# Patient Record
Sex: FEMALE | Race: NATIVE HAWAIIAN OR OTHER PACIFIC ISLANDER | NOT HISPANIC OR LATINO | Employment: OTHER | URBAN - METROPOLITAN AREA
[De-identification: names, ages, dates, MRNs, and addresses within clinical notes are randomized per-mention and may not be internally consistent; named-entity substitution may affect disease eponyms.]

---

## 2017-03-13 LAB
EKG IMPRESSION: NORMAL
EKG IMPRESSION: NORMAL

## 2017-03-13 PROCEDURE — 93005 ELECTROCARDIOGRAM TRACING: CPT

## 2017-03-13 PROCEDURE — 99285 EMERGENCY DEPT VISIT HI MDM: CPT

## 2017-03-14 ENCOUNTER — HOSPITAL ENCOUNTER (INPATIENT)
Facility: MEDICAL CENTER | Age: 67
LOS: 3 days | DRG: 191 | End: 2017-03-18
Attending: EMERGENCY MEDICINE | Admitting: INTERNAL MEDICINE
Payer: MEDICAID

## 2017-03-14 ENCOUNTER — APPOINTMENT (OUTPATIENT)
Dept: RADIOLOGY | Facility: MEDICAL CENTER | Age: 67
DRG: 191 | End: 2017-03-14
Attending: INTERNAL MEDICINE
Payer: MEDICAID

## 2017-03-14 ENCOUNTER — RESOLUTE PROFESSIONAL BILLING HOSPITAL PROF FEE (OUTPATIENT)
Dept: HOSPITALIST | Facility: MEDICAL CENTER | Age: 67
End: 2017-03-14
Payer: MEDICAID

## 2017-03-14 ENCOUNTER — HOSPITAL ENCOUNTER (OUTPATIENT)
Dept: RADIOLOGY | Facility: MEDICAL CENTER | Age: 67
End: 2017-03-14
Attending: EMERGENCY MEDICINE
Payer: MEDICAID

## 2017-03-14 DIAGNOSIS — R07.9 CHEST PAIN, UNSPECIFIED TYPE: ICD-10-CM

## 2017-03-14 DIAGNOSIS — J44.1 ACUTE EXACERBATION OF CHRONIC OBSTRUCTIVE PULMONARY DISEASE (COPD) (HCC): ICD-10-CM

## 2017-03-14 DIAGNOSIS — J44.1 COPD EXACERBATION (HCC): ICD-10-CM

## 2017-03-14 PROBLEM — E87.6 HYPOKALEMIA: Status: ACTIVE | Noted: 2017-03-14

## 2017-03-14 LAB
ALBUMIN SERPL BCP-MCNC: 3.8 G/DL (ref 3.2–4.9)
ALBUMIN/GLOB SERPL: 1.3 G/DL
ALP SERPL-CCNC: 115 U/L (ref 30–99)
ALT SERPL-CCNC: 9 U/L (ref 2–50)
ANION GAP SERPL CALC-SCNC: 8 MMOL/L (ref 0–11.9)
AST SERPL-CCNC: 13 U/L (ref 12–45)
BASOPHILS # BLD AUTO: 0.8 % (ref 0–1.8)
BASOPHILS # BLD: 0.05 K/UL (ref 0–0.12)
BILIRUB SERPL-MCNC: 0.3 MG/DL (ref 0.1–1.5)
BLOOD CULTURE HOLD CXBCH: NORMAL
BNP SERPL-MCNC: 93 PG/ML (ref 0–100)
BUN SERPL-MCNC: 18 MG/DL (ref 8–22)
CALCIUM SERPL-MCNC: 9.1 MG/DL (ref 8.5–10.5)
CHLORIDE SERPL-SCNC: 107 MMOL/L (ref 96–112)
CO2 SERPL-SCNC: 24 MMOL/L (ref 20–33)
CREAT SERPL-MCNC: 0.58 MG/DL (ref 0.5–1.4)
EKG IMPRESSION: NORMAL
EOSINOPHIL # BLD AUTO: 0.07 K/UL (ref 0–0.51)
EOSINOPHIL NFR BLD: 1.2 % (ref 0–6.9)
ERYTHROCYTE [DISTWIDTH] IN BLOOD BY AUTOMATED COUNT: 39.9 FL (ref 35.9–50)
EST. AVERAGE GLUCOSE BLD GHB EST-MCNC: 143 MG/DL
GFR SERPL CREATININE-BSD FRML MDRD: >60 ML/MIN/1.73 M 2
GLOBULIN SER CALC-MCNC: 3 G/DL (ref 1.9–3.5)
GLUCOSE BLD-MCNC: 210 MG/DL (ref 65–99)
GLUCOSE BLD-MCNC: 252 MG/DL (ref 65–99)
GLUCOSE BLD-MCNC: 327 MG/DL (ref 65–99)
GLUCOSE BLD-MCNC: 345 MG/DL (ref 65–99)
GLUCOSE BLD-MCNC: 368 MG/DL (ref 65–99)
GLUCOSE SERPL-MCNC: 166 MG/DL (ref 65–99)
HBA1C MFR BLD: 6.6 % (ref 0–5.6)
HCT VFR BLD AUTO: 46.8 % (ref 37–47)
HGB BLD-MCNC: 15.1 G/DL (ref 12–16)
IMM GRANULOCYTES # BLD AUTO: 0.02 K/UL (ref 0–0.11)
IMM GRANULOCYTES NFR BLD AUTO: 0.3 % (ref 0–0.9)
LV EJECT FRACT  99904: 60
LV EJECT FRACT MOD 2C 99903: 68.96
LV EJECT FRACT MOD 4C 99902: 65.67
LV EJECT FRACT MOD BP 99901: 66.68
LYMPHOCYTES # BLD AUTO: 1.82 K/UL (ref 1–4.8)
LYMPHOCYTES NFR BLD: 30 % (ref 22–41)
MCH RBC QN AUTO: 26.6 PG (ref 27–33)
MCHC RBC AUTO-ENTMCNC: 32.3 G/DL (ref 33.6–35)
MCV RBC AUTO: 82.5 FL (ref 81.4–97.8)
MONOCYTES # BLD AUTO: 0.58 K/UL (ref 0–0.85)
MONOCYTES NFR BLD AUTO: 9.6 % (ref 0–13.4)
NEUTROPHILS # BLD AUTO: 3.53 K/UL (ref 2–7.15)
NEUTROPHILS NFR BLD: 58.1 % (ref 44–72)
NRBC # BLD AUTO: 0 K/UL
NRBC BLD AUTO-RTO: 0 /100 WBC
PLATELET # BLD AUTO: 236 K/UL (ref 164–446)
PMV BLD AUTO: 10.2 FL (ref 9–12.9)
POTASSIUM SERPL-SCNC: 3.5 MMOL/L (ref 3.6–5.5)
PROT SERPL-MCNC: 6.8 G/DL (ref 6–8.2)
RBC # BLD AUTO: 5.67 M/UL (ref 4.2–5.4)
SODIUM SERPL-SCNC: 139 MMOL/L (ref 135–145)
TROPONIN I SERPL-MCNC: 0.01 NG/ML (ref 0–0.04)
TROPONIN I SERPL-MCNC: 0.02 NG/ML (ref 0–0.04)
TROPONIN I SERPL-MCNC: <0.01 NG/ML (ref 0–0.04)
WBC # BLD AUTO: 6.1 K/UL (ref 4.8–10.8)

## 2017-03-14 PROCEDURE — 93005 ELECTROCARDIOGRAM TRACING: CPT | Mod: XU | Performed by: INTERNAL MEDICINE

## 2017-03-14 PROCEDURE — 94760 N-INVAS EAR/PLS OXIMETRY 1: CPT

## 2017-03-14 PROCEDURE — 93005 ELECTROCARDIOGRAM TRACING: CPT | Performed by: INTERNAL MEDICINE

## 2017-03-14 PROCEDURE — G0378 HOSPITAL OBSERVATION PER HR: HCPCS

## 2017-03-14 PROCEDURE — 700111 HCHG RX REV CODE 636 W/ 250 OVERRIDE (IP): Performed by: INTERNAL MEDICINE

## 2017-03-14 PROCEDURE — 96375 TX/PRO/DX INJ NEW DRUG ADDON: CPT | Mod: XU

## 2017-03-14 PROCEDURE — 71020 DX-CHEST-2 VIEWS: CPT

## 2017-03-14 PROCEDURE — 99219 PR INITIAL OBSERVATION CARE,LEVL II: CPT | Performed by: INTERNAL MEDICINE

## 2017-03-14 PROCEDURE — 700111 HCHG RX REV CODE 636 W/ 250 OVERRIDE (IP)

## 2017-03-14 PROCEDURE — A9270 NON-COVERED ITEM OR SERVICE: HCPCS | Performed by: EMERGENCY MEDICINE

## 2017-03-14 PROCEDURE — 93005 ELECTROCARDIOGRAM TRACING: CPT

## 2017-03-14 PROCEDURE — 700102 HCHG RX REV CODE 250 W/ 637 OVERRIDE(OP): Performed by: NURSE PRACTITIONER

## 2017-03-14 PROCEDURE — 93306 TTE W/DOPPLER COMPLETE: CPT | Mod: 26 | Performed by: INTERNAL MEDICINE

## 2017-03-14 PROCEDURE — 700101 HCHG RX REV CODE 250: Performed by: INTERNAL MEDICINE

## 2017-03-14 PROCEDURE — 96372 THER/PROPH/DIAG INJ SC/IM: CPT | Mod: XU

## 2017-03-14 PROCEDURE — A9270 NON-COVERED ITEM OR SERVICE: HCPCS | Performed by: INTERNAL MEDICINE

## 2017-03-14 PROCEDURE — 93010 ELECTROCARDIOGRAM REPORT: CPT | Performed by: INTERNAL MEDICINE

## 2017-03-14 PROCEDURE — 700102 HCHG RX REV CODE 250 W/ 637 OVERRIDE(OP): Performed by: EMERGENCY MEDICINE

## 2017-03-14 PROCEDURE — 96376 TX/PRO/DX INJ SAME DRUG ADON: CPT | Mod: XU

## 2017-03-14 PROCEDURE — 84484 ASSAY OF TROPONIN QUANT: CPT

## 2017-03-14 PROCEDURE — 94640 AIRWAY INHALATION TREATMENT: CPT

## 2017-03-14 PROCEDURE — 83036 HEMOGLOBIN GLYCOSYLATED A1C: CPT

## 2017-03-14 PROCEDURE — 80053 COMPREHEN METABOLIC PANEL: CPT

## 2017-03-14 PROCEDURE — 700101 HCHG RX REV CODE 250: Performed by: EMERGENCY MEDICINE

## 2017-03-14 PROCEDURE — 700102 HCHG RX REV CODE 250 W/ 637 OVERRIDE(OP): Performed by: INTERNAL MEDICINE

## 2017-03-14 PROCEDURE — 85025 COMPLETE CBC W/AUTO DIFF WBC: CPT

## 2017-03-14 PROCEDURE — 93306 TTE W/DOPPLER COMPLETE: CPT

## 2017-03-14 PROCEDURE — 36415 COLL VENOUS BLD VENIPUNCTURE: CPT

## 2017-03-14 PROCEDURE — 83880 ASSAY OF NATRIURETIC PEPTIDE: CPT

## 2017-03-14 PROCEDURE — 82962 GLUCOSE BLOOD TEST: CPT | Mod: 91

## 2017-03-14 PROCEDURE — 700111 HCHG RX REV CODE 636 W/ 250 OVERRIDE (IP): Performed by: EMERGENCY MEDICINE

## 2017-03-14 PROCEDURE — A9502 TC99M TETROFOSMIN: HCPCS

## 2017-03-14 RX ORDER — INSULIN GLARGINE 100 [IU]/ML
10 INJECTION, SOLUTION SUBCUTANEOUS ONCE
Status: DISPENSED | OUTPATIENT
Start: 2017-03-14 | End: 2017-03-15

## 2017-03-14 RX ORDER — BUDESONIDE AND FORMOTEROL FUMARATE DIHYDRATE 160; 4.5 UG/1; UG/1
2 AEROSOL RESPIRATORY (INHALATION) 2 TIMES DAILY
Status: DISCONTINUED | OUTPATIENT
Start: 2017-03-14 | End: 2017-03-18 | Stop reason: HOSPADM

## 2017-03-14 RX ORDER — REGADENOSON 0.08 MG/ML
INJECTION, SOLUTION INTRAVENOUS
Status: COMPLETED
Start: 2017-03-14 | End: 2017-03-14

## 2017-03-14 RX ORDER — OXYCODONE HYDROCHLORIDE 10 MG/1
10 TABLET ORAL ONCE
Status: COMPLETED | OUTPATIENT
Start: 2017-03-14 | End: 2017-03-14

## 2017-03-14 RX ORDER — CLONIDINE HYDROCHLORIDE 0.1 MG/1
0.1 TABLET ORAL 2 TIMES DAILY
Status: DISCONTINUED | OUTPATIENT
Start: 2017-03-14 | End: 2017-03-18 | Stop reason: HOSPADM

## 2017-03-14 RX ORDER — MORPHINE SULFATE 4 MG/ML
2-4 INJECTION, SOLUTION INTRAMUSCULAR; INTRAVENOUS
Status: DISCONTINUED | OUTPATIENT
Start: 2017-03-14 | End: 2017-03-16

## 2017-03-14 RX ORDER — LISINOPRIL 20 MG/1
20 TABLET ORAL DAILY
Status: DISCONTINUED | OUTPATIENT
Start: 2017-03-14 | End: 2017-03-18 | Stop reason: HOSPADM

## 2017-03-14 RX ORDER — BUDESONIDE AND FORMOTEROL FUMARATE DIHYDRATE 160; 4.5 UG/1; UG/1
2 AEROSOL RESPIRATORY (INHALATION) 2 TIMES DAILY
Status: DISCONTINUED | OUTPATIENT
Start: 2017-03-14 | End: 2017-03-14

## 2017-03-14 RX ORDER — NITROGLYCERIN 0.4 MG/1
0.4 TABLET SUBLINGUAL
Status: DISCONTINUED | OUTPATIENT
Start: 2017-03-14 | End: 2017-03-18 | Stop reason: HOSPADM

## 2017-03-14 RX ORDER — DEXTROSE MONOHYDRATE 25 G/50ML
25 INJECTION, SOLUTION INTRAVENOUS
Status: DISCONTINUED | OUTPATIENT
Start: 2017-03-14 | End: 2017-03-18 | Stop reason: HOSPADM

## 2017-03-14 RX ORDER — IPRATROPIUM BROMIDE AND ALBUTEROL SULFATE 2.5; .5 MG/3ML; MG/3ML
3 SOLUTION RESPIRATORY (INHALATION)
Status: DISCONTINUED | OUTPATIENT
Start: 2017-03-14 | End: 2017-03-16

## 2017-03-14 RX ORDER — ASPIRIN 81 MG/1
324 TABLET, CHEWABLE ORAL ONCE
Status: COMPLETED | OUTPATIENT
Start: 2017-03-14 | End: 2017-03-14

## 2017-03-14 RX ORDER — ONDANSETRON 2 MG/ML
4 INJECTION INTRAMUSCULAR; INTRAVENOUS EVERY 4 HOURS PRN
Status: DISCONTINUED | OUTPATIENT
Start: 2017-03-14 | End: 2017-03-18 | Stop reason: HOSPADM

## 2017-03-14 RX ORDER — ONDANSETRON 4 MG/1
4 TABLET, ORALLY DISINTEGRATING ORAL EVERY 4 HOURS PRN
Status: DISCONTINUED | OUTPATIENT
Start: 2017-03-14 | End: 2017-03-18 | Stop reason: HOSPADM

## 2017-03-14 RX ORDER — INSULIN GLARGINE 100 [IU]/ML
32 INJECTION, SOLUTION SUBCUTANEOUS EVERY EVENING
Status: DISCONTINUED | OUTPATIENT
Start: 2017-03-14 | End: 2017-03-14

## 2017-03-14 RX ORDER — POTASSIUM CHLORIDE 20 MEQ/1
20 TABLET, EXTENDED RELEASE ORAL 2 TIMES DAILY
Status: DISCONTINUED | OUTPATIENT
Start: 2017-03-14 | End: 2017-03-18 | Stop reason: HOSPADM

## 2017-03-14 RX ORDER — BISACODYL 10 MG
10 SUPPOSITORY, RECTAL RECTAL
Status: DISCONTINUED | OUTPATIENT
Start: 2017-03-14 | End: 2017-03-18 | Stop reason: HOSPADM

## 2017-03-14 RX ORDER — BUDESONIDE AND FORMOTEROL FUMARATE DIHYDRATE 160; 4.5 UG/1; UG/1
2 AEROSOL RESPIRATORY (INHALATION)
Status: DISCONTINUED | OUTPATIENT
Start: 2017-03-14 | End: 2017-03-14

## 2017-03-14 RX ORDER — TIOTROPIUM BROMIDE 18 UG/1
1 CAPSULE ORAL; RESPIRATORY (INHALATION)
Status: DISCONTINUED | OUTPATIENT
Start: 2017-03-14 | End: 2017-03-18 | Stop reason: HOSPADM

## 2017-03-14 RX ORDER — GABAPENTIN 300 MG/1
300 CAPSULE ORAL 3 TIMES DAILY
Status: DISCONTINUED | OUTPATIENT
Start: 2017-03-14 | End: 2017-03-18 | Stop reason: HOSPADM

## 2017-03-14 RX ORDER — PREDNISONE 20 MG/1
60 TABLET ORAL DAILY
Status: DISCONTINUED | OUTPATIENT
Start: 2017-03-14 | End: 2017-03-15

## 2017-03-14 RX ORDER — IPRATROPIUM BROMIDE AND ALBUTEROL SULFATE 2.5; .5 MG/3ML; MG/3ML
3 SOLUTION RESPIRATORY (INHALATION)
Status: DISCONTINUED | OUTPATIENT
Start: 2017-03-14 | End: 2017-03-18 | Stop reason: HOSPADM

## 2017-03-14 RX ORDER — ALPRAZOLAM 0.5 MG/1
0.5 TABLET ORAL 3 TIMES DAILY PRN
Status: DISCONTINUED | OUTPATIENT
Start: 2017-03-14 | End: 2017-03-14

## 2017-03-14 RX ORDER — ALBUTEROL SULFATE 2.5 MG/3ML
5 SOLUTION RESPIRATORY (INHALATION) ONCE
Status: DISPENSED | OUTPATIENT
Start: 2017-03-14 | End: 2017-03-15

## 2017-03-14 RX ORDER — PROMETHAZINE HYDROCHLORIDE 25 MG/1
12.5-25 TABLET ORAL EVERY 4 HOURS PRN
Status: DISCONTINUED | OUTPATIENT
Start: 2017-03-14 | End: 2017-03-18 | Stop reason: HOSPADM

## 2017-03-14 RX ORDER — INSULIN GLARGINE 100 [IU]/ML
40 INJECTION, SOLUTION SUBCUTANEOUS EVERY EVENING
Status: DISCONTINUED | OUTPATIENT
Start: 2017-03-14 | End: 2017-03-16

## 2017-03-14 RX ORDER — CITALOPRAM 20 MG/1
20 TABLET ORAL
Status: DISCONTINUED | OUTPATIENT
Start: 2017-03-14 | End: 2017-03-18 | Stop reason: HOSPADM

## 2017-03-14 RX ORDER — ALBUTEROL SULFATE 90 UG/1
2 AEROSOL, METERED RESPIRATORY (INHALATION) EVERY 4 HOURS PRN
Status: DISCONTINUED | OUTPATIENT
Start: 2017-03-14 | End: 2017-03-14

## 2017-03-14 RX ORDER — METOPROLOL SUCCINATE 25 MG/1
25 TABLET, EXTENDED RELEASE ORAL DAILY
Status: DISCONTINUED | OUTPATIENT
Start: 2017-03-14 | End: 2017-03-18 | Stop reason: HOSPADM

## 2017-03-14 RX ORDER — RISPERIDONE 0.5 MG/1
0.5 TABLET ORAL
Status: DISCONTINUED | OUTPATIENT
Start: 2017-03-14 | End: 2017-03-18 | Stop reason: HOSPADM

## 2017-03-14 RX ORDER — TRAZODONE HYDROCHLORIDE 50 MG/1
50 TABLET ORAL
Status: DISCONTINUED | OUTPATIENT
Start: 2017-03-14 | End: 2017-03-18 | Stop reason: HOSPADM

## 2017-03-14 RX ORDER — PREDNISONE 20 MG/1
60 TABLET ORAL ONCE
Status: COMPLETED | OUTPATIENT
Start: 2017-03-14 | End: 2017-03-14

## 2017-03-14 RX ORDER — OXYCODONE HYDROCHLORIDE 10 MG/1
10 TABLET ORAL 2 TIMES DAILY PRN
Status: DISCONTINUED | OUTPATIENT
Start: 2017-03-14 | End: 2017-03-15

## 2017-03-14 RX ORDER — FUROSEMIDE 20 MG/1
20 TABLET ORAL 2 TIMES DAILY
Status: DISCONTINUED | OUTPATIENT
Start: 2017-03-14 | End: 2017-03-18 | Stop reason: HOSPADM

## 2017-03-14 RX ORDER — ALPRAZOLAM 0.5 MG/1
0.5 TABLET ORAL 3 TIMES DAILY PRN
Status: DISCONTINUED | OUTPATIENT
Start: 2017-03-14 | End: 2017-03-18 | Stop reason: HOSPADM

## 2017-03-14 RX ORDER — ALBUTEROL SULFATE 2.5 MG/3ML
5 SOLUTION RESPIRATORY (INHALATION)
Status: DISCONTINUED | OUTPATIENT
Start: 2017-03-14 | End: 2017-03-14 | Stop reason: CLARIF

## 2017-03-14 RX ORDER — SIMVASTATIN 20 MG
20 TABLET ORAL NIGHTLY
Status: DISCONTINUED | OUTPATIENT
Start: 2017-03-14 | End: 2017-03-18 | Stop reason: HOSPADM

## 2017-03-14 RX ORDER — HEPARIN SODIUM 5000 [USP'U]/ML
5000 INJECTION, SOLUTION INTRAVENOUS; SUBCUTANEOUS EVERY 8 HOURS
Status: DISCONTINUED | OUTPATIENT
Start: 2017-03-14 | End: 2017-03-18 | Stop reason: HOSPADM

## 2017-03-14 RX ORDER — PROMETHAZINE HYDROCHLORIDE 25 MG/1
12.5-25 SUPPOSITORY RECTAL EVERY 4 HOURS PRN
Status: DISCONTINUED | OUTPATIENT
Start: 2017-03-14 | End: 2017-03-18 | Stop reason: HOSPADM

## 2017-03-14 RX ORDER — AMOXICILLIN 250 MG
2 CAPSULE ORAL 2 TIMES DAILY
Status: DISCONTINUED | OUTPATIENT
Start: 2017-03-14 | End: 2017-03-18 | Stop reason: HOSPADM

## 2017-03-14 RX ORDER — SODIUM CHLORIDE 9 MG/ML
1000 INJECTION, SOLUTION INTRAVENOUS ONCE
Status: ACTIVE | OUTPATIENT
Start: 2017-03-14 | End: 2017-03-15

## 2017-03-14 RX ORDER — POLYETHYLENE GLYCOL 3350 17 G/17G
1 POWDER, FOR SOLUTION ORAL
Status: DISCONTINUED | OUTPATIENT
Start: 2017-03-14 | End: 2017-03-18 | Stop reason: HOSPADM

## 2017-03-14 RX ADMIN — FUROSEMIDE 20 MG: 20 TABLET ORAL at 20:57

## 2017-03-14 RX ADMIN — TRAZODONE HYDROCHLORIDE 50 MG: 50 TABLET ORAL at 20:57

## 2017-03-14 RX ADMIN — MORPHINE SULFATE 4 MG: 4 INJECTION INTRAVENOUS at 09:42

## 2017-03-14 RX ADMIN — PREDNISONE 60 MG: 20 TABLET ORAL at 09:35

## 2017-03-14 RX ADMIN — CLONIDINE HYDROCHLORIDE 0.1 MG: 0.1 TABLET ORAL at 09:33

## 2017-03-14 RX ADMIN — LISINOPRIL 20 MG: 20 TABLET ORAL at 09:34

## 2017-03-14 RX ADMIN — FUROSEMIDE 20 MG: 20 TABLET ORAL at 09:34

## 2017-03-14 RX ADMIN — INSULIN LISPRO 6 UNITS: 100 INJECTION, SOLUTION INTRAVENOUS; SUBCUTANEOUS at 18:22

## 2017-03-14 RX ADMIN — GUAIFENESIN 200 MG: 100 SOLUTION ORAL at 18:20

## 2017-03-14 RX ADMIN — IPRATROPIUM BROMIDE AND ALBUTEROL SULFATE 3 ML: .5; 3 SOLUTION RESPIRATORY (INHALATION) at 19:20

## 2017-03-14 RX ADMIN — RISPERIDONE 0.5 MG: 0.5 TABLET, FILM COATED ORAL at 21:09

## 2017-03-14 RX ADMIN — BUDESONIDE AND FORMOTEROL FUMARATE DIHYDRATE 2 PUFF: 160; 4.5 AEROSOL RESPIRATORY (INHALATION) at 09:37

## 2017-03-14 RX ADMIN — ALBUTEROL SULFATE 10 MG: 5 SOLUTION RESPIRATORY (INHALATION) at 04:51

## 2017-03-14 RX ADMIN — METOPROLOL SUCCINATE 25 MG: 25 TABLET, EXTENDED RELEASE ORAL at 09:35

## 2017-03-14 RX ADMIN — SIMVASTATIN 20 MG: 20 TABLET, FILM COATED ORAL at 20:57

## 2017-03-14 RX ADMIN — ASPIRIN 324 MG: 81 TABLET, CHEWABLE ORAL at 02:50

## 2017-03-14 RX ADMIN — INSULIN GLARGINE 40 UNITS: 100 INJECTION, SOLUTION SUBCUTANEOUS at 21:00

## 2017-03-14 RX ADMIN — REGADENOSON 0.4 MG: 0.08 INJECTION, SOLUTION INTRAVENOUS at 15:45

## 2017-03-14 RX ADMIN — GABAPENTIN 300 MG: 300 CAPSULE ORAL at 18:21

## 2017-03-14 RX ADMIN — HEPARIN SODIUM 5000 UNITS: 5000 INJECTION, SOLUTION INTRAVENOUS; SUBCUTANEOUS at 06:36

## 2017-03-14 RX ADMIN — GUAIFENESIN 200 MG: 100 SOLUTION ORAL at 21:15

## 2017-03-14 RX ADMIN — GABAPENTIN 300 MG: 300 CAPSULE ORAL at 20:56

## 2017-03-14 RX ADMIN — ALBUTEROL SULFATE 5 MG: 2.5 SOLUTION RESPIRATORY (INHALATION) at 02:56

## 2017-03-14 RX ADMIN — NITROGLYCERIN 0.4 MG: 0.4 TABLET SUBLINGUAL at 04:31

## 2017-03-14 RX ADMIN — ASPIRIN 81 MG: 81 TABLET ORAL at 09:32

## 2017-03-14 RX ADMIN — IPRATROPIUM BROMIDE 1 MG: 0.5 SOLUTION RESPIRATORY (INHALATION) at 02:56

## 2017-03-14 RX ADMIN — IPRATROPIUM BROMIDE AND ALBUTEROL SULFATE 3 ML: .5; 3 SOLUTION RESPIRATORY (INHALATION) at 12:02

## 2017-03-14 RX ADMIN — OXYCODONE HYDROCHLORIDE 10 MG: 10 TABLET ORAL at 18:29

## 2017-03-14 RX ADMIN — INSULIN LISPRO 6 UNITS: 100 INJECTION, SOLUTION INTRAVENOUS; SUBCUTANEOUS at 11:58

## 2017-03-14 RX ADMIN — PREDNISONE 60 MG: 20 TABLET ORAL at 02:50

## 2017-03-14 RX ADMIN — CLONIDINE HYDROCHLORIDE 0.1 MG: 0.1 TABLET ORAL at 20:56

## 2017-03-14 RX ADMIN — POTASSIUM CHLORIDE 20 MEQ: 1500 TABLET, EXTENDED RELEASE ORAL at 09:34

## 2017-03-14 RX ADMIN — GUAIFENESIN 200 MG: 100 SOLUTION ORAL at 09:38

## 2017-03-14 RX ADMIN — MORPHINE SULFATE 2 MG: 4 INJECTION INTRAVENOUS at 21:10

## 2017-03-14 RX ADMIN — CITALOPRAM HYDROBROMIDE 20 MG: 20 TABLET ORAL at 09:38

## 2017-03-14 RX ADMIN — OXYCODONE HYDROCHLORIDE 10 MG: 10 TABLET ORAL at 02:50

## 2017-03-14 RX ADMIN — BUDESONIDE AND FORMOTEROL FUMARATE DIHYDRATE 2 PUFF: 160; 4.5 AEROSOL RESPIRATORY (INHALATION) at 20:56

## 2017-03-14 RX ADMIN — IPRATROPIUM BROMIDE AND ALBUTEROL SULFATE 3 ML: .5; 3 SOLUTION RESPIRATORY (INHALATION) at 22:39

## 2017-03-14 RX ADMIN — GABAPENTIN 300 MG: 300 CAPSULE ORAL at 09:34

## 2017-03-14 RX ADMIN — HEPARIN SODIUM 5000 UNITS: 5000 INJECTION, SOLUTION INTRAVENOUS; SUBCUTANEOUS at 18:27

## 2017-03-14 RX ADMIN — POTASSIUM CHLORIDE 20 MEQ: 1500 TABLET, EXTENDED RELEASE ORAL at 20:57

## 2017-03-14 ASSESSMENT — ENCOUNTER SYMPTOMS
VOMITING: 0
BACK PAIN: 1
SHORTNESS OF BREATH: 1
SPUTUM PRODUCTION: 1
FOCAL WEAKNESS: 1
DIARRHEA: 0
COUGH: 1
FEVER: 1
NAUSEA: 1

## 2017-03-14 ASSESSMENT — COPD QUESTIONNAIRES
HAVE YOU SMOKED AT LEAST 100 CIGARETTES IN YOUR ENTIRE LIFE: YES
COPD SCREENING SCORE: 7
DO YOU EVER COUGH UP ANY MUCUS OR PHLEGM?: YES, A FEW DAYS A WEEK OR MONTH
DURING THE PAST 4 WEEKS HOW MUCH DID YOU FEEL SHORT OF BREATH: SOME OF THE TIME

## 2017-03-14 ASSESSMENT — PAIN SCALES - GENERAL
PAINLEVEL_OUTOF10: 8

## 2017-03-14 ASSESSMENT — LIFESTYLE VARIABLES
DO YOU DRINK ALCOHOL: NO
DO YOU DRINK ALCOHOL: NO
EVER_SMOKED: YES
ALCOHOL_USE: NO

## 2017-03-14 NOTE — PROGRESS NOTES
"Assumed patient care.  Pt A&Ox4.  Respirations even, on room air, mild work of breathing with expiratory wheezes throughout.  Pt complains of 8/10 mid chest pain \"pressure\", medicated per MAR.  Monitors applied, sinus rhythm noted.  Call light within reach.  Pt updated on POC, updated communication board.   Needs met, will continue to monitor. Breakfast meal provided   "

## 2017-03-14 NOTE — PROGRESS NOTES
"Address on facesheet is no longer valid. Patient states she was living with a person who was on hospice and has since . \"Since the lease was in her name, I has kicked out. The landlord said I could apply but by the time I did, they had rented it to someone else.\" Patient is currently homeless and plans to find a place with a person who was also living in the same place \"but she has to apply for an ID card, and they said it was going to take a few weeks. I get $300 per month through disability, and that's what I was paying for rent before.\"    Care Transition Team Assessment    Information Source  Orientation : Oriented x 4  Information Given By: Patient  Who is responsible for making decisions for patient? : Patient    Readmission Evaluation  Is this a readmission?: No    Elopement Risk  Legal Hold: No  Ambulatory or Self Mobile in Wheelchair: No-Not an Elopement Risk  Elopement Risk: Not at Risk for Elopement    Interdisciplinary Discharge Planning  Does Admitting Nurse Feel This Could be a Complex Discharge?: Yes  Primary Care Physician: Dr. Fortune at [facility]  Lives with - Patient's Self Care Capacity: Other (Comments) (Is homeless.)  Support Systems: None  Housing / Facility:  (Is homeless.)  Do You Take your Prescribed Medications Regularly: Yes  Able to Return to Previous ADL's: Future Time w/Therapy  Mobility Issues: No  Prior Services: None  Patient Expects to be Discharged to:: Home.  Assistance Needed: Yes  Durable Medical Equipment: Not Applicable    Discharge Preparedness  What is your plan after discharge?: Home with help  What are your discharge supports?:  (None.)  Prior Functional Level: Ambulatory, Independent with Activities of Daily Living (Could drive but does not have a car.)  Difficulity with ADLs: None  Difficulity with IADLs: Driving  Difficulity with IADL Comments: Is homeless. Does not have a car. Uses taxis (has Medicaid) or [employer].    Functional Assesment  Prior Functional Level: " Ambulatory, Independent with Activities of Daily Living (Could drive but does not have a car.)    Finances  Financial Barriers to Discharge: Yes  Average Monthly Income:  ($300/month SSI.)  Source of Income: Social Security Disability  Prescription Coverage: Yes    Vision / Hearing Impairment  Vision Impairment : Yes  Right Eye Vision: Impaired, Wears Glasses (Lost reading glasses.)  Left Eye Vision: Impaired, Wears Glasses  Hearing Impairment : No    Values / Beliefs / Concerns  Values / Beliefs Concerns : No    Advance Directive  Advance Directive?: None  Advance Directive offered?: AD Booklet given    Domestic Abuse  Have you ever been the victim of abuse or violence?: No  Physical Abuse or Sexual Abuse: No  Verbal Abuse or Emotional Abuse: No  Possible Abuse Reported to:: Not Applicable    Psychological Assessment  History of Substance Abuse: None  History of Psychiatric Problems: No  Non-compliant with Treatment: No  Newly Diagnosed Illness: No    Discharge Risks or Barriers  Discharge risks or barriers?: No PCP, Transportation, Homeless / couch surfing  Patient risk factors: Homeless, Lack of outside supports, No PCP    Anticipated Discharge Information  Anticipated discharge disposition: Home  Discharge Address: None - is homeless.  Discharge Contact Phone Number: [phone] (mobile)

## 2017-03-14 NOTE — ED PROVIDER NOTES
"ED Provider Note    Scribed for Ishan Saunders M.D. by Rohini Moscoso. 3/14/2017, 1:57 AM.    Primary care provider: Pcp Pt States None  Means of arrival: Walk-in   History obtained from: Patient   History limited by: None     CHIEF COMPLAINT  Chief Complaint   Patient presents with   • Chest Pain     x1 day. center chest.    • Shortness of Breath     x1 day. COPD       HPI  Zenia Ordaz is a 66 y.o. female who presents to the Emergency Department due to pain to the central chest onset yesterday. She states it feels like \"elephants are sitting\" on her chest. Her pain radiates to her back. Patient reports cough onset 3 weeks ago with associated green sputum production. Her chest pain is exacerbated while coughing. She also reports associated intermittent fevers, nausea, shortness of breath, and leg pain and weakness. Patient has a history of COPD and states she has been using her inhaler frequently. In addition, she states her right arm has been broken since January and has not healed well. She has been experiencing pain to the arm since then. She also reports history of diabetes mellitus and 2 previous heart attacks. She denies vomiting or diarrhea. Patient notes history of IV drug use 40 years ago.     REVIEW OF SYSTEMS  Review of Systems   Constitutional: Positive for fever.   Respiratory: Positive for cough, sputum production (green) and shortness of breath.    Cardiovascular: Positive for chest pain (central).   Gastrointestinal: Positive for nausea. Negative for vomiting and diarrhea.   Musculoskeletal: Positive for back pain.        Positive leg pain, right arm pain.    Neurological: Positive for focal weakness (leg weakness ).   All other systems reviewed and are negative.  C     PAST MEDICAL HISTORY   has a past medical history of COPD; Fibromyalgia; Hepatitis B; Hepatitis C; Hepatitis A; Diabetes; CAD (coronary artery disease); Stroke (CMS-HCC) (1982); Backpain; Myocardial infarct (CMS-HCC) (1989, " 1991); Cancer (CMS-HCC) (Cervical ~2003); Congestive heart failure (CMS-HCC); MRSA infection; Drug abuse; Hypertension; Fall; and Pneumonia.    SURGICAL HISTORY   has past surgical history that includes other cardiac surgery and incision and drainage orthopedic (7/13/2013).    SOCIAL HISTORY  Social History   Substance Use Topics   • Smoking status: Current Every Day Smoker -- 0.10 packs/day for 53 years     Types: Cigarettes   • Smokeless tobacco: Never Used      Comment: smokes 1/2 ppd   • Alcohol Use: No      History   Drug Use No     Comment: Hx of heroin meth       FAMILY HISTORY  Family History   Problem Relation Age of Onset   • Cancer Mother    • Cancer Sister    • Cancer Maternal Aunt        CURRENT MEDICATIONS  Home Medications     Reviewed by Tom Escoto M.D. (Physician) on 03/14/17 at 0449  Med List Status: Complete    Medication Last Dose Status    albuterol (VENTOLIN OR PROVENTIL) 108 (90 BASE) MCG/ACT Aero Soln inhalation aerosol 3/14/2017 Active    alprazolam (XANAX) 0.5 MG Tab 3/13/2017 Active    aspirin EC (ECOTRIN) 81 MG Tablet Delayed Response 3/14/2017 Active    azithromycin (ZITHROMAX) 250 MG Tab > 1 week Active    benzonatate (TESSALON) 100 MG Cap > 1 week Active    budesonide-formoterol (SYMBICORT) 160-4.5 MCG/ACT Aerosol 3/14/2017 Active    citalopram (CELEXA) 20 MG Tab 3/14/2017 Active    clonidine (CATAPRES) 0.1 MG Tab 3/14/2017 Active    furosemide (LASIX) 20 MG Tab 3/14/2017 Active    gabapentin (NEURONTIN) 300 MG Cap 3/14/2017 Active    guaifenesin (ROBITUSSIN) 100 MG/5ML Solution  Active    ibuprofen (MOTRIN) 600 MG Tab  Active    insulin glargine (LANTUS SOLOSTAR) 100 UNIT/ML Solution Pen-injector injection 3/13/2017 Active    insulin lispro (HUMALOG) 100 UNIT/ML Solution 3/13/2017 Active    lisinopril (PRINIVIL) 20 MG Tab 3/13/2017 Active    metoclopramide (REGLAN) 5 MG tablet 3/13/2017 Active    metoprolol SR (TOPROL XL) 25 MG TABLET SR 24 HR 3/13/2017 Active    nitroglycerin  "(NITROSTAT) 0.4 MG SL Tab 3/13/2017 Active    oxycodone immediate release (ROXICODONE) 10 MG immediate release tablet 3/12 Active    potassium chloride SA (K-DUR) 20 MEQ Tab CR  Active    risperidone (RISPERDAL) 0.5 MG Tab 3/12 Active    simvastatin (ZOCOR) 20 MG Tab 3/12 Active    trazodone (DESYREL) 50 MG Tab 3/12 Active                ALLERGIES  Allergies   Allergen Reactions   • Tylenol Rash     Pt states \"I get a body rash\".   • Zofran Rash     Pt states \"I get a body rash\".   • Mushroom Extract Complex Rash     Pt gets a rash and breaks out in a sweat when eats mushrooms       PHYSICAL EXAM  VITAL SIGNS: /69 mmHg  Pulse 91  Temp(Src) 36.4 °C (97.5 °F) (Temporal)  Resp 18  Wt 82.2 kg (181 lb 3.5 oz)  SpO2 95%    Constitutional: Well developed, Well nourished, Mild distress. Patient appears much older than stated age.   HENT: Normocephalic, Atraumatic.  Eyes: Conjunctiva normal, No discharge.   Cardiovascular: Normal heart rate, Normal rhythm, No murmurs, equal pulses.   Pulmonary: No respiratory distress, No rales, No rhonchi. Bilateral coarse breath sounds with expiratory wheezing. Wet sounding cough.   Abdomen:Soft, No tenderness,   Musculoskeletal: No major deformities noted, No tenderness. No edema  Skin: Warm, Dry, No erythema, No rash.   Neurologic: Alert & oriented x 3, Normal motor function,  No focal deficits noted.   Psychiatric: Affect normal, Judgment normal, Mood normal.     LABS  Results for orders placed or performed during the hospital encounter of 03/14/17   CBC WITH DIFFERENTIAL   Result Value Ref Range    WBC 6.1 4.8 - 10.8 K/uL    RBC 5.67 (H) 4.20 - 5.40 M/uL    Hemoglobin 15.1 12.0 - 16.0 g/dL    Hematocrit 46.8 37.0 - 47.0 %    MCV 82.5 81.4 - 97.8 fL    MCH 26.6 (L) 27.0 - 33.0 pg    MCHC 32.3 (L) 33.6 - 35.0 g/dL    RDW 39.9 35.9 - 50.0 fL    Platelet Count 236 164 - 446 K/uL    MPV 10.2 9.0 - 12.9 fL    Neutrophils-Polys 58.10 44.00 - 72.00 %    Lymphocytes 30.00 22.00 - " 41.00 %    Monocytes 9.60 0.00 - 13.40 %    Eosinophils 1.20 0.00 - 6.90 %    Basophils 0.80 0.00 - 1.80 %    Immature Granulocytes 0.30 0.00 - 0.90 %    Nucleated RBC 0.00 /100 WBC    Neutrophils (Absolute) 3.53 2.00 - 7.15 K/uL    Lymphs (Absolute) 1.82 1.00 - 4.80 K/uL    Monos (Absolute) 0.58 0.00 - 0.85 K/uL    Eos (Absolute) 0.07 0.00 - 0.51 K/uL    Baso (Absolute) 0.05 0.00 - 0.12 K/uL    Immature Granulocytes (abs) 0.02 0.00 - 0.11 K/uL    NRBC (Absolute) 0.00 K/uL   COMP METABOLIC PANEL   Result Value Ref Range    Sodium 139 135 - 145 mmol/L    Potassium 3.5 (L) 3.6 - 5.5 mmol/L    Chloride 107 96 - 112 mmol/L    Co2 24 20 - 33 mmol/L    Anion Gap 8.0 0.0 - 11.9    Glucose 166 (H) 65 - 99 mg/dL    Bun 18 8 - 22 mg/dL    Creatinine 0.58 0.50 - 1.40 mg/dL    Calcium 9.1 8.5 - 10.5 mg/dL    AST(SGOT) 13 12 - 45 U/L    ALT(SGPT) 9 2 - 50 U/L    Alkaline Phosphatase 115 (H) 30 - 99 U/L    Total Bilirubin 0.3 0.1 - 1.5 mg/dL    Albumin 3.8 3.2 - 4.9 g/dL    Total Protein 6.8 6.0 - 8.2 g/dL    Globulin 3.0 1.9 - 3.5 g/dL    A-G Ratio 1.3 g/dL   TROPONIN   Result Value Ref Range    Troponin I 0.02 0.00 - 0.04 ng/mL   BTYPE NATRIURETIC PEPTIDE   Result Value Ref Range    B Natriuretic Peptide 93 0 - 100 pg/mL   BLOOD CULTURE,HOLD   Result Value Ref Range    Blood Culture Hold Collected    ESTIMATED GFR   Result Value Ref Range    GFR If African American >60 >60 mL/min/1.73 m 2    GFR If Non African American >60 >60 mL/min/1.73 m 2   EKG (ER)   Result Value Ref Range    Report       [facility] Emergency Dept.    Test Date:  2017  Pt Name:    OMAR KENNEDY              Department: ER  MRN:        [id number]                      Room:  Gender:     F                            Technician: [id number]  :        1950                   Requested By:ER TRIAGE PROTOCOL  Order #:    [id number]                    Reading MD:    Measurements  Intervals                                Axis  Rate:        80                           P:          54  WI:         152                          QRS:        47  QRSD:       96                           T:          74  QT:         408  QTc:        471    Interpretive Statements  SINUS RHYTHM  BORDERLINE T ABNORMALITIES, ANT-LAT LEADS  Compared to ECG 2016 04:56:23  Possible ischemia no longer present  T-wave abnormality still present     EKG (ER)   Result Value Ref Range    Report       [facility] Emergency Dept.    Test Date:  2017  Pt Name:    OMAR KENNEDY              Department: ER  MRN:        [id number]                      Room:  Gender:     F                            Technician: [id number]  :        1950                   Requested By:ER TRIAGE PROTOCOL  Order #:    [id number]                    Reading MD:    Measurements  Intervals                                Axis  Rate:       72                           P:          55  WI:         156                          QRS:        33  QRSD:       90                           T:          99  QT:         392  QTc:        430    Interpretive Statements  SINUS RHYTHM  NONSPECIFIC T ABNORMALITIES, ANT-LAT LEADS  Compared to ECG 2017 17:44:43  No significant changes     All labs reviewed by me.    EKG  12 Lead EKG interpreted by me shows a normal sinus rhythm at a rate of 72. Axis normal. No ST elevations. T wave inversions 1 and AVL. Diffuse T wave flattening. Old EKG from 2016 shows T wave inversions less prominent than old EKG. Final impression: nonspecific T wave abnormalities, T wave inversions less prominent than old EKG, no significant changes.    RADIOLOGY  DX-CHEST-2 VIEWS   Final Result         1. No active cardiopulmonary abnormalities are identified.      NM-CARDIAC STRESS TEST    (Results Pending)   The radiologist's interpretation of all radiological studies have been reviewed by me.    COURSE & MEDICAL DECISION MAKING  Pertinent Labs & Imaging studies reviewed.  (See chart for details)    1:57 AM - Patient seen and examined at bedside. Patient will be treated with Proventil 5 mg nebulizer, Atrovent 1 mg nebulizer, aspirin 324 mg oral, preniSONE 60 mg oral, and IV fluids. Ordered chest x-ray, CBC with differential, CMP, troponin, BNP, and EKG to evaluate her symptoms. The differential diagnoses include but are not limited to: pneumonia, COPD exacerbation, bronchitis, myocardial infarction. Bedside ultrasound attempted but was unsuccessful secondary to patient's flailing around and inability to hold still.     2:23 AM Review of past medical records shows patient had a stress test in 2014 with no reversible ischemia. Ejection fraction was 65%     Patient was given breathing treatments with that continues up significant wheezing. She continues also complaining of substernal chest pressure. She was given nitroglycerin with some mild relief. Secondary recent treatment has been ordered. Given the fact the patient has such significant wheezing and continued chest pain discussed the case with Dr. Hernandez hospitalist she's agreed to admit the patient.    Medical Decision Making: Patient presents with chest pain as well as 3 weeks of nonproductive cough and shortness of breath. At this point in time I think her wheezing and cough is likely secondary to bronchitis with COPD exacerbation. Patient is still having significant wheezing and poor air movement despite multiple breathing treatments I think should benefit from admission with repetitive breathing treatments. Patient also complains of substernal chest pressure described as an elephant sitting on her chest. Given her history cardiac disease felt the patient would benefit from further risk stratification with repeat enzymes and possible stress test. At this point time her EKG does not show any significant changes and she has negative troponin.       DISPOSITION:  Patient will be admitted to Dr. Hernandez in [facility]  condition.      FINAL IMPRESSION  1. Acute exacerbation of chronic obstructive pulmonary disease (COPD) (CMS-HCC)    2. Chest pain, unspecified type          I, Rohini Moscoso (Scribnurys), am scribing for, and in the presence of, Ishan Saunders M.D.    Electronically signed by: Rohini Moscoso (Scribe), 3/14/2017    IIshan M.D. personally performed the services described in this documentation, as scribed by Rohini Moscoso in my presence, and it is both accurate and complete.    The note accurately reflects work and decisions made by me.  Ishan Saunders  3/14/2017  5:08 AM

## 2017-03-14 NOTE — H&P
CHIEF COMPLAINT:  Chest pain.    HISTORY OF PRESENT ILLNESS:  This is a 66-year-old female with chronic   respiratory failure from COPD on 3 liters of oxygen at home who has been   complaining of chest pain, which started yesterday morning.  She described her   chest pain as chest tightness, it is like an elephant sitting on her chest,   it was 9/10 when it started.  She tried to take aspirin and nitroglycerin,   which helped a little bit, but the chest pain persisted throughout the whole   day.  She says that she is always short of breath because of her COPD, but she   is a little bit more short of breath yesterday with wheezing.  Denies any   fever.  Her chest pain is substernal, nonradiating.  Currently, it is 8/10.    Because of the chest pain, she decided to come to the hospital [facility].    PAST MEDICAL HISTORY:  COPD on 3 liters of oxygen, hypertension, diabetes type   2, coronary artery disease, chronic back pain, peripheral neuropathy,   anxiety, hypertension, bipolar disorder, fibromyalgia, dyslipidemia, hepatitis   C.  She had skin cancer and cervical cancer, she is on remission for that.    PAST SURGICAL HISTORY:  I and D.    SOCIAL HISTORY:  She is a heavy smoker.  Denies illicit drug use history.    Uses meth and heroin from time to time.    FAMILY HISTORY:  Mother had breast cancer, father had underlying lung cancer   and sister has uterine cancer.    ALLERGIES:  TYLENOL AND ZOFRAN.    HOME MEDICATIONS:  Albuterol 108 mcg 2 puff inhalation every 4 hours as needed   for shortness of breath, alprazolam 0.5 mg 3 times a day for sleep p.r.n.,   aspirin 81 mg daily, Zithromax 250 mg daily, Tessalon 100 mg 3 times a day as   needed for cough, Symbicort 160/4.5 mcg 2 puff inhalation b.i.d., Celexa 20 mg   daily, clonidine 0.1 mg twice a day, Lasix 20 mg b.i.d., gabapentin 300 mg 3   times a day, guaifenesin 10 mL every 4 hours, ibuprofen 600 mg every 6 hours   as needed, insulin 30 units at bedtime _____  insulin sliding scale, lisinopril   20 mg daily, Reglan 5 mg 3 times a day before meals, metoprolol 25 mg daily,   nitroglycerin 0.4 mg under the tongue p.r.n. for chest pain, oxycodone 10 mg   b.i.d. for pain, potassium chloride 20 mEq b.i.d., Risperdal 0.5 mg daily,   Zocor 20 mg daily, trazodone 50 mg daily.    REVIEW OF SYSTEMS:  The patient has chronic lower extremity swelling.  All   other systems reviewed were negative.    PHYSICAL EXAMINATION:  VITAL SIGNS:  Blood pressure is 147/77, pulse of 67, respiratory rate of 18,   temperature of 36.8, oxygen is 100% on 3 L of oxygen.  GENERAL:  The patient is disheveled female sitting on the edge of her bed, in   mild distress.  HEENT:  Head:  Normocephalic, atraumatic.  Eyes:  Pupils are reactive to   light.  Anicteric sclerae.  Pinkish palpebral conjunctivae.  Oral mucosa:  No   oral lesions noted, moist mucosa.  NECK:  No JVD, no lymphadenopathy, no thyromegaly.  CHEST AND LUNGS:  Equal chest expansion.  Positive wheezing bilaterally.  No   rales.  No tenderness to palpation.  CARDIOVASCULAR SYSTEM:  Regular rate and rhythm.  S1, S2 heard.  No murmurs   noted.  GASTROINTESTINAL:  Positive bowel sounds.  No tenderness.  No   hepatosplenomegaly.  EXTREMITIES:  Pulses palpable in both upper and lower extremities.  Patient   has 1+ pitting edema in both lower extremities.  NEUROLOGIC:  Cranial nerves II-XII intact.  Alert and oriented x3.    LABORATORY DATA:  WBC 6.1, hemoglobin 15.1, hematocrit 46.8, platelet count   236.  Sodium 139, potassium 3.5, chloride 107, CO2 of 24, anion gap of 8,   glucose 166, BUN 18, creatinine 0.58.  Troponin I is 0.02.  BNP of 93.    IMAGING:  Chest x-ray, no active cardiopulmonary abnormalities are identified.    EKG, sinus rhythm.    ASSESSMENT AND PLAN:  1.  Chest tightness.  Rule out acute coronary syndrome.  We will get Lexiscan   stress test.  We will trend troponin.  We will check lipid panel and   hemoglobin A1c.  We will keep  her n.p.o. now.  Morphine, nitroglycerin p.r.n.   for chest pain.  2.  Acute on chronic obstructive pulmonary disease exacerbation.  I will start   her on prednisone 60 mg p.o. daily and DuoNebs every 4 hours as needed for   shortness of breath.  3.  Hypertension, currently stable.  Continue all her medications.  4.  Diabetes type 2.  Continue Lantus.  I will put her on moderate sliding   scale insulin, Accu-Cheks q.a.c. and at bedtime.  5.  Code status is full.  6.  Peripheral neuropathy.  Continue gabapentin.  7.  Fibromyalgia.  8.  Bipolar disorder.  9.  Hyperlipidemia, as above.  10.  Deep venous thrombosis prophylaxis.  I will put her on SCDs.    MEDICAL DECISION MAKING:  Less than 2 midnights.       ____________________________________     MD BRADLEY Infante / MIKE    DD:  03/14/2017 05:33:15  DT:  03/14/2017 06:41:03    D#:  [id number]  Job#:  [id number]

## 2017-03-14 NOTE — ED NOTES
Patient to ED Red 8. She states she does have mid chest stabbing chest pain, worse with palpation, radiates to back, worse with cough. States pain present since yesterday, has not changed.     She states she has been ill with cough x3 week. She has been on 2 antibiotics for bronchitis and was told she had the flu. No fever in last 24 hrs. Wheezing noted to all lung field.

## 2017-03-14 NOTE — PROGRESS NOTES
Pt  admitted to room T213 ED RN transporting in a gurney  from  ED at 0615.  Pt drowsy but easily arousable .Pain reported at 8 on a scale of 0-10. Oriented to room call light and smoking policy.  Reviewed plan of care (equipment,  medications, activity, diet, fall precautions, skin care, and pain) with patient .

## 2017-03-14 NOTE — ED NOTES
Blood collected via butterfly. RT at bedside for treatment.   Unable to establish IV at this time. ERP aware.

## 2017-03-14 NOTE — PROGRESS NOTES
Tech unable to draw blood.    called, left message at ext. [phone].   Informed by [employer] that patient is able to eat breakfast, stress test possibly will be performed later in the afternoon.

## 2017-03-14 NOTE — PROGRESS NOTES
Patient admitted after midnight by Dr Escoto, please see H&P for full details.    Pt seen and examined by myself, today the patient complains of cough, SOB, weakness. + productive cough for many months. Reportedly just finished a 10 day course of cipro for PNA 4-5 days ago. Is currently homeless. Chest pain is pleuritic in nature. +chest tenderness to palpation.     Hospital Course:  Zenia Ordaz is a 66 y.o. female w/ h/o COPD, DM, AMI, home oxygen use admitted 3/14/2017 for chest pain, URI symptoms and SOB. Found to have COPD exacerbation.     Assessment and plan:  Active Hospital Problems    Diagnosis   • *Chest pain [R07.9]  -NM stresss test pending  -Suspect musculoskeletal 2/2 cough  -ECHO pending   • COPD exacerbation (CMS-[facility]) [J44.1]- on home O2 dose  -RT protocol  -Wean O2 to home O2 dose 3L to keep SPO2 > 91%  -Prednisone, symbicort, spiriva   • On home oxygen therapy [Z99.81]- on home O2 dose   • CAD (coronary artery disease) [I25.10]- cont ASA, BB, ACEI   • Hypokalemia [E87.6]- replace, monitor   • Type 2 diabetes mellitus, uncontrolled (CMS-[facility]) [E11.65]  -ISS ACHS  -Cont lantus       Dispo: home once improved    Patient was discussed with bedside RN/SW     Vannesa Hancock NP

## 2017-03-14 NOTE — ED NOTES
Chief Complaint   Patient presents with   • Chest Pain     x1 day. center chest.    • Shortness of Breath     x1 day. COPD   Pt ambulatory to triage with above complaint. Pt returned to [facility], educated on triage process, and to inform staff of any changes or concerns.   EKG completed in triage.

## 2017-03-15 ENCOUNTER — APPOINTMENT (OUTPATIENT)
Dept: RADIOLOGY | Facility: MEDICAL CENTER | Age: 67
DRG: 191 | End: 2017-03-15
Attending: NURSE PRACTITIONER
Payer: MEDICAID

## 2017-03-15 PROBLEM — R53.1 WEAKNESS: Status: ACTIVE | Noted: 2017-03-15

## 2017-03-15 LAB
ALBUMIN SERPL BCP-MCNC: 3.7 G/DL (ref 3.2–4.9)
ALBUMIN/GLOB SERPL: 1.4 G/DL
ALP SERPL-CCNC: 101 U/L (ref 30–99)
ALT SERPL-CCNC: 8 U/L (ref 2–50)
ANION GAP SERPL CALC-SCNC: 6 MMOL/L (ref 0–11.9)
AST SERPL-CCNC: 9 U/L (ref 12–45)
BASE EXCESS BLDA CALC-SCNC: -1 MMOL/L (ref -4–3)
BASOPHILS # BLD AUTO: 0.6 % (ref 0–1.8)
BASOPHILS # BLD: 0.05 K/UL (ref 0–0.12)
BILIRUB SERPL-MCNC: 0.3 MG/DL (ref 0.1–1.5)
BODY TEMPERATURE: ABNORMAL CENTIGRADE
BUN SERPL-MCNC: 27 MG/DL (ref 8–22)
CALCIUM SERPL-MCNC: 8.9 MG/DL (ref 8.5–10.5)
CHLORIDE SERPL-SCNC: 106 MMOL/L (ref 96–112)
CHOLEST SERPL-MCNC: 166 MG/DL (ref 100–199)
CO2 SERPL-SCNC: 26 MMOL/L (ref 20–33)
CREAT SERPL-MCNC: 0.62 MG/DL (ref 0.5–1.4)
EOSINOPHIL # BLD AUTO: 0.01 K/UL (ref 0–0.51)
EOSINOPHIL NFR BLD: 0.1 % (ref 0–6.9)
ERYTHROCYTE [DISTWIDTH] IN BLOOD BY AUTOMATED COUNT: 42.5 FL (ref 35.9–50)
FLUAV H1 2009 PAND RNA SPEC QL NAA+PROBE: NOT DETECTED
FLUAV RNA SPEC QL NAA+PROBE: NEGATIVE
FLUBV RNA SPEC QL NAA+PROBE: NEGATIVE
GFR SERPL CREATININE-BSD FRML MDRD: >60 ML/MIN/1.73 M 2
GLOBULIN SER CALC-MCNC: 2.7 G/DL (ref 1.9–3.5)
GLUCOSE BLD-MCNC: 140 MG/DL (ref 65–99)
GLUCOSE BLD-MCNC: 197 MG/DL (ref 65–99)
GLUCOSE BLD-MCNC: 287 MG/DL (ref 65–99)
GLUCOSE BLD-MCNC: 347 MG/DL (ref 65–99)
GLUCOSE BLD-MCNC: 440 MG/DL (ref 65–99)
GLUCOSE SERPL-MCNC: 144 MG/DL (ref 65–99)
HCO3 BLDA-SCNC: 25 MMOL/L (ref 17–25)
HCT VFR BLD AUTO: 43.9 % (ref 37–47)
HDLC SERPL-MCNC: 61 MG/DL
HGB BLD-MCNC: 13.7 G/DL (ref 12–16)
IMM GRANULOCYTES # BLD AUTO: 0.03 K/UL (ref 0–0.11)
IMM GRANULOCYTES NFR BLD AUTO: 0.4 % (ref 0–0.9)
LDLC SERPL CALC-MCNC: 86 MG/DL
LYMPHOCYTES # BLD AUTO: 1.58 K/UL (ref 1–4.8)
LYMPHOCYTES NFR BLD: 18.6 % (ref 22–41)
MAGNESIUM SERPL-MCNC: 2 MG/DL (ref 1.5–2.5)
MCH RBC QN AUTO: 26.4 PG (ref 27–33)
MCHC RBC AUTO-ENTMCNC: 31.2 G/DL (ref 33.6–35)
MCV RBC AUTO: 84.7 FL (ref 81.4–97.8)
MONOCYTES # BLD AUTO: 0.54 K/UL (ref 0–0.85)
MONOCYTES NFR BLD AUTO: 6.4 % (ref 0–13.4)
NEUTROPHILS # BLD AUTO: 6.28 K/UL (ref 2–7.15)
NEUTROPHILS NFR BLD: 73.9 % (ref 44–72)
NRBC # BLD AUTO: 0 K/UL
NRBC BLD AUTO-RTO: 0 /100 WBC
PCO2 BLDA: 45.4 MMHG (ref 26–37)
PH BLDA: 7.36 [PH] (ref 7.4–7.5)
PLATELET # BLD AUTO: 193 K/UL (ref 164–446)
PMV BLD AUTO: 10.2 FL (ref 9–12.9)
PO2 BLDA: 46.7 MMHG (ref 64–87)
POTASSIUM SERPL-SCNC: 3.8 MMOL/L (ref 3.6–5.5)
PROT SERPL-MCNC: 6.4 G/DL (ref 6–8.2)
RBC # BLD AUTO: 5.18 M/UL (ref 4.2–5.4)
SAO2 % BLDA: 83.5 % (ref 93–99)
SODIUM SERPL-SCNC: 138 MMOL/L (ref 135–145)
TRIGL SERPL-MCNC: 97 MG/DL (ref 0–149)
WBC # BLD AUTO: 8.5 K/UL (ref 4.8–10.8)

## 2017-03-15 PROCEDURE — 94640 AIRWAY INHALATION TREATMENT: CPT

## 2017-03-15 PROCEDURE — G8978 MOBILITY CURRENT STATUS: HCPCS | Mod: CI

## 2017-03-15 PROCEDURE — 96376 TX/PRO/DX INJ SAME DRUG ADON: CPT

## 2017-03-15 PROCEDURE — 700101 HCHG RX REV CODE 250: Performed by: NURSE PRACTITIONER

## 2017-03-15 PROCEDURE — 82803 BLOOD GASES ANY COMBINATION: CPT

## 2017-03-15 PROCEDURE — 87503 INFLUENZA DNA AMP PROB ADDL: CPT

## 2017-03-15 PROCEDURE — 96367 TX/PROPH/DG ADDL SEQ IV INF: CPT

## 2017-03-15 PROCEDURE — 83735 ASSAY OF MAGNESIUM: CPT

## 2017-03-15 PROCEDURE — 700105 HCHG RX REV CODE 258: Performed by: NURSE PRACTITIONER

## 2017-03-15 PROCEDURE — 700101 HCHG RX REV CODE 250: Performed by: INTERNAL MEDICINE

## 2017-03-15 PROCEDURE — 700102 HCHG RX REV CODE 250 W/ 637 OVERRIDE(OP): Performed by: INTERNAL MEDICINE

## 2017-03-15 PROCEDURE — A9270 NON-COVERED ITEM OR SERVICE: HCPCS | Performed by: INTERNAL MEDICINE

## 2017-03-15 PROCEDURE — 96372 THER/PROPH/DIAG INJ SC/IM: CPT

## 2017-03-15 PROCEDURE — 80053 COMPREHEN METABOLIC PANEL: CPT

## 2017-03-15 PROCEDURE — 700102 HCHG RX REV CODE 250 W/ 637 OVERRIDE(OP): Performed by: NURSE PRACTITIONER

## 2017-03-15 PROCEDURE — A9270 NON-COVERED ITEM OR SERVICE: HCPCS | Performed by: NURSE PRACTITIONER

## 2017-03-15 PROCEDURE — 97166 OT EVAL MOD COMPLEX 45 MIN: CPT

## 2017-03-15 PROCEDURE — G8987 SELF CARE CURRENT STATUS: HCPCS | Mod: CJ

## 2017-03-15 PROCEDURE — 36415 COLL VENOUS BLD VENIPUNCTURE: CPT

## 2017-03-15 PROCEDURE — 306637 HCHG MISC ORTHO ITEM RC 0274

## 2017-03-15 PROCEDURE — 700111 HCHG RX REV CODE 636 W/ 250 OVERRIDE (IP): Performed by: NURSE PRACTITIONER

## 2017-03-15 PROCEDURE — 96375 TX/PRO/DX INJ NEW DRUG ADDON: CPT

## 2017-03-15 PROCEDURE — G8988 SELF CARE GOAL STATUS: HCPCS | Mod: CI

## 2017-03-15 PROCEDURE — 700111 HCHG RX REV CODE 636 W/ 250 OVERRIDE (IP): Performed by: INTERNAL MEDICINE

## 2017-03-15 PROCEDURE — G0378 HOSPITAL OBSERVATION PER HR: HCPCS

## 2017-03-15 PROCEDURE — 71010 DX-CHEST-LIMITED (1 VIEW): CPT

## 2017-03-15 PROCEDURE — 82962 GLUCOSE BLOOD TEST: CPT | Mod: 91

## 2017-03-15 PROCEDURE — 80061 LIPID PANEL: CPT

## 2017-03-15 PROCEDURE — 85025 COMPLETE CBC W/AUTO DIFF WBC: CPT

## 2017-03-15 PROCEDURE — 99232 SBSQ HOSP IP/OBS MODERATE 35: CPT | Performed by: INTERNAL MEDICINE

## 2017-03-15 PROCEDURE — 87502 INFLUENZA DNA AMP PROBE: CPT

## 2017-03-15 PROCEDURE — 96365 THER/PROPH/DIAG IV INF INIT: CPT

## 2017-03-15 PROCEDURE — G8979 MOBILITY GOAL STATUS: HCPCS | Mod: CH

## 2017-03-15 PROCEDURE — 97162 PT EVAL MOD COMPLEX 30 MIN: CPT

## 2017-03-15 RX ORDER — METHYLPREDNISOLONE SODIUM SUCCINATE 125 MG/2ML
62.5 INJECTION, POWDER, LYOPHILIZED, FOR SOLUTION INTRAMUSCULAR; INTRAVENOUS EVERY 6 HOURS
Status: DISCONTINUED | OUTPATIENT
Start: 2017-03-15 | End: 2017-03-18

## 2017-03-15 RX ORDER — OXYCODONE HYDROCHLORIDE 10 MG/1
10 TABLET ORAL EVERY 8 HOURS PRN
Status: DISCONTINUED | OUTPATIENT
Start: 2017-03-15 | End: 2017-03-17

## 2017-03-15 RX ORDER — SODIUM CHLORIDE 9 MG/ML
500 INJECTION, SOLUTION INTRAVENOUS ONCE
Status: COMPLETED | OUTPATIENT
Start: 2017-03-15 | End: 2017-03-15

## 2017-03-15 RX ORDER — OXYCODONE HYDROCHLORIDE 10 MG/1
10 TABLET ORAL ONCE
Status: COMPLETED | OUTPATIENT
Start: 2017-03-15 | End: 2017-03-15

## 2017-03-15 RX ORDER — LIDOCAINE 50 MG/G
1 PATCH TOPICAL DAILY
Status: DISCONTINUED | OUTPATIENT
Start: 2017-03-15 | End: 2017-03-18 | Stop reason: HOSPADM

## 2017-03-15 RX ADMIN — IPRATROPIUM BROMIDE AND ALBUTEROL SULFATE 3 ML: .5; 3 SOLUTION RESPIRATORY (INHALATION) at 09:07

## 2017-03-15 RX ADMIN — IPRATROPIUM BROMIDE AND ALBUTEROL SULFATE 3 ML: .5; 3 SOLUTION RESPIRATORY (INHALATION) at 07:20

## 2017-03-15 RX ADMIN — CEFTRIAXONE 2 G: 2 INJECTION, POWDER, FOR SOLUTION INTRAMUSCULAR; INTRAVENOUS at 10:17

## 2017-03-15 RX ADMIN — IPRATROPIUM BROMIDE AND ALBUTEROL SULFATE 3 ML: .5; 3 SOLUTION RESPIRATORY (INHALATION) at 14:44

## 2017-03-15 RX ADMIN — TIOTROPIUM BROMIDE 1 CAPSULE: 18 CAPSULE ORAL; RESPIRATORY (INHALATION) at 07:20

## 2017-03-15 RX ADMIN — METHYLPREDNISOLONE SODIUM SUCCINATE 62.5 MG: 125 INJECTION, POWDER, FOR SOLUTION INTRAMUSCULAR; INTRAVENOUS at 10:16

## 2017-03-15 RX ADMIN — SIMVASTATIN 20 MG: 20 TABLET, FILM COATED ORAL at 22:08

## 2017-03-15 RX ADMIN — GABAPENTIN 300 MG: 300 CAPSULE ORAL at 08:06

## 2017-03-15 RX ADMIN — FUROSEMIDE 20 MG: 20 TABLET ORAL at 22:09

## 2017-03-15 RX ADMIN — IPRATROPIUM BROMIDE AND ALBUTEROL SULFATE 3 ML: .5; 3 SOLUTION RESPIRATORY (INHALATION) at 11:27

## 2017-03-15 RX ADMIN — GABAPENTIN 300 MG: 300 CAPSULE ORAL at 22:08

## 2017-03-15 RX ADMIN — INSULIN GLARGINE 40 UNITS: 100 INJECTION, SOLUTION SUBCUTANEOUS at 22:16

## 2017-03-15 RX ADMIN — GABAPENTIN 300 MG: 300 CAPSULE ORAL at 16:44

## 2017-03-15 RX ADMIN — LIDOCAINE 1 PATCH: 50 PATCH CUTANEOUS at 13:39

## 2017-03-15 RX ADMIN — GUAIFENESIN 200 MG: 100 SOLUTION ORAL at 17:49

## 2017-03-15 RX ADMIN — CLONIDINE HYDROCHLORIDE 0.1 MG: 0.1 TABLET ORAL at 08:07

## 2017-03-15 RX ADMIN — ASPIRIN 81 MG: 81 TABLET ORAL at 08:06

## 2017-03-15 RX ADMIN — INSULIN LISPRO 5 UNITS: 100 INJECTION, SOLUTION INTRAVENOUS; SUBCUTANEOUS at 00:22

## 2017-03-15 RX ADMIN — RISPERIDONE 0.5 MG: 0.5 TABLET, FILM COATED ORAL at 22:10

## 2017-03-15 RX ADMIN — FUROSEMIDE 20 MG: 20 TABLET ORAL at 08:06

## 2017-03-15 RX ADMIN — HEPARIN SODIUM 5000 UNITS: 5000 INJECTION, SOLUTION INTRAVENOUS; SUBCUTANEOUS at 22:12

## 2017-03-15 RX ADMIN — AZITHROMYCIN 500 MG: 500 INJECTION, POWDER, LYOPHILIZED, FOR SOLUTION INTRAVENOUS at 11:16

## 2017-03-15 RX ADMIN — BUDESONIDE AND FORMOTEROL FUMARATE DIHYDRATE 2 PUFF: 160; 4.5 AEROSOL RESPIRATORY (INHALATION) at 07:20

## 2017-03-15 RX ADMIN — PREDNISONE 60 MG: 20 TABLET ORAL at 08:06

## 2017-03-15 RX ADMIN — OXYCODONE HYDROCHLORIDE 10 MG: 10 TABLET ORAL at 08:24

## 2017-03-15 RX ADMIN — GUAIFENESIN 200 MG: 100 SOLUTION ORAL at 22:11

## 2017-03-15 RX ADMIN — POTASSIUM CHLORIDE 20 MEQ: 1500 TABLET, EXTENDED RELEASE ORAL at 08:06

## 2017-03-15 RX ADMIN — OXYCODONE HYDROCHLORIDE 10 MG: 10 TABLET ORAL at 03:20

## 2017-03-15 RX ADMIN — TRAZODONE HYDROCHLORIDE 50 MG: 50 TABLET ORAL at 22:10

## 2017-03-15 RX ADMIN — INSULIN LISPRO 2 UNITS: 100 INJECTION, SOLUTION INTRAVENOUS; SUBCUTANEOUS at 11:21

## 2017-03-15 RX ADMIN — IPRATROPIUM BROMIDE AND ALBUTEROL SULFATE 3 ML: .5; 3 SOLUTION RESPIRATORY (INHALATION) at 19:01

## 2017-03-15 RX ADMIN — GUAIFENESIN 200 MG: 100 SOLUTION ORAL at 13:38

## 2017-03-15 RX ADMIN — ALPRAZOLAM 0.5 MG: 0.5 TABLET ORAL at 03:20

## 2017-03-15 RX ADMIN — METOPROLOL SUCCINATE 25 MG: 25 TABLET, EXTENDED RELEASE ORAL at 08:09

## 2017-03-15 RX ADMIN — MORPHINE SULFATE 4 MG: 4 INJECTION INTRAVENOUS at 22:13

## 2017-03-15 RX ADMIN — LISINOPRIL 20 MG: 20 TABLET ORAL at 08:07

## 2017-03-15 RX ADMIN — SODIUM CHLORIDE 500 ML: 9 INJECTION, SOLUTION INTRAVENOUS at 13:02

## 2017-03-15 RX ADMIN — IPRATROPIUM BROMIDE AND ALBUTEROL SULFATE 3 ML: .5; 3 SOLUTION RESPIRATORY (INHALATION) at 22:29

## 2017-03-15 RX ADMIN — GUAIFENESIN 200 MG: 100 SOLUTION ORAL at 06:38

## 2017-03-15 RX ADMIN — METHYLPREDNISOLONE SODIUM SUCCINATE 62.5 MG: 125 INJECTION, POWDER, FOR SOLUTION INTRAMUSCULAR; INTRAVENOUS at 17:49

## 2017-03-15 RX ADMIN — IPRATROPIUM BROMIDE AND ALBUTEROL SULFATE 3 ML: .5; 3 SOLUTION RESPIRATORY (INHALATION) at 02:27

## 2017-03-15 RX ADMIN — CITALOPRAM HYDROBROMIDE 20 MG: 20 TABLET ORAL at 08:09

## 2017-03-15 RX ADMIN — OXYCODONE HYDROCHLORIDE 10 MG: 10 TABLET ORAL at 16:44

## 2017-03-15 RX ADMIN — BUDESONIDE AND FORMOTEROL FUMARATE DIHYDRATE 2 PUFF: 160; 4.5 AEROSOL RESPIRATORY (INHALATION) at 22:30

## 2017-03-15 RX ADMIN — HEPARIN SODIUM 5000 UNITS: 5000 INJECTION, SOLUTION INTRAVENOUS; SUBCUTANEOUS at 06:38

## 2017-03-15 RX ADMIN — INSULIN LISPRO 6 UNITS: 100 INJECTION, SOLUTION INTRAVENOUS; SUBCUTANEOUS at 17:52

## 2017-03-15 RX ADMIN — HEPARIN SODIUM 5000 UNITS: 5000 INJECTION, SOLUTION INTRAVENOUS; SUBCUTANEOUS at 13:39

## 2017-03-15 RX ADMIN — GUAIFENESIN 200 MG: 100 SOLUTION ORAL at 10:23

## 2017-03-15 RX ADMIN — POTASSIUM CHLORIDE 20 MEQ: 1500 TABLET, EXTENDED RELEASE ORAL at 22:09

## 2017-03-15 ASSESSMENT — GAIT ASSESSMENTS
GAIT LEVEL OF ASSIST: STAND BY ASSIST
DISTANCE (FEET): 100
ASSISTIVE DEVICE: SINGLE POINT CANE
DEVIATION: BRADYKINETIC;SHUFFLED GAIT

## 2017-03-15 ASSESSMENT — ENCOUNTER SYMPTOMS
CHILLS: 0
SPUTUM PRODUCTION: 1
FEVER: 0
WEAKNESS: 1
ABDOMINAL PAIN: 0
SHORTNESS OF BREATH: 1
FOCAL WEAKNESS: 0
HEADACHES: 0
PALPITATIONS: 0
WHEEZING: 1
COUGH: 1
VOMITING: 0
CONSTIPATION: 0
BACK PAIN: 0
NAUSEA: 0
DIZZINESS: 0

## 2017-03-15 ASSESSMENT — PAIN SCALES - GENERAL
PAINLEVEL_OUTOF10: 7
PAINLEVEL_OUTOF10: 8
PAINLEVEL_OUTOF10: 8

## 2017-03-15 ASSESSMENT — LIFESTYLE VARIABLES: DO YOU DRINK ALCOHOL: NO

## 2017-03-15 ASSESSMENT — ACTIVITIES OF DAILY LIVING (ADL): TOILETING: INDEPENDENT

## 2017-03-15 NOTE — THERAPY
"Occupational Therapy Evaluation completed.   Functional Status:  Pt presented to skilled OT services following moderate to severe deficits with ADLs/IADLs. Pt very focused on her deficits and underlying co-morbidities throughout the session and unclear how reliable of historian pt is giving pt provided with multiple contradictions throughout the session. Performed STS with sba, ambulated to sink with sba pushing IV pole, face washing with sba, donned/doffed socks with sba/min a. Requires ortho f/u for RUE, reports in sling since January, moving wrist, hand and some elbow ROM spontaneously and volitionally w/o any c/o pain. Pt would benefit from acute skilled services while in house to address underlying deficits restricting pt's I with ADLs.    Plan of Care: Will benefit from Occupational Therapy 3 times per week  Discharge Recommendations:  Equipment: Will Continue to Assess for Equipment Needs. Post-acute therapy recommended before discharged home.    See \"Rehab Therapy-Acute\" Patient Summary Report for complete documentation.    "

## 2017-03-15 NOTE — PROGRESS NOTES
Hospital Medicine Progress Note, Adult, Complex               Author: Vannesa Hancock Date & Time created: 3/15/2017  3:46 PM     Interval History:  Hospital Course:  Zenia Ordaz is a 66 y.o. female w/ h/o COPD, DM, AMI, home oxygen use admitted 3/14/2017 for chest pain, URI symptoms and SOB. Found to have COPD exacerbation.      Today, the patient complains of SOB that is worse today, with cough and wheezing. Feels much worse than yesterday. Fatigued. WOB is increased. Chest pain continues, with deep breathing and cough. Improved with pain medication though it is not lasting as long.     Review of Systems:  Review of Systems   Constitutional: Positive for malaise/fatigue. Negative for fever and chills.   Respiratory: Positive for cough, sputum production, shortness of breath and wheezing.    Cardiovascular: Positive for chest pain (with cough or deep breathing). Negative for palpitations.   Gastrointestinal: Negative for nausea, vomiting, abdominal pain and constipation.   Genitourinary: Negative for dysuria.   Musculoskeletal: Positive for joint pain (right arm pain s/p fracture). Negative for back pain.   Neurological: Positive for weakness. Negative for dizziness, focal weakness and headaches.   All other systems reviewed and are negative.      Physical Exam:  Physical Exam   Constitutional: She is oriented to person, place, and time. She appears well-developed and well-nourished. She appears distressed (mild distress, SOB).   Chronically ill appearing, debilitated female resting in bed   HENT:   Head: Normocephalic and atraumatic.   Eyes: Conjunctivae are normal.   Neck: Normal range of motion. Neck supple.   Cardiovascular: Normal rate and regular rhythm.    No murmur heard.  Pulmonary/Chest: She is in respiratory distress (tachypnea with exertion). She has wheezes. She has rales. She exhibits tenderness (right sided chest pain tender to palpation).   Abdominal: Soft. Bowel sounds are normal. There is no  tenderness. There is no guarding.   Musculoskeletal: Normal range of motion. She exhibits no edema.   Right arm in sling from fall, was supposed to follow up OP with ortho but has been unable to make appointments   Neurological: She is alert and oriented to person, place, and time.   Skin: Skin is warm and dry. No rash noted.   Vitals reviewed.      Labs:  Recent Labs      03/15/17   0919   [id number]  7.36*   [id number]  45.4*   [id number]  46.7*   [id number]  83.5*   ARTHCO3  25   ARTBE  -1     Recent Labs      17   0502  17   0920   TROPONINI  0.02  0.01  <0.01   BNPBTYPENAT  93   --    --      Recent Labs      03/14/17   0227  03/15/17   0938   SODIUM  139  138   POTASSIUM  3.5*  3.8   CHLORIDE  107  106   CO2  24  26   BUN  18  27*   CREATININE  0.58  0.62   MAGNESIUM   --   2.0   CALCIUM  9.1  8.9     Recent Labs      03/14/17   0227  03/15/17   0938   ALTSGPT  9  8   ASTSGOT  13  9*   ALKPHOSPHAT  115*  101*   TBILIRUBIN  0.3  0.3   GLUCOSE  166*  144*     Recent Labs      03/14/17   0227  03/15/17   0938   RBC  5.67*  5.18   HEMOGLOBIN  15.1  13.7   HEMATOCRIT  46.8  43.9   PLATELETCT  236  193     Recent Labs      03/14/17   0227  03/15/17   0938   WBC  6.1  8.5   NEUTSPOLYS  58.10  73.90*   LYMPHOCYTES  30.00  18.60*   MONOCYTES  9.60  6.40   EOSINOPHILS  1.20  0.10   BASOPHILS  0.80  0.60   ASTSGOT  13  9*   ALTSGPT  9  8   ALKPHOSPHAT  115*  101*   TBILIRUBIN  0.3  0.3           Hemodynamics:  Temp (24hrs), Av.4 °C (97.6 °F), Min:35.9 °C (96.6 °F), Max:36.9 °C (98.5 °F)  Temperature: 36.3 °C (97.4 °F)  Pulse  Av.3  Min: 55  Max: 91   Blood Pressure: (!) 92/54 mmHg     Respiratory:    Respiration: 18, Pulse Oximetry: 94 %, O2 Daily Delivery Respiratory : Silicone Nasal Cannula     Given By:: Mouthpiece, #MDI/DPI Given: MDI/DPI x 2, Work Of Breathing / Effort: Mild  RUL Breath Sounds: Expiratory Wheezes, RML Breath Sounds: Expiratory Wheezes, RLL Breath Sounds: Expiratory  Wheezes, MARC Breath Sounds: Expiratory Wheezes, LLL Breath Sounds: Expiratory Wheezes  Fluids:  No intake or output data in the 24 hours ending 03/15/17 1546     GI/Nutrition:  Orders Placed This Encounter   Procedures   • DIET ORDER     Standing Status: Standing      Number of Occurrences: 1      Standing Expiration Date:      Order Specific Question:  Diet:     Answer:  Diabetic [3]     Order Specific Question:  Diet:     Answer:  Cardiac [6]     Medical Decision Making, by Problem:  Assessment and plan:  Active Hospital Problems      Diagnosis    •  *Chest pain [R07.9]  -NM stresss test- neg  -Suspect musculoskeletal 2/2 cough- PRN oxycodone, lidocaine patch,   -ECHO LVEF 60, grade II diastolic   •  COPD exacerbation (CMS-HCC) [J44.1]- on home O2 dose  -RT protocol  -Wean O2 to home O2 dose 3L to keep SPO2 > 91%  -Steroid, symbicort, spiriva   -Respiratory status worse today- add IV steroid, rocephin/azithromycin (day 1)  -ABG (appears venous)- without acidosis  -Influenza NEG, sputum culture pend   •  On home oxygen therapy [Z99.81]- on home O2 dose    •  CAD (coronary artery disease) [I25.10]- cont ASA, BB, ACEI    •  Hypokalemia [E87.6]- replace, monitor    •  Type 2 diabetes mellitus, uncontrolled (CMS-HCC) [E11.65]  -ISS ACHS  -Cont lantus            Weakness  -PT/OT- reccs SNF placement for rehab needs    Dispo: transfer to inpatient status as patient failed OP antibiotics, respiratory status worse today, will require IV ABX- also needs SNF placement per PT eval    Labs reviewed and Medications reviewed  Fagan catheter: No Fagan      DVT Prophylaxis: Enoxaparin (Lovenox)    Ulcer prophylaxis: Not indicated    Assessed for rehab: Patient was assess for and/or received rehabilitation services during this hospitalization

## 2017-03-15 NOTE — FLOWSHEET NOTE
03/15/17 1445   Interdisciplinary Plan of Care-Goals (Indications)   Obstructive Ventilatory Defect or Pulmonary Disease without Obvious Obstruction Physical Exam / Hyperinflation / Wheezing (bronchospasm)   Interdisciplinary Plan of Care-Outcomes    Bronchodilator Outcome Diminished Wheezing and Volume of Air Movement Increased   SVN Group   #SVN Performed Yes   Given By: Mouthpiece   Respiratory WDL   Respiratory (WDL) X   Chest Exam   Work Of Breathing / Effort Mild   Respiration 18   Pulse 69   Breath Sounds   RUL Breath Sounds Expiratory Wheezes   RML Breath Sounds Expiratory Wheezes   RLL Breath Sounds Expiratory Wheezes   MARC Breath Sounds Expiratory Wheezes   LLL Breath Sounds Expiratory Wheezes   Oximetry   Continuous Oximetry Yes   Oxygen   Pulse Oximetry 94 %   O2 (LPM) 3   O2 Daily Delivery Respiratory  Silicone Nasal Cannula

## 2017-03-15 NOTE — FLOWSHEET NOTE
03/15/17 1129   Interdisciplinary Plan of Care-Goals (Indications)   Obstructive Ventilatory Defect or Pulmonary Disease without Obvious Obstruction Physical Exam / Hyperinflation / Wheezing (bronchospasm)   Interdisciplinary Plan of Care-Outcomes    Bronchodilator Outcome Diminished Wheezing and Volume of Air Movement Increased   SVN Group   #SVN Performed Yes   Given By: Mouthpiece   Respiratory WDL   Respiratory (WDL) X   Chest Exam   Work Of Breathing / Effort Mild   Respiration 18   Pulse (!) 58   Breath Sounds   RUL Breath Sounds Expiratory Wheezes   RML Breath Sounds Expiratory Wheezes   RLL Breath Sounds Diminished   MARC Breath Sounds Expiratory Wheezes   LLL Breath Sounds Diminished   Oxygen   Pulse Oximetry 93 %   O2 (LPM) 3   O2 Daily Delivery Respiratory  Silicone Nasal Cannula

## 2017-03-15 NOTE — PROGRESS NOTES
Assumed patient care. Report received from DARRELL Suresh.  Pt A&Ox4, report numbness and tingling to bilateral hand and bilateral feet, pt states this is baseline.  Respirations even, on 3L O2, moderate work of breathing and expiratory wheezes heard throughout lungs.  Pt complains of 7/10 chest, back and arm pain, medicated per MAR.  Monitors applied, sinus rhythm noted.    Call light within reach.  Pt updated on POC, updated communication board. Needs met, will continue to monitor.

## 2017-03-15 NOTE — RESPIRATORY CARE
COPD EDUCATION by COPD CLINICAL EDUCATOR  3/15/2017 at 8:47 AM by Sophy Castanon     Patient reviewed by COPD education team. Patient does not qualify for COPD program.
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
## 2017-03-15 NOTE — PROGRESS NOTES
A/o,up sitting and eating snacks,assessment completed per CDU,poc discussed,verbalized understanding,asking about seeing bone Doctor because of her fx right upper arm and left foot 2nd toe fx,noted splinted,sling rue intact,denies n/v,will continue to monitor.

## 2017-03-15 NOTE — PROGRESS NOTES
Dylon from Lab called with critical result of APO2 46.7 at 0939. Critical lab result read back to Dylon.   Nurse practitioner, Vannesa, notified of critical lab result at 0940.  Critical lab result read back by nurse practitioner

## 2017-03-16 ENCOUNTER — APPOINTMENT (OUTPATIENT)
Dept: RADIOLOGY | Facility: MEDICAL CENTER | Age: 67
DRG: 191 | End: 2017-03-16
Attending: NURSE PRACTITIONER
Payer: MEDICAID

## 2017-03-16 LAB
ANION GAP SERPL CALC-SCNC: 7 MMOL/L (ref 0–11.9)
BUN SERPL-MCNC: 39 MG/DL (ref 8–22)
CALCIUM SERPL-MCNC: 9 MG/DL (ref 8.5–10.5)
CHLORIDE SERPL-SCNC: 103 MMOL/L (ref 96–112)
CO2 SERPL-SCNC: 22 MMOL/L (ref 20–33)
CREAT SERPL-MCNC: 0.77 MG/DL (ref 0.5–1.4)
ERYTHROCYTE [DISTWIDTH] IN BLOOD BY AUTOMATED COUNT: 44.7 FL (ref 35.9–50)
GFR SERPL CREATININE-BSD FRML MDRD: >60 ML/MIN/1.73 M 2
GLUCOSE BLD-MCNC: 216 MG/DL (ref 65–99)
GLUCOSE BLD-MCNC: 217 MG/DL (ref 65–99)
GLUCOSE BLD-MCNC: 278 MG/DL (ref 65–99)
GLUCOSE BLD-MCNC: 291 MG/DL (ref 65–99)
GLUCOSE BLD-MCNC: 320 MG/DL (ref 65–99)
GLUCOSE BLD-MCNC: 334 MG/DL (ref 65–99)
GLUCOSE SERPL-MCNC: 321 MG/DL (ref 65–99)
HCT VFR BLD AUTO: 45.4 % (ref 37–47)
HGB BLD-MCNC: 13.5 G/DL (ref 12–16)
MCH RBC QN AUTO: 26.6 PG (ref 27–33)
MCHC RBC AUTO-ENTMCNC: 29.7 G/DL (ref 33.6–35)
MCV RBC AUTO: 89.4 FL (ref 81.4–97.8)
PLATELET # BLD AUTO: 191 K/UL (ref 164–446)
PMV BLD AUTO: 10.6 FL (ref 9–12.9)
POTASSIUM SERPL-SCNC: 5 MMOL/L (ref 3.6–5.5)
RBC # BLD AUTO: 5.08 M/UL (ref 4.2–5.4)
SODIUM SERPL-SCNC: 132 MMOL/L (ref 135–145)
WBC # BLD AUTO: 9.9 K/UL (ref 4.8–10.8)

## 2017-03-16 PROCEDURE — 85027 COMPLETE CBC AUTOMATED: CPT

## 2017-03-16 PROCEDURE — 99232 SBSQ HOSP IP/OBS MODERATE 35: CPT | Performed by: INTERNAL MEDICINE

## 2017-03-16 PROCEDURE — 90686 IIV4 VACC NO PRSV 0.5 ML IM: CPT | Performed by: INTERNAL MEDICINE

## 2017-03-16 PROCEDURE — 3E0234Z INTRODUCTION OF SERUM, TOXOID AND VACCINE INTO MUSCLE, PERCUTANEOUS APPROACH: ICD-10-PCS | Performed by: INTERNAL MEDICINE

## 2017-03-16 PROCEDURE — 82962 GLUCOSE BLOOD TEST: CPT | Mod: 91

## 2017-03-16 PROCEDURE — A9270 NON-COVERED ITEM OR SERVICE: HCPCS | Performed by: INTERNAL MEDICINE

## 2017-03-16 PROCEDURE — 700101 HCHG RX REV CODE 250: Performed by: NURSE PRACTITIONER

## 2017-03-16 PROCEDURE — A9270 NON-COVERED ITEM OR SERVICE: HCPCS | Performed by: NURSE PRACTITIONER

## 2017-03-16 PROCEDURE — 94640 AIRWAY INHALATION TREATMENT: CPT

## 2017-03-16 PROCEDURE — 96376 TX/PRO/DX INJ SAME DRUG ADON: CPT

## 2017-03-16 PROCEDURE — 306637 HCHG MISC ORTHO ITEM RC 0274

## 2017-03-16 PROCEDURE — 96372 THER/PROPH/DIAG INJ SC/IM: CPT

## 2017-03-16 PROCEDURE — 700111 HCHG RX REV CODE 636 W/ 250 OVERRIDE (IP): Performed by: INTERNAL MEDICINE

## 2017-03-16 PROCEDURE — 700102 HCHG RX REV CODE 250 W/ 637 OVERRIDE(OP): Performed by: INTERNAL MEDICINE

## 2017-03-16 PROCEDURE — 73060 X-RAY EXAM OF HUMERUS: CPT | Mod: RT

## 2017-03-16 PROCEDURE — 73200 CT UPPER EXTREMITY W/O DYE: CPT | Mod: RT

## 2017-03-16 PROCEDURE — 96366 THER/PROPH/DIAG IV INF ADDON: CPT

## 2017-03-16 PROCEDURE — 700105 HCHG RX REV CODE 258: Performed by: NURSE PRACTITIONER

## 2017-03-16 PROCEDURE — 80048 BASIC METABOLIC PNL TOTAL CA: CPT

## 2017-03-16 PROCEDURE — 700101 HCHG RX REV CODE 250: Performed by: INTERNAL MEDICINE

## 2017-03-16 PROCEDURE — 90471 IMMUNIZATION ADMIN: CPT

## 2017-03-16 PROCEDURE — 700102 HCHG RX REV CODE 250 W/ 637 OVERRIDE(OP): Performed by: NURSE PRACTITIONER

## 2017-03-16 PROCEDURE — 73000 X-RAY EXAM OF COLLAR BONE: CPT | Mod: RT

## 2017-03-16 PROCEDURE — 700111 HCHG RX REV CODE 636 W/ 250 OVERRIDE (IP): Performed by: NURSE PRACTITIONER

## 2017-03-16 PROCEDURE — 73080 X-RAY EXAM OF ELBOW: CPT | Mod: RT

## 2017-03-16 PROCEDURE — 73090 X-RAY EXAM OF FOREARM: CPT | Mod: RT

## 2017-03-16 RX ORDER — INSULIN GLARGINE 100 [IU]/ML
10 INJECTION, SOLUTION SUBCUTANEOUS ONCE
Status: COMPLETED | OUTPATIENT
Start: 2017-03-16 | End: 2017-03-16

## 2017-03-16 RX ORDER — TRAMADOL HYDROCHLORIDE 50 MG/1
50 TABLET ORAL EVERY 6 HOURS PRN
Status: DISCONTINUED | OUTPATIENT
Start: 2017-03-16 | End: 2017-03-18 | Stop reason: HOSPADM

## 2017-03-16 RX ORDER — ALBUTEROL SULFATE 90 UG/1
2 AEROSOL, METERED RESPIRATORY (INHALATION) EVERY 4 HOURS PRN
Status: DISCONTINUED | OUTPATIENT
Start: 2017-03-16 | End: 2017-03-18 | Stop reason: HOSPADM

## 2017-03-16 RX ORDER — AZITHROMYCIN 250 MG/1
500 TABLET, FILM COATED ORAL DAILY
Status: DISCONTINUED | OUTPATIENT
Start: 2017-03-17 | End: 2017-03-18 | Stop reason: HOSPADM

## 2017-03-16 RX ORDER — IPRATROPIUM BROMIDE AND ALBUTEROL SULFATE 2.5; .5 MG/3ML; MG/3ML
3 SOLUTION RESPIRATORY (INHALATION) 4 TIMES DAILY
Status: DISCONTINUED | OUTPATIENT
Start: 2017-03-17 | End: 2017-03-18 | Stop reason: HOSPADM

## 2017-03-16 RX ORDER — INSULIN GLARGINE 100 [IU]/ML
50 INJECTION, SOLUTION SUBCUTANEOUS EVERY EVENING
Status: DISCONTINUED | OUTPATIENT
Start: 2017-03-16 | End: 2017-03-18 | Stop reason: HOSPADM

## 2017-03-16 RX ADMIN — FUROSEMIDE 20 MG: 20 TABLET ORAL at 21:17

## 2017-03-16 RX ADMIN — GABAPENTIN 300 MG: 300 CAPSULE ORAL at 21:17

## 2017-03-16 RX ADMIN — OXYCODONE HYDROCHLORIDE 10 MG: 10 TABLET ORAL at 02:00

## 2017-03-16 RX ADMIN — GUAIFENESIN 200 MG: 100 SOLUTION ORAL at 17:09

## 2017-03-16 RX ADMIN — CLONIDINE HYDROCHLORIDE 0.1 MG: 0.1 TABLET ORAL at 21:17

## 2017-03-16 RX ADMIN — ALPRAZOLAM 0.5 MG: 0.5 TABLET ORAL at 21:27

## 2017-03-16 RX ADMIN — GUAIFENESIN 200 MG: 100 SOLUTION ORAL at 21:19

## 2017-03-16 RX ADMIN — NITROGLYCERIN 0.4 MG: 0.4 TABLET SUBLINGUAL at 17:57

## 2017-03-16 RX ADMIN — INSULIN LISPRO 5 UNITS: 100 INJECTION, SOLUTION INTRAVENOUS; SUBCUTANEOUS at 01:57

## 2017-03-16 RX ADMIN — GABAPENTIN 300 MG: 300 CAPSULE ORAL at 09:30

## 2017-03-16 RX ADMIN — INFLUENZA A VIRUS A/CALIFORNIA/7/2009 X-179A (H1N1) ANTIGEN (FORMALDEHYDE INACTIVATED), INFLUENZA A VIRUS A/HONG KONG/4801/2014 X-263B (H3N2) ANTIGEN (FORMALDEHYDE INACTIVATED), INFLUENZA B VIRUS B/PHUKET/3073/2013 ANTIGEN (FORMALDEHYDE INACTIVATED), AND INFLUENZA B VIRUS B/BRISBANE/60/2008 ANTIGEN (FORMALDEHYDE INACTIVATED) 0.5 ML: 15; 15; 15; 15 INJECTION, SUSPENSION INTRAMUSCULAR at 18:38

## 2017-03-16 RX ADMIN — TRAZODONE HYDROCHLORIDE 50 MG: 50 TABLET ORAL at 21:18

## 2017-03-16 RX ADMIN — METHYLPREDNISOLONE SODIUM SUCCINATE 62.5 MG: 125 INJECTION, POWDER, FOR SOLUTION INTRAMUSCULAR; INTRAVENOUS at 00:31

## 2017-03-16 RX ADMIN — RISPERIDONE 0.5 MG: 0.5 TABLET, FILM COATED ORAL at 21:20

## 2017-03-16 RX ADMIN — GABAPENTIN 300 MG: 300 CAPSULE ORAL at 15:00

## 2017-03-16 RX ADMIN — INSULIN GLARGINE 10 UNITS: 100 INJECTION, SOLUTION SUBCUTANEOUS at 09:32

## 2017-03-16 RX ADMIN — GUAIFENESIN 200 MG: 100 SOLUTION ORAL at 10:30

## 2017-03-16 RX ADMIN — ASPIRIN 81 MG: 81 TABLET ORAL at 09:30

## 2017-03-16 RX ADMIN — INSULIN LISPRO 6 UNITS: 100 INJECTION, SOLUTION INTRAVENOUS; SUBCUTANEOUS at 12:00

## 2017-03-16 RX ADMIN — HEPARIN SODIUM 5000 UNITS: 5000 INJECTION, SOLUTION INTRAVENOUS; SUBCUTANEOUS at 05:46

## 2017-03-16 RX ADMIN — METHYLPREDNISOLONE SODIUM SUCCINATE 62.5 MG: 125 INJECTION, POWDER, FOR SOLUTION INTRAMUSCULAR; INTRAVENOUS at 05:48

## 2017-03-16 RX ADMIN — POTASSIUM CHLORIDE 20 MEQ: 1500 TABLET, EXTENDED RELEASE ORAL at 21:18

## 2017-03-16 RX ADMIN — CITALOPRAM HYDROBROMIDE 20 MG: 20 TABLET ORAL at 09:30

## 2017-03-16 RX ADMIN — LIDOCAINE 1 PATCH: 50 PATCH CUTANEOUS at 09:30

## 2017-03-16 RX ADMIN — IPRATROPIUM BROMIDE AND ALBUTEROL SULFATE 3 ML: .5; 3 SOLUTION RESPIRATORY (INHALATION) at 02:26

## 2017-03-16 RX ADMIN — BUDESONIDE AND FORMOTEROL FUMARATE DIHYDRATE 2 PUFF: 160; 4.5 AEROSOL RESPIRATORY (INHALATION) at 21:20

## 2017-03-16 RX ADMIN — GUAIFENESIN 200 MG: 100 SOLUTION ORAL at 02:01

## 2017-03-16 RX ADMIN — AZITHROMYCIN 500 MG: 500 INJECTION, POWDER, LYOPHILIZED, FOR SOLUTION INTRAVENOUS at 11:15

## 2017-03-16 RX ADMIN — HEPARIN SODIUM 5000 UNITS: 5000 INJECTION, SOLUTION INTRAVENOUS; SUBCUTANEOUS at 21:21

## 2017-03-16 RX ADMIN — GUAIFENESIN 200 MG: 100 SOLUTION ORAL at 05:39

## 2017-03-16 RX ADMIN — METHYLPREDNISOLONE SODIUM SUCCINATE 62.5 MG: 125 INJECTION, POWDER, FOR SOLUTION INTRAMUSCULAR; INTRAVENOUS at 18:37

## 2017-03-16 RX ADMIN — INSULIN LISPRO 3 UNITS: 100 INJECTION, SOLUTION INTRAVENOUS; SUBCUTANEOUS at 18:32

## 2017-03-16 RX ADMIN — TIOTROPIUM BROMIDE 1 CAPSULE: 18 CAPSULE ORAL; RESPIRATORY (INHALATION) at 09:00

## 2017-03-16 RX ADMIN — IPRATROPIUM BROMIDE AND ALBUTEROL SULFATE 3 ML: .5; 3 SOLUTION RESPIRATORY (INHALATION) at 20:16

## 2017-03-16 RX ADMIN — INSULIN GLARGINE 50 UNITS: 100 INJECTION, SOLUTION SUBCUTANEOUS at 21:25

## 2017-03-16 RX ADMIN — CEFTRIAXONE 2 G: 2 INJECTION, POWDER, FOR SOLUTION INTRAMUSCULAR; INTRAVENOUS at 10:29

## 2017-03-16 RX ADMIN — SIMVASTATIN 20 MG: 20 TABLET, FILM COATED ORAL at 21:18

## 2017-03-16 RX ADMIN — IPRATROPIUM BROMIDE AND ALBUTEROL SULFATE 3 ML: .5; 3 SOLUTION RESPIRATORY (INHALATION) at 08:55

## 2017-03-16 RX ADMIN — METOPROLOL SUCCINATE 25 MG: 25 TABLET, EXTENDED RELEASE ORAL at 09:30

## 2017-03-16 RX ADMIN — POTASSIUM CHLORIDE 20 MEQ: 1500 TABLET, EXTENDED RELEASE ORAL at 09:30

## 2017-03-16 RX ADMIN — LISINOPRIL 20 MG: 20 TABLET ORAL at 09:30

## 2017-03-16 RX ADMIN — OXYCODONE HYDROCHLORIDE 10 MG: 10 TABLET ORAL at 13:30

## 2017-03-16 RX ADMIN — CLONIDINE HYDROCHLORIDE 0.1 MG: 0.1 TABLET ORAL at 09:30

## 2017-03-16 RX ADMIN — OXYCODONE HYDROCHLORIDE 10 MG: 10 TABLET ORAL at 21:27

## 2017-03-16 RX ADMIN — FUROSEMIDE 20 MG: 20 TABLET ORAL at 09:30

## 2017-03-16 RX ADMIN — INSULIN LISPRO 3 UNITS: 100 INJECTION, SOLUTION INTRAVENOUS; SUBCUTANEOUS at 05:41

## 2017-03-16 RX ADMIN — MORPHINE SULFATE 4 MG: 4 INJECTION INTRAVENOUS at 06:08

## 2017-03-16 ASSESSMENT — PAIN SCALES - GENERAL
PAINLEVEL_OUTOF10: 2
PAINLEVEL_OUTOF10: 8
PAINLEVEL_OUTOF10: 8
PAINLEVEL_OUTOF10: 3
PAINLEVEL_OUTOF10: 8
PAINLEVEL_OUTOF10: 2
PAINLEVEL_OUTOF10: 7
PAINLEVEL_OUTOF10: 8

## 2017-03-16 ASSESSMENT — ENCOUNTER SYMPTOMS
SHORTNESS OF BREATH: 1
COUGH: 1
WEAKNESS: 1
ABDOMINAL PAIN: 0
CHILLS: 0
HEADACHES: 0
FEVER: 0
FOCAL WEAKNESS: 0
NAUSEA: 0
BACK PAIN: 0
WHEEZING: 1
PALPITATIONS: 0
DIZZINESS: 0
CONSTIPATION: 0
SPUTUM PRODUCTION: 1

## 2017-03-16 NOTE — PROGRESS NOTES
Received report from DARRELL Gan. Pt resting in chair w/ tele monitor in place. Pt requesting more food. Needs identified and addressed at this time.

## 2017-03-16 NOTE — PROGRESS NOTES
"Hospital Medicine Progress Note, Adult, Complex               Author: Vannesa Hancock Date & Time created: 3/16/2017  2:04 PM     Interval History:  Hospital Course:  Zenia Ordaz is a 66 y.o. female w/ h/o COPD, DM, AMI, home oxygen use admitted 3/14/2017 for chest pain, URI symptoms and SOB. Found to have COPD exacerbation.      Today, the patient complains of continued SOB. Feels it may be getting somewhat better. Denies fever/chills. Right arm pain is severe, states she fell and had XRs done at [facility] but then she reinjured it recently. Wishes for XR of the arm. Denies new complaint. O2 demand at baseline, breathing appears better but she states \"not much\".     Review of Systems:  Review of Systems   Constitutional: Positive for malaise/fatigue. Negative for fever and chills.   Respiratory: Positive for cough, sputum production, shortness of breath and wheezing.    Cardiovascular: Positive for chest pain (with cough or deep breathing, improving). Negative for palpitations and leg swelling.   Gastrointestinal: Negative for nausea, abdominal pain and constipation.   Genitourinary: Negative for dysuria.   Musculoskeletal: Positive for joint pain (right arm pain s/p fracture). Negative for back pain.   Neurological: Positive for weakness. Negative for dizziness, focal weakness and headaches.   All other systems reviewed and are negative.      Physical Exam:  Physical Exam   Constitutional: She is oriented to person, place, and time. She appears well-developed and well-nourished. No distress.   Chronically ill appearing, debilitated female resting in bed   HENT:   Head: Normocephalic and atraumatic.   Eyes: Conjunctivae are normal. No scleral icterus.   Neck: Normal range of motion. Neck supple.   Cardiovascular: Normal rate and regular rhythm.    No murmur heard.  Pulmonary/Chest: No respiratory distress (improved). She has wheezes. She has no rales. She exhibits tenderness (right sided chest pain tender to " palpation).   Abdominal: Soft. Bowel sounds are normal. There is no tenderness. There is no guarding.   Musculoskeletal: Normal range of motion. She exhibits no edema.   Right arm in sling from fall   Neurological: She is alert and oriented to person, place, and time.   Skin: Skin is warm and dry. No rash noted.   Vitals reviewed.      Labs:  Recent Labs      03/15/17   0919   NUPRZ32M  7.36*   [id number]  45.4*   [id number]  46.7*   [id number]  83.5*   ARTHCO3  25   ARTBE  -1     Recent Labs      17   0502  17   0920   TROPONINI  0.02  0.01  <0.01   BNPBTYPENAT  93   --    --      Recent Labs      03/14/17   0227  03/15/17   0938  17   0307   SODIUM  139  138  132*   POTASSIUM  3.5*  3.8  5.0   CHLORIDE  107  106  103   CO2  24  26  22   BUN  18  27*  39*   CREATININE  0.58  0.62  0.77   MAGNESIUM   --   2.0   --    CALCIUM  9.1  8.9  9.0     Recent Labs      03/14/17   0227  03/15/17   0938  17   0307   ALTSGPT  9  8   --    ASTSGOT  13  9*   --    ALKPHOSPHAT  115*  101*   --    TBILIRUBIN  0.3  0.3   --    GLUCOSE  166*  144*  321*     Recent Labs      03/14/17   0227  03/15/17   0938  17   0307   RBC  5.67*  5.18  5.08   HEMOGLOBIN  15.1  13.7  13.5   HEMATOCRIT  46.8  43.9  45.4   PLATELETCT  236  193  191     Recent Labs      03/14/17   0227  03/15/17   0938  17   0307   WBC  6.1  8.5  9.9   NEUTSPOLYS  58.10  73.90*   --    LYMPHOCYTES  30.00  18.60*   --    MONOCYTES  9.60  6.40   --    EOSINOPHILS  1.20  0.10   --    BASOPHILS  0.80  0.60   --    ASTSGOT  13  9*   --    ALTSGPT  9  8   --    ALKPHOSPHAT  115*  101*   --    TBILIRUBIN  0.3  0.3   --            Hemodynamics:  Temp (24hrs), Av.4 °C (97.6 °F), Min:35.9 °C (96.6 °F), Max:36.8 °C (98.2 °F)  Temperature: 35.9 °C (96.6 °F)  Pulse  Av.5  Min: 55  Max: 91   Blood Pressure: 145/66 mmHg     Respiratory:    Respiration: 18, Pulse Oximetry: 91 %, O2 Daily Delivery Respiratory : Silicone Nasal  Cannula     Given By:: Mask, #MDI/DPI Given: MDI/DPI x 1, Work Of Breathing / Effort: Mild  RUL Breath Sounds: Expiratory Wheezes, RML Breath Sounds: Expiratory Wheezes, RLL Breath Sounds: Diminished, MARC Breath Sounds: Expiratory Wheezes, LLL Breath Sounds: Diminished  Fluids:  No intake or output data in the 24 hours ending 03/16/17 1404     GI/Nutrition:  Orders Placed This Encounter   Procedures   • DIET ORDER     Standing Status: Standing      Number of Occurrences: 1      Standing Expiration Date:      Order Specific Question:  Diet:     Answer:  Diabetic [3]     Order Specific Question:  Diet:     Answer:  Cardiac [6]     Medical Decision Making, by Problem:  Assessment and plan:  Active Hospital Problems      Diagnosis    •  *Chest pain [R07.9]  -NM stresss test- neg  -Suspect musculoskeletal 2/2 cough- PRN oxycodone, lidocaine patch, gabapentin   -ECHO LVEF 60, grade II diastolic   •  COPD exacerbation (CMS-HCC) [J44.1]- on home O2 dose  -RT protocol  -Wean O2 to home O2 dose 3L to keep SPO2 > 91%  -Steroid, symbicort, spiriva   -Respiratory status improved today- cont IV steroid, rocephin/azithromycin (day 2)  -ABG (appears venous)- without acidosis  -Influenza NEG, sputum culture pend   •  On home oxygen therapy [Z99.81]- on home O2 dose    •  CAD (coronary artery disease) [I25.10]- cont ASA, BB, ACEI    •  Hypokalemia [E87.6]- replace, monitor    •  Type 2 diabetes mellitus, uncontrolled (CMS-[facility]) [E11.65]  -ISS ACHS  -Cont lantus- increase to 50mg daily (10 units given this AM)           Weakness  -PT/OT- reccs SNF placement for rehab needs    Right arm pain s/p fall  -XR of right arm pending, may need ortho consult  -Continue sling, pain control, PT/OT    Dispo: IP medical unit, also needs SNF placement per PT eval- appreciate CM/SW input    Labs reviewed and Medications reviewed  Fagan catheter: No Fagan      DVT Prophylaxis: Enoxaparin (Lovenox)    Ulcer prophylaxis: Not indicated    Assessed for  rehab: Patient was assess for and/or received rehabilitation services during this hospitalization

## 2017-03-16 NOTE — PROGRESS NOTES
Assessment complete. A&Ox4. C/o pain 8/10. Will be medicated per MAR prn. Discussed POC w/ pt. Educated on medications per MAR. Discussed the importance of calling before getting OOB to prevent injury/falls. Pt verbalizes understanding. Bed alarm on. Call light in reach. Bed in lowest position. No other needs identified. Will continue hourly rounding.

## 2017-03-16 NOTE — WOUND TEAM
"Wound team consult placed regarding left second toe wound.  Upon observation, Pt presents with no actual wound to the toe but states that she \"dropped a board on it and [place] said it was broken in three places.\"  Upon removing a large amount of tape, a scab fell off the cuticle of the toenail revealing intact skin beneath.  The toe was not malaligned, was not swollen and Pt reported no pain when tape was being removed or toe mobilized.  Toe left VEE, no dressings needed, no skilled wound team needs at this time.    "

## 2017-03-16 NOTE — PROGRESS NOTES
KANE Fry tech attempted to draw pt blood x2. Unable to obtain blood draw. Lab called at ext. [phone].

## 2017-03-17 LAB
ANION GAP SERPL CALC-SCNC: 4 MMOL/L (ref 0–11.9)
BUN SERPL-MCNC: 43 MG/DL (ref 8–22)
CALCIUM SERPL-MCNC: 9.2 MG/DL (ref 8.5–10.5)
CHLORIDE SERPL-SCNC: 98 MMOL/L (ref 96–112)
CO2 SERPL-SCNC: 28 MMOL/L (ref 20–33)
CREAT SERPL-MCNC: 0.78 MG/DL (ref 0.5–1.4)
ERYTHROCYTE [DISTWIDTH] IN BLOOD BY AUTOMATED COUNT: 42.1 FL (ref 35.9–50)
GFR SERPL CREATININE-BSD FRML MDRD: >60 ML/MIN/1.73 M 2
GLUCOSE BLD-MCNC: 252 MG/DL (ref 65–99)
GLUCOSE BLD-MCNC: 278 MG/DL (ref 65–99)
GLUCOSE BLD-MCNC: 292 MG/DL (ref 65–99)
GLUCOSE BLD-MCNC: 309 MG/DL (ref 65–99)
GLUCOSE BLD-MCNC: 324 MG/DL (ref 65–99)
GLUCOSE SERPL-MCNC: 301 MG/DL (ref 65–99)
HCT VFR BLD AUTO: 43.1 % (ref 37–47)
HGB BLD-MCNC: 13.7 G/DL (ref 12–16)
MCH RBC QN AUTO: 26.7 PG (ref 27–33)
MCHC RBC AUTO-ENTMCNC: 31.8 G/DL (ref 33.6–35)
MCV RBC AUTO: 83.9 FL (ref 81.4–97.8)
PLATELET # BLD AUTO: 227 K/UL (ref 164–446)
PMV BLD AUTO: 10.5 FL (ref 9–12.9)
POTASSIUM SERPL-SCNC: 5 MMOL/L (ref 3.6–5.5)
RBC # BLD AUTO: 5.14 M/UL (ref 4.2–5.4)
SODIUM SERPL-SCNC: 130 MMOL/L (ref 135–145)
WBC # BLD AUTO: 15 K/UL (ref 4.8–10.8)

## 2017-03-17 PROCEDURE — 96372 THER/PROPH/DIAG INJ SC/IM: CPT

## 2017-03-17 PROCEDURE — 94640 AIRWAY INHALATION TREATMENT: CPT

## 2017-03-17 PROCEDURE — 700101 HCHG RX REV CODE 250: Performed by: NURSE PRACTITIONER

## 2017-03-17 PROCEDURE — 99232 SBSQ HOSP IP/OBS MODERATE 35: CPT | Performed by: INTERNAL MEDICINE

## 2017-03-17 PROCEDURE — 82962 GLUCOSE BLOOD TEST: CPT

## 2017-03-17 PROCEDURE — 700101 HCHG RX REV CODE 250: Performed by: INTERNAL MEDICINE

## 2017-03-17 PROCEDURE — 700111 HCHG RX REV CODE 636 W/ 250 OVERRIDE (IP): Performed by: INTERNAL MEDICINE

## 2017-03-17 PROCEDURE — 700111 HCHG RX REV CODE 636 W/ 250 OVERRIDE (IP): Performed by: NURSE PRACTITIONER

## 2017-03-17 PROCEDURE — 85027 COMPLETE CBC AUTOMATED: CPT

## 2017-03-17 PROCEDURE — 96366 THER/PROPH/DIAG IV INF ADDON: CPT

## 2017-03-17 PROCEDURE — A9270 NON-COVERED ITEM OR SERVICE: HCPCS | Performed by: NURSE PRACTITIONER

## 2017-03-17 PROCEDURE — 96376 TX/PRO/DX INJ SAME DRUG ADON: CPT

## 2017-03-17 PROCEDURE — 36415 COLL VENOUS BLD VENIPUNCTURE: CPT

## 2017-03-17 PROCEDURE — 97535 SELF CARE MNGMENT TRAINING: CPT

## 2017-03-17 PROCEDURE — 306637 HCHG MISC ORTHO ITEM RC 0274

## 2017-03-17 PROCEDURE — A9270 NON-COVERED ITEM OR SERVICE: HCPCS | Performed by: INTERNAL MEDICINE

## 2017-03-17 PROCEDURE — 700102 HCHG RX REV CODE 250 W/ 637 OVERRIDE(OP): Performed by: INTERNAL MEDICINE

## 2017-03-17 PROCEDURE — 700105 HCHG RX REV CODE 258: Performed by: NURSE PRACTITIONER

## 2017-03-17 PROCEDURE — 700102 HCHG RX REV CODE 250 W/ 637 OVERRIDE(OP): Performed by: NURSE PRACTITIONER

## 2017-03-17 PROCEDURE — 80048 BASIC METABOLIC PNL TOTAL CA: CPT

## 2017-03-17 RX ORDER — OXYCODONE HYDROCHLORIDE 10 MG/1
10 TABLET ORAL EVERY 4 HOURS PRN
Status: DISCONTINUED | OUTPATIENT
Start: 2017-03-17 | End: 2017-03-17

## 2017-03-17 RX ORDER — OXYCODONE HYDROCHLORIDE 10 MG/1
10 TABLET ORAL EVERY 6 HOURS PRN
Status: DISCONTINUED | OUTPATIENT
Start: 2017-03-17 | End: 2017-03-18 | Stop reason: HOSPADM

## 2017-03-17 RX ADMIN — TRAZODONE HYDROCHLORIDE 50 MG: 50 TABLET ORAL at 20:22

## 2017-03-17 RX ADMIN — OXYCODONE HYDROCHLORIDE 10 MG: 10 TABLET ORAL at 13:03

## 2017-03-17 RX ADMIN — IPRATROPIUM BROMIDE AND ALBUTEROL SULFATE 3 ML: .5; 3 SOLUTION RESPIRATORY (INHALATION) at 20:14

## 2017-03-17 RX ADMIN — GUAIFENESIN 200 MG: 100 SOLUTION ORAL at 05:46

## 2017-03-17 RX ADMIN — FUROSEMIDE 20 MG: 20 TABLET ORAL at 08:46

## 2017-03-17 RX ADMIN — GUAIFENESIN 200 MG: 100 SOLUTION ORAL at 10:00

## 2017-03-17 RX ADMIN — GUAIFENESIN 200 MG: 100 SOLUTION ORAL at 17:43

## 2017-03-17 RX ADMIN — FUROSEMIDE 20 MG: 20 TABLET ORAL at 20:21

## 2017-03-17 RX ADMIN — CLONIDINE HYDROCHLORIDE 0.1 MG: 0.1 TABLET ORAL at 08:46

## 2017-03-17 RX ADMIN — METHYLPREDNISOLONE SODIUM SUCCINATE 62.5 MG: 125 INJECTION, POWDER, FOR SOLUTION INTRAMUSCULAR; INTRAVENOUS at 05:43

## 2017-03-17 RX ADMIN — LISINOPRIL 20 MG: 20 TABLET ORAL at 08:46

## 2017-03-17 RX ADMIN — METHYLPREDNISOLONE SODIUM SUCCINATE 62.5 MG: 125 INJECTION, POWDER, FOR SOLUTION INTRAMUSCULAR; INTRAVENOUS at 13:03

## 2017-03-17 RX ADMIN — GABAPENTIN 300 MG: 300 CAPSULE ORAL at 15:00

## 2017-03-17 RX ADMIN — GUAIFENESIN 200 MG: 100 SOLUTION ORAL at 01:57

## 2017-03-17 RX ADMIN — BUDESONIDE AND FORMOTEROL FUMARATE DIHYDRATE 2 PUFF: 160; 4.5 AEROSOL RESPIRATORY (INHALATION) at 09:40

## 2017-03-17 RX ADMIN — IPRATROPIUM BROMIDE AND ALBUTEROL SULFATE 3 ML: .5; 3 SOLUTION RESPIRATORY (INHALATION) at 09:38

## 2017-03-17 RX ADMIN — OXYCODONE HYDROCHLORIDE 10 MG: 10 TABLET ORAL at 05:49

## 2017-03-17 RX ADMIN — IPRATROPIUM BROMIDE AND ALBUTEROL SULFATE 3 ML: .5; 3 SOLUTION RESPIRATORY (INHALATION) at 12:50

## 2017-03-17 RX ADMIN — OXYCODONE HYDROCHLORIDE 10 MG: 10 TABLET ORAL at 19:32

## 2017-03-17 RX ADMIN — HEPARIN SODIUM 5000 UNITS: 5000 INJECTION, SOLUTION INTRAVENOUS; SUBCUTANEOUS at 20:27

## 2017-03-17 RX ADMIN — CITALOPRAM HYDROBROMIDE 20 MG: 20 TABLET ORAL at 08:46

## 2017-03-17 RX ADMIN — GABAPENTIN 300 MG: 300 CAPSULE ORAL at 20:20

## 2017-03-17 RX ADMIN — HEPARIN SODIUM 5000 UNITS: 5000 INJECTION, SOLUTION INTRAVENOUS; SUBCUTANEOUS at 15:00

## 2017-03-17 RX ADMIN — CLONIDINE HYDROCHLORIDE 0.1 MG: 0.1 TABLET ORAL at 20:21

## 2017-03-17 RX ADMIN — RISPERIDONE 0.5 MG: 0.5 TABLET, FILM COATED ORAL at 20:22

## 2017-03-17 RX ADMIN — METHYLPREDNISOLONE SODIUM SUCCINATE 62.5 MG: 125 INJECTION, POWDER, FOR SOLUTION INTRAMUSCULAR; INTRAVENOUS at 17:44

## 2017-03-17 RX ADMIN — INSULIN GLARGINE 50 UNITS: 100 INJECTION, SOLUTION SUBCUTANEOUS at 20:28

## 2017-03-17 RX ADMIN — POTASSIUM CHLORIDE 20 MEQ: 1500 TABLET, EXTENDED RELEASE ORAL at 20:22

## 2017-03-17 RX ADMIN — HEPARIN SODIUM 5000 UNITS: 5000 INJECTION, SOLUTION INTRAVENOUS; SUBCUTANEOUS at 05:47

## 2017-03-17 RX ADMIN — AZITHROMYCIN 500 MG: 250 TABLET, FILM COATED ORAL at 08:46

## 2017-03-17 RX ADMIN — METOPROLOL SUCCINATE 25 MG: 25 TABLET, EXTENDED RELEASE ORAL at 08:46

## 2017-03-17 RX ADMIN — INSULIN LISPRO 5 UNITS: 100 INJECTION, SOLUTION INTRAVENOUS; SUBCUTANEOUS at 00:07

## 2017-03-17 RX ADMIN — SIMVASTATIN 20 MG: 20 TABLET, FILM COATED ORAL at 20:21

## 2017-03-17 RX ADMIN — GABAPENTIN 300 MG: 300 CAPSULE ORAL at 08:46

## 2017-03-17 RX ADMIN — TIOTROPIUM BROMIDE 1 CAPSULE: 18 CAPSULE ORAL; RESPIRATORY (INHALATION) at 09:40

## 2017-03-17 RX ADMIN — ASPIRIN 81 MG: 81 TABLET ORAL at 08:46

## 2017-03-17 RX ADMIN — POTASSIUM CHLORIDE 20 MEQ: 1500 TABLET, EXTENDED RELEASE ORAL at 08:46

## 2017-03-17 RX ADMIN — INSULIN LISPRO 5 UNITS: 100 INJECTION, SOLUTION INTRAVENOUS; SUBCUTANEOUS at 17:49

## 2017-03-17 RX ADMIN — METHYLPREDNISOLONE SODIUM SUCCINATE 62.5 MG: 125 INJECTION, POWDER, FOR SOLUTION INTRAMUSCULAR; INTRAVENOUS at 00:03

## 2017-03-17 RX ADMIN — LIDOCAINE 1 PATCH: 50 PATCH CUTANEOUS at 08:48

## 2017-03-17 RX ADMIN — BUDESONIDE AND FORMOTEROL FUMARATE DIHYDRATE 2 PUFF: 160; 4.5 AEROSOL RESPIRATORY (INHALATION) at 20:21

## 2017-03-17 RX ADMIN — INSULIN LISPRO 6 UNITS: 100 INJECTION, SOLUTION INTRAVENOUS; SUBCUTANEOUS at 05:47

## 2017-03-17 RX ADMIN — CEFTRIAXONE 2 G: 2 INJECTION, POWDER, FOR SOLUTION INTRAMUSCULAR; INTRAVENOUS at 08:48

## 2017-03-17 RX ADMIN — GUAIFENESIN 200 MG: 100 SOLUTION ORAL at 20:26

## 2017-03-17 RX ADMIN — INSULIN LISPRO 5 UNITS: 100 INJECTION, SOLUTION INTRAVENOUS; SUBCUTANEOUS at 13:00

## 2017-03-17 ASSESSMENT — ENCOUNTER SYMPTOMS
ABDOMINAL PAIN: 0
WHEEZING: 1
SPUTUM PRODUCTION: 1
COUGH: 1
FEVER: 0
SHORTNESS OF BREATH: 1
CHILLS: 0
DIZZINESS: 0
WEAKNESS: 1
BACK PAIN: 0
NAUSEA: 0
HEADACHES: 0
CONSTIPATION: 0
FOCAL WEAKNESS: 0
MYALGIAS: 0
HEMOPTYSIS: 0
PALPITATIONS: 0

## 2017-03-17 ASSESSMENT — PAIN SCALES - GENERAL
PAINLEVEL_OUTOF10: 4
PAINLEVEL_OUTOF10: 8
PAINLEVEL_OUTOF10: 4
PAINLEVEL_OUTOF10: 8
PAINLEVEL_OUTOF10: 4

## 2017-03-17 NOTE — PROGRESS NOTES
Report received from DARRELL Frey. Pt resting comfortably in bed. Pt requesting pain medication. To be medicated per MAR.

## 2017-03-17 NOTE — PROGRESS NOTES
Assessment done, Pt educated on plan of care waiting on dr rounds and  Breakfast  Patient resting in bed, no signs of distress,  no complains of pain at this time. Call light within reach,  side rails up, will monitor. Condition stable

## 2017-03-17 NOTE — PROGRESS NOTES
Assessment complete. A&Ox4. C/o pain 8/10. Will be medicated per MAR prn. Discussed POC w/ pt. Educated on medications per MAR. Discussed the importance of calling for needs and before getting OOB to prevent injury/falls. Pt verbalizes understanding. Call light in reach. Bed in lowest position. No other needs identified. Will continue hourly rounding.

## 2017-03-17 NOTE — PROGRESS NOTES
Hospital Medicine Progress Note, Adult, Complex               Author: Mary Dupont  Date & Time created: 3/17/2017  12:46 PM     Interval History:  Zenia Ordaz is a 66 y.o. female w/ h/o COPD, DM, AMI, home oxygen use admitted 3/14/2017 for chest pain, URI symptoms and SOB. Found to have COPD exacerbation.  Ortho consulted and awaiting their notes and recs. Pain high.       Review of Systems:  Review of Systems   Constitutional: Positive for malaise/fatigue. Negative for fever and chills.   Respiratory: Positive for cough, sputum production, shortness of breath and wheezing. Negative for hemoptysis.    Cardiovascular: Positive for chest pain (with cough or deep breathing, improving). Negative for palpitations and leg swelling.   Gastrointestinal: Negative for nausea, abdominal pain and constipation.   Genitourinary: Negative for dysuria.   Musculoskeletal: Positive for joint pain (right arm pain s/p fracture). Negative for myalgias and back pain.   Neurological: Positive for weakness. Negative for dizziness, focal weakness and headaches.   All other systems reviewed and are negative.      Physical Exam:  Physical Exam   Constitutional: She is oriented to person, place, and time. She appears well-developed and well-nourished. No distress.   Chronically ill appearing, debilitated female resting in bed   HENT:   Head: Normocephalic and atraumatic.   Eyes: Conjunctivae are normal. No scleral icterus.   Neck: Normal range of motion. Neck supple.   Cardiovascular: Normal rate and regular rhythm.    No murmur heard.  Pulmonary/Chest: No respiratory distress (improved). She has wheezes. She has no rales. She exhibits tenderness (right sided chest pain tender to palpation).   Abdominal: Soft. Bowel sounds are normal. There is no tenderness. There is no guarding.   Musculoskeletal: Normal range of motion. She exhibits no edema.   Right arm in sling from fall   Neurological: She is alert and oriented to person, place,  and time.   Skin: Skin is warm and dry. No rash noted.   Vitals reviewed.      Labs:  Recent Labs      03/15/17   0919   [id number]  7.36*   [id number]  45.4*   [id number]  46.7*   [id number]  83.5*   ARTHCO3  25   ARTBE  -1         Recent Labs      03/15/17   0938  17   SODIUM  138  132*  130*   POTASSIUM  3.8  5.0  5.0   CHLORIDE  106  103  98   CO2  26  22  28   BUN  27*  39*  43*   CREATININE  0.62  0.77  0.78   MAGNESIUM  2.0   --    --    CALCIUM  8.9  9.0  9.2     Recent Labs      03/15/17   0938  03/16/17   0307  03/17/17   0038   ALTSGPT  8   --    --    ASTSGOT  9*   --    --    ALKPHOSPHAT  101*   --    --    TBILIRUBIN  0.3   --    --    GLUCOSE  144*  321*  301*     Recent Labs      03/15/17   0938  03/16/17   0307  03/17/17   0038   RBC  5.18  5.08  5.14   HEMOGLOBIN  13.7  13.5  13.7   HEMATOCRIT  43.9  45.4  43.1   PLATELETCT  193  191  227     Recent Labs      03/15/17   0938  03/16/17   0307  03/17/17   0038   WBC  8.5  9.9  15.0*   NEUTSPOLYS  73.90*   --    --    LYMPHOCYTES  18.60*   --    --    MONOCYTES  6.40   --    --    EOSINOPHILS  0.10   --    --    BASOPHILS  0.60   --    --    ASTSGOT  9*   --    --    ALTSGPT  8   --    --    ALKPHOSPHAT  101*   --    --    TBILIRUBIN  0.3   --    --            Hemodynamics:  Temp (24hrs), Av.6 °C (97.8 °F), Min:36.4 °C (97.5 °F), Max:36.7 °C (98.1 °F)  Temperature: 36.7 °C (98 °F)  Pulse  Av.8  Min: 51  Max: 91   Blood Pressure: 131/65 mmHg     Respiratory:    Respiration: 19, Pulse Oximetry: 97 %, O2 Daily Delivery Respiratory : Silicone Nasal Cannula     Given By:: Mask, #MDI/DPI Given: MDI/DPI x 2, Work Of Breathing / Effort: Mild  RUL Breath Sounds: Expiratory Wheezes, RML Breath Sounds: Expiratory Wheezes, RLL Breath Sounds: Expiratory Wheezes, MARC Breath Sounds: Expiratory Wheezes, LLL Breath Sounds: Expiratory Wheezes  Fluids:  No intake or output data in the 24 hours ending 17 0246      GI/Nutrition:  Orders Placed This Encounter   Procedures   • DIET ORDER     Standing Status: Standing      Number of Occurrences: 1      Standing Expiration Date:      Order Specific Question:  Diet:     Answer:  Diabetic [3]     Order Specific Question:  Diet:     Answer:  Cardiac [6]     Medical Decision Making, by Problem:  Assessment and plan:  Active Hospital Problems      Diagnosis    •  *Chest pain [R07.9]  -NM stresss test- negative   -Suspect musculoskeletal 2/2 cough and arm pain - PRN oxycodone, lidocaine patch, gabapentin   -ECHO LVEF 60%, grade II diastolic   •  COPD exacerbation (CMS-[facility]) [J44.1]- on home O2 dose  -RT protocol  -Wean O2 to home O2 dose 3L to keep SPO2 > 91%  -Steroid, symbicort, spiriva   -Respiratory status improved today- cont IV steroid, rocephin/azithromycin (day 3)  -ABG (appears venous)- without acidosis  -Influenza NEG, sputum culture pend   •  On home oxygen therapy [Z99.81]- on home O2 baseline    •  CAD (coronary artery disease) [I25.10  - cont ASA, BB, ACEI    •  Hypokalemia [E87.6]  - replace, monitor repeat Chem panel in AM    •  Type 2 diabetes mellitus, uncontrolled (CMS-[facility]) [E11.65] in setting of IV steroids   -ISS ACHS  -Cont lantus-  50mg daily            Weakness   -PT/OT- reccs SNF placement for rehab needs    Right arm pain s/p fall  -XR of right arm pending- Ortho has been consulted, awaiting recs. CT shows fracture  -Continue sling, pain control, PT/OT    Dispo: IP medical unit, also needs SNF placement per PT eval- appreciate CM/SW input    Labs reviewed and Medications reviewed  Fagan catheter: No Fagan      DVT Prophylaxis: Enoxaparin (Lovenox)    Ulcer prophylaxis: Not indicated    Assessed for rehab: Patient was assess for and/or received rehabilitation services during this hospitalization

## 2017-03-17 NOTE — THERAPY
"Occupational Therapy Treatment completed with focus on ADLs.  Functional Status:  Pt took seated shower with min assist.  She required min assist for clothing management during toileting tasks. Pt with pain and loss of ROM in RUE making ADLs more difficult.   Plan of Care: Will benefit from Occupational Therapy 3 times per week  Discharge Recommendations:  Equipment Will Continue to Assess for Equipment Needs. Post-acute therapy recommended before discharged home.    See \"Rehab Therapy-Acute\" Patient Summary Report for complete documentation.   "

## 2017-03-18 ENCOUNTER — RESOLUTE PROFESSIONAL BILLING HOSPITAL PROF FEE (OUTPATIENT)
Dept: HOSPITALIST | Facility: MEDICAL CENTER | Age: 67
End: 2017-03-18
Payer: MEDICAID

## 2017-03-18 ENCOUNTER — HOSPITAL ENCOUNTER (OUTPATIENT)
Facility: MEDICAL CENTER | Age: 67
End: 2017-03-20
Attending: EMERGENCY MEDICINE | Admitting: HOSPITALIST
Payer: MEDICAID

## 2017-03-18 ENCOUNTER — APPOINTMENT (OUTPATIENT)
Dept: RADIOLOGY | Facility: MEDICAL CENTER | Age: 67
End: 2017-03-18
Attending: EMERGENCY MEDICINE
Payer: MEDICAID

## 2017-03-18 VITALS
DIASTOLIC BLOOD PRESSURE: 61 MMHG | RESPIRATION RATE: 18 BRPM | WEIGHT: 177.91 LBS | SYSTOLIC BLOOD PRESSURE: 125 MMHG | HEART RATE: 61 BPM | TEMPERATURE: 97.7 F | OXYGEN SATURATION: 96 % | HEIGHT: 62 IN | BODY MASS INDEX: 32.74 KG/M2

## 2017-03-18 DIAGNOSIS — R07.9 ACUTE CHEST PAIN: ICD-10-CM

## 2017-03-18 DIAGNOSIS — S42.291D OTHER CLOSED DISPLACED FRACTURE OF PROXIMAL END OF RIGHT HUMERUS WITH ROUTINE HEALING, SUBSEQUENT ENCOUNTER: ICD-10-CM

## 2017-03-18 DIAGNOSIS — R06.02 SHORTNESS OF BREATH: ICD-10-CM

## 2017-03-18 PROBLEM — E87.6 HYPOKALEMIA: Status: RESOLVED | Noted: 2017-03-14 | Resolved: 2017-03-18

## 2017-03-18 PROBLEM — S42.309A HUMERAL FRACTURE: Status: ACTIVE | Noted: 2017-03-18

## 2017-03-18 PROBLEM — S42.353A: Status: ACTIVE | Noted: 2017-03-18

## 2017-03-18 PROBLEM — S42.209A CLOSED FRACTURE OF PROXIMAL END OF HUMERUS: Status: ACTIVE | Noted: 2017-03-18

## 2017-03-18 LAB
ALBUMIN SERPL BCP-MCNC: 3.6 G/DL (ref 3.2–4.9)
ALBUMIN/GLOB SERPL: 1.1 G/DL
ALP SERPL-CCNC: 99 U/L (ref 30–99)
ALT SERPL-CCNC: 15 U/L (ref 2–50)
ANION GAP SERPL CALC-SCNC: 6 MMOL/L (ref 0–11.9)
ANION GAP SERPL CALC-SCNC: 7 MMOL/L (ref 0–11.9)
APTT PPP: 23 SEC (ref 24.7–36)
AST SERPL-CCNC: 10 U/L (ref 12–45)
BASOPHILS # BLD AUTO: 0.1 % (ref 0–1.8)
BASOPHILS # BLD: 0.01 K/UL (ref 0–0.12)
BILIRUB SERPL-MCNC: 0.2 MG/DL (ref 0.1–1.5)
BNP SERPL-MCNC: 166 PG/ML (ref 0–100)
BUN SERPL-MCNC: 46 MG/DL (ref 8–22)
BUN SERPL-MCNC: 48 MG/DL (ref 8–22)
CALCIUM SERPL-MCNC: 9.2 MG/DL (ref 8.5–10.5)
CALCIUM SERPL-MCNC: 9.2 MG/DL (ref 8.5–10.5)
CHLORIDE SERPL-SCNC: 100 MMOL/L (ref 96–112)
CHLORIDE SERPL-SCNC: 98 MMOL/L (ref 96–112)
CO2 SERPL-SCNC: 27 MMOL/L (ref 20–33)
CO2 SERPL-SCNC: 28 MMOL/L (ref 20–33)
CREAT SERPL-MCNC: 0.87 MG/DL (ref 0.5–1.4)
CREAT SERPL-MCNC: 0.88 MG/DL (ref 0.5–1.4)
EKG IMPRESSION: NORMAL
EOSINOPHIL # BLD AUTO: 0 K/UL (ref 0–0.51)
EOSINOPHIL NFR BLD: 0 % (ref 0–6.9)
ERYTHROCYTE [DISTWIDTH] IN BLOOD BY AUTOMATED COUNT: 41.1 FL (ref 35.9–50)
GFR SERPL CREATININE-BSD FRML MDRD: >60 ML/MIN/1.73 M 2
GFR SERPL CREATININE-BSD FRML MDRD: >60 ML/MIN/1.73 M 2
GLOBULIN SER CALC-MCNC: 3.2 G/DL (ref 1.9–3.5)
GLUCOSE BLD-MCNC: 127 MG/DL (ref 65–99)
GLUCOSE BLD-MCNC: 335 MG/DL (ref 65–99)
GLUCOSE BLD-MCNC: 368 MG/DL (ref 65–99)
GLUCOSE SERPL-MCNC: 222 MG/DL (ref 65–99)
GLUCOSE SERPL-MCNC: 326 MG/DL (ref 65–99)
HCT VFR BLD AUTO: 42.6 % (ref 37–47)
HGB BLD-MCNC: 13.9 G/DL (ref 12–16)
IMM GRANULOCYTES # BLD AUTO: 0.13 K/UL (ref 0–0.11)
IMM GRANULOCYTES NFR BLD AUTO: 0.9 % (ref 0–0.9)
INR PPP: 0.93 (ref 0.87–1.13)
LACTATE BLD-SCNC: 2 MMOL/L (ref 0.5–2)
LIPASE SERPL-CCNC: 25 U/L (ref 11–82)
LYMPHOCYTES # BLD AUTO: 0.98 K/UL (ref 1–4.8)
LYMPHOCYTES NFR BLD: 7.1 % (ref 22–41)
MCH RBC QN AUTO: 27 PG (ref 27–33)
MCHC RBC AUTO-ENTMCNC: 32.6 G/DL (ref 33.6–35)
MCV RBC AUTO: 82.7 FL (ref 81.4–97.8)
MONOCYTES # BLD AUTO: 0.53 K/UL (ref 0–0.85)
MONOCYTES NFR BLD AUTO: 3.8 % (ref 0–13.4)
NEUTROPHILS # BLD AUTO: 12.21 K/UL (ref 2–7.15)
NEUTROPHILS NFR BLD: 88.1 % (ref 44–72)
NRBC # BLD AUTO: 0 K/UL
NRBC BLD AUTO-RTO: 0 /100 WBC
PLATELET # BLD AUTO: 254 K/UL (ref 164–446)
PMV BLD AUTO: 10.4 FL (ref 9–12.9)
POTASSIUM SERPL-SCNC: 4.5 MMOL/L (ref 3.6–5.5)
POTASSIUM SERPL-SCNC: 4.5 MMOL/L (ref 3.6–5.5)
PROT SERPL-MCNC: 6.8 G/DL (ref 6–8.2)
PROTHROMBIN TIME: 12.7 SEC (ref 12–14.6)
RBC # BLD AUTO: 5.15 M/UL (ref 4.2–5.4)
SODIUM SERPL-SCNC: 132 MMOL/L (ref 135–145)
SODIUM SERPL-SCNC: 134 MMOL/L (ref 135–145)
TROPONIN I SERPL-MCNC: <0.01 NG/ML (ref 0–0.04)
WBC # BLD AUTO: 13.9 K/UL (ref 4.8–10.8)

## 2017-03-18 PROCEDURE — 82962 GLUCOSE BLOOD TEST: CPT

## 2017-03-18 PROCEDURE — A9270 NON-COVERED ITEM OR SERVICE: HCPCS | Performed by: NURSE PRACTITIONER

## 2017-03-18 PROCEDURE — 700111 HCHG RX REV CODE 636 W/ 250 OVERRIDE (IP): Performed by: EMERGENCY MEDICINE

## 2017-03-18 PROCEDURE — 80053 COMPREHEN METABOLIC PANEL: CPT

## 2017-03-18 PROCEDURE — 71010 DX-CHEST-PORTABLE (1 VIEW): CPT

## 2017-03-18 PROCEDURE — A9270 NON-COVERED ITEM OR SERVICE: HCPCS | Performed by: INTERNAL MEDICINE

## 2017-03-18 PROCEDURE — 85610 PROTHROMBIN TIME: CPT

## 2017-03-18 PROCEDURE — 85025 COMPLETE CBC W/AUTO DIFF WBC: CPT

## 2017-03-18 PROCEDURE — 700101 HCHG RX REV CODE 250: Performed by: INTERNAL MEDICINE

## 2017-03-18 PROCEDURE — 700102 HCHG RX REV CODE 250 W/ 637 OVERRIDE(OP): Performed by: HOSPITALIST

## 2017-03-18 PROCEDURE — 700111 HCHG RX REV CODE 636 W/ 250 OVERRIDE (IP): Performed by: INTERNAL MEDICINE

## 2017-03-18 PROCEDURE — 700102 HCHG RX REV CODE 250 W/ 637 OVERRIDE(OP): Performed by: NURSE PRACTITIONER

## 2017-03-18 PROCEDURE — 84484 ASSAY OF TROPONIN QUANT: CPT

## 2017-03-18 PROCEDURE — 700105 HCHG RX REV CODE 258: Performed by: NURSE PRACTITIONER

## 2017-03-18 PROCEDURE — G0378 HOSPITAL OBSERVATION PER HR: HCPCS

## 2017-03-18 PROCEDURE — 99232 SBSQ HOSP IP/OBS MODERATE 35: CPT | Performed by: INTERNAL MEDICINE

## 2017-03-18 PROCEDURE — 700111 HCHG RX REV CODE 636 W/ 250 OVERRIDE (IP): Performed by: HOSPITALIST

## 2017-03-18 PROCEDURE — 83605 ASSAY OF LACTIC ACID: CPT

## 2017-03-18 PROCEDURE — 700102 HCHG RX REV CODE 250 W/ 637 OVERRIDE(OP): Performed by: INTERNAL MEDICINE

## 2017-03-18 PROCEDURE — 83690 ASSAY OF LIPASE: CPT

## 2017-03-18 PROCEDURE — 83880 ASSAY OF NATRIURETIC PEPTIDE: CPT

## 2017-03-18 PROCEDURE — 94640 AIRWAY INHALATION TREATMENT: CPT

## 2017-03-18 PROCEDURE — 96366 THER/PROPH/DIAG IV INF ADDON: CPT

## 2017-03-18 PROCEDURE — 96375 TX/PRO/DX INJ NEW DRUG ADDON: CPT

## 2017-03-18 PROCEDURE — 700101 HCHG RX REV CODE 250: Performed by: EMERGENCY MEDICINE

## 2017-03-18 PROCEDURE — 96374 THER/PROPH/DIAG INJ IV PUSH: CPT

## 2017-03-18 PROCEDURE — 93005 ELECTROCARDIOGRAM TRACING: CPT

## 2017-03-18 PROCEDURE — 80048 BASIC METABOLIC PNL TOTAL CA: CPT

## 2017-03-18 PROCEDURE — 96376 TX/PRO/DX INJ SAME DRUG ADON: CPT

## 2017-03-18 PROCEDURE — 99285 EMERGENCY DEPT VISIT HI MDM: CPT

## 2017-03-18 PROCEDURE — 94760 N-INVAS EAR/PLS OXIMETRY 1: CPT

## 2017-03-18 PROCEDURE — 96372 THER/PROPH/DIAG INJ SC/IM: CPT

## 2017-03-18 PROCEDURE — 87040 BLOOD CULTURE FOR BACTERIA: CPT | Mod: 91

## 2017-03-18 PROCEDURE — A9270 NON-COVERED ITEM OR SERVICE: HCPCS | Performed by: HOSPITALIST

## 2017-03-18 PROCEDURE — 700111 HCHG RX REV CODE 636 W/ 250 OVERRIDE (IP): Performed by: NURSE PRACTITIONER

## 2017-03-18 PROCEDURE — 99220 PR INITIAL OBSERVATION CARE,LEVL III: CPT | Performed by: HOSPITALIST

## 2017-03-18 PROCEDURE — 85730 THROMBOPLASTIN TIME PARTIAL: CPT

## 2017-03-18 PROCEDURE — 700101 HCHG RX REV CODE 250: Performed by: NURSE PRACTITIONER

## 2017-03-18 PROCEDURE — 700101 HCHG RX REV CODE 250: Performed by: HOSPITALIST

## 2017-03-18 RX ORDER — GUAIFENESIN/DEXTROMETHORPHAN 100-10MG/5
10 SYRUP ORAL EVERY 6 HOURS PRN
Status: DISCONTINUED | OUTPATIENT
Start: 2017-03-18 | End: 2017-03-20 | Stop reason: HOSPADM

## 2017-03-18 RX ORDER — GABAPENTIN 300 MG/1
300 CAPSULE ORAL 3 TIMES DAILY
Status: DISCONTINUED | OUTPATIENT
Start: 2017-03-18 | End: 2017-03-20 | Stop reason: HOSPADM

## 2017-03-18 RX ORDER — DEXTROSE MONOHYDRATE 25 G/50ML
25 INJECTION, SOLUTION INTRAVENOUS
Status: DISCONTINUED | OUTPATIENT
Start: 2017-03-18 | End: 2017-03-20 | Stop reason: HOSPADM

## 2017-03-18 RX ORDER — POLYETHYLENE GLYCOL 3350 17 G/17G
1 POWDER, FOR SOLUTION ORAL
Status: DISCONTINUED | OUTPATIENT
Start: 2017-03-18 | End: 2017-03-20 | Stop reason: HOSPADM

## 2017-03-18 RX ORDER — INSULIN GLARGINE 100 [IU]/ML
32 INJECTION, SOLUTION SUBCUTANEOUS EVERY EVENING
Status: DISCONTINUED | OUTPATIENT
Start: 2017-03-18 | End: 2017-03-20

## 2017-03-18 RX ORDER — LISINOPRIL 20 MG/1
20 TABLET ORAL DAILY
Status: DISCONTINUED | OUTPATIENT
Start: 2017-03-19 | End: 2017-03-20 | Stop reason: HOSPADM

## 2017-03-18 RX ORDER — OXYCODONE HYDROCHLORIDE 10 MG/1
10 TABLET ORAL 2 TIMES DAILY PRN
Status: DISCONTINUED | OUTPATIENT
Start: 2017-03-18 | End: 2017-03-20 | Stop reason: HOSPADM

## 2017-03-18 RX ORDER — RISPERIDONE 0.5 MG/1
0.5 TABLET ORAL
Status: DISCONTINUED | OUTPATIENT
Start: 2017-03-18 | End: 2017-03-20 | Stop reason: HOSPADM

## 2017-03-18 RX ORDER — FUROSEMIDE 20 MG/1
20 TABLET ORAL 2 TIMES DAILY
Status: DISCONTINUED | OUTPATIENT
Start: 2017-03-18 | End: 2017-03-20 | Stop reason: HOSPADM

## 2017-03-18 RX ORDER — TRAZODONE HYDROCHLORIDE 50 MG/1
50 TABLET ORAL
Status: DISCONTINUED | OUTPATIENT
Start: 2017-03-18 | End: 2017-03-20 | Stop reason: HOSPADM

## 2017-03-18 RX ORDER — CLONIDINE HYDROCHLORIDE 0.1 MG/1
0.1 TABLET ORAL 2 TIMES DAILY
Status: DISCONTINUED | OUTPATIENT
Start: 2017-03-18 | End: 2017-03-20 | Stop reason: HOSPADM

## 2017-03-18 RX ORDER — MORPHINE SULFATE 15 MG/1
15 TABLET ORAL 3 TIMES DAILY PRN
Status: DISCONTINUED | OUTPATIENT
Start: 2017-03-18 | End: 2017-03-20 | Stop reason: HOSPADM

## 2017-03-18 RX ORDER — ALBUTEROL SULFATE 90 UG/1
2 AEROSOL, METERED RESPIRATORY (INHALATION) EVERY 4 HOURS PRN
Status: DISCONTINUED | OUTPATIENT
Start: 2017-03-18 | End: 2017-03-20 | Stop reason: HOSPADM

## 2017-03-18 RX ORDER — METHYLPREDNISOLONE SODIUM SUCCINATE 125 MG/2ML
125 INJECTION, POWDER, LYOPHILIZED, FOR SOLUTION INTRAMUSCULAR; INTRAVENOUS ONCE
Status: COMPLETED | OUTPATIENT
Start: 2017-03-18 | End: 2017-03-18

## 2017-03-18 RX ORDER — SIMVASTATIN 20 MG
20 TABLET ORAL NIGHTLY
Status: DISCONTINUED | OUTPATIENT
Start: 2017-03-18 | End: 2017-03-20 | Stop reason: HOSPADM

## 2017-03-18 RX ORDER — AMOXICILLIN 250 MG
2 CAPSULE ORAL 2 TIMES DAILY
Status: DISCONTINUED | OUTPATIENT
Start: 2017-03-18 | End: 2017-03-20 | Stop reason: HOSPADM

## 2017-03-18 RX ORDER — METHYLPREDNISOLONE SODIUM SUCCINATE 125 MG/2ML
62.5 INJECTION, POWDER, LYOPHILIZED, FOR SOLUTION INTRAMUSCULAR; INTRAVENOUS EVERY 8 HOURS
Status: DISCONTINUED | OUTPATIENT
Start: 2017-03-18 | End: 2017-03-20 | Stop reason: HOSPADM

## 2017-03-18 RX ORDER — ALPRAZOLAM 0.5 MG/1
0.5 TABLET ORAL 3 TIMES DAILY PRN
Status: DISCONTINUED | OUTPATIENT
Start: 2017-03-18 | End: 2017-03-20 | Stop reason: HOSPADM

## 2017-03-18 RX ORDER — IPRATROPIUM BROMIDE AND ALBUTEROL SULFATE 2.5; .5 MG/3ML; MG/3ML
3 SOLUTION RESPIRATORY (INHALATION)
Status: DISCONTINUED | OUTPATIENT
Start: 2017-03-18 | End: 2017-03-18 | Stop reason: ALTCHOICE

## 2017-03-18 RX ORDER — CITALOPRAM 20 MG/1
20 TABLET ORAL
Status: DISCONTINUED | OUTPATIENT
Start: 2017-03-19 | End: 2017-03-20 | Stop reason: HOSPADM

## 2017-03-18 RX ORDER — IPRATROPIUM BROMIDE AND ALBUTEROL SULFATE 2.5; .5 MG/3ML; MG/3ML
3 SOLUTION RESPIRATORY (INHALATION)
Status: DISCONTINUED | OUTPATIENT
Start: 2017-03-18 | End: 2017-03-20

## 2017-03-18 RX ORDER — BISACODYL 10 MG
10 SUPPOSITORY, RECTAL RECTAL
Status: DISCONTINUED | OUTPATIENT
Start: 2017-03-18 | End: 2017-03-20 | Stop reason: HOSPADM

## 2017-03-18 RX ORDER — METOCLOPRAMIDE 10 MG/1
5 TABLET ORAL
Status: DISCONTINUED | OUTPATIENT
Start: 2017-03-19 | End: 2017-03-20 | Stop reason: HOSPADM

## 2017-03-18 RX ORDER — MORPHINE SULFATE 15 MG/1
15 TABLET ORAL 3 TIMES DAILY PRN
Status: ON HOLD | COMMUNITY
End: 2017-03-26

## 2017-03-18 RX ORDER — BUDESONIDE AND FORMOTEROL FUMARATE DIHYDRATE 160; 4.5 UG/1; UG/1
2 AEROSOL RESPIRATORY (INHALATION) 2 TIMES DAILY
Status: DISCONTINUED | OUTPATIENT
Start: 2017-03-18 | End: 2017-03-20 | Stop reason: HOSPADM

## 2017-03-18 RX ORDER — MORPHINE SULFATE 4 MG/ML
4 INJECTION, SOLUTION INTRAMUSCULAR; INTRAVENOUS ONCE
Status: COMPLETED | OUTPATIENT
Start: 2017-03-18 | End: 2017-03-18

## 2017-03-18 RX ORDER — METHYLPREDNISOLONE SODIUM SUCCINATE 40 MG/ML
40 INJECTION, POWDER, LYOPHILIZED, FOR SOLUTION INTRAMUSCULAR; INTRAVENOUS EVERY 8 HOURS
Status: DISCONTINUED | OUTPATIENT
Start: 2017-03-18 | End: 2017-03-18 | Stop reason: HOSPADM

## 2017-03-18 RX ORDER — ACETAMINOPHEN 325 MG/1
650 TABLET ORAL EVERY 6 HOURS PRN
Status: DISCONTINUED | OUTPATIENT
Start: 2017-03-18 | End: 2017-03-20 | Stop reason: HOSPADM

## 2017-03-18 RX ORDER — METOPROLOL SUCCINATE 25 MG/1
25 TABLET, EXTENDED RELEASE ORAL DAILY
Status: DISCONTINUED | OUTPATIENT
Start: 2017-03-19 | End: 2017-03-20 | Stop reason: HOSPADM

## 2017-03-18 RX ORDER — ALBUTEROL SULFATE 2.5 MG/3ML
2.5 SOLUTION RESPIRATORY (INHALATION)
Status: DISCONTINUED | OUTPATIENT
Start: 2017-03-18 | End: 2017-03-18 | Stop reason: ALTCHOICE

## 2017-03-18 RX ADMIN — METHYLPREDNISOLONE SODIUM SUCCINATE 62.5 MG: 125 INJECTION, POWDER, FOR SOLUTION INTRAMUSCULAR; INTRAVENOUS at 23:01

## 2017-03-18 RX ADMIN — TIOTROPIUM BROMIDE 1 CAPSULE: 18 CAPSULE ORAL; RESPIRATORY (INHALATION) at 07:01

## 2017-03-18 RX ADMIN — LIDOCAINE 1 PATCH: 50 PATCH CUTANEOUS at 09:10

## 2017-03-18 RX ADMIN — METHYLPREDNISOLONE SODIUM SUCCINATE 62.5 MG: 125 INJECTION, POWDER, FOR SOLUTION INTRAMUSCULAR; INTRAVENOUS at 05:19

## 2017-03-18 RX ADMIN — FUROSEMIDE 20 MG: 20 TABLET ORAL at 22:26

## 2017-03-18 RX ADMIN — GUAIFENESIN 200 MG: 100 SOLUTION ORAL at 02:24

## 2017-03-18 RX ADMIN — ALPRAZOLAM 0.5 MG: 0.5 TABLET ORAL at 13:32

## 2017-03-18 RX ADMIN — SIMVASTATIN 20 MG: 20 TABLET, FILM COATED ORAL at 22:28

## 2017-03-18 RX ADMIN — CITALOPRAM HYDROBROMIDE 20 MG: 20 TABLET ORAL at 09:10

## 2017-03-18 RX ADMIN — OXYCODONE HYDROCHLORIDE 10 MG: 10 TABLET ORAL at 09:10

## 2017-03-18 RX ADMIN — CEFTRIAXONE 2 G: 2 INJECTION, POWDER, FOR SOLUTION INTRAMUSCULAR; INTRAVENOUS at 09:11

## 2017-03-18 RX ADMIN — MORPHINE SULFATE 15 MG: 15 TABLET ORAL at 23:59

## 2017-03-18 RX ADMIN — RISPERIDONE 0.5 MG: 0.5 TABLET, FILM COATED ORAL at 22:27

## 2017-03-18 RX ADMIN — POTASSIUM CHLORIDE 20 MEQ: 1500 TABLET, EXTENDED RELEASE ORAL at 09:10

## 2017-03-18 RX ADMIN — INSULIN LISPRO 6 UNITS: 100 INJECTION, SOLUTION INTRAVENOUS; SUBCUTANEOUS at 00:11

## 2017-03-18 RX ADMIN — ENOXAPARIN SODIUM 40 MG: 100 INJECTION SUBCUTANEOUS at 22:26

## 2017-03-18 RX ADMIN — INSULIN LISPRO 8 UNITS: 100 INJECTION, SOLUTION INTRAVENOUS; SUBCUTANEOUS at 13:18

## 2017-03-18 RX ADMIN — MORPHINE SULFATE 4 MG: 4 INJECTION INTRAVENOUS at 17:36

## 2017-03-18 RX ADMIN — ALBUTEROL SULFATE 2.5 MG: 2.5 SOLUTION RESPIRATORY (INHALATION) at 19:10

## 2017-03-18 RX ADMIN — METHYLPREDNISOLONE SODIUM SUCCINATE 125 MG: 125 INJECTION, POWDER, FOR SOLUTION INTRAMUSCULAR; INTRAVENOUS at 17:36

## 2017-03-18 RX ADMIN — BUDESONIDE AND FORMOTEROL FUMARATE DIHYDRATE 2 PUFF: 160; 4.5 AEROSOL RESPIRATORY (INHALATION) at 07:01

## 2017-03-18 RX ADMIN — HEPARIN SODIUM 5000 UNITS: 5000 INJECTION, SOLUTION INTRAVENOUS; SUBCUTANEOUS at 05:22

## 2017-03-18 RX ADMIN — TRAZODONE HYDROCHLORIDE 50 MG: 50 TABLET ORAL at 22:28

## 2017-03-18 RX ADMIN — IPRATROPIUM BROMIDE AND ALBUTEROL SULFATE 3 ML: .5; 3 SOLUTION RESPIRATORY (INHALATION) at 22:51

## 2017-03-18 RX ADMIN — GUAIFENESIN 200 MG: 100 SOLUTION ORAL at 09:11

## 2017-03-18 RX ADMIN — GABAPENTIN 300 MG: 300 CAPSULE ORAL at 09:10

## 2017-03-18 RX ADMIN — FUROSEMIDE 20 MG: 20 TABLET ORAL at 09:10

## 2017-03-18 RX ADMIN — ASPIRIN 81 MG: 81 TABLET ORAL at 09:10

## 2017-03-18 RX ADMIN — IPRATROPIUM BROMIDE 0.5 MG: 0.5 SOLUTION RESPIRATORY (INHALATION) at 19:10

## 2017-03-18 RX ADMIN — IPRATROPIUM BROMIDE AND ALBUTEROL SULFATE 3 ML: .5; 3 SOLUTION RESPIRATORY (INHALATION) at 06:57

## 2017-03-18 RX ADMIN — OXYCODONE HYDROCHLORIDE 10 MG: 10 TABLET ORAL at 20:16

## 2017-03-18 RX ADMIN — AZITHROMYCIN 500 MG: 250 TABLET, FILM COATED ORAL at 09:10

## 2017-03-18 RX ADMIN — GABAPENTIN 300 MG: 300 CAPSULE ORAL at 22:26

## 2017-03-18 RX ADMIN — METOPROLOL SUCCINATE 25 MG: 25 TABLET, EXTENDED RELEASE ORAL at 09:10

## 2017-03-18 RX ADMIN — CLONIDINE HYDROCHLORIDE 0.1 MG: 0.1 TABLET ORAL at 09:10

## 2017-03-18 RX ADMIN — OXYCODONE HYDROCHLORIDE 10 MG: 10 TABLET ORAL at 02:26

## 2017-03-18 RX ADMIN — METHYLPREDNISOLONE SODIUM SUCCINATE 62.5 MG: 125 INJECTION, POWDER, FOR SOLUTION INTRAMUSCULAR; INTRAVENOUS at 13:19

## 2017-03-18 RX ADMIN — GUAIFENESIN 200 MG: 100 SOLUTION ORAL at 05:19

## 2017-03-18 RX ADMIN — METHYLPREDNISOLONE SODIUM SUCCINATE 62.5 MG: 125 INJECTION, POWDER, FOR SOLUTION INTRAMUSCULAR; INTRAVENOUS at 00:12

## 2017-03-18 RX ADMIN — LISINOPRIL 20 MG: 20 TABLET ORAL at 09:10

## 2017-03-18 ASSESSMENT — ENCOUNTER SYMPTOMS
VOMITING: 0
SPUTUM PRODUCTION: 1
WEAKNESS: 1
HEADACHES: 0
SORE THROAT: 0
TINGLING: 0
NERVOUS/ANXIOUS: 1
NECK PAIN: 0
COUGH: 1
ABDOMINAL PAIN: 0
FALLS: 1
FOCAL WEAKNESS: 0
BACK PAIN: 0
WHEEZING: 1
DOUBLE VISION: 0
PALPITATIONS: 0
BLURRED VISION: 0
CHILLS: 0
DIZZINESS: 0
DIARRHEA: 0
DEPRESSION: 0
FEVER: 0
NAUSEA: 0
SHORTNESS OF BREATH: 0

## 2017-03-18 ASSESSMENT — PAIN SCALES - WONG BAKER: WONGBAKER_NUMERICALRESPONSE: HURTS AS MUCH AS POSSIBLE

## 2017-03-18 ASSESSMENT — LIFESTYLE VARIABLES
EVER_SMOKED: YES
DO YOU DRINK ALCOHOL: NO

## 2017-03-18 ASSESSMENT — COPD QUESTIONNAIRES
DO YOU EVER COUGH UP ANY MUCUS OR PHLEGM?: NO/ONLY WITH OCCASIONAL COLDS OR INFECTIONS
COPD SCREENING SCORE: 8
HAVE YOU SMOKED AT LEAST 100 CIGARETTES IN YOUR ENTIRE LIFE: YES
DURING THE PAST 4 WEEKS HOW MUCH DID YOU FEEL SHORT OF BREATH: MOST  OR ALL OF THE TIME

## 2017-03-18 ASSESSMENT — PAIN SCALES - GENERAL
PAINLEVEL_OUTOF10: 7
PAINLEVEL_OUTOF10: 7
PAINLEVEL_OUTOF10: 2
PAINLEVEL_OUTOF10: 1
PAINLEVEL_OUTOF10: 7

## 2017-03-18 NOTE — PROGRESS NOTES
Assessment done, Pt educated on plan of care. waiting on dr rounds Patient resting in bed, no signs of distress,  no complains of pain at this time. Call light within reach,  side rails up, will monitor. Condition stable

## 2017-03-18 NOTE — PROGRESS NOTES
Assessment complete. A&Ox4. C/o pain 8/10. Has been medicated per MAR. Discussed POC w/ pt. Educated on medications per MAR. Discussed the importance of calling for needs and before getting OOB to prevent injury/falls. Pt verbalizes understanding. Call light in reach. Bed in lowest position. No other needs identified. Will continue hourly rounding.

## 2017-03-18 NOTE — ED PROVIDER NOTES
ED Provider Note    Scribed for Dick Eric M.D. by Amirah Payan. 3/18/2017, 4:11 PM.    Primary care provider: Pcp Pt States None  Means of arrival: Walk-In  History obtained from: Patient  History limited by: None    CHIEF COMPLAINT  Chief Complaint   Patient presents with   • Shortness of Breath     Pt BIB self/wheelchair for c/o SOB/CP/Arm pain. Pt reports to siging out AMA to deal with personal business and would like to be checked back in.    • Chest Pain   • Arm Pain       HPI  Zenia Ordaz is a 66 y.o. female who presents to the Emergency Department for shortness of breath, onset two weeks ago. The patient states that she was diagnosed with pneumonia and signed out AMA to deal with personal issues. She states that she fell and fractured her arm, she complains of right arm pain. She endorses bilateral leg swelling. Per patient, she has chest pain with her cough. She denies any recent fevers. The patient has a history of COPD, Hep B, Hep C, Hep A, diabetes, CAD, stroke, MI, hypertension, CHF, and cancer.    REVIEW OF SYSTEMS  Pertinent positives include shortness of breath, arm pain, leg swelling, chest pain, and cough. Pertinent negatives include no fevers. As above, all other systems reviewed and are negative.   See HPI for further details. C.    PAST MEDICAL HISTORY   has a past medical history of COPD; Fibromyalgia; Hepatitis B; Hepatitis C; Hepatitis A; Diabetes; CAD (coronary artery disease); Stroke (CMS-HCC) (1982); Backpain; Myocardial infarct (CMS-HCC) (1989, 1991); Cancer (CMS-HCC) (Cervical ~2003); Congestive heart failure (CMS-HCC); MRSA infection; Drug abuse; Hypertension; Fall; Pneumonia; and Seizure (CMS-HCC).    SURGICAL HISTORY   has past surgical history that includes other cardiac surgery and incision and drainage orthopedic (7/13/2013).    SOCIAL HISTORY  Social History   Substance Use Topics   • Smoking status: Current Every Day Smoker -- 0.10 packs/day for 53 years      Types: Cigarettes   • Smokeless tobacco: Never Used      Comment: smokes 1/2 ppd   • Alcohol Use: No      History   Drug Use No     Comment: Hx of heroin meth       FAMILY HISTORY  Family History   Problem Relation Age of Onset   • Cancer Mother    • Cancer Sister    • Cancer Maternal Aunt        CURRENT MEDICATIONS  No current facility-administered medications on file prior to encounter.     Current Outpatient Prescriptions on File Prior to Encounter   Medication Sig Dispense Refill   • oxycodone immediate release (ROXICODONE) 10 MG immediate release tablet Take 1 Tab by mouth 2 times a day as needed for Moderate Pain. 5 Tab 0   • potassium chloride SA (K-DUR) 20 MEQ Tab CR Take 1 Tab by mouth 2 Times a Day. 60 Tab 0   • alprazolam (XANAX) 0.5 MG Tab Take 1 Tab by mouth 3 times a day as needed for Sleep. 90 Tab 0   • nitroglycerin (NITROSTAT) 0.4 MG SL Tab Place 1 Tab under tongue as needed for Chest Pain. 25 Tab 6   • clonidine (CATAPRES) 0.1 MG Tab Take 0.1 mg by mouth 2 times a day.     • insulin lispro (HUMALOG) 100 UNIT/ML Solution Inject 2-10 Units as instructed 3 times a day before meals. Sliding scale  151-200 2 units  201-250 4 units  251-300 6 units  301-350 8 units  351-400 10 units  <60 to >400 call MD     • metoclopramide (REGLAN) 5 MG tablet Take 5 mg by mouth 3 times a day before meals. Indications: Nausea and Vomiting     • simvastatin (ZOCOR) 20 MG Tab Take 20 mg by mouth every evening.     • citalopram (CELEXA) 20 MG Tab Take 1 Tab by mouth every day. 30 Tab 6   • budesonide-formoterol (SYMBICORT) 160-4.5 MCG/ACT Aerosol Inhale 2 Puffs by mouth 2 Times a Day. 1 Inhaler 6   • furosemide (LASIX) 20 MG Tab Take 1 Tab by mouth 2 times a day. 60 Tab 6   • gabapentin (NEURONTIN) 300 MG Cap Take 1 Cap by mouth 3 times a day. 90 Cap 6   • lisinopril (PRINIVIL) 20 MG Tab Take 1 Tab by mouth every day. 30 Tab 6   • trazodone (DESYREL) 50 MG Tab Take 1 Tab by mouth every day. 30 Tab 6   • metoprolol SR  "(TOPROL XL) 25 MG TABLET SR 24 HR Take 1 Tab by mouth every day. 30 Tab 6   • insulin glargine (LANTUS SOLOSTAR) 100 UNIT/ML Solution Pen-injector injection Inject 32 Units as instructed every evening. 5 PEN 6   • albuterol (VENTOLIN OR PROVENTIL) 108 (90 BASE) MCG/ACT Aero Soln inhalation aerosol Inhale 2 Puffs by mouth every four hours as needed for Shortness of Breath. 1 Inhaler 1   • aspirin EC (ECOTRIN) 81 MG Tablet Delayed Response Take 1 Tab by mouth every day. 30 Tab 0   • risperidone (RISPERDAL) 0.5 MG Tab Take 1 Tab by mouth every bedtime. 60 Tab 3     ALLERGIES  Allergies   Allergen Reactions   • Mushroom Extract Complex Rash and Unspecified     Pt gets a rash and breaks out in a sweat when eats mushrooms   • Tylenol Rash     Pt states \"I get a body rash\".   • Zofran Rash     Pt states \"I get a body rash\".       PHYSICAL EXAM  VITAL SIGNS: /60 mmHg  Pulse 63  Temp(Src) 37 °C (98.6 °F) (Temporal)  Resp 16  Ht 1.549 m (5' 0.98\")  Wt 80.287 kg (177 lb)  BMI 33.46 kg/m2  SpO2 93%  Vitals reviewed.  Constitutional: Alert, coughing, moderate respiratory distress.  HENT: No signs of trauma, Bilateral external ears normal, Nose normal.   Eyes: Pupils are equal and reactive, Conjunctiva normal, Non-icteric.   Neck: Normal range of motion, No tenderness, Supple, No stridor.   Lymphatic: No lymphadenopathy noted.   Cardiovascular: Regular rate and rhythm, no murmurs.   Thorax & Lungs: Wheezes throughout. Mild respiratory distress, No chest tenderness.    Abdomen: Bowel sounds normal, Soft, No tenderness, No peritoneal signs, No masses, No pulsatile masses.   Skin: Warm, Dry, No erythema, No rash.   Back: No bony tenderness, No CVA tenderness.   Extremities: Bilateral leg swelling, Intact distal pulses, No edema, No tenderness, No cyanosis  Musculoskeletal: Good range of motion in all major joints. No tenderness to palpation or major deformities noted.   Neurologic: Alert , Normal motor function, Normal " sensory function, No focal deficits noted.   Psychiatric: Patient reports her son recently  and is depressed. She denies SI.     DIAGNOSTIC STUDIES / PROCEDURES    LABS  Labs Reviewed   CBC WITH DIFFERENTIAL - Abnormal; Notable for the following:     WBC 13.9 (*)     MCHC 32.6 (*)     Neutrophils-Polys 88.10 (*)     Lymphocytes 7.10 (*)     Neutrophils (Absolute) 12.21 (*)     Lymphs (Absolute) 0.98 (*)     Immature Granulocytes (abs) 0.13 (*)     All other components within normal limits    Narrative:     Indicate which anticoagulants the patient is on:->UNKNOWN   COMP METABOLIC PANEL - Abnormal; Notable for the following:     Sodium 134 (*)     Glucose 222 (*)     Bun 48 (*)     AST(SGOT) 10 (*)     All other components within normal limits    Narrative:     Indicate which anticoagulants the patient is on:->UNKNOWN   APTT - Abnormal; Notable for the following:     APTT 23.0 (*)     All other components within normal limits    Narrative:     Indicate which anticoagulants the patient is on:->UNKNOWN   BTYPE NATRIURETIC PEPTIDE - Abnormal; Notable for the following:     B Natriuretic Peptide 166 (*)     All other components within normal limits    Narrative:     Indicate which anticoagulants the patient is on:->UNKNOWN   CBC WITHOUT DIFFERENTIAL - Abnormal; Notable for the following:     WBC 13.5 (*)     MCHC 31.8 (*)     All other components within normal limits   BASIC METABOLIC PANEL - Abnormal; Notable for the following:     Glucose 248 (*)     Bun 39 (*)     All other components within normal limits   ACCU-CHEK GLUCOSE - Abnormal; Notable for the following:     Glucose - Accu-Ck 280 (*)     All other components within normal limits   ACCU-CHEK GLUCOSE - Abnormal; Notable for the following:     Glucose - Accu-Ck 195 (*)     All other components within normal limits   ACCU-CHEK GLUCOSE - Abnormal; Notable for the following:     Glucose - Accu-Ck 250 (*)     All other components within normal limits   ACCU-CHEK  "GLUCOSE - Abnormal; Notable for the following:     Glucose - Accu-Ck 119 (*)     All other components within normal limits   LIPASE    Narrative:     Indicate which anticoagulants the patient is on:->UNKNOWN   PROTHROMBIN TIME    Narrative:     Indicate which anticoagulants the patient is on:->UNKNOWN   TROPONIN    Narrative:     Indicate which anticoagulants the patient is on:->UNKNOWN   BLOOD CULTURE    Narrative:     Per Hospital Policy: Only change Specimen Src: to \"Line\" if  specified by physician order.   BLOOD CULTURE    Narrative:     Per Hospital Policy: Only change Specimen Src: to \"Line\" if  specified by physician order.   LACTIC ACID    Narrative:     Indicate which anticoagulants the patient is on:->UNKNOWN   ESTIMATED GFR    Narrative:     Indicate which anticoagulants the patient is on:->UNKNOWN   ESTIMATED GFR   LACTIC ACID   [facility] POC GLUCOSE   [facility] POC GLUCOSE   [facility] POC GLUCOSE   [facility] POC GLUCOSE   [facility] POC GLUCOSE   [facility] POC GLUCOSE   [facility] POC GLUCOSE   [facility] POC GLUCOSE   [facility] POC GLUCOSE   [facility] POC GLUCOSE    All labs reviewed by me.    EKG Interpretation:  Interpreted by me    12 Lead EKG interpreted by me to show:  Sinus   Rate 59  Axis: Normal  Intervals: Normal  T wave inversion in lead 1 and L  Compared with EKG performed on 3/14/17 and 3/13/17 with no significant changes  My impression of this EKG: Does not indicate ischemia at this time.    RADIOLOGY  DX-TOE(S) 2+ LEFT   Final Result      Subacute seconds distal phalanx fracture with no obvious bony healing.      DX-CHEST-PORTABLE (1 VIEW)   Final Result      1.  No acute cardiopulmonary abnormality identified.      2.  Enlarged cardiac silhouette      The radiologist's interpretation of all radiological studies have been reviewed by me.    COURSE & MEDICAL DECISION MAKING  Nursing notes, VS, PMSFHx reviewed in chart.  Differential diagnoses include but not limited to: pneumonia, COPD exacerbation, " ACS, CHF.     Obtained and reviewed past medical records from March 14 2017 which indicated the patient was admitted for COPD exacerbation, chest pain, and at that time she had a normal troponin. She signed out AMA because of her sons death.    4:11 PM Patient seen and examined at bedside. Ordered for DX chest, lactic acid, blood culture, CBC with differential, CMP, lipase, prothrombin time, APTT, troponin, btype natriuretic peptide, and EKG to evaluate. Patient will be treated with 4mg Morphine for her symptoms. The patient was informed that general lab work, EKG, and chest x-ray will be ordered. It was discussed with the patient that she may need to be admitted to the hospital for further treatment. She understood and verbalized agreement.     4:26 PM Ordered Atrovent and Solu-Medrol.     Troponin is negative    DISPOSITION:  Patient will be admitted to Dr. Munoz hosptialist in guarded condition.    FINAL IMPRESSION  1. Shortness of breath    2. Acute chest pain    3. Other closed displaced fracture of proximal end of right humerus with routine healing, subsequent encounter          Amirah GALLOWAY (Betty), am scribing for, and in the presence of, Dick Eric M.D..    Electronically signed by: Amirah Ovalle), 3/18/2017    Dick GALLOWAY M.D. personally performed the services described in this documentation, as scribed by Amirah Payan in my presence, and it is both accurate and complete.    The note accurately reflects work and decisions made by me.  Dick Eric  3/18/2017  8:45 PM

## 2017-03-18 NOTE — ED NOTES
Chief Complaint   Patient presents with   • Shortness of Breath     Pt BIB self/wheelchair for c/o SOB/CP/Arm pain. Pt reports to siging out AMA to deal with personal business and would like to be checked back in.    • Chest Pain   • Arm Pain     Explained to pt triage process, made pt aware to tell this RN of any changes/concerns, pt verbalized understanding of process and instructions given. Pt to ER ender.

## 2017-03-18 NOTE — PROGRESS NOTES
"Hospital Medicine Progress Note, Adult, Complex               Author: Ruby Cleaning Date & Time created: 3/18/2017  2:15 PM     Interval History:  Admitted w/COPD exacerbation and R Humerus fracture, awaiting Ortho consult. Dr. Zhao consulted them this morning. Also awaiting bed on Ortho floor. Pain better controlled although pt continues to ask for adjustment in medications. Pain less w/distraction.  COPD exacerbation improving, wheezing improved, decreased Solumedrol dose, continue Zithromax.  May require SNF at [place] as pt is homeless, appreciate  assistance.    Review of Systems:  Review of Systems   Constitutional: Positive for malaise/fatigue. Negative for fever and chills.   HENT: Negative for congestion and sore throat.    Eyes: Negative for blurred vision and double vision.   Respiratory: Positive for cough, sputum production (\"sometimes\") and wheezing. Negative for shortness of breath.    Cardiovascular: Negative for chest pain and palpitations.        Reports chest pain has improved   Gastrointestinal: Negative for nausea, vomiting, abdominal pain and diarrhea.   Genitourinary: Negative.    Musculoskeletal: Positive for falls (prior to arrival, none since admission). Negative for back pain and neck pain.        R arm and shoulder pain   Skin: Negative.    Neurological: Positive for weakness (\"all over sometimes.\"). Negative for dizziness, tingling, focal weakness and headaches.   Psychiatric/Behavioral: Negative for depression. The patient is nervous/anxious.        Physical Exam:  Physical Exam   Constitutional: She is oriented to person, place, and time. She appears well-developed and well-nourished. No distress.   Disheveled   HENT:   Head: Normocephalic and atraumatic.   Mouth/Throat: Oropharynx is clear and moist.   Eyes: Conjunctivae and EOM are normal. No scleral icterus.   Neck: Normal range of motion. Neck supple. No JVD present.   Cardiovascular: Normal rate, regular rhythm and normal heart sounds. "    No murmur heard.  Pulmonary/Chest: Effort normal. No respiratory distress. She has wheezes. She exhibits no tenderness.   Abdominal: Soft. Bowel sounds are normal. She exhibits no distension.   Musculoskeletal:   R arm in sling,  equal and strong, wiggles fingers, cap refill < 3sec, skin warm, pink, dry   Neurological: She is alert and oriented to person, place, and time.   Skin: Skin is warm and dry. She is not diaphoretic.   Psychiatric:   Flat affect, intermittently anxious   Nursing note and vitals reviewed.      Labs:        Invalid input(s): [id number]      Recent Labs      178  17   0055   SODIUM  132*  130*  132*   POTASSIUM  5.0  5.0  4.5   CHLORIDE  103  98  98   CO2  22  28  28   BUN  39*  43*  46*   CREATININE  0.77  0.78  0.87   CALCIUM  9.0  9.2  9.2     Recent Labs      178  17   0055   GLUCOSE  321*  301*  326*     Recent Labs      17   RBC  5.08  5.14   HEMOGLOBIN  13.5  13.7   HEMATOCRIT  45.4  43.1   PLATELETCT  191  227     Recent Labs      17   WBC  9.9  15.0*           Hemodynamics:  Temp (24hrs), Av.5 °C (97.7 °F), Min:36.2 °C (97.2 °F), Max:36.7 °C (98.1 °F)  Temperature: 36.5 °C (97.7 °F)  Pulse  Av.6  Min: 46  Max: 91Heart Rate (Monitored): (!) 53  Blood Pressure: 125/61 mmHg     Respiratory:    Respiration: 18, Pulse Oximetry: 96 %, O2 Daily Delivery Respiratory : Silicone Nasal Cannula     Given By:: Mask, #MDI/DPI Given: MDI/DPI x 2, Work Of Breathing / Effort: Mild  RUL Breath Sounds: Expiratory Wheezes, RML Breath Sounds: Expiratory Wheezes, RLL Breath Sounds: Expiratory Wheezes, MARC Breath Sounds: Expiratory Wheezes, LLL Breath Sounds: Expiratory Wheezes  Fluids:  No intake or output data in the 24 hours ending 17 1415     GI/Nutrition:  Orders Placed This Encounter   Procedures   • DIET ORDER     Standing Status: Standing       Number of Occurrences: 1      Standing Expiration Date:      Order Specific Question:  Diet:     Answer:  Diabetic [3]     Order Specific Question:  Diet:     Answer:  Cardiac [6]     Medical Decision Making, by Problem:  Active Hospital Problems    Diagnosis   • Closed fracture of proximal end of humerus [S42.209A]- awaiting Ortho consult, currently in sling, pain mgmt adjusted, continue PT/OT awaiting Ortho bed   • COPD exacerbation (CMS-HCC) [J44.1]- continue RT protocol, Zithromax, IV Solumedrol decreased,    • Weakness [R53.1]- ambulating around unit independently w/standby, PT/OT while inpt, SNF at [place]?   • Type 2 diabetes mellitus, uncontrolled (CMS-HCC) [E11.65]- dietary and lifestyle modifications discussed, limited as pt is homeless, continue iss, outpt follow-up, declined diabetes educator   • On home oxygen therapy [Z99.81]- continue at [place]   • CAD (coronary artery disease) [I25.10]- ASA, ACEi, BB, lifestyle and dietary modifications discussed   • Tobacco abuse [Z72.0]- advised cessation, not interested at this time, continue 02, nicoderm/nicorette prn       Labs reviewed and Radiology images reviewed  Fagan catheter: No Fagan      DVT Prophylaxis: Heparin  DVT prophylaxis - mechanical: Not indicated at this time, ambulatory  Ulcer prophylaxis: Not indicated (eating regular diet)  Antibiotics: Treating active infection/contamination beyond 24 hours perioperative coverage  Assessed for rehab: Patient was assess for and/or received rehabilitation services during this hospitalization

## 2017-03-18 NOTE — DISCHARGE SUMMARY
Hospital Medicine Discharge Note     Admit Date:  3/14/2017       Discharge Date:   3/18/2017  LOS: 3 days     Primary Care Provider:    Pcp Pt States None    Attending Physician:  Saundra Zhao     Discharge Diagnoses:     COPD exacerbation (CMS-HCC)    Closed fracture of proximal end of humerus    Weakness          Chronic Medical Problems:    CAD (coronary artery disease)    On home oxygen therapy    Type 2 diabetes mellitus, uncontrolled (CMS-HCC)    Tobacco abuse       Consultants:      Ortho: Dr. Ozuna.     Imaging/ Testing:      CT-EXTREMITY, UPPER W/O RIGHT   1.  Comminuted fracture of the proximal humerus is identified as described above. There is mild angulation and displacement of the fragments. There is suggestion that some callus formation is present which could indicate that subacute fracture may be    present.   2.  No dislocation. No fracture of the glenoid acromion or clavicle is noted.   DX-CLAVICLE RIGHT   1.  No evidence of acute clavicle fracture.   2.  Deformity of the proximal humerus. There are possible acute and chronic fracture component seen.   DX-FOREARM RIGHT   No evidence of acute fracture.   DX-HUMERUS 2+ RIGHT   Deformity of the proximal humerus possibly representing an acute fracture of the greater tuberosity with chronic fracture of the humeral neck. This could be further evaluated with CT scan.   DX-ELBOW-COMPLETE 3+ RIGHT   No evidence of acute fracture or dislocation.   DX-CHEST-LIMITED (1 VIEW)   1. Slightly lower lung volumes with crowding of the pulmonary vasculature.   2. Minimal bibasilar opacities, likely atelectasis.   ECHOCARDIOGRAM COMP W/O CONT   Left ventricular ejection fraction is visually estimated to be 60%.  Mild concentric left ventricular hypertrophy.  Grade II diastolic   dysfunction.  No significant valve disease or flow abnormalities.   NM-CARDIAC STRESS TEST   Normal left ventricular size, ejection fraction, and wall motion.   No reversible defects that  would indicate ischemia.     Non-reversible defect noted in inferior wall likely artifact vs infarct.   DX-CHEST-2 VIEWS   1. No active cardiopulmonary abnormalities are identified.         Procedures:        None    Hospital Summary (Brief Narrative):       For H and P on admission, please refer to full H and P dictated by Dr. Escoto  In brief, Zenia Ordaz is a 66 y.o. female who was admitted 3/14/2017 after presenting to ER for chest pain, and copd exacerbation. She was admitted for further work up, trop have been negative, she had a stress test which did not show any reversible ischemia, also echo was neg for acute issue. She was having arm pain, Ct of right upper extremity showed  Comminuted fracture of the proximal humerus and ortho Dr. Ozuna has been consulted.  Regarding her COPD exacerbation she was started on steroids, RT protocol she is now on 3 L of O2 which is her baseline O2 requirement.    Pt today decided to leave AMA even though we were waiting for ortho to see if any surgical intervention was needed.

## 2017-03-18 NOTE — PROGRESS NOTES
Zenia Ordaz patient has chosen to leave the hospital against medical advice. The attending physician has not discharged the patient. Patient is not a risk to himself or others. I have discussed with the patient the following:  Physician has not determined patient is ready for discharge, Risks and consequences of leaving the hospital too soon and Benefit of continued hospitalization.      Discharge against medical advice form has been Signed.      Patient is a greater than 60 years old  and a referral to  was made.    Attending physician has been notified.  Dr schwartz notified

## 2017-03-19 ENCOUNTER — APPOINTMENT (OUTPATIENT)
Dept: RADIOLOGY | Facility: MEDICAL CENTER | Age: 67
End: 2017-03-19
Attending: ORTHOPAEDIC SURGERY
Payer: MEDICAID

## 2017-03-19 PROBLEM — S92.515G: Status: ACTIVE | Noted: 2017-03-19

## 2017-03-19 PROBLEM — J96.21 ACUTE ON CHRONIC RESPIRATORY FAILURE WITH HYPOXIA (HCC): Status: ACTIVE | Noted: 2017-03-19

## 2017-03-19 LAB
ANION GAP SERPL CALC-SCNC: 10 MMOL/L (ref 0–11.9)
BUN SERPL-MCNC: 39 MG/DL (ref 8–22)
CALCIUM SERPL-MCNC: 8.9 MG/DL (ref 8.5–10.5)
CHLORIDE SERPL-SCNC: 99 MMOL/L (ref 96–112)
CO2 SERPL-SCNC: 27 MMOL/L (ref 20–33)
CREAT SERPL-MCNC: 0.72 MG/DL (ref 0.5–1.4)
ERYTHROCYTE [DISTWIDTH] IN BLOOD BY AUTOMATED COUNT: 42.7 FL (ref 35.9–50)
GFR SERPL CREATININE-BSD FRML MDRD: >60 ML/MIN/1.73 M 2
GLUCOSE BLD-MCNC: 119 MG/DL (ref 65–99)
GLUCOSE BLD-MCNC: 195 MG/DL (ref 65–99)
GLUCOSE BLD-MCNC: 241 MG/DL (ref 65–99)
GLUCOSE BLD-MCNC: 250 MG/DL (ref 65–99)
GLUCOSE BLD-MCNC: 280 MG/DL (ref 65–99)
GLUCOSE SERPL-MCNC: 248 MG/DL (ref 65–99)
HCT VFR BLD AUTO: 44.4 % (ref 37–47)
HGB BLD-MCNC: 14.1 G/DL (ref 12–16)
MCH RBC QN AUTO: 27 PG (ref 27–33)
MCHC RBC AUTO-ENTMCNC: 31.8 G/DL (ref 33.6–35)
MCV RBC AUTO: 84.9 FL (ref 81.4–97.8)
PLATELET # BLD AUTO: 208 K/UL (ref 164–446)
PMV BLD AUTO: 10.7 FL (ref 9–12.9)
POTASSIUM SERPL-SCNC: 4.4 MMOL/L (ref 3.6–5.5)
RBC # BLD AUTO: 5.23 M/UL (ref 4.2–5.4)
SODIUM SERPL-SCNC: 136 MMOL/L (ref 135–145)
WBC # BLD AUTO: 13.5 K/UL (ref 4.8–10.8)

## 2017-03-19 PROCEDURE — 96376 TX/PRO/DX INJ SAME DRUG ADON: CPT

## 2017-03-19 PROCEDURE — 700111 HCHG RX REV CODE 636 W/ 250 OVERRIDE (IP): Performed by: HOSPITALIST

## 2017-03-19 PROCEDURE — 700102 HCHG RX REV CODE 250 W/ 637 OVERRIDE(OP): Performed by: HOSPITALIST

## 2017-03-19 PROCEDURE — 80048 BASIC METABOLIC PNL TOTAL CA: CPT

## 2017-03-19 PROCEDURE — 36415 COLL VENOUS BLD VENIPUNCTURE: CPT

## 2017-03-19 PROCEDURE — 99407 BEHAV CHNG SMOKING > 10 MIN: CPT | Performed by: INTERNAL MEDICINE

## 2017-03-19 PROCEDURE — A9270 NON-COVERED ITEM OR SERVICE: HCPCS | Performed by: HOSPITALIST

## 2017-03-19 PROCEDURE — 82962 GLUCOSE BLOOD TEST: CPT | Mod: 91

## 2017-03-19 PROCEDURE — A9270 NON-COVERED ITEM OR SERVICE: HCPCS | Performed by: INTERNAL MEDICINE

## 2017-03-19 PROCEDURE — 99225 PR SUBSEQUENT OBSERVATION CARE,LEVEL II: CPT | Mod: 25 | Performed by: INTERNAL MEDICINE

## 2017-03-19 PROCEDURE — 94640 AIRWAY INHALATION TREATMENT: CPT

## 2017-03-19 PROCEDURE — 700101 HCHG RX REV CODE 250: Performed by: HOSPITALIST

## 2017-03-19 PROCEDURE — G0378 HOSPITAL OBSERVATION PER HR: HCPCS

## 2017-03-19 PROCEDURE — 85027 COMPLETE CBC AUTOMATED: CPT

## 2017-03-19 PROCEDURE — 700102 HCHG RX REV CODE 250 W/ 637 OVERRIDE(OP): Performed by: INTERNAL MEDICINE

## 2017-03-19 PROCEDURE — 73660 X-RAY EXAM OF TOE(S): CPT | Mod: LT

## 2017-03-19 RX ORDER — LEVOFLOXACIN 750 MG/1
750 TABLET, FILM COATED ORAL DAILY
Status: DISCONTINUED | OUTPATIENT
Start: 2017-03-19 | End: 2017-03-20 | Stop reason: HOSPADM

## 2017-03-19 RX ADMIN — IPRATROPIUM BROMIDE AND ALBUTEROL SULFATE 3 ML: .5; 3 SOLUTION RESPIRATORY (INHALATION) at 23:03

## 2017-03-19 RX ADMIN — IPRATROPIUM BROMIDE AND ALBUTEROL SULFATE 3 ML: .5; 3 SOLUTION RESPIRATORY (INHALATION) at 04:59

## 2017-03-19 RX ADMIN — CLONIDINE HYDROCHLORIDE 0.1 MG: 0.1 TABLET ORAL at 08:19

## 2017-03-19 RX ADMIN — CLONIDINE HYDROCHLORIDE 0.1 MG: 0.1 TABLET ORAL at 20:39

## 2017-03-19 RX ADMIN — INSULIN GLARGINE 32 UNITS: 100 INJECTION, SOLUTION SUBCUTANEOUS at 20:41

## 2017-03-19 RX ADMIN — METHYLPREDNISOLONE SODIUM SUCCINATE 62.5 MG: 125 INJECTION, POWDER, FOR SOLUTION INTRAMUSCULAR; INTRAVENOUS at 13:45

## 2017-03-19 RX ADMIN — INSULIN LISPRO 2 UNITS: 100 INJECTION, SOLUTION INTRAVENOUS; SUBCUTANEOUS at 11:38

## 2017-03-19 RX ADMIN — INSULIN GLARGINE 32 UNITS: 100 INJECTION, SOLUTION SUBCUTANEOUS at 00:31

## 2017-03-19 RX ADMIN — METOPROLOL SUCCINATE 25 MG: 25 TABLET, EXTENDED RELEASE ORAL at 09:00

## 2017-03-19 RX ADMIN — STANDARDIZED SENNA CONCENTRATE AND DOCUSATE SODIUM 2 TABLET: 8.6; 5 TABLET, FILM COATED ORAL at 08:19

## 2017-03-19 RX ADMIN — INSULIN LISPRO 3 UNITS: 100 INJECTION, SOLUTION INTRAVENOUS; SUBCUTANEOUS at 00:32

## 2017-03-19 RX ADMIN — GABAPENTIN 300 MG: 300 CAPSULE ORAL at 08:19

## 2017-03-19 RX ADMIN — CITALOPRAM HYDROBROMIDE 20 MG: 20 TABLET ORAL at 08:19

## 2017-03-19 RX ADMIN — IPRATROPIUM BROMIDE AND ALBUTEROL SULFATE 3 ML: .5; 3 SOLUTION RESPIRATORY (INHALATION) at 10:53

## 2017-03-19 RX ADMIN — FUROSEMIDE 20 MG: 20 TABLET ORAL at 08:28

## 2017-03-19 RX ADMIN — IPRATROPIUM BROMIDE AND ALBUTEROL SULFATE 3 ML: .5; 3 SOLUTION RESPIRATORY (INHALATION) at 15:53

## 2017-03-19 RX ADMIN — LISINOPRIL 20 MG: 20 TABLET ORAL at 08:19

## 2017-03-19 RX ADMIN — OXYCODONE HYDROCHLORIDE 10 MG: 10 TABLET ORAL at 13:45

## 2017-03-19 RX ADMIN — RISPERIDONE 0.5 MG: 0.5 TABLET, FILM COATED ORAL at 20:39

## 2017-03-19 RX ADMIN — MORPHINE SULFATE 15 MG: 15 TABLET ORAL at 16:14

## 2017-03-19 RX ADMIN — MORPHINE SULFATE 15 MG: 15 TABLET ORAL at 10:45

## 2017-03-19 RX ADMIN — STANDARDIZED SENNA CONCENTRATE AND DOCUSATE SODIUM 2 TABLET: 8.6; 5 TABLET, FILM COATED ORAL at 20:39

## 2017-03-19 RX ADMIN — METHYLPREDNISOLONE SODIUM SUCCINATE 62.5 MG: 125 INJECTION, POWDER, FOR SOLUTION INTRAMUSCULAR; INTRAVENOUS at 05:18

## 2017-03-19 RX ADMIN — BUDESONIDE AND FORMOTEROL FUMARATE DIHYDRATE 2 PUFF: 160; 4.5 AEROSOL RESPIRATORY (INHALATION) at 08:26

## 2017-03-19 RX ADMIN — LEVOFLOXACIN 750 MG: 750 TABLET, FILM COATED ORAL at 13:45

## 2017-03-19 RX ADMIN — INSULIN LISPRO 1 UNITS: 100 INJECTION, SOLUTION INTRAVENOUS; SUBCUTANEOUS at 06:26

## 2017-03-19 RX ADMIN — ASPIRIN 81 MG: 81 TABLET ORAL at 08:19

## 2017-03-19 RX ADMIN — METOCLOPRAMIDE 5 MG: 10 TABLET ORAL at 10:45

## 2017-03-19 RX ADMIN — METOCLOPRAMIDE 5 MG: 10 TABLET ORAL at 05:19

## 2017-03-19 RX ADMIN — ENOXAPARIN SODIUM 40 MG: 100 INJECTION SUBCUTANEOUS at 20:40

## 2017-03-19 RX ADMIN — TRAZODONE HYDROCHLORIDE 50 MG: 50 TABLET ORAL at 20:39

## 2017-03-19 RX ADMIN — METOCLOPRAMIDE 5 MG: 10 TABLET ORAL at 16:14

## 2017-03-19 RX ADMIN — MORPHINE SULFATE 15 MG: 15 TABLET ORAL at 22:00

## 2017-03-19 RX ADMIN — GABAPENTIN 300 MG: 300 CAPSULE ORAL at 20:39

## 2017-03-19 RX ADMIN — FUROSEMIDE 20 MG: 20 TABLET ORAL at 20:03

## 2017-03-19 RX ADMIN — GABAPENTIN 300 MG: 300 CAPSULE ORAL at 13:45

## 2017-03-19 RX ADMIN — SIMVASTATIN 20 MG: 20 TABLET, FILM COATED ORAL at 20:39

## 2017-03-19 RX ADMIN — INSULIN LISPRO 2 UNITS: 100 INJECTION, SOLUTION INTRAVENOUS; SUBCUTANEOUS at 20:40

## 2017-03-19 RX ADMIN — METHYLPREDNISOLONE SODIUM SUCCINATE 62.5 MG: 125 INJECTION, POWDER, FOR SOLUTION INTRAMUSCULAR; INTRAVENOUS at 22:00

## 2017-03-19 ASSESSMENT — ENCOUNTER SYMPTOMS
FALLS: 1
CHILLS: 0
BLURRED VISION: 0
DEPRESSION: 0
HEADACHES: 0
WEAKNESS: 1
SPUTUM PRODUCTION: 1
COUGH: 1
WHEEZING: 1
ABDOMINAL PAIN: 0
VOMITING: 0
FEVER: 0
DIZZINESS: 0
SHORTNESS OF BREATH: 1
LOSS OF CONSCIOUSNESS: 0
NAUSEA: 0
MYALGIAS: 1
NECK PAIN: 0

## 2017-03-19 ASSESSMENT — PAIN SCALES - GENERAL
PAINLEVEL_OUTOF10: 5
PAINLEVEL_OUTOF10: 4
PAINLEVEL_OUTOF10: 4
PAINLEVEL_OUTOF10: 5
PAINLEVEL_OUTOF10: 6
PAINLEVEL_OUTOF10: 6
PAINLEVEL_OUTOF10: 8
PAINLEVEL_OUTOF10: 4

## 2017-03-19 ASSESSMENT — LIFESTYLE VARIABLES
EVER_SMOKED: YES
DO YOU DRINK ALCOHOL: NO

## 2017-03-19 NOTE — ED NOTES
Assumed care of pt,  Updated on POC.  [place], pillow and water given per request.  Denies other needs at this time.

## 2017-03-19 NOTE — RESPIRATORY CARE
COPD EDUCATION by COPD CLINICAL EDUCATOR  3/19/2017 at 6:39 AM by Mary Burciaga     Patient reviewed by COPD education team. Patient does not qualify for COPD program.

## 2017-03-19 NOTE — PROGRESS NOTES
Pt a+ox4, complaints of pain to R arm and Left 2nd toe pain medicated per MAR. R arm sling placed by ortho tech. Pt bed alarm on , call light within reach. Pt remains on baseline O2 satting well, resp treatments/steroids continued- no complaints of sob.. Xray ordered for 2nd toe. Pt with no further complaints at this time.

## 2017-03-19 NOTE — PROGRESS NOTES
Patients skin check revealed redness behind ears from nasal cannula, bruising on left upper arm, slight bruising on abdomen. Patient has scarred areas bilaterally on both arms from skin cancer surgery.  Feet are dry but skin is intact.

## 2017-03-19 NOTE — PROGRESS NOTES
"This Rn Found patient out by the elevators and educated patient that she is not able to leave floor, asked to go back to the room.  Patient stated \"I will just sign out AMA, I will go back through er after seeing my friend.  I understand how this works.\"  Pt clothing was wet from washing them in her room earlier.  RN gave pair of scrub pants   "

## 2017-03-19 NOTE — FLOWSHEET NOTE
03/19/17 1054   Events/Summary/Plan   Events/Summary/Plan SVN given   Interdisciplinary Plan of Care-Goals (Indications)   Obstructive Ventilatory Defect or Pulmonary Disease without Obvious Obstruction History / Diagnosis   Interdisciplinary Plan of Care-Outcomes    Bronchodilator Outcome Patient at Stable Baseline   Education   Education Yes - Pt. / Family has been Instructed in use of Respiratory Medications and Adverse Reactions   RT Assessment of Delivered Medications   Evaluation of Medication Delivery Daily Yes-- Pt /Family has been Instructed in use of Respiratory Medications and Adverse Reactions   SVN Group   #SVN Performed Yes   Given By: Mouthpiece   Respiratory WDL   Respiratory (WDL) X   Chest Exam   Work Of Breathing / Effort Mild   Respiration 16   Pulse 68   Breath Sounds   RUL Breath Sounds Diminished   RML Breath Sounds Diminished   RLL Breath Sounds Diminished   MARC Breath Sounds Diminished   LLL Breath Sounds Diminished   Oxygen   Pulse Oximetry 98 %   O2 (LPM) 0   O2 (FiO2) 21   O2 Daily Delivery Respiratory  Room Air with O2 Available

## 2017-03-19 NOTE — PROGRESS NOTES
Fell 2 months ago and sustained right proximal humerus fx  Several more falls since  Had no follow up  Admitted with COPD exacerbation  Also c/o left 2nd toe pain with fracture  Will check 2nd toe xrays  Right humerus xrays show healing proximal humerus fx in good position, possible new greater tuberosity fx  Sling for comfort  ROM as tolerated  WBAT RUE  F/u [facility] 3-4 weeks

## 2017-03-19 NOTE — CARE PLAN
Problem: Pain Management  Goal: Pain level will decrease to patient’s comfort goal  Intervention: Follow pain managment plan developed in collaboration with patient and Interdisciplinary Team  Patient given PO Morphine for pain at 8/10 location of right arm.  Pain goal is 5/10.  Patient is resting comfortably.

## 2017-03-19 NOTE — CARE PLAN
Problem: Safety  Goal: Will remain free from injury  Intervention: Provide assistance with mobility  Bed alarm on. Call light within reach
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
## 2017-03-19 NOTE — CARE PLAN
Problem: Respiratory:  Goal: Respiratory status will improve  Intervention: Assess and monitor pulmonary status  Patient is on 3 liters of oxygen and is breathing with no difficulty.  Patient is being monitored by a continuous pulse ox.

## 2017-03-19 NOTE — ED NOTES
Pt ambulatory to BR with 1 person assist.  Medicated per MAR for increased pain with coughing/ inspriation.  Updated on POC.  Call light in reach.

## 2017-03-20 VITALS
BODY MASS INDEX: 33.42 KG/M2 | SYSTOLIC BLOOD PRESSURE: 146 MMHG | HEART RATE: 75 BPM | HEIGHT: 61 IN | WEIGHT: 177 LBS | DIASTOLIC BLOOD PRESSURE: 99 MMHG | RESPIRATION RATE: 17 BRPM | TEMPERATURE: 98.6 F | OXYGEN SATURATION: 97 %

## 2017-03-20 LAB
GLUCOSE BLD-MCNC: 186 MG/DL (ref 65–99)
GLUCOSE BLD-MCNC: 230 MG/DL (ref 65–99)
GLUCOSE BLD-MCNC: 231 MG/DL (ref 65–99)
GLUCOSE BLD-MCNC: 271 MG/DL (ref 65–99)
GLUCOSE BLD-MCNC: 281 MG/DL (ref 65–99)
GLUCOSE BLD-MCNC: 302 MG/DL (ref 65–99)

## 2017-03-20 PROCEDURE — G0378 HOSPITAL OBSERVATION PER HR: HCPCS

## 2017-03-20 PROCEDURE — G8979 MOBILITY GOAL STATUS: HCPCS | Mod: CI

## 2017-03-20 PROCEDURE — 96376 TX/PRO/DX INJ SAME DRUG ADON: CPT

## 2017-03-20 PROCEDURE — 99225 PR SUBSEQUENT OBSERVATION CARE,LEVEL II: CPT | Performed by: INTERNAL MEDICINE

## 2017-03-20 PROCEDURE — A9270 NON-COVERED ITEM OR SERVICE: HCPCS | Performed by: HOSPITALIST

## 2017-03-20 PROCEDURE — 700102 HCHG RX REV CODE 250 W/ 637 OVERRIDE(OP): Performed by: HOSPITALIST

## 2017-03-20 PROCEDURE — 700111 HCHG RX REV CODE 636 W/ 250 OVERRIDE (IP): Performed by: HOSPITALIST

## 2017-03-20 PROCEDURE — A9270 NON-COVERED ITEM OR SERVICE: HCPCS | Performed by: INTERNAL MEDICINE

## 2017-03-20 PROCEDURE — 97162 PT EVAL MOD COMPLEX 30 MIN: CPT

## 2017-03-20 PROCEDURE — 94640 AIRWAY INHALATION TREATMENT: CPT

## 2017-03-20 PROCEDURE — G8978 MOBILITY CURRENT STATUS: HCPCS | Mod: CJ

## 2017-03-20 PROCEDURE — 99285 EMERGENCY DEPT VISIT HI MDM: CPT

## 2017-03-20 PROCEDURE — 700101 HCHG RX REV CODE 250: Performed by: HOSPITALIST

## 2017-03-20 PROCEDURE — 700102 HCHG RX REV CODE 250 W/ 637 OVERRIDE(OP): Performed by: INTERNAL MEDICINE

## 2017-03-20 PROCEDURE — 82962 GLUCOSE BLOOD TEST: CPT | Mod: 91

## 2017-03-20 RX ORDER — INSULIN GLARGINE 100 [IU]/ML
34 INJECTION, SOLUTION SUBCUTANEOUS EVERY EVENING
Status: DISCONTINUED | OUTPATIENT
Start: 2017-03-20 | End: 2017-03-20 | Stop reason: HOSPADM

## 2017-03-20 RX ORDER — IPRATROPIUM BROMIDE AND ALBUTEROL SULFATE 2.5; .5 MG/3ML; MG/3ML
3 SOLUTION RESPIRATORY (INHALATION)
Status: DISCONTINUED | OUTPATIENT
Start: 2017-03-21 | End: 2017-03-20 | Stop reason: HOSPADM

## 2017-03-20 RX ADMIN — ALPRAZOLAM 0.5 MG: 0.5 TABLET ORAL at 00:46

## 2017-03-20 RX ADMIN — INSULIN LISPRO 2 UNITS: 100 INJECTION, SOLUTION INTRAVENOUS; SUBCUTANEOUS at 16:53

## 2017-03-20 RX ADMIN — MORPHINE SULFATE 15 MG: 15 TABLET ORAL at 11:27

## 2017-03-20 RX ADMIN — ASPIRIN 81 MG: 81 TABLET ORAL at 08:30

## 2017-03-20 RX ADMIN — IPRATROPIUM BROMIDE AND ALBUTEROL SULFATE 3 ML: .5; 3 SOLUTION RESPIRATORY (INHALATION) at 12:01

## 2017-03-20 RX ADMIN — METOPROLOL SUCCINATE 25 MG: 25 TABLET, EXTENDED RELEASE ORAL at 08:31

## 2017-03-20 RX ADMIN — INSULIN GLARGINE 34 UNITS: 100 INJECTION, SOLUTION SUBCUTANEOUS at 20:45

## 2017-03-20 RX ADMIN — INSULIN LISPRO 2 UNITS: 100 INJECTION, SOLUTION INTRAVENOUS; SUBCUTANEOUS at 11:28

## 2017-03-20 RX ADMIN — CLONIDINE HYDROCHLORIDE 0.1 MG: 0.1 TABLET ORAL at 08:31

## 2017-03-20 RX ADMIN — METOCLOPRAMIDE 5 MG: 10 TABLET ORAL at 11:20

## 2017-03-20 RX ADMIN — IPRATROPIUM BROMIDE AND ALBUTEROL SULFATE 3 ML: .5; 3 SOLUTION RESPIRATORY (INHALATION) at 19:39

## 2017-03-20 RX ADMIN — GABAPENTIN 300 MG: 300 CAPSULE ORAL at 20:44

## 2017-03-20 RX ADMIN — GABAPENTIN 300 MG: 300 CAPSULE ORAL at 15:18

## 2017-03-20 RX ADMIN — BUDESONIDE AND FORMOTEROL FUMARATE DIHYDRATE 2 PUFF: 160; 4.5 AEROSOL RESPIRATORY (INHALATION) at 08:18

## 2017-03-20 RX ADMIN — LISINOPRIL 20 MG: 20 TABLET ORAL at 08:30

## 2017-03-20 RX ADMIN — STANDARDIZED SENNA CONCENTRATE AND DOCUSATE SODIUM 2 TABLET: 8.6; 5 TABLET, FILM COATED ORAL at 20:44

## 2017-03-20 RX ADMIN — METHYLPREDNISOLONE SODIUM SUCCINATE 62.5 MG: 125 INJECTION, POWDER, FOR SOLUTION INTRAMUSCULAR; INTRAVENOUS at 15:18

## 2017-03-20 RX ADMIN — CITALOPRAM HYDROBROMIDE 20 MG: 20 TABLET ORAL at 08:31

## 2017-03-20 RX ADMIN — METOCLOPRAMIDE 5 MG: 10 TABLET ORAL at 06:19

## 2017-03-20 RX ADMIN — SIMVASTATIN 20 MG: 20 TABLET, FILM COATED ORAL at 20:44

## 2017-03-20 RX ADMIN — ENOXAPARIN SODIUM 40 MG: 100 INJECTION SUBCUTANEOUS at 20:44

## 2017-03-20 RX ADMIN — LEVOFLOXACIN 750 MG: 750 TABLET, FILM COATED ORAL at 08:30

## 2017-03-20 RX ADMIN — TRAZODONE HYDROCHLORIDE 50 MG: 50 TABLET ORAL at 20:44

## 2017-03-20 RX ADMIN — FUROSEMIDE 20 MG: 20 TABLET ORAL at 08:31

## 2017-03-20 RX ADMIN — INSULIN LISPRO 4 UNITS: 100 INJECTION, SOLUTION INTRAVENOUS; SUBCUTANEOUS at 20:45

## 2017-03-20 RX ADMIN — RISPERIDONE 0.5 MG: 0.5 TABLET, FILM COATED ORAL at 20:44

## 2017-03-20 RX ADMIN — IPRATROPIUM BROMIDE AND ALBUTEROL SULFATE 3 ML: .5; 3 SOLUTION RESPIRATORY (INHALATION) at 03:00

## 2017-03-20 RX ADMIN — IPRATROPIUM BROMIDE AND ALBUTEROL SULFATE 3 ML: .5; 3 SOLUTION RESPIRATORY (INHALATION) at 16:40

## 2017-03-20 RX ADMIN — GABAPENTIN 300 MG: 300 CAPSULE ORAL at 08:30

## 2017-03-20 RX ADMIN — INSULIN LISPRO 1 UNITS: 100 INJECTION, SOLUTION INTRAVENOUS; SUBCUTANEOUS at 06:20

## 2017-03-20 RX ADMIN — MORPHINE SULFATE 15 MG: 15 TABLET ORAL at 16:57

## 2017-03-20 RX ADMIN — METHYLPREDNISOLONE SODIUM SUCCINATE 62.5 MG: 125 INJECTION, POWDER, FOR SOLUTION INTRAMUSCULAR; INTRAVENOUS at 06:20

## 2017-03-20 RX ADMIN — IPRATROPIUM BROMIDE AND ALBUTEROL SULFATE 3 ML: .5; 3 SOLUTION RESPIRATORY (INHALATION) at 08:18

## 2017-03-20 RX ADMIN — OXYCODONE HYDROCHLORIDE 10 MG: 10 TABLET ORAL at 06:20

## 2017-03-20 RX ADMIN — METOCLOPRAMIDE 5 MG: 10 TABLET ORAL at 16:57

## 2017-03-20 ASSESSMENT — PAIN SCALES - GENERAL
PAINLEVEL_OUTOF10: 8
PAINLEVEL_OUTOF10: 4
PAINLEVEL_OUTOF10: 6
PAINLEVEL_OUTOF10: 8
PAINLEVEL_OUTOF10: 5
PAINLEVEL_OUTOF10: 6
PAINLEVEL_OUTOF10: 6

## 2017-03-20 ASSESSMENT — ENCOUNTER SYMPTOMS
MYALGIAS: 1
FEVER: 0
SPUTUM PRODUCTION: 1
COUGH: 1
SHORTNESS OF BREATH: 1
DIZZINESS: 0
DEPRESSION: 0
FALLS: 1
ABDOMINAL PAIN: 0
BLURRED VISION: 0
WEAKNESS: 1
NECK PAIN: 0
HEADACHES: 0
WHEEZING: 1
LOSS OF CONSCIOUSNESS: 0

## 2017-03-20 ASSESSMENT — GAIT ASSESSMENTS
DEVIATION: ANTALGIC;STEP TO
ASSISTIVE DEVICE: HEMI-WALKER
GAIT LEVEL OF ASSIST: CONTACT GUARD ASSIST
DISTANCE (FEET): 30

## 2017-03-20 ASSESSMENT — COPD QUESTIONNAIRES
DO YOU EVER COUGH UP ANY MUCUS OR PHLEGM?: NO/ONLY WITH OCCASIONAL COLDS OR INFECTIONS
DURING THE PAST 4 WEEKS HOW MUCH DID YOU FEEL SHORT OF BREATH: MOST  OR ALL OF THE TIME
COPD SCREENING SCORE: 8
HAVE YOU SMOKED AT LEAST 100 CIGARETTES IN YOUR ENTIRE LIFE: YES

## 2017-03-20 NOTE — DISCHARGE PLANNING
Received notice from [facility] they have declined patient as no Medicaid beds.  Received call from Veronicaирина Lindsey and [facility] they have declined patient as no Medicaid beds.

## 2017-03-20 NOTE — PROGRESS NOTES
Assumed care of patient at 0700am. Patient alert/oriented x4. VSS, continues on 3L NC which is baseline. Denies current need for any pain meds at this time. FS as ordered. Ambulatory with standby assist. POC discussed with patient, verbalized understanding. Bed alarm on for safety. Call bell within reach. Will continue to monitor.
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
## 2017-03-20 NOTE — FLOWSHEET NOTE
03/20/17 0300   Events/Summary/Plan   Non-Invasive Resp Device Site Inspection Completed Intact   Interdisciplinary Plan of Care-Goals (Indications)   Obstructive Ventilatory Defect or Pulmonary Disease without Obvious Obstruction History / Diagnosis   Interdisciplinary Plan of Care-Outcomes    Bronchodilator Outcome Patient at Stable Baseline   Education   Education Yes - Pt. / Family has been Instructed in use of Respiratory Equipment;Yes - Pt. / Family has been Instructed in use of Respiratory Medications and Adverse Reactions   RT Assessment of Delivered Medications   Evaluation of Medication Delivery Daily Yes-- Pt /Family has been Instructed in use of Respiratory Medications and Adverse Reactions   SVN Group   #SVN Performed Yes   Given By: Mouthpiece   Respiratory WDL   Respiratory (WDL) X   Chest Exam   Work Of Breathing / Effort Mild   Respiration 20   Pulse (!) 56   Breath Sounds   Pre/Post Intervention Pre Intervention Assessment   RUL Breath Sounds Expiratory Wheezes   RML Breath Sounds Expiratory Wheezes   RLL Breath Sounds Diminished   MARC Breath Sounds Expiratory Wheezes   LLL Breath Sounds Diminished   Secretions   Cough Non Productive   Oxygen   O2 (LPM) 3   O2 Daily Delivery Respiratory  Silicone Nasal Cannula

## 2017-03-20 NOTE — FLOWSHEET NOTE
03/20/17 0820   Events/Summary/Plan   Events/Summary/Plan svn given   Non-Invasive Resp Device Site Inspection Completed Intact   Interdisciplinary Plan of Care-Goals (Indications)   Obstructive Ventilatory Defect or Pulmonary Disease without Obvious Obstruction History / Diagnosis   Interdisciplinary Plan of Care-Outcomes    Bronchodilator Outcome Patient at Stable Baseline   Education   Education Yes - Pt. / Family has been Instructed in use of Respiratory Medications and Adverse Reactions   RT Assessment of Delivered Medications   Evaluation of Medication Delivery Daily Yes-- Pt /Family has been Instructed in use of Respiratory Medications and Adverse Reactions   SVN Group   #SVN Performed Yes   Given By: Mouthpiece   Date SVN Last Changed 03/18/17   Date SVN Next Change Due (Q 7 Days) 03/25/17   Respiratory WDL   Respiratory (WDL) X   Chest Exam   Work Of Breathing / Effort Mild   Respiration 18   Pulse 62   Breath Sounds   RUL Breath Sounds Diminished   RML Breath Sounds Diminished   RLL Breath Sounds Diminished   MARC Breath Sounds Diminished   LLL Breath Sounds Diminished   Oxygen   Home O2 Use Prior To Admission? Yes   Home O2 LPM Flow 3 LPM   Home O2 Delivery Method Nasal Cannula   Pulse Oximetry 95 %   O2 (LPM) 3   O2 Daily Delivery Respiratory  Silicone Nasal Cannula

## 2017-03-20 NOTE — PROGRESS NOTES
Hospital Medicine Progress Note, Adult, Complex               Author: Renato Sawyer Date & Time created: 3/19/2017  5:15 PM     Interval History:  67 y/o female with COPD exacerbation and R humeral fracture    R humeral fracture-called ortho O'sugar, non surgical. Keep in sling  2nd left foot phalanx fracture-continues to fall and cause problems  COPD-lungs feel worse, coughing a lot. She is trying to cut back on her smoking.    Review of Systems:  Review of Systems   Constitutional: Positive for malaise/fatigue. Negative for fever and chills.   Eyes: Negative for blurred vision.   Respiratory: Positive for cough, sputum production, shortness of breath and wheezing.    Cardiovascular: Negative for chest pain and leg swelling.   Gastrointestinal: Negative for nausea, vomiting and abdominal pain.   Genitourinary: Negative for dysuria.   Musculoskeletal: Positive for myalgias, joint pain and falls. Negative for neck pain.   Skin: Negative for itching.   Neurological: Positive for weakness. Negative for dizziness, loss of consciousness and headaches.   Psychiatric/Behavioral: Negative for depression.   All other systems reviewed and are negative.      Physical Exam:  Physical Exam   Constitutional: She is oriented to person, place, and time. She appears well-developed and well-nourished. No distress.   Appears much older than stated age   HENT:   Head: Normocephalic and atraumatic.   Mouth/Throat: No oropharyngeal exudate.   Eyes: Pupils are equal, round, and reactive to light. Right eye exhibits no discharge. Left eye exhibits no discharge.   Neck: Normal range of motion. Neck supple.   Cardiovascular: Normal rate, regular rhythm, normal heart sounds and intact distal pulses.    No murmur heard.  Pulmonary/Chest: Effort normal. No stridor. No respiratory distress. She has wheezes. She has rales.   Abdominal: Soft. Bowel sounds are normal. She exhibits no distension. There is no tenderness.   Musculoskeletal: Normal  range of motion. She exhibits tenderness. She exhibits no edema.   RUE in sling  Left 2nd toe pain   Neurological: She is alert and oriented to person, place, and time. No cranial nerve deficit.   Skin: Skin is warm and dry. No rash noted.   Psychiatric: She has a normal mood and affect.   Nursing note and vitals reviewed.      Labs:        Invalid input(s): [id number]  Recent Labs      17   TROPONINI  <0.01   BNPBTYPENAT  166*     Recent Labs      17   0553   SODIUM  132*  134*  136   POTASSIUM  4.5  4.5  4.4   CHLORIDE  98  100  99   CO2  28  27  27   BUN  46*  48*  39*   CREATININE  0.87  0.88  0.72   CALCIUM  9.2  9.2  8.9     Recent Labs      17   0553   ALTSGPT   --   15   --    ASTSGOT   --   10*   --    ALKPHOSPHAT   --   99   --    TBILIRUBIN   --   0.2   --    LIPASE   --   25   --    GLUCOSE  326*  222*  248*     Recent Labs      17   0553   RBC  5.14  5.15  5.23   HEMOGLOBIN  13.7  13.9  14.1   HEMATOCRIT  43.1  42.6  44.4   PLATELETCT  227  254  208   PROTHROMBTM   --   12.7   --    APTT   --   23.0*   --    INR   --   0.93   --      Recent Labs      17   0553   WBC  15.0*  13.9*  13.5*   NEUTSPOLYS   --   88.10*   --    LYMPHOCYTES   --   7.10*   --    MONOCYTES   --   3.80   --    EOSINOPHILS   --   0.00   --    BASOPHILS   --   0.10   --    ASTSGOT   --   10*   --    ALTSGPT   --   15   --    ALKPHOSPHAT   --   99   --    TBILIRUBIN   --   0.2   --            Hemodynamics:  Temp (24hrs), Av.7 °C (98 °F), Min:36.4 °C (97.6 °F), Max:37.2 °C (98.9 °F)  Temperature: 36.4 °C (97.6 °F)  Pulse  Av.6  Min: 56  Max: 70Heart Rate (Monitored): 68  Blood Pressure: 132/62 mmHg, NIBP: 126/57 mmHg     Respiratory:    Respiration: 18, Pulse Oximetry: 98 %, O2 Daily Delivery Respiratory : Silicone Nasal Cannula     Given By::  Mouthpiece, Work Of Breathing / Effort: Mild  RUL Breath Sounds: Diminished, RML Breath Sounds: Diminished, RLL Breath Sounds: Diminished, MARC Breath Sounds: Diminished, LLL Breath Sounds: Diminished  Fluids:    Intake/Output Summary (Last 24 hours) at 03/19/17 1715  Last data filed at 03/19/17 0008   Gross per 24 hour   Intake    400 ml   Output      0 ml   Net    400 ml        GI/Nutrition:  Orders Placed This Encounter   Procedures   • Diet Order     Standing Status: Standing      Number of Occurrences: 1      Standing Expiration Date:      Order Specific Question:  Diet:     Answer:  Diabetic [3]     Order Specific Question:  Calorie modifications:     Answer:  1800 kcals [4]     Medical Decision Making, by Problem:  Active Hospital Problems    Diagnosis   • *COPD exacerbation (CMS-[facility]) [J44.1]-advanced  -continue IV steroids, BD's, add Po levaquin given severity  -try to wean o2 PRN   • Diabetes type 2, controlled (CMS-[facility]) [E11.9]-sugars are decent right now  -continue ISS, adjust PRN, continue standing lantus   • Chronic narcotic dependence [F11.20]-judicious use of pain meds, both PO and IV   • Hepatitis C [B19.20]-treatment naive   • CAD (coronary artery disease) [I25.10]-no current cp, continue outpatient meds   • Tobacco abuse [Z72.0]-  Counseled the patient on the importance of tobacco cessation including the use of chantix, wellbutrin, and hypnosis. Patient is in understanding of this issue. BILL CODE 28093. Spent over 10 minutes, she is interested in full quitting.     • Closed nondisplaced fracture of proximal phalanx of lesser toe of left foot with delayed healing [S92.515G]-carlos or ortho following, likely non surgical, appropriate pain control   • Acute on chronic respiratory failure with hypoxia (CMS-[facility]) [J96.21]-as above   • Humeral fracture [S42.309A]-non operative per ortho, pain control, PT/OT, keep RUE in sling   • Fibromyalgia [M79.7]-outpatient pain control   • Anxiety [F41.9]-low dose  benzo's   • Essential hypertension [I10]-well controllled, continue current meds       Labs reviewed and Medications reviewed  Fagan catheter: No Fagan      DVT Prophylaxis: Heparin        Assessed for rehab: Patient was assess for and/or received rehabilitation services during this hospitalization

## 2017-03-20 NOTE — DISCHARGE PLANNING
Received call from Martín at [facility]  They have declined patient as no Medicaid beds.

## 2017-03-20 NOTE — DISCHARGE PLANNING
Received choice form from [facility] at 1122.  Referral sent to all local SNF at 1238 on 03/20/17.

## 2017-03-20 NOTE — CARE PLAN
Problem: Infection  Goal: Will remain free from infection  Staff members use Avagard when entering and exiting room. Gloves worn when providing cares. RN scrubs PIV hub for 15 seconds using alcohol wipe before administering any IV medications. Patient educated on good hand hygiene after using restroom.    Problem: Venous Thromboembolism (VTW)/Deep Vein Thrombosis (DVT) Prevention:  Goal: Patient will participate in Venous Thrombosis (VTE)/Deep Vein Thrombosis (DVT)Prevention Measures  Intervention: Assess and monitor for anticoagulation complications  VTE protocol in place. Patient is ambulatory and walks frequently to and from bathroom. Refused SCDs after education provided. RN administers Enoxaparin 40mg/ml SQ at night.

## 2017-03-20 NOTE — CONSULTS
DATE OF SERVICE:  03/19/2017    REQUESTING PHYSICIAN:  Dr. Sawyer.    CHIEF COMPLAINT:  1.  Right shoulder pain.  2.  Left second toe pain.    HISTORY OF PRESENT ILLNESS:  The patient is 66 years old admitted for a COPD   exacerbation.  She was going to be admitted yesterday, but left against   medical advice and came back.  She was admitted.  She has pain in her shoulder   and x-rays showed a proximal humerus fracture.  Orthopedic consultation was   requested.  Patient does state that she fell 2 months ago, was seen in the   emergency room at [facility] found to have a fracture, then she fell few   subsequent times, one more recently.  She also has injured her left second toe   and this toe hurts.  She has a shoulder immobilizer in place.    ALLERGIES:  MUSHROOM EXTRACT, TYLENOL, ZOFRAN.    MEDICATIONS:  Albuterol, Xanax, Symbicort, aspirin, Celexa, clonidine, Lasix,   Neurontin, Lantus insulin, lisinopril, Reglan, ____, morphine, nitroglycerin,   oxycodone, Risperdal, Zocor, trazodone.    PAST MEDICAL HISTORY:  COPD, hypertension, diabetes mellitus, coronary artery   disease, chronic back pain, opiate dependence, peripheral neuropathy, anxiety,   bipolar disorder, fibromyalgia, dyslipidemia, hepatitis C.    PAST SURGICAL HISTORY:  Irrigation and debridement.    SOCIAL HISTORY:  The patient is homeless.  She smokes 2 packs a day, although   she says she has decreased this to 2 cigarettes a day.    FAMILY HISTORY:  Positive for diabetes and heart disease in her parents and   AIDS in her son.    PHYSICAL EXAMINATION:  GENERAL:  The patient is in no acute distress.  HEENT:  Normocephalic, atraumatic.  VITAL SIGNS:  Her blood pressure is 132/65, heart rate 69, respirations 18,   temperature 97.6.  NECK:  Supple, nontender.  CHEST AND ABDOMEN:  Nontender.  No labored breathing.  ABDOMEN:  Soft.  PELVIS:  Stable.  EXTREMITIES:  Left upper and right lower extremity without tenderness or   deformity.  Right upper  extremity shows an old scar from a deltoid abscess.    There is no significant swelling.  She is able to elevate her arm to about 90   degrees.  She is able to move all of her fingers.  There is some swelling at   the tip of the left second toe.  No erythema or fluctuance.    LABORATORY DATA:  Include white blood cell count of 13,500; hematocrit 44%,   platelet count ____.  Sodium 136, potassium 4.4, glucose 248, creatinine 0.72.    Radiographs of the right shoulder show a proximal humerus fracture involving   greater tuberosity and surgical neck.  After seeing her later in the day at   the time of this dictation, I did review her x-rays from January 23 at [facility], this shows the acute fracture and the new x-rays just show same   alignment of a healing fracture with no acute fracture.  The radiographs from   January 23 also show a second toe distal phalanx fracture.    ASSESSMENT:  1.  Healing right proximal humerus fracture.  2.  Left second toe distal phalanx fracture, subacute.  3.  Tobacco abuse.  4.  Hepatitis C.  5.  Diabetes mellitus.  6.  Chronic obstructive pulmonary disease exacerbation.  7.  Opiate dependence.    PLAN:  We will get x-rays of the left second toe.  With regards to the   shoulder, she can have a sling as needed, but she should start working on   range of motion exercises.  We will have the therapist teach her some.  She   should do these exercises for the next 6 weeks and we will see her back in the   clinic in 6 weeks.  We will give some additional recommendations regarding   the second toe once we have seen the x-rays.       ____________________________________     MD VANGIE MAJANO / MIKE    DD:  03/19/2017 15:54:36  DT:  03/19/2017 18:37:14    D#:  [id number]  Job#:  [id number]

## 2017-03-21 ENCOUNTER — APPOINTMENT (OUTPATIENT)
Dept: RADIOLOGY | Facility: MEDICAL CENTER | Age: 67
DRG: 190 | End: 2017-03-21
Attending: EMERGENCY MEDICINE
Payer: MEDICAID

## 2017-03-21 ENCOUNTER — HOSPITAL ENCOUNTER (INPATIENT)
Facility: MEDICAL CENTER | Age: 67
LOS: 4 days | DRG: 190 | End: 2017-03-26
Attending: EMERGENCY MEDICINE | Admitting: HOSPITALIST
Payer: MEDICAID

## 2017-03-21 ENCOUNTER — RESOLUTE PROFESSIONAL BILLING HOSPITAL PROF FEE (OUTPATIENT)
Dept: HOSPITALIST | Facility: MEDICAL CENTER | Age: 67
End: 2017-03-21
Payer: MEDICAID

## 2017-03-21 DIAGNOSIS — J43.1 PANLOBULAR EMPHYSEMA (HCC): ICD-10-CM

## 2017-03-21 DIAGNOSIS — F41.9 ANXIETY: ICD-10-CM

## 2017-03-21 DIAGNOSIS — F31.31 BIPOLAR AFFECTIVE DISORDER, CURRENTLY DEPRESSED, MILD (HCC): ICD-10-CM

## 2017-03-21 DIAGNOSIS — I25.83 CORONARY ARTERY DISEASE DUE TO LIPID RICH PLAQUE: ICD-10-CM

## 2017-03-21 DIAGNOSIS — I25.10 CORONARY ARTERY DISEASE DUE TO LIPID RICH PLAQUE: ICD-10-CM

## 2017-03-21 DIAGNOSIS — J44.1 COPD EXACERBATION (HCC): ICD-10-CM

## 2017-03-21 DIAGNOSIS — J45.51 SEVERE PERSISTENT ASTHMA WITH ACUTE EXACERBATION: ICD-10-CM

## 2017-03-21 DIAGNOSIS — M54.32 LEFT SIDED SCIATICA: ICD-10-CM

## 2017-03-21 DIAGNOSIS — Z72.0 TOBACCO ABUSE: Chronic | ICD-10-CM

## 2017-03-21 DIAGNOSIS — G89.29 CHRONIC PAIN: ICD-10-CM

## 2017-03-21 DIAGNOSIS — J42 CHRONIC BRONCHITIS, UNSPECIFIED CHRONIC BRONCHITIS TYPE (HCC): ICD-10-CM

## 2017-03-21 DIAGNOSIS — G47.9 SLEEP DISORDER: ICD-10-CM

## 2017-03-21 LAB
ALBUMIN SERPL BCP-MCNC: 3.3 G/DL (ref 3.2–4.9)
ALBUMIN/GLOB SERPL: 1.2 G/DL
ALP SERPL-CCNC: 75 U/L (ref 30–99)
ALT SERPL-CCNC: 14 U/L (ref 2–50)
ANION GAP SERPL CALC-SCNC: 9 MMOL/L (ref 0–11.9)
AST SERPL-CCNC: 12 U/L (ref 12–45)
BASOPHILS # BLD AUTO: 0 % (ref 0–1.8)
BASOPHILS # BLD: 0 K/UL (ref 0–0.12)
BILIRUB SERPL-MCNC: 0.2 MG/DL (ref 0.1–1.5)
BUN SERPL-MCNC: 39 MG/DL (ref 8–22)
CALCIUM SERPL-MCNC: 8.5 MG/DL (ref 8.5–10.5)
CHLORIDE SERPL-SCNC: 98 MMOL/L (ref 96–112)
CO2 SERPL-SCNC: 28 MMOL/L (ref 20–33)
CREAT SERPL-MCNC: 0.82 MG/DL (ref 0.5–1.4)
EOSINOPHIL # BLD AUTO: 0 K/UL (ref 0–0.51)
EOSINOPHIL NFR BLD: 0 % (ref 0–6.9)
ERYTHROCYTE [DISTWIDTH] IN BLOOD BY AUTOMATED COUNT: 42 FL (ref 35.9–50)
GFR SERPL CREATININE-BSD FRML MDRD: >60 ML/MIN/1.73 M 2
GLOBULIN SER CALC-MCNC: 2.7 G/DL (ref 1.9–3.5)
GLUCOSE BLD-MCNC: 171 MG/DL (ref 65–99)
GLUCOSE BLD-MCNC: 236 MG/DL (ref 65–99)
GLUCOSE BLD-MCNC: 314 MG/DL (ref 65–99)
GLUCOSE BLD-MCNC: 352 MG/DL (ref 65–99)
GLUCOSE BLD-MCNC: 44 MG/DL (ref 65–99)
GLUCOSE SERPL-MCNC: 176 MG/DL (ref 65–99)
HCT VFR BLD AUTO: 43.2 % (ref 37–47)
HGB BLD-MCNC: 13.8 G/DL (ref 12–16)
LYMPHOCYTES # BLD AUTO: 2.14 K/UL (ref 1–4.8)
LYMPHOCYTES NFR BLD: 14 % (ref 22–41)
MANUAL DIFF BLD: NORMAL
MCH RBC QN AUTO: 26.6 PG (ref 27–33)
MCHC RBC AUTO-ENTMCNC: 31.9 G/DL (ref 33.6–35)
MCV RBC AUTO: 83.2 FL (ref 81.4–97.8)
MONOCYTES # BLD AUTO: 0.93 K/UL (ref 0–0.85)
MONOCYTES NFR BLD AUTO: 6.1 % (ref 0–13.4)
MORPHOLOGY BLD-IMP: NORMAL
NEUTROPHILS # BLD AUTO: 12.22 K/UL (ref 2–7.15)
NEUTROPHILS NFR BLD: 79 % (ref 44–72)
NEUTS BAND NFR BLD MANUAL: 0.9 % (ref 0–10)
NRBC # BLD AUTO: 0 K/UL
NRBC BLD AUTO-RTO: 0 /100 WBC
PLATELET # BLD AUTO: 245 K/UL (ref 164–446)
PLATELET BLD QL SMEAR: NORMAL
PMV BLD AUTO: 10.5 FL (ref 9–12.9)
POTASSIUM SERPL-SCNC: 4.1 MMOL/L (ref 3.6–5.5)
PROT SERPL-MCNC: 6 G/DL (ref 6–8.2)
RBC # BLD AUTO: 5.19 M/UL (ref 4.2–5.4)
RBC BLD AUTO: NORMAL
SODIUM SERPL-SCNC: 135 MMOL/L (ref 135–145)
TROPONIN I SERPL-MCNC: <0.01 NG/ML (ref 0–0.04)
WBC # BLD AUTO: 15.3 K/UL (ref 4.8–10.8)

## 2017-03-21 PROCEDURE — 93005 ELECTROCARDIOGRAM TRACING: CPT | Performed by: EMERGENCY MEDICINE

## 2017-03-21 PROCEDURE — 700101 HCHG RX REV CODE 250: Performed by: EMERGENCY MEDICINE

## 2017-03-21 PROCEDURE — 99285 EMERGENCY DEPT VISIT HI MDM: CPT

## 2017-03-21 PROCEDURE — 94640 AIRWAY INHALATION TREATMENT: CPT

## 2017-03-21 PROCEDURE — 700111 HCHG RX REV CODE 636 W/ 250 OVERRIDE (IP): Performed by: EMERGENCY MEDICINE

## 2017-03-21 PROCEDURE — A9270 NON-COVERED ITEM OR SERVICE: HCPCS | Performed by: HOSPITALIST

## 2017-03-21 PROCEDURE — G0378 HOSPITAL OBSERVATION PER HR: HCPCS

## 2017-03-21 PROCEDURE — 84484 ASSAY OF TROPONIN QUANT: CPT

## 2017-03-21 PROCEDURE — 80053 COMPREHEN METABOLIC PANEL: CPT

## 2017-03-21 PROCEDURE — 85027 COMPLETE CBC AUTOMATED: CPT

## 2017-03-21 PROCEDURE — 96376 TX/PRO/DX INJ SAME DRUG ADON: CPT

## 2017-03-21 PROCEDURE — 96375 TX/PRO/DX INJ NEW DRUG ADDON: CPT

## 2017-03-21 PROCEDURE — 82962 GLUCOSE BLOOD TEST: CPT | Mod: 91

## 2017-03-21 PROCEDURE — 700111 HCHG RX REV CODE 636 W/ 250 OVERRIDE (IP): Performed by: HOSPITALIST

## 2017-03-21 PROCEDURE — 71010 DX-CHEST-PORTABLE (1 VIEW): CPT

## 2017-03-21 PROCEDURE — 96372 THER/PROPH/DIAG INJ SC/IM: CPT

## 2017-03-21 PROCEDURE — 85007 BL SMEAR W/DIFF WBC COUNT: CPT

## 2017-03-21 PROCEDURE — 99220 PR INITIAL OBSERVATION CARE,LEVL III: CPT | Performed by: HOSPITALIST

## 2017-03-21 PROCEDURE — 700102 HCHG RX REV CODE 250 W/ 637 OVERRIDE(OP): Performed by: HOSPITALIST

## 2017-03-21 RX ORDER — PROMETHAZINE HYDROCHLORIDE 25 MG/1
12.5-25 TABLET ORAL EVERY 4 HOURS PRN
Status: DISCONTINUED | OUTPATIENT
Start: 2017-03-21 | End: 2017-03-26 | Stop reason: HOSPADM

## 2017-03-21 RX ORDER — LISINOPRIL 20 MG/1
20 TABLET ORAL DAILY
Status: DISCONTINUED | OUTPATIENT
Start: 2017-03-21 | End: 2017-03-22

## 2017-03-21 RX ORDER — NITROGLYCERIN 0.4 MG/1
0.4 TABLET SUBLINGUAL
Status: DISCONTINUED | OUTPATIENT
Start: 2017-03-21 | End: 2017-03-26 | Stop reason: HOSPADM

## 2017-03-21 RX ORDER — BISACODYL 10 MG
10 SUPPOSITORY, RECTAL RECTAL
Status: DISCONTINUED | OUTPATIENT
Start: 2017-03-22 | End: 2017-03-26 | Stop reason: HOSPADM

## 2017-03-21 RX ORDER — ONDANSETRON 4 MG/1
4 TABLET, ORALLY DISINTEGRATING ORAL EVERY 4 HOURS PRN
Status: DISCONTINUED | OUTPATIENT
Start: 2017-03-21 | End: 2017-03-21

## 2017-03-21 RX ORDER — PROMETHAZINE HYDROCHLORIDE 25 MG/1
12.5-25 SUPPOSITORY RECTAL EVERY 4 HOURS PRN
Status: DISCONTINUED | OUTPATIENT
Start: 2017-03-21 | End: 2017-03-26 | Stop reason: HOSPADM

## 2017-03-21 RX ORDER — HEPARIN SODIUM 5000 [USP'U]/ML
5000 INJECTION, SOLUTION INTRAVENOUS; SUBCUTANEOUS EVERY 8 HOURS
Status: DISCONTINUED | OUTPATIENT
Start: 2017-03-22 | End: 2017-03-23

## 2017-03-21 RX ORDER — DEXTROSE MONOHYDRATE 25 G/50ML
25 INJECTION, SOLUTION INTRAVENOUS
Status: DISCONTINUED | OUTPATIENT
Start: 2017-03-21 | End: 2017-03-26 | Stop reason: HOSPADM

## 2017-03-21 RX ORDER — POLYETHYLENE GLYCOL 3350 17 G/17G
1 POWDER, FOR SOLUTION ORAL
Status: DISCONTINUED | OUTPATIENT
Start: 2017-03-22 | End: 2017-03-26 | Stop reason: HOSPADM

## 2017-03-21 RX ORDER — METHYLPREDNISOLONE SODIUM SUCCINATE 125 MG/2ML
125 INJECTION, POWDER, LYOPHILIZED, FOR SOLUTION INTRAMUSCULAR; INTRAVENOUS EVERY 6 HOURS
Status: DISCONTINUED | OUTPATIENT
Start: 2017-03-21 | End: 2017-03-22

## 2017-03-21 RX ORDER — CITALOPRAM 20 MG/1
20 TABLET ORAL DAILY
Status: DISCONTINUED | OUTPATIENT
Start: 2017-03-21 | End: 2017-03-26 | Stop reason: HOSPADM

## 2017-03-21 RX ORDER — GABAPENTIN 300 MG/1
300 CAPSULE ORAL 3 TIMES DAILY
Status: DISCONTINUED | OUTPATIENT
Start: 2017-03-21 | End: 2017-03-26 | Stop reason: HOSPADM

## 2017-03-21 RX ORDER — SIMVASTATIN 20 MG
20 TABLET ORAL NIGHTLY
Status: DISCONTINUED | OUTPATIENT
Start: 2017-03-21 | End: 2017-03-26 | Stop reason: HOSPADM

## 2017-03-21 RX ORDER — ALPRAZOLAM 0.5 MG/1
0.5 TABLET ORAL 3 TIMES DAILY PRN
Status: DISCONTINUED | OUTPATIENT
Start: 2017-03-21 | End: 2017-03-26 | Stop reason: HOSPADM

## 2017-03-21 RX ORDER — RISPERIDONE 0.5 MG/1
0.5 TABLET ORAL
Status: DISCONTINUED | OUTPATIENT
Start: 2017-03-21 | End: 2017-03-26 | Stop reason: HOSPADM

## 2017-03-21 RX ORDER — AZITHROMYCIN 250 MG/1
500 TABLET, FILM COATED ORAL DAILY
Status: COMPLETED | OUTPATIENT
Start: 2017-03-21 | End: 2017-03-25

## 2017-03-21 RX ORDER — ACETAMINOPHEN 325 MG/1
650 TABLET ORAL EVERY 6 HOURS PRN
Status: DISCONTINUED | OUTPATIENT
Start: 2017-03-21 | End: 2017-03-21

## 2017-03-21 RX ORDER — CLONIDINE HYDROCHLORIDE 0.1 MG/1
0.1 TABLET ORAL 2 TIMES DAILY
Status: DISCONTINUED | OUTPATIENT
Start: 2017-03-21 | End: 2017-03-22

## 2017-03-21 RX ORDER — AMOXICILLIN 250 MG
2 CAPSULE ORAL 2 TIMES DAILY
Status: DISCONTINUED | OUTPATIENT
Start: 2017-03-22 | End: 2017-03-26 | Stop reason: HOSPADM

## 2017-03-21 RX ORDER — BUDESONIDE AND FORMOTEROL FUMARATE DIHYDRATE 160; 4.5 UG/1; UG/1
2 AEROSOL RESPIRATORY (INHALATION) 2 TIMES DAILY
Status: DISCONTINUED | OUTPATIENT
Start: 2017-03-21 | End: 2017-03-26 | Stop reason: HOSPADM

## 2017-03-21 RX ORDER — ALBUTEROL SULFATE 2.5 MG/3ML
2.5 SOLUTION RESPIRATORY (INHALATION)
Status: DISCONTINUED | OUTPATIENT
Start: 2017-03-21 | End: 2017-03-22

## 2017-03-21 RX ORDER — INSULIN GLARGINE 100 [IU]/ML
32 INJECTION, SOLUTION SUBCUTANEOUS EVERY EVENING
Status: DISCONTINUED | OUTPATIENT
Start: 2017-03-21 | End: 2017-03-26 | Stop reason: HOSPADM

## 2017-03-21 RX ORDER — POTASSIUM CHLORIDE 20 MEQ/1
20 TABLET, EXTENDED RELEASE ORAL 2 TIMES DAILY
Status: DISCONTINUED | OUTPATIENT
Start: 2017-03-21 | End: 2017-03-22

## 2017-03-21 RX ORDER — METOPROLOL SUCCINATE 25 MG/1
25 TABLET, EXTENDED RELEASE ORAL DAILY
Status: DISCONTINUED | OUTPATIENT
Start: 2017-03-21 | End: 2017-03-22

## 2017-03-21 RX ORDER — MORPHINE SULFATE 15 MG/1
15 TABLET ORAL 3 TIMES DAILY PRN
Status: DISCONTINUED | OUTPATIENT
Start: 2017-03-21 | End: 2017-03-26 | Stop reason: HOSPADM

## 2017-03-21 RX ORDER — OXYCODONE HYDROCHLORIDE 10 MG/1
10 TABLET ORAL 2 TIMES DAILY PRN
Status: DISCONTINUED | OUTPATIENT
Start: 2017-03-21 | End: 2017-03-26 | Stop reason: HOSPADM

## 2017-03-21 RX ORDER — TRAZODONE HYDROCHLORIDE 50 MG/1
50 TABLET ORAL
Status: DISCONTINUED | OUTPATIENT
Start: 2017-03-21 | End: 2017-03-26 | Stop reason: HOSPADM

## 2017-03-21 RX ORDER — ONDANSETRON 2 MG/ML
4 INJECTION INTRAMUSCULAR; INTRAVENOUS EVERY 4 HOURS PRN
Status: DISCONTINUED | OUTPATIENT
Start: 2017-03-21 | End: 2017-03-21

## 2017-03-21 RX ORDER — PREDNISONE 20 MG/1
60 TABLET ORAL ONCE
Status: COMPLETED | OUTPATIENT
Start: 2017-03-21 | End: 2017-03-21

## 2017-03-21 RX ORDER — METOCLOPRAMIDE 5 MG/1
5 TABLET ORAL
Status: DISCONTINUED | OUTPATIENT
Start: 2017-03-21 | End: 2017-03-26 | Stop reason: HOSPADM

## 2017-03-21 RX ORDER — FUROSEMIDE 20 MG/1
20 TABLET ORAL 2 TIMES DAILY
Status: DISCONTINUED | OUTPATIENT
Start: 2017-03-21 | End: 2017-03-22

## 2017-03-21 RX ADMIN — TRAZODONE HYDROCHLORIDE 50 MG: 50 TABLET ORAL at 20:06

## 2017-03-21 RX ADMIN — GABAPENTIN 300 MG: 300 CAPSULE ORAL at 15:14

## 2017-03-21 RX ADMIN — RISPERIDONE 0.5 MG: 0.5 TABLET, FILM COATED ORAL at 20:07

## 2017-03-21 RX ADMIN — BUDESONIDE AND FORMOTEROL FUMARATE DIHYDRATE 2 PUFF: 160; 4.5 AEROSOL RESPIRATORY (INHALATION) at 13:41

## 2017-03-21 RX ADMIN — ALBUTEROL SULFATE 2.5 MG: 2.5 SOLUTION RESPIRATORY (INHALATION) at 06:23

## 2017-03-21 RX ADMIN — METOCLOPRAMIDE HYDROCHLORIDE 5 MG: 5 TABLET ORAL at 18:15

## 2017-03-21 RX ADMIN — BUDESONIDE AND FORMOTEROL FUMARATE DIHYDRATE 2 PUFF: 160; 4.5 AEROSOL RESPIRATORY (INHALATION) at 21:39

## 2017-03-21 RX ADMIN — ALBUTEROL SULFATE 2.5 MG: 2.5 SOLUTION RESPIRATORY (INHALATION) at 23:01

## 2017-03-21 RX ADMIN — INSULIN LISPRO 12 UNITS: 100 INJECTION, SOLUTION INTRAVENOUS; SUBCUTANEOUS at 21:32

## 2017-03-21 RX ADMIN — METHYLPREDNISOLONE SODIUM SUCCINATE 125 MG: 125 INJECTION, POWDER, FOR SOLUTION INTRAMUSCULAR; INTRAVENOUS at 23:29

## 2017-03-21 RX ADMIN — METOPROLOL SUCCINATE 25 MG: 25 TABLET, EXTENDED RELEASE ORAL at 13:45

## 2017-03-21 RX ADMIN — LISINOPRIL 20 MG: 20 TABLET ORAL at 12:15

## 2017-03-21 RX ADMIN — METOCLOPRAMIDE HYDROCHLORIDE 5 MG: 5 TABLET ORAL at 12:22

## 2017-03-21 RX ADMIN — METHYLPREDNISOLONE SODIUM SUCCINATE 125 MG: 125 INJECTION, POWDER, FOR SOLUTION INTRAMUSCULAR; INTRAVENOUS at 12:15

## 2017-03-21 RX ADMIN — CLONIDINE HYDROCHLORIDE 0.1 MG: 0.1 TABLET ORAL at 12:15

## 2017-03-21 RX ADMIN — GABAPENTIN 300 MG: 300 CAPSULE ORAL at 12:15

## 2017-03-21 RX ADMIN — PREDNISONE 60 MG: 20 TABLET ORAL at 06:15

## 2017-03-21 RX ADMIN — SIMVASTATIN 20 MG: 20 TABLET, FILM COATED ORAL at 20:05

## 2017-03-21 RX ADMIN — METHYLPREDNISOLONE SODIUM SUCCINATE 125 MG: 125 INJECTION, POWDER, FOR SOLUTION INTRAMUSCULAR; INTRAVENOUS at 17:28

## 2017-03-21 RX ADMIN — CLONIDINE HYDROCHLORIDE 0.1 MG: 0.1 TABLET ORAL at 20:06

## 2017-03-21 RX ADMIN — MORPHINE SULFATE 15 MG: 15 TABLET ORAL at 21:29

## 2017-03-21 RX ADMIN — ASPIRIN 81 MG: 81 TABLET ORAL at 12:15

## 2017-03-21 RX ADMIN — INSULIN LISPRO 4 UNITS: 100 INJECTION, SOLUTION INTRAVENOUS; SUBCUTANEOUS at 18:15

## 2017-03-21 RX ADMIN — POTASSIUM CHLORIDE 20 MEQ: 1500 TABLET, EXTENDED RELEASE ORAL at 12:15

## 2017-03-21 RX ADMIN — MORPHINE SULFATE 15 MG: 15 TABLET ORAL at 12:15

## 2017-03-21 RX ADMIN — ALBUTEROL SULFATE 2.5 MG: 2.5 SOLUTION RESPIRATORY (INHALATION) at 09:39

## 2017-03-21 RX ADMIN — IPRATROPIUM BROMIDE 0.5 MG: 0.5 SOLUTION RESPIRATORY (INHALATION) at 06:23

## 2017-03-21 RX ADMIN — POTASSIUM CHLORIDE 20 MEQ: 1500 TABLET, EXTENDED RELEASE ORAL at 20:06

## 2017-03-21 RX ADMIN — GABAPENTIN 300 MG: 300 CAPSULE ORAL at 20:06

## 2017-03-21 RX ADMIN — ALBUTEROL SULFATE 2.5 MG: 2.5 SOLUTION RESPIRATORY (INHALATION) at 17:57

## 2017-03-21 RX ADMIN — INSULIN GLARGINE 32 UNITS: 100 INJECTION, SOLUTION SUBCUTANEOUS at 21:32

## 2017-03-21 RX ADMIN — FUROSEMIDE 20 MG: 20 TABLET ORAL at 20:06

## 2017-03-21 RX ADMIN — AZITHROMYCIN 500 MG: 250 TABLET, FILM COATED ORAL at 17:27

## 2017-03-21 RX ADMIN — CITALOPRAM HYDROBROMIDE 20 MG: 20 TABLET ORAL at 13:43

## 2017-03-21 RX ADMIN — ALPRAZOLAM 0.5 MG: 0.5 TABLET ORAL at 20:05

## 2017-03-21 RX ADMIN — FUROSEMIDE 20 MG: 20 TABLET ORAL at 12:15

## 2017-03-21 ASSESSMENT — PAIN SCALES - GENERAL
PAINLEVEL_OUTOF10: 9
PAINLEVEL_OUTOF10: 6
PAINLEVEL_OUTOF10: 9

## 2017-03-21 ASSESSMENT — LIFESTYLE VARIABLES
EVER_SMOKED: YES
ALCOHOL_USE: NO

## 2017-03-21 ASSESSMENT — COPD QUESTIONNAIRES
DO YOU EVER COUGH UP ANY MUCUS OR PHLEGM?: YES, EVERY DAY
COPD SCREENING SCORE: 8
HAVE YOU SMOKED AT LEAST 100 CIGARETTES IN YOUR ENTIRE LIFE: YES
DURING THE PAST 4 WEEKS HOW MUCH DID YOU FEEL SHORT OF BREATH: SOME OF THE TIME

## 2017-03-21 NOTE — DIETARY
"Nutrition Services:  Per 2002 Nutritional Risk Screening guidelines, patient with score = 1.  Categorized as mild level of impaired nutritional status, as evidenced by weight loss >5% in three months r/t \"I had the flu and lost 10 lbs\", which equates to a 5.3% loss over the last month; severe.  Otherwise, patient ate adequately at meals and did not see fluctuation in her weight PTA, aside from fluctuating POC BS.  Patient states PO intake of at least % on Diabetic/1800 kcal diet at this time.  RD to f/u with nutrition re-screen in 7 days per department policy, unless otherwise specified.              "

## 2017-03-21 NOTE — ED NOTES
Pt resting on cart showing no sign of acute distress. Resp even unlabored with occasional moist cough. Skin pink warm dry.

## 2017-03-21 NOTE — PROGRESS NOTES
Report received, pt care assumed. Pt assessment complete. Pt aaox4, no signs of distress noted at this time. POC discussed with pt and verbalizes no questions. Pt denies pain and denies any additional needs at this time. Bed in lowest position and locked. Pt educated on fall risk and verbalized understanding, call light and personal belongings  within reach, will continue to monitor.

## 2017-03-21 NOTE — IP AVS SNAPSHOT
3/26/2017          [facility] [address]    Dear Zenia:    [facility] wants to ensure your discharge home is safe and you or your loved ones have had all your questions answered regarding your care after you leave the hospital.    You may receive a telephone call within two days of your discharge.  This call is to make certain you understand your discharge instructions as well as ensure we provided you with the best care possible during your stay with us.     The call will only last approximately 3-5 minutes and will be done by a nurse.    Once again, we want to ensure your discharge home is safe and that you have a clear understanding of any next steps in your care.  If you have any questions or concerns, please do not hesitate to contact us, we are here for you.  Thank you for choosing [facility] for your healthcare needs.    Sincerely,    Jaime Erazo    [facility]

## 2017-03-21 NOTE — IP AVS SNAPSHOT
[employer] Access Code: [email or web address]  Expires: 4/25/2017  1:00 PM    Your email address is not on file at [facility].  Email Addresses are required for you to sign up for [employer], please contact [phone] to verify your personal information and to provide your email address prior to attempting to register for [employer].    Zenia Ordaz  [facility] [place] [address]    Squabblert  A secure, online tool to manage your health information     [facility]’s [employer]ist[employer]® is a secure, online tool that connects you to your personalized health information from the privacy of your home -- day or night - making it very easy for you to manage your healthcare. Once the activation process is completed, you can even access your medical information using the Work[employer] Assistants spenser, which is available for free in the Apple Spenser store or Google Play store.     To learn more about [employer]ist[employer], visit [email or web address]    There are two levels of access available (as shown below):   My Chart Features  [facility] Primary Care Doctor [facility]  Specialists [facility]  Urgent  Care Non-[facility] Primary Care Doctor   Email your healthcare team securely and privately 24/7 X X X    Manage appointments: schedule your next appointment; view details of past/upcoming appointments X      Request prescription refills. X      View recent personal medical records, including lab and immunizations X X X X   View health record, including health history, allergies, medications X X X X   Read reports about your outpatient visits, procedures, consult and ER notes X X X X   See your discharge summary, which is a recap of your hospital and/or ER visit that includes your diagnosis, lab results, and care plan X X  X     How to register for Squ[employer]bl[employer]:  Once your e-mail address has been verified, follow the following steps to sign up for [employer].     1. Go to  [email or web address]  2. Click on the Sign Up Now box, which takes you to the New Member Sign Up page. You will  need to provide the following information:  a. Enter your [employer] Access Code exactly as it appears at the top of this page. (You will not need to use this code after you’ve completed the sign-up process. If you do not sign up before the expiration date, you must request a new code.)   b. Enter your date of birth.   c. Enter your home email address.   d. Click Submit, and follow the next screen’s instructions.  3. Create a Henablet ID. This will be your [employer] login ID and cannot be changed, so think of one that is secure and easy to remember.  4. Create a [employer] password. You can change your password at any time.  5. Enter your Password Reset Question and Answer. This can be used at a later time if you forget your password.   6. Enter your e-mail address. This allows you to receive e-mail notifications when new information is available in [employer].  7. Click Sign Up. You can now view your health information.    For assistance activating your [employer] account, call [phone]
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
## 2017-03-21 NOTE — PROGRESS NOTES
"Pt wants to be discharged against medical advice because she wants to stay with her daughter. Pt aware that she cannot leave her room for an extended period of time.  She said, \"i want to be with my daughter now. i am totally fine and I get it, If i don't go back i loose this room. But i am going to sign back in,  in the ED\". This RN said to wait till we get a call back from the hospitalist on call, to inform that she is going AMA. Pt could not wait for the go signal, and said, \"Don't worry! I am gonna be back soon.  Pt signed AMA papers. All lines removed from pt and name band removed.   "

## 2017-03-21 NOTE — THERAPY
"Physical Therapy Evaluation completed.   Bed Mobility:  Supine to Sit: Contact Guard Assist (HOB elevated)  Transfers: Sit to Stand: Contact Guard Assist  Gait: Level Of Assist: Contact Guard Assist with Markus-Walker x 30 feet, fatigues quickly( increased SOB)    Plan of Care: Will benefit from Physical Therapy 3 times per week  Discharge Recommendations: Equipment: Will Continue to Assess for Equipment Needs. Post-acute therapy Discharge to a transitional care facility for continued skilled therapy services.    See \"Rehab Therapy-Acute\" Patient Summary Report for complete documentation.     "
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
## 2017-03-21 NOTE — PROGRESS NOTES
Hospital Medicine Progress Note, Adult, Complex               Author: Renato Sawyer Date & Time created: 3/20/2017  5:19 PM     Interval History:  67 y/o female with COPD exacerbation and R humeral fracture    R humeral fracture-called ortho O[place]ugar, non surgical. Keep in sling, pain is a bit better today.  2nd left foot phalanx fracture-continues to fall and cause problems  COPD-lungs feel about the same today, still coughing a lot. Says the inhalers last night helped a lot!    Review of Systems:  Review of Systems   Constitutional: Positive for malaise/fatigue (no appreciable improvement today). Negative for fever.   Eyes: Negative for blurred vision.   Respiratory: Positive for cough, sputum production, shortness of breath and wheezing.         Unchanged today except for mild improvement in cough   Cardiovascular: Negative for chest pain and leg swelling.   Gastrointestinal: Negative for abdominal pain.   Genitourinary: Negative for dysuria.   Musculoskeletal: Positive for myalgias, joint pain and falls. Negative for neck pain.        A little better controlled today   Skin: Negative for itching.   Neurological: Positive for weakness. Negative for dizziness, loss of consciousness and headaches.   Psychiatric/Behavioral: Negative for depression.   All other systems reviewed and are negative.      Physical Exam:  Physical Exam   Constitutional: She is oriented to person, place, and time. She appears well-developed and well-nourished. No distress.   Appears much older than stated age   HENT:   Head: Normocephalic and atraumatic.   Mouth/Throat: No oropharyngeal exudate.   Eyes: Pupils are equal, round, and reactive to light. Right eye exhibits no discharge. Left eye exhibits no discharge.   Neck: Normal range of motion. Neck supple.   Cardiovascular: Normal rate, regular rhythm, normal heart sounds and intact distal pulses.    No murmur heard.  Pulmonary/Chest: Effort normal. No stridor. No respiratory distress.  She has wheezes. She has rales.   Diffuse and unchanged   Abdominal: Soft. Bowel sounds are normal. There is no tenderness.   Musculoskeletal: Normal range of motion. She exhibits tenderness. She exhibits no edema.   RUE in sling  Left 2nd toe pain   Neurological: She is alert and oriented to person, place, and time. No cranial nerve deficit.   Skin: Skin is warm and dry. No rash noted.   Psychiatric: She has a normal mood and affect.   Nursing note and vitals reviewed.      Labs:        Invalid input(s): [id number]  Recent Labs      17   TROPONINI  <0.01   BNPBTYPENAT  166*     Recent Labs      17   0553   SODIUM  132*  134*  136   POTASSIUM  4.5  4.5  4.4   CHLORIDE  98  100  99   CO2  28  27  27   BUN  46*  48*  39*   CREATININE  0.87  0.88  0.72   CALCIUM  9.2  9.2  8.9     Recent Labs      17   0553   ALTSGPT   --   15   --    ASTSGOT   --   10*   --    ALKPHOSPHAT   --   99   --    TBILIRUBIN   --   0.2   --    LIPASE   --   25   --    GLUCOSE  326*  222*  248*     Recent Labs      17   0553   RBC  5.15  5.23   HEMOGLOBIN  13.9  14.1   HEMATOCRIT  42.6  44.4   PLATELETCT  254  208   PROTHROMBTM  12.7   --    APTT  23.0*   --    INR  0.93   --      Recent Labs      17   0553   WBC  13.9*  13.5*   NEUTSPOLYS  88.10*   --    LYMPHOCYTES  7.10*   --    MONOCYTES  3.80   --    EOSINOPHILS  0.00   --    BASOPHILS  0.10   --    ASTSGOT  10*   --    ALTSGPT  15   --    ALKPHOSPHAT  99   --    TBILIRUBIN  0.2   --            Hemodynamics:  Temp (24hrs), Av.8 °C (98.2 °F), Min:36.6 °C (97.9 °F), Max:36.8 °C (98.3 °F)  Temperature: 36.6 °C (97.9 °F)  Pulse  Av.1  Min: 50  Max: 70   Blood Pressure: 129/66 mmHg     Respiratory:    Respiration: 18, Pulse Oximetry: 97 %, O2 Daily Delivery Respiratory : Silicone Nasal Cannula     Given By:: Mouthpiece, Work Of Breathing /  Effort: Mild  RUL Breath Sounds: Diminished, RML Breath Sounds: Diminished, RLL Breath Sounds: Diminished, MARC Breath Sounds: Diminished, LLL Breath Sounds: Diminished  Fluids:    Intake/Output Summary (Last 24 hours) at 03/20/17 1719  Last data filed at 03/20/17 0800   Gross per 24 hour   Intake    240 ml   Output      0 ml   Net    240 ml        GI/Nutrition:  Orders Placed This Encounter   Procedures   • Diet Order     Standing Status: Standing      Number of Occurrences: 1      Standing Expiration Date:      Order Specific Question:  Diet:     Answer:  Diabetic [3]     Order Specific Question:  Calorie modifications:     Answer:  1800 kcals [4]     Medical Decision Making, by Problem:  Active Hospital Problems    Diagnosis   • *COPD exacerbation (CMS-HCC) [J44.1]-advanced  -continue IV steroids, BD's, continue Po levaquin given severity  -try to wean o2 PRN  -will take a tincture of time   • Diabetes type 2, controlled (CMS-HCC) [E11.9]-sugars are high given concurrent steroids  -continue ISS, adjust PRN, increase lantus to 34 units BID   • Chronic narcotic dependence [F11.20]-judicious use of pain meds, both PO and IV   • Hepatitis C [B19.20]-treatment naive   • CAD (coronary artery disease) [I25.10]-no current cp, continue outpatient meds   • Tobacco abuse [Z72.0]-  -counseling given     • Closed nondisplaced fracture of proximal phalanx of lesser toe of left foot with delayed healing [S92.515G]-carlos or ortho following, likely non surgical, appropriate pain control   • Acute on chronic respiratory failure with hypoxia (CMS-HCC) [J96.21]-as above   • Humeral fracture [S42.309A]-non operative per ortho, pain control, PT/OT, keep RUE in sling   • Fibromyalgia [M79.7]-outpatient pain control   • Anxiety [F41.9]-low dose benzo's   • Essential hypertension [I10]-well controllled, continue current meds     Dispo will be difficult given social situation.    Labs reviewed and Medications reviewed  Fagan catheter: No  Fagan      DVT Prophylaxis: Heparin        Assessed for rehab: Patient was assess for and/or received rehabilitation services during this hospitalization

## 2017-03-21 NOTE — ED NOTES
Med rec complete per patient  Allergies reviewed    Patient stated she was here for a week and was just released  Patient stated she was on Keflex twice daily for 10 days, completed last week for tonsillitis

## 2017-03-21 NOTE — RESPIRATORY CARE
COPD EDUCATION by COPD CLINICAL EDUCATOR  3/21/2017 at 12:00 PM by oSphy Castanon     Patient reviewed by COPD education team. Patient does not qualify for COPD program.

## 2017-03-21 NOTE — ED PROVIDER NOTES
ED Provider Note    CHIEF COMPLAINT  Chief Complaint   Patient presents with   • COPD       HPI  Zenia Ordaz is a 66 y.o. female who presents with shortness of breath, for the last 2 weeks, PND or orthopnea and dyspnea on exertion, wheezing all day. Long history of COPD with similar symptoms. Still smokes. No chest pain no nausea no vomiting no diarrhea.  Evidently she was admitted here, 3 days ago following dictation   IMPRESSION AND PLAN:  1.  Recurrent chronic obstructive pulmonary disease exacerbation.  The patient   will be admitted observation status to medical floor.  Will be started on IV    Solu-Medrol, DuoNeb, bronchodilator therapy.  Continue with Symbicort.     Respiratory protocols.  2.  Recent right humerus fracture.  Dr. Surinder Ozuna had been consulted on    previous admission.  Unfortunately, she had gone against medical advice.  We    will reconsult following admission, stabilize in sling, provide analgesics for   pain control.  3.  Chronic respiratory failure.  We will maintain on O2 support with planned    treatment of chronic obstructive pulmonary disease as above, O2 protocols.  4.  Chest pain, appears chronic, likely musculoskeletal, fibromyalgia flare    up.  She is reproducible, exquisitely tender on exam.  We will provide    supportive treatment.  5.  History of coronary artery disease per records.  Continue with aspirin,    metoprolol, statin therapy.  Her pain is more consistent with a    musculoskeletal etiology, as above.  6.  History of anxiety and depression.  Resume Celexa.  7.  History of diabetes, uncontrolled.  Resume Lantus, monitor serial    Accu-Cheks, provide insulin sliding scale coverage.  8.  Bipolar disorder.  We will continue with outpatient medications.  9.  Homeless.  We will have social service to assist with discharge planning.        ____________________________________        DD:  03/18/2017 23:52:27      Evidently her relative hung himself recently so she  left AMA with the  yesterday, returns today continues to have shortness of breath.  REVIEW OF SYSTEMS  See HPI for further details. History of fibromyalgia P hepatitis C hepatitis A diabetes coronary artery disease stroke past medical myocardial infarction drug abuse hypertension pneumonia Denies other G.I., G.U.. endrocine, cardiovascular, respriatory or neurological problems. All other systems are negative.     PAST MEDICAL HISTORY  Past Medical History   Diagnosis Date   • COPD    • Fibromyalgia    • Hepatitis B    • Hepatitis C    • Hepatitis A    • Diabetes    • CAD (coronary artery disease)      mi x 2   • Stroke (CMS-HCC)    • Backpain    • Myocardial infarct (CMS-HCC) ,      2 per patient   • Cancer (CMS-HCC) Cervical ~   • Congestive heart failure (CMS-HCC)    • MRSA infection    • Drug abuse    • Hypertension    • Fall    • Pneumonia    • Seizure (CMS-HCC)        FAMILY HISTORY  Family History   Problem Relation Age of Onset   • Cancer Mother    • Cancer Sister    • Cancer Maternal Aunt        SOCIAL HISTORY she still is a smoker I have urged her to stop  Social History     Social History   • Marital Status:      Spouse Name: N/A   • Number of Children: N/A   • Years of Education: N/A     Social History Main Topics   • Smoking status: Current Every Day Smoker -- 0.10 packs/day for 53 years     Types: Cigarettes   • Smokeless tobacco: Never Used      Comment: smokes 1/2 ppd   • Alcohol Use: No   • Drug Use: No      Comment: Hx of heroin meth   • Sexual Activity: Not on file     Other Topics Concern   • Not on file     Social History Narrative       SURGICAL HISTORY  Past Surgical History   Procedure Laterality Date   • Other cardiac surgery       doesn't remember if she had stent placed during a heart cath   • Incision and drainage orthopedic  2013     Performed by Surinder Ozuna M.D. at SURGERY [facility]       CURRENT MEDICATIONS  Home Medications     **Home  "medications have not yet been reviewed for this encounter**          ALLERGIES  Allergies   Allergen Reactions   • Mushroom Extract Complex Rash and Unspecified     Pt gets a rash and breaks out in a sweat when eats mushrooms   • Tylenol Rash     Pt states \"I get a body rash\".   • Zofran Rash     Pt states \"I get a body rash\".       PHYSICAL EXAM  VITAL SIGNS: /56 mmHg  Pulse 61  Temp(Src) 37.2 °C (98.9 °F)  Resp 19  Ht 1.524 m (5')  Wt 80.74 kg (178 lb)  BMI 34.76 kg/m2  SpO2 94%  Constitutional: Well developed, Well nourished, moderately obese No acute distress, Non-toxic appearance.   HENT: Normocephalic, Atraumatic, Bilateral external ears normal, Oropharynx moist, No oral exudates, Nose normal.   Eyes: PERRL, EOMI, Conjunctiva normal, No discharge.   Neck: Normal range of motion, No tenderness, Supple, No stridor.   Lymphatic: No lymphadenopathy noted.   Cardiovascular: Normal heart rate, Normal rhythm, No murmurs, No rubs, No gallops.   Thorax & Lungs: Wheezes, rales, all lung fields chest wall motion slightly labored No chest tenderness.   Abdomen:  No tenderness, no guarding no rigidity and the abdomen is soft.  No masses, No pulsatile masses.  Skin: Warm, Dry, No erythema, No rash.   Back: No tenderness, No CVA tenderness.   Extremities: Intact distal pulses, No edema, No tenderness, No cyanosis, No clubbing.   Musculoskeletal: Good range of motion in all major joints. No tenderness to palpation or major deformities noted.   Neurologic: Alert & oriented x 3, Normal motor function, Normal sensory function, No focal deficits noted.   Psychiatric: Affect normal, Judgment normal, Mood normal.   EKG Interpretation    Interpreted by me    Rhythm: normal sinus   Rate: normal  Axis: normal  Ectopy: none  Conduction: normal  ST Segments: no acute change  T Waves: no acute change  Q Waves: none    Clinical Impression: I do no EKG to compare to, no evidence of ischemia on this " EKG.      RADIOLOGY/PROCEDURES  DX-CHEST-PORTABLE (1 VIEW)   Final Result         1.  Hazy and linear densities in the bilateral lung bases, greater on the left, could represent atelectasis, early infiltrates not excluded.   2.  Cardiomegaly            COURSE & MEDICAL DECISION MAKING  Pertinent Labs & Imaging studies reviewed. (See chart for details) troponin normal electrolytes normal, renal function unremarkable there were function tests normal white count elevated 15.3 hematocrit normal clinic and is normal, there is no shift    Has a long history of asthma COPD, is given albuterol, prednisone,. Admitted here 3 days ago, signed out against medical advice to go to a . He returns this morning still short of breath. White count still markedly elevated. I have talked with the hospitalist about admission.  FINAL IMPRESSION  1.   1. Severe persistent asthma with acute exacerbation    2. Chronic bronchitis, unspecified chronic bronchitis type (CMS-HCC)        2.   3.     Disposition  I have talked with the hospitalist about admission.  Electronically signed by: Praful Minaya, 3/21/2017 6:02 AM

## 2017-03-21 NOTE — ED NOTES
"Chief Complaint   Patient presents with   • COPD     Pt left AMA from hospital yesterday. COPD exacerbation, CHF, and states \"some broken bones.\" Pt wearing hospital gown.       /56 mmHg  Pulse 70  Temp(Src) 37.2 °C (98.9 °F)  Resp 18  Ht 1.524 m (5')  Wt 80.74 kg (178 lb)  BMI 34.76 kg/m2  SpO2 91%      Pt Informed regarding triage process and verbalized understanding to inform triage tech or RN for any changes in condition.  Placed in lobby.    "

## 2017-03-21 NOTE — IP AVS SNAPSHOT
" <p align=\"LEFT\"><IMG SRC=\"//[email or web address]\" alt=\"Image\" WIDTH=\"50%\" HEIGHT=\"200\" BORDER=\"\"></p>                   Name:Zenia Ordaz  Medical Record Number:[id number]  CSN: [id number]    YOB: 1950   Age: 66 y.o.  Sex: female  HT:1.524 m (5') WT: 89.4 kg (197 lb 1.5 oz)          Admit Date: 3/21/2017     Discharge Date:   Today's Date: 3/26/2017  Attending Doctor:  Craig Iverson M.D.                  Allergies:  Mushroom extract complex; Tylenol; and Zofran             Medication List      Take these Medications        Instructions    albuterol 108 (90 BASE) MCG/ACT Aers inhalation aerosol    Inhale 2 Puffs by mouth every four hours as needed for Shortness of Breath.   Dose:  2 Puff       alprazolam 0.5 MG Tabs   What changed:  reasons to take this   Commonly known as:  XANAX    Take 1 Tab by mouth 3 times a day as needed for Sleep or Anxiety.   Dose:  0.5 mg       aspirin EC 81 MG Tbec   Commonly known as:  ECOTRIN    Take 1 Tab by mouth every day.   Dose:  81 mg       budesonide-formoterol 160-4.5 MCG/ACT Aero   What changed:  how much to take   Commonly known as:  SYMBICORT    Inhale 1 Puff by mouth 2 Times a Day.   Dose:  1 Puff       citalopram 20 MG Tabs   Commonly known as:  CELEXA    Take 1 Tab by mouth every day.   Dose:  20 mg       gabapentin 300 MG Caps   Commonly known as:  NEURONTIN    Take 1 Cap by mouth 3 times a day.   Dose:  300 mg       glucose blood strip    Doctor's comments:  E11.9   1 Strip by Other route as needed.   Dose:  1 Each       insulin glargine 100 UNIT/ML Soln   What changed:  medication strength   Commonly known as:  LANTUS    Inject 32 Units as instructed every evening.   Dose:  32 Units       insulin lispro 100 UNIT/ML Soln   Commonly known as:  HUMALOG    Inject 2-10 Units as instructed 3 times a day before meals. Sliding scale 151-200 2 units 201-250 4 units 251-300 6 units 301-350 8 units 351-400 10 units <60 to >400 call MD   Dose:  2-10 " "Units       INSULIN SYRINGE .5CC/31GX5/16\" 31G X 5/16\" 0.5 ML Misc    Doctor's comments:  E11.9   1 Each by Does not apply route 4 Times a Day,Before Meals and at Bedtime.   Dose:  1 Each       metoclopramide 5 MG tablet   Commonly known as:  REGLAN    Take 1 Tab by mouth 3 times a day before meals. Indications: Nausea and Vomiting   Dose:  5 mg       morphine 15 MG tablet   What changed:    - when to take this  - reasons to take this   Commonly known as:  MS IR    Take 1 Tab by mouth 3 times a day.   Dose:  15 mg       nitroglycerin 0.4 MG Subl   Commonly known as:  NITROSTAT    Place 1 Tab under tongue as needed for Chest Pain.   Dose:  0.4 mg       oxycodone immediate release 10 MG immediate release tablet   Commonly known as:  ROXICODONE    Take 1 Tab by mouth 2 times a day as needed for Moderate Pain.   Dose:  10 mg       risperidone 0.5 MG Tabs   Commonly known as:  RISPERDAL    Take 1 Tab by mouth every bedtime.   Dose:  0.5 mg       simvastatin 20 MG Tabs   What changed:  when to take this   Commonly known as:  ZOCOR    Take 1 Tab by mouth every evening.   Dose:  20 mg       trazodone 50 MG Tabs   Commonly known as:  DESYREL    Take 1 Tab by mouth every day.   Dose:  50 mg         "

## 2017-03-21 NOTE — IP AVS SNAPSHOT
Home Care Instructions                                                                                                                  Name:Zenia Ordaz  Medical Record Number:[id number]  CSN: [id number]    YOB: 1950   Age: 66 y.o.  Sex: female  HT:1.524 m (5') WT: 89.4 kg (197 lb 1.5 oz)          Admit Date: 3/21/2017     Discharge Date:   Today's Date: 3/26/2017  Attending Doctor:  Craig Iverson M.D.                  Allergies:  Mushroom extract complex; Tylenol; and Zofran            Discharge Instructions       Discharge Instructions    Discharged to home by taxi with self. Discharged via walking, hospital escort: Refused.  Special equipment needed: Not Applicable    Be sure to schedule a follow-up appointment with your primary care doctor or any specialists as instructed.     Discharge Plan:   Influenza Vaccine Indication: Not indicated: Previously immunized this influenza season and > 8 years of age    I understand that a diet low in cholesterol, fat, and sodium is recommended for good health. Unless I have been given specific instructions below for another diet, I accept this instruction as my diet prescription.   Other diet: diabetic    Special Instructions: None    · Is patient discharged on Warfarin / Coumadin?   No     · Is patient Post Blood Transfusion?  No    Depression / Suicide Risk    As you are discharged from this [facility] facility, it is important to learn how to keep safe from harming yourself.    Recognize the warning signs:  · Abrupt changes in personality, positive or negative- including increase in energy   · Giving away possessions  · Change in eating patterns- significant weight changes-  positive or negative  · Change in sleeping patterns- unable to sleep or sleeping all the time   · Unwillingness or inability to communicate  · Depression  · Unusual sadness, discouragement and loneliness  · Talk of wanting to die  · Neglect of personal  appearance   · Rebelliousness- reckless behavior  · Withdrawal from people/activities they love  · Confusion- inability to concentrate     If you or a loved one observes any of these behaviors or has concerns about self-harm, here's what you can do:  · Talk about it- your feelings and reasons for harming yourself  · Remove any means that you might use to hurt yourself (examples: pills, rope, extension cords, firearm)  · Get professional help from the community (Mental Health, Substance Abuse, psychological counseling)  · Do not be alone:Call your Safe Contact- someone whom you trust who will be there for you.  · Call your local CRISIS HOTLINE 933-0885 or 513-607-0771  · Call your local Children's Mobile Crisis Response Team [place] (082) 741-2143 or www."[employer]  · Call the toll free National Suicide Prevention Hotlines   · National Suicide Prevention Lifeline 135-152-QWFH (8848)  · COH Line Network 800-SUICIDE (336-1322)    Chronic Obstructive Pulmonary Disease  Chronic obstructive pulmonary disease (COPD) is a common lung problem. In COPD, the flow of air from the lungs is limited. The way your lungs work will probably never return to normal, but there are things you can do to improve your lungs and make yourself feel better. Your doctor may treat your condition with:  · Medicines.  · Oxygen.  · Lung surgery.  · Changes to your diet.  · Rehabilitation. This may involve a team of specialists.  HOME CARE  · Take all medicines as told by your doctor.  · Avoid medicines or cough syrups that dry up your airway (such as antihistamines) and do not allow you to get rid of thick spit. You do not need to avoid them if told differently by your doctor.  · If you smoke, stop. Smoking makes the problem worse.  · Avoid being around things that make your breathing worse (like smoke, chemicals, and fumes).  · Use oxygen therapy and therapy to help improve your lungs (pulmonary rehabilitation) if told by  your doctor. If you need home oxygen therapy, ask your doctor if you should buy a tool to measure your oxygen level (oximeter).  · Avoid people who have a sickness you can catch (contagious).  · Avoid going outside when it is very hot, cold, or humid.  · Eat healthy foods. Eat smaller meals more often. Rest before meals.  · Stay active, but remember to also rest.  · Make sure to get all the shots (vaccines) your doctor recommends. Ask your doctor if you need a pneumonia shot.  · Learn and use tips on how to relax.  · Learn and use tips on how to control your breathing as told by your doctor. Try:  · Breathing in (inhaling) through your nose for 1 second. Then, pucker your lips and breath out (exhale) through your lips for 2 seconds.  · Putting one hand on your belly (abdomen). Breathe in slowly through your nose for 1 second. Your hand on your belly should move out. Pucker your lips and breathe out slowly through your lips. Your hand on your belly should move in as you breathe out.  · Learn and use controlled coughing to clear thick spit from your lungs. The steps are:  · Lean your head a little forward.  · Breathe in deeply.  · Try to hold your breath for 3 seconds.  · Keep your mouth slightly open while coughing 2 times.  · Spit any thick spit out into a tissue.  · Rest and do the steps again 1 or 2 times as needed.  GET HELP IF:  · You cough up more thick spit than usual.  · There is a change in the color or thickness of the spit.  · It is harder to breathe than usual.  · Your breathing is faster than usual.  GET HELP RIGHT AWAY IF:  · You have shortness of breath while resting.  · You have shortness of breath that stops you from:  · Being able to talk.  · Doing normal activities.  · You chest hurts for longer than 5 minutes.  · Your skin color is more blue than usual.  · Your pulse oximeter shows that you have low oxygen for longer than 5 minutes.  MAKE SURE YOU:  · Understand these instructions.  · Will watch  your condition.  · Will get help right away if you are not doing well or get worse.     This information is not intended to replace advice given to you by your health care provider. Make sure you discuss any questions you have with your health care provider.     Document Released: 06/05/2009 Document Revised: 01/08/2016 Document Reviewed: 08/14/2014  [employer] Interactive Patient Education ©2016 [employer]    Pneumonia, Adult  Pneumonia is an infection of the lungs.   CAUSES  Pneumonia may be caused by bacteria or a virus. Usually, the infection is caused by breathing in droplets from an infected person's cough or sneeze.   SYMPTOMS   Symptoms of pneumonia include:  · Cough.  · Fever.  · Chest pain.  · Rapid breathing.  · Shortness of breath.  · Shaking chills.  · Mucus production.  DIAGNOSIS   If you have the common symptoms of pneumonia, often your health care provider will confirm the diagnosis with a chest X-ray. The X-ray will show an abnormality in the lung if you have pneumonia. Other tests may be done on your blood, urine, or mucus (sputum) to find the specific cause of your pneumonia. A blood gas test or pulse oximetry test may be needed to check how well your lungs are working.  TREATMENT   Your treatment will depend on whether your pneumonia is caused by bacteria or a virus.   · Bacterial pneumonia is treated with antibiotic medicine.  · Pneumonia that is caused by the influenza virus may be treated with an antiviral medicine.  · Pneumonia that is caused by a virus other than influenza will not respond to antibiotic medicine. This type of pneumonia will have to run its course.   HOME CARE INSTRUCTIONS   · Cough suppressants may be used if you are losing too much rest from coughing at night. However, you should try to avoid taking cough suppresants. This is because coughing helps to remove mucus from your lungs.  · Sleep in a semi-upright position at night. Try sleeping in a reclining chair, or place a  few pillows under your head.  · Try using a cold steam vaporizer or humidifier in your home or bedroom. This may help loosen your mucus.  · If you were prescribed an antibiotic medicine, finish all of it even if you start to feel better.  · If you were prescribed an expectorant, take it as directed by your health care provider. This medicine loosens the mucus so you can cough it up.  · Take medicines only as directed by your health care provider.  · Do not smoke. If you are a smoker and continue to smoke, your cough may last several weeks after your pneumonia has cleared.  · Get rest when you feel tired, or as needed.  PREVENTION  A pneumococcal shot (vaccine) is available to prevent a common bacterial cause of pneumonia. This is usually suggested for:  · People over 65 years old.  · People on chemotherapy.  · People with chronic lung problems, such as bronchitis or emphysema.  · People with immune system problems.  If you are over 65 years old or have a high risk condition, you may receive the pneumococcal vaccine if you have not received it before. In some countries, a routine influenza vaccine is also recommended. This vaccine can help prevent some cases of pneumonia. You may be offered the influenza vaccine as part of your care.  If you are a smoker, it is time to quit in order to prevent pneumonia in the future. You may receive instructions on how to stop smoking. Your health care provider can provide medicines and counseling to help you quit.  SEEK MEDICAL CARE IF:  · You have a fever.  · You cannot control your cough with suppressants at night, and you keep losing sleep.  SEEK IMMEDIATE MEDICAL CARE IF:   · You have worsening shortness of breath.  · You have increased chest pain.  · Your sickness becomes worse, especially if you are an older adult or have a weakened immune system.  · You cough up blood.  · You have pain that is getting worse or is not controlled with medicines.  · Your symptoms are getting  "worse rather than better.     This information is not intended to replace advice given to you by your health care provider. Make sure you discuss any questions you have with your health care provider.     Document Released: 12/18/2006 Document Revised: 01/08/2016 Document Reviewed: 04/13/2016  [employer] Interactive Patient Education ©2016 [employer]         Discharge Medication Instructions:    Below are the medications your physician expects you to take upon discharge:    Review all your home medications and newly ordered medications with your doctor and/or pharmacist. Follow medication instructions as directed by your doctor and/or pharmacist.    Please keep your medication list with you and share with your physician.               Medication List      START taking these medications        Instructions    glucose blood strip    Doctor's comments:  E11.9   1 Strip by Other route as needed.   Dose:  1 Each       INSULIN SYRINGE .5CC/31GX5/16\" 31G X 5/16\" 0.5 ML Misc    Doctor's comments:  E11.9   1 Each by Does not apply route 4 Times a Day,Before Meals and at Bedtime.   Dose:  1 Each         CHANGE how you take these medications        Instructions    budesonide-formoterol 160-4.5 MCG/ACT Aero   What changed:  how much to take   Last time this was given:  2 Puffs on 3/26/2017  8:07 AM   Commonly known as:  SYMBICORT    Inhale 1 Puff by mouth 2 Times a Day.   Dose:  1 Puff       insulin glargine 100 UNIT/ML Soln   What changed:  medication strength   Last time this was given:  32 Units on 3/25/2017  8:28 PM   Commonly known as:  LANTUS    Inject 32 Units as instructed every evening.   Dose:  32 Units       morphine 15 MG tablet   What changed:    - when to take this  - reasons to take this   Last time this was given:  15 mg on 3/26/2017 11:23 AM   Commonly known as:  MS IR    Take 1 Tab by mouth 3 times a day.   Dose:  15 mg       simvastatin 20 MG Tabs   What changed:  when to take this   Last time this was " given:  20 mg on 3/25/2017  8:37 PM   Commonly known as:  ZOCOR    Take 1 Tab by mouth every evening.   Dose:  20 mg         CONTINUE taking these medications        Instructions    albuterol 108 (90 BASE) MCG/ACT Aers inhalation aerosol    Inhale 2 Puffs by mouth every four hours as needed for Shortness of Breath.   Dose:  2 Puff       alprazolam 0.5 MG Tabs   Last time this was given:  0.5 mg on 3/21/2017  8:05 PM   Commonly known as:  XANAX    Take 1 Tab by mouth 3 times a day as needed for Sleep or Anxiety.   Dose:  0.5 mg       aspirin EC 81 MG Tbec   Last time this was given:  81 mg on 3/26/2017  8:59 AM   Commonly known as:  ECOTRIN    Take 1 Tab by mouth every day.   Dose:  81 mg       citalopram 20 MG Tabs   Last time this was given:  20 mg on 3/26/2017  8:59 AM   Commonly known as:  CELEXA    Take 1 Tab by mouth every day.   Dose:  20 mg       gabapentin 300 MG Caps   Last time this was given:  300 mg on 3/26/2017  8:59 AM   Commonly known as:  NEURONTIN    Take 1 Cap by mouth 3 times a day.   Dose:  300 mg       insulin lispro 100 UNIT/ML Soln   Last time this was given:  3 Units on 3/25/2017  8:27 PM   Commonly known as:  HUMALOG    Inject 2-10 Units as instructed 3 times a day before meals. Sliding scale 151-200 2 units 201-250 4 units 251-300 6 units 301-350 8 units 351-400 10 units <60 to >400 call MD   Dose:  2-10 Units       metoclopramide 5 MG tablet   Last time this was given:  5 mg on 3/26/2017 11:21 AM   Commonly known as:  REGLAN    Take 1 Tab by mouth 3 times a day before meals. Indications: Nausea and Vomiting   Dose:  5 mg       nitroglycerin 0.4 MG Subl   Commonly known as:  NITROSTAT    Place 1 Tab under tongue as needed for Chest Pain.   Dose:  0.4 mg       oxycodone immediate release 10 MG immediate release tablet   Last time this was given:  10 mg on 3/26/2017  5:27 AM   Commonly known as:  ROXICODONE    Take 1 Tab by mouth 2 times a day as needed for Moderate Pain.   Dose:  10 mg        risperidone 0.5 MG Tabs   Last time this was given:  0.5 mg on 3/25/2017  8:37 PM   Commonly known as:  RISPERDAL    Take 1 Tab by mouth every bedtime.   Dose:  0.5 mg       trazodone 50 MG Tabs   Last time this was given:  50 mg on 3/25/2017  8:37 PM   Commonly known as:  DESYREL    Take 1 Tab by mouth every day.   Dose:  50 mg         STOP taking these medications     clonidine 0.1 MG Tabs   Commonly known as:  CATAPRES       furosemide 20 MG Tabs   Commonly known as:  LASIX       lisinopril 20 MG Tabs   Commonly known as:  PRINIVIL       metoprolol SR 25 MG Tb24   Commonly known as:  TOPROL XL       potassium chloride SA 20 MEQ Tbcr   Commonly known as:  Kdur               Instructions           Diet / Nutrition:    Follow any diet instructions given to you by your doctor or the dietician, including how much salt (sodium) you are allowed each day.    If you are overweight, talk to your doctor about a weight reduction plan.    Activity:    Remain physically active following your doctor's instructions about exercise and activity.    Rest often.     Any time you become even a little tired or short of breath, SIT DOWN and rest.    Worsening Symptoms:    Report any of the following signs and symptoms to the doctor's office immediately:    *Pain of jaw, arm, or neck  *Chest pain not relieved by medication                               *Dizziness or loss of consciousness  *Difficulty breathing even when at rest   *More tired than usual                                       *Bleeding drainage or swelling of surgical site  *Swelling of feet, ankles, legs or stomach                 *Fever (>100ºF)  *Pink or blood tinged sputum  *Weight gain (3lbs/day or 5lbs /week)           *Shock from internal defibrillator (if applicable)  *Palpitations or irregular heartbeats                *Cool and/or numb extremities    Stroke Awareness    Common Risk Factors for Stroke include:    Age  Atrial Fibrillation  Carotid Artery  Stenosis  Diabetes Mellitus  Excessive alcohol consumption  High blood pressure  Overweight   Physical inactivity  Smoking    Warning signs and symptoms of a stroke include:    *Sudden numbness or weakness of the face, arm or leg (especially on one side of the body).  *Sudden confusion, trouble speaking or understanding.  *Sudden trouble seeing in one or both eyes.  *Sudden trouble walking, dizziness, loss of balance or coordination.Sudden severe headache with no known cause.    It is very important to get treatment quickly when a stroke occurs. If you experience any of the above warning signs, call 911 immediately.                   Disclaimer         Quit Smoking / Tobacco Use:    I understand the use of any tobacco products increases my chance of suffering from future heart disease or stroke and could cause other illnesses which may shorten my life. Quitting the use of tobacco products is the single most important thing I can do to improve my health. For further information on smoking / tobacco cessation call a Toll Free Quit Line at 1-676.192.5939 (*[employer] [place]) or 1-761.335.4497 (American Lung Association) or you can access the web based program at www.lungAmedica.org.    [place] Tobacco Users Help Line:  (505) 175-6293       Toll Free: 1-715.240.3978  Quit Tobacco Program [facility] (137)063-0054    Crisis Hotline:    [place] Crisis Hotline:  8-989-YUFRIKO or 1-200.852.7934    [place] Crisis Hotline:    1-931.240.7577 or 755-430-5161    Discharge Survey:   Thank you for choosing [facility]. We hope we did everything we could to make your hospital stay a pleasant one. You may be receiving a phone survey and we would appreciate your time and participation in answering the questions. Your input is very valuable to us in our efforts to improve our service to our patients and their families.        My signature on this form indicates that:    1. I have reviewed and understand the  above information.  2. My questions regarding this information have been answered to my satisfaction.  3. I have formulated a plan with my discharge nurse to obtain my prescribed medications for home.                  Disclaimer         __________________________________                     __________       ________                       Patient Signature                                                 Date                    Time

## 2017-03-22 PROBLEM — S42.209A CLOSED FRACTURE OF PROXIMAL END OF HUMERUS: Chronic | Status: ACTIVE | Noted: 2017-03-18

## 2017-03-22 PROBLEM — E11.40 DIABETIC NEUROPATHY (HCC): Chronic | Status: ACTIVE | Noted: 2017-03-22

## 2017-03-22 LAB
ANION GAP SERPL CALC-SCNC: 8 MMOL/L (ref 0–11.9)
BASOPHILS # BLD AUTO: 0.2 % (ref 0–1.8)
BASOPHILS # BLD: 0.03 K/UL (ref 0–0.12)
BUN SERPL-MCNC: 42 MG/DL (ref 8–22)
CALCIUM SERPL-MCNC: 8.5 MG/DL (ref 8.5–10.5)
CHLORIDE SERPL-SCNC: 98 MMOL/L (ref 96–112)
CO2 SERPL-SCNC: 26 MMOL/L (ref 20–33)
CREAT SERPL-MCNC: 0.66 MG/DL (ref 0.5–1.4)
EKG IMPRESSION: NORMAL
EOSINOPHIL # BLD AUTO: 0 K/UL (ref 0–0.51)
EOSINOPHIL NFR BLD: 0 % (ref 0–6.9)
ERYTHROCYTE [DISTWIDTH] IN BLOOD BY AUTOMATED COUNT: 43 FL (ref 35.9–50)
GFR SERPL CREATININE-BSD FRML MDRD: >60 ML/MIN/1.73 M 2
GLUCOSE BLD-MCNC: 237 MG/DL (ref 65–99)
GLUCOSE BLD-MCNC: 269 MG/DL (ref 65–99)
GLUCOSE BLD-MCNC: 273 MG/DL (ref 65–99)
GLUCOSE BLD-MCNC: 275 MG/DL (ref 65–99)
GLUCOSE BLD-MCNC: 357 MG/DL (ref 65–99)
GLUCOSE SERPL-MCNC: 277 MG/DL (ref 65–99)
HCT VFR BLD AUTO: 41.3 % (ref 37–47)
HGB BLD-MCNC: 13.1 G/DL (ref 12–16)
IMM GRANULOCYTES # BLD AUTO: 0.65 K/UL (ref 0–0.11)
IMM GRANULOCYTES NFR BLD AUTO: 5 % (ref 0–0.9)
LYMPHOCYTES # BLD AUTO: 0.85 K/UL (ref 1–4.8)
LYMPHOCYTES NFR BLD: 6.6 % (ref 22–41)
MCH RBC QN AUTO: 26.6 PG (ref 27–33)
MCHC RBC AUTO-ENTMCNC: 31.7 G/DL (ref 33.6–35)
MCV RBC AUTO: 83.9 FL (ref 81.4–97.8)
MONOCYTES # BLD AUTO: 0.62 K/UL (ref 0–0.85)
MONOCYTES NFR BLD AUTO: 4.8 % (ref 0–13.4)
NEUTROPHILS # BLD AUTO: 10.74 K/UL (ref 2–7.15)
NEUTROPHILS NFR BLD: 83.4 % (ref 44–72)
NRBC # BLD AUTO: 0 K/UL
NRBC BLD AUTO-RTO: 0 /100 WBC
PLATELET # BLD AUTO: 254 K/UL (ref 164–446)
PMV BLD AUTO: 10.5 FL (ref 9–12.9)
POTASSIUM SERPL-SCNC: 4.6 MMOL/L (ref 3.6–5.5)
RBC # BLD AUTO: 4.92 M/UL (ref 4.2–5.4)
SODIUM SERPL-SCNC: 132 MMOL/L (ref 135–145)
WBC # BLD AUTO: 12.9 K/UL (ref 4.8–10.8)

## 2017-03-22 PROCEDURE — G0378 HOSPITAL OBSERVATION PER HR: HCPCS

## 2017-03-22 PROCEDURE — 700102 HCHG RX REV CODE 250 W/ 637 OVERRIDE(OP): Performed by: NURSE PRACTITIONER

## 2017-03-22 PROCEDURE — 700101 HCHG RX REV CODE 250: Performed by: HOSPITALIST

## 2017-03-22 PROCEDURE — 770006 HCHG ROOM/CARE - MED/SURG/GYN SEMI*

## 2017-03-22 PROCEDURE — 306637 HCHG MISC ORTHO ITEM RC 0274

## 2017-03-22 PROCEDURE — 99233 SBSQ HOSP IP/OBS HIGH 50: CPT | Performed by: FAMILY MEDICINE

## 2017-03-22 PROCEDURE — 700101 HCHG RX REV CODE 250

## 2017-03-22 PROCEDURE — A9270 NON-COVERED ITEM OR SERVICE: HCPCS | Performed by: NURSE PRACTITIONER

## 2017-03-22 PROCEDURE — 36415 COLL VENOUS BLD VENIPUNCTURE: CPT

## 2017-03-22 PROCEDURE — 700102 HCHG RX REV CODE 250 W/ 637 OVERRIDE(OP): Performed by: INTERNAL MEDICINE

## 2017-03-22 PROCEDURE — 700111 HCHG RX REV CODE 636 W/ 250 OVERRIDE (IP): Performed by: FAMILY MEDICINE

## 2017-03-22 PROCEDURE — 96365 THER/PROPH/DIAG IV INF INIT: CPT

## 2017-03-22 PROCEDURE — 700102 HCHG RX REV CODE 250 W/ 637 OVERRIDE(OP)

## 2017-03-22 PROCEDURE — A9270 NON-COVERED ITEM OR SERVICE: HCPCS | Performed by: HOSPITALIST

## 2017-03-22 PROCEDURE — A9270 NON-COVERED ITEM OR SERVICE: HCPCS | Performed by: INTERNAL MEDICINE

## 2017-03-22 PROCEDURE — 700102 HCHG RX REV CODE 250 W/ 637 OVERRIDE(OP): Performed by: HOSPITALIST

## 2017-03-22 PROCEDURE — 94640 AIRWAY INHALATION TREATMENT: CPT

## 2017-03-22 PROCEDURE — 700111 HCHG RX REV CODE 636 W/ 250 OVERRIDE (IP): Performed by: HOSPITALIST

## 2017-03-22 PROCEDURE — 96372 THER/PROPH/DIAG INJ SC/IM: CPT

## 2017-03-22 PROCEDURE — 96376 TX/PRO/DX INJ SAME DRUG ADON: CPT

## 2017-03-22 PROCEDURE — 82962 GLUCOSE BLOOD TEST: CPT | Mod: 91

## 2017-03-22 PROCEDURE — 700105 HCHG RX REV CODE 258: Performed by: FAMILY MEDICINE

## 2017-03-22 PROCEDURE — 700101 HCHG RX REV CODE 250: Performed by: EMERGENCY MEDICINE

## 2017-03-22 PROCEDURE — A9270 NON-COVERED ITEM OR SERVICE: HCPCS

## 2017-03-22 PROCEDURE — 85025 COMPLETE CBC W/AUTO DIFF WBC: CPT

## 2017-03-22 PROCEDURE — 80048 BASIC METABOLIC PNL TOTAL CA: CPT

## 2017-03-22 RX ORDER — IPRATROPIUM BROMIDE AND ALBUTEROL SULFATE 2.5; .5 MG/3ML; MG/3ML
3 SOLUTION RESPIRATORY (INHALATION)
Status: DISCONTINUED | OUTPATIENT
Start: 2017-03-22 | End: 2017-03-23

## 2017-03-22 RX ORDER — METHYLPREDNISOLONE SODIUM SUCCINATE 125 MG/2ML
125 INJECTION, POWDER, LYOPHILIZED, FOR SOLUTION INTRAMUSCULAR; INTRAVENOUS EVERY 6 HOURS
Status: DISCONTINUED | OUTPATIENT
Start: 2017-03-22 | End: 2017-03-23

## 2017-03-22 RX ORDER — IPRATROPIUM BROMIDE AND ALBUTEROL SULFATE 2.5; .5 MG/3ML; MG/3ML
SOLUTION RESPIRATORY (INHALATION)
Status: COMPLETED
Start: 2017-03-22 | End: 2017-03-22

## 2017-03-22 RX ORDER — GUAIFENESIN 600 MG/1
600 TABLET, EXTENDED RELEASE ORAL EVERY 12 HOURS
Status: DISCONTINUED | OUTPATIENT
Start: 2017-03-22 | End: 2017-03-26 | Stop reason: HOSPADM

## 2017-03-22 RX ORDER — METHYLPREDNISOLONE SODIUM SUCCINATE 125 MG/2ML
62.5 INJECTION, POWDER, LYOPHILIZED, FOR SOLUTION INTRAMUSCULAR; INTRAVENOUS EVERY 6 HOURS
Status: DISCONTINUED | OUTPATIENT
Start: 2017-03-22 | End: 2017-03-22

## 2017-03-22 RX ORDER — ALBUTEROL SULFATE 2.5 MG/3ML
2.5 SOLUTION RESPIRATORY (INHALATION)
Status: DISCONTINUED | OUTPATIENT
Start: 2017-03-22 | End: 2017-03-26 | Stop reason: HOSPADM

## 2017-03-22 RX ORDER — OXYCODONE HYDROCHLORIDE 10 MG/1
TABLET ORAL
Status: COMPLETED
Start: 2017-03-22 | End: 2017-03-22

## 2017-03-22 RX ORDER — ALBUTEROL SULFATE 90 UG/1
2 AEROSOL, METERED RESPIRATORY (INHALATION) EVERY 4 HOURS PRN
Status: DISCONTINUED | OUTPATIENT
Start: 2017-03-22 | End: 2017-03-26 | Stop reason: HOSPADM

## 2017-03-22 RX ADMIN — OXYCODONE HYDROCHLORIDE 10 MG: 10 TABLET ORAL at 18:08

## 2017-03-22 RX ADMIN — MORPHINE SULFATE 15 MG: 15 TABLET ORAL at 05:42

## 2017-03-22 RX ADMIN — IPRATROPIUM BROMIDE AND ALBUTEROL SULFATE 3 ML: .5; 3 SOLUTION RESPIRATORY (INHALATION) at 21:55

## 2017-03-22 RX ADMIN — INSULIN LISPRO 7 UNITS: 100 INJECTION, SOLUTION INTRAVENOUS; SUBCUTANEOUS at 06:28

## 2017-03-22 RX ADMIN — SIMVASTATIN 20 MG: 20 TABLET, FILM COATED ORAL at 20:12

## 2017-03-22 RX ADMIN — IPRATROPIUM BROMIDE AND ALBUTEROL SULFATE 3 ML: .5; 3 SOLUTION RESPIRATORY (INHALATION) at 13:03

## 2017-03-22 RX ADMIN — BUDESONIDE AND FORMOTEROL FUMARATE DIHYDRATE 2 PUFF: 160; 4.5 AEROSOL RESPIRATORY (INHALATION) at 20:14

## 2017-03-22 RX ADMIN — GABAPENTIN 300 MG: 300 CAPSULE ORAL at 09:10

## 2017-03-22 RX ADMIN — CEFTRIAXONE 2 G: 2 INJECTION, POWDER, FOR SOLUTION INTRAMUSCULAR; INTRAVENOUS at 12:55

## 2017-03-22 RX ADMIN — FUROSEMIDE 20 MG: 20 TABLET ORAL at 09:10

## 2017-03-22 RX ADMIN — OXYCODONE HYDROCHLORIDE 10 MG: 10 TABLET ORAL at 00:50

## 2017-03-22 RX ADMIN — INSULIN LISPRO 12 UNITS: 100 INJECTION, SOLUTION INTRAVENOUS; SUBCUTANEOUS at 20:22

## 2017-03-22 RX ADMIN — INSULIN LISPRO 4 UNITS: 100 INJECTION, SOLUTION INTRAVENOUS; SUBCUTANEOUS at 14:48

## 2017-03-22 RX ADMIN — CLONIDINE HYDROCHLORIDE 0.1 MG: 0.1 TABLET ORAL at 09:10

## 2017-03-22 RX ADMIN — GABAPENTIN 300 MG: 300 CAPSULE ORAL at 20:12

## 2017-03-22 RX ADMIN — ALBUTEROL SULFATE 2.5 MG: 2.5 SOLUTION RESPIRATORY (INHALATION) at 03:04

## 2017-03-22 RX ADMIN — CITALOPRAM HYDROBROMIDE 20 MG: 20 TABLET ORAL at 09:09

## 2017-03-22 RX ADMIN — POTASSIUM CHLORIDE 20 MEQ: 1500 TABLET, EXTENDED RELEASE ORAL at 09:10

## 2017-03-22 RX ADMIN — INSULIN GLARGINE 32 UNITS: 100 INJECTION, SOLUTION SUBCUTANEOUS at 20:22

## 2017-03-22 RX ADMIN — INSULIN LISPRO 7 UNITS: 100 INJECTION, SOLUTION INTRAVENOUS; SUBCUTANEOUS at 18:17

## 2017-03-22 RX ADMIN — IPRATROPIUM BROMIDE AND ALBUTEROL SULFATE 3 ML: .5; 3 SOLUTION RESPIRATORY (INHALATION) at 16:28

## 2017-03-22 RX ADMIN — HYDROCODONE BITARTRATE AND HOMATROPINE METHYLBROMIDE 5 ML: 5; 1.5 SOLUTION ORAL at 20:45

## 2017-03-22 RX ADMIN — HEPARIN SODIUM 5000 UNITS: 5000 INJECTION, SOLUTION INTRAVENOUS; SUBCUTANEOUS at 20:17

## 2017-03-22 RX ADMIN — METHYLPREDNISOLONE SODIUM SUCCINATE 125 MG: 125 INJECTION, POWDER, FOR SOLUTION INTRAMUSCULAR; INTRAVENOUS at 05:44

## 2017-03-22 RX ADMIN — METOCLOPRAMIDE HYDROCHLORIDE 5 MG: 5 TABLET ORAL at 12:55

## 2017-03-22 RX ADMIN — LISINOPRIL 20 MG: 20 TABLET ORAL at 09:10

## 2017-03-22 RX ADMIN — RISPERIDONE 0.5 MG: 0.5 TABLET, FILM COATED ORAL at 20:17

## 2017-03-22 RX ADMIN — MORPHINE SULFATE 15 MG: 15 TABLET ORAL at 12:55

## 2017-03-22 RX ADMIN — ASPIRIN 81 MG: 81 TABLET ORAL at 09:09

## 2017-03-22 RX ADMIN — HEPARIN SODIUM 5000 UNITS: 5000 INJECTION, SOLUTION INTRAVENOUS; SUBCUTANEOUS at 14:43

## 2017-03-22 RX ADMIN — AZITHROMYCIN 500 MG: 250 TABLET, FILM COATED ORAL at 10:16

## 2017-03-22 RX ADMIN — GUAIFENESIN 600 MG: 600 TABLET, EXTENDED RELEASE ORAL at 20:12

## 2017-03-22 RX ADMIN — METOCLOPRAMIDE HYDROCHLORIDE 5 MG: 5 TABLET ORAL at 06:27

## 2017-03-22 RX ADMIN — GUAIFENESIN 600 MG: 600 TABLET, EXTENDED RELEASE ORAL at 12:55

## 2017-03-22 RX ADMIN — METOCLOPRAMIDE HYDROCHLORIDE 5 MG: 5 TABLET ORAL at 18:20

## 2017-03-22 RX ADMIN — HEPARIN SODIUM 5000 UNITS: 5000 INJECTION, SOLUTION INTRAVENOUS; SUBCUTANEOUS at 05:44

## 2017-03-22 RX ADMIN — TRAZODONE HYDROCHLORIDE 50 MG: 50 TABLET ORAL at 20:12

## 2017-03-22 RX ADMIN — BUDESONIDE AND FORMOTEROL FUMARATE DIHYDRATE 2 PUFF: 160; 4.5 AEROSOL RESPIRATORY (INHALATION) at 09:09

## 2017-03-22 RX ADMIN — GABAPENTIN 300 MG: 300 CAPSULE ORAL at 14:43

## 2017-03-22 RX ADMIN — METHYLPREDNISOLONE SODIUM SUCCINATE 125 MG: 125 INJECTION, POWDER, FOR SOLUTION INTRAMUSCULAR; INTRAVENOUS at 18:08

## 2017-03-22 ASSESSMENT — ENCOUNTER SYMPTOMS
NECK PAIN: 0
ABDOMINAL PAIN: 0
NERVOUS/ANXIOUS: 1
TINGLING: 0
CHILLS: 0
NAUSEA: 0
DEPRESSION: 0
BLURRED VISION: 0
FEVER: 0
WHEEZING: 1
BACK PAIN: 0
COUGH: 1
FOCAL WEAKNESS: 0
PALPITATIONS: 0
DIARRHEA: 0
SHORTNESS OF BREATH: 1
FALLS: 1
SORE THROAT: 0
VOMITING: 0
HEMOPTYSIS: 0
SPUTUM PRODUCTION: 0
WEAKNESS: 1
DOUBLE VISION: 0
HEADACHES: 0
DIZZINESS: 0

## 2017-03-22 ASSESSMENT — PAIN SCALES - GENERAL
PAINLEVEL_OUTOF10: 6
PAINLEVEL_OUTOF10: 6
PAINLEVEL_OUTOF10: 9

## 2017-03-22 NOTE — PROGRESS NOTES
Hospital Medicine Progress Note, Adult, Complex               Author: Ирина Munoz Date & Time created: 3/22/2017  2:21 PM     Interval History:  65 y/o F re-admitted w/COPD exacerbation and R Humerus fracture after multi leaving AMA, Ortho consulted- remain in sling and WBAT- f/u outpt. Awaiting bed on Med floor, is inpt.   COPD exacerbation showing only mild improvement, wheezing- severe tight, rhonchi, needs continued higher Solumedrol dose, continue Zithromax and add IV rocephin.  May require SNF at [place] as pt is homeless, appreciate  assistance.    Review of Systems:  Review of Systems   Constitutional: Positive for malaise/fatigue. Negative for fever and chills.   HENT: Negative for congestion and sore throat.    Eyes: Negative for blurred vision and double vision.   Respiratory: Positive for cough, shortness of breath and wheezing. Negative for hemoptysis and sputum production.    Cardiovascular: Negative for chest pain and palpitations.   Gastrointestinal: Negative for nausea, vomiting, abdominal pain and diarrhea.   Genitourinary: Negative.    Musculoskeletal: Positive for falls (previously, none this admission). Negative for back pain and neck pain.        R arm and shoulder pain   Skin: Negative.    Neurological: Positive for weakness. Negative for dizziness, tingling, focal weakness and headaches.   Psychiatric/Behavioral: Negative for depression. The patient is nervous/anxious.        Physical Exam:  Physical Exam   Constitutional: She is oriented to person, place, and time. She appears well-developed and well-nourished. No distress.   Disheveled   HENT:   Head: Normocephalic and atraumatic.   Mouth/Throat: Oropharynx is clear and moist. No oropharyngeal exudate.   Eyes: Conjunctivae are normal. Right eye exhibits no discharge. Left eye exhibits no discharge. No scleral icterus.   Neck: Normal range of motion. Neck supple. No JVD present.   Cardiovascular: Normal rate, regular rhythm and normal heart  sounds.    No murmur heard.  Pulmonary/Chest: Effort normal. No respiratory distress. She has wheezes. She exhibits no tenderness.   rhonchi   Abdominal: Soft. Bowel sounds are normal. She exhibits no distension.   Musculoskeletal:   R arm in sling,  equal and strong, wiggles fingers, cap refill < 3sec, skin warm, pink, dry   Neurological: She is alert and oriented to person, place, and time.   Skin: Skin is warm and dry. She is not diaphoretic.   Psychiatric:   Anxious, restless, freq coming out of room   Nursing note and vitals reviewed.      Labs:        Invalid input(s): [id number]  Recent Labs      17   TROPONINI  <0.01     Recent Labs      17   0300   SODIUM  135  132*   POTASSIUM  4.1  4.6   CHLORIDE  98  98   CO2  28  26   BUN  39*  42*   CREATININE  0.82  0.66   CALCIUM  8.5  8.5     Recent Labs      17   0300   ALTSGPT  14   --    ASTSGOT  12   --    ALKPHOSPHAT  75   --    TBILIRUBIN  0.2   --    GLUCOSE  176*  277*     Recent Labs      17   0300   RBC  5.19  4.92   HEMOGLOBIN  13.8  13.1   HEMATOCRIT  43.2  41.3   PLATELETCT  245  254     Recent Labs      17   0300   WBC  15.3*  12.9*   NEUTSPOLYS  79.00*  83.40*   LYMPHOCYTES  14.00*  6.60*   MONOCYTES  6.10  4.80   EOSINOPHILS  0.00  0.00   BASOPHILS  0.00  0.20   ASTSGOT  12   --    ALTSGPT  14   --    ALKPHOSPHAT  75   --    TBILIRUBIN  0.2   --            Hemodynamics:  Temp (24hrs), Av.7 °C (98 °F), Min:36.4 °C (97.6 °F), Max:37.1 °C (98.7 °F)  Temperature: 36.8 °C (98.3 °F)  Pulse  Av.4  Min: 46  Max: 91   Blood Pressure: 100/53 mmHg     Respiratory:    Respiration: 16, Pulse Oximetry: 94 %, O2 Daily Delivery Respiratory : Silicone Nasal Cannula     Given By:: Mouthpiece, Work Of Breathing / Effort: Mild  RUL Breath Sounds: Expiratory Wheezes, RML Breath Sounds: Expiratory Wheezes, RLL Breath Sounds: Expiratory Wheezes, MARC  Breath Sounds: Expiratory Wheezes, LLL Breath Sounds: Expiratory Wheezes  Fluids:  No intake or output data in the 24 hours ending 03/22/17 1421     GI/Nutrition:  Orders Placed This Encounter   Procedures   • Diet Order     Standing Status: Standing      Number of Occurrences: 1      Standing Expiration Date:      Order Specific Question:  Diet:     Answer:  Diabetic [3]     Medical Decision Making, by Problem:  Active Hospital Problems    Diagnosis   • COPD exacerbation (CMS-HCC) [J44.1]- continue RT protocol, Zithromax IV C3, IV Solumedrol symbicort, duonebs, O2 at 3-5L      • Closed fracture of proximal end of humerus [S42.209A]- Dr Sulma renee- WBAT, currently in sling, pain mgmt prn   • Type 2 diabetes mellitus, uncontrolled (CMS-HCC) [E11.65]- dietary and lifestyle modifications discussed, limited as pt is homeless, continue iss, outpt follow-up, declined diabetes educator   • On home oxygen therapy [Z99.81]- 3L continue at DC   • CAD (coronary artery disease) [I25.10]- ASA, ACEi, BB, lifestyle and dietary modifications discussed   • Tobacco abuse [Z72.0]- advised cessation, not interested at this time, continue 02, nicoderm/nicorette prn- refusing currently   HTN- cont clonidine, lasix/K, metoprolol, and lisinopril  Diabetic neuropathy- cont gabapentin  Bipolar, anxiety- cont celexa, risperidone    Labs reviewed and Radiology images reviewed  Fagan catheter: No Fagan      DVT Prophylaxis: Heparin  DVT prophylaxis - mechanical: Not indicated at this time, ambulatory  Ulcer prophylaxis: Not indicated (eating regular diet)  Antibiotics: Treating active infection/contamination beyond 24 hours perioperative coverage  Assessed for rehab: Patient was assess for and/or received rehabilitation services during this hospitalization    Dispo- cont RT/IS/O2, IV abx /steroids, expect discharge when stable and improved, will likely need CM help as pt is homeless

## 2017-03-22 NOTE — H&P
CHIEF COMPLAINT:  Shortness of breath.    PRIMARY CARE PROVIDER:  None.    HISTORY OF PRESENT ILLNESS:  This is a 66-year-old woman well known to our   facility from repeated admissions.  She was just discharged from this facility   where she left against medical advice stating that she had to attend a   .  Patient comes back today still complaining of shortness of breath.    She was seen by orthopedic surgery.  Dr. Braswell saw her on her last admission   and stated that she just needs a sling for her humerus fracture and _____ her   toe.  The films showed a subacute second distal phalanx fracture in the left   foot, no obvious healing.  She states she is short of breath.  She has   tightness in her chest.  She is coughing and says she has a lot of phlegm that   she is bringing up.  No fever or chills have occurred.  Patient just had a   nuclear stress test on her last admission as well, which was negative.    REVIEW OF SYSTEMS:  A complete review of systems was performed and other than   what is stated in history of present illness is otherwise negative.    PAST MEDICAL HISTORY:  1.  COPD with chronic respiratory failure, oxygen dependent on 3 liters nasal   cannula.  2.  Hypertension.  3.  Diabetes.  4.  Coronary artery disease.  5.  Chronic back pain.  6.  Diabetic peripheral neuropathy.  7.  Bipolar disorder.  8.  Anxiety disorder.  9.  Fibromyalgia.  10.  Hyperlipidemia.  11.  Hepatitis C.    PAST SURGICAL HISTORY:  She has had I and D of abscess.    ALLERGIES:  TO MUSHROOMS, TYLENOL, AND ZOFRAN.    FAMILY HISTORY:  Both her parents had heart disease and diabetes.  One of her   sons has committed suicide and the other one recently  of complications of   AIDS.    SOCIAL HISTORY:  She states that she smoked 2 packs of cigarettes per day for   many years, now smokes 1 cigarette per day.  She denies alcohol and says she   is not using illicit drug use.  She is homeless at this time.    MEDICATIONS:   Albuterol 1-2 puffs every 4-6 hours as needed for shortness of   breath or wheezing, alprazolam 0.5 mg 3 times daily as needed for sleep,   aspirin 81 mg daily, Symbicort 160/4.5 two puffs b.i.d., citalopram 20 mg   daily, clonidine 0.1 mg b.i.d., furosemide 20 mg twice daily, gabapentin 300   mg 3 times daily, insulin glargine 32 units every evening, insulin lispro on   sliding scale, Prinivil 20 mg daily, Reglan 5 mg 2 times daily before meals,   Toprol-XL 25 mg daily, morphine immediate release 15 mg 2 times daily as   needed for pain, nitroglycerin 0.4 mg sublingual as needed for chest pain,   oxycodone 10 mg immediate release twice daily as needed for moderate pain,   potassium chloride 20 mEq twice daily, Risperdal 2.5 mg at bedtime,   simvastatin 20 mg daily, and trazodone 50 mg daily.    PHYSICAL EXAMINATION:  VITAL SIGNS:  Temperature is 98.9 degrees, heart rate 70, respirations 18,   pulse oximetry 94% on 3-1/2 liters per nasal cannula, blood pressure is   119/56.  GENERAL:  This is an obese, otherwise well-developed woman.  She is awake,   alert, oriented, pleasant and cooperative with the examination.  HEENT:  Normocephalic, atraumatic.  Pupils are equal and reactive.  Mucous   membranes are moist.  NECK:  Supple, without lymphadenopathy.  Trachea is midline with some rattling   stridor, no thin stridor.  She is protecting her airway.  CHEST:  She has bilateral wheezes and rhonchi, mostly rhonchi.  No tachypnea   or accessory muscle use is noted and no intercostal retractions.  CARDIOVASCULAR:  Regular rate.  I do not detect a murmur, rub or gallop.  No   ventricular heave is present.  Radial pulse is normal and symmetric.  Normal   capillary refill.  ABDOMEN:  Soft, nontender, nondistended.  Normoactive bowel sounds are   present.  MUSCULOSKELETAL:  No cyanosis, clubbing or edema in the extremities is   present.  She has her right arm in a sling.  She has bony tenderness over the   right upper  extremity.  Left second toe is tender, no swelling has occurred.  SKIN:  Warm and dry, normal color and temperature.  No rashes, ecchymosis, or   petechia are present.    LABORATORY DATA:  White blood cell count is 15.3, hemoglobin is 13.8,   hematocrit is 43.2, platelets are 245.  Sodium 135, potassium 4.1, chloride   98, bicarbonate is 28, glucose is 176, BUN 39, creatinine 0.82, calcium is   8.5.  AST is 12, ALT is 14, alkaline phosphatase 75, total bilirubin is 0.2,   albumin is 3.3, troponin less than 0.01.  Chest x-ray, hazy and linear   densities in bilateral lung bases, greater on the left, could represent   atelectasis, early infiltrate is not excluded and she also has cardiomegaly.    ASSESSMENT:  1.  Chronic obstructive pulmonary disease exacerbation:  Patient is still an   active smoker, though she has cut down quite a bit.  She has not feed long   enough in the past 3 admissions to get a complete course of treatment.  She   has left against medical advice every time.  I have been managed the patient   to stay for full course of treatment at this time.  Starting Solu-Medrol 125   mg every 6 hours, respiratory treatments, azithromycin 500 mg p.o. daily,   breathing treatments with Symbicort and to have social service was discussed   with the patient whether she has received her medications as an outpatient.    Again, smoking cessation is imperative for her.  Continue supplemental oxygen   she will, but her usual level of 3 liters per minute to 3-1/2 at this time.  2.  Diabetes:  Continue Lantus insulin sliding scale and diabetic diet.  3.  Diabetic neuropathy, continue gabapentin.  She is on chronic pain   medications as well.  4.  Hypertension and coronary artery disease:  She is going to remain on   aspirin, clonidine, Lasix, metoprolol, and lisinopril.  5.  Fracture of humerus, chronic:  Orthopedic surgery has recommended that she   remain in a sling, followed by outpatient.  6.  Toe fracture, not  healing:  She has been seen by orthopedics and they   wanted to follow her up outpatient.  No surgical intervention is planned at   this time.  7.  Psychiatric disorders, bipolar and anxiety disorder.  Continue risperidone   and Celexa.       ____________________________________     MD MALACHI ZUNIGA / MIKE    DD:  03/21/2017 16:17:13  DT:  03/21/2017 18:43:39    D#:  [id number]  Job#:  [id number]

## 2017-03-22 NOTE — DISCHARGE PLANNING
CM [facility] stating he received a call from pt asking 'what is the plan'. Matthew does not have referral for rehab but states he reviewed chart and she does not meet criteria for rehab.

## 2017-03-22 NOTE — PROGRESS NOTES
Pt was seen by the tech heading to the elevator, per tech, pt stated, I'm gonna go out to smoke, I don't want nicotine patch, Pt was seen with cigarette and lighter in her hand. Pt extremely agitated and anxious at this time, crying and screaming.Security personnel paged to the room.Hospital smoking policy explained to the pt, pt verbalizes understanding but still insisting on going out to smoke. Pt calmed down afterwards , requesting for xanax.Pt transferred to  T213 for closer monitoring.

## 2017-03-22 NOTE — PROGRESS NOTES
Pt up to RR and back multiple times; ambulates with steady gait.  Pt frustrated with sling, insisting Ortho be called for different sling that has more support.  Pt knocked over lunch tray after eating most of it; spilled drink and ice on floor and self.  Room cleaned, Pt given clean gown, underwear, towels, washcloths; bedding also changed.  Pt now comfortable, clean, watching TV.

## 2017-03-23 LAB
ANION GAP SERPL CALC-SCNC: 11 MMOL/L (ref 0–11.9)
BACTERIA BLD CULT: NORMAL
BACTERIA BLD CULT: NORMAL
BNP SERPL-MCNC: 129 PG/ML (ref 0–100)
BUN SERPL-MCNC: 44 MG/DL (ref 8–22)
CALCIUM SERPL-MCNC: 8.5 MG/DL (ref 8.5–10.5)
CHLORIDE SERPL-SCNC: 98 MMOL/L (ref 96–112)
CO2 SERPL-SCNC: 22 MMOL/L (ref 20–33)
CREAT SERPL-MCNC: 0.99 MG/DL (ref 0.5–1.4)
ERYTHROCYTE [DISTWIDTH] IN BLOOD BY AUTOMATED COUNT: 43.8 FL (ref 35.9–50)
GFR SERPL CREATININE-BSD FRML MDRD: 56 ML/MIN/1.73 M 2
GLUCOSE BLD-MCNC: 188 MG/DL (ref 65–99)
GLUCOSE BLD-MCNC: 240 MG/DL (ref 65–99)
GLUCOSE BLD-MCNC: 247 MG/DL (ref 65–99)
GLUCOSE BLD-MCNC: 327 MG/DL (ref 65–99)
GLUCOSE BLD-MCNC: 364 MG/DL (ref 65–99)
GLUCOSE SERPL-MCNC: 322 MG/DL (ref 65–99)
HCT VFR BLD AUTO: 43.2 % (ref 37–47)
HGB BLD-MCNC: 13.4 G/DL (ref 12–16)
MCH RBC QN AUTO: 26.6 PG (ref 27–33)
MCHC RBC AUTO-ENTMCNC: 31 G/DL (ref 33.6–35)
MCV RBC AUTO: 85.9 FL (ref 81.4–97.8)
PLATELET # BLD AUTO: 252 K/UL (ref 164–446)
PMV BLD AUTO: 10.4 FL (ref 9–12.9)
POTASSIUM SERPL-SCNC: 4.7 MMOL/L (ref 3.6–5.5)
RBC # BLD AUTO: 5.03 M/UL (ref 4.2–5.4)
SIGNIFICANT IND 70042: NORMAL
SIGNIFICANT IND 70042: NORMAL
SITE SITE: NORMAL
SITE SITE: NORMAL
SODIUM SERPL-SCNC: 131 MMOL/L (ref 135–145)
SOURCE SOURCE: NORMAL
SOURCE SOURCE: NORMAL
WBC # BLD AUTO: 18.9 K/UL (ref 4.8–10.8)

## 2017-03-23 PROCEDURE — 770006 HCHG ROOM/CARE - MED/SURG/GYN SEMI*

## 2017-03-23 PROCEDURE — 85027 COMPLETE CBC AUTOMATED: CPT

## 2017-03-23 PROCEDURE — 94640 AIRWAY INHALATION TREATMENT: CPT

## 2017-03-23 PROCEDURE — 700111 HCHG RX REV CODE 636 W/ 250 OVERRIDE (IP): Performed by: FAMILY MEDICINE

## 2017-03-23 PROCEDURE — 96372 THER/PROPH/DIAG INJ SC/IM: CPT

## 2017-03-23 PROCEDURE — 700102 HCHG RX REV CODE 250 W/ 637 OVERRIDE(OP): Performed by: HOSPITALIST

## 2017-03-23 PROCEDURE — 700111 HCHG RX REV CODE 636 W/ 250 OVERRIDE (IP): Performed by: HOSPITALIST

## 2017-03-23 PROCEDURE — 700102 HCHG RX REV CODE 250 W/ 637 OVERRIDE(OP): Performed by: NURSE PRACTITIONER

## 2017-03-23 PROCEDURE — 96376 TX/PRO/DX INJ SAME DRUG ADON: CPT

## 2017-03-23 PROCEDURE — A9270 NON-COVERED ITEM OR SERVICE: HCPCS | Performed by: NURSE PRACTITIONER

## 2017-03-23 PROCEDURE — 700101 HCHG RX REV CODE 250: Performed by: HOSPITALIST

## 2017-03-23 PROCEDURE — 700105 HCHG RX REV CODE 258: Performed by: FAMILY MEDICINE

## 2017-03-23 PROCEDURE — A9270 NON-COVERED ITEM OR SERVICE: HCPCS | Performed by: HOSPITALIST

## 2017-03-23 PROCEDURE — 96366 THER/PROPH/DIAG IV INF ADDON: CPT

## 2017-03-23 PROCEDURE — 99233 SBSQ HOSP IP/OBS HIGH 50: CPT | Performed by: FAMILY MEDICINE

## 2017-03-23 PROCEDURE — 36415 COLL VENOUS BLD VENIPUNCTURE: CPT

## 2017-03-23 PROCEDURE — 80048 BASIC METABOLIC PNL TOTAL CA: CPT

## 2017-03-23 PROCEDURE — 82962 GLUCOSE BLOOD TEST: CPT

## 2017-03-23 PROCEDURE — 83880 ASSAY OF NATRIURETIC PEPTIDE: CPT

## 2017-03-23 RX ORDER — IPRATROPIUM BROMIDE AND ALBUTEROL SULFATE 2.5; .5 MG/3ML; MG/3ML
3 SOLUTION RESPIRATORY (INHALATION)
Status: DISCONTINUED | OUTPATIENT
Start: 2017-03-23 | End: 2017-03-26

## 2017-03-23 RX ORDER — METHYLPREDNISOLONE SODIUM SUCCINATE 125 MG/2ML
62.5 INJECTION, POWDER, LYOPHILIZED, FOR SOLUTION INTRAMUSCULAR; INTRAVENOUS EVERY 6 HOURS
Status: COMPLETED | OUTPATIENT
Start: 2017-03-23 | End: 2017-03-24

## 2017-03-23 RX ADMIN — IPRATROPIUM BROMIDE AND ALBUTEROL SULFATE 3 ML: .5; 3 SOLUTION RESPIRATORY (INHALATION) at 02:18

## 2017-03-23 RX ADMIN — IPRATROPIUM BROMIDE AND ALBUTEROL SULFATE 3 ML: .5; 3 SOLUTION RESPIRATORY (INHALATION) at 19:12

## 2017-03-23 RX ADMIN — MORPHINE SULFATE 15 MG: 15 TABLET ORAL at 23:43

## 2017-03-23 RX ADMIN — METOCLOPRAMIDE HYDROCHLORIDE 5 MG: 5 TABLET ORAL at 17:00

## 2017-03-23 RX ADMIN — HEPARIN SODIUM 5000 UNITS: 5000 INJECTION, SOLUTION INTRAVENOUS; SUBCUTANEOUS at 15:24

## 2017-03-23 RX ADMIN — TRAZODONE HYDROCHLORIDE 50 MG: 50 TABLET ORAL at 21:18

## 2017-03-23 RX ADMIN — SIMVASTATIN 20 MG: 20 TABLET, FILM COATED ORAL at 21:17

## 2017-03-23 RX ADMIN — METOCLOPRAMIDE HYDROCHLORIDE 5 MG: 5 TABLET ORAL at 06:15

## 2017-03-23 RX ADMIN — METHYLPREDNISOLONE SODIUM SUCCINATE 62.5 MG: 125 INJECTION, POWDER, FOR SOLUTION INTRAMUSCULAR; INTRAVENOUS at 18:12

## 2017-03-23 RX ADMIN — HEPARIN SODIUM 5000 UNITS: 5000 INJECTION, SOLUTION INTRAVENOUS; SUBCUTANEOUS at 06:16

## 2017-03-23 RX ADMIN — GABAPENTIN 300 MG: 300 CAPSULE ORAL at 15:24

## 2017-03-23 RX ADMIN — METHYLPREDNISOLONE SODIUM SUCCINATE 62.5 MG: 125 INJECTION, POWDER, FOR SOLUTION INTRAMUSCULAR; INTRAVENOUS at 23:07

## 2017-03-23 RX ADMIN — OXYCODONE HYDROCHLORIDE 10 MG: 10 TABLET ORAL at 07:33

## 2017-03-23 RX ADMIN — RISPERIDONE 0.5 MG: 0.5 TABLET, FILM COATED ORAL at 21:18

## 2017-03-23 RX ADMIN — OXYCODONE HYDROCHLORIDE 10 MG: 10 TABLET ORAL at 21:16

## 2017-03-23 RX ADMIN — BUDESONIDE AND FORMOTEROL FUMARATE DIHYDRATE 2 PUFF: 160; 4.5 AEROSOL RESPIRATORY (INHALATION) at 19:13

## 2017-03-23 RX ADMIN — BUDESONIDE AND FORMOTEROL FUMARATE DIHYDRATE 2 PUFF: 160; 4.5 AEROSOL RESPIRATORY (INHALATION) at 10:33

## 2017-03-23 RX ADMIN — IPRATROPIUM BROMIDE AND ALBUTEROL SULFATE 3 ML: .5; 3 SOLUTION RESPIRATORY (INHALATION) at 14:51

## 2017-03-23 RX ADMIN — METOCLOPRAMIDE HYDROCHLORIDE 5 MG: 5 TABLET ORAL at 11:00

## 2017-03-23 RX ADMIN — GUAIFENESIN 600 MG: 600 TABLET, EXTENDED RELEASE ORAL at 21:17

## 2017-03-23 RX ADMIN — INSULIN LISPRO 4 UNITS: 100 INJECTION, SOLUTION INTRAVENOUS; SUBCUTANEOUS at 06:21

## 2017-03-23 RX ADMIN — INSULIN LISPRO 3 UNITS: 100 INJECTION, SOLUTION INTRAVENOUS; SUBCUTANEOUS at 18:09

## 2017-03-23 RX ADMIN — GUAIFENESIN 600 MG: 600 TABLET, EXTENDED RELEASE ORAL at 10:33

## 2017-03-23 RX ADMIN — METHYLPREDNISOLONE SODIUM SUCCINATE 125 MG: 125 INJECTION, POWDER, FOR SOLUTION INTRAMUSCULAR; INTRAVENOUS at 06:17

## 2017-03-23 RX ADMIN — INSULIN LISPRO 10 UNITS: 100 INJECTION, SOLUTION INTRAVENOUS; SUBCUTANEOUS at 12:00

## 2017-03-23 RX ADMIN — GABAPENTIN 300 MG: 300 CAPSULE ORAL at 21:17

## 2017-03-23 RX ADMIN — CITALOPRAM HYDROBROMIDE 20 MG: 20 TABLET ORAL at 10:32

## 2017-03-23 RX ADMIN — CEFTRIAXONE 2 G: 2 INJECTION, POWDER, FOR SOLUTION INTRAMUSCULAR; INTRAVENOUS at 12:57

## 2017-03-23 RX ADMIN — GABAPENTIN 300 MG: 300 CAPSULE ORAL at 10:32

## 2017-03-23 RX ADMIN — MORPHINE SULFATE 15 MG: 15 TABLET ORAL at 15:24

## 2017-03-23 RX ADMIN — METHYLPREDNISOLONE SODIUM SUCCINATE 125 MG: 125 INJECTION, POWDER, FOR SOLUTION INTRAMUSCULAR; INTRAVENOUS at 12:00

## 2017-03-23 RX ADMIN — INSULIN LISPRO 12 UNITS: 100 INJECTION, SOLUTION INTRAVENOUS; SUBCUTANEOUS at 21:21

## 2017-03-23 RX ADMIN — ASPIRIN 81 MG: 81 TABLET ORAL at 10:32

## 2017-03-23 RX ADMIN — INSULIN GLARGINE 32 UNITS: 100 INJECTION, SOLUTION SUBCUTANEOUS at 21:21

## 2017-03-23 RX ADMIN — AZITHROMYCIN 500 MG: 250 TABLET, FILM COATED ORAL at 10:32

## 2017-03-23 RX ADMIN — IPRATROPIUM BROMIDE AND ALBUTEROL SULFATE 3 ML: .5; 3 SOLUTION RESPIRATORY (INHALATION) at 07:02

## 2017-03-23 RX ADMIN — METHYLPREDNISOLONE SODIUM SUCCINATE 125 MG: 125 INJECTION, POWDER, FOR SOLUTION INTRAMUSCULAR; INTRAVENOUS at 00:04

## 2017-03-23 ASSESSMENT — ENCOUNTER SYMPTOMS
TINGLING: 0
ABDOMINAL PAIN: 0
PALPITATIONS: 0
CHILLS: 0
SPUTUM PRODUCTION: 0
FOCAL WEAKNESS: 0
HEMOPTYSIS: 0
COUGH: 1
SORE THROAT: 0
DEPRESSION: 0
WEAKNESS: 1
BLURRED VISION: 0
DIZZINESS: 0
DIARRHEA: 0
VOMITING: 0
DOUBLE VISION: 0
BACK PAIN: 0
FALLS: 1
NAUSEA: 0
NERVOUS/ANXIOUS: 1
SHORTNESS OF BREATH: 1
FEVER: 0
NECK PAIN: 0
HEADACHES: 0
WHEEZING: 1

## 2017-03-23 ASSESSMENT — PAIN SCALES - GENERAL
PAINLEVEL_OUTOF10: 7
PAINLEVEL_OUTOF10: 7
PAINLEVEL_OUTOF10: 6
PAINLEVEL_OUTOF10: 2
PAINLEVEL_OUTOF10: 0
PAINLEVEL_OUTOF10: 2

## 2017-03-23 NOTE — H&P
PRIMARY CARE PHYSICIAN:  None.    CHIEF COMPLAINT:  Cough, shortness of breath, and wheezing.    HISTORY OF PRESENT ILLNESS:  Patient is a 66-year-old white female, who has   had repeated admissions secondary to COPD exacerbation.  Apparently, she has   been discharged against medical advice prior to having any notable   improvement.  She comes back today with cough, shortness of breath, and   wheezing.  She has been admitted, she has been placed on IV steroids.    However, she has failed to respond.  It appears that she will need a longer   hospital stay for improvement.  She also has a known history of a right   proximal humerus fracture.  She is in the sling.  It is nonoperative according   to orthopedic surgery, she also has a second distal phalanx fracture in the   left foot.  It was also nonoperative or nonsurgical.  Patient will be admitted   for further evaluation and management.    PAST MEDICAL HISTORY:  1.  Noncompliance.  2.  Chronic obstructive pulmonary disease.  3.  Chronic respiratory failure on O2 at 2 liters per minute per nasal cannula   at home.  4.  Hypertension.  5.  Diabetes mellitus type 2.  6.  Coronary artery disease.  7.  Chronic back pain.  8.  Neuropathy.  9.  Bipolar disorder.  10.  Anxiety disorder.  11.  Chronic pain.  12.  Hyperlipidemia.  13.  Hepatitis C.  14.  Diastolic dysfunction.    PAST SURGICAL HISTORY:  I and D of abscess.    FAMILY HISTORY:  Both parents had history of heart disease and diabetes.    SOCIAL HISTORY:  Patient smoked two packs of cigarettes per day for many   years.  Now smokes one cigarette per day.  Denies any alcohol use, denies any   illicit drug use.  She is currently homeless.    ALLERGIES:  TO MUSHROOMS, TYLENOL, AND ZOFRAN.    CURRENT MEDICATIONS:  1.  Albuterol inhaler two puffs every four hours as needed.  2.  Xanax 0.5 mg three times a day as needed for anxiety.  3.  Aspirin 81 mg per day.  4.  Symbicort 160/4.5 two puffs twice a day.  5.  Celexa 20  mg per day.  6.  Catapres 0.1 mg twice daily.  7.  Lasix 20 mg twice a day.  8.  Neurontin 300 mg three times a day.  9.  Lantus 32 units at bedtime.  10.  Humalog sliding scale.  11.  Lisinopril 20 mg per day.  12.  Reglan 5 mg three times daily as needed before meals for nausea and   vomiting.  13.  She is on Toprol-XL 25 mg per day.  14.  Morphine 50 mg three times a day as needed for pain.  15.  Nitroglycerin as needed for chest pain.  16.  Oxycodone 10 mg twice a day as needed for pain.  17.  K-Dur 20 mEq twice a day.  18.  Risperdal 0.5 mg at bedtime.  19.  Zocor 20 mg per day.  20.  Trazodone 50 mg per day.    REVIEW OF SYSTEMS:  Negative except for those stated above reviewed per AMA   criteria.    PHYSICAL EXAMINATION:  VITAL SIGNS:  Blood pressure of 100/53, pulse rate 60, respiratory rate of 18,   temperature of 98.3 degrees Fahrenheit, and O2 saturation 92% on 4 liters per   minute nasal cannula.  GENERAL:  Elderly white female lying in bed in no acute distress.  Alert and   oriented x3.  HEENT:  Normocephalic and atraumatic.  Pupils reactive, responds to light.    EOMs are intact.  Oropharynx is moist and clear.  NECK:  Shows no thyromegaly, lymphadenopathy or carotid bruits.  CHEST:  Symmetrical chest expansion with diffuse wheezing and crackles also   noted.  There is no accessory muscle use.  CARDIOVASCULAR:  Regular rate and rhythm.  S1, S2 distinct.  There is no S3,   no murmurs appreciated.  ABDOMEN:  Normoactive bowel sounds, soft, nontender, and nondistended.  No   rebound or rigidity.  EXTREMITIES:  No clubbing, cyanosis, edema.  Pulses +2.  She is in right   shoulder sling.  NEUROLOGICAL EXAMINATION:  Cranial nerves II-XII intact.  No gross motor or   sensory deficits.    LABORATORY DATA:  WBC 12.9, hemoglobin 13.1, hematocrit 41.2, and platelet   count 254,000.  Sodium 130, potassium 4.6, creatinine 0.66, glucose 277, and   troponin 0.01.  Chest x-ray shows hazy linear atelectasis, bilateral  lung   bases, greater on the left.    ASSESSMENT AND PLAN:  1.  Chronic obstructive pulmonary disease exacerbation.  We will increase   intravenous steroids to full dose 125 mg intravenously every 6.  We will place   the patient on respiratory therapy protocol and continue her Symbicort.    Patient may also need to be placed on Spiriva.  2.  Pneumonia, place the patient on intravenous azithromycin and Rocephin.  3.  Diabetes mellitus type 2.  Place the patient on Lantus and sliding scale   insulin.  4.  Diabetic neuropathy.  Continue patient's Neurontin.  5.  Bipolar disorder and anxiety.  Continue patient's Celexa.  Continue   patient's Risperdal also.  6.  Hyperlipidemia.  Continue patient's Zocor.  7.  Tobacco abuse.  8.  Proximal humerus fracture.  Continue pain control, as well as acute   inpatient right shoulder sling.  9.  Coronary artery disease.  Continue patient's aspirin.  10.  Hypertension.  Blood pressure is on low side.  We will hold off on her   Catapres, lisinopril, and metoprolol.  11.  Diastolic dysfunction, hold off on her Lasix for now.  12.  Prophylaxis.  Place the patient on heparin.  13.  Code status is full.       ____________________________________     MD SHIRIN LOPES / MIKE    DD:  03/22/2017 16:09:53  DT:  03/22/2017 18:42:54    D#:  [id number]  Job#:  [id number]

## 2017-03-23 NOTE — DISCHARGE PLANNING
CM met with pt to discuss returning to shelter. Pt states incontinence issues are over and were due to 'infection'. She is not happy about returning there but admits it's better than the bridge. She does say that she may be able to go to the '[facility]' as she has a friend there.     Plan per Dr Lopez is to taper steroids and discharge this weekend. Pt aware of plan.

## 2017-03-23 NOTE — DISCHARGE PLANNING
CM met with pt at bedside to discuss discharge plan. Pt states her roommate went to live with family and since the apt was in her name she was unable to return there. She has currently been living under the bridge while not in hospital. She states a friend is coming from [place] and they will be getting an apartment April 1st when she gets her money. Pt was advised when discharged she would not be able to stay in the hospital and we discussed going to the shelter. She stated she couldn't go back to the shelter as she gave someone an aspirin and they kicked her out. She states her only family is in [place] and she has a friend who is like a daughter but when suggested she might help her she replied 'don't even go there'. There was also discussion about AMA and her reasons were a death and a friend needed to go to the ER. She states she won't AMA again.    GAMALIEL spoke with Jenna at the shelter ([phone]) and she stated Zenia was having 'accidents' (urine and stool incontinence) multiple times a day and not asking for help. Jenna stated they had given her adult diapers, clothes and help with showers. She is able to return to shelter they just ask that she communicate with them.  GAMALIEL will d/w pt.

## 2017-03-23 NOTE — PROGRESS NOTES
Assessment done, Pt educated on plan of care.waiting on dr rounds  Patient resting in bed, no signs of distress,  no complains of pain at this time. Call light within reach,  side rails up, will monitor. Condition stable

## 2017-03-23 NOTE — FLOWSHEET NOTE
RES     03/23/17 0702   Events/Summary/Plan   Events/Summary/Plan SVN given   Interdisciplinary Plan of Care-Goals (Indications)   Obstructive Ventilatory Defect or Pulmonary Disease without Obvious Obstruction Physical Exam / Hyperinflation / Wheezing (bronchospasm)   Interdisciplinary Plan of Care-Outcomes    Bronchodilator Outcome Diminished Wheezing and Volume of Air Movement Increased   Education   Education Yes - Pt. / Family has been Instructed in use of Respiratory Medications and Adverse Reactions   RT Assessment of Delivered Medications   Evaluation of Medication Delivery Daily Yes-- Pt /Family has been Instructed in use of Respiratory Medications and Adverse Reactions   SVN Group   #SVN Performed Yes   Given By: Mouthpiece   Date SVN Last Changed 03/21/17   Date SVN Next Change Due (Q 7 Days) 03/28/17   Chest Exam   Respiration 18   Pulse 61   Breath Sounds   Pre/Post Intervention Pre Intervention Assessment   RUL Breath Sounds Expiratory Wheezes   RML Breath Sounds Expiratory Wheezes   RLL Breath Sounds Expiratory Wheezes   MARC Breath Sounds Expiratory Wheezes   LLL Breath Sounds Expiratory Wheezes   Secretions   Cough Non Productive   How Sputum Obtained Spontaneous   Oximetry   Continuous Oximetry Yes   O2 Alarms Set & Reviewed Yes   Oxygen   Home O2 LPM Flow 3.5 LPM   Home O2 Delivery Method Nasal Cannula   Home O2 Frequency of Use Continuous   Pulse Oximetry 98 %   O2 (LPM) 4   O2 Daily Delivery Respiratory  OxyMask

## 2017-03-23 NOTE — FLOWSHEET NOTE
RES     03/23/17 1453   Events/Summary/Plan   Events/Summary/Plan SVN given   SVN Group   #SVN Performed Yes   Given By: Mouthpiece   Chest Exam   Respiration 20   Breath Sounds   Pre/Post Intervention Pre Intervention Assessment   RUL Breath Sounds Expiratory Wheezes   RML Breath Sounds Expiratory Wheezes   RLL Breath Sounds Diminished   MARC Breath Sounds Expiratory Wheezes   LLL Breath Sounds Expiratory Wheezes   Oximetry   Continuous Oximetry Yes   Oxygen   Pulse Oximetry 98 %   O2 (LPM) 3   O2 Daily Delivery Respiratory  Silicone Nasal Cannula

## 2017-03-23 NOTE — PROGRESS NOTES
Hospital Medicine Progress Note, Adult, Complex               Author: JOHN Coe  Date & Time created: 3/23/2017  8:58 AM     Interval History:  65 y/o F re-admitted w/COPD exacerbation and R Humerus fracture after multi leaving AMA, Ortho consulted- remain in sling and WBAT    3/23. Feeling better today but still wheezing. Promises to stay in hospital this time until better    Review of Systems:  Review of Systems   Constitutional: Positive for malaise/fatigue. Negative for fever and chills.   HENT: Negative for congestion and sore throat.    Eyes: Negative for blurred vision and double vision.   Respiratory: Positive for cough, shortness of breath and wheezing. Negative for hemoptysis and sputum production.    Cardiovascular: Negative for chest pain and palpitations.   Gastrointestinal: Negative for nausea, vomiting, abdominal pain and diarrhea.   Genitourinary: Negative.    Musculoskeletal: Positive for falls (previously, none this admission). Negative for back pain and neck pain.        R arm and shoulder pain   Skin: Negative.    Neurological: Positive for weakness. Negative for dizziness, tingling, focal weakness and headaches.   Psychiatric/Behavioral: Negative for depression. The patient is nervous/anxious.        Physical Exam:  Physical Exam   Constitutional: She is oriented to person, place, and time. She appears well-developed and well-nourished. No distress.   Disheveled   HENT:   Head: Normocephalic and atraumatic.   Mouth/Throat: Oropharynx is clear and moist. No oropharyngeal exudate.   Eyes: Conjunctivae are normal. Right eye exhibits no discharge. Left eye exhibits no discharge. No scleral icterus.   Neck: Normal range of motion. Neck supple. No JVD present.   Cardiovascular: Normal rate, regular rhythm and normal heart sounds.    No murmur heard.  Pulmonary/Chest: Effort normal. No respiratory distress. She has wheezes. She exhibits no tenderness.   Rhonchi  Wheezes throughout     Abdominal: Soft. Bowel sounds are normal. She exhibits no distension.   Musculoskeletal:   R arm in sling,  equal and strong, wiggles fingers, cap refill < 3sec, skin warm, pink, dry   Neurological: She is alert and oriented to person, place, and time.   Skin: Skin is warm and dry. She is not diaphoretic.   Psychiatric:   Anxious, restless, freq coming out of room   Nursing note and vitals reviewed.      Labs:        Invalid input(s): [id number]  Recent Labs      17   0020   TROPONINI  <0.01   --    BNPBTYPENAT   --   129*     Recent Labs      17   03017   0020   SODIUM  135  132*  131*   POTASSIUM  4.1  4.6  4.7   CHLORIDE  98  98  98   CO2  28  26  22   BUN  39*  42*  44*   CREATININE  0.82  0.66  0.99   CALCIUM  8.5  8.5  8.5     Recent Labs      17   0020   ALTSGPT  14   --    --    ASTSGOT  12   --    --    ALKPHOSPHAT  75   --    --    TBILIRUBIN  0.2   --    --    GLUCOSE  176*  277*  322*     Recent Labs      17   0300  17   0020   RBC  5.19  4.92  5.03   HEMOGLOBIN  13.8  13.1  13.4   HEMATOCRIT  43.2  41.3  43.2   PLATELETCT  245  254  252     Recent Labs      17   0020   WBC  15.3*  12.9*  18.9*   NEUTSPOLYS  79.00*  83.40*   --    LYMPHOCYTES  14.00*  6.60*   --    MONOCYTES  6.10  4.80   --    EOSINOPHILS  0.00  0.00   --    BASOPHILS  0.00  0.20   --    ASTSGOT  12   --    --    ALTSGPT  14   --    --    ALKPHOSPHAT  75   --    --    TBILIRUBIN  0.2   --    --            Hemodynamics:  Temp (24hrs), Av.7 °C (98 °F), Min:36.4 °C (97.6 °F), Max:36.9 °C (98.4 °F)  Temperature: 36.9 °C (98.4 °F)  Pulse  Av.4  Min: 46  Max: 91   Blood Pressure: 139/65 mmHg     Respiratory:    Respiration: 20, Pulse Oximetry: 99 %, O2 Daily Delivery Respiratory : OxyMask     Given By:: Mouthpiece, Work Of Breathing / Effort: Moderate  RUL  Breath Sounds: Expiratory Wheezes, RML Breath Sounds: Expiratory Wheezes, RLL Breath Sounds: Expiratory Wheezes, MARC Breath Sounds: Expiratory Wheezes, LLL Breath Sounds: Expiratory Wheezes  Fluids:  No intake or output data in the 24 hours ending 03/23/17 0858     GI/Nutrition:  Orders Placed This Encounter   Procedures   • Diet Order     Standing Status: Standing      Number of Occurrences: 1      Standing Expiration Date:      Order Specific Question:  Diet:     Answer:  Diabetic [3]     Medical Decision Making, by Problem:  Active Hospital Problems    Diagnosis   • COPD exacerbation (CMS-HCC) [J44.1]  - continue RT protocol, Zithromax, Ceftriaxone, IV Solumedrol,  symbicort, duonebs  - Continue O2 as needed with goal >91%      • Closed fracture of proximal end of humerus [S42.399A]  - Dr Ozuna saw- WBAT, currently in sling- complains about not being able to apply it- education provided  - pain management PRN - drug seeking behavior, monitor   - f/u Ortho outpatient    • Type 2 diabetes mellitus, uncontrolled (CMS-HCC) [E11.65]  - dietary and lifestyle modifications discussed, limited as pt is homeless, continue iss, outpt follow-up, patient has declined diabetes educator   • On home oxygen therapy [Z99.81]  - 3L continue at on discharge    • CAD (coronary artery disease) [I25.10]  - ASA, ACEi, BB, lifestyle and dietary modifications discussed   • Tobacco abuse [Z72.0]  - advised cessation, not interested at this time- refusing nicotine     Hypertension   - cont clonidine, lasix/K, metoprolol, and lisinopril    Diabetic neuropathy  - cont gabapentin    Bipolar, anxiety  - cont celexa, risperidone     Awaiting medical bed. Likely another 1-2 nights     Dispo: d/c when clinically improved. She is homeless and has no close relatives. Claims she is banned from shelter. CM assisting with case-discussed with them today during rounding.     Labs reviewed and Radiology images reviewed  Fagan catheter: No  Fagan      DVT Prophylaxis: Heparin  DVT prophylaxis - mechanical: Not indicated at this time, ambulatory  Ulcer prophylaxis: Not indicated (eating regular diet)  Antibiotics: Treating active infection/contamination beyond 24 hours perioperative coverage  Assessed for rehab: Patient was assess for and/or received rehabilitation services during this hospitalization

## 2017-03-23 NOTE — PROGRESS NOTES
Assessment complete. A&Ox4. C/o pain 6/10. Will be medicated per MAR prn. Discussed POC w/ pt. Educated on medications per MAR. Discussed the importance of calling for needs and before getting OOB to prevent injury/falls. Pt verbalizes understanding. Educated on importance of titrating O2 levels. Pt verbalizes understanding. Call light in reach. Bed in lowest position. No other needs identified. Will continue hourly rounding.

## 2017-03-24 LAB
ANION GAP SERPL CALC-SCNC: 9 MMOL/L (ref 0–11.9)
BUN SERPL-MCNC: 33 MG/DL (ref 8–22)
CALCIUM SERPL-MCNC: 8.5 MG/DL (ref 8.5–10.5)
CHLORIDE SERPL-SCNC: 99 MMOL/L (ref 96–112)
CO2 SERPL-SCNC: 25 MMOL/L (ref 20–33)
CREAT SERPL-MCNC: 0.66 MG/DL (ref 0.5–1.4)
ERYTHROCYTE [DISTWIDTH] IN BLOOD BY AUTOMATED COUNT: 42.3 FL (ref 35.9–50)
GFR SERPL CREATININE-BSD FRML MDRD: >60 ML/MIN/1.73 M 2
GLUCOSE BLD-MCNC: 205 MG/DL (ref 65–99)
GLUCOSE BLD-MCNC: 237 MG/DL (ref 65–99)
GLUCOSE BLD-MCNC: 265 MG/DL (ref 65–99)
GLUCOSE BLD-MCNC: 269 MG/DL (ref 65–99)
GLUCOSE SERPL-MCNC: 253 MG/DL (ref 65–99)
HCT VFR BLD AUTO: 40.5 % (ref 37–47)
HGB BLD-MCNC: 12.9 G/DL (ref 12–16)
MCH RBC QN AUTO: 26.6 PG (ref 27–33)
MCHC RBC AUTO-ENTMCNC: 31.9 G/DL (ref 33.6–35)
MCV RBC AUTO: 83.5 FL (ref 81.4–97.8)
PLATELET # BLD AUTO: 250 K/UL (ref 164–446)
PMV BLD AUTO: 10.8 FL (ref 9–12.9)
POTASSIUM SERPL-SCNC: 4.6 MMOL/L (ref 3.6–5.5)
RBC # BLD AUTO: 4.85 M/UL (ref 4.2–5.4)
SODIUM SERPL-SCNC: 133 MMOL/L (ref 135–145)
WBC # BLD AUTO: 18.9 K/UL (ref 4.8–10.8)

## 2017-03-24 PROCEDURE — 99232 SBSQ HOSP IP/OBS MODERATE 35: CPT | Performed by: HOSPITALIST

## 2017-03-24 PROCEDURE — A9270 NON-COVERED ITEM OR SERVICE: HCPCS | Performed by: HOSPITALIST

## 2017-03-24 PROCEDURE — 700102 HCHG RX REV CODE 250 W/ 637 OVERRIDE(OP): Performed by: HOSPITALIST

## 2017-03-24 PROCEDURE — 700102 HCHG RX REV CODE 250 W/ 637 OVERRIDE(OP): Performed by: NURSE PRACTITIONER

## 2017-03-24 PROCEDURE — 85027 COMPLETE CBC AUTOMATED: CPT

## 2017-03-24 PROCEDURE — 36415 COLL VENOUS BLD VENIPUNCTURE: CPT

## 2017-03-24 PROCEDURE — 94760 N-INVAS EAR/PLS OXIMETRY 1: CPT

## 2017-03-24 PROCEDURE — 700101 HCHG RX REV CODE 250: Performed by: HOSPITALIST

## 2017-03-24 PROCEDURE — 94640 AIRWAY INHALATION TREATMENT: CPT

## 2017-03-24 PROCEDURE — A9270 NON-COVERED ITEM OR SERVICE: HCPCS | Performed by: NURSE PRACTITIONER

## 2017-03-24 PROCEDURE — 82962 GLUCOSE BLOOD TEST: CPT

## 2017-03-24 PROCEDURE — 700111 HCHG RX REV CODE 636 W/ 250 OVERRIDE (IP): Performed by: FAMILY MEDICINE

## 2017-03-24 PROCEDURE — 80048 BASIC METABOLIC PNL TOTAL CA: CPT

## 2017-03-24 PROCEDURE — 770006 HCHG ROOM/CARE - MED/SURG/GYN SEMI*

## 2017-03-24 PROCEDURE — 700105 HCHG RX REV CODE 258: Performed by: FAMILY MEDICINE

## 2017-03-24 RX ADMIN — METOCLOPRAMIDE HYDROCHLORIDE 5 MG: 5 TABLET ORAL at 12:22

## 2017-03-24 RX ADMIN — IPRATROPIUM BROMIDE AND ALBUTEROL SULFATE 3 ML: .5; 3 SOLUTION RESPIRATORY (INHALATION) at 14:31

## 2017-03-24 RX ADMIN — GUAIFENESIN 600 MG: 600 TABLET, EXTENDED RELEASE ORAL at 20:07

## 2017-03-24 RX ADMIN — METOCLOPRAMIDE HYDROCHLORIDE 5 MG: 5 TABLET ORAL at 18:47

## 2017-03-24 RX ADMIN — INSULIN LISPRO 7 UNITS: 100 INJECTION, SOLUTION INTRAVENOUS; SUBCUTANEOUS at 20:11

## 2017-03-24 RX ADMIN — INSULIN LISPRO 7 UNITS: 100 INJECTION, SOLUTION INTRAVENOUS; SUBCUTANEOUS at 05:49

## 2017-03-24 RX ADMIN — IPRATROPIUM BROMIDE AND ALBUTEROL SULFATE 3 ML: .5; 3 SOLUTION RESPIRATORY (INHALATION) at 18:39

## 2017-03-24 RX ADMIN — GABAPENTIN 300 MG: 300 CAPSULE ORAL at 20:07

## 2017-03-24 RX ADMIN — INSULIN LISPRO 4 UNITS: 100 INJECTION, SOLUTION INTRAVENOUS; SUBCUTANEOUS at 11:34

## 2017-03-24 RX ADMIN — CEFTRIAXONE 2 G: 2 INJECTION, POWDER, FOR SOLUTION INTRAMUSCULAR; INTRAVENOUS at 09:47

## 2017-03-24 RX ADMIN — INSULIN LISPRO 4 UNITS: 100 INJECTION, SOLUTION INTRAVENOUS; SUBCUTANEOUS at 16:20

## 2017-03-24 RX ADMIN — SIMVASTATIN 20 MG: 20 TABLET, FILM COATED ORAL at 20:07

## 2017-03-24 RX ADMIN — METHYLPREDNISOLONE SODIUM SUCCINATE 62.5 MG: 125 INJECTION, POWDER, FOR SOLUTION INTRAMUSCULAR; INTRAVENOUS at 05:25

## 2017-03-24 RX ADMIN — IPRATROPIUM BROMIDE AND ALBUTEROL SULFATE 3 ML: .5; 3 SOLUTION RESPIRATORY (INHALATION) at 07:20

## 2017-03-24 RX ADMIN — BUDESONIDE AND FORMOTEROL FUMARATE DIHYDRATE 2 PUFF: 160; 4.5 AEROSOL RESPIRATORY (INHALATION) at 07:20

## 2017-03-24 RX ADMIN — CITALOPRAM HYDROBROMIDE 20 MG: 20 TABLET ORAL at 09:46

## 2017-03-24 RX ADMIN — IPRATROPIUM BROMIDE AND ALBUTEROL SULFATE 3 ML: .5; 3 SOLUTION RESPIRATORY (INHALATION) at 11:05

## 2017-03-24 RX ADMIN — METOCLOPRAMIDE HYDROCHLORIDE 5 MG: 5 TABLET ORAL at 06:14

## 2017-03-24 RX ADMIN — MORPHINE SULFATE 15 MG: 15 TABLET ORAL at 18:48

## 2017-03-24 RX ADMIN — GABAPENTIN 300 MG: 300 CAPSULE ORAL at 16:15

## 2017-03-24 RX ADMIN — GABAPENTIN 300 MG: 300 CAPSULE ORAL at 09:46

## 2017-03-24 RX ADMIN — GUAIFENESIN 600 MG: 600 TABLET, EXTENDED RELEASE ORAL at 09:46

## 2017-03-24 RX ADMIN — ASPIRIN 81 MG: 81 TABLET ORAL at 09:46

## 2017-03-24 RX ADMIN — MORPHINE SULFATE 15 MG: 15 TABLET ORAL at 12:22

## 2017-03-24 RX ADMIN — OXYCODONE HYDROCHLORIDE 10 MG: 10 TABLET ORAL at 20:07

## 2017-03-24 RX ADMIN — AZITHROMYCIN 500 MG: 250 TABLET, FILM COATED ORAL at 09:46

## 2017-03-24 RX ADMIN — BUDESONIDE AND FORMOTEROL FUMARATE DIHYDRATE 2 PUFF: 160; 4.5 AEROSOL RESPIRATORY (INHALATION) at 18:39

## 2017-03-24 RX ADMIN — RISPERIDONE 0.5 MG: 0.5 TABLET, FILM COATED ORAL at 20:07

## 2017-03-24 RX ADMIN — MORPHINE SULFATE 15 MG: 15 TABLET ORAL at 05:25

## 2017-03-24 RX ADMIN — INSULIN GLARGINE 32 UNITS: 100 INJECTION, SOLUTION SUBCUTANEOUS at 20:11

## 2017-03-24 RX ADMIN — TRAZODONE HYDROCHLORIDE 50 MG: 50 TABLET ORAL at 20:07

## 2017-03-24 ASSESSMENT — PAIN SCALES - GENERAL
PAINLEVEL_OUTOF10: 5
PAINLEVEL_OUTOF10: 8
PAINLEVEL_OUTOF10: 0
PAINLEVEL_OUTOF10: 4
PAINLEVEL_OUTOF10: 7

## 2017-03-24 ASSESSMENT — ENCOUNTER SYMPTOMS
DIARRHEA: 0
NAUSEA: 0
DIZZINESS: 0
WEAKNESS: 1
CHILLS: 0
HEADACHES: 0
PALPITATIONS: 0
MYALGIAS: 0
VOMITING: 0
SHORTNESS OF BREATH: 1
WHEEZING: 1
FEVER: 0
ABDOMINAL PAIN: 0
FOCAL WEAKNESS: 0
COUGH: 1
CONSTIPATION: 0

## 2017-03-24 NOTE — PROGRESS NOTES
Assumed care for pt. S/p transfer from CDU. Assessed pt to be alert and oriented x4, oriented to unit and medical equipment. Report received from prior RN. PT not in acute respiratory distress. Connected extensions for oxygen tubing. Humidifier in place. Denies pain and discomfort. Pt requesting for food, educated on diet order. Noted BS elevated.

## 2017-03-24 NOTE — PROGRESS NOTES
Assessment complete. A&Ox4. C/o pain 7/10. Will be medicated per MAR prn. Discussed POC w/ pt. Educated on medications per MAR. Discussed the importance of calling for needs and before getting OOB to prevent injury/falls. Pt verbalizes understanding. Call light in reach. Bed in lowest position. No other needs identified. Will continue hourly rounding.

## 2017-03-24 NOTE — CARE PLAN
Problem: Bronchoconstriction:  Goal: Improve in air movement and diminished wheezing  Outcome: PROGRESSING AS EXPECTED  Intervention: Implement inhaled treatments  DUO QID

## 2017-03-24 NOTE — CARE PLAN
Problem: Oxygenation:  Goal: Maintain adequate oxygenation dependent on patient condition  Outcome: PROGRESSING AS EXPECTED  Intervention: Manage oxygen therapy by monitoring pulse oximetry and/or ABG values  95% 4LPM NC

## 2017-03-24 NOTE — PROGRESS NOTES
Pt transported to S610-01 w/ this RN and . Pt has all personal belongings. Chart and medications given to receiving RN.

## 2017-03-24 NOTE — PROGRESS NOTES
Hospital Medicine Progress Note, Adult, Complex               Author: Craig ERMA Zayra Date & Time created: 3/24/2017  3:48 PM     65 y/o F re-admitted w/COPD exacerbation and R Humerus fracture after multi leaving AMA, Ortho consulted- remain in sling and WBAT    Interval History:  3/24 - improved day over day, decreased wheeze, improved oxygenation. Current sats: Pulse Oximetry: 96 % on O2 (LPM): 4. Downtitrating O2 as tolerating.     3/23. Feeling better today but still wheezing. Promises to stay in hospital this time until better    Review of Systems:  Review of Systems   Constitutional: Positive for malaise/fatigue. Negative for fever and chills.   Respiratory: Positive for cough, shortness of breath and wheezing.    Cardiovascular: Negative for chest pain and palpitations.   Gastrointestinal: Negative for nausea, vomiting, abdominal pain, diarrhea and constipation.   Genitourinary: Negative for dysuria.   Musculoskeletal: Negative for myalgias.   Skin: Negative for itching.   Neurological: Positive for weakness. Negative for dizziness, focal weakness and headaches.   All other systems reviewed and are negative.      Physical Exam:  Physical Exam   Constitutional: She is oriented to person, place, and time. She appears well-developed and well-nourished.   HENT:   Head: Normocephalic and atraumatic.   Mouth/Throat: Oropharynx is clear and moist.   Eyes: Conjunctivae and EOM are normal. Pupils are equal, round, and reactive to light. No scleral icterus.   Neck: Normal range of motion. Neck supple. No tracheal deviation present. No thyromegaly present.   Cardiovascular: Normal rate, regular rhythm, normal heart sounds and intact distal pulses.    No murmur heard.  Pulmonary/Chest: Effort normal. No respiratory distress. She has decreased breath sounds in the right lower field and the left lower field. She has wheezes.   Abdominal: Soft. Bowel sounds are normal. She exhibits no distension. There is no tenderness.    Musculoskeletal: Normal range of motion. She exhibits no edema or tenderness.   Lymphadenopathy:     She has no cervical adenopathy.        Right: No supraclavicular adenopathy present.        Left: No supraclavicular adenopathy present.   Neurological: She is alert and oriented to person, place, and time. No cranial nerve deficit.   Skin: Skin is warm and dry.   Vitals reviewed.      Labs:        Invalid input(s): [id number]  Recent Labs      17   BNPBTYPENAT  129*     Recent Labs      17   SODIUM  132*  131*  133*   POTASSIUM  4.6  4.7  4.6   CHLORIDE  98  98  99   CO2  26  22  25   BUN  42*  44*  33*   CREATININE  0.66  0.99  0.66   CALCIUM  8.5  8.5  8.5     Recent Labs      17   GLUCOSE  277*  322*  253*     Recent Labs      17   0140   RBC  4.92  5.03  4.85   HEMOGLOBIN  13.1  13.4  12.9   HEMATOCRIT  41.3  43.2  40.5   PLATELETCT  254  252  250     Recent Labs      17   0140   WBC  12.9*  18.9*  18.9*   NEUTSPOLYS  83.40*   --    --    LYMPHOCYTES  6.60*   --    --    MONOCYTES  4.80   --    --    EOSINOPHILS  0.00   --    --    BASOPHILS  0.20   --    --            Hemodynamics:  Temp (24hrs), Av.7 °C (98.1 °F), Min:36.3 °C (97.3 °F), Max:37.1 °C (98.7 °F)  Temperature: 36.6 °C (97.8 °F)  Pulse  Av.7  Min: 56  Max: 81   Blood Pressure: 137/59 mmHg     Respiratory:    Respiration: 19, Pulse Oximetry: 96 %, O2 Daily Delivery Respiratory : Silicone Nasal Cannula     Given By:: Mouthpiece, #MDI/DPI Given: MDI/DPI x 1, Work Of Breathing / Effort: Mild  RUL Breath Sounds: Expiratory Wheezes, RML Breath Sounds: Expiratory Wheezes, RLL Breath Sounds: Diminished, MARC Breath Sounds: Expiratory Wheezes, LLL Breath Sounds: Diminished  Fluids:    Intake/Output Summary (Last 24 hours) at 17 1548  Last data filed at  03/24/17 0600   Gross per 24 hour   Intake   1600 ml   Output      0 ml   Net   1600 ml        GI/Nutrition:  Orders Placed This Encounter   Procedures   • Diet Order     Standing Status: Standing      Number of Occurrences: 1      Standing Expiration Date:      Order Specific Question:  Diet:     Answer:  Diabetic [3]     Medical Decision Making, by Problem:  Active Hospital Problems    Diagnosis   • COPD exacerbation (CMS-HCC) [J44.1]  With acute exacerbation.  Home O2: YesHome O2 LPM Flow: 3 LPM Supplemental O2 PRN with goal SpO2 greater than 90%.  RT protocol.  PO steroids  Current sats: Pulse Oximetry: 96 % on O2 (LPM): 4        • Closed fracture of proximal end of humerus [S42.245A]  - Dr Ozuna saw- WBAT, currently in sling- complains about not being able to apply it- education provided  - pain management PRN - drug seeking behavior, monitor   - f/u Ortho outpatient      • Type 2 diabetes mellitus, uncontrolled (CMS-HCC) [E11.65]  In-hospital FSBG goal less than 180 mg/dL  SSI qAC & qHS when eating, q6 when NPO  GLUCOSE - ACCU-CK   Date/Time Value Ref Range Status   03/24/2017 11:31 * 65 - 99 mg/dL Final   03/24/2017 05:47 * 65 - 99 mg/dL Final   03/23/2017 11:13 * 65 - 99 mg/dL Final   03/23/2017 09:21 * 65 - 99 mg/dL Final      • On home oxygen therapy [Z99.81] - chronic respiratory failure with hypoxia  - 3L continue at on discharge      • CAD (coronary artery disease) [I25.10]  - ASA, ACEi, BB, lifestyle and dietary modifications discussed     • Tobacco abuse [Z72.0]  - advised cessation, not interested at this time- refusing nicotine       Hypertension   SBP goal less than 140 mmHg  DBP goal less than 90 mmHg  PRN and antihypertensives to titrate by hospitalist towards goal  Most recent Blood Pressure: 137/59 mmHg      Diabetic neuropathy  - cont gabapentin      Bipolar, anxiety  - cont celexa, risperidone         Dispo: shelter when back on 3L      Labs reviewed and  Medications reviewed  Fagan catheter: No Fagan      DVT Prophylaxis: Heparin

## 2017-03-24 NOTE — PROGRESS NOTES
2-RN skin check done. Noted to have fair skin condition. Noted multiple bruising to abdomen from heparin injections. Pt wearing sling to right arm d/t fracture. No redness to abdominal and breast folds. Noted dryness with callous to soles of feet. Proper skin protocol in place. Pt has history of non-compliance, requesting for food. Given per diet order, educated on diet restrictions and notified about blood sugar reading. Denies SOB or . Cotninued on oxygen via NC at 4L/m. Encouraged to do deep breathing exercises. Kept safe and comfortable and provided needs.

## 2017-03-24 NOTE — PROGRESS NOTES
Pt non-compliant with diet orders. Frequently requesting food, educated on diet restrictions. Provided low calorie snacks.

## 2017-03-24 NOTE — PROGRESS NOTES
AAOx4. C/o 5/10 pain, preferring to wait until closer to lunch time for pain medication. -N/V. -N/T. Denies new onset of chest pain/SOB. +BS in all 4 quadrants, last BM this AM per pt. At times non-compliant w/ diabetic diet & refusing diabetic educator. Standby assist, tolerates well.POC discussed, denies further needs at this time. Will most likely need to be dcd to shelter. Bed alarm on, call light within reach & hourly rounding in place.

## 2017-03-24 NOTE — CARE PLAN
Problem: Safety  Goal: Will remain free from falls  Intervention: Assess risk factors for falls    03/23/17 2300 03/24/17 0000   OTHER   Fall Risk High Risk to Fall - 2 or more points  --    Risk for Injury-Any positive answers results in the pt being at high risk for fall related injury Not Applicable --    Mobility Status Assessment 1-1 Healthcare Provider Required for Assistance with Ambulation & Transfer --    History of fall 0 --    Date of Last Fall 03/11/17 --    Pt Calls for Assistance --  Yes       Intervention: Implement fall precautions    03/23/17 2108 03/23/17 2300   OTHER   Environmental Precautions --  Treaded Slipper Socks on Patient   IV Pole on Same Side of Bed as Bathroom (SL) --    Bedrails --  Bedrails Closest to Bathroom Down   Chair/Bed Strip Alarm --  Patient Educated Regarding Fall Risk and Need for Bed Alarm, Understands and Continues to Refuse   Patient calls appropriately prior to ambulation, could not find any bed alarm box, will connect once available. Strip alarm sensor intact.       Problem: Venous Thromboembolism (VTW)/Deep Vein Thrombosis (DVT) Prevention:  Goal: Patient will participate in Venous Thrombosis (VTE)/Deep Vein Thrombosis (DVT)Prevention Measures    03/23/17 2300   OTHER   Risk Assessment Score 3   VTE RISK High   Mechanical Prophylaxis Patient educated regarding VTE risk and need for SCDs, understands and continues to refuse   Pharmacologic Prophylaxis Used Unfractionated Heparin         Problem: Respiratory:  Goal: Respiratory status will improve  Outcome: PROGRESSING AS EXPECTED  Educated on proper deep breathing and coughing technique. Encouraged to use incentive spirometer. Continued on solumedrol per IV. Noted minimal effort in breathing. Continued on oxygen via NC at 4L/m. Will titrate down to baseline 3l/m as tolerated.

## 2017-03-25 LAB
GLUCOSE BLD-MCNC: 112 MG/DL (ref 65–99)
GLUCOSE BLD-MCNC: 120 MG/DL (ref 65–99)
GLUCOSE BLD-MCNC: 137 MG/DL (ref 65–99)
GLUCOSE BLD-MCNC: 166 MG/DL (ref 65–99)

## 2017-03-25 PROCEDURE — 770006 HCHG ROOM/CARE - MED/SURG/GYN SEMI*

## 2017-03-25 PROCEDURE — 82962 GLUCOSE BLOOD TEST: CPT

## 2017-03-25 PROCEDURE — 94640 AIRWAY INHALATION TREATMENT: CPT

## 2017-03-25 PROCEDURE — 700102 HCHG RX REV CODE 250 W/ 637 OVERRIDE(OP): Performed by: NURSE PRACTITIONER

## 2017-03-25 PROCEDURE — 700111 HCHG RX REV CODE 636 W/ 250 OVERRIDE (IP): Performed by: FAMILY MEDICINE

## 2017-03-25 PROCEDURE — 700105 HCHG RX REV CODE 258: Performed by: FAMILY MEDICINE

## 2017-03-25 PROCEDURE — A9270 NON-COVERED ITEM OR SERVICE: HCPCS | Performed by: HOSPITALIST

## 2017-03-25 PROCEDURE — A9270 NON-COVERED ITEM OR SERVICE: HCPCS | Performed by: NURSE PRACTITIONER

## 2017-03-25 PROCEDURE — 700102 HCHG RX REV CODE 250 W/ 637 OVERRIDE(OP): Performed by: HOSPITALIST

## 2017-03-25 PROCEDURE — 700101 HCHG RX REV CODE 250: Performed by: HOSPITALIST

## 2017-03-25 PROCEDURE — 99232 SBSQ HOSP IP/OBS MODERATE 35: CPT | Performed by: HOSPITALIST

## 2017-03-25 RX ADMIN — GUAIFENESIN 600 MG: 600 TABLET, EXTENDED RELEASE ORAL at 20:37

## 2017-03-25 RX ADMIN — IPRATROPIUM BROMIDE AND ALBUTEROL SULFATE 3 ML: .5; 3 SOLUTION RESPIRATORY (INHALATION) at 18:52

## 2017-03-25 RX ADMIN — IPRATROPIUM BROMIDE AND ALBUTEROL SULFATE 3 ML: .5; 3 SOLUTION RESPIRATORY (INHALATION) at 14:50

## 2017-03-25 RX ADMIN — GABAPENTIN 300 MG: 300 CAPSULE ORAL at 07:49

## 2017-03-25 RX ADMIN — GABAPENTIN 300 MG: 300 CAPSULE ORAL at 16:03

## 2017-03-25 RX ADMIN — TRAZODONE HYDROCHLORIDE 50 MG: 50 TABLET ORAL at 20:37

## 2017-03-25 RX ADMIN — BUDESONIDE AND FORMOTEROL FUMARATE DIHYDRATE 2 PUFF: 160; 4.5 AEROSOL RESPIRATORY (INHALATION) at 18:52

## 2017-03-25 RX ADMIN — CEFTRIAXONE 2 G: 2 INJECTION, POWDER, FOR SOLUTION INTRAMUSCULAR; INTRAVENOUS at 07:49

## 2017-03-25 RX ADMIN — SIMVASTATIN 20 MG: 20 TABLET, FILM COATED ORAL at 20:37

## 2017-03-25 RX ADMIN — GABAPENTIN 300 MG: 300 CAPSULE ORAL at 20:37

## 2017-03-25 RX ADMIN — RISPERIDONE 0.5 MG: 0.5 TABLET, FILM COATED ORAL at 20:37

## 2017-03-25 RX ADMIN — INSULIN LISPRO 3 UNITS: 100 INJECTION, SOLUTION INTRAVENOUS; SUBCUTANEOUS at 20:27

## 2017-03-25 RX ADMIN — MORPHINE SULFATE 15 MG: 15 TABLET ORAL at 04:10

## 2017-03-25 RX ADMIN — INSULIN GLARGINE 32 UNITS: 100 INJECTION, SOLUTION SUBCUTANEOUS at 20:28

## 2017-03-25 RX ADMIN — GUAIFENESIN 600 MG: 600 TABLET, EXTENDED RELEASE ORAL at 07:49

## 2017-03-25 RX ADMIN — BUDESONIDE AND FORMOTEROL FUMARATE DIHYDRATE 2 PUFF: 160; 4.5 AEROSOL RESPIRATORY (INHALATION) at 06:31

## 2017-03-25 RX ADMIN — IPRATROPIUM BROMIDE AND ALBUTEROL SULFATE 3 ML: .5; 3 SOLUTION RESPIRATORY (INHALATION) at 10:40

## 2017-03-25 RX ADMIN — CITALOPRAM HYDROBROMIDE 20 MG: 20 TABLET ORAL at 07:50

## 2017-03-25 RX ADMIN — METOCLOPRAMIDE HYDROCHLORIDE 5 MG: 5 TABLET ORAL at 05:30

## 2017-03-25 RX ADMIN — METOCLOPRAMIDE HYDROCHLORIDE 5 MG: 5 TABLET ORAL at 11:16

## 2017-03-25 RX ADMIN — AZITHROMYCIN 500 MG: 250 TABLET, FILM COATED ORAL at 07:49

## 2017-03-25 RX ADMIN — MORPHINE SULFATE 15 MG: 15 TABLET ORAL at 17:19

## 2017-03-25 RX ADMIN — IPRATROPIUM BROMIDE AND ALBUTEROL SULFATE 3 ML: .5; 3 SOLUTION RESPIRATORY (INHALATION) at 06:31

## 2017-03-25 RX ADMIN — ASPIRIN 81 MG: 81 TABLET ORAL at 07:49

## 2017-03-25 RX ADMIN — OXYCODONE HYDROCHLORIDE 10 MG: 10 TABLET ORAL at 20:40

## 2017-03-25 RX ADMIN — OXYCODONE HYDROCHLORIDE 10 MG: 10 TABLET ORAL at 07:55

## 2017-03-25 ASSESSMENT — ENCOUNTER SYMPTOMS
WHEEZING: 1
FOCAL WEAKNESS: 0
CONSTIPATION: 0
CHILLS: 0
SHORTNESS OF BREATH: 1
PALPITATIONS: 0
COUGH: 1
FEVER: 0
HEADACHES: 0
WEAKNESS: 1
DIZZINESS: 0
NAUSEA: 0
VOMITING: 0
DIARRHEA: 0
MYALGIAS: 0
ABDOMINAL PAIN: 0

## 2017-03-25 ASSESSMENT — PAIN SCALES - GENERAL
PAINLEVEL_OUTOF10: 5
PAINLEVEL_OUTOF10: 7
PAINLEVEL_OUTOF10: 0
PAINLEVEL_OUTOF10: 7
PAINLEVEL_OUTOF10: 7
PAINLEVEL_OUTOF10: 6
PAINLEVEL_OUTOF10: 2

## 2017-03-25 NOTE — PROGRESS NOTES
Assessed pt to be alert and oriented x4, non-compliant with diet. Consistently requesting for food, refuses diabetes education. Given insulin per sliding scale and as scheduled. Verbalized improvement in breathing, administered mucinex. Encouraged to use incentive spirometer. Kept safe and comfortable and provided needs.

## 2017-03-25 NOTE — PROGRESS NOTES
Received report, assumed care at 0700. Patient is alert and oriented x4, still having SOB. Complained of pain to right arm and and chest- oxycodone given as needed. RT called for PRN treatment.

## 2017-03-25 NOTE — CARE PLAN
Problem: Oxygenation:  Goal: Maintain adequate oxygenation dependent on patient condition  Outcome: PROGRESSING AS EXPECTED  Intervention: Levels of oxygenation will improve to baseline  95-97% on 3LPM NC ( home O2 baseline)      Problem: Bronchoconstriction:  Goal: Improve in air movement and diminished wheezing  Outcome: PROGRESSING AS EXPECTED  Intervention: Implement inhaled treatments  DUO QID

## 2017-03-25 NOTE — PROGRESS NOTES
Hospital Medicine Progress Note, Adult, Complex               Author: Craig ERMA Zayra Date & Time created: 3/25/2017  10:20 AM     65 y/o F re-admitted w/COPD exacerbation and R Humerus fracture after multi leaving AMA, Ortho consulted- remain in sling and WBAT    Interval History:  3/25 - doing better, Current sats: Pulse Oximetry: 95 % on O2 (LPM): 3 and she feels like she is almost ready to go, less wheezing, and she feels like her chest is less tight. Questions answered. Advised we need to arrange her home O2.     3/24 - improved day over day, decreased wheeze, improved oxygenation. Current sats: Pulse Oximetry: 96 % on O2 (LPM): 4. Downtitrating O2 as tolerating.     3/23. Feeling better today but still wheezing. Promises to stay in hospital this time until better    Review of Systems:  Review of Systems   Constitutional: Positive for malaise/fatigue. Negative for fever and chills.   Respiratory: Positive for cough, shortness of breath and wheezing.    Cardiovascular: Negative for chest pain and palpitations.   Gastrointestinal: Negative for nausea, vomiting, abdominal pain, diarrhea and constipation.   Genitourinary: Negative for dysuria.   Musculoskeletal: Negative for myalgias.   Skin: Negative for itching.   Neurological: Positive for weakness. Negative for dizziness, focal weakness and headaches.   All other systems reviewed and are negative.      Physical Exam:  Physical Exam   Constitutional: She is oriented to person, place, and time. She appears well-developed and well-nourished.   HENT:   Head: Normocephalic and atraumatic.   Mouth/Throat: Oropharynx is clear and moist.   Eyes: Conjunctivae and EOM are normal. Pupils are equal, round, and reactive to light. No scleral icterus.   Neck: Normal range of motion. Neck supple. No tracheal deviation present. No thyromegaly present.   Cardiovascular: Normal rate, regular rhythm, normal heart sounds and intact distal pulses.    No murmur  heard.  Pulmonary/Chest: Effort normal. No respiratory distress. She has decreased breath sounds in the right lower field and the left lower field. She has wheezes.   Abdominal: Soft. Bowel sounds are normal. She exhibits no distension. There is no tenderness.   Musculoskeletal: Normal range of motion. She exhibits no edema or tenderness.   Lymphadenopathy:     She has no cervical adenopathy.        Right: No supraclavicular adenopathy present.        Left: No supraclavicular adenopathy present.   Neurological: She is alert and oriented to person, place, and time. No cranial nerve deficit.   Skin: Skin is warm and dry.   Vitals reviewed.      Labs:        Invalid input(s): [id number]  Recent Labs      17   002   BNPBTYPENAT  129*     Recent Labs      17   014   SODIUM  131*  133*   POTASSIUM  4.7  4.6   CHLORIDE  98  99   CO2  22  25   BUN  44*  33*   CREATININE  0.99  0.66   CALCIUM  8.5  8.5     Recent Labs      17   014   GLUCOSE  322*  253*     Recent Labs      170  17   0140   RBC  5.03  4.85   HEMOGLOBIN  13.4  12.9   HEMATOCRIT  43.2  40.5   PLATELETCT  252  250     Recent Labs      17   0140   WBC  18.9*  18.9*           Hemodynamics:  Temp (24hrs), Av.7 °C (98 °F), Min:36.4 °C (97.6 °F), Max:37 °C (98.6 °F)  Temperature: 36.7 °C (98.1 °F)  Pulse  Av.7  Min: 56  Max: 97   Blood Pressure: 139/75 mmHg     Respiratory:    Respiration: 18, Pulse Oximetry: 95 %, O2 Daily Delivery Respiratory : Silicone Nasal Cannula     Given By:: Mouthpiece, #MDI/DPI Given: MDI/DPI x 1, Work Of Breathing / Effort: Mild  RUL Breath Sounds: Expiratory Wheezes, RML Breath Sounds: Expiratory Wheezes, RLL Breath Sounds: Diminished, MARC Breath Sounds: Expiratory Wheezes, LLL Breath Sounds: Diminished  Fluids:    Intake/Output Summary (Last 24 hours) at 17 1020  Last data filed at 17 0600   Gross per 24 hour   Intake    1300 ml   Output      0 ml   Net   1300 ml        GI/Nutrition:  Orders Placed This Encounter   Procedures   • Diet Order     Standing Status: Standing      Number of Occurrences: 1      Standing Expiration Date:      Order Specific Question:  Diet:     Answer:  Diabetic [3]     Medical Decision Making, by Problem:  Active Hospital Problems    Diagnosis   • COPD exacerbation (CMS-[facility]) [J44.1]  With acute exacerbation.  Home O2: Yes   Home O2 LPM Flow: 3 LPM   Supplemental O2 PRN with goal SpO2 greater than 90%.  RT protocol.  PO steroids  Current sats: Pulse Oximetry: 95 % on O2 (LPM): 3         • Closed fracture of proximal end of humerus [S42.209A]  - Dr Ozuna saw- WBAT, currently in sling- complains about not being able to apply it- education provided  - f/u Ortho outpatient      • Type 2 diabetes mellitus, uncontrolled (CMS-HCC) [E11.65]  In-hospital FSBG goal less than 180 mg/dL  SSI qAC & qHS when eating, q6 when NPO  GLUCOSE - ACCU-CK   Date/Time Value Ref Range Status   03/25/2017 05:23 * 65 - 99 mg/dL Final   03/24/2017 08:06 * 65 - 99 mg/dL Final   03/24/2017 04:17 * 65 - 99 mg/dL Final   03/24/2017 11:31 * 65 - 99 mg/dL Final      • On home oxygen therapy [Z99.81] - chronic respiratory failure with hypoxia  - 3L, need to continue at on discharge but needs to be arranged.     • CAD (coronary artery disease) [I25.10]  - ASA, ACEi, BB, lifestyle and dietary modifications discussed     • Tobacco abuse [Z72.0]  - advised cessation, not interested at this time- refusing nicotine       Hypertension   SBP goal less than 140 mmHg  DBP goal less than 90 mmHg  PRN and antihypertensives to titrate by hospitalist towards goal  Most recent Blood Pressure: 139/75 mmHg       Diabetic neuropathy  - cont gabapentin      Bipolar, anxiety  - cont celexa, risperidone         Dispo: discharge to home when O2 can be arranged.      Labs reviewed and Medications reviewed  Fagan catheter: No  Fagan      DVT Prophylaxis: Heparin

## 2017-03-25 NOTE — PROGRESS NOTES
Pt attended [employer] movie night for about 25 minutes before requesting to return to her room.  Pt loud at times, repeatedly asking about snacks.

## 2017-03-25 NOTE — CARE PLAN
Problem: Infection  Goal: Will remain free from infection  Outcome: PROGRESSING AS EXPECTED  Currently on IV antibiotics.    Problem: Pain Management  Goal: Pain level will decrease to patient’s comfort goal  Outcome: PROGRESSING SLOWER THAN EXPECTED  C/o 8/10 pain this afternoon, medicated per MAR. Resting comfortably.

## 2017-03-25 NOTE — CARE PLAN
Problem: Safety  Goal: Will remain free from injury  Intervention: Provide assistance with mobility  Fall precautions in place. Bed alarm is on. Call light is within reach and patient demonstrated use.      Problem: Knowledge Deficit  Goal: Knowledge of disease process/condition, treatment plan, diagnostic tests, and medications will improve  Intervention: Assess knowledge level of disease process/condition, treatment plan, diagnostic tests, and medications  Plan of care which includes use of IV antibiotics, RT treatments, Oxygen per cannula were discussed with patient who expressed understanding.       Problem: Pain Management  Goal: Pain level will decrease to patient’s comfort goal  Intervention: Follow pain managment plan developed in collaboration with patient and Interdisciplinary Team  Continues to complain pain to right arm and chest- medicated as needed.

## 2017-03-25 NOTE — CARE PLAN
Problem: Safety  Goal: Will remain free from falls  Intervention: Assess risk factors for falls    03/24/17 2000 03/24/17 2326   OTHER   Fall Risk High Risk to Fall - 2 or more points  --    Risk for Injury-Any positive answers results in the pt being at high risk for fall related injury Not Applicable --    Mobility Status Assessment 1-1 Healthcare Provider Required for Assistance with Ambulation & Transfer --    History of fall 0 --    Date of Last Fall 03/11/17 --    Pt Calls for Assistance --  Yes       Intervention: Implement fall precautions    03/24/17 0945 03/24/17 2000   OTHER   Environmental Precautions --  Treaded Slipper Socks on Patient   IV Pole on Same Side of Bed as Bathroom (SL) --    Bedrails --  Bedrails Closest to Bathroom Down   Chair/Bed Strip Alarm --  Yes - Alarm On           Problem: Venous Thromboembolism (VTW)/Deep Vein Thrombosis (DVT) Prevention:  Goal: Patient will participate in Venous Thrombosis (VTE)/Deep Vein Thrombosis (DVT)Prevention Measures  Outcome: PROGRESSING AS EXPECTED    03/24/17 2000   OTHER   Risk Assessment Score 3   VTE RISK High   Mechanical Prophylaxis Patient educated regarding VTE risk and need for SCDs, understands and continues to refuse   Pharmacologic Prophylaxis Used Unfractionated Heparin   Encouraged to ambulate with assistance.     Problem: Pain Management  Goal: Pain level will decrease to patient’s comfort goal  Outcome: PROGRESSING AS EXPECTED  Complaint of pain to fracture site. Administered analgesic per MAR. Noted to be effective.

## 2017-03-26 VITALS
TEMPERATURE: 97.9 F | DIASTOLIC BLOOD PRESSURE: 55 MMHG | BODY MASS INDEX: 38.69 KG/M2 | RESPIRATION RATE: 19 BRPM | OXYGEN SATURATION: 92 % | HEIGHT: 60 IN | WEIGHT: 197.09 LBS | HEART RATE: 69 BPM | SYSTOLIC BLOOD PRESSURE: 112 MMHG

## 2017-03-26 LAB
GLUCOSE BLD-MCNC: 118 MG/DL (ref 65–99)
GLUCOSE BLD-MCNC: 118 MG/DL (ref 65–99)

## 2017-03-26 PROCEDURE — A9270 NON-COVERED ITEM OR SERVICE: HCPCS | Performed by: NURSE PRACTITIONER

## 2017-03-26 PROCEDURE — A9270 NON-COVERED ITEM OR SERVICE: HCPCS | Performed by: HOSPITALIST

## 2017-03-26 PROCEDURE — 700101 HCHG RX REV CODE 250: Performed by: HOSPITALIST

## 2017-03-26 PROCEDURE — 700102 HCHG RX REV CODE 250 W/ 637 OVERRIDE(OP): Performed by: NURSE PRACTITIONER

## 2017-03-26 PROCEDURE — 94640 AIRWAY INHALATION TREATMENT: CPT

## 2017-03-26 PROCEDURE — 99239 HOSP IP/OBS DSCHRG MGMT >30: CPT | Performed by: HOSPITALIST

## 2017-03-26 PROCEDURE — 700105 HCHG RX REV CODE 258: Performed by: FAMILY MEDICINE

## 2017-03-26 PROCEDURE — 700102 HCHG RX REV CODE 250 W/ 637 OVERRIDE(OP): Performed by: HOSPITALIST

## 2017-03-26 PROCEDURE — 82962 GLUCOSE BLOOD TEST: CPT

## 2017-03-26 PROCEDURE — 700111 HCHG RX REV CODE 636 W/ 250 OVERRIDE (IP): Performed by: FAMILY MEDICINE

## 2017-03-26 RX ORDER — MORPHINE SULFATE 15 MG/1
15 TABLET ORAL 3 TIMES DAILY
Qty: 90 TAB | Refills: 0 | Status: ON HOLD | OUTPATIENT
Start: 2017-03-26 | End: 2017-04-22

## 2017-03-26 RX ORDER — ALBUTEROL SULFATE 90 UG/1
2 AEROSOL, METERED RESPIRATORY (INHALATION) EVERY 4 HOURS PRN
Qty: 1 INHALER | Refills: 1 | Status: ON HOLD | OUTPATIENT
Start: 2017-03-26 | End: 2017-04-12

## 2017-03-26 RX ORDER — BUDESONIDE AND FORMOTEROL FUMARATE DIHYDRATE 160; 4.5 UG/1; UG/1
1 AEROSOL RESPIRATORY (INHALATION) 2 TIMES DAILY
Qty: 1 INHALER | Refills: 2 | Status: ON HOLD | OUTPATIENT
Start: 2017-03-26 | End: 2017-04-12

## 2017-03-26 RX ORDER — RISPERIDONE 0.5 MG/1
0.5 TABLET ORAL
Qty: 30 TAB | Refills: 2 | Status: SHIPPED | OUTPATIENT
Start: 2017-03-26

## 2017-03-26 RX ORDER — SIMVASTATIN 20 MG
20 TABLET ORAL EVERY EVENING
Qty: 30 TAB | Refills: 2 | Status: ON HOLD | OUTPATIENT
Start: 2017-03-26 | End: 2017-04-21

## 2017-03-26 RX ORDER — ALPRAZOLAM 0.5 MG/1
0.5 TABLET ORAL 3 TIMES DAILY PRN
Qty: 90 TAB | Refills: 0 | Status: ON HOLD | OUTPATIENT
Start: 2017-03-26 | End: 2017-05-23

## 2017-03-26 RX ORDER — NAPROXEN SODIUM 220 MG
1 TABLET ORAL
Qty: 120 EACH | Refills: 2 | Status: SHIPPED | OUTPATIENT
Start: 2017-03-26 | End: 2017-04-03

## 2017-03-26 RX ORDER — GABAPENTIN 300 MG/1
300 CAPSULE ORAL 3 TIMES DAILY
Qty: 90 CAP | Refills: 2 | Status: SHIPPED | OUTPATIENT
Start: 2017-03-26

## 2017-03-26 RX ORDER — TRAZODONE HYDROCHLORIDE 50 MG/1
50 TABLET ORAL
Qty: 30 TAB | Refills: 2 | Status: SHIPPED | OUTPATIENT
Start: 2017-03-26 | End: 2017-05-20

## 2017-03-26 RX ORDER — METOCLOPRAMIDE 5 MG/1
5 TABLET ORAL
Qty: 90 TAB | Refills: 2 | Status: ON HOLD | OUTPATIENT
Start: 2017-03-26 | End: 2017-05-23

## 2017-03-26 RX ORDER — NITROGLYCERIN 0.4 MG/1
0.4 TABLET SUBLINGUAL PRN
Qty: 25 TAB | Refills: 2 | Status: SHIPPED | OUTPATIENT
Start: 2017-03-26 | End: 2017-04-03

## 2017-03-26 RX ORDER — INSULIN GLARGINE 100 [IU]/ML
32 INJECTION, SOLUTION SUBCUTANEOUS EVERY EVENING
Qty: 10 ML | Refills: 2 | Status: ON HOLD | OUTPATIENT
Start: 2017-03-26 | End: 2017-05-10

## 2017-03-26 RX ORDER — CITALOPRAM 20 MG/1
20 TABLET ORAL
Qty: 30 TAB | Refills: 2 | Status: SHIPPED | OUTPATIENT
Start: 2017-03-26 | End: 2017-05-20

## 2017-03-26 RX ORDER — OXYCODONE HYDROCHLORIDE 10 MG/1
10 TABLET ORAL 2 TIMES DAILY PRN
Qty: 60 TAB | Refills: 0 | Status: ON HOLD | OUTPATIENT
Start: 2017-03-26 | End: 2017-04-22

## 2017-03-26 RX ADMIN — MORPHINE SULFATE 15 MG: 15 TABLET ORAL at 11:23

## 2017-03-26 RX ADMIN — METOCLOPRAMIDE HYDROCHLORIDE 5 MG: 5 TABLET ORAL at 05:27

## 2017-03-26 RX ADMIN — GUAIFENESIN 600 MG: 600 TABLET, EXTENDED RELEASE ORAL at 08:59

## 2017-03-26 RX ADMIN — CITALOPRAM HYDROBROMIDE 20 MG: 20 TABLET ORAL at 08:59

## 2017-03-26 RX ADMIN — CEFTRIAXONE 2 G: 2 INJECTION, POWDER, FOR SOLUTION INTRAMUSCULAR; INTRAVENOUS at 10:25

## 2017-03-26 RX ADMIN — GABAPENTIN 300 MG: 300 CAPSULE ORAL at 08:59

## 2017-03-26 RX ADMIN — MORPHINE SULFATE 15 MG: 15 TABLET ORAL at 01:18

## 2017-03-26 RX ADMIN — METOCLOPRAMIDE HYDROCHLORIDE 5 MG: 5 TABLET ORAL at 11:21

## 2017-03-26 RX ADMIN — OXYCODONE HYDROCHLORIDE 10 MG: 10 TABLET ORAL at 05:27

## 2017-03-26 RX ADMIN — IPRATROPIUM BROMIDE AND ALBUTEROL SULFATE 3 ML: .5; 3 SOLUTION RESPIRATORY (INHALATION) at 08:07

## 2017-03-26 RX ADMIN — ASPIRIN 81 MG: 81 TABLET ORAL at 08:59

## 2017-03-26 RX ADMIN — BUDESONIDE AND FORMOTEROL FUMARATE DIHYDRATE 2 PUFF: 160; 4.5 AEROSOL RESPIRATORY (INHALATION) at 08:07

## 2017-03-26 ASSESSMENT — PAIN SCALES - GENERAL
PAINLEVEL_OUTOF10: 7
PAINLEVEL_OUTOF10: 5
PAINLEVEL_OUTOF10: 0
PAINLEVEL_OUTOF10: 7

## 2017-03-26 NOTE — DISCHARGE PLANNING
Medical Social Work  Patient was receiving services through [facility] in the past.    Faxed [facility] to [facility].

## 2017-03-26 NOTE — DISCHARGE PLANNING
Spoke with Skylar at [facility], they have received referral and she will advise if a problem with acceptance on service.

## 2017-03-26 NOTE — FACE TO FACE
Face to Face Note  -  Durable Medical Equipment    Craig Iverson M.D. - NPI: [id number]  I certify that this patient is under my care and that they had a durable medical equipment(DME)face to face encounter by myself that meets the physician DME face-to-face encounter requirements with this patient on:    Date of encounter:   Patient:                    MRN:                       YOB: 2015  Zenia Ordaz  [id number]  1950     The encounter with the patient was in whole, or in part, for the following medical condition, which is the primary reason for durable medical equipment:  COPD    I certify that, based on my findings, the following durable medical equipment is medically necessary:  Oxygen.    HOME O2 Saturation Measurements:(Values must be present for Home Oxygen orders)  Room air sat at rest: 93  Room air sat with amb: 85  With liters of O2: 1, O2 sat at rest with O2: 94  With Liters of O2: 1, O2 sat with amb with O2 : 93  Is the patient mobile?: Yes    My Clinical findings support the need for the above equipment due to:  Hypoxia    Supporting Symptoms: hypoxia, shortness of breath, dyspnea on exertion, decreased exercise tolerance

## 2017-03-26 NOTE — DISCHARGE PLANNING
Spoke with AJ the on call  for [employer], there office is now closed.  Per AJ, this would need to be cleared with their management as patient is homeless and concern for return of equipment.  MICKI Jordan advised.

## 2017-03-26 NOTE — PROGRESS NOTES
Received report from day shift RN. Discussed POC with pt., verbalized understanding, assumed care @1915. A&Ox4. On 2 liters of O2 via nasal cannula. Tolerating well. O2 sat 96%. Lung sounds diminished. Pt does complain of shortness of breath when ambulating. Complaining of pain on R arm rated 7/10 on pain scale. Medicated appropriately per MAR. Standby assist to bathroom. Had BM. RT treatments PRN. Bed alarm on. FSBS 166, 3 units of humalog given per MAR. Safety precautions in place; treaded socks on, call light within reach, personal belongings within reach, upper bed rails up.

## 2017-03-26 NOTE — DISCHARGE INSTRUCTIONS
Discharge Instructions    Discharged to home by taxi with self. Discharged via walking, hospital escort: Refused.  Special equipment needed: Not Applicable    Be sure to schedule a follow-up appointment with your primary care doctor or any specialists as instructed.     Discharge Plan:   Influenza Vaccine Indication: Not indicated: Previously immunized this influenza season and > 8 years of age    I understand that a diet low in cholesterol, fat, and sodium is recommended for good health. Unless I have been given specific instructions below for another diet, I accept this instruction as my diet prescription.   Other diet: diabetic    Special Instructions: None    · Is patient discharged on Warfarin / Coumadin?   No     · Is patient Post Blood Transfusion?  No    Depression / Suicide Risk    As you are discharged from this [facility] facility, it is important to learn how to keep safe from harming yourself.    Recognize the warning signs:  · Abrupt changes in personality, positive or negative- including increase in energy   · Giving away possessions  · Change in eating patterns- significant weight changes-  positive or negative  · Change in sleeping patterns- unable to sleep or sleeping all the time   · Unwillingness or inability to communicate  · Depression  · Unusual sadness, discouragement and loneliness  · Talk of wanting to die  · Neglect of personal appearance   · Rebelliousness- reckless behavior  · Withdrawal from people/activities they love  · Confusion- inability to concentrate     If you or a loved one observes any of these behaviors or has concerns about self-harm, here's what you can do:  · Talk about it- your feelings and reasons for harming yourself  · Remove any means that you might use to hurt yourself (examples: pills, rope, extension cords, firearm)  · Get professional help from the community (Mental Health, Substance Abuse, psychological counseling)  · Do not be alone:Call your Safe Contact-  someone whom you trust who will be there for you.  · Call your local CRISIS HOTLINE 654-6510 or 779-089-6368  · Call your local Children's Mobile Crisis Response Team [place] (811) 250-4063 or www.baimos technologies  · Call the toll free National Suicide Prevention Hotlines   · National Suicide Prevention Lifeline 854-135-DPAK (8988)  · National Hope Line Network 800-SUICIDE (455-7976)    Chronic Obstructive Pulmonary Disease  Chronic obstructive pulmonary disease (COPD) is a common lung problem. In COPD, the flow of air from the lungs is limited. The way your lungs work will probably never return to normal, but there are things you can do to improve your lungs and make yourself feel better. Your doctor may treat your condition with:  · Medicines.  · Oxygen.  · Lung surgery.  · Changes to your diet.  · Rehabilitation. This may involve a team of specialists.  HOME CARE  · Take all medicines as told by your doctor.  · Avoid medicines or cough syrups that dry up your airway (such as antihistamines) and do not allow you to get rid of thick spit. You do not need to avoid them if told differently by your doctor.  · If you smoke, stop. Smoking makes the problem worse.  · Avoid being around things that make your breathing worse (like smoke, chemicals, and fumes).  · Use oxygen therapy and therapy to help improve your lungs (pulmonary rehabilitation) if told by your doctor. If you need home oxygen therapy, ask your doctor if you should buy a tool to measure your oxygen level (oximeter).  · Avoid people who have a sickness you can catch (contagious).  · Avoid going outside when it is very hot, cold, or humid.  · Eat healthy foods. Eat smaller meals more often. Rest before meals.  · Stay active, but remember to also rest.  · Make sure to get all the shots (vaccines) your doctor recommends. Ask your doctor if you need a pneumonia shot.  · Learn and use tips on how to relax.  · Learn and use tips on how to control your  breathing as told by your doctor. Try:  · Breathing in (inhaling) through your nose for 1 second. Then, pucker your lips and breath out (exhale) through your lips for 2 seconds.  · Putting one hand on your belly (abdomen). Breathe in slowly through your nose for 1 second. Your hand on your belly should move out. Pucker your lips and breathe out slowly through your lips. Your hand on your belly should move in as you breathe out.  · Learn and use controlled coughing to clear thick spit from your lungs. The steps are:  · Lean your head a little forward.  · Breathe in deeply.  · Try to hold your breath for 3 seconds.  · Keep your mouth slightly open while coughing 2 times.  · Spit any thick spit out into a tissue.  · Rest and do the steps again 1 or 2 times as needed.  GET HELP IF:  · You cough up more thick spit than usual.  · There is a change in the color or thickness of the spit.  · It is harder to breathe than usual.  · Your breathing is faster than usual.  GET HELP RIGHT AWAY IF:  · You have shortness of breath while resting.  · You have shortness of breath that stops you from:  · Being able to talk.  · Doing normal activities.  · You chest hurts for longer than 5 minutes.  · Your skin color is more blue than usual.  · Your pulse oximeter shows that you have low oxygen for longer than 5 minutes.  MAKE SURE YOU:  · Understand these instructions.  · Will watch your condition.  · Will get help right away if you are not doing well or get worse.     This information is not intended to replace advice given to you by your health care provider. Make sure you discuss any questions you have with your health care provider.     Document Released: 06/05/2009 Document Revised: 01/08/2016 Document Reviewed: 08/14/2014  [employer] Interactive Patient Education ©2016 [employer]    Pneumonia, Adult  Pneumonia is an infection of the lungs.   CAUSES  Pneumonia may be caused by bacteria or a virus. Usually, the infection is caused by  breathing in droplets from an infected person's cough or sneeze.   SYMPTOMS   Symptoms of pneumonia include:  · Cough.  · Fever.  · Chest pain.  · Rapid breathing.  · Shortness of breath.  · Shaking chills.  · Mucus production.  DIAGNOSIS   If you have the common symptoms of pneumonia, often your health care provider will confirm the diagnosis with a chest X-ray. The X-ray will show an abnormality in the lung if you have pneumonia. Other tests may be done on your blood, urine, or mucus (sputum) to find the specific cause of your pneumonia. A blood gas test or pulse oximetry test may be needed to check how well your lungs are working.  TREATMENT   Your treatment will depend on whether your pneumonia is caused by bacteria or a virus.   · Bacterial pneumonia is treated with antibiotic medicine.  · Pneumonia that is caused by the influenza virus may be treated with an antiviral medicine.  · Pneumonia that is caused by a virus other than influenza will not respond to antibiotic medicine. This type of pneumonia will have to run its course.   HOME CARE INSTRUCTIONS   · Cough suppressants may be used if you are losing too much rest from coughing at night. However, you should try to avoid taking cough suppresants. This is because coughing helps to remove mucus from your lungs.  · Sleep in a semi-upright position at night. Try sleeping in a reclining chair, or place a few pillows under your head.  · Try using a cold steam vaporizer or humidifier in your home or bedroom. This may help loosen your mucus.  · If you were prescribed an antibiotic medicine, finish all of it even if you start to feel better.  · If you were prescribed an expectorant, take it as directed by your health care provider. This medicine loosens the mucus so you can cough it up.  · Take medicines only as directed by your health care provider.  · Do not smoke. If you are a smoker and continue to smoke, your cough may last several weeks after your pneumonia  has cleared.  · Get rest when you feel tired, or as needed.  PREVENTION  A pneumococcal shot (vaccine) is available to prevent a common bacterial cause of pneumonia. This is usually suggested for:  · People over 65 years old.  · People on chemotherapy.  · People with chronic lung problems, such as bronchitis or emphysema.  · People with immune system problems.  If you are over 65 years old or have a high risk condition, you may receive the pneumococcal vaccine if you have not received it before. In some countries, a routine influenza vaccine is also recommended. This vaccine can help prevent some cases of pneumonia. You may be offered the influenza vaccine as part of your care.  If you are a smoker, it is time to quit in order to prevent pneumonia in the future. You may receive instructions on how to stop smoking. Your health care provider can provide medicines and counseling to help you quit.  SEEK MEDICAL CARE IF:  · You have a fever.  · You cannot control your cough with suppressants at night, and you keep losing sleep.  SEEK IMMEDIATE MEDICAL CARE IF:   · You have worsening shortness of breath.  · You have increased chest pain.  · Your sickness becomes worse, especially if you are an older adult or have a weakened immune system.  · You cough up blood.  · You have pain that is getting worse or is not controlled with medicines.  · Your symptoms are getting worse rather than better.     This information is not intended to replace advice given to you by your health care provider. Make sure you discuss any questions you have with your health care provider.     Document Released: 12/18/2006 Document Revised: 01/08/2016 Document Reviewed: 04/13/2016  [employer] Interactive Patient Education ©2016 [employer] Inc.

## 2017-03-26 NOTE — CARE PLAN
Problem: Pain Management  Goal: Pain level will decrease to patient’s comfort goal  Outcome: PROGRESSING AS EXPECTED  Complaining of pain on R arm rated 7/10 on pain scale. Medicated appropriately per MAR. Will continue to monitor.     Problem: Respiratory:  Goal: Respiratory status will improve  Outcome: PROGRESSING AS EXPECTED  Titrated pt down to 1 liter O2 via nasal cannula. Tolerating well.  in use. O2 sat 95%. Lung sounds diminished. Will continue to monitor.

## 2017-03-26 NOTE — DISCHARGE PLANNING
Received choice form from [facility] at 1005.  [facility].

## 2017-03-26 NOTE — DISCHARGE PLANNING
Medical Social Work  Faxed patient's rx's to [facility], patient is aware [facility] is not paying for her medications.  Patient provided a cab vouchers [phone]

## 2017-03-26 NOTE — DISCHARGE PLANNING
Received call from Skylar at [facility] they have declined patient as patient is homeless.  Referral sent to [facility] as per [facility] request.

## 2017-03-26 NOTE — DISCHARGE PLANNING
AZUL received a voicemail from bed day management that pt should be ready to dc soon and will need home 02. Requested to work on home 02 for dc. Upon further chart review, pt was on oxygen at admission. AZUL left report for Sunday SW to f/u to ensure there are no other needs for dc.

## 2017-03-26 NOTE — PROGRESS NOTES
Pt discharged. IV out, all belongings with patient. Meds returned from pharmacy. Clothing provided to patient.   Discharge instructions including prescriptions provided to patient at bedside.  Pt able to teach back DC instructions including medication regimen, follow up information, and signs and symptoms to monitor for. Pt declined wheelchair. Observed leaving unit with steady gait for taxi transport.

## 2017-03-27 ENCOUNTER — PATIENT OUTREACH (OUTPATIENT)
Dept: HEALTH INFORMATION MANAGEMENT | Facility: OTHER | Age: 67
End: 2017-03-27

## 2017-03-27 NOTE — DISCHARGE SUMMARY
ADMITTING DIAGNOSES:  1.  Chronic obstructive pulmonary disease exacerbation.  2.  Community-acquired pneumonia.  3.  Diabetes mellitus type 2.  4.  Diabetic neuropathy.  5.  Bipolar disorder.  6.  Hyperlipidemia.  7.  Tobacco abuse.  8.  Proximal humeral fracture.  9.  Coronary artery disease.  10.  Hypertension.  11.  Diastolic dysfunction.    DISCHARGE DIAGNOSES:  1.  Chronic respiratory failure, required 1 liter of oxygen with exertion.  2.  Chronic obstructive pulmonary disease, exacerbation, resolved.  3.  Community-acquired pneumonia, resolved.  4.  Diabetes type 2 diabetes with neuropathy complications.  5.  Diabetic neuropathy.  6.  Bipolar disorder.  7.  Hyperlipidemia.  8.  Tobacco abuse.  9.  Proximal humeral fracture.  10.  Nonsurgical with outpatient followup.    CONSULTATIONS:  None.    PROCEDURES:  None.    DISCHARGE ACTIVITY:  As tolerated, 5-pound weight restriction.  No lifting   with the right arm.    DISCHARGE MEDICATIONS:  Glucose test strips, insulin syringes.  She was given   prescriptions for these:  1.  MS Contin 15 mg 3 times daily.  2.  Lantus 32 units in the evening.  3.  Symbicort 160/4.5 one puff twice daily.  4.  Simvastatin 20 mg every evening.  5.  Albuterol HFA 2 puffs every 4 hours as needed for shortness of breath.  6.  Xanax 0.5 mg 2 times a day as needed for sleep or anxiety.  7.  Aspirin 81 mg daily.  8.  Celexa 20 mg daily.  9.  Neurontin 300 mg 3 times daily.  10.  Humalog sliding scale as directed.  11.  Reglan 5 mg 2 times daily before meals.  12.  Nitroglycerin 0.4 mg as needed for chest pain.  13.  Oxycodone 10 mg twice a day as needed for moderate pain.  14.  Risperdal 0.5 mg at bedtime.  15.  Trazodone 50 mg at bedtime.  She is to stop her clonidine, Lasix,   lisinopril, Toprol, and potassium chloride for borderline low blood pressures   while in the hospital, but she needs to follow up with primary care for   resumption of these.    DIET:  A 2 g sodium consistent  carbohydrate as tolerated.    HISTORY OF PRESENT ILLNESS AND HOSPITAL COURSE:  This is a 66-year-old female   who came in with COPD exacerbation.  She required full admission to the   hospital, as she had slow improvement.  She previously required 3 liters of   oxygen 24 hours a day.  She completed a full course of antibiotics during her   stay here and was able to be titrated actually down to 1 liter with exertion   only.  She was unable to be titrated off of her oxygen entirely; however, she   was arranged for home oxygen with 1 liter with exertion and at night.  She   tolerated this well.  She was anxious for discharge and is stable for   discharge to home in good stable condition with home oxygen and close   outpatient followup.    Total time for discharge, 38 minutes.       ____________________________________     MD MOE Schrader / MIKE    DD:  03/27/2017 07:47:10  DT:  03/27/2017 08:19:35    D#:  [id number]  Job#:  [id number]

## 2017-03-27 NOTE — PROGRESS NOTES
· Placed discharge outreach phone call to patient s/p hospital discharge 3/26/17.  Left voicemail with my contact information and instructions to return my phone call.

## 2017-04-03 ENCOUNTER — APPOINTMENT (OUTPATIENT)
Dept: RADIOLOGY | Facility: MEDICAL CENTER | Age: 67
DRG: 175 | End: 2017-04-03
Attending: STUDENT IN AN ORGANIZED HEALTH CARE EDUCATION/TRAINING PROGRAM
Payer: MEDICAID

## 2017-04-03 ENCOUNTER — HOSPITAL ENCOUNTER (INPATIENT)
Facility: MEDICAL CENTER | Age: 67
LOS: 9 days | DRG: 175 | End: 2017-04-12
Attending: EMERGENCY MEDICINE | Admitting: HOSPITALIST
Payer: MEDICAID

## 2017-04-03 ENCOUNTER — APPOINTMENT (OUTPATIENT)
Dept: RADIOLOGY | Facility: MEDICAL CENTER | Age: 67
DRG: 175 | End: 2017-04-03
Attending: EMERGENCY MEDICINE
Payer: MEDICAID

## 2017-04-03 ENCOUNTER — RESOLUTE PROFESSIONAL BILLING HOSPITAL PROF FEE (OUTPATIENT)
Dept: HOSPITALIST | Facility: MEDICAL CENTER | Age: 67
End: 2017-04-03
Payer: MEDICAID

## 2017-04-03 DIAGNOSIS — J41.0 SIMPLE CHRONIC BRONCHITIS (HCC): ICD-10-CM

## 2017-04-03 DIAGNOSIS — I26.99 PULMONARY EMBOLISM, OTHER: ICD-10-CM

## 2017-04-03 DIAGNOSIS — I27.82 CHRONIC SEPTIC PULMONARY EMBOLISM WITH ACUTE COR PULMONALE (HCC): ICD-10-CM

## 2017-04-03 DIAGNOSIS — G89.29 CHRONIC PAIN: ICD-10-CM

## 2017-04-03 DIAGNOSIS — I26.01 CHRONIC SEPTIC PULMONARY EMBOLISM WITH ACUTE COR PULMONALE (HCC): ICD-10-CM

## 2017-04-03 DIAGNOSIS — J44.1 COPD EXACERBATION (HCC): ICD-10-CM

## 2017-04-03 PROBLEM — J96.01 ACUTE RESPIRATORY FAILURE WITH HYPOXIA (HCC): Status: ACTIVE | Noted: 2017-04-03

## 2017-04-03 LAB
ALBUMIN SERPL BCP-MCNC: 3.3 G/DL (ref 3.2–4.9)
ALBUMIN/GLOB SERPL: 1.2 G/DL
ALP SERPL-CCNC: 73 U/L (ref 30–99)
ALT SERPL-CCNC: 45 U/L (ref 2–50)
ANION GAP SERPL CALC-SCNC: 8 MMOL/L (ref 0–11.9)
APTT PPP: 25.7 SEC (ref 24.7–36)
AST SERPL-CCNC: 33 U/L (ref 12–45)
BASOPHILS # BLD AUTO: 0.1 % (ref 0–1.8)
BASOPHILS # BLD: 0.01 K/UL (ref 0–0.12)
BILIRUB SERPL-MCNC: 0.9 MG/DL (ref 0.1–1.5)
BLOOD CULTURE HOLD CXBCH: NORMAL
BNP SERPL-MCNC: 402 PG/ML (ref 0–100)
BUN SERPL-MCNC: 11 MG/DL (ref 8–22)
CALCIUM SERPL-MCNC: 8.2 MG/DL (ref 8.5–10.5)
CHLORIDE SERPL-SCNC: 106 MMOL/L (ref 96–112)
CO2 SERPL-SCNC: 23 MMOL/L (ref 20–33)
CREAT SERPL-MCNC: 0.57 MG/DL (ref 0.5–1.4)
DEPRECATED D DIMER PPP IA-ACNC: 1910 NG/ML(D-DU)
EOSINOPHIL # BLD AUTO: 0.03 K/UL (ref 0–0.51)
EOSINOPHIL NFR BLD: 0.4 % (ref 0–6.9)
ERYTHROCYTE [DISTWIDTH] IN BLOOD BY AUTOMATED COUNT: 44.7 FL (ref 35.9–50)
GFR SERPL CREATININE-BSD FRML MDRD: >60 ML/MIN/1.73 M 2
GLOBULIN SER CALC-MCNC: 2.8 G/DL (ref 1.9–3.5)
GLUCOSE BLD-MCNC: 293 MG/DL (ref 65–99)
GLUCOSE SERPL-MCNC: 289 MG/DL (ref 65–99)
HCT VFR BLD AUTO: 42.4 % (ref 37–47)
HCYS SERPL-SCNC: 8.49 UMOL/L
HGB BLD-MCNC: 13.7 G/DL (ref 12–16)
IMM GRANULOCYTES # BLD AUTO: 0.04 K/UL (ref 0–0.11)
IMM GRANULOCYTES NFR BLD AUTO: 0.5 % (ref 0–0.9)
INR PPP: 0.98 (ref 0.87–1.13)
LACTATE BLD-SCNC: 1.8 MMOL/L (ref 0.5–2)
LYMPHOCYTES # BLD AUTO: 0.99 K/UL (ref 1–4.8)
LYMPHOCYTES NFR BLD: 12 % (ref 22–41)
MAGNESIUM SERPL-MCNC: 1.7 MG/DL (ref 1.5–2.5)
MCH RBC QN AUTO: 27.2 PG (ref 27–33)
MCHC RBC AUTO-ENTMCNC: 32.3 G/DL (ref 33.6–35)
MCV RBC AUTO: 84.1 FL (ref 81.4–97.8)
MONOCYTES # BLD AUTO: 0.52 K/UL (ref 0–0.85)
MONOCYTES NFR BLD AUTO: 6.3 % (ref 0–13.4)
NEUTROPHILS # BLD AUTO: 6.66 K/UL (ref 2–7.15)
NEUTROPHILS NFR BLD: 80.7 % (ref 44–72)
NRBC # BLD AUTO: 0 K/UL
NRBC BLD AUTO-RTO: 0 /100 WBC
PLATELET # BLD AUTO: 116 K/UL (ref 164–446)
PMV BLD AUTO: 9.7 FL (ref 9–12.9)
POTASSIUM SERPL-SCNC: 3.3 MMOL/L (ref 3.6–5.5)
PROT SERPL-MCNC: 6.1 G/DL (ref 6–8.2)
PROTHROMBIN TIME: 13.3 SEC (ref 12–14.6)
RBC # BLD AUTO: 5.04 M/UL (ref 4.2–5.4)
SODIUM SERPL-SCNC: 137 MMOL/L (ref 135–145)
TROPONIN I SERPL-MCNC: 0.04 NG/ML (ref 0–0.04)
TSH SERPL DL<=0.005 MIU/L-ACNC: 0.44 UIU/ML (ref 0.3–3.7)
WBC # BLD AUTO: 8.3 K/UL (ref 4.8–10.8)

## 2017-04-03 PROCEDURE — 71010 DX-CHEST-LIMITED (1 VIEW): CPT

## 2017-04-03 PROCEDURE — 96372 THER/PROPH/DIAG INJ SC/IM: CPT

## 2017-04-03 PROCEDURE — 700102 HCHG RX REV CODE 250 W/ 637 OVERRIDE(OP): Performed by: HOSPITALIST

## 2017-04-03 PROCEDURE — 700111 HCHG RX REV CODE 636 W/ 250 OVERRIDE (IP): Performed by: EMERGENCY MEDICINE

## 2017-04-03 PROCEDURE — 85300 ANTITHROMBIN III ACTIVITY: CPT

## 2017-04-03 PROCEDURE — 85025 COMPLETE CBC W/AUTO DIFF WBC: CPT

## 2017-04-03 PROCEDURE — 83880 ASSAY OF NATRIURETIC PEPTIDE: CPT

## 2017-04-03 PROCEDURE — 99285 EMERGENCY DEPT VISIT HI MDM: CPT

## 2017-04-03 PROCEDURE — 84443 ASSAY THYROID STIM HORMONE: CPT

## 2017-04-03 PROCEDURE — 700111 HCHG RX REV CODE 636 W/ 250 OVERRIDE (IP): Performed by: HOSPITALIST

## 2017-04-03 PROCEDURE — 86147 CARDIOLIPIN ANTIBODY EA IG: CPT | Mod: 91

## 2017-04-03 PROCEDURE — 85303 CLOT INHIBIT PROT C ACTIVITY: CPT

## 2017-04-03 PROCEDURE — 85301 ANTITHROMBIN III ANTIGEN: CPT

## 2017-04-03 PROCEDURE — 94640 AIRWAY INHALATION TREATMENT: CPT

## 2017-04-03 PROCEDURE — 85306 CLOT INHIBIT PROT S FREE: CPT

## 2017-04-03 PROCEDURE — 85379 FIBRIN DEGRADATION QUANT: CPT

## 2017-04-03 PROCEDURE — 96374 THER/PROPH/DIAG INJ IV PUSH: CPT

## 2017-04-03 PROCEDURE — 83090 ASSAY OF HOMOCYSTEINE: CPT

## 2017-04-03 PROCEDURE — 99223 1ST HOSP IP/OBS HIGH 75: CPT | Performed by: HOSPITALIST

## 2017-04-03 PROCEDURE — 700105 HCHG RX REV CODE 258: Performed by: EMERGENCY MEDICINE

## 2017-04-03 PROCEDURE — 84484 ASSAY OF TROPONIN QUANT: CPT

## 2017-04-03 PROCEDURE — 36415 COLL VENOUS BLD VENIPUNCTURE: CPT

## 2017-04-03 PROCEDURE — 304562 HCHG STAT O2 MASK/CANNULA

## 2017-04-03 PROCEDURE — 80053 COMPREHEN METABOLIC PANEL: CPT

## 2017-04-03 PROCEDURE — 83605 ASSAY OF LACTIC ACID: CPT

## 2017-04-03 PROCEDURE — 85610 PROTHROMBIN TIME: CPT

## 2017-04-03 PROCEDURE — 81241 F5 GENE: CPT

## 2017-04-03 PROCEDURE — 770020 HCHG ROOM/CARE - TELE (206)

## 2017-04-03 PROCEDURE — 82962 GLUCOSE BLOOD TEST: CPT

## 2017-04-03 PROCEDURE — A9270 NON-COVERED ITEM OR SERVICE: HCPCS | Performed by: HOSPITALIST

## 2017-04-03 PROCEDURE — 85730 THROMBOPLASTIN TIME PARTIAL: CPT

## 2017-04-03 PROCEDURE — 700117 HCHG RX CONTRAST REV CODE 255: Performed by: EMERGENCY MEDICINE

## 2017-04-03 PROCEDURE — 83735 ASSAY OF MAGNESIUM: CPT

## 2017-04-03 PROCEDURE — 96375 TX/PRO/DX INJ NEW DRUG ADDON: CPT

## 2017-04-03 PROCEDURE — 700101 HCHG RX REV CODE 250

## 2017-04-03 PROCEDURE — 71275 CT ANGIOGRAPHY CHEST: CPT

## 2017-04-03 PROCEDURE — 96361 HYDRATE IV INFUSION ADD-ON: CPT

## 2017-04-03 RX ORDER — PROMETHAZINE HYDROCHLORIDE 25 MG/1
12.5-25 SUPPOSITORY RECTAL EVERY 4 HOURS PRN
Status: DISCONTINUED | OUTPATIENT
Start: 2017-04-03 | End: 2017-04-12 | Stop reason: HOSPADM

## 2017-04-03 RX ORDER — POTASSIUM CHLORIDE 1.5 G/1.58G
20 POWDER, FOR SOLUTION ORAL ONCE
Status: COMPLETED | OUTPATIENT
Start: 2017-04-03 | End: 2017-04-03

## 2017-04-03 RX ORDER — BUDESONIDE AND FORMOTEROL FUMARATE DIHYDRATE 160; 4.5 UG/1; UG/1
1 AEROSOL RESPIRATORY (INHALATION) 2 TIMES DAILY
Status: DISCONTINUED | OUTPATIENT
Start: 2017-04-03 | End: 2017-04-12 | Stop reason: HOSPADM

## 2017-04-03 RX ORDER — METHYLPREDNISOLONE SODIUM SUCCINATE 125 MG/2ML
62.5 INJECTION, POWDER, LYOPHILIZED, FOR SOLUTION INTRAMUSCULAR; INTRAVENOUS EVERY 6 HOURS
Status: DISCONTINUED | OUTPATIENT
Start: 2017-04-03 | End: 2017-04-05

## 2017-04-03 RX ORDER — ALBUTEROL SULFATE 2.5 MG/3ML
2.5 SOLUTION RESPIRATORY (INHALATION)
Status: DISPENSED | OUTPATIENT
Start: 2017-04-03 | End: 2017-04-04

## 2017-04-03 RX ORDER — POLYETHYLENE GLYCOL 3350 17 G/17G
1 POWDER, FOR SOLUTION ORAL
Status: DISCONTINUED | OUTPATIENT
Start: 2017-04-04 | End: 2017-04-12 | Stop reason: HOSPADM

## 2017-04-03 RX ORDER — SIMVASTATIN 20 MG
20 TABLET ORAL EVERY EVENING
Status: DISCONTINUED | OUTPATIENT
Start: 2017-04-03 | End: 2017-04-12 | Stop reason: HOSPADM

## 2017-04-03 RX ORDER — TRAZODONE HYDROCHLORIDE 50 MG/1
50 TABLET ORAL
Status: DISCONTINUED | OUTPATIENT
Start: 2017-04-03 | End: 2017-04-03

## 2017-04-03 RX ORDER — MORPHINE SULFATE 4 MG/ML
4 INJECTION, SOLUTION INTRAMUSCULAR; INTRAVENOUS ONCE
Status: COMPLETED | OUTPATIENT
Start: 2017-04-03 | End: 2017-04-03

## 2017-04-03 RX ORDER — ONDANSETRON 4 MG/1
4 TABLET, ORALLY DISINTEGRATING ORAL EVERY 4 HOURS PRN
Status: DISCONTINUED | OUTPATIENT
Start: 2017-04-03 | End: 2017-04-03

## 2017-04-03 RX ORDER — RISPERIDONE 0.5 MG/1
0.5 TABLET ORAL
Status: DISCONTINUED | OUTPATIENT
Start: 2017-04-03 | End: 2017-04-12 | Stop reason: HOSPADM

## 2017-04-03 RX ORDER — DEXTROSE MONOHYDRATE 25 G/50ML
25 INJECTION, SOLUTION INTRAVENOUS
Status: DISCONTINUED | OUTPATIENT
Start: 2017-04-03 | End: 2017-04-12 | Stop reason: HOSPADM

## 2017-04-03 RX ORDER — MAGNESIUM SULFATE HEPTAHYDRATE 40 MG/ML
2 INJECTION, SOLUTION INTRAVENOUS ONCE
Status: COMPLETED | OUTPATIENT
Start: 2017-04-03 | End: 2017-04-04

## 2017-04-03 RX ORDER — AMOXICILLIN 250 MG
2 CAPSULE ORAL 2 TIMES DAILY
Status: DISCONTINUED | OUTPATIENT
Start: 2017-04-04 | End: 2017-04-12 | Stop reason: HOSPADM

## 2017-04-03 RX ORDER — POTASSIUM CHLORIDE 20 MEQ/1
40 TABLET, EXTENDED RELEASE ORAL ONCE
Status: COMPLETED | OUTPATIENT
Start: 2017-04-03 | End: 2017-04-03

## 2017-04-03 RX ORDER — METHYLPREDNISOLONE SODIUM SUCCINATE 125 MG/2ML
125 INJECTION, POWDER, LYOPHILIZED, FOR SOLUTION INTRAMUSCULAR; INTRAVENOUS ONCE
Status: COMPLETED | OUTPATIENT
Start: 2017-04-03 | End: 2017-04-03

## 2017-04-03 RX ORDER — OXYCODONE HYDROCHLORIDE 10 MG/1
10 TABLET ORAL 2 TIMES DAILY PRN
Status: DISCONTINUED | OUTPATIENT
Start: 2017-04-03 | End: 2017-04-04

## 2017-04-03 RX ORDER — PROMETHAZINE HYDROCHLORIDE 25 MG/1
12.5-25 TABLET ORAL EVERY 4 HOURS PRN
Status: DISCONTINUED | OUTPATIENT
Start: 2017-04-03 | End: 2017-04-12 | Stop reason: HOSPADM

## 2017-04-03 RX ORDER — ALPRAZOLAM 0.5 MG/1
0.5 TABLET ORAL 3 TIMES DAILY PRN
Status: DISCONTINUED | OUTPATIENT
Start: 2017-04-03 | End: 2017-04-04

## 2017-04-03 RX ORDER — MORPHINE SULFATE 15 MG/1
15 TABLET ORAL 3 TIMES DAILY
Status: DISCONTINUED | OUTPATIENT
Start: 2017-04-03 | End: 2017-04-12 | Stop reason: HOSPADM

## 2017-04-03 RX ORDER — INSULIN GLARGINE 100 [IU]/ML
32 INJECTION, SOLUTION SUBCUTANEOUS EVERY EVENING
Status: DISCONTINUED | OUTPATIENT
Start: 2017-04-03 | End: 2017-04-09

## 2017-04-03 RX ORDER — SODIUM CHLORIDE 9 MG/ML
1000 INJECTION, SOLUTION INTRAVENOUS ONCE
Status: COMPLETED | OUTPATIENT
Start: 2017-04-03 | End: 2017-04-03

## 2017-04-03 RX ORDER — ONDANSETRON 2 MG/ML
4 INJECTION INTRAMUSCULAR; INTRAVENOUS EVERY 4 HOURS PRN
Status: DISCONTINUED | OUTPATIENT
Start: 2017-04-03 | End: 2017-04-03

## 2017-04-03 RX ORDER — TRAZODONE HYDROCHLORIDE 50 MG/1
50 TABLET ORAL
Status: DISCONTINUED | OUTPATIENT
Start: 2017-04-03 | End: 2017-04-12 | Stop reason: HOSPADM

## 2017-04-03 RX ORDER — GABAPENTIN 300 MG/1
300 CAPSULE ORAL 3 TIMES DAILY
Status: DISCONTINUED | OUTPATIENT
Start: 2017-04-03 | End: 2017-04-12 | Stop reason: HOSPADM

## 2017-04-03 RX ORDER — BISACODYL 10 MG
10 SUPPOSITORY, RECTAL RECTAL
Status: DISCONTINUED | OUTPATIENT
Start: 2017-04-03 | End: 2017-04-12 | Stop reason: HOSPADM

## 2017-04-03 RX ORDER — METOCLOPRAMIDE 5 MG/1
5 TABLET ORAL
Status: DISCONTINUED | OUTPATIENT
Start: 2017-04-04 | End: 2017-04-09

## 2017-04-03 RX ORDER — CITALOPRAM 20 MG/1
20 TABLET ORAL DAILY
Status: DISCONTINUED | OUTPATIENT
Start: 2017-04-04 | End: 2017-04-12 | Stop reason: HOSPADM

## 2017-04-03 RX ADMIN — ALPRAZOLAM 0.5 MG: 0.25 TABLET ORAL at 18:52

## 2017-04-03 RX ADMIN — METHYLPREDNISOLONE SODIUM SUCCINATE 125 MG: 125 INJECTION, POWDER, FOR SOLUTION INTRAMUSCULAR; INTRAVENOUS at 14:43

## 2017-04-03 RX ADMIN — GABAPENTIN 300 MG: 300 CAPSULE ORAL at 21:26

## 2017-04-03 RX ADMIN — BUDESONIDE AND FORMOTEROL FUMARATE DIHYDRATE 1 PUFF: 160; 4.5 AEROSOL RESPIRATORY (INHALATION) at 21:58

## 2017-04-03 RX ADMIN — TRAZODONE HYDROCHLORIDE 50 MG: 50 TABLET ORAL at 21:27

## 2017-04-03 RX ADMIN — MORPHINE SULFATE 4 MG: 4 INJECTION INTRAVENOUS at 17:06

## 2017-04-03 RX ADMIN — SIMVASTATIN 20 MG: 20 TABLET, FILM COATED ORAL at 21:27

## 2017-04-03 RX ADMIN — ALBUTEROL SULFATE 2.5 MG: 2.5 SOLUTION RESPIRATORY (INHALATION) at 14:46

## 2017-04-03 RX ADMIN — INSULIN GLARGINE 32 UNITS: 100 INJECTION, SOLUTION SUBCUTANEOUS at 21:58

## 2017-04-03 RX ADMIN — POTASSIUM CHLORIDE 40 MEQ: 1500 TABLET, EXTENDED RELEASE ORAL at 18:52

## 2017-04-03 RX ADMIN — INSULIN LISPRO 5 UNITS: 100 INJECTION, SOLUTION INTRAVENOUS; SUBCUTANEOUS at 21:59

## 2017-04-03 RX ADMIN — OXYCODONE HYDROCHLORIDE 10 MG: 10 TABLET ORAL at 21:27

## 2017-04-03 RX ADMIN — IOHEXOL 75 ML: 350 INJECTION, SOLUTION INTRAVENOUS at 16:15

## 2017-04-03 RX ADMIN — ENOXAPARIN SODIUM 100 MG: 100 INJECTION SUBCUTANEOUS at 17:06

## 2017-04-03 RX ADMIN — METHYLPREDNISOLONE SODIUM SUCCINATE 62.5 MG: 125 INJECTION, POWDER, FOR SOLUTION INTRAMUSCULAR; INTRAVENOUS at 21:26

## 2017-04-03 RX ADMIN — SODIUM CHLORIDE 1000 ML: 9 INJECTION, SOLUTION INTRAVENOUS at 14:43

## 2017-04-03 RX ADMIN — PROMETHAZINE HYDROCHLORIDE 25 MG: 25 TABLET ORAL at 21:26

## 2017-04-03 RX ADMIN — RISPERIDONE 0.5 MG: 1 TABLET, FILM COATED ORAL at 21:27

## 2017-04-03 ASSESSMENT — PAIN SCALES - GENERAL
PAINLEVEL_OUTOF10: 7
PAINLEVEL_OUTOF10: 9

## 2017-04-03 ASSESSMENT — LIFESTYLE VARIABLES
EVER_SMOKED: YES
PACK_YEARS: 40
ALCOHOL_USE: NO

## 2017-04-03 NOTE — IP AVS SNAPSHOT
Emulis Access Code: [email or web address]  Expires: 4/25/2017  1:00 PM    Your email address is not on file at [facility].  Email Addresses are required for you to sign up for Emulis, please contact [phone] to verify your personal information and to provide your email address prior to attempting to register for Emulis.    [place] [address]    Training Amigot  A secure, online tool to manage your health information     [facility]’s Emulis® is a secure, online tool that connects you to your personalized health information from the privacy of your home -- day or night - making it very easy for you to manage your healthcare. Once the activation process is completed, you can even access your medical information using the Emulis spenser, which is available for free in the Apple Spenser store or Google Play store.     To learn more about Emulis, visit [email or web address]    There are two levels of access available (as shown below):   My Chart Features  [facility] Primary Care Doctor [facility]  Specialists [facility]  Urgent  Care Non-[facility] Primary Care Doctor   Email your healthcare team securely and privately 24/7 X X X    Manage appointments: schedule your next appointment; view details of past/upcoming appointments X      Request prescription refills. X      View recent personal medical records, including lab and immunizations X X X X   View health record, including health history, allergies, medications X X X X   Read reports about your outpatient visits, procedures, consult and ER notes X X X X   See your discharge summary, which is a recap of your hospital and/or ER visit that includes your diagnosis, lab results, and care plan X X  X     How to register for Training Amigot:  Once your e-mail address has been verified, follow the following steps to sign up for Training Amigot.     1. Go to  [email or web address]  2. Click on the Sign Up Now box, which takes you to the New Member Sign Up page. You will  need to provide the following information:  a. Enter your MakieLab Access Code exactly as it appears at the top of this page. (You will not need to use this code after you’ve completed the sign-up process. If you do not sign up before the expiration date, you must request a new code.)   b. Enter your date of birth.   c. Enter your home email address.   d. Click Submit, and follow the next screen’s instructions.  3. Create a Malwarebytest ID. This will be your MakieLab login ID and cannot be changed, so think of one that is secure and easy to remember.  4. Create a MakieLab password. You can change your password at any time.  5. Enter your Password Reset Question and Answer. This can be used at a later time if you forget your password.   6. Enter your e-mail address. This allows you to receive e-mail notifications when new information is available in MakieLab.  7. Click Sign Up. You can now view your health information.    For assistance activating your MakieLab account, call [phone]

## 2017-04-03 NOTE — ED NOTES
Pt bib remsa for [facility]. Pt c/o SOB and chest pain. Pt working moderately hard to breath. Pt's LS wheezy through out. Pt received albuterol x 1 and duo neb x 2 with improvement. Pt also received  mg po pta. fsbs 284. vss chart up for erp

## 2017-04-03 NOTE — IP AVS SNAPSHOT
" <p align=\"LEFT\"><IMG SRC=\"[email or web address]\" alt=\"Image\" WIDTH=\"50%\" HEIGHT=\"200\" BORDER=\"\"></p>                   Name:Zenia Ordaz  Medical Record Number:[id number]  CSN: [id number]    YOB: 1950   Age: 66 y.o.  Sex: female  HT:1.562 m (5' 1.5\") WT: 85.4 kg (188 lb 4.4 oz)          Admit Date: 4/3/2017     Discharge Date:   Today's Date: 4/12/2017  Attending Doctor:  Rosa Isela Washington M.D.                  Allergies:  Mushroom extract complex; Tylenol; and Zofran          Follow-up Information     1. Follow up with [facility] In 1 week.    Why:  Follow up check up    Contact information    [address] [place] [address] [id number] [phone]         Medication List      Take these Medications        Instructions    albuterol 108 (90 BASE) MCG/ACT Aers inhalation aerosol    Inhale 2 Puffs by mouth every four hours as needed for Shortness of Breath.   Dose:  2 Puff       alprazolam 0.5 MG Tabs   Commonly known as:  XANAX    Take 1 Tab by mouth 3 times a day as needed for Sleep or Anxiety.   Dose:  0.5 mg       aspirin EC 81 MG Tbec   Commonly known as:  ECOTRIN    Take 1 Tab by mouth every day.   Dose:  81 mg       budesonide-formoterol 160-4.5 MCG/ACT Aero   Commonly known as:  SYMBICORT    Inhale 1 Puff by mouth 2 Times a Day.   Dose:  1 Puff       citalopram 20 MG Tabs   Commonly known as:  CELEXA    Take 1 Tab by mouth every day.   Dose:  20 mg       gabapentin 300 MG Caps   Commonly known as:  NEURONTIN    Take 1 Cap by mouth 3 times a day.   Dose:  300 mg       insulin glargine 100 UNIT/ML Soln   Commonly known as:  LANTUS    Inject 32 Units as instructed every evening.   Dose:  32 Units       insulin lispro 100 UNIT/ML Soln   Commonly known as:  HUMALOG    Inject 2-10 Units as instructed 3 times a day before meals. Sliding scale 151-200 2 units 201-250 4 units 251-300 6 units 301-350 8 units 351-400 10 units <60 to >400 call MD   Dose:  2-10 Units       MethylPREDNISolone " 4 MG Tbpk   Commonly known as:  MEDROL DOSEPAK    Take as directed       metoclopramide 5 MG tablet   Commonly known as:  REGLAN    Take 1 Tab by mouth 3 times a day before meals. Indications: Nausea and Vomiting   Dose:  5 mg       morphine 15 MG tablet   Commonly known as:  MS IR    Take 1 Tab by mouth 3 times a day.   Dose:  15 mg       oxycodone immediate release 10 MG immediate release tablet   Commonly known as:  ROXICODONE    Take 1 Tab by mouth 2 times a day as needed for Moderate Pain.   Dose:  10 mg       risperidone 0.5 MG Tabs   Commonly known as:  RISPERDAL    Take 1 Tab by mouth every bedtime.   Dose:  0.5 mg       simvastatin 20 MG Tabs   Commonly known as:  ZOCOR    Take 1 Tab by mouth every evening.   Dose:  20 mg       trazodone 50 MG Tabs   Commonly known as:  DESYREL    Take 1 Tab by mouth every day.   Dose:  50 mg       * warfarin 5 MG Tabs   Commonly known as:  COUMADIN    Take once daily on Sunday, Tuesday, Wednesday, Friday, and Saturday       * warfarin 7.5 MG Tabs   Commonly known as:  COUMADIN    Take daily on Monday, and Thursday       * Notice:  This list has 2 medication(s) that are the same as other medications prescribed for you. Read the directions carefully, and ask your doctor or other care provider to review them with you.

## 2017-04-03 NOTE — ED NOTES
Med rec complete per Pt and [facility]@[phone]  Per Pt and Pharmacy she picked up all medications  Pt states she takes medication as prescribed  Allergies reviewed
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
## 2017-04-03 NOTE — IP AVS SNAPSHOT
" Home Care Instructions                                                                                                                  Name:Zenia Ordaz  Medical Record Number:[id number]  CSN: [id number]    YOB: 1950   Age: 66 y.o.  Sex: female  HT:1.562 m (5' 1.5\") WT: 85.4 kg (188 lb 4.4 oz)          Admit Date: 4/3/2017     Discharge Date:   Today's Date: 4/12/2017  Attending Doctor:  Rosa Isela Washington M.D.                  Allergies:  Mushroom extract complex; Tylenol; and Zofran            Discharge Instructions       Discharge Instructions    Discharged to home by car with relative. Discharged via wheelchair, hospital escort: Yes.  Special equipment needed: Oxygen    Be sure to schedule a follow-up appointment with your primary care doctor or any specialists as instructed.     Discharge Plan:   Diet Plan:  (Diabetic)  Activity Level:  (Activity as tolerated)  Confirmed Follow up Appointment: Patient to Call and Schedule Appointment  Confirmed Symptoms Management: Discussed  Medication Reconciliation Updated: Yes  Influenza Vaccine Indication: Not indicated: Previously immunized this influenza season and > 8 years of age    I understand that a diet low in cholesterol, fat, and sodium is recommended for good health. Unless I have been given specific instructions below for another diet, I accept this instruction as my diet prescription.   Other diet: Diabetic Diet    Special Instructions: None    · Is patient discharged on Warfarin / Coumadin?   Yes    You are receiving the drug warfarin. Please understand the importance of monitoring warfarin with scheduled PT/INR blood draws.  Follow-up with a call to your personal Doctor's office in 3 days to schedule a PT/INR. .    IMPORTANT: HOW TO USE THIS INFORMATION:  This is a summary and does NOT have all possible information about this product. This information does not assure that this product is safe, effective, or appropriate for you. This information " "is not individual medical advice and does not substitute for the advice of your health care professional. Always ask your health care professional for complete information about this product and your specific health needs.      WARFARIN - ORAL (WARF-uh-rin)      COMMON BRAND NAME(S): Coumadin      WARNING:  Warfarin can cause very serious (possibly fatal) bleeding. This is more likely to occur when you first start taking this medication or if you take too much warfarin. To decrease your risk for bleeding, your doctor or other health care provider will monitor you closely and check your lab results (INR test) to make sure you are not taking too much warfarin. Keep all medical and laboratory appointments. Tell your doctor right away if you notice any signs of serious bleeding. See also Side Effects section.      USES:  This medication is used to treat blood clots (such as in deep vein thrombosis-DVT or pulmonary embolus-PE) and/or to prevent new clots from forming in your body. Preventing harmful blood clots helps to reduce the risk of a stroke or heart attack. Conditions that increase your risk of developing blood clots include a certain type of irregular heart rhythm (atrial fibrillation), heart valve replacement, recent heart attack, and certain surgeries (such as hip/knee replacement). Warfarin is commonly called a \"blood thinner,\" but the more correct term is \"anticoagulant.\" It helps to keep blood flowing smoothly in your body by decreasing the amount of certain substances (clotting proteins) in your blood.      HOW TO USE:  Read the Medication Guide provided by your pharmacist before you start taking warfarin and each time you get a refill. If you have any questions, ask your doctor or pharmacist. Take this medication by mouth with or without food as directed by your doctor or other health care professional, usually once a day. It is very important to take it exactly as directed. Do not increase the dose, take " it more frequently, or stop using it unless directed by your doctor. Dosage is based on your medical condition, laboratory tests (such as INR), and response to treatment. Your doctor or other health care provider will monitor you closely while you are taking this medication to determine the right dose for you. Use this medication regularly to get the most benefit from it. To help you remember, take it at the same time each day. It is important to eat a balanced, consistent diet while taking warfarin. Some foods can affect how warfarin works in your body and may affect your treatment and dose. Avoid sudden large increases or decreases in your intake of foods high in vitamin K (such as broccoli, cauliflower, cabbage, brussels sprouts, kale, spinach, and other green leafy vegetables, liver, green tea, certain vitamin supplements). If you are trying to lose weight, check with your doctor before you try to go on a diet. Cranberry products may also affect how your warfarin works. Limit the amount of cranberry juice (16 ounces/480 milliliters a day) or other cranberry products you may drink or eat.      SIDE EFFECTS:  Nausea, loss of appetite, or stomach/abdominal pain may occur. If any of these effects persist or worsen, tell your doctor or pharmacist promptly. Remember that your doctor has prescribed this medication because he or she has judged that the benefit to you is greater than the risk of side effects. Many people using this medication do not have serious side effects. This medication can cause serious bleeding if it affects your blood clotting proteins too much (shown by unusually high INR lab results). Even if your doctor stops your medication, this risk of bleeding can continue for up to a week. Tell your doctor right away if you have any signs of serious bleeding, including: unusual pain/swelling/discomfort, unusual/easy bruising, prolonged bleeding from cuts or gums, persistent/frequent nosebleeds, unusually  heavy/prolonged menstrual flow, pink/dark urine, coughing up blood, vomit that is bloody or looks like coffee grounds, severe headache, dizziness/fainting, unusual or persistent tiredness/weakness, bloody/black/tarry stools, chest pain, shortness of breath, difficulty swallowing. Tell your doctor right away if any of these unlikely but serious side effects occur: persistent nausea/vomiting, severe stomach/abdominal pain, yellowing eyes/skin. This drug rarely has caused very serious (possibly fatal) problems if its effects lead to small blood clots (usually at the beginning of treatment). This can lead to severe skin/tissue damage that may require surgery or amputation if left untreated. Patients with certain blood conditions (protein C or S deficiency) may be at greater risk. Get medical help right away if any of these rare but serious side effects occur: painful/red/purplish patches on the skin (such as on the toe, breast, abdomen), change in the amount of urine, vision changes, confusion, slurred speech, weakness on one side of the body. A very serious allergic reaction to this drug is rare. However, get medical help right away if you notice any symptoms of a serious allergic reaction, including: rash, itching/swelling (especially of the face/tongue/throat), severe dizziness, trouble breathing. This is not a complete list of possible side effects. If you notice other effects not listed above, contact your doctor or pharmacist. In the [place] - Call your doctor for medical advice about side effects. You may report side effects to FDA at 0-987-ECY-5842. In [place] - Call your doctor for medical advice about side effects. You may report side effects to [facility] [place] at [phone].      PRECAUTIONS:  Before taking warfarin, tell your doctor or pharmacist if you are allergic to it; or if you have any other allergies. This product may contain inactive ingredients, which can cause allergic reactions or other problems. Talk  to your pharmacist for more details. Before using this medication, tell your doctor or pharmacist your medical history, especially of: blood disorders (such as anemia, hemophilia), bleeding problems (such as bleeding of the stomach/intestines, bleeding in the brain), blood vessel disorders (such as aneurysms), recent major injury/surgery, liver disease, alcohol use, mental/mood disorders (including memory problems), frequent falls/injuries. It is important that all your doctors and dentists know that you take warfarin. Before having surgery or any medical/dental procedures, tell your doctor or dentist that you are taking this medication and about all the products you use (including prescription drugs, nonprescription drugs, and herbal products). Avoid getting injections into the muscles. If you must have an injection into a muscle (for example, a flu shot), it should be given in the arm. This way, it will be easier to check for bleeding and/or apply pressure bandages. This medication may cause stomach bleeding. Daily use of alcohol while using this medicine will increase your risk for stomach bleeding and may also affect how this medication works. Limit or avoid alcoholic beverages. If you have not been eating well, if you have an illness or infection that causes fever, vomiting, or diarrhea for more than 2 days, or if you start using any antibiotic medications, contact your doctor or pharmacist immediately because these conditions can affect how warfarin works. This medication can cause heavy bleeding. To lower the chance of getting cut, bruised, or injured, use great caution with sharp objects like safety razors and nail cutters. Use an electric razor when shaving and a soft toothbrush when brushing your teeth. Avoid activities such as contact sports. If you fall or injure yourself, especially if you hit your head, call your doctor immediately. Your doctor may need to check you. The Food & Drug Administration has  "stated that generic warfarin products are interchangeable. However, consult your doctor or pharmacist before switching warfarin products. Be careful not to take more than one medication that contains warfarin unless specifically directed by the doctor or health care provider who is monitoring your warfarin treatment. Older adults may be at greater risk for bleeding while using this drug. This medication is not recommended for use during pregnancy because of serious (possibly fatal) harm to an unborn baby. Discuss the use of reliable forms of birth control with your doctor. If you become pregnant or think you may be pregnant, tell your doctor immediately. If you are planning pregnancy, discuss a plan for managing your condition with your doctor before you become pregnant. Your doctor may switch the type of medication you use during pregnancy. Very small amounts of this medication may pass into breast milk but is unlikely to harm a nursing infant. Consult your doctor before breast-feeding.      DRUG INTERACTIONS:  Drug interactions may change how your medications work or increase your risk for serious side effects. This document does not contain all possible drug interactions. Keep a list of all the products you use (including prescription/nonprescription drugs and herbal products) and share it with your doctor and pharmacist. Do not start, stop, or change the dosage of any medicines without your doctor's approval. Warfarin interacts with many prescription, nonprescription, vitamin, and herbal products. This includes medications that are applied to the skin or inside the vagina or rectum. The interactions with warfarin usually result in an increase or decrease in the \"blood-thinning\" (anticoagulant) effect. Your doctor or other health care professional should closely monitor you to prevent serious bleeding or clotting problems. While taking warfarin, it is very important to tell your doctor or pharmacist of any " changes in medications, vitamins, or herbal products that you are taking. Some products that may interact with this drug include: capecitabine, imatinib, mifepristone. Aspirin, aspirin-like drugs (salicylates), and nonsteroidal anti-inflammatory drugs (NSAIDs such as ibuprofen, naproxen, celecoxib) may have effects similar to warfarin. These drugs may increase the risk of bleeding problems if taken during treatment with warfarin. Carefully check all prescription/nonprescription product labels (including drugs applied to the skin such as pain-relieving creams) since the products may contain NSAIDs or salicylates. Talk to your doctor about using a different medication (such as acetaminophen) to treat pain/fever. Low-dose aspirin and related drugs (such as clopidogrel, ticlopidine) should be continued if prescribed by your doctor for specific medical reasons such as heart attack or stroke prevention. Consult your doctor or pharmacist for more details. Many herbal products interact with warfarin. Tell your doctor before taking any herbal products, especially bromelains, coenzyme Q10, cranberry, danshen, dong quai, fenugreek, garlic, ginkgo biloba, ginseng, and Henny's wort, among others. This medication may interfere with a certain laboratory test to measure theophylline levels, possibly causing false test results. Make sure laboratory personnel and all your doctors know you use this drug.      OVERDOSE:  If overdose is suspected, contact a poison control center or emergency room immediately. [place] residents can call the [place] National Poison Hotline at 1-556.807.5392. [place] residents can call a provincial poison control center. Symptoms of overdose may include: bloody/black/tarry stools, pink/dark urine, unusual/prolonged bleeding.      NOTES:  Do not share this medication with others. Laboratory and/or medical tests (such as INR, complete blood count) must be performed periodically to monitor your progress or check for  side effects. Consult your doctor for more details.      MISSED DOSE:  For the best possible benefit, do not miss any doses. If you do miss a dose and remember on the same day, take it as soon as you remember. If you remember on the next day, skip the missed dose and resume your usual dosing schedule. Do not double the dose to catch up because this could increase your risk for bleeding. Keep a record of missed doses to give to your doctor or pharmacist. Contact your doctor or pharmacist if you miss 2 or more doses in a row.      STORAGE:  Store at room temperature away from light and moisture. Do not store in the bathroom. Keep all medications away from children and pets. Do not flush medications down the toilet or pour them into a drain unless instructed to do so. Properly discard this product when it is  or no longer needed. Consult your pharmacist or local waste disposal company for more details about how to safely discard your product.      MEDICAL ALERT:  Your condition and medication can cause complications in a medical emergency. For information about enrolling in MedicAlert, call 1-345.171.3444 ([place]) or 1-712.297.5826 ([place]).      Information last revised 2010 Copyright(c) 2010 [employer]             · Is patient Post Blood Transfusion?  No    Depression / Suicide Risk    As you are discharged from this [facility] facility, it is important to learn how to keep safe from harming yourself.    Recognize the warning signs:  · Abrupt changes in personality, positive or negative- including increase in energy   · Giving away possessions  · Change in eating patterns- significant weight changes-  positive or negative  · Change in sleeping patterns- unable to sleep or sleeping all the time   · Unwillingness or inability to communicate  · Depression  · Unusual sadness, discouragement and loneliness  · Talk of wanting to die  · Neglect of personal appearance   · Rebelliousness- reckless  behavior  · Withdrawal from people/activities they love  · Confusion- inability to concentrate     If you or a loved one observes any of these behaviors or has concerns about self-harm, here's what you can do:  · Talk about it- your feelings and reasons for harming yourself  · Remove any means that you might use to hurt yourself (examples: pills, rope, extension cords, firearm)  · Get professional help from the community (Mental Health, Substance Abuse, psychological counseling)  · Do not be alone:Call your Safe Contact- someone whom you trust who will be there for you.  · Call your local CRISIS HOTLINE 846-6994 or 164-607-9156  · Call your local Children's Mobile Crisis Response Team [place] (611) 121-6209 or www.GloNav  · Call the toll free National Suicide Prevention Hotlines   · National Suicide Prevention Lifeline 204-917-FUIA (3939)  · National [employer] [facility] Network 800-SUICIDE (735-8985)      Chronic Obstructive Pulmonary Disease  Chronic obstructive pulmonary disease (COPD) is a common lung condition in which airflow from the lungs is limited. COPD is a general term that can be used to describe many different lung problems that limit airflow, including both chronic bronchitis and emphysema. If you have COPD, your lung function will probably never return to normal, but there are measures you can take to improve lung function and make yourself feel better.  CAUSES   · Smoking (common).  · Exposure to secondhand smoke.  · Genetic problems.  · Chronic inflammatory lung diseases or recurrent infections.  SYMPTOMS  · Shortness of breath, especially with physical activity.  · Deep, persistent (chronic) cough with a large amount of thick mucus.  · Wheezing.  · Rapid breaths (tachypnea).  · Gray or bluish discoloration (cyanosis) of the skin, especially in your fingers, toes, or lips.  · Fatigue.  · Weight loss.  · Frequent infections or episodes when breathing symptoms become much worse  (exacerbations).  · Chest tightness.  DIAGNOSIS  Your health care provider will take a medical history and perform a physical examination to diagnose COPD. Additional tests for COPD may include:  · Lung (pulmonary) function tests.  · Chest X-ray.  · CT scan.  · Blood tests.  TREATMENT   Treatment for COPD may include:  · Inhaler and nebulizer medicines. These help manage the symptoms of COPD and make your breathing more comfortable.  · Supplemental oxygen. Supplemental oxygen is only helpful if you have a low oxygen level in your blood.  · Exercise and physical activity. These are beneficial for nearly all people with COPD.  · Lung surgery or transplant.  · Nutrition therapy to gain weight, if you are underweight.  · Pulmonary rehabilitation. This may involve working with a team of health care providers and specialists, such as respiratory, occupational, and physical therapists.  HOME CARE INSTRUCTIONS  · Take all medicines (inhaled or pills) as directed by your health care provider.  · Avoid over-the-counter medicines or cough syrups that dry up your airway (such as antihistamines) and slow down the elimination of secretions unless instructed otherwise by your health care provider.  · If you are a smoker, the most important thing that you can do is stop smoking. Continuing to smoke will cause further lung damage and breathing trouble. Ask your health care provider for help with quitting smoking. He or she can direct you to community resources or hospitals that provide support.  · Avoid exposure to irritants such as smoke, chemicals, and fumes that aggravate your breathing.  · Use oxygen therapy and pulmonary rehabilitation if directed by your health care provider. If you require home oxygen therapy, ask your health care provider whether you should purchase a pulse oximeter to measure your oxygen level at home.  · Avoid contact with individuals who have a contagious illness.  · Avoid extreme temperature and humidity  changes.  · Eat healthy foods. Eating smaller, more frequent meals and resting before meals may help you maintain your strength.  · Stay active, but balance activity with periods of rest. Exercise and physical activity will help you maintain your ability to do things you want to do.  · Preventing infection and hospitalization is very important when you have COPD. Make sure to receive all the vaccines your health care provider recommends, especially the pneumococcal and influenza vaccines. Ask your health care provider whether you need a pneumonia vaccine.  · Learn and use relaxation techniques to manage stress.  · Learn and use controlled breathing techniques as directed by your health care provider. Controlled breathing techniques include:  ¨ Pursed lip breathing. Start by breathing in (inhaling) through your nose for 1 second. Then, purse your lips as if you were going to whistle and breathe out (exhale) through the pursed lips for 2 seconds.  ¨ Diaphragmatic breathing. Start by putting one hand on your abdomen just above your waist. Inhale slowly through your nose. The hand on your abdomen should move out. Then purse your lips and exhale slowly. You should be able to feel the hand on your abdomen moving in as you exhale.  · Learn and use controlled coughing to clear mucus from your lungs. Controlled coughing is a series of short, progressive coughs. The steps of controlled coughing are:  1. Lean your head slightly forward.  2. Breathe in deeply using diaphragmatic breathing.  3. Try to hold your breath for 3 seconds.  4. Keep your mouth slightly open while coughing twice.  5. Spit any mucus out into a tissue.  6. Rest and repeat the steps once or twice as needed.  SEEK MEDICAL CARE IF:  · You are coughing up more mucus than usual.  · There is a change in the color or thickness of your mucus.  · Your breathing is more labored than usual.  · Your breathing is faster than usual.  SEEK IMMEDIATE MEDICAL CARE  IF:  · You have shortness of breath while you are resting.  · You have shortness of breath that prevents you from:  ¨ Being able to talk.  ¨ Performing your usual physical activities.  · You have chest pain lasting longer than 5 minutes.  · Your skin color is more cyanotic than usual.  · You measure low oxygen saturations for longer than 5 minutes with a pulse oximeter.  MAKE SURE YOU:  · Understand these instructions.  · Will watch your condition.  · Will get help right away if you are not doing well or get worse.     This information is not intended to replace advice given to you by your health care provider. Make sure you discuss any questions you have with your health care provider.     Document Released: 09/27/2006 Document Revised: 01/08/2016 Document Reviewed: 08/14/2014  "[employer] Interactive Patient Education ©2016 "[employer] Inc.      Pulmonary Embolism  A pulmonary (lung) embolism (PE) is a blood clot that has traveled to the lung and results in a blockage of blood flow in the affected lung. Most clots come from deep veins in the legs or pelvis. PE is a dangerous and potentially life-threatening condition that can be treated if identified.  CAUSES  Blood clots form in a vein for different reasons. Usually several things cause blood clots. They include:  · The flow of blood slows down.  · The inside of the vein is damaged in some way.  · The person has a condition that makes the blood clot more easily.  RISK FACTORS  Some people are more likely than others to develop PE. Risk factors include:   · Smoking.  · Being overweight (obese).  · Sitting or lying still for a long time. This includes long-distance travel, paralysis, or recovery from an illness or surgery.  Other factors that increase risk are:   · Older age, especially over 75 years of age.  · Having a family history of blood clots or if you have already had a blood clot.  · Having major or lengthy surgery. This is especially true for surgery on the  hip, knee, or belly (abdomen). Hip surgery is particularly high risk.  · Having a long, thin tube (catheter) placed inside a vein during a medical procedure.  · Breaking a hip or leg.  · Having cancer or cancer treatment.  · Medicines containing the female hormone estrogen. This includes birth control pills and hormone replacement therapy.  · Other circulation or heart problems.  · Pregnancy and childbirth.  · Hormone changes make the blood clot more easily during pregnancy.  · The fetus puts pressure on the veins of the pelvis.  · There is a risk of injury to veins during delivery or a caesarean delivery. The risk is highest just after childbirth.    PREVENTION   · Exercise the legs regularly. Take a brisk 30 minute walk every day.  · Maintain a weight that is appropriate for your height.  · Avoid sitting or lying in bed for long periods of time without moving your legs.  · Women, particularly those over the age of 35 years, should consider the risks and benefits of taking estrogen medicines, including birth control pills.  · Do not smoke, especially if you take estrogen medicines.  · Long-distance travel can increase your risk. You should exercise your legs by walking or pumping the muscles every hour.  · Many of the risk factors above relate to situations that exist with hospitalization, either for illness, injury, or elective surgery. Prevention may include medical and nonmedical measures.    · Your health care provider will assess you for the need for venous thromboembolism prevention when you are admitted to the hospital. If you are having surgery, your surgeon will assess you the day of or day after surgery.     SYMPTOMS   The symptoms of a PE usually start suddenly and include:  · Shortness of breath.  · Coughing.  · Coughing up blood or blood-tinged mucus.  · Chest pain. Pain is often worse with deep breaths.  · Rapid heartbeat.  DIAGNOSIS   Your health care provider will take a medical history, perform a  physical exam, and use rule-out criteria to assess your risk for PE. If your risk is intermediate or high, other tests may be done. These include:  7. Blood tests, such as studies of the clotting properties of your blood.  8. Imaging tests, such as ultrasound, CT, MRI, and other tests to see if you have clots in your legs or lungs.  9. An electrocardiogram. This can look for heart strain from blood clots in the lungs.  TREATMENT   · The most common treatment for a PE is blood thinning (anticoagulant) medicine, which reduces the blood's tendency to clot. Anticoagulants can stop new blood clots from forming and old clots from growing. They cannot dissolve existing clots. Your body does this by itself over time. Anticoagulants can be given by mouth, through an intravenous (IV) tube, or by injection. Your health care provider will determine the best program for you.  · Less commonly, clot-dissolving medicines (thrombolytics) are used to dissolve a PE. They carry a high risk of bleeding, so they are used mainly in severe cases.  · Very rarely, a blood clot in the leg needs to be removed surgically.  · If you are unable to take anticoagulants, your health care provider may arrange for you to have a filter placed in a main vein in your abdomen. This filter prevents clots from traveling to your lungs.  HOME CARE INSTRUCTIONS   · Take all medicines as directed by your health care provider.  · Learn as much as you can about DVT.  · Wear a medical alert bracelet or carry a medical alert card.  · Ask your health care provider how soon you can go back to normal activities. It is important to stay active to prevent blood clots. If you are on anticoagulant medicine, avoid contact sports.  · It is very important to exercise. This is especially important while traveling, sitting, or standing for long periods of time. Exercise your legs by walking or by tightening and relaxing your leg muscles regularly. Take frequent walks.  · You  may need to wear compression stockings. These are tight elastic stockings that apply pressure to the lower legs. This pressure can help keep the blood in the legs from clotting.  Taking Warfarin  Warfarin is a daily medicine that is taken by mouth. Your health care provider will advise you on the length of treatment (usually 3-6 months, sometimes lifelong). If you take warfarin:  · Understand how to take warfarin and foods that can affect how warfarin works in your body.  · Too much and too little warfarin are both dangerous. Too much warfarin increases the risk of bleeding. Too little warfarin continues to allow the risk for blood clots.  Warfarin and Regular Blood Testing  While taking warfarin, you will need to have regular blood tests to measure your blood clotting time. These blood tests usually include both the prothrombin time (PT) and international normalized ratio (INR) tests. The PT and INR results allow your health care provider to adjust your dose of warfarin. It is very important that you have your PT and INR tested as often as directed by your health care provider.   Warfarin and Your Diet  Avoid major changes in your diet, or notify your health care provider before changing your diet. Arrange a visit with a registered dietitian to answer your questions. Many foods, especially foods high in vitamin K, can interfere with warfarin and affect the PT and INR results. You should eat a consistent amount of foods high in vitamin K. Foods high in vitamin K include:   · Spinach, kale, broccoli, cabbage, jose francisco and turnip greens, [place] sprouts, peas, cauliflower, seaweed, and parsley.  · Beef and pork liver.  · Green tea.  · Soybean oil.  Warfarin with Other Medicines  Many medicines can interfere with warfarin and affect the PT and INR results. You must:  · Tell your health care provider about any and all medicines, vitamins, and supplements you take, including aspirin and other over-the-counter  anti-inflammatory medicines. Be especially cautious with aspirin and anti-inflammatory medicines. Ask your health care provider before taking these.  · Do not take or discontinue any prescribed or over-the-counter medicine except on the advice of your health care provider or pharmacist.  Warfarin Side Effects  Warfarin can have side effects, such as easy bruising and difficulty stopping bleeding. Ask your health care provider or pharmacist about other side effects of warfarin. You will need to:  · Hold pressure over cuts for longer than usual.  · Notify your dentist and other health care providers that you are taking warfarin before you undergo any procedures where bleeding may occur.  Warfarin with Alcohol and Tobacco   · Drinking alcohol frequently can increase the effect of warfarin, leading to excess bleeding. It is best to avoid alcoholic drinks or consume only very small amounts while taking warfarin. Notify your health care provider if you change your alcohol intake.  · Do not use any tobacco products including cigarettes, chewing tobacco, or electronic cigarettes. If you smoke, quit. Ask your health care provider for help with quitting smoking.  Alternative Medicines to Warfarin: Factor Xa Inhibitor Medicines  · These blood thinning medicines are taken by mouth, usually for several weeks or longer. It is important to take the medicine every single day, at the same time each day.  · There are no regular blood tests required when using these medicines.  · There are fewer food and drug interactions than with warfarin.  · The side effects of this class of medicine is similar to that of warfarin, including excessive bruising or bleeding. Ask your health care provider or pharmacist about other potential side effects.  SEEK MEDICAL CARE IF:   · You notice a rapid heartbeat.  · You feel weaker or more tired than usual.  · You feel faint.  · You notice increased bruising.  · Your symptoms are not getting better in  the time expected.  · You are having side effects of medicine.  SEEK IMMEDIATE MEDICAL CARE IF:   · You have chest pain.  · You have trouble breathing.  · You have new or increased swelling or pain in one leg.  · You cough up blood.  · You notice blood in vomit, in a bowel movement, or in urine.  · You have a fever.  Symptoms of PE may represent a serious problem that is an emergency. Do not wait to see if the symptoms will go away. Get medical help right away. Call your local emergency services (911 in the [place]). Do not drive yourself to the hospital.       This information is not intended to replace advice given to you by your health care provider. Make sure you discuss any questions you have with your health care provider.     Document Released: 12/15/2001 Document Revised: 01/08/2016 Document Reviewed: 04/13/2016  [employer] Patient Education ©2016 [employer]        Follow-up Information     1. Follow up with [facility] In 1 week.    Why:  Follow up check up    Contact information    [address] [place] [address] [id number] [phone]         Discharge Medication Instructions:    Below are the medications your physician expects you to take upon discharge:    Review all your home medications and newly ordered medications with your doctor and/or pharmacist. Follow medication instructions as directed by your doctor and/or pharmacist.    Please keep your medication list with you and share with your physician.               Medication List      START taking these medications        Instructions    Morning Afternoon Evening Bedtime    MethylPREDNISolone 4 MG Tbpk   Last time this was given:  4 mg on 4/12/2017 11:40 AM   Commonly known as:  MEDROL DOSEPAK        Take as directed                        * warfarin 5 MG Tabs   Last time this was given:  5 mg on 4/11/2017  5:50 PM   Commonly known as:  COUMADIN        Take once daily on Sunday, Tuesday, Wednesday, Friday, and Saturday                         * warfarin 7.5 MG Tabs   Last time this was given:  5 mg on 4/11/2017  5:50 PM   Commonly known as:  COUMADIN        Take daily on Monday, and Thursday                        * Notice:  This list has 2 medication(s) that are the same as other medications prescribed for you. Read the directions carefully, and ask your doctor or other care provider to review them with you.      CONTINUE taking these medications        Instructions    Morning Afternoon Evening Bedtime    albuterol 108 (90 BASE) MCG/ACT Aers inhalation aerosol        Inhale 2 Puffs by mouth every four hours as needed for Shortness of Breath.   Dose:  2 Puff                        alprazolam 0.5 MG Tabs   Last time this was given:  0.5 mg on 4/3/2017  6:52 PM   Commonly known as:  XANAX        Take 1 Tab by mouth 3 times a day as needed for Sleep or Anxiety.   Dose:  0.5 mg                        aspirin EC 81 MG Tbec   Last time this was given:  81 mg on 4/8/2017  7:49 AM   Commonly known as:  ECOTRIN        Take 1 Tab by mouth every day.   Dose:  81 mg                        budesonide-formoterol 160-4.5 MCG/ACT Aero   Last time this was given:  1 Puff on 4/12/2017  7:13 AM   Commonly known as:  SYMBICORT        Inhale 1 Puff by mouth 2 Times a Day.   Dose:  1 Puff                        citalopram 20 MG Tabs   Last time this was given:  20 mg on 4/12/2017  8:05 AM   Commonly known as:  CELEXA        Take 1 Tab by mouth every day.   Dose:  20 mg                        gabapentin 300 MG Caps   Last time this was given:  300 mg on 4/12/2017  8:05 AM   Commonly known as:  NEURONTIN        Take 1 Cap by mouth 3 times a day.   Dose:  300 mg                        insulin glargine 100 UNIT/ML Soln   Last time this was given:  36 Units on 4/11/2017 10:01 PM   Commonly known as:  LANTUS        Inject 32 Units as instructed every evening.   Dose:  32 Units                        insulin lispro 100 UNIT/ML Soln   Last time this was given:  3  Units on 4/12/2017 11:42 AM   Commonly known as:  HUMALOG        Inject 2-10 Units as instructed 3 times a day before meals. Sliding scale 151-200 2 units 201-250 4 units 251-300 6 units 301-350 8 units 351-400 10 units <60 to >400 call MD   Dose:  2-10 Units                        metoclopramide 5 MG tablet   Last time this was given:  5 mg on 4/9/2017  5:12 AM   Commonly known as:  REGLAN        Take 1 Tab by mouth 3 times a day before meals. Indications: Nausea and Vomiting   Dose:  5 mg                        morphine 15 MG tablet   Last time this was given:  15 mg on 4/12/2017 11:40 AM   Commonly known as:  MS IR        Take 1 Tab by mouth 3 times a day.   Dose:  15 mg                        oxycodone immediate release 10 MG immediate release tablet   Last time this was given:  10 mg on 4/3/2017  9:27 PM   Commonly known as:  ROXICODONE        Take 1 Tab by mouth 2 times a day as needed for Moderate Pain.   Dose:  10 mg                        risperidone 0.5 MG Tabs   Last time this was given:  0.5 mg on 4/11/2017  9:59 PM   Commonly known as:  RISPERDAL        Take 1 Tab by mouth every bedtime.   Dose:  0.5 mg                        simvastatin 20 MG Tabs   Last time this was given:  20 mg on 4/11/2017  9:59 PM   Commonly known as:  ZOCOR        Take 1 Tab by mouth every evening.   Dose:  20 mg                        trazodone 50 MG Tabs   Last time this was given:  50 mg on 4/11/2017  9:59 PM   Commonly known as:  DESYREL        Take 1 Tab by mouth every day.   Dose:  50 mg                             Where to Get Your Medications      Information about where to get these medications is not yet available     ! Ask your nurse or doctor about these medications    - albuterol 108 (90 BASE) MCG/ACT Aers inhalation aerosol  - budesonide-formoterol 160-4.5 MCG/ACT Aero  - MethylPREDNISolone 4 MG Tbpk  - warfarin 5 MG Tabs  - warfarin 7.5 MG Tabs            Orders for after discharge     DME O2 New Set Up     Complete by:  As directed        REFERRAL TO ANTICOAGULATION MONITORING    Complete by:  As directed    If this Referral to the anticoagulation clinic is being ordered with a Referral to home health, then schedule the anticoagulation visit after the home health treatments are completed.       REFERRAL TO ANTICOAGULATION MONITORING    Complete by:  As directed    If this Referral to the anticoagulation clinic is being ordered with a Referral to home health, then schedule the anticoagulation visit after the home health treatments are completed.             Instructions           Diet / Nutrition:    Follow any diet instructions given to you by your doctor or the dietician, including how much salt (sodium) you are allowed each day.    If you are overweight, talk to your doctor about a weight reduction plan.    Activity:    Remain physically active following your doctor's instructions about exercise and activity.    Rest often.     Any time you become even a little tired or short of breath, SIT DOWN and rest.    Worsening Symptoms:    Report any of the following signs and symptoms to the doctor's office immediately:    *Pain of jaw, arm, or neck  *Chest pain not relieved by medication                               *Dizziness or loss of consciousness  *Difficulty breathing even when at rest   *More tired than usual                                       *Bleeding drainage or swelling of surgical site  *Swelling of feet, ankles, legs or stomach                 *Fever (>100ºF)  *Pink or blood tinged sputum  *Weight gain (3lbs/day or 5lbs /week)           *Shock from internal defibrillator (if applicable)  *Palpitations or irregular heartbeats                *Cool and/or numb extremities    Stroke Awareness    Common Risk Factors for Stroke include:    Age  Atrial Fibrillation  Carotid Artery Stenosis  Diabetes Mellitus  Excessive alcohol consumption  High blood pressure  Overweight   Physical inactivity  Smoking    Warning  signs and symptoms of a stroke include:    *Sudden numbness or weakness of the face, arm or leg (especially on one side of the body).  *Sudden confusion, trouble speaking or understanding.  *Sudden trouble seeing in one or both eyes.  *Sudden trouble walking, dizziness, loss of balance or coordination.Sudden severe headache with no known cause.    It is very important to get treatment quickly when a stroke occurs. If you experience any of the above warning signs, call 911 immediately.                   Disclaimer         Quit Smoking / Tobacco Use:    I understand the use of any tobacco products increases my chance of suffering from future heart disease or stroke and could cause other illnesses which may shorten my life. Quitting the use of tobacco products is the single most important thing I can do to improve my health. For further information on smoking / tobacco cessation call a Toll Free Quit Line at 1-153.224.8918 (*[employer] [place]) or 1-241.815.8300 ([employer]) or you can access the web based program at www.[employer].org.    [place] Tobacco Users Help Line:  (980) 608-5991       Toll Free: 1-293.597.1095  Quit Tobacco Program [facility] (970)260-3489    Crisis Hotline:    [place] Crisis Hotline:  4-485-VKIMBYH or 1-121.935.8456    [place] Crisis Hotline:    1-239.450.1304 or 933-334-8143    Discharge Survey:   Thank you for choosing [facility]. We hope we did everything we could to make your hospital stay a pleasant one. You may be receiving a phone survey and we would appreciate your time and participation in answering the questions. Your input is very valuable to us in our efforts to improve our service to our patients and their families.        My signature on this form indicates that:    1. I have reviewed and understand the above information.  2. My questions regarding this information have been answered to my satisfaction.  3. I have formulated a plan  with my discharge nurse to obtain my prescribed medications for home.                  Disclaimer         __________________________________                     __________       ________                       Patient Signature                                                 Date                    Time
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
## 2017-04-03 NOTE — IP AVS SNAPSHOT
4/12/2017    Zenia Ordaz  [facility] [address]    Dear Zenia:    [facility] wants to ensure your discharge home is safe and you or your loved ones have had all of your questions answered regarding your care after you leave the hospital.    Below is a list of resources and contact information should you have any questions regarding your hospital stay, follow-up instructions, or active medical symptoms.    Questions or Concerns Regarding… Contact   Medical Questions Related to Your Discharge  (7 days a week, 8am-5pm) Contact a Nurse Care Coordinator   [phone]   Medical Questions Not Related to Your Discharge  (24 hours a day / 7 days a week)  Contact the Nurse Health Line   [phone]    Medications or Discharge Instructions Refer to your discharge packet   or contact your [facility] Primary Care Provider   [phone]   Follow-up Appointment(s) Schedule your appointment via Digital Message Display   or contact Scheduling [phone]   Billing Review your statement via Digital Message Display  or contact Billing [phone]   Medical Records Review your records via Digital Message Display   or contact Medical Records [phone]     You may receive a telephone call within two days of discharge. This call is to make certain you understand your discharge instructions and have the opportunity to have any questions answered. You can also easily access your medical information, test results and upcoming appointments via the Digital Message Display free online health management tool. You can learn more and sign up at [employer]/Digital Message Display. For assistance setting up your Digital Message Display account, please call [phone].    Once again, we want to ensure your discharge home is safe and that you have a clear understanding of any next steps in your care. If you have any questions or concerns, please do not hesitate to contact us, we are here for you. Thank you for choosing [facility] for your healthcare needs.    Sincerely,    Your [facility] Healthcare Team

## 2017-04-04 LAB
ANION GAP SERPL CALC-SCNC: 8 MMOL/L (ref 0–11.9)
BUN SERPL-MCNC: 13 MG/DL (ref 8–22)
CALCIUM SERPL-MCNC: 8.5 MG/DL (ref 8.5–10.5)
CHLORIDE SERPL-SCNC: 105 MMOL/L (ref 96–112)
CO2 SERPL-SCNC: 24 MMOL/L (ref 20–33)
CREAT SERPL-MCNC: 0.63 MG/DL (ref 0.5–1.4)
ERYTHROCYTE [DISTWIDTH] IN BLOOD BY AUTOMATED COUNT: 45.4 FL (ref 35.9–50)
GFR SERPL CREATININE-BSD FRML MDRD: >60 ML/MIN/1.73 M 2
GLUCOSE BLD-MCNC: 161 MG/DL (ref 65–99)
GLUCOSE BLD-MCNC: 206 MG/DL (ref 65–99)
GLUCOSE BLD-MCNC: 271 MG/DL (ref 65–99)
GLUCOSE BLD-MCNC: 292 MG/DL (ref 65–99)
GLUCOSE SERPL-MCNC: 288 MG/DL (ref 65–99)
HCT VFR BLD AUTO: 41.1 % (ref 37–47)
HGB BLD-MCNC: 13 G/DL (ref 12–16)
MCH RBC QN AUTO: 27 PG (ref 27–33)
MCHC RBC AUTO-ENTMCNC: 31.6 G/DL (ref 33.6–35)
MCV RBC AUTO: 85.4 FL (ref 81.4–97.8)
PLATELET # BLD AUTO: 122 K/UL (ref 164–446)
PMV BLD AUTO: 9.6 FL (ref 9–12.9)
POTASSIUM SERPL-SCNC: 4.5 MMOL/L (ref 3.6–5.5)
RBC # BLD AUTO: 4.81 M/UL (ref 4.2–5.4)
SODIUM SERPL-SCNC: 137 MMOL/L (ref 135–145)
WBC # BLD AUTO: 5.2 K/UL (ref 4.8–10.8)

## 2017-04-04 PROCEDURE — 93970 EXTREMITY STUDY: CPT | Mod: 26 | Performed by: SURGERY

## 2017-04-04 PROCEDURE — 85027 COMPLETE CBC AUTOMATED: CPT

## 2017-04-04 PROCEDURE — A9270 NON-COVERED ITEM OR SERVICE: HCPCS | Performed by: HOSPITALIST

## 2017-04-04 PROCEDURE — 99233 SBSQ HOSP IP/OBS HIGH 50: CPT | Performed by: HOSPITALIST

## 2017-04-04 PROCEDURE — 80048 BASIC METABOLIC PNL TOTAL CA: CPT

## 2017-04-04 PROCEDURE — 82962 GLUCOSE BLOOD TEST: CPT | Mod: 91

## 2017-04-04 PROCEDURE — 99406 BEHAV CHNG SMOKING 3-10 MIN: CPT

## 2017-04-04 PROCEDURE — 700111 HCHG RX REV CODE 636 W/ 250 OVERRIDE (IP): Performed by: HOSPITALIST

## 2017-04-04 PROCEDURE — 700101 HCHG RX REV CODE 250: Performed by: HOSPITALIST

## 2017-04-04 PROCEDURE — 36415 COLL VENOUS BLD VENIPUNCTURE: CPT

## 2017-04-04 PROCEDURE — 700102 HCHG RX REV CODE 250 W/ 637 OVERRIDE(OP): Performed by: HOSPITALIST

## 2017-04-04 PROCEDURE — 94640 AIRWAY INHALATION TREATMENT: CPT

## 2017-04-04 PROCEDURE — 700102 HCHG RX REV CODE 250 W/ 637 OVERRIDE(OP)

## 2017-04-04 PROCEDURE — A9270 NON-COVERED ITEM OR SERVICE: HCPCS

## 2017-04-04 PROCEDURE — 94760 N-INVAS EAR/PLS OXIMETRY 1: CPT

## 2017-04-04 PROCEDURE — 770020 HCHG ROOM/CARE - TELE (206)

## 2017-04-04 PROCEDURE — 93970 EXTREMITY STUDY: CPT

## 2017-04-04 RX ORDER — WARFARIN SODIUM 10 MG/1
10 TABLET ORAL
Status: COMPLETED | OUTPATIENT
Start: 2017-04-04 | End: 2017-04-04

## 2017-04-04 RX ORDER — IPRATROPIUM BROMIDE AND ALBUTEROL SULFATE 2.5; .5 MG/3ML; MG/3ML
3 SOLUTION RESPIRATORY (INHALATION)
Status: DISCONTINUED | OUTPATIENT
Start: 2017-04-04 | End: 2017-04-12 | Stop reason: HOSPADM

## 2017-04-04 RX ORDER — DEXTROMETHORPHAN HBR. AND GUAIFENESIN 10; 100 MG/5ML; MG/5ML
10 SOLUTION ORAL EVERY 6 HOURS PRN
Status: DISCONTINUED | OUTPATIENT
Start: 2017-04-04 | End: 2017-04-12 | Stop reason: HOSPADM

## 2017-04-04 RX ORDER — ENALAPRILAT 1.25 MG/ML
1.25 INJECTION INTRAVENOUS EVERY 6 HOURS PRN
Status: DISCONTINUED | OUTPATIENT
Start: 2017-04-04 | End: 2017-04-12 | Stop reason: HOSPADM

## 2017-04-04 RX ADMIN — DEXTROMETHORPHAN HYDROBROMIDE AND GUAIFENESIN 10 ML: 10; 100 LIQUID ORAL at 15:07

## 2017-04-04 RX ADMIN — METOCLOPRAMIDE HYDROCHLORIDE 5 MG: 5 TABLET ORAL at 12:18

## 2017-04-04 RX ADMIN — ENOXAPARIN SODIUM 100 MG: 100 INJECTION SUBCUTANEOUS at 05:24

## 2017-04-04 RX ADMIN — CITALOPRAM HYDROBROMIDE 20 MG: 20 TABLET ORAL at 08:09

## 2017-04-04 RX ADMIN — ASPIRIN 81 MG: 81 TABLET ORAL at 08:09

## 2017-04-04 RX ADMIN — MORPHINE SULFATE 15 MG: 15 TABLET ORAL at 00:50

## 2017-04-04 RX ADMIN — GABAPENTIN 300 MG: 300 CAPSULE ORAL at 08:07

## 2017-04-04 RX ADMIN — METOCLOPRAMIDE HYDROCHLORIDE 5 MG: 5 TABLET ORAL at 17:54

## 2017-04-04 RX ADMIN — BUDESONIDE AND FORMOTEROL FUMARATE DIHYDRATE 1 PUFF: 160; 4.5 AEROSOL RESPIRATORY (INHALATION) at 20:33

## 2017-04-04 RX ADMIN — GABAPENTIN 300 MG: 300 CAPSULE ORAL at 20:32

## 2017-04-04 RX ADMIN — MORPHINE SULFATE 15 MG: 15 TABLET ORAL at 20:32

## 2017-04-04 RX ADMIN — MORPHINE SULFATE 15 MG: 15 TABLET ORAL at 15:08

## 2017-04-04 RX ADMIN — INSULIN LISPRO 3 UNITS: 100 INJECTION, SOLUTION INTRAVENOUS; SUBCUTANEOUS at 05:28

## 2017-04-04 RX ADMIN — ENOXAPARIN SODIUM 100 MG: 100 INJECTION SUBCUTANEOUS at 20:32

## 2017-04-04 RX ADMIN — METHYLPREDNISOLONE SODIUM SUCCINATE 62.5 MG: 125 INJECTION, POWDER, FOR SOLUTION INTRAMUSCULAR; INTRAVENOUS at 03:38

## 2017-04-04 RX ADMIN — INSULIN LISPRO 5 UNITS: 100 INJECTION, SOLUTION INTRAVENOUS; SUBCUTANEOUS at 20:34

## 2017-04-04 RX ADMIN — METHYLPREDNISOLONE SODIUM SUCCINATE 62.5 MG: 125 INJECTION, POWDER, FOR SOLUTION INTRAMUSCULAR; INTRAVENOUS at 15:08

## 2017-04-04 RX ADMIN — TRAZODONE HYDROCHLORIDE 50 MG: 50 TABLET ORAL at 20:33

## 2017-04-04 RX ADMIN — INSULIN LISPRO 2 UNITS: 100 INJECTION, SOLUTION INTRAVENOUS; SUBCUTANEOUS at 17:52

## 2017-04-04 RX ADMIN — INSULIN LISPRO 5 UNITS: 100 INJECTION, SOLUTION INTRAVENOUS; SUBCUTANEOUS at 12:17

## 2017-04-04 RX ADMIN — MORPHINE SULFATE 15 MG: 15 TABLET ORAL at 08:09

## 2017-04-04 RX ADMIN — MAGNESIUM SULFATE IN WATER 2 G: 40 INJECTION, SOLUTION INTRAVENOUS at 00:08

## 2017-04-04 RX ADMIN — IPRATROPIUM BROMIDE AND ALBUTEROL SULFATE 3 ML: .5; 3 SOLUTION RESPIRATORY (INHALATION) at 03:18

## 2017-04-04 RX ADMIN — STANDARDIZED SENNA CONCENTRATE AND DOCUSATE SODIUM 2 TABLET: 8.6; 5 TABLET, FILM COATED ORAL at 20:34

## 2017-04-04 RX ADMIN — RISPERIDONE 0.5 MG: 1 TABLET, FILM COATED ORAL at 20:33

## 2017-04-04 RX ADMIN — WARFARIN SODIUM 10 MG: 10 TABLET ORAL at 17:55

## 2017-04-04 RX ADMIN — GABAPENTIN 300 MG: 300 CAPSULE ORAL at 15:08

## 2017-04-04 RX ADMIN — BUDESONIDE AND FORMOTEROL FUMARATE DIHYDRATE 1 PUFF: 160; 4.5 AEROSOL RESPIRATORY (INHALATION) at 08:06

## 2017-04-04 RX ADMIN — STANDARDIZED SENNA CONCENTRATE AND DOCUSATE SODIUM 2 TABLET: 8.6; 5 TABLET, FILM COATED ORAL at 08:09

## 2017-04-04 RX ADMIN — INSULIN GLARGINE 32 UNITS: 100 INJECTION, SOLUTION SUBCUTANEOUS at 20:34

## 2017-04-04 RX ADMIN — METHYLPREDNISOLONE SODIUM SUCCINATE 62.5 MG: 125 INJECTION, POWDER, FOR SOLUTION INTRAMUSCULAR; INTRAVENOUS at 20:32

## 2017-04-04 RX ADMIN — SIMVASTATIN 20 MG: 20 TABLET, FILM COATED ORAL at 20:34

## 2017-04-04 RX ADMIN — METHYLPREDNISOLONE SODIUM SUCCINATE 62.5 MG: 125 INJECTION, POWDER, FOR SOLUTION INTRAMUSCULAR; INTRAVENOUS at 08:07

## 2017-04-04 RX ADMIN — METOCLOPRAMIDE HYDROCHLORIDE 5 MG: 5 TABLET ORAL at 05:25

## 2017-04-04 ASSESSMENT — ENCOUNTER SYMPTOMS
SORE THROAT: 0
ABDOMINAL PAIN: 0
CHILLS: 0
PALPITATIONS: 0
MYALGIAS: 1
BLURRED VISION: 0
VOMITING: 0
INSOMNIA: 0
BACK PAIN: 1
FEVER: 0
NECK PAIN: 1
HEADACHES: 0
EYE PAIN: 0
SHORTNESS OF BREATH: 1
TINGLING: 0
DEPRESSION: 0
DIZZINESS: 0
COUGH: 0
NAUSEA: 0

## 2017-04-04 ASSESSMENT — PAIN SCALES - GENERAL
PAINLEVEL_OUTOF10: 8
PAINLEVEL_OUTOF10: 8
PAINLEVEL_OUTOF10: 6
PAINLEVEL_OUTOF10: 8
PAINLEVEL_OUTOF10: 7
PAINLEVEL_OUTOF10: 7

## 2017-04-04 ASSESSMENT — COPD QUESTIONNAIRES
DO YOU EVER COUGH UP ANY MUCUS OR PHLEGM?: YES, A FEW DAYS A WEEK OR MONTH
COPD SCREENING SCORE: 6
DURING THE PAST 4 WEEKS HOW MUCH DID YOU FEEL SHORT OF BREATH: SOME OF THE TIME
HAVE YOU SMOKED AT LEAST 100 CIGARETTES IN YOUR ENTIRE LIFE: YES

## 2017-04-04 ASSESSMENT — LIFESTYLE VARIABLES
EVER_SMOKED: YES
PACK_YEARS: 40

## 2017-04-04 NOTE — PROGRESS NOTES
Received report from RN. Assumed pt care. Assessment completed. AA&Ox4. Pain 7/10. O2 98% on 5L NC.   Pt is currently in bed. Complains of SOB, Pt sat up in chair for breakfast and instructed on breathing.   Bed alarm on, call light within reach, pt calls appropriately and does not get out of bed. Bed in lowest position, bed locked, RN and CNA numbers provided, no further needs at this time.Safety precautions in place. Hourly rounding in progress.

## 2017-04-04 NOTE — DIETARY
Nutrition Services: Pt seen for poor PO per admit screen. Diet= Diabetic. Pt with COPD exacerbation and pulmonary embolism.     PREVIOUS MEDICAL HISTORY: Fibromyalgia rheumatica, Chronic obstructive pulmonary disease, hepatitis B, hepatitis C, Type 2 diabetes, Coronary artery disease, Hypertension, Tobacco use, Pulmonary hypertension, Grade II diastolic heart failure.  Pertinent Meds: am Glu 266, FSBS: 206-293 (4/3-4/4).   Wt per stand up scale on 4/3: 84.6 kg/186 lbs and BMI of 34.67. No recent wt loss per admit screen.   Skin: No breakdown noted.     Plan/Rec: Nutrition Representative to see pt for snacks/meal preferences. RD to follow to monitor PO intake per department policy.

## 2017-04-04 NOTE — PROGRESS NOTES
Hospital Medicine Interval Note  Date of Service:  4/4/2017    Chief Complaint  66 y.o.-year-old female admitted 4/3/2017 with recurrent chest pain and SOB. Treated for copd flare.  Found to have bilateral PE. Known homeless status and hx of recurrent admissions and noncompliance.     Interval Problem Update  Very labile affect. No chest pain. Says everything hurts and asking for pain meds. Was still smoking at home.     Consultants/Specialty  none    Disposition  Home.      Review of Systems   Constitutional: Negative for fever and chills.   HENT: Negative for sore throat.    Eyes: Negative for blurred vision and pain.   Respiratory: Positive for shortness of breath. Negative for cough.    Cardiovascular: Negative for chest pain and palpitations.   Gastrointestinal: Negative for nausea, vomiting and abdominal pain.   Genitourinary: Negative for dysuria and urgency.   Musculoskeletal: Positive for myalgias, back pain, joint pain and neck pain.   Skin: Negative for itching and rash.   Neurological: Negative for dizziness, tingling and headaches.   Psychiatric/Behavioral: Negative for depression. The patient does not have insomnia.    All other systems reviewed and are negative.     Physical Exam Laboratory/Imaging   Filed Vitals:    04/04/17 0800 04/04/17 0900 04/04/17 1100 04/04/17 1200   BP: 179/94 199/97 141/78 142/91   Pulse: 79   77   Temp: 36.7 °C (98.1 °F)   37.1 °C (98.7 °F)   Resp: 20   20   Height:       Weight:       SpO2: 93%   96%     Physical Exam   Constitutional: She is oriented to person, place, and time. She appears well-developed and well-nourished. No distress.   HENT:   Right Ear: External ear normal.   Left Ear: External ear normal.   Nose: Nose normal.   Eyes: Conjunctivae are normal. Right eye exhibits no discharge. Left eye exhibits no discharge.   Neck: No JVD present.   Cardiovascular: Regular rhythm and normal heart sounds.    No murmur heard.  Pulmonary/Chest: Effort normal. No stridor.  No respiratory distress. She has wheezes. She has rales.   Abdominal: Soft. Bowel sounds are normal. She exhibits no distension. There is no tenderness.   Musculoskeletal: She exhibits no edema or tenderness.   Neurological: She is alert and oriented to person, place, and time.   Skin: Skin is warm and dry. She is not diaphoretic. No erythema.   Psychiatric: She has a normal mood and affect. Her behavior is normal.   Nursing note and vitals reviewed.   Lab Results   Component Value Date/Time    WBC 5.2 04/04/2017 02:32 AM    HEMOGLOBIN 13.0 04/04/2017 02:32 AM    HEMATOCRIT 41.1 04/04/2017 02:32 AM    PLATELET COUNT 122* 04/04/2017 02:32 AM     Lab Results   Component Value Date/Time    SODIUM 137 04/04/2017 02:32 AM    POTASSIUM 4.5 04/04/2017 02:32 AM    CHLORIDE 105 04/04/2017 02:32 AM    CO2 24 04/04/2017 02:32 AM    GLUCOSE 288* 04/04/2017 02:32 AM    BUN 13 04/04/2017 02:32 AM    CREATININE 0.63 04/04/2017 02:32 AM      Assessment/Plan    COPD (chronic obstructive pulmonary disease) (CMS-HCC)  Assessment & Plan  Iv steroids. Rt protocol.     Diabetes type 2, controlled (CMS-HCC)  Assessment & Plan  Really not well controlled. Watch with steroids.     HTN (hypertension)  Assessment & Plan  Benign. Home meds ongoing.     Pulmonary emboli (CMS-HCC)  Assessment & Plan  Start coumadin.     COPD exacerbation (CMS-HCC)  Assessment & Plan  As above. Recurrent as she is still smoking.     Acute respiratory failure with hypoxia (CMS-HCC)  Assessment & Plan  2/2 PE and copd flare. Treat both. Rt protocol.     Tobacco abuse  Assessment & Plan  We discussed he need to stop.     CAD (coronary artery disease)  Assessment & Plan  Stable. No MI noted.     Chronic narcotic dependence  Assessment & Plan  Will not escalate her narcotics. We discussed this in detail today.        EKG reviewed, Labs reviewed, Medications reviewed and Radiology images reviewed  Fagan catheter: No Fagan      DVT Prophylaxis: Heparin and Warfarin  (Coumadin)  DVT prophylaxis - mechanical: SCDs    Antibiotics: Treating active infection/contamination beyond 24 hours perioperative coverage  Assessed for rehab: Patient was assess for and/or received rehabilitation services during this hospitalization

## 2017-04-04 NOTE — H&P
CHIEF COMPLAINT:  Shortness of breath and chest pain.    HISTORY OF PRESENT ILLNESS:  This is a 66-year-old female who reports chest   pain beginning about 4 days ago.  She had been in the hospital recently and in   fact was discharged 4 days ago.  She was treated at that time for COPD   exacerbation and community-acquired pneumonia.    The patient reports that since going home, her shortness of breath has really   not gotten any better and in fact has gotten worse.  She notes that it is   worse especially when she walks.  She has had swelling in her legs, which has   increased over the last 2 days.  She feels generally very weak and tired.  She   has had increased cough as well.    In the ED, the patient was initially thought to be suffering from a COPD   exacerbation; however, given an elevated D-dimer of 1910, a CTA was completed   which did demonstrate bilateral peripheral PEs.    REVIEW OF SYSTEMS:  Positive as noted above, otherwise positive for   generalized back and joint pain which the patient attributes to her   fibromyalgia which seems to be worse lately.  She also feels generally weak   and has not been eating well.  All other systems are reviewed and negative.    PREVIOUS MEDICAL HISTORY:  1.  Fibromyalgia rheumatica.  2.  Chronic obstructive pulmonary disease.  3.  History of hepatitis B.  4.  History of hepatitis C.  5.  Type 2 diabetes.  6.  Coronary artery disease.  7.  Hypertension.  8.  Tobacco use.  9.  Pulmonary hypertension.  10.  Grade II diastolic heart failure.    MEDICATIONS:  1.  Albuterol p.r.n.  2.  Xanax 0.5 mg p.o. t.i.d. p.r.n. anxiety or sleep.  3.  Aspirin 81 mg p.o. daily.  4.  Symbicort 160/4.5 1 puff b.i.d.  5.  Celexa 20 mg p.o. daily.  6.  Gabapentin 300 mg p.o. t.i.d.  7.  Lantus 32 units at bedtime.  8.  Sliding scale lispro prior to meals.  9.  Reglan 5 mg p.o. t.i.d.  10.  MS Contin.  11.  Immediate release morphine 15 mg p.o. t.i.d.  12.  Oxycodone 10 mg p.o. b.i.d. as  needed for breakthrough pain.  13.  Risperdal 0.5 mg at bedtime.  14.  Zocor 20 mg p.o. daily.  15.  Trazodone 50 mg p.o. daily.    ALLERGIES:  1.  TYLENOL.  2.  ZOFRAN.    SOCIAL HISTORY:  Patient smokes, though she is trying to cutdown.    SURGICAL HISTORY:  1.  Cardiac catheterization.  2.  I and D.    FAMILY HISTORY:  Reviewed but not relevant to presentation.    PHYSICAL EXAMINATION:  VITAL SIGNS:  In ED, temperature 37.1, heart rate 83, respiratory rate 20, BP   148/98 sating 100% on 6 L nasal cannula.  GENERAL:  The patient is awake, alert.  She is in no acute distress.  HEENT:  Head is normocephalic and atraumatic.  Mucus membranes are moist.    Sclerae and conjunctivae are benign.  NECK:  Trachea is in the midline.  Neck is supple.  There is no JVD or bruits.  RESPIRATORY:  Bilateral expiratory wheezes are appreciated, there is   relatively prolonged expiratory phase and poor air movement.  CARDIAC:  Regular rate and rhythm.  No murmurs, rubs or clicks.  ABDOMEN:  Soft.  No guarding, rebound, hepatosplenomegaly or masses.  EXTREMITIES:  No clubbing, cyanosis or edema.  Bilateral calves are nontender   to palpation.  There are no palpable cords.  SKIN:  Warm and dry.  No rashes are appreciated.  NEUROLOGIC:  Alert and oriented.  Speech clear, content logical.  PSYCHIATRIC:  Anxious, otherwise unremarkable.    LABORATORY DATA:  White count 8.3, hemoglobin 13.7, platelet count 116.    Sodium 137, potassium 3.3, BUN 11, creatinine 0.57.  LFTs are unremarkable.    Lactic acid 1.8, mag 1.7.  Troponin I 0.04.  .  D-dimer 1910.  TSH   0.440.    IMAGING STUDIES:  CTA of the chest:  Small acute bilateral pulmonary emboli.    Findings suggestive of pulmonary hypertension.  No evidence of right heart   strain.  No other significant acute findings.    ASSESSMENT AND PLAN:  This is a 66-year-old female with problem list as   follows:  1.  Bilateral pulmonary embolism:  The patient was recently hospitalized and    this is her only significant risk factor other than smoking and obesity, which   are not insignificant.  She will be admitted.  We will check her legs for   DVT.  We will place her initially on Lovenox b.i.d. 1 mg/kg, consider starting   her on a p.o. anticoagulant whether vitamin K agent or one of the novel   agents dependent on the remainder of workup.  Hypercoagulable labs have been   sent by the emergency room physician.  We will look forward to these results.  2.  Acute hypoxic respiratory failure:  The patient is suffering from chronic   obstructive pulmonary disease exacerbation combined with her pulmonary   embolisms, I believe, causing her failure at this time.  She does not   clinically appear to be in heart failure nor does her BNP or chest x-ray or CT   appeared to indicate this.  We will treat the patient chronic obstructive   pulmonary disease and pulmonary embolisms as noted.  3.  Chronic obstructive pulmonary disease exacerbation:  Place her on IV   Solu-Medrol, aggressive respiratory and O2 protocols.  Continue inhaled   steroid and Spiriva.  4.  Fibromyalgia rheumatica:  Continue p.r.n. support.  5.  Hypomagnesemia:  Replace and follow.  6.  Hypokalemia:  Replace and follow.  7.  History of hepatitis B:  Stable.  8.  History of hepatitis C:  Stable.  9.  Type 2 diabetes:  Continue Lantus and cover with sliding scale, may need   to titrate while on steroids.  10.  Coronary artery disease:  Continue beta blocker, aspirin, and statin.  11.  Hypertension:  Continue patient's outpatient regimen, monitor and   titrate.  12.  Tobacco use:  The patient needs to quit obviously.  13.  History of pulmonary hypertension:  This of course may be made worse by   her pulmonary embolisms.  May need follow up with pulmonology.  14.  History of grade II diastolic heart failure:  At this point appears to be   compensated.       ____________________________________     DO NESTOR GomesB /  MIKE    DD:  04/03/2017 19:23:57  DT:  04/03/2017 21:02:12    D#:  [id number]  Job#:  [id number]    cc: _____ _____

## 2017-04-04 NOTE — ASSESSMENT & PLAN NOTE
Really not well controlled. Worse with steroids. Change to po steroids. Increase ssi to high dose.

## 2017-04-04 NOTE — RESPIRATORY CARE
COPD EDUCATION by COPD CLINICAL EDUCATOR  4/4/2017  at  9:03 AM by Mary Burciaga     Patient interviewed by COPD education team.  Patient unable to participate in full program.  Short intervention has been conducted.  A comprehensive packet including information about COPD, treatments, and smoking cessation given.

## 2017-04-04 NOTE — PROGRESS NOTES
"Inpatient Anticoagulation Service Note    Date: 4/4/2017  Reason for Anticoagulation: New Pulmonary Embolism        Hemoglobin Value: 13  Hematocrit Value: 41.1  Lab Platelet Value: 122  Target INR: 2.0 to 3.0    INR from last 7 days     Date/Time INR Value    04/03/17 1333 0.98        Dose from last 7 days     Date/Time Dose (mg)    04/04/17 0900 10        Significant Interactions: Aspirin, Statin  Bridge Therapy: Yes  Date of Last VTE Event: 04/03/17  Bridge Therapy Start Date: 04/04/17  Days of Overlap Therapy: 0        Assessment and Plan:  67 yo female with new pulmonary embolism demonstrated on CT scan 4/3/17 \"Small acute bilateral pulmonary emboli.\" Initiated on therapeutic lovenox dosing (100mg SubQ BID), however, due to financial constraints will be transitioning to warfarin for anticoagulation therapy. Ordered warfarin 10mg po x one for this evening and will evaluate effect on INR. Patient will need to be therapeutic on warfarin x 2 INR readings plus 5 days total of bridge therapy prior to discontinuation of lovenox due to indication of acute PE. INR labs daily and pharmacy will continue to monitor.      Pharmacist suggested discharge dosing: Warfarin 5mg po daily with f/u INR readings within 72 hours of discharge      Mera Cote, WILLYD                "

## 2017-04-04 NOTE — ED NOTES
Report received from RN. Assumed care of pt. Pt sitting up on edge of [facility]. Aware of POC, awaiting transport.

## 2017-04-04 NOTE — DISCHARGE PLANNING
Medical Social Work    Pt referred to [facility]

## 2017-04-04 NOTE — PROGRESS NOTES
2 RN skin:   Skin intact, generalized bruising on upper and lower extremity, minimal scabs and scratches to buttocks and LE.

## 2017-04-05 LAB
ALBUMIN SERPL BCP-MCNC: 3.5 G/DL (ref 3.2–4.9)
ALBUMIN/GLOB SERPL: 1.3 G/DL
ALP SERPL-CCNC: 69 U/L (ref 30–99)
ALT SERPL-CCNC: 30 U/L (ref 2–50)
ANION GAP SERPL CALC-SCNC: 9 MMOL/L (ref 0–11.9)
AST SERPL-CCNC: 13 U/L (ref 12–45)
AT III ACT/NOR PPP CHRO: 93 % (ref 76–128)
AT III AG ACT/NOR PPP IA: 78 % (ref 82–136)
BASOPHILS # BLD AUTO: 0.1 % (ref 0–1.8)
BASOPHILS # BLD: 0.01 K/UL (ref 0–0.12)
BILIRUB SERPL-MCNC: 0.3 MG/DL (ref 0.1–1.5)
BUN SERPL-MCNC: 22 MG/DL (ref 8–22)
CALCIUM SERPL-MCNC: 8.9 MG/DL (ref 8.5–10.5)
CARDIOLIPIN IGA SER IA-ACNC: 1 APL (ref 0–11)
CARDIOLIPIN IGG SER IA-ACNC: 0 GPL (ref 0–14)
CARDIOLIPIN IGM SER IA-ACNC: 10 MPL (ref 0–12)
CHLORIDE SERPL-SCNC: 102 MMOL/L (ref 96–112)
CO2 SERPL-SCNC: 22 MMOL/L (ref 20–33)
CREAT SERPL-MCNC: 0.61 MG/DL (ref 0.5–1.4)
EOSINOPHIL # BLD AUTO: 0 K/UL (ref 0–0.51)
EOSINOPHIL NFR BLD: 0 % (ref 0–6.9)
ERYTHROCYTE [DISTWIDTH] IN BLOOD BY AUTOMATED COUNT: 45.4 FL (ref 35.9–50)
GFR SERPL CREATININE-BSD FRML MDRD: >60 ML/MIN/1.73 M 2
GLOBULIN SER CALC-MCNC: 2.8 G/DL (ref 1.9–3.5)
GLUCOSE BLD-MCNC: 191 MG/DL (ref 65–99)
GLUCOSE BLD-MCNC: 248 MG/DL (ref 65–99)
GLUCOSE BLD-MCNC: 248 MG/DL (ref 65–99)
GLUCOSE BLD-MCNC: 292 MG/DL (ref 65–99)
GLUCOSE SERPL-MCNC: 275 MG/DL (ref 65–99)
HCT VFR BLD AUTO: 38.8 % (ref 37–47)
HGB BLD-MCNC: 12.4 G/DL (ref 12–16)
IMM GRANULOCYTES # BLD AUTO: 0.05 K/UL (ref 0–0.11)
IMM GRANULOCYTES NFR BLD AUTO: 0.6 % (ref 0–0.9)
INR PPP: 1.02 (ref 0.87–1.13)
LYMPHOCYTES # BLD AUTO: 0.65 K/UL (ref 1–4.8)
LYMPHOCYTES NFR BLD: 7.2 % (ref 22–41)
MCH RBC QN AUTO: 27.4 PG (ref 27–33)
MCHC RBC AUTO-ENTMCNC: 32 G/DL (ref 33.6–35)
MCV RBC AUTO: 85.7 FL (ref 81.4–97.8)
MONOCYTES # BLD AUTO: 0.21 K/UL (ref 0–0.85)
MONOCYTES NFR BLD AUTO: 2.3 % (ref 0–13.4)
NEUTROPHILS # BLD AUTO: 8.13 K/UL (ref 2–7.15)
NEUTROPHILS NFR BLD: 89.8 % (ref 44–72)
NRBC # BLD AUTO: 0 K/UL
NRBC BLD AUTO-RTO: 0 /100 WBC
PLATELET # BLD AUTO: 125 K/UL (ref 164–446)
PMV BLD AUTO: 10.3 FL (ref 9–12.9)
POTASSIUM SERPL-SCNC: 5.2 MMOL/L (ref 3.6–5.5)
PROT C ACT/NOR PPP: 108 % (ref 83–168)
PROT S ACT/NOR PPP: 84 % (ref 57–131)
PROT SERPL-MCNC: 6.3 G/DL (ref 6–8.2)
PROTHROMBIN TIME: 13.7 SEC (ref 12–14.6)
RBC # BLD AUTO: 4.53 M/UL (ref 4.2–5.4)
SODIUM SERPL-SCNC: 133 MMOL/L (ref 135–145)
WBC # BLD AUTO: 9.1 K/UL (ref 4.8–10.8)

## 2017-04-05 PROCEDURE — 94760 N-INVAS EAR/PLS OXIMETRY 1: CPT

## 2017-04-05 PROCEDURE — 700102 HCHG RX REV CODE 250 W/ 637 OVERRIDE(OP): Performed by: HOSPITALIST

## 2017-04-05 PROCEDURE — 85610 PROTHROMBIN TIME: CPT

## 2017-04-05 PROCEDURE — A9270 NON-COVERED ITEM OR SERVICE: HCPCS

## 2017-04-05 PROCEDURE — 700102 HCHG RX REV CODE 250 W/ 637 OVERRIDE(OP)

## 2017-04-05 PROCEDURE — 94640 AIRWAY INHALATION TREATMENT: CPT

## 2017-04-05 PROCEDURE — 80053 COMPREHEN METABOLIC PANEL: CPT

## 2017-04-05 PROCEDURE — 36415 COLL VENOUS BLD VENIPUNCTURE: CPT

## 2017-04-05 PROCEDURE — 85025 COMPLETE CBC W/AUTO DIFF WBC: CPT

## 2017-04-05 PROCEDURE — 700111 HCHG RX REV CODE 636 W/ 250 OVERRIDE (IP): Performed by: HOSPITALIST

## 2017-04-05 PROCEDURE — A9270 NON-COVERED ITEM OR SERVICE: HCPCS | Performed by: HOSPITALIST

## 2017-04-05 PROCEDURE — 700101 HCHG RX REV CODE 250: Performed by: HOSPITALIST

## 2017-04-05 PROCEDURE — 99233 SBSQ HOSP IP/OBS HIGH 50: CPT | Performed by: HOSPITALIST

## 2017-04-05 PROCEDURE — 770020 HCHG ROOM/CARE - TELE (206)

## 2017-04-05 PROCEDURE — 82962 GLUCOSE BLOOD TEST: CPT | Mod: 91

## 2017-04-05 RX ORDER — PREDNISONE 20 MG/1
60 TABLET ORAL DAILY
Status: DISCONTINUED | OUTPATIENT
Start: 2017-04-05 | End: 2017-04-09

## 2017-04-05 RX ORDER — WARFARIN SODIUM 10 MG/1
10 TABLET ORAL
Status: COMPLETED | OUTPATIENT
Start: 2017-04-05 | End: 2017-04-05

## 2017-04-05 RX ORDER — LISINOPRIL 20 MG/1
20 TABLET ORAL
Status: DISCONTINUED | OUTPATIENT
Start: 2017-04-05 | End: 2017-04-11

## 2017-04-05 RX ORDER — WARFARIN SODIUM 5 MG/1
5 TABLET ORAL
Status: DISCONTINUED | OUTPATIENT
Start: 2017-04-06 | End: 2017-04-10

## 2017-04-05 RX ORDER — FUROSEMIDE 10 MG/ML
10 INJECTION INTRAMUSCULAR; INTRAVENOUS ONCE
Status: COMPLETED | OUTPATIENT
Start: 2017-04-05 | End: 2017-04-05

## 2017-04-05 RX ADMIN — MORPHINE SULFATE 15 MG: 15 TABLET ORAL at 16:00

## 2017-04-05 RX ADMIN — BUDESONIDE AND FORMOTEROL FUMARATE DIHYDRATE 1 PUFF: 160; 4.5 AEROSOL RESPIRATORY (INHALATION) at 08:32

## 2017-04-05 RX ADMIN — TRAZODONE HYDROCHLORIDE 50 MG: 50 TABLET ORAL at 21:29

## 2017-04-05 RX ADMIN — METOCLOPRAMIDE HYDROCHLORIDE 5 MG: 5 TABLET ORAL at 17:46

## 2017-04-05 RX ADMIN — SIMVASTATIN 20 MG: 20 TABLET, FILM COATED ORAL at 21:29

## 2017-04-05 RX ADMIN — ENOXAPARIN SODIUM 100 MG: 100 INJECTION SUBCUTANEOUS at 08:32

## 2017-04-05 RX ADMIN — GABAPENTIN 300 MG: 300 CAPSULE ORAL at 08:32

## 2017-04-05 RX ADMIN — CITALOPRAM HYDROBROMIDE 20 MG: 20 TABLET ORAL at 08:32

## 2017-04-05 RX ADMIN — DEXTROMETHORPHAN HYDROBROMIDE AND GUAIFENESIN 10 ML: 10; 100 LIQUID ORAL at 16:06

## 2017-04-05 RX ADMIN — BUDESONIDE AND FORMOTEROL FUMARATE DIHYDRATE 1 PUFF: 160; 4.5 AEROSOL RESPIRATORY (INHALATION) at 21:27

## 2017-04-05 RX ADMIN — INSULIN LISPRO 3 UNITS: 100 INJECTION, SOLUTION INTRAVENOUS; SUBCUTANEOUS at 05:44

## 2017-04-05 RX ADMIN — DEXTROMETHORPHAN HYDROBROMIDE AND GUAIFENESIN 10 ML: 10; 100 LIQUID ORAL at 21:39

## 2017-04-05 RX ADMIN — DEXTROMETHORPHAN HYDROBROMIDE AND GUAIFENESIN 10 ML: 10; 100 LIQUID ORAL at 00:46

## 2017-04-05 RX ADMIN — WARFARIN SODIUM 10 MG: 10 TABLET ORAL at 17:47

## 2017-04-05 RX ADMIN — GABAPENTIN 300 MG: 300 CAPSULE ORAL at 16:00

## 2017-04-05 RX ADMIN — MORPHINE SULFATE 15 MG: 15 TABLET ORAL at 21:28

## 2017-04-05 RX ADMIN — LISINOPRIL 20 MG: 20 TABLET ORAL at 08:49

## 2017-04-05 RX ADMIN — ASPIRIN 81 MG: 81 TABLET ORAL at 08:32

## 2017-04-05 RX ADMIN — ENOXAPARIN SODIUM 100 MG: 100 INJECTION SUBCUTANEOUS at 21:28

## 2017-04-05 RX ADMIN — RISPERIDONE 0.5 MG: 1 TABLET, FILM COATED ORAL at 21:29

## 2017-04-05 RX ADMIN — INSULIN LISPRO 4 UNITS: 100 INJECTION, SOLUTION INTRAVENOUS; SUBCUTANEOUS at 17:48

## 2017-04-05 RX ADMIN — INSULIN GLARGINE 32 UNITS: 100 INJECTION, SOLUTION SUBCUTANEOUS at 21:28

## 2017-04-05 RX ADMIN — IPRATROPIUM BROMIDE AND ALBUTEROL SULFATE 3 ML: .5; 3 SOLUTION RESPIRATORY (INHALATION) at 11:32

## 2017-04-05 RX ADMIN — MORPHINE SULFATE 15 MG: 15 TABLET ORAL at 08:31

## 2017-04-05 RX ADMIN — FUROSEMIDE 10 MG: 10 INJECTION, SOLUTION INTRAVENOUS at 12:21

## 2017-04-05 RX ADMIN — METHYLPREDNISOLONE SODIUM SUCCINATE 62.5 MG: 125 INJECTION, POWDER, FOR SOLUTION INTRAMUSCULAR; INTRAVENOUS at 01:58

## 2017-04-05 RX ADMIN — GABAPENTIN 300 MG: 300 CAPSULE ORAL at 21:29

## 2017-04-05 RX ADMIN — INSULIN LISPRO 3 UNITS: 100 INJECTION, SOLUTION INTRAVENOUS; SUBCUTANEOUS at 12:21

## 2017-04-05 RX ADMIN — METOCLOPRAMIDE HYDROCHLORIDE 5 MG: 5 TABLET ORAL at 12:20

## 2017-04-05 RX ADMIN — PREDNISONE 60 MG: 20 TABLET ORAL at 08:49

## 2017-04-05 RX ADMIN — METOCLOPRAMIDE HYDROCHLORIDE 5 MG: 5 TABLET ORAL at 05:43

## 2017-04-05 ASSESSMENT — ENCOUNTER SYMPTOMS
SORE THROAT: 0
PALPITATIONS: 0
FEVER: 0
WHEEZING: 1
BLURRED VISION: 0
NAUSEA: 0
TINGLING: 0
MYALGIAS: 1
CHILLS: 0
EYE PAIN: 0
ABDOMINAL PAIN: 0
BACK PAIN: 1
DEPRESSION: 0
SHORTNESS OF BREATH: 1
INSOMNIA: 0
COUGH: 0
HEADACHES: 0
DIZZINESS: 0
NECK PAIN: 1

## 2017-04-05 ASSESSMENT — PAIN SCALES - GENERAL
PAINLEVEL_OUTOF10: 4
PAINLEVEL_OUTOF10: 7
PAINLEVEL_OUTOF10: 8
PAINLEVEL_OUTOF10: 6
PAINLEVEL_OUTOF10: 7

## 2017-04-05 NOTE — PROGRESS NOTES
Pt lying in bed awake. Pt's resp are noted to be labored. RT called for a treatment. Oxygen in place at 5L per n/c.    Patient's chart and MAR reviewed. Pt medicated for pain w/ her scheduled medication. See MAR.  Pt is A & O x4. Patient was updated on plan of care for the day. Questions answered and concerns addressed.  Pt denies any additional needs at this time. White board updated. Call light, phone and personal belongings within reach.

## 2017-04-05 NOTE — PROGRESS NOTES
Received report. Pt stable, resting. Call bell within reach. Bed alarm on and locked. Will continue to monitor

## 2017-04-05 NOTE — PROGRESS NOTES
"Inpatient Anticoagulation Service Note    Date: 4/5/2017  Reason for Anticoagulation: New Pulmonary Embolism        Hemoglobin Value: 12.4  Hematocrit Value: 38.8  Lab Platelet Value: 125  Target INR: 2.0 to 3.0    INR from last 7 days     Date/Time INR Value    04/05/17 0236 1.02    04/03/17 1333 0.98        Dose from last 7 days     Date/Time Dose (mg)    04/05/17 0237 10    04/04/17 0900 10        Significant Interactions: Aspirin, Statin  Bridge Therapy: Yes  Date of Last VTE Event: 04/03/17  Bridge Therapy Start Date: 04/04/17  Days of Overlap Therapy: 1     Assessment and Plan:  67 yo female with new pulmonary embolism demonstrated on CT scan 4/3/17 \"Small acute bilateral pulmonary emboli.\" Initiated on therapeutic lovenox dosing (100mg SubQ BID), however, due to financial constraints transitioning to warfarin for anticoagulation therapy. Ordered another dose of warfarin 10mg for this evening and will continue to evaluate effect on INR. INE remains sub therapeutic with a slight trend upward from 0.98 to 1.02 today. H/H stable.   Patient will need to be therapeutic on warfarin x 2 INR readings plus 5 days total of bridge therapy prior to discontinuation of lovenox due to indication of acute PE. INR labs daily and pharmacy will continue to monitor.      Pharmacist suggested discharge dosing: Warfarin 5mg po daily with f/u INR within 72 hours of discharge      Mera Cote, PHARMD                "

## 2017-04-05 NOTE — CARE PLAN
Problem: Safety  Goal: Will remain free from injury  Pt educated on importance of calling for assistance

## 2017-04-05 NOTE — PROGRESS NOTES
Sitting on bedside. Pt voiding frequently as a result of Lasix that was given as a one time dose. Resp appear less labored in comparison to initial assessment. Pt now has  in place per recommendation of Dr. Rucker. Will continue to monitor.

## 2017-04-05 NOTE — CARE PLAN
Problem: Pain Management  Goal: Pain level will decrease to patient’s comfort goal  Outcome: PROGRESSING AS EXPECTED  Pt to receive scheduled narcotics for pain. Pt states pain relief when medications administered.     Problem: Respiratory:  Goal: Respiratory status will improve  Outcome: PROGRESSING SLOWER THAN EXPECTED  Goal for today is to wean oxygen as tolerated. Pt is currently on 5L per n/c

## 2017-04-06 LAB
F5 P.R506Q BLD/T QL: NEGATIVE
GLUCOSE BLD-MCNC: 130 MG/DL (ref 65–99)
GLUCOSE BLD-MCNC: 139 MG/DL (ref 65–99)
GLUCOSE BLD-MCNC: 269 MG/DL (ref 65–99)
GLUCOSE BLD-MCNC: 296 MG/DL (ref 65–99)
GLUCOSE BLD-MCNC: 338 MG/DL (ref 65–99)
GLUCOSE BLD-MCNC: 58 MG/DL (ref 65–99)
INR PPP: 2 (ref 0.87–1.13)
PROTHROMBIN TIME: 23.3 SEC (ref 12–14.6)

## 2017-04-06 PROCEDURE — 770020 HCHG ROOM/CARE - TELE (206)

## 2017-04-06 PROCEDURE — A9270 NON-COVERED ITEM OR SERVICE: HCPCS

## 2017-04-06 PROCEDURE — 85610 PROTHROMBIN TIME: CPT

## 2017-04-06 PROCEDURE — 94640 AIRWAY INHALATION TREATMENT: CPT

## 2017-04-06 PROCEDURE — 700102 HCHG RX REV CODE 250 W/ 637 OVERRIDE(OP): Performed by: HOSPITALIST

## 2017-04-06 PROCEDURE — A9270 NON-COVERED ITEM OR SERVICE: HCPCS | Performed by: HOSPITALIST

## 2017-04-06 PROCEDURE — 700101 HCHG RX REV CODE 250: Performed by: HOSPITALIST

## 2017-04-06 PROCEDURE — 700102 HCHG RX REV CODE 250 W/ 637 OVERRIDE(OP)

## 2017-04-06 PROCEDURE — 700111 HCHG RX REV CODE 636 W/ 250 OVERRIDE (IP): Performed by: HOSPITALIST

## 2017-04-06 PROCEDURE — 82962 GLUCOSE BLOOD TEST: CPT

## 2017-04-06 PROCEDURE — 99233 SBSQ HOSP IP/OBS HIGH 50: CPT | Performed by: HOSPITALIST

## 2017-04-06 PROCEDURE — 36415 COLL VENOUS BLD VENIPUNCTURE: CPT

## 2017-04-06 RX ORDER — NICOTINE 21 MG/24HR
21 PATCH, TRANSDERMAL 24 HOURS TRANSDERMAL
Status: DISCONTINUED | OUTPATIENT
Start: 2017-04-06 | End: 2017-04-12 | Stop reason: HOSPADM

## 2017-04-06 RX ADMIN — GABAPENTIN 300 MG: 300 CAPSULE ORAL at 14:56

## 2017-04-06 RX ADMIN — ALBUTEROL SULFATE 2.5 MG: 2.5 SOLUTION RESPIRATORY (INHALATION) at 14:35

## 2017-04-06 RX ADMIN — METOCLOPRAMIDE HYDROCHLORIDE 5 MG: 5 TABLET ORAL at 18:13

## 2017-04-06 RX ADMIN — ALBUTEROL SULFATE 2.5 MG: 2.5 SOLUTION RESPIRATORY (INHALATION) at 19:31

## 2017-04-06 RX ADMIN — BUDESONIDE AND FORMOTEROL FUMARATE DIHYDRATE 1 PUFF: 160; 4.5 AEROSOL RESPIRATORY (INHALATION) at 09:02

## 2017-04-06 RX ADMIN — PREDNISONE 60 MG: 20 TABLET ORAL at 09:01

## 2017-04-06 RX ADMIN — RISPERIDONE 0.5 MG: 1 TABLET, FILM COATED ORAL at 21:31

## 2017-04-06 RX ADMIN — INSULIN GLARGINE 32 UNITS: 100 INJECTION, SOLUTION SUBCUTANEOUS at 21:33

## 2017-04-06 RX ADMIN — INSULIN LISPRO 7 UNITS: 100 INJECTION, SOLUTION INTRAVENOUS; SUBCUTANEOUS at 18:10

## 2017-04-06 RX ADMIN — MORPHINE SULFATE 15 MG: 15 TABLET ORAL at 21:30

## 2017-04-06 RX ADMIN — GABAPENTIN 300 MG: 300 CAPSULE ORAL at 21:30

## 2017-04-06 RX ADMIN — ALBUTEROL SULFATE 2.5 MG: 2.5 SOLUTION RESPIRATORY (INHALATION) at 22:49

## 2017-04-06 RX ADMIN — DEXTROMETHORPHAN HYDROBROMIDE AND GUAIFENESIN 10 ML: 10; 100 LIQUID ORAL at 11:18

## 2017-04-06 RX ADMIN — METOCLOPRAMIDE HYDROCHLORIDE 5 MG: 5 TABLET ORAL at 06:33

## 2017-04-06 RX ADMIN — GABAPENTIN 300 MG: 300 CAPSULE ORAL at 09:01

## 2017-04-06 RX ADMIN — INSULIN LISPRO 7 UNITS: 100 INJECTION, SOLUTION INTRAVENOUS; SUBCUTANEOUS at 01:13

## 2017-04-06 RX ADMIN — DEXTROMETHORPHAN HYDROBROMIDE AND GUAIFENESIN 10 ML: 10; 100 LIQUID ORAL at 23:37

## 2017-04-06 RX ADMIN — MORPHINE SULFATE 15 MG: 15 TABLET ORAL at 14:56

## 2017-04-06 RX ADMIN — TRAZODONE HYDROCHLORIDE 50 MG: 50 TABLET ORAL at 23:37

## 2017-04-06 RX ADMIN — METOCLOPRAMIDE HYDROCHLORIDE 5 MG: 5 TABLET ORAL at 11:25

## 2017-04-06 RX ADMIN — ENOXAPARIN SODIUM 100 MG: 100 INJECTION SUBCUTANEOUS at 09:01

## 2017-04-06 RX ADMIN — LISINOPRIL 20 MG: 20 TABLET ORAL at 09:01

## 2017-04-06 RX ADMIN — BUDESONIDE AND FORMOTEROL FUMARATE DIHYDRATE 1 PUFF: 160; 4.5 AEROSOL RESPIRATORY (INHALATION) at 21:30

## 2017-04-06 RX ADMIN — ENOXAPARIN SODIUM 100 MG: 100 INJECTION SUBCUTANEOUS at 21:33

## 2017-04-06 RX ADMIN — WARFARIN SODIUM 5 MG: 5 TABLET ORAL at 18:13

## 2017-04-06 RX ADMIN — MORPHINE SULFATE 15 MG: 15 TABLET ORAL at 09:01

## 2017-04-06 RX ADMIN — IPRATROPIUM BROMIDE AND ALBUTEROL SULFATE 3 ML: .5; 3 SOLUTION RESPIRATORY (INHALATION) at 08:29

## 2017-04-06 RX ADMIN — ASPIRIN 81 MG: 81 TABLET ORAL at 09:01

## 2017-04-06 RX ADMIN — CITALOPRAM HYDROBROMIDE 20 MG: 20 TABLET ORAL at 09:01

## 2017-04-06 RX ADMIN — SIMVASTATIN 20 MG: 20 TABLET, FILM COATED ORAL at 21:30

## 2017-04-06 RX ADMIN — DEXTROSE MONOHYDRATE 25 ML: 25 INJECTION, SOLUTION INTRAVENOUS at 06:32

## 2017-04-06 RX ADMIN — NICOTINE 21 MG: 21 PATCH TRANSDERMAL at 14:56

## 2017-04-06 ASSESSMENT — PAIN SCALES - GENERAL
PAINLEVEL_OUTOF10: 7
PAINLEVEL_OUTOF10: 4
PAINLEVEL_OUTOF10: 6
PAINLEVEL_OUTOF10: 7
PAINLEVEL_OUTOF10: 7
PAINLEVEL_OUTOF10: 6
PAINLEVEL_OUTOF10: 4

## 2017-04-06 ASSESSMENT — ENCOUNTER SYMPTOMS
DIZZINESS: 0
DEPRESSION: 0
FEVER: 0
BACK PAIN: 1
TINGLING: 0
INSOMNIA: 0
SORE THROAT: 0
NAUSEA: 0
SHORTNESS OF BREATH: 1
EYE PAIN: 0
BLURRED VISION: 0
COUGH: 1
SPUTUM PRODUCTION: 0
HEADACHES: 0
WHEEZING: 1
PALPITATIONS: 0
ABDOMINAL PAIN: 0
CHILLS: 0

## 2017-04-06 NOTE — CARE PLAN
Problem: Safety  Goal: Will remain free from injury  Outcome: PROGRESSING AS EXPECTED  Educated to use call light for all needs. Call light within reach. Bed in low position. Bed alarm in place. Non-skid footwear in place.     Problem: Infection  Goal: Will remain free from infection  Outcome: PROGRESSING AS EXPECTED  Proper hand hygiene before and after patient care to ensure patient will remain free from infection.

## 2017-04-06 NOTE — PROGRESS NOTES
Assumed care of the patient. Received report from day RN. Patient complains of pain in chest from coughing at this time. Medicated per MAR. Educated to use call light for all needs. Call light within reach. Bed in low position. Will continue with hourly rounding.

## 2017-04-06 NOTE — PROGRESS NOTES
Hospital Medicine Interval Note  Date of Service:  4/6/2017    Chief Complaint  66 y.o.-year-old female admitted 4/3/2017 with recurrent chest pain and SOB. Treated for copd flare.  Found to have bilateral PE. Known homeless status and hx of recurrent admissions and noncompliance.       Interval Problem Update  Still wheezing and SOB  Asking for nicotine replacement now. She has been smoking 2-3ppd  Says she is getting a check and is willing to get a place to stay now. Referred to SW.     Consultants/Specialty  none    Disposition  Home.      Review of Systems   Constitutional: Negative for fever and chills.   HENT: Negative for sore throat.    Eyes: Negative for blurred vision and pain.   Respiratory: Positive for cough, shortness of breath and wheezing. Negative for sputum production.    Cardiovascular: Negative for chest pain and palpitations.   Gastrointestinal: Negative for nausea and abdominal pain.   Genitourinary: Negative for dysuria and urgency.   Musculoskeletal: Positive for back pain and joint pain.   Skin: Negative for rash.   Neurological: Negative for dizziness, tingling and headaches.   Psychiatric/Behavioral: Negative for depression. The patient does not have insomnia.    All other systems reviewed and are negative.     Physical Exam Laboratory/Imaging   Filed Vitals:    04/06/17 0800 04/06/17 0830 04/06/17 1200 04/06/17 1436   BP: 149/76  151/57    Pulse: 61 57 81 58   Temp: 36.3 °C (97.3 °F)  36.5 °C (97.7 °F)    Resp: 17 20 18 17   Height:       Weight:       SpO2: 98% 98% 97% 99%     Physical Exam   Constitutional: She is oriented to person, place, and time. She appears well-developed and well-nourished. No distress.   HENT:   Right Ear: External ear normal.   Left Ear: External ear normal.   Nose: Nose normal.   Eyes: Conjunctivae are normal. Right eye exhibits no discharge. Left eye exhibits no discharge.   Neck: No JVD present.   Cardiovascular: Regular rhythm and normal heart sounds.    No  murmur heard.  Pulmonary/Chest: Effort normal. No stridor. No respiratory distress. She has wheezes. She has rales.   Abdominal: Soft. Bowel sounds are normal. She exhibits no distension. There is no tenderness. There is no rebound.   Musculoskeletal: She exhibits no edema or tenderness.   Neurological: She is alert and oriented to person, place, and time.   Skin: Skin is warm and dry. She is not diaphoretic. No erythema.   Psychiatric: She has a normal mood and affect. Her behavior is normal.   Nursing note and vitals reviewed.   Lab Results   Component Value Date/Time    WBC 9.1 04/05/2017 02:37 AM    HEMOGLOBIN 12.4 04/05/2017 02:37 AM    HEMATOCRIT 38.8 04/05/2017 02:37 AM    PLATELET COUNT 125* 04/05/2017 02:37 AM     Lab Results   Component Value Date/Time    SODIUM 133* 04/05/2017 02:36 AM    POTASSIUM 5.2 04/05/2017 02:36 AM    CHLORIDE 102 04/05/2017 02:36 AM    CO2 22 04/05/2017 02:36 AM    GLUCOSE 275* 04/05/2017 02:36 AM    BUN 22 04/05/2017 02:36 AM    CREATININE 0.61 04/05/2017 02:36 AM      Assessment/Plan    COPD (chronic obstructive pulmonary disease) (CMS-HCC)  Assessment & Plan  PO steroids. Rt protocol.     Diabetes type 2, controlled (CMS-HCC)  Assessment & Plan  Really not well controlled. Worse with steroids. Change to po steroids. Increase ssi to high dose.     Drug abuse, IV  Assessment & Plan  Has been ongoing at home.     HTN (hypertension)  Assessment & Plan  Benign. Home meds ongoing.     Chronic respiratory failure (CMS-HCC)  Assessment & Plan  On 2-3L at home. Unclear requirements so far but weaning her down. May be able to get her home o2 if she gets an apartment to live in.     Pulmonary emboli (CMS-HCC)  Assessment & Plan  Lovenox/coumadin. Likely will need to stay here until coumadin therapeutic.     COPD exacerbation (CMS-HCC)  Assessment & Plan  As above. Recurrent as she is still smoking. Nicotine patch per her request today.     Acute respiratory failure with hypoxia  (CMS-HCC)  Assessment & Plan  2/2 PE and copd flare. Treat both. Rt protocol.     Tobacco abuse  Assessment & Plan  We discussed he need to stop.     CAD (coronary artery disease)  Assessment & Plan  Stable. No MI noted.     Hepatitis C  Assessment & Plan  Chronic . No treatment in past.     Chronic narcotic dependence  Assessment & Plan  Will not escalate her narcotics.        EKG reviewed, Labs reviewed, Medications reviewed and Radiology images reviewed  Fagan catheter: No Fagan      DVT Prophylaxis: Heparin and Warfarin (Coumadin)  DVT prophylaxis - mechanical: SCDs    Antibiotics: Treating active infection/contamination beyond 24 hours perioperative coverage  Assessed for rehab: Patient was assess for and/or received rehabilitation services during this hospitalization

## 2017-04-06 NOTE — DISCHARGE PLANNING
Upon utilization review, patient noted to be on the following medications that could potentially require prior authorization if prescribed at discharge: Lovenox.  If it is anticipated that patient will require these medications at discharge, beginning the prescription prior auth process in advance to anticipated discharge could assist in preventing delays when patient is medically cleared to be discharged from the hospital.

## 2017-04-06 NOTE — PROGRESS NOTES
Pt fsbs 58. Per hypoglycemic protocol pt was given 25g of 50% dextrose. Will recheck blood sugar in 15 minutes. Pt is complaining that she fell while she was trying to pick blankets up off the floor, however Latisha RAMÍREZ was at bedside and stated that the patient did not fall she got wobbly and she helped her back to bed. Pt is stating that she wants to talk to the supervisor and that she fell. Charge RN notified. Will monitor in 15 minutes for blood sugar recheck.

## 2017-04-06 NOTE — PROGRESS NOTES
"Inpatient Anticoagulation Service Note    Date: 4/6/2017  Reason for Anticoagulation: New Pulmonary Embolism        Hemoglobin Value: 12.4  Hematocrit Value: 38.8  Lab Platelet Value: 125  Target INR: 2.0 to 3.0    INR from last 7 days     Date/Time INR Value    04/06/17 0301 (!)2    04/05/17 0236 1.02    04/03/17 1333 0.98        Dose from last 7 days     Date/Time Dose (mg)    04/06/17 1100 5    04/05/17 0237 10    04/04/17 0900 10        Significant Interactions: Aspirin, Statin, Corticosteroids  Bridge Therapy: Yes  Date of Last VTE Event: 04/03/17  Bridge Therapy Start Date: 04/04/17  Days of Overlap Therapy: 2     Assessment and Plan:  65 yo female with new pulmonary embolism demonstrated on CT scan 4/3/17 \"Small acute bilateral pulmonary emboli.\" Initiated on therapeutic lovenox dosing (100mg SubQ BID), however, due to financial constraints transitioning to warfarin for anticoagulation therapy.    INR is now therapeutic at 2, however, need two INR readings within range plus 5 days of overlap therapy before discontinuing lovenox due to new PE indication.     Patient received warfarin 10mg x two consecutive day. Now will initiate warfarin 5mg po daily.       Pharmacist suggested discharge dosing: Warfarin 5mg po daily with INR follow-up within 72 hours of discharge.      Mera Cote, PHARMD                "

## 2017-04-06 NOTE — DISCHARGE PLANNING
Medical Social Work    Update: LSW met with pt at bedside. Pt can't return to homeless shelter as she was banned. Pt does not have any money on her. Apparently, pt had a child support card that she had lost. Pt requested a new child support credit card be sent to her today from [employer] to [facility]. Pt was unable to say where in the hospital this card would be delivered to. TERI will continue to work on d/c plan with pt.

## 2017-04-06 NOTE — PROGRESS NOTES
Lying in bed alert/oriented. Pt medicated for pain w/ scheduled pain medication. No s/s of distress noted at this time. Oxygen remains in place and  attached. NPC noted. Pt requests cough medicine. See MAR.

## 2017-04-06 NOTE — PROGRESS NOTES
Patient resting in bed. Has no complaints at this time. Educated to use call light for all needs. Call light within reach. Bed alarm in place. Bed in low position. Will continue to monitor with hourly rounding.

## 2017-04-07 LAB
GLUCOSE BLD-MCNC: 144 MG/DL (ref 65–99)
GLUCOSE BLD-MCNC: 157 MG/DL (ref 65–99)
GLUCOSE BLD-MCNC: 183 MG/DL (ref 65–99)
GLUCOSE BLD-MCNC: 265 MG/DL (ref 65–99)
GLUCOSE BLD-MCNC: 315 MG/DL (ref 65–99)
INR PPP: 2.66 (ref 0.87–1.13)
PROTHROMBIN TIME: 29.2 SEC (ref 12–14.6)

## 2017-04-07 PROCEDURE — 36415 COLL VENOUS BLD VENIPUNCTURE: CPT

## 2017-04-07 PROCEDURE — 99232 SBSQ HOSP IP/OBS MODERATE 35: CPT | Performed by: HOSPITALIST

## 2017-04-07 PROCEDURE — 700111 HCHG RX REV CODE 636 W/ 250 OVERRIDE (IP): Performed by: HOSPITALIST

## 2017-04-07 PROCEDURE — 99406 BEHAV CHNG SMOKING 3-10 MIN: CPT

## 2017-04-07 PROCEDURE — A9270 NON-COVERED ITEM OR SERVICE: HCPCS | Performed by: HOSPITALIST

## 2017-04-07 PROCEDURE — 700102 HCHG RX REV CODE 250 W/ 637 OVERRIDE(OP)

## 2017-04-07 PROCEDURE — 700102 HCHG RX REV CODE 250 W/ 637 OVERRIDE(OP): Performed by: HOSPITALIST

## 2017-04-07 PROCEDURE — 94640 AIRWAY INHALATION TREATMENT: CPT

## 2017-04-07 PROCEDURE — 770006 HCHG ROOM/CARE - MED/SURG/GYN SEMI*

## 2017-04-07 PROCEDURE — 700101 HCHG RX REV CODE 250: Performed by: HOSPITALIST

## 2017-04-07 PROCEDURE — 94760 N-INVAS EAR/PLS OXIMETRY 1: CPT

## 2017-04-07 PROCEDURE — 85610 PROTHROMBIN TIME: CPT

## 2017-04-07 PROCEDURE — A9270 NON-COVERED ITEM OR SERVICE: HCPCS

## 2017-04-07 PROCEDURE — 82962 GLUCOSE BLOOD TEST: CPT | Mod: 91

## 2017-04-07 RX ADMIN — INSULIN LISPRO 10 UNITS: 100 INJECTION, SOLUTION INTRAVENOUS; SUBCUTANEOUS at 17:14

## 2017-04-07 RX ADMIN — SIMVASTATIN 20 MG: 20 TABLET, FILM COATED ORAL at 20:36

## 2017-04-07 RX ADMIN — GABAPENTIN 300 MG: 300 CAPSULE ORAL at 15:12

## 2017-04-07 RX ADMIN — MORPHINE SULFATE 15 MG: 15 TABLET ORAL at 15:12

## 2017-04-07 RX ADMIN — INSULIN LISPRO 3 UNITS: 100 INJECTION, SOLUTION INTRAVENOUS; SUBCUTANEOUS at 06:35

## 2017-04-07 RX ADMIN — ALBUTEROL SULFATE 2.5 MG: 2.5 SOLUTION RESPIRATORY (INHALATION) at 18:47

## 2017-04-07 RX ADMIN — BUDESONIDE AND FORMOTEROL FUMARATE DIHYDRATE 1 PUFF: 160; 4.5 AEROSOL RESPIRATORY (INHALATION) at 11:35

## 2017-04-07 RX ADMIN — LISINOPRIL 20 MG: 20 TABLET ORAL at 08:53

## 2017-04-07 RX ADMIN — MORPHINE SULFATE 15 MG: 15 TABLET ORAL at 20:37

## 2017-04-07 RX ADMIN — WARFARIN SODIUM 5 MG: 5 TABLET ORAL at 17:12

## 2017-04-07 RX ADMIN — RISPERIDONE 0.5 MG: 1 TABLET, FILM COATED ORAL at 20:37

## 2017-04-07 RX ADMIN — BUDESONIDE AND FORMOTEROL FUMARATE DIHYDRATE 1 PUFF: 160; 4.5 AEROSOL RESPIRATORY (INHALATION) at 18:53

## 2017-04-07 RX ADMIN — TRAZODONE HYDROCHLORIDE 50 MG: 50 TABLET ORAL at 20:37

## 2017-04-07 RX ADMIN — NICOTINE 21 MG: 21 PATCH TRANSDERMAL at 06:35

## 2017-04-07 RX ADMIN — ENOXAPARIN SODIUM 100 MG: 100 INJECTION SUBCUTANEOUS at 08:52

## 2017-04-07 RX ADMIN — DEXTROMETHORPHAN HYDROBROMIDE AND GUAIFENESIN 10 ML: 10; 100 LIQUID ORAL at 21:46

## 2017-04-07 RX ADMIN — ASPIRIN 81 MG: 81 TABLET ORAL at 08:53

## 2017-04-07 RX ADMIN — CITALOPRAM HYDROBROMIDE 20 MG: 20 TABLET ORAL at 08:54

## 2017-04-07 RX ADMIN — MORPHINE SULFATE 15 MG: 15 TABLET ORAL at 08:53

## 2017-04-07 RX ADMIN — ENOXAPARIN SODIUM 100 MG: 100 INJECTION SUBCUTANEOUS at 20:36

## 2017-04-07 RX ADMIN — METOCLOPRAMIDE HYDROCHLORIDE 5 MG: 5 TABLET ORAL at 17:12

## 2017-04-07 RX ADMIN — ALBUTEROL SULFATE 2.5 MG: 2.5 SOLUTION RESPIRATORY (INHALATION) at 11:33

## 2017-04-07 RX ADMIN — GABAPENTIN 300 MG: 300 CAPSULE ORAL at 20:36

## 2017-04-07 RX ADMIN — INSULIN LISPRO 3 UNITS: 100 INJECTION, SOLUTION INTRAVENOUS; SUBCUTANEOUS at 00:46

## 2017-04-07 RX ADMIN — ALBUTEROL SULFATE 2.5 MG: 2.5 SOLUTION RESPIRATORY (INHALATION) at 02:17

## 2017-04-07 RX ADMIN — PREDNISONE 60 MG: 20 TABLET ORAL at 08:54

## 2017-04-07 RX ADMIN — GABAPENTIN 300 MG: 300 CAPSULE ORAL at 08:53

## 2017-04-07 RX ADMIN — ALBUTEROL SULFATE 2.5 MG: 2.5 SOLUTION RESPIRATORY (INHALATION) at 07:05

## 2017-04-07 RX ADMIN — INSULIN LISPRO 7 UNITS: 100 INJECTION, SOLUTION INTRAVENOUS; SUBCUTANEOUS at 20:46

## 2017-04-07 RX ADMIN — ALBUTEROL SULFATE 2.5 MG: 2.5 SOLUTION RESPIRATORY (INHALATION) at 15:15

## 2017-04-07 RX ADMIN — METOCLOPRAMIDE HYDROCHLORIDE 5 MG: 5 TABLET ORAL at 06:35

## 2017-04-07 RX ADMIN — METOCLOPRAMIDE HYDROCHLORIDE 5 MG: 5 TABLET ORAL at 12:14

## 2017-04-07 RX ADMIN — INSULIN GLARGINE 32 UNITS: 100 INJECTION, SOLUTION SUBCUTANEOUS at 20:46

## 2017-04-07 ASSESSMENT — COPD QUESTIONNAIRES
HAVE YOU SMOKED AT LEAST 100 CIGARETTES IN YOUR ENTIRE LIFE: YES
COPD SCREENING SCORE: 5
DO YOU EVER COUGH UP ANY MUCUS OR PHLEGM?: YES, A FEW DAYS A WEEK OR MONTH
DURING THE PAST 4 WEEKS HOW MUCH DID YOU FEEL SHORT OF BREATH: NONE/LITTLE OF THE TIME

## 2017-04-07 ASSESSMENT — ENCOUNTER SYMPTOMS
SPUTUM PRODUCTION: 0
FEVER: 0
EYE PAIN: 0
SHORTNESS OF BREATH: 1
CHILLS: 0
BLURRED VISION: 0
HEADACHES: 0
INSOMNIA: 0
WHEEZING: 1
PALPITATIONS: 0
DEPRESSION: 0
SORE THROAT: 0
BACK PAIN: 1
ABDOMINAL PAIN: 0
TINGLING: 0
NAUSEA: 0
DIZZINESS: 0
COUGH: 1

## 2017-04-07 ASSESSMENT — PAIN SCALES - GENERAL
PAINLEVEL_OUTOF10: 7
PAINLEVEL_OUTOF10: 4
PAINLEVEL_OUTOF10: 7
PAINLEVEL_OUTOF10: 7
PAINLEVEL_OUTOF10: 0
PAINLEVEL_OUTOF10: 4

## 2017-04-07 ASSESSMENT — LIFESTYLE VARIABLES
PACK_YEARS: 40
EVER_SMOKED: YES

## 2017-04-07 NOTE — PROGRESS NOTES
Report received, assumed care, assessment done. Pt aware of plan of care. Will monitor. Denies pain.

## 2017-04-07 NOTE — CARE PLAN
Problem: Nutritional:  Goal: Achieve adequate nutritional intake  Patient will consume >50% of meals   Outcome: MET Date Met:  04/07/17  Pt consuming % of meals. Please con't to record percentage of meals consumed in ADLs to help monitor po adequacy.

## 2017-04-07 NOTE — PROGRESS NOTES
Hospital Medicine Interval Note  Date of Service:  4/7/2017    Chief Complaint  66 y.o.-year-old female admitted 4/3/2017 with recurrent chest pain and SOB. Treated for copd flare.  Found to have bilateral PE. Known homeless status and hx of recurrent admissions and noncompliance.       Interval Problem Update  Still wheezing and SOB today. o2 requirements decreasing however.     Consultants/Specialty  none    Disposition  Home. Homeless in past. Apparently gets alamony check every month and is willing to get an apartment with the money.      Review of Systems   Constitutional: Negative for fever and chills.   HENT: Negative for sore throat.    Eyes: Negative for blurred vision and pain.   Respiratory: Positive for cough, shortness of breath and wheezing. Negative for sputum production.    Cardiovascular: Negative for chest pain and palpitations.   Gastrointestinal: Negative for nausea and abdominal pain.   Genitourinary: Negative for dysuria and urgency.   Musculoskeletal: Positive for back pain and joint pain.   Skin: Negative for rash.   Neurological: Negative for dizziness, tingling and headaches.   Psychiatric/Behavioral: Negative for depression. The patient does not have insomnia.    All other systems reviewed and are negative.     Physical Exam Laboratory/Imaging   Filed Vitals:    04/07/17 0706 04/07/17 0800 04/07/17 1135 04/07/17 1300   BP:  136/65  124/63   Pulse: 60 93 63 67   Temp:  36.1 °C (96.9 °F)  36.9 °C (98.5 °F)   Resp: 16 18 18 16   Height:       Weight:       SpO2: 99% 98% 97% 96%     Physical Exam   Constitutional: She is oriented to person, place, and time. She appears well-developed and well-nourished. No distress.   HENT:   Right Ear: External ear normal.   Left Ear: External ear normal.   Nose: Nose normal.   Eyes: Conjunctivae are normal. Right eye exhibits no discharge. Left eye exhibits no discharge.   Neck: No JVD present.   Cardiovascular: Regular rhythm and normal heart sounds.    No  murmur heard.  Pulmonary/Chest: Effort normal. No stridor. No respiratory distress. She has wheezes. She has rales.   Abdominal: Soft. Bowel sounds are normal. She exhibits no distension. There is no tenderness. There is no rebound.   Musculoskeletal: She exhibits no edema or tenderness.   Neurological: She is alert and oriented to person, place, and time.   Skin: Skin is warm and dry. She is not diaphoretic. No erythema.   Psychiatric: She has a normal mood and affect. Her behavior is normal.   Nursing note and vitals reviewed.   Lab Results   Component Value Date/Time    WBC 9.1 04/05/2017 02:37 AM    HEMOGLOBIN 12.4 04/05/2017 02:37 AM    HEMATOCRIT 38.8 04/05/2017 02:37 AM    PLATELET COUNT 125* 04/05/2017 02:37 AM     Lab Results   Component Value Date/Time    SODIUM 133* 04/05/2017 02:36 AM    POTASSIUM 5.2 04/05/2017 02:36 AM    CHLORIDE 102 04/05/2017 02:36 AM    CO2 22 04/05/2017 02:36 AM    GLUCOSE 275* 04/05/2017 02:36 AM    BUN 22 04/05/2017 02:36 AM    CREATININE 0.61 04/05/2017 02:36 AM      Assessment/Plan    COPD (chronic obstructive pulmonary disease) (CMS-HCC)  Assessment & Plan  PO steroids. Rt protocol.     Diabetes type 2, controlled (CMS-HCC)  Assessment & Plan  Really not well controlled. Worse with steroids. Change to po steroids. Increase ssi to high dose.     Drug abuse, IV  Assessment & Plan  Has been ongoing at home.     HTN (hypertension)  Assessment & Plan  Benign. Home meds ongoing.     Chronic respiratory failure (CMS-HCC)  Assessment & Plan  On 2-3L at home. Unclear requirements so far but weaning her down. May be able to get her home o2 if she gets an apartment to live in.     Pulmonary emboli (CMS-HCC)  Assessment & Plan  Lovenox/coumadin. Likely will need to stay here until coumadin therapeutic.     COPD exacerbation (CMS-HCC)  Assessment & Plan  As above. Recurrent as she is still smoking. Nicotine patch per her request today.     Acute respiratory failure with hypoxia  (CMS-HCC)  Assessment & Plan  2/2 PE and copd flare. Treat both. Rt protocol.     Tobacco abuse  Assessment & Plan  We discussed he need to stop.     CAD (coronary artery disease)  Assessment & Plan  Stable. No MI noted.     Hepatitis C  Assessment & Plan  Chronic . No treatment in past.     Chronic narcotic dependence  Assessment & Plan  Will not escalate her narcotics.        EKG reviewed, Labs reviewed, Medications reviewed and Radiology images reviewed  Fagan catheter: No Fagan      DVT Prophylaxis: Heparin and Warfarin (Coumadin)  DVT prophylaxis - mechanical: SCDs    Antibiotics: Treating active infection/contamination beyond 24 hours perioperative coverage  Assessed for rehab: Patient was assess for and/or received rehabilitation services during this hospitalization

## 2017-04-07 NOTE — PROGRESS NOTES
Pt assisted up to chair at bedside for dinner. No c/o voiced. No s/s of distress. Chair alarm in place for safety. Pt has call light within reach.

## 2017-04-07 NOTE — PROGRESS NOTES
Report received at bedside, assumed care. Pt is resting in bed. A&O x 4. No other concerns, complains or distress. Tele box on. Chart reviewed. Bed in lowest position, treaded slipper sock on, and call light within reach.

## 2017-04-07 NOTE — CARE PLAN
Problem: Safety  Goal: Will remain free from injury  Intervention: Collaborate with Interdisciplinary Team for safe transfer and mobilization techniques  Educated patient on use of call light, no slip socks on, bed lowest position. All needs attended to. Patient verbalized understanding.           Problem: Venous Thromboembolism (VTW)/Deep Vein Thrombosis (DVT) Prevention:  Goal: Patient will participate in Venous Thrombosis (VTE)/Deep Vein Thrombosis (DVT)Prevention Measures  Intervention: Encourage ambulation/mobilization at level directed by Physical Therapy in collaboration with Interdisciplinary Team  Patient encouraged to ambulate. Labs being monitored r/t possible adverse effects of anticoagulants; WDL. Patient educated on need for anticoagulant, medications, adverse effects, and treatment plan; verbalized understanding.

## 2017-04-07 NOTE — PROGRESS NOTES
Patient is sleeping comfortably in bed, no signs of distress, even unlabored breathing, will continue to monitor.

## 2017-04-07 NOTE — CARE PLAN
Problem: Safety  Goal: Will remain free from injury  Outcome: PROGRESSING AS EXPECTED  Bed alarm on, bed in lowest position, treaded socks on, personal belongings and call light within reach     Problem: Knowledge Deficit  Goal: Knowledge of disease process/condition, treatment plan, diagnostic tests, and medications will improve  Outcome: PROGRESSING AS EXPECTED  Pt yelling at nurses asking for private room. Educated pt on the need for a private room and informed pt that she does not meet isolation requirements etc     Problem: Respiratory:  Goal: Respiratory status will improve  Outcome: PROGRESSING AS EXPECTED  Pt on 3 L O2 via NC which is pts baseline, encouraging pt to cough and deep breathe, will instruct on use of IS

## 2017-04-07 NOTE — CARE PLAN
Problem: Pain Management  Goal: Pain level will decrease to patient’s comfort goal  Outcome: PROGRESSING AS EXPECTED  Pt is satisfied w/ her currently medication regimen. Pt has not asked this nurse for addition pain medication.     Problem: Respiratory:  Goal: Respiratory status will improve  Outcome: PROGRESSING AS EXPECTED  Oxygen wean. Pt weaned to 3L w/ pulse ox WNL

## 2017-04-07 NOTE — PROGRESS NOTES
Pt up in chair for breakfast. Patient's chart and MAR reviewed. Pt medicated w/ scheduled pain medication.  Pt is A & O x4. Patient was updated on plan of care for the day. Will attempt to wean. Questions answered and concerns addressed.  Pt denies any additional needs at this time. White board updated. Call light, phone and personal belongings within reach.

## 2017-04-07 NOTE — PROGRESS NOTES
Pt up in chair for meal. Pt denies c/o pain or distress at this time. Oxygen on at 3L w/  in place. Pulse ox 96%

## 2017-04-07 NOTE — PROGRESS NOTES
Pt loud and verbally aggressive wanting a new room and stating she wants a new nurse. Assumed care of pt from DARRELL Cooper. Two RN skin check complete. Healing scabs noted on back and BLE. Feet are dry and cracking. No other open wounds noted. All bryn prominences pink and blanching.

## 2017-04-07 NOTE — FLOWSHEET NOTE
04/07/17 1516   Events/Summary/Plan   Events/Summary/Plan SVN   Interdisciplinary Plan of Care-Goals (Indications)   Obstructive Ventilatory Defect or Pulmonary Disease without Obvious Obstruction History / Diagnosis;Physical Exam / Hyperinflation / Wheezing (bronchospasm)   Interdisciplinary Plan of Care-Outcomes    Bronchodilator Outcome Diminished Wheezing and Volume of Air Movement Increased   Education   Education Yes - Pt. / Family has been Instructed in use of Respiratory Equipment   RT Assessment of Delivered Medications   Evaluation of Medication Delivery Daily Yes-- Pt /Family has been Instructed in use of Respiratory Medications and Adverse Reactions   SVN Group   #SVN Performed Yes   Given By: Mouthpiece   Date SVN Last Changed 04/07/17   Date SVN Next Change Due (Q 7 Days) 04/14/17   Chest Exam   Work Of Breathing / Effort Moderate   Respiration 16   Pulse 88   Breath Sounds   RUL Breath Sounds Expiratory Wheezes   RML Breath Sounds Expiratory Wheezes   RLL Breath Sounds Expiratory Wheezes   MARC Breath Sounds Expiratory Wheezes   LLL Breath Sounds Expiratory Wheezes   Secretions   Cough Congested;Non Productive   Oxygen   O2 (LPM) 3   O2 Daily Delivery Respiratory  Silicone Nasal Cannula

## 2017-04-07 NOTE — PROGRESS NOTES
MS:  SB-SR 51-72 with occasional PVC  Pt had drops into the low 40s while sleeping but did not maintain.  .16/.08/.48

## 2017-04-08 ENCOUNTER — APPOINTMENT (OUTPATIENT)
Dept: RADIOLOGY | Facility: MEDICAL CENTER | Age: 67
DRG: 175 | End: 2017-04-08
Attending: INTERNAL MEDICINE
Payer: MEDICAID

## 2017-04-08 PROBLEM — J96.01 ACUTE RESPIRATORY FAILURE WITH HYPOXIA (HCC): Status: RESOLVED | Noted: 2017-04-03 | Resolved: 2017-04-08

## 2017-04-08 LAB
GLUCOSE BLD-MCNC: 106 MG/DL (ref 65–99)
GLUCOSE BLD-MCNC: 141 MG/DL (ref 65–99)
GLUCOSE BLD-MCNC: 239 MG/DL (ref 65–99)
GLUCOSE BLD-MCNC: 292 MG/DL (ref 65–99)
INR PPP: 2.28 (ref 0.87–1.13)
PROTHROMBIN TIME: 25.8 SEC (ref 12–14.6)

## 2017-04-08 PROCEDURE — 700101 HCHG RX REV CODE 250: Performed by: HOSPITALIST

## 2017-04-08 PROCEDURE — A9270 NON-COVERED ITEM OR SERVICE: HCPCS

## 2017-04-08 PROCEDURE — A9270 NON-COVERED ITEM OR SERVICE: HCPCS | Performed by: HOSPITALIST

## 2017-04-08 PROCEDURE — 700111 HCHG RX REV CODE 636 W/ 250 OVERRIDE (IP): Performed by: HOSPITALIST

## 2017-04-08 PROCEDURE — 700102 HCHG RX REV CODE 250 W/ 637 OVERRIDE(OP)

## 2017-04-08 PROCEDURE — 36415 COLL VENOUS BLD VENIPUNCTURE: CPT

## 2017-04-08 PROCEDURE — 99233 SBSQ HOSP IP/OBS HIGH 50: CPT | Performed by: INTERNAL MEDICINE

## 2017-04-08 PROCEDURE — 94760 N-INVAS EAR/PLS OXIMETRY 1: CPT

## 2017-04-08 PROCEDURE — 85610 PROTHROMBIN TIME: CPT

## 2017-04-08 PROCEDURE — 700102 HCHG RX REV CODE 250 W/ 637 OVERRIDE(OP): Performed by: HOSPITALIST

## 2017-04-08 PROCEDURE — 700102 HCHG RX REV CODE 250 W/ 637 OVERRIDE(OP): Performed by: INTERNAL MEDICINE

## 2017-04-08 PROCEDURE — 71020 DX-CHEST-2 VIEWS: CPT

## 2017-04-08 PROCEDURE — A9270 NON-COVERED ITEM OR SERVICE: HCPCS | Performed by: INTERNAL MEDICINE

## 2017-04-08 PROCEDURE — 770006 HCHG ROOM/CARE - MED/SURG/GYN SEMI*

## 2017-04-08 PROCEDURE — 94640 AIRWAY INHALATION TREATMENT: CPT

## 2017-04-08 PROCEDURE — 82962 GLUCOSE BLOOD TEST: CPT

## 2017-04-08 RX ORDER — LEVOFLOXACIN 500 MG/1
500 TABLET, FILM COATED ORAL DAILY
Status: DISCONTINUED | OUTPATIENT
Start: 2017-04-08 | End: 2017-04-12 | Stop reason: HOSPADM

## 2017-04-08 RX ADMIN — CITALOPRAM HYDROBROMIDE 20 MG: 20 TABLET ORAL at 07:49

## 2017-04-08 RX ADMIN — WARFARIN SODIUM 5 MG: 5 TABLET ORAL at 16:58

## 2017-04-08 RX ADMIN — MORPHINE SULFATE 15 MG: 15 TABLET ORAL at 20:46

## 2017-04-08 RX ADMIN — SIMVASTATIN 20 MG: 20 TABLET, FILM COATED ORAL at 20:46

## 2017-04-08 RX ADMIN — ALBUTEROL SULFATE 2.5 MG: 2.5 SOLUTION RESPIRATORY (INHALATION) at 06:21

## 2017-04-08 RX ADMIN — INSULIN LISPRO 4 UNITS: 100 INJECTION, SOLUTION INTRAVENOUS; SUBCUTANEOUS at 20:48

## 2017-04-08 RX ADMIN — BUDESONIDE AND FORMOTEROL FUMARATE DIHYDRATE 1 PUFF: 160; 4.5 AEROSOL RESPIRATORY (INHALATION) at 18:09

## 2017-04-08 RX ADMIN — INSULIN GLARGINE 32 UNITS: 100 INJECTION, SOLUTION SUBCUTANEOUS at 20:49

## 2017-04-08 RX ADMIN — RISPERIDONE 0.5 MG: 1 TABLET, FILM COATED ORAL at 20:46

## 2017-04-08 RX ADMIN — TRAZODONE HYDROCHLORIDE 50 MG: 50 TABLET ORAL at 20:45

## 2017-04-08 RX ADMIN — GABAPENTIN 300 MG: 300 CAPSULE ORAL at 20:46

## 2017-04-08 RX ADMIN — DEXTROMETHORPHAN HYDROBROMIDE AND GUAIFENESIN 10 ML: 10; 100 LIQUID ORAL at 20:55

## 2017-04-08 RX ADMIN — ALBUTEROL SULFATE 2.5 MG: 2.5 SOLUTION RESPIRATORY (INHALATION) at 18:08

## 2017-04-08 RX ADMIN — GABAPENTIN 300 MG: 300 CAPSULE ORAL at 07:49

## 2017-04-08 RX ADMIN — METOCLOPRAMIDE HYDROCHLORIDE 5 MG: 5 TABLET ORAL at 05:25

## 2017-04-08 RX ADMIN — ENOXAPARIN SODIUM 100 MG: 100 INJECTION SUBCUTANEOUS at 07:49

## 2017-04-08 RX ADMIN — ALBUTEROL SULFATE 2.5 MG: 2.5 SOLUTION RESPIRATORY (INHALATION) at 11:25

## 2017-04-08 RX ADMIN — INSULIN LISPRO 7 UNITS: 100 INJECTION, SOLUTION INTRAVENOUS; SUBCUTANEOUS at 16:56

## 2017-04-08 RX ADMIN — BUDESONIDE AND FORMOTEROL FUMARATE DIHYDRATE 1 PUFF: 160; 4.5 AEROSOL RESPIRATORY (INHALATION) at 06:22

## 2017-04-08 RX ADMIN — PREDNISONE 60 MG: 20 TABLET ORAL at 07:49

## 2017-04-08 RX ADMIN — ASPIRIN 81 MG: 81 TABLET ORAL at 07:49

## 2017-04-08 RX ADMIN — GABAPENTIN 300 MG: 300 CAPSULE ORAL at 14:06

## 2017-04-08 RX ADMIN — LEVOFLOXACIN 500 MG: 500 TABLET, FILM COATED ORAL at 17:01

## 2017-04-08 RX ADMIN — METOCLOPRAMIDE HYDROCHLORIDE 5 MG: 5 TABLET ORAL at 10:53

## 2017-04-08 RX ADMIN — ALBUTEROL SULFATE 2.5 MG: 2.5 SOLUTION RESPIRATORY (INHALATION) at 16:01

## 2017-04-08 RX ADMIN — MORPHINE SULFATE 15 MG: 15 TABLET ORAL at 07:49

## 2017-04-08 RX ADMIN — METOCLOPRAMIDE HYDROCHLORIDE 5 MG: 5 TABLET ORAL at 16:58

## 2017-04-08 RX ADMIN — DEXTROMETHORPHAN HYDROBROMIDE AND GUAIFENESIN 10 ML: 10; 100 LIQUID ORAL at 03:45

## 2017-04-08 RX ADMIN — MORPHINE SULFATE 15 MG: 15 TABLET ORAL at 14:06

## 2017-04-08 RX ADMIN — LISINOPRIL 20 MG: 20 TABLET ORAL at 07:49

## 2017-04-08 ASSESSMENT — PAIN SCALES - GENERAL
PAINLEVEL_OUTOF10: 5
PAINLEVEL_OUTOF10: 8
PAINLEVEL_OUTOF10: 7
PAINLEVEL_OUTOF10: 6

## 2017-04-08 ASSESSMENT — ENCOUNTER SYMPTOMS
SHORTNESS OF BREATH: 1
FEVER: 0
VOMITING: 0
ABDOMINAL PAIN: 0
WHEEZING: 1
DIZZINESS: 0
BLURRED VISION: 0
HEADACHES: 0
COUGH: 1
HEARTBURN: 0

## 2017-04-08 NOTE — CARE PLAN
Problem: Oxygenation:  Goal: Maintain adequate oxygenation dependent on patient condition  Outcome: PROGRESSING AS EXPECTED  Intervention: Manage oxygen therapy by monitoring pulse oximetry and/or ABG values  100-96% 3lpm NC       Problem: Bronchoconstriction:  Goal: Improve in air movement and diminished wheezing  Outcome: PROGRESSING AS EXPECTED  Intervention: Implement inhaled treatments  Albuterol QID

## 2017-04-08 NOTE — CARE PLAN
Problem: Respiratory:  Goal: Respiratory status will improve  Outcome: PROGRESSING AS EXPECTED  Pt on 3L O2 via NC. Denies sob. RT protocol in place. Encourage IS use.    Problem: Mobility  Goal: Risk for activity intolerance will decrease  Outcome: PROGRESSING AS EXPECTED  Pt ambulates with SBA to bathroom.

## 2017-04-08 NOTE — CARE PLAN
Problem: Safety  Goal: Will remain free from falls  Outcome: PROGRESSING AS EXPECTED  Pt. A&OX4, with history of fall. Bed alarm On, bed kept low and locked, call light within reached, treaded socks on.    Problem: Pain Management  Goal: Pain level will decrease to patient’s comfort goal  Outcome: PROGRESSING AS EXPECTED  Pt. With complaints of chest/lung pain. Medicated per MAR.

## 2017-04-08 NOTE — PROGRESS NOTES
Inpatient Anticoagulation Service Note    Date: 2017  Reason for Anticoagulation: New Pulmonary Embolism  Target INR: 2.0 to 3.0    Hemoglobin Value: 12.4  Hematocrit Value: 38.8  Lab Platelet Value: 125    INR from last 7 days     Date/Time INR Value    17 0222 (!)2.28    17 0230 (!)2.66    17 0301 (!)2    17 0236 1.02    17 1333 0.98        Dose from last 7 days     Date/Time Dose (mg)    17 1300 5    17 0230 5    17 1100 5    17 0237 10    17 0900 10        Significant Interactions: Aspirin, Corticosteroids, Statin, Other (Comments) (citalopram)    Bridge Therapy: Yes  Date of Last VTE Event: 17  Bridge Therapy Start Date: 17  Days of Overlap Therapy: 4     Comments: No bleeding noted.  No acute events.  No changes in drug interactions.  INR remains at goal.      Plan: Will continue current current dosing with bridge therapy.  Pharmacy will continue to monitor and adjust dosing if needed.    Education Material Provided?: No (will provide closer to discharge)    Pharmacist suggested discharge dosin mg daily with f/u INR in 2-3 days after discharge       Yvette Ray, PharmD

## 2017-04-08 NOTE — PROGRESS NOTES
Received report and assumed care of pt. Pt A&Ox4, bed alarm on, call light within reach, siderails upx2, treaded socks on, bed in low and locked position. Discussed poc with pt. R EJ patent and saline locked. Pt reports chronic mid chest pain and medicated per MAR. Compliant with medications. On 3L O2 via NC, denies sob. Encouraged IS use. Non-productive cough, guaifenesin DM administered. Up with SBA to bathroom and had a BM. Hourly rounding in place.

## 2017-04-08 NOTE — PROGRESS NOTES
Received report from day shift RN, assumed care. Pt. Is awake, on bed. A&Ox3-4, disoriented to time but oriented to current events. Stand by assist , pt. With complaints of chest/lung pain, medicated per MAR. Pt. Diminished on lower lung fields and expiratory wheezing on upper lung fields, RT on board, on 3L O2, baseline, medicated per MAR. Safety precautions in place. Bed alarm ON, bed kept low and locked, call light within reach, assisted as necessary.

## 2017-04-08 NOTE — PROGRESS NOTES
Hospital Medicine Progress Note, Adult, Complex               Author: Jeremiah Scott Date & Time created: 4/8/2017  4:18 PM     Interval History:  Hx reviewed; patient feels better with improved breathing although still worse than baseline (QUIROS 1/2 block); no chest pain; eating ok; pain controlled with current regimen. Currently homeless and need SW to help with disposition.    Review of Systems:  Review of Systems   Constitutional: Negative for fever.   Eyes: Negative for blurred vision.   Respiratory: Positive for cough, shortness of breath and wheezing.    Cardiovascular: Negative for chest pain.   Gastrointestinal: Negative for heartburn, vomiting and abdominal pain.   Genitourinary: Negative for dysuria.   Skin: Negative for rash.   Neurological: Negative for dizziness and headaches.       Physical Exam:  Physical Exam   Constitutional: She is oriented to person, place, and time. No distress.   HENT:   Head: Normocephalic.   Eyes: EOM are normal.   Neck: Neck supple.   Cardiovascular: Normal rate and regular rhythm.    Pulmonary/Chest: No respiratory distress. She has wheezes. She has rales.   Diffuse wheezes, basilar crackles, moving air ok   Abdominal: Soft. Bowel sounds are normal. She exhibits no distension. There is no tenderness.   Musculoskeletal:   1+ edema ble   Neurological: She is alert and oriented to person, place, and time.   Skin: Skin is warm and dry.   Psychiatric: Thought content normal.       Labs:        Invalid input(s): [id number]      No results for input(s): SODIUM, POTASSIUM, CHLORIDE, CO2, BUN, CREATININE, MAGNESIUM, PHOSPHORUS, CALCIUM in the last 72 hours.  No results for input(s): ALTSGPT, ASTSGOT, ALKPHOSPHAT, TBILIRUBIN, DBILIRUBIN, GAMMAGT, AMYLASE, LIPASE, ALB, PREALBUMIN, GLUCOSE in the last 72 hours.  Recent Labs      04/06/17   0301  04/07/17   0230  04/08/17   0222   PROTHROMBTM  23.3*  29.2*  25.8*   INR  2.00*  2.66*  2.28*               Hemodynamics:  Temp (24hrs),  Av.4 °C (97.6 °F), Min:36.1 °C (97 °F), Max:36.7 °C (98.1 °F)  Temperature: 36.7 °C (98.1 °F)  Pulse  Av.1  Min: 55  Max: 97   Blood Pressure: 138/83 mmHg     Respiratory:    Respiration: 18, Pulse Oximetry: 96 %, O2 Daily Delivery Respiratory : Silicone Nasal Cannula     Given By:: Mouthpiece, #MDI/DPI Given: MDI/DPI x 1, Work Of Breathing / Effort: Mild  RUL Breath Sounds: Expiratory Wheezes, RML Breath Sounds: Expiratory Wheezes, RLL Breath Sounds: Expiratory Wheezes, MARC Breath Sounds: Expiratory Wheezes, LLL Breath Sounds: Expiratory Wheezes  Fluids:    Intake/Output Summary (Last 24 hours) at 17 1618  Last data filed at 17 0600   Gross per 24 hour   Intake    860 ml   Output      0 ml   Net    860 ml        GI/Nutrition:  Orders Placed This Encounter   Procedures   • Diet Order     Standing Status: Standing      Number of Occurrences: 1      Standing Expiration Date:      Order Specific Question:  Diet:     Answer:  Diabetic [3]     Medical Decision Making, by Problem:  Active Hospital Problems    Diagnosis   • Pulmonary emboli (CMS-HCC) [I26.99]on anticoag with Lovenox and Coumadin; PT INR therapeutic and have been on Lovenox for 5 days; dc Lovenox; clinically better   • COPD exacerbation (CMS-HCC) [J44.1] still with bronchospasm; cont steroid/bd/o2; check repeat cxr   • Chronic respiratory failure (CMS-HCC) [J96.10] on home o2  But ran out last several months due to social situation   • Drug abuse, IV [F19.10]   • Diabetes type 2, controlled (CMS-HCC) [E11.9] not optimally controlled on steroid; adjust med as needed   • Chronic narcotic dependence [F11.20]   • Hepatitis C [B19.20]   • CAD (coronary artery disease) [I25.10] stable; cont current care   • Tobacco abuse [Z72.0] advised cessation   • HTN (hypertension) [I10] controlled; cont rx  DNR per patient's request       Core Measures

## 2017-04-09 LAB
ALBUMIN SERPL BCP-MCNC: 3.1 G/DL (ref 3.2–4.9)
ALBUMIN/GLOB SERPL: 1.5 G/DL
ALP SERPL-CCNC: 62 U/L (ref 30–99)
ALT SERPL-CCNC: 19 U/L (ref 2–50)
ANION GAP SERPL CALC-SCNC: 6 MMOL/L (ref 0–11.9)
AST SERPL-CCNC: 9 U/L (ref 12–45)
BASOPHILS # BLD AUTO: 0.2 % (ref 0–1.8)
BASOPHILS # BLD: 0.01 K/UL (ref 0–0.12)
BILIRUB SERPL-MCNC: 0.2 MG/DL (ref 0.1–1.5)
BUN SERPL-MCNC: 27 MG/DL (ref 8–22)
CALCIUM SERPL-MCNC: 8.6 MG/DL (ref 8.5–10.5)
CHLORIDE SERPL-SCNC: 100 MMOL/L (ref 96–112)
CO2 SERPL-SCNC: 29 MMOL/L (ref 20–33)
CREAT SERPL-MCNC: 0.56 MG/DL (ref 0.5–1.4)
EOSINOPHIL # BLD AUTO: 0.02 K/UL (ref 0–0.51)
EOSINOPHIL NFR BLD: 0.4 % (ref 0–6.9)
ERYTHROCYTE [DISTWIDTH] IN BLOOD BY AUTOMATED COUNT: 45.6 FL (ref 35.9–50)
GFR SERPL CREATININE-BSD FRML MDRD: >60 ML/MIN/1.73 M 2
GLOBULIN SER CALC-MCNC: 2.1 G/DL (ref 1.9–3.5)
GLUCOSE BLD-MCNC: 165 MG/DL (ref 65–99)
GLUCOSE BLD-MCNC: 188 MG/DL (ref 65–99)
GLUCOSE BLD-MCNC: 213 MG/DL (ref 65–99)
GLUCOSE BLD-MCNC: 239 MG/DL (ref 65–99)
GLUCOSE BLD-MCNC: 255 MG/DL (ref 65–99)
GLUCOSE SERPL-MCNC: 307 MG/DL (ref 65–99)
HCT VFR BLD AUTO: 37 % (ref 37–47)
HGB BLD-MCNC: 11.9 G/DL (ref 12–16)
IMM GRANULOCYTES # BLD AUTO: 0.05 K/UL (ref 0–0.11)
IMM GRANULOCYTES NFR BLD AUTO: 0.9 % (ref 0–0.9)
INR PPP: 2.2 (ref 0.87–1.13)
LYMPHOCYTES # BLD AUTO: 1.34 K/UL (ref 1–4.8)
LYMPHOCYTES NFR BLD: 23.8 % (ref 22–41)
MCH RBC QN AUTO: 27.3 PG (ref 27–33)
MCHC RBC AUTO-ENTMCNC: 32.2 G/DL (ref 33.6–35)
MCV RBC AUTO: 84.9 FL (ref 81.4–97.8)
MONOCYTES # BLD AUTO: 0.31 K/UL (ref 0–0.85)
MONOCYTES NFR BLD AUTO: 5.5 % (ref 0–13.4)
NEUTROPHILS # BLD AUTO: 3.9 K/UL (ref 2–7.15)
NEUTROPHILS NFR BLD: 69.2 % (ref 44–72)
NRBC # BLD AUTO: 0 K/UL
NRBC BLD AUTO-RTO: 0 /100 WBC
PLATELET # BLD AUTO: 133 K/UL (ref 164–446)
PMV BLD AUTO: 10.8 FL (ref 9–12.9)
POTASSIUM SERPL-SCNC: 4.1 MMOL/L (ref 3.6–5.5)
PROT SERPL-MCNC: 5.2 G/DL (ref 6–8.2)
PROTHROMBIN TIME: 25.1 SEC (ref 12–14.6)
RBC # BLD AUTO: 4.36 M/UL (ref 4.2–5.4)
SODIUM SERPL-SCNC: 135 MMOL/L (ref 135–145)
WBC # BLD AUTO: 5.6 K/UL (ref 4.8–10.8)

## 2017-04-09 PROCEDURE — 94760 N-INVAS EAR/PLS OXIMETRY 1: CPT

## 2017-04-09 PROCEDURE — 700102 HCHG RX REV CODE 250 W/ 637 OVERRIDE(OP)

## 2017-04-09 PROCEDURE — 700111 HCHG RX REV CODE 636 W/ 250 OVERRIDE (IP): Performed by: INTERNAL MEDICINE

## 2017-04-09 PROCEDURE — A9270 NON-COVERED ITEM OR SERVICE: HCPCS

## 2017-04-09 PROCEDURE — A9270 NON-COVERED ITEM OR SERVICE: HCPCS | Performed by: HOSPITALIST

## 2017-04-09 PROCEDURE — 700102 HCHG RX REV CODE 250 W/ 637 OVERRIDE(OP): Performed by: HOSPITALIST

## 2017-04-09 PROCEDURE — 700102 HCHG RX REV CODE 250 W/ 637 OVERRIDE(OP): Performed by: INTERNAL MEDICINE

## 2017-04-09 PROCEDURE — 99232 SBSQ HOSP IP/OBS MODERATE 35: CPT | Performed by: INTERNAL MEDICINE

## 2017-04-09 PROCEDURE — A9270 NON-COVERED ITEM OR SERVICE: HCPCS | Performed by: INTERNAL MEDICINE

## 2017-04-09 PROCEDURE — 700101 HCHG RX REV CODE 250: Performed by: HOSPITALIST

## 2017-04-09 PROCEDURE — 700111 HCHG RX REV CODE 636 W/ 250 OVERRIDE (IP): Performed by: HOSPITALIST

## 2017-04-09 PROCEDURE — 82962 GLUCOSE BLOOD TEST: CPT | Mod: 91

## 2017-04-09 PROCEDURE — 302131 K PAD MOTOR: Performed by: INTERNAL MEDICINE

## 2017-04-09 PROCEDURE — 770006 HCHG ROOM/CARE - MED/SURG/GYN SEMI*

## 2017-04-09 PROCEDURE — 302151 K-PAD 14X20: Performed by: INTERNAL MEDICINE

## 2017-04-09 PROCEDURE — 80053 COMPREHEN METABOLIC PANEL: CPT

## 2017-04-09 PROCEDURE — 85610 PROTHROMBIN TIME: CPT

## 2017-04-09 PROCEDURE — 85025 COMPLETE CBC W/AUTO DIFF WBC: CPT

## 2017-04-09 PROCEDURE — 94640 AIRWAY INHALATION TREATMENT: CPT

## 2017-04-09 PROCEDURE — 36415 COLL VENOUS BLD VENIPUNCTURE: CPT

## 2017-04-09 RX ORDER — INSULIN GLARGINE 100 [IU]/ML
36 INJECTION, SOLUTION SUBCUTANEOUS EVERY EVENING
Status: DISCONTINUED | OUTPATIENT
Start: 2017-04-09 | End: 2017-04-12 | Stop reason: HOSPADM

## 2017-04-09 RX ORDER — METHYLPREDNISOLONE SODIUM SUCCINATE 125 MG/2ML
62.5 INJECTION, POWDER, LYOPHILIZED, FOR SOLUTION INTRAMUSCULAR; INTRAVENOUS EVERY 8 HOURS
Status: DISCONTINUED | OUTPATIENT
Start: 2017-04-09 | End: 2017-04-11

## 2017-04-09 RX ORDER — INSULIN GLARGINE 100 [IU]/ML
38 INJECTION, SOLUTION SUBCUTANEOUS EVERY EVENING
Status: DISCONTINUED | OUTPATIENT
Start: 2017-04-09 | End: 2017-04-09

## 2017-04-09 RX ORDER — MORPHINE SULFATE 15 MG/1
15 TABLET ORAL EVERY 6 HOURS PRN
Status: DISCONTINUED | OUTPATIENT
Start: 2017-04-09 | End: 2017-04-12 | Stop reason: HOSPADM

## 2017-04-09 RX ADMIN — BUDESONIDE AND FORMOTEROL FUMARATE DIHYDRATE 1 PUFF: 160; 4.5 AEROSOL RESPIRATORY (INHALATION) at 21:16

## 2017-04-09 RX ADMIN — GABAPENTIN 300 MG: 300 CAPSULE ORAL at 21:17

## 2017-04-09 RX ADMIN — METOCLOPRAMIDE HYDROCHLORIDE 5 MG: 5 TABLET ORAL at 05:12

## 2017-04-09 RX ADMIN — INSULIN GLARGINE 36 UNITS: 100 INJECTION, SOLUTION SUBCUTANEOUS at 21:45

## 2017-04-09 RX ADMIN — ALBUTEROL SULFATE 2.5 MG: 2.5 SOLUTION RESPIRATORY (INHALATION) at 10:46

## 2017-04-09 RX ADMIN — RISPERIDONE 0.5 MG: 1 TABLET, FILM COATED ORAL at 21:17

## 2017-04-09 RX ADMIN — BUDESONIDE AND FORMOTEROL FUMARATE DIHYDRATE 1 PUFF: 160; 4.5 AEROSOL RESPIRATORY (INHALATION) at 07:09

## 2017-04-09 RX ADMIN — SIMVASTATIN 20 MG: 20 TABLET, FILM COATED ORAL at 21:17

## 2017-04-09 RX ADMIN — GABAPENTIN 300 MG: 300 CAPSULE ORAL at 13:38

## 2017-04-09 RX ADMIN — INSULIN LISPRO 3 UNITS: 100 INJECTION, SOLUTION INTRAVENOUS; SUBCUTANEOUS at 05:16

## 2017-04-09 RX ADMIN — METHYLPREDNISOLONE SODIUM SUCCINATE 62.5 MG: 125 INJECTION, POWDER, FOR SOLUTION INTRAMUSCULAR; INTRAVENOUS at 13:38

## 2017-04-09 RX ADMIN — MORPHINE SULFATE 15 MG: 15 TABLET ORAL at 18:09

## 2017-04-09 RX ADMIN — ALBUTEROL SULFATE 2.5 MG: 2.5 SOLUTION RESPIRATORY (INHALATION) at 20:25

## 2017-04-09 RX ADMIN — ALBUTEROL SULFATE 2.5 MG: 2.5 SOLUTION RESPIRATORY (INHALATION) at 15:16

## 2017-04-09 RX ADMIN — MORPHINE SULFATE 15 MG: 15 TABLET ORAL at 21:17

## 2017-04-09 RX ADMIN — ALBUTEROL SULFATE 2.5 MG: 2.5 SOLUTION RESPIRATORY (INHALATION) at 07:09

## 2017-04-09 RX ADMIN — WARFARIN SODIUM 5 MG: 5 TABLET ORAL at 17:06

## 2017-04-09 RX ADMIN — MORPHINE SULFATE 15 MG: 15 TABLET ORAL at 12:02

## 2017-04-09 RX ADMIN — LEVOFLOXACIN 500 MG: 500 TABLET, FILM COATED ORAL at 07:53

## 2017-04-09 RX ADMIN — PREDNISONE 60 MG: 20 TABLET ORAL at 07:53

## 2017-04-09 RX ADMIN — METHYLPREDNISOLONE SODIUM SUCCINATE 62.5 MG: 125 INJECTION, POWDER, FOR SOLUTION INTRAMUSCULAR; INTRAVENOUS at 21:17

## 2017-04-09 RX ADMIN — MORPHINE SULFATE 15 MG: 15 TABLET ORAL at 05:12

## 2017-04-09 RX ADMIN — DEXTROMETHORPHAN HYDROBROMIDE AND GUAIFENESIN 10 ML: 10; 100 LIQUID ORAL at 18:08

## 2017-04-09 RX ADMIN — CITALOPRAM HYDROBROMIDE 20 MG: 20 TABLET ORAL at 07:53

## 2017-04-09 RX ADMIN — TRAZODONE HYDROCHLORIDE 50 MG: 50 TABLET ORAL at 23:10

## 2017-04-09 RX ADMIN — INSULIN LISPRO 7 UNITS: 100 INJECTION, SOLUTION INTRAVENOUS; SUBCUTANEOUS at 21:45

## 2017-04-09 RX ADMIN — GABAPENTIN 300 MG: 300 CAPSULE ORAL at 07:53

## 2017-04-09 RX ADMIN — INSULIN LISPRO 3 UNITS: 100 INJECTION, SOLUTION INTRAVENOUS; SUBCUTANEOUS at 12:00

## 2017-04-09 RX ADMIN — INSULIN LISPRO 4 UNITS: 100 INJECTION, SOLUTION INTRAVENOUS; SUBCUTANEOUS at 17:06

## 2017-04-09 RX ADMIN — LISINOPRIL 20 MG: 20 TABLET ORAL at 07:53

## 2017-04-09 ASSESSMENT — PAIN SCALES - GENERAL
PAINLEVEL_OUTOF10: 6
PAINLEVEL_OUTOF10: 7
PAINLEVEL_OUTOF10: 10
PAINLEVEL_OUTOF10: 8

## 2017-04-09 ASSESSMENT — COPD QUESTIONNAIRES
DURING THE PAST 4 WEEKS HOW MUCH DID YOU FEEL SHORT OF BREATH: NONE/LITTLE OF THE TIME
HAVE YOU SMOKED AT LEAST 100 CIGARETTES IN YOUR ENTIRE LIFE: YES
DO YOU EVER COUGH UP ANY MUCUS OR PHLEGM?: YES, A FEW DAYS A WEEK OR MONTH
COPD SCREENING SCORE: 5

## 2017-04-09 ASSESSMENT — ENCOUNTER SYMPTOMS
MYALGIAS: 0
HEADACHES: 0
DIZZINESS: 0
BLURRED VISION: 0
DEPRESSION: 0
FEVER: 0
WHEEZING: 1
SHORTNESS OF BREATH: 1
HEARTBURN: 0
COUGH: 1

## 2017-04-09 NOTE — PROGRESS NOTES
Assumed care at this time. Report received from DARRELL Bejarano. Pt laying in bed awake watching television, a/o x4. No acute distress noted, respirations even and unlabored. Side rails up x2, nonslip footwear on, bed locked/ placed in lowest position, call light within reach, hourly rounding in place. Will continue to monitor.

## 2017-04-09 NOTE — PROGRESS NOTES
Hospital Medicine Progress Note, Adult, Complex               Author: Jeremiah Scott Date & Time created: 4/9/2017  11:12 AM     Interval History:  Breathing about the same as yesterday, still has some sob at rest/wheezing although better than on admission.  C/o sciatica pain yesterday but better today.  Eating well.  No other c/o.    Review of Systems:  Review of Systems   Constitutional: Negative for fever.   Eyes: Negative for blurred vision.   Respiratory: Positive for cough, shortness of breath and wheezing.    Cardiovascular: Negative for chest pain.   Gastrointestinal: Negative for heartburn.   Genitourinary: Negative for dysuria.   Musculoskeletal: Negative for myalgias.   Skin: Negative for rash.   Neurological: Negative for dizziness and headaches.   Psychiatric/Behavioral: Negative for depression.       Physical Exam:  Physical Exam   Constitutional: She is oriented to person, place, and time. No distress.   HENT:   Head: Normocephalic.   Eyes: EOM are normal.   Neck: Neck supple.   Cardiovascular: Normal rate and regular rhythm.    Pulmonary/Chest: She has wheezes.   Moderate diffuse bilat wheezes   Abdominal: Soft. Bowel sounds are normal. She exhibits no distension. There is no tenderness.   Musculoskeletal:   1+ edema ble   Neurological: She is alert and oriented to person, place, and time.   Skin: Skin is warm.   Psychiatric: Her behavior is normal.       Labs:        Invalid input(s): [id number]      Recent Labs      04/09/17 0222   SODIUM  135   POTASSIUM  4.1   CHLORIDE  100   CO2  29   BUN  27*   CREATININE  0.56   CALCIUM  8.6     Recent Labs      04/09/17 0222   ALTSGPT  19   ASTSGOT  9*   ALKPHOSPHAT  62   TBILIRUBIN  0.2   GLUCOSE  307*     Recent Labs      04/07/17   0230  04/08/17 0222 04/09/17 0222   RBC   --    --   4.36   HEMOGLOBIN   --    --   11.9*   HEMATOCRIT   --    --   37.0   PLATELETCT   --    --   133*   PROTHROMBTM  29.2*  25.8*  25.1*   INR  2.66*  2.28*  2.20*      Recent Labs      17   0222   WBC  5.6   NEUTSPOLYS  69.20   LYMPHOCYTES  23.80   MONOCYTES  5.50   EOSINOPHILS  0.40   BASOPHILS  0.20   ASTSGOT  9*   ALTSGPT  19   ALKPHOSPHAT  62   TBILIRUBIN  0.2           Hemodynamics:  Temp (24hrs), Av.7 °C (98 °F), Min:36.6 °C (97.8 °F), Max:36.7 °C (98.1 °F)  Temperature: 36.7 °C (98.1 °F)  Pulse  Av.8  Min: 55  Max: 97   Blood Pressure: 135/52 mmHg     Respiratory:    Respiration: 17, Pulse Oximetry: 99 %, O2 Daily Delivery Respiratory : Silicone Nasal Cannula     Given By:: Mouthpiece, #MDI/DPI Given: MDI/DPI x 1, Work Of Breathing / Effort: Mild  RUL Breath Sounds: Expiratory Wheezes, RML Breath Sounds: Expiratory Wheezes, RLL Breath Sounds: Expiratory Wheezes, MARC Breath Sounds: Expiratory Wheezes, LLL Breath Sounds: Expiratory Wheezes  Fluids:    Intake/Output Summary (Last 24 hours) at 17 1112  Last data filed at 17 0600   Gross per 24 hour   Intake    350 ml   Output      0 ml   Net    350 ml     Weight: 85.4 kg (188 lb 4.4 oz)  GI/Nutrition:  Orders Placed This Encounter   Procedures   • Diet Order     Standing Status: Standing      Number of Occurrences: 1      Standing Expiration Date:      Order Specific Question:  Diet:     Answer:  Diabetic [3]     Medical Decision Making, by Problem:  Active Hospital Problems    Diagnosis   • Pulmonary emboli (CMS-HCC) [I26.99] improving on rx; on Coumadin with therapeutic INR.   • COPD exacerbation (CMS-[facility]) [J44.1] still sob with diffuse wheezes; change to IV steroid; cont abx/bd/o2   • Chronic respiratory failure (CMS-[facility]) [J96.10] as above; need home o2 arranged at discharge   • Drug abuse, IV [F19.10]   • Diabetes type 2, controlled (CMS-[facility]) [E11.9] adjust Lantus for good control   • Chronic narcotic dependence [F11.20] for chronic pain; cont morphine   • Hepatitis C [B19.20] stable; supportive care   • CAD (coronary artery disease) [I25.10] stable; cont rx   • Tobacco abuse [Z72.0]   • HTN  (hypertension) [I10] controlled, cont rx       Core Measures

## 2017-04-09 NOTE — PROGRESS NOTES
Inpatient Anticoagulation Service Note    Date: 4/9/2017  Reason for Anticoagulation: New Pulmonary Embolism        Hemoglobin Value: 11.9  Hematocrit Value: 37  Lab Platelet Value: 133  Target INR: 2.0 to 3.0    INR from last 7 days     Date/Time INR Value    04/09/17 0222 (!)2.2    04/08/17 0222 (!)2.28    04/07/17 0230 (!)2.66    04/06/17 0301 (!)2    04/05/17 0236 1.02    04/03/17 1333 0.98        Dose from last 7 days     Date/Time Dose (mg)    04/09/17 1600 5    04/08/17 1300 5    04/07/17 0230 5    04/06/17 1100 5    04/05/17 0237 10    04/04/17 0900 10        Significant Interactions: Aspirin, Corticosteroids, Statin, Other (Comments) (citalopram)  Bridge Therapy: No  Date of Last VTE Event: 04/03/17  Bridge Therapy Start Date: 04/04/17  Days of Overlap Therapy: 4    Comments: Lovenox dc'd by MD. INR remains therapeutic. IF INR continues to trend down, will increase dosing tomorrow.    Plan:  Warfarin 5 mg po today , trend INR  Education Material Provided?: No (will provide closer to discharge)  Pharmacist suggested discharge dosing: Warfarin 5mg po daily with a follow up INR within 72 hours of discharge     Ruby Womack, PharmD

## 2017-04-09 NOTE — PROGRESS NOTES
Pt provided diabetic snacks during the night. Continuing to complain about being hungry. . Provided education.

## 2017-04-09 NOTE — PROGRESS NOTES
Pt complaining of 9/10 sciatica pain and demanding MD to be notified. Notified Black LOPEZ. Unable to order motrin due to pt being on coumadin and pt allergic to tylenol. Heat packs applied to back.

## 2017-04-09 NOTE — CARE PLAN
Problem: Safety  Goal: Will remain free from injury  Outcome: PROGRESSING AS EXPECTED  Hourly rounding, call light within reach, nonslip footwear on, bed locked/ placed in lowest position, side rails up x2    Problem: Respiratory:  Goal: Respiratory status will improve  Outcome: PROGRESSING AS EXPECTED  Assess and monitor pulmonary status, educate and encourage use of IS, monitor O2 levels titrate oxygen

## 2017-04-09 NOTE — PROGRESS NOTES
Received report and assumed care of pt. Pt A&Ox4, treaded socks on. Pt ambulated to nursing station to sit up in chair with steady gait. Pt transported to chest x-ray via . Room changed from [facility] to [facility]. All belongings placed at bedside. On 3L O2 via NC, denies sob. Reports 7/10 chest/lung pain and medicated per MAR. R EJ patent and saline locked. Hourly rounding in place.

## 2017-04-09 NOTE — CARE PLAN
Problem: Oxygenation:  Goal: Maintain adequate oxygenation dependent on patient condition  Outcome: PROGRESSING AS EXPECTED  Intervention: Manage oxygen therapy by monitoring pulse oximetry and/or ABG values  100-98% 3lpm NC      Problem: Bronchoconstriction:  Goal: Improve in air movement and diminished wheezing  Outcome: PROGRESSING AS EXPECTED  Intervention: Implement inhaled treatments  Albuterol QID

## 2017-04-09 NOTE — CARE PLAN
Problem: Venous Thromboembolism (VTW)/Deep Vein Thrombosis (DVT) Prevention:  Goal: Patient will participate in Venous Thrombosis (VTE)/Deep Vein Thrombosis (DVT)Prevention Measures  Outcome: PROGRESSING AS EXPECTED  Pt ambulated in halls tonight. Encourage ambulation as tolerated. On coumadin.    Problem: Discharge Barriers/Planning  Goal: Patient’s continuum of care needs will be met  Outcome: PROGRESSING AS EXPECTED  Collaborating with SW for discharge plans. Need home O2.

## 2017-04-09 NOTE — PROGRESS NOTES
Pt refusing BA placement. A&Ox4. Educated regarding need for safety equipment in the prevention of unnecessary falls and injury. Pt verbalizes understanding, continues to refuse. Agreeable to call for assistance prior attempts at ambulating independently. Demonstrates proper use of call light, verbalizes understanding of communication board and staff phone extensions.

## 2017-04-10 ENCOUNTER — PATIENT OUTREACH (OUTPATIENT)
Dept: HEALTH INFORMATION MANAGEMENT | Facility: OTHER | Age: 67
End: 2017-04-10

## 2017-04-10 LAB
GLUCOSE BLD-MCNC: 159 MG/DL (ref 65–99)
GLUCOSE BLD-MCNC: 162 MG/DL (ref 65–99)
GLUCOSE BLD-MCNC: 267 MG/DL (ref 65–99)
GLUCOSE BLD-MCNC: 288 MG/DL (ref 65–99)
INR PPP: 1.89 (ref 0.87–1.13)
PROTHROMBIN TIME: 22.3 SEC (ref 12–14.6)

## 2017-04-10 PROCEDURE — 99232 SBSQ HOSP IP/OBS MODERATE 35: CPT | Performed by: INTERNAL MEDICINE

## 2017-04-10 PROCEDURE — A9270 NON-COVERED ITEM OR SERVICE: HCPCS | Performed by: INTERNAL MEDICINE

## 2017-04-10 PROCEDURE — 700111 HCHG RX REV CODE 636 W/ 250 OVERRIDE (IP): Performed by: INTERNAL MEDICINE

## 2017-04-10 PROCEDURE — 700102 HCHG RX REV CODE 250 W/ 637 OVERRIDE(OP)

## 2017-04-10 PROCEDURE — A9270 NON-COVERED ITEM OR SERVICE: HCPCS

## 2017-04-10 PROCEDURE — 700102 HCHG RX REV CODE 250 W/ 637 OVERRIDE(OP): Performed by: HOSPITALIST

## 2017-04-10 PROCEDURE — A9270 NON-COVERED ITEM OR SERVICE: HCPCS | Performed by: HOSPITALIST

## 2017-04-10 PROCEDURE — 82962 GLUCOSE BLOOD TEST: CPT | Mod: 91

## 2017-04-10 PROCEDURE — 770006 HCHG ROOM/CARE - MED/SURG/GYN SEMI*

## 2017-04-10 PROCEDURE — 36415 COLL VENOUS BLD VENIPUNCTURE: CPT

## 2017-04-10 PROCEDURE — 85610 PROTHROMBIN TIME: CPT

## 2017-04-10 PROCEDURE — 700101 HCHG RX REV CODE 250: Performed by: HOSPITALIST

## 2017-04-10 PROCEDURE — 700102 HCHG RX REV CODE 250 W/ 637 OVERRIDE(OP): Performed by: INTERNAL MEDICINE

## 2017-04-10 PROCEDURE — 94640 AIRWAY INHALATION TREATMENT: CPT

## 2017-04-10 PROCEDURE — 94760 N-INVAS EAR/PLS OXIMETRY 1: CPT

## 2017-04-10 RX ORDER — WARFARIN SODIUM 5 MG/1
5 TABLET ORAL
Status: DISCONTINUED | OUTPATIENT
Start: 2017-04-11 | End: 2017-04-10

## 2017-04-10 RX ORDER — WARFARIN SODIUM 7.5 MG/1
7.5 TABLET ORAL
Status: DISCONTINUED | OUTPATIENT
Start: 2017-04-10 | End: 2017-04-10

## 2017-04-10 RX ORDER — WARFARIN SODIUM 7.5 MG/1
7.5 TABLET ORAL
Status: DISCONTINUED | OUTPATIENT
Start: 2017-04-10 | End: 2017-04-12 | Stop reason: HOSPADM

## 2017-04-10 RX ORDER — WARFARIN SODIUM 5 MG/1
5 TABLET ORAL
Status: DISCONTINUED | OUTPATIENT
Start: 2017-04-11 | End: 2017-04-12 | Stop reason: HOSPADM

## 2017-04-10 RX ADMIN — INSULIN LISPRO 7 UNITS: 100 INJECTION, SOLUTION INTRAVENOUS; SUBCUTANEOUS at 20:02

## 2017-04-10 RX ADMIN — INSULIN GLARGINE 36 UNITS: 100 INJECTION, SOLUTION SUBCUTANEOUS at 20:01

## 2017-04-10 RX ADMIN — DEXTROMETHORPHAN HYDROBROMIDE AND GUAIFENESIN 10 ML: 10; 100 LIQUID ORAL at 03:18

## 2017-04-10 RX ADMIN — METHYLPREDNISOLONE SODIUM SUCCINATE 62.5 MG: 125 INJECTION, POWDER, FOR SOLUTION INTRAMUSCULAR; INTRAVENOUS at 14:00

## 2017-04-10 RX ADMIN — RISPERIDONE 0.5 MG: 1 TABLET, FILM COATED ORAL at 20:00

## 2017-04-10 RX ADMIN — SIMVASTATIN 20 MG: 20 TABLET, FILM COATED ORAL at 20:00

## 2017-04-10 RX ADMIN — MORPHINE SULFATE 15 MG: 15 TABLET ORAL at 19:59

## 2017-04-10 RX ADMIN — METHYLPREDNISOLONE SODIUM SUCCINATE 62.5 MG: 125 INJECTION, POWDER, FOR SOLUTION INTRAMUSCULAR; INTRAVENOUS at 06:13

## 2017-04-10 RX ADMIN — BUDESONIDE AND FORMOTEROL FUMARATE DIHYDRATE 1 PUFF: 160; 4.5 AEROSOL RESPIRATORY (INHALATION) at 07:59

## 2017-04-10 RX ADMIN — INSULIN LISPRO 3 UNITS: 100 INJECTION, SOLUTION INTRAVENOUS; SUBCUTANEOUS at 12:12

## 2017-04-10 RX ADMIN — ALBUTEROL SULFATE 2.5 MG: 2.5 SOLUTION RESPIRATORY (INHALATION) at 14:40

## 2017-04-10 RX ADMIN — WARFARIN SODIUM 7.5 MG: 7.5 TABLET ORAL at 17:16

## 2017-04-10 RX ADMIN — LISINOPRIL 20 MG: 20 TABLET ORAL at 08:00

## 2017-04-10 RX ADMIN — TRAZODONE HYDROCHLORIDE 50 MG: 50 TABLET ORAL at 19:59

## 2017-04-10 RX ADMIN — BUDESONIDE AND FORMOTEROL FUMARATE DIHYDRATE 1 PUFF: 160; 4.5 AEROSOL RESPIRATORY (INHALATION) at 20:04

## 2017-04-10 RX ADMIN — ALBUTEROL SULFATE 2.5 MG: 2.5 SOLUTION RESPIRATORY (INHALATION) at 08:31

## 2017-04-10 RX ADMIN — INSULIN LISPRO 7 UNITS: 100 INJECTION, SOLUTION INTRAVENOUS; SUBCUTANEOUS at 06:27

## 2017-04-10 RX ADMIN — MORPHINE SULFATE 15 MG: 15 TABLET ORAL at 13:00

## 2017-04-10 RX ADMIN — LEVOFLOXACIN 500 MG: 500 TABLET, FILM COATED ORAL at 08:00

## 2017-04-10 RX ADMIN — GABAPENTIN 300 MG: 300 CAPSULE ORAL at 14:27

## 2017-04-10 RX ADMIN — STANDARDIZED SENNA CONCENTRATE AND DOCUSATE SODIUM 2 TABLET: 8.6; 5 TABLET, FILM COATED ORAL at 08:01

## 2017-04-10 RX ADMIN — INSULIN LISPRO 3 UNITS: 100 INJECTION, SOLUTION INTRAVENOUS; SUBCUTANEOUS at 17:17

## 2017-04-10 RX ADMIN — ENOXAPARIN SODIUM 80 MG: 100 INJECTION SUBCUTANEOUS at 10:31

## 2017-04-10 RX ADMIN — CITALOPRAM HYDROBROMIDE 20 MG: 20 TABLET ORAL at 07:59

## 2017-04-10 RX ADMIN — ALBUTEROL SULFATE 2.5 MG: 2.5 SOLUTION RESPIRATORY (INHALATION) at 18:39

## 2017-04-10 RX ADMIN — METHYLPREDNISOLONE SODIUM SUCCINATE 62.5 MG: 125 INJECTION, POWDER, FOR SOLUTION INTRAMUSCULAR; INTRAVENOUS at 21:52

## 2017-04-10 RX ADMIN — ENOXAPARIN SODIUM 80 MG: 100 INJECTION SUBCUTANEOUS at 20:00

## 2017-04-10 RX ADMIN — GABAPENTIN 300 MG: 300 CAPSULE ORAL at 20:00

## 2017-04-10 RX ADMIN — MORPHINE SULFATE 15 MG: 15 TABLET ORAL at 06:12

## 2017-04-10 RX ADMIN — GABAPENTIN 300 MG: 300 CAPSULE ORAL at 08:00

## 2017-04-10 RX ADMIN — MORPHINE SULFATE 15 MG: 15 TABLET ORAL at 03:07

## 2017-04-10 ASSESSMENT — ENCOUNTER SYMPTOMS
HEARTBURN: 0
MYALGIAS: 0
WHEEZING: 1
DIZZINESS: 0
COUGH: 1
HEADACHES: 0
BLURRED VISION: 0
FEVER: 0
SHORTNESS OF BREATH: 1

## 2017-04-10 ASSESSMENT — PAIN SCALES - GENERAL
PAINLEVEL_OUTOF10: 8
PAINLEVEL_OUTOF10: 6
PAINLEVEL_OUTOF10: 8

## 2017-04-10 NOTE — PROGRESS NOTES
Hospital Medicine Progress Note, Adult, Complex               Author: Jeremiah Scott Date & Time created: 4/10/2017  3:13 PM     Interval History:  Breathing better but still wheezing; able to walk the terry with o2; pain under control; no new c/o    Review of Systems:  Review of Systems   Constitutional: Negative for fever.   Eyes: Negative for blurred vision.   Respiratory: Positive for cough, shortness of breath and wheezing.    Cardiovascular: Negative for chest pain.   Gastrointestinal: Negative for heartburn.   Genitourinary: Negative for dysuria.   Musculoskeletal: Negative for myalgias.   Skin: Negative for rash.   Neurological: Negative for dizziness and headaches.       Physical Exam:  Physical Exam   Constitutional: She is oriented to person, place, and time. No distress.   HENT:   Head: Normocephalic.   Eyes: EOM are normal.   Neck: Neck supple.   Cardiovascular: Normal rate and regular rhythm.    Pulmonary/Chest: Effort normal. She has wheezes.   Mod diffuse bilat wheezes   Abdominal: Soft. Bowel sounds are normal. She exhibits no distension. There is no tenderness.   Musculoskeletal:   1+ edema BLE   Neurological: She is alert and oriented to person, place, and time.   Skin: Skin is warm.   Psychiatric: Her behavior is normal.       Labs:        Invalid input(s): [id number]      Recent Labs      04/09/17 0222   SODIUM  135   POTASSIUM  4.1   CHLORIDE  100   CO2  29   BUN  27*   CREATININE  0.56   CALCIUM  8.6     Recent Labs      04/09/17 0222   ALTSGPT  19   ASTSGOT  9*   ALKPHOSPHAT  62   TBILIRUBIN  0.2   GLUCOSE  307*     Recent Labs      04/08/17   0222  04/09/17   0222  04/10/17   0012   RBC   --   4.36   --    HEMOGLOBIN   --   11.9*   --    HEMATOCRIT   --   37.0   --    PLATELETCT   --   133*   --    PROTHROMBTM  25.8*  25.1*  22.3*   INR  2.28*  2.20*  1.89*     Recent Labs      04/09/17 0222   WBC  5.6   NEUTSPOLYS  69.20   LYMPHOCYTES  23.80   MONOCYTES  5.50   EOSINOPHILS  0.40    BASOPHILS  0.20   ASTSGOT  9*   ALTSGPT  19   ALKPHOSPHAT  62   TBILIRUBIN  0.2           Hemodynamics:  Temp (24hrs), Av.7 °C (98.1 °F), Min:36.2 °C (97.2 °F), Max:37 °C (98.6 °F)  Temperature: 36.9 °C (98.4 °F)  Pulse  Av.6  Min: 55  Max: 97   Blood Pressure: 129/88 mmHg     Respiratory:    Respiration: 16, Pulse Oximetry: 94 %, O2 Daily Delivery Respiratory : Silicone Nasal Cannula     Given By:: Mouthpiece, Work Of Breathing / Effort: Mild  RUL Breath Sounds: Expiratory Wheezes, RML Breath Sounds: Expiratory Wheezes, RLL Breath Sounds: Diminished, MARC Breath Sounds: Expiratory Wheezes, LLL Breath Sounds: Diminished  Fluids:    Intake/Output Summary (Last 24 hours) at 04/10/17 1513  Last data filed at 04/10/17 0926   Gross per 24 hour   Intake    160 ml   Output      0 ml   Net    160 ml        GI/Nutrition:  Orders Placed This Encounter   Procedures   • Diet Order     Standing Status: Standing      Number of Occurrences: 1      Standing Expiration Date:      Order Specific Question:  Diet:     Answer:  Diabetic [3]     Medical Decision Making, by Problem:  Active Hospital Problems    Diagnosis   • Pulmonary emboli (CMS-HCC) [I26.99] on coumadin; INR sl. Below range; adjust dose per pharmacy; add Lovenox until therapeutic   • COPD exacerbation (CMS-HCC) [J44.1] improving but still has signif bronchospasm; cont IV steroid/bd/abx/o2; change to oral steroid when wheezing less   • Chronic respiratory failure (CMS-HCC) [J96.10] as above; need home o2; SW involved   • Drug abuse, IV [F19.10]   • Diabetes type 2, controlled (CMS-HCC) [E11.9] adjusting med for good control; higher glucose on steroid   • Chronic narcotic dependence [F11.20] cont current regimen; stable   • Hepatitis C [B19.20] stable   • CAD (coronary artery disease) [I25.10] stable; cont current rx   • Tobacco abuse [Z72.0] advised cessation   • HTN (hypertension) [I10] controlled; cont rx       Core Measures

## 2017-04-10 NOTE — PROGRESS NOTES
Patient OOB ad steve, gait is steady. No c/o pain. Becomes dyspneic when walking. O2 on at all times.

## 2017-04-10 NOTE — PROGRESS NOTES
Inpatient Anticoagulation Service Note    Date: 4/10/2017  Reason for Anticoagulation: New Pulmonary Embolism        Hemoglobin Value: 11.9  Hematocrit Value: 37  Lab Platelet Value: 133  Target INR: 2.0 to 3.0    INR from last 7 days     Date/Time INR Value    04/10/17 0012 (!)1.89    04/09/17 0222 (!)2.2    04/08/17 0222 (!)2.28    04/07/17 0230 (!)2.66    04/06/17 0301 (!)2    04/05/17 0236 1.02    04/03/17 1333 0.98        Dose from last 7 days     Date/Time Dose (mg)    04/10/17 0900 7.5    04/09/17 1600 5    04/08/17 1300 5    04/07/17 0230 5    04/06/17 1100 5    04/05/17 0237 10    04/04/17 0900 10        Significant Interactions: Antibiotics, Aspirin, Corticosteroids, Statin, Other (Comments) (citalopram)  Bridge Therapy: Yes  Date of Last VTE Event: 04/03/17  Bridge Therapy Start Date: 04/04/17  Days of Overlap Therapy: 4 (discontinued by MD but restarted due to subtherapeutic INR)    Comments: INR subtherapeutic, with acute PE restart lovenox bridge and increase warfarin daily dose. Start warfarin 7.5 mg Mon and Thurs and 5 mg all other days.    Plan:  warfarin 7.5 mg PO  Education Material Provided?: Yes  Pharmacist suggested discharge dosing: Warfarin 7.5 mg PO Mon and Thursday and Warfarin 5 mg all other days     Deanna Mcdermott

## 2017-04-11 LAB
AMPHET UR QL SCN: NEGATIVE
ANION GAP SERPL CALC-SCNC: 7 MMOL/L (ref 0–11.9)
BARBITURATES UR QL SCN: NEGATIVE
BASOPHILS # BLD AUTO: 0.2 % (ref 0–1.8)
BASOPHILS # BLD: 0.02 K/UL (ref 0–0.12)
BENZODIAZ UR QL SCN: NEGATIVE
BUN SERPL-MCNC: 61 MG/DL (ref 8–22)
BZE UR QL SCN: NEGATIVE
CALCIUM SERPL-MCNC: 8.9 MG/DL (ref 8.5–10.5)
CANNABINOIDS UR QL SCN: NEGATIVE
CHLORIDE SERPL-SCNC: 101 MMOL/L (ref 96–112)
CO2 SERPL-SCNC: 29 MMOL/L (ref 20–33)
CREAT SERPL-MCNC: 1.74 MG/DL (ref 0.5–1.4)
EOSINOPHIL # BLD AUTO: 0 K/UL (ref 0–0.51)
EOSINOPHIL NFR BLD: 0 % (ref 0–6.9)
ERYTHROCYTE [DISTWIDTH] IN BLOOD BY AUTOMATED COUNT: 46.4 FL (ref 35.9–50)
GFR SERPL CREATININE-BSD FRML MDRD: 29 ML/MIN/1.73 M 2
GLUCOSE BLD-MCNC: 130 MG/DL (ref 65–99)
GLUCOSE BLD-MCNC: 301 MG/DL (ref 65–99)
GLUCOSE SERPL-MCNC: 114 MG/DL (ref 65–99)
HCT VFR BLD AUTO: 36.2 % (ref 37–47)
HGB BLD-MCNC: 11.8 G/DL (ref 12–16)
IMM GRANULOCYTES # BLD AUTO: 0.09 K/UL (ref 0–0.11)
IMM GRANULOCYTES NFR BLD AUTO: 0.9 % (ref 0–0.9)
INR PPP: 2.76 (ref 0.87–1.13)
LYMPHOCYTES # BLD AUTO: 0.52 K/UL (ref 1–4.8)
LYMPHOCYTES NFR BLD: 5.1 % (ref 22–41)
MCH RBC QN AUTO: 27.3 PG (ref 27–33)
MCHC RBC AUTO-ENTMCNC: 32.6 G/DL (ref 33.6–35)
MCV RBC AUTO: 83.6 FL (ref 81.4–97.8)
MDMA UR QL SCN: NEGATIVE
METHADONE UR QL SCN: NEGATIVE
MONOCYTES # BLD AUTO: 0.56 K/UL (ref 0–0.85)
MONOCYTES NFR BLD AUTO: 5.5 % (ref 0–13.4)
NEUTROPHILS # BLD AUTO: 8.96 K/UL (ref 2–7.15)
NEUTROPHILS NFR BLD: 88.3 % (ref 44–72)
NRBC # BLD AUTO: 0 K/UL
NRBC BLD AUTO-RTO: 0 /100 WBC
OPIATES UR QL SCN: POSITIVE
OXYCODONE UR QL SCN: NEGATIVE
PCP UR QL SCN: NEGATIVE
PLATELET # BLD AUTO: 232 K/UL (ref 164–446)
PMV BLD AUTO: 10.2 FL (ref 9–12.9)
POTASSIUM SERPL-SCNC: 5.2 MMOL/L (ref 3.6–5.5)
PROPOXYPH UR QL SCN: NEGATIVE
PROTHROMBIN TIME: 30 SEC (ref 12–14.6)
RBC # BLD AUTO: 4.33 M/UL (ref 4.2–5.4)
SODIUM SERPL-SCNC: 137 MMOL/L (ref 135–145)
WBC # BLD AUTO: 10.2 K/UL (ref 4.8–10.8)

## 2017-04-11 PROCEDURE — A9270 NON-COVERED ITEM OR SERVICE: HCPCS | Performed by: HOSPITALIST

## 2017-04-11 PROCEDURE — 700102 HCHG RX REV CODE 250 W/ 637 OVERRIDE(OP): Performed by: HOSPITALIST

## 2017-04-11 PROCEDURE — 80048 BASIC METABOLIC PNL TOTAL CA: CPT

## 2017-04-11 PROCEDURE — 700102 HCHG RX REV CODE 250 W/ 637 OVERRIDE(OP): Performed by: INTERNAL MEDICINE

## 2017-04-11 PROCEDURE — 80307 DRUG TEST PRSMV CHEM ANLYZR: CPT

## 2017-04-11 PROCEDURE — 700111 HCHG RX REV CODE 636 W/ 250 OVERRIDE (IP): Performed by: INTERNAL MEDICINE

## 2017-04-11 PROCEDURE — A9270 NON-COVERED ITEM OR SERVICE: HCPCS | Performed by: INTERNAL MEDICINE

## 2017-04-11 PROCEDURE — 85610 PROTHROMBIN TIME: CPT

## 2017-04-11 PROCEDURE — 99233 SBSQ HOSP IP/OBS HIGH 50: CPT | Performed by: INTERNAL MEDICINE

## 2017-04-11 PROCEDURE — 700101 HCHG RX REV CODE 250: Performed by: HOSPITALIST

## 2017-04-11 PROCEDURE — 94640 AIRWAY INHALATION TREATMENT: CPT

## 2017-04-11 PROCEDURE — 700102 HCHG RX REV CODE 250 W/ 637 OVERRIDE(OP)

## 2017-04-11 PROCEDURE — 82962 GLUCOSE BLOOD TEST: CPT | Mod: 91

## 2017-04-11 PROCEDURE — 85025 COMPLETE CBC W/AUTO DIFF WBC: CPT

## 2017-04-11 PROCEDURE — A9270 NON-COVERED ITEM OR SERVICE: HCPCS

## 2017-04-11 PROCEDURE — 770006 HCHG ROOM/CARE - MED/SURG/GYN SEMI*

## 2017-04-11 PROCEDURE — 36415 COLL VENOUS BLD VENIPUNCTURE: CPT

## 2017-04-11 PROCEDURE — 94760 N-INVAS EAR/PLS OXIMETRY 1: CPT

## 2017-04-11 RX ORDER — METHYLPREDNISOLONE SODIUM SUCCINATE 125 MG/2ML
62.5 INJECTION, POWDER, LYOPHILIZED, FOR SOLUTION INTRAMUSCULAR; INTRAVENOUS EVERY 12 HOURS
Status: DISCONTINUED | OUTPATIENT
Start: 2017-04-11 | End: 2017-04-12

## 2017-04-11 RX ORDER — FUROSEMIDE 10 MG/ML
40 INJECTION INTRAMUSCULAR; INTRAVENOUS ONCE
Status: COMPLETED | OUTPATIENT
Start: 2017-04-11 | End: 2017-04-11

## 2017-04-11 RX ORDER — LISINOPRIL 10 MG/1
10 TABLET ORAL
Status: DISCONTINUED | OUTPATIENT
Start: 2017-04-11 | End: 2017-04-12

## 2017-04-11 RX ADMIN — TRAZODONE HYDROCHLORIDE 50 MG: 50 TABLET ORAL at 21:59

## 2017-04-11 RX ADMIN — LEVOFLOXACIN 500 MG: 500 TABLET, FILM COATED ORAL at 08:09

## 2017-04-11 RX ADMIN — ENOXAPARIN SODIUM 80 MG: 100 INJECTION SUBCUTANEOUS at 08:10

## 2017-04-11 RX ADMIN — BUDESONIDE AND FORMOTEROL FUMARATE DIHYDRATE 1 PUFF: 160; 4.5 AEROSOL RESPIRATORY (INHALATION) at 07:54

## 2017-04-11 RX ADMIN — METHYLPREDNISOLONE SODIUM SUCCINATE 62.5 MG: 125 INJECTION, POWDER, FOR SOLUTION INTRAMUSCULAR; INTRAVENOUS at 05:40

## 2017-04-11 RX ADMIN — INSULIN LISPRO 3 UNITS: 100 INJECTION, SOLUTION INTRAVENOUS; SUBCUTANEOUS at 17:45

## 2017-04-11 RX ADMIN — FUROSEMIDE 40 MG: 10 INJECTION, SOLUTION INTRAMUSCULAR; INTRAVENOUS at 08:10

## 2017-04-11 RX ADMIN — WARFARIN SODIUM 5 MG: 5 TABLET ORAL at 17:50

## 2017-04-11 RX ADMIN — SIMVASTATIN 20 MG: 20 TABLET, FILM COATED ORAL at 21:59

## 2017-04-11 RX ADMIN — ALBUTEROL SULFATE 2.5 MG: 2.5 SOLUTION RESPIRATORY (INHALATION) at 15:56

## 2017-04-11 RX ADMIN — CITALOPRAM HYDROBROMIDE 20 MG: 20 TABLET ORAL at 08:07

## 2017-04-11 RX ADMIN — GABAPENTIN 300 MG: 300 CAPSULE ORAL at 08:08

## 2017-04-11 RX ADMIN — ENOXAPARIN SODIUM 80 MG: 100 INJECTION SUBCUTANEOUS at 21:58

## 2017-04-11 RX ADMIN — GABAPENTIN 300 MG: 300 CAPSULE ORAL at 14:42

## 2017-04-11 RX ADMIN — METHYLPREDNISOLONE SODIUM SUCCINATE 62.5 MG: 125 INJECTION, POWDER, FOR SOLUTION INTRAMUSCULAR; INTRAVENOUS at 21:58

## 2017-04-11 RX ADMIN — STANDARDIZED SENNA CONCENTRATE AND DOCUSATE SODIUM 2 TABLET: 8.6; 5 TABLET, FILM COATED ORAL at 21:59

## 2017-04-11 RX ADMIN — GABAPENTIN 300 MG: 300 CAPSULE ORAL at 21:58

## 2017-04-11 RX ADMIN — INSULIN LISPRO 10 UNITS: 100 INJECTION, SOLUTION INTRAVENOUS; SUBCUTANEOUS at 11:00

## 2017-04-11 RX ADMIN — MORPHINE SULFATE 15 MG: 15 TABLET ORAL at 21:58

## 2017-04-11 RX ADMIN — RISPERIDONE 0.5 MG: 1 TABLET, FILM COATED ORAL at 21:59

## 2017-04-11 RX ADMIN — MORPHINE SULFATE 15 MG: 15 TABLET ORAL at 12:34

## 2017-04-11 RX ADMIN — ALBUTEROL SULFATE 2.5 MG: 2.5 SOLUTION RESPIRATORY (INHALATION) at 07:54

## 2017-04-11 RX ADMIN — LISINOPRIL 10 MG: 20 TABLET ORAL at 08:07

## 2017-04-11 RX ADMIN — BUDESONIDE AND FORMOTEROL FUMARATE DIHYDRATE 1 PUFF: 160; 4.5 AEROSOL RESPIRATORY (INHALATION) at 21:58

## 2017-04-11 RX ADMIN — MORPHINE SULFATE 15 MG: 15 TABLET ORAL at 04:29

## 2017-04-11 RX ADMIN — PROMETHAZINE HYDROCHLORIDE 25 MG: 25 TABLET ORAL at 22:05

## 2017-04-11 RX ADMIN — INSULIN GLARGINE 36 UNITS: 100 INJECTION, SOLUTION SUBCUTANEOUS at 22:01

## 2017-04-11 ASSESSMENT — ENCOUNTER SYMPTOMS
SPUTUM PRODUCTION: 0
CONSTIPATION: 0
SHORTNESS OF BREATH: 1
PALPITATIONS: 0
DIARRHEA: 0
HEADACHES: 0
NAUSEA: 0
COUGH: 1
MYALGIAS: 0
DIAPHORESIS: 0
ABDOMINAL PAIN: 0
DIZZINESS: 0
WHEEZING: 1
FEVER: 0

## 2017-04-11 ASSESSMENT — PAIN SCALES - GENERAL
PAINLEVEL_OUTOF10: 8
PAINLEVEL_OUTOF10: 4
PAINLEVEL_OUTOF10: 0
PAINLEVEL_OUTOF10: 9
PAINLEVEL_OUTOF10: 0

## 2017-04-11 NOTE — PROGRESS NOTES
Assumed care of pt at shift change, report received from day shift RN .Pt A&Ox4, walking around room. Irritable, upset and yelling around the unit at beginning of shift for not being allowed to go down to smoke, education provided, security paged. C/o back and sciatic pain, medicated per MAR. No other s/s of discomfort. Bed in low position, call light within reach, hourly rounding in place.

## 2017-04-11 NOTE — PROGRESS NOTES
Hospital Medicine Progress Note, Adult, Complex               Author: Rosa Isela Washington Date & Time created: 4/11/2017  7:56 AM     Interval History:  The patient was admitted with shortness of breath and chest pain and found to have acute pulmonary emboli    Today she is on 3 liters oxygen with good oxygen saturation and ambulates in the halls    Review of Systems:  Review of Systems   Constitutional: Negative for fever and diaphoresis.   Respiratory: Positive for cough, shortness of breath and wheezing. Negative for sputum production.    Cardiovascular: Negative for chest pain, palpitations and leg swelling.   Gastrointestinal: Negative for nausea, abdominal pain, diarrhea and constipation.   Genitourinary: Negative for dysuria.   Musculoskeletal: Negative for myalgias.   Skin: Negative for rash.   Neurological: Negative for dizziness and headaches.       Physical Exam:  Physical Exam   Constitutional: No distress.   Eyes: Conjunctivae are normal.   Cardiovascular: Normal rate and regular rhythm.    Pulmonary/Chest: Effort normal. She has wheezes. She has no rales.   Abdominal: Soft. Bowel sounds are normal.   Musculoskeletal: She exhibits no edema.   Neurological: She is alert. Coordination normal.   Skin: Skin is warm and dry. She is not diaphoretic.   Psychiatric: Her behavior is normal.   Nursing note and vitals reviewed.      Labs:        Invalid input(s): [id number]      Recent Labs      04/09/17 0222 04/11/17 0141   SODIUM  135  137   POTASSIUM  4.1  5.2   CHLORIDE  100  101   CO2  29  29   BUN  27*  61*   CREATININE  0.56  1.74*   CALCIUM  8.6  8.9     Recent Labs      04/09/17 0222 04/11/17 0141   ALTSGPT  19   --    ASTSGOT  9*   --    ALKPHOSPHAT  62   --    TBILIRUBIN  0.2   --    GLUCOSE  307*  114*     Recent Labs      04/09/17   0222  04/10/17   0012  04/11/17 0141   RBC  4.36   --   4.33   HEMOGLOBIN  11.9*   --   11.8*   HEMATOCRIT  37.0   --   36.2*   PLATELETCT  133*   --   232    PROTHROMBTM  25.1*  22.3*   --    INR  2.20*  1.89*   --      Recent Labs      17   0222  17   0141   WBC  5.6  10.2   NEUTSPOLYS  69.20  88.30*   LYMPHOCYTES  23.80  5.10*   MONOCYTES  5.50  5.50   EOSINOPHILS  0.40  0.00   BASOPHILS  0.20  0.20   ASTSGOT  9*   --    ALTSGPT  19   --    ALKPHOSPHAT  62   --    TBILIRUBIN  0.2   --            Hemodynamics:  Temp (24hrs), Av.9 °C (98.4 °F), Min:36.7 °C (98 °F), Max:37.1 °C (98.7 °F)  Temperature: 36.7 °C (98 °F)  Pulse  Av.2  Min: 55  Max: 97   Blood Pressure: 123/69 mmHg     Respiratory:    Respiration: 16, Pulse Oximetry: 95 %, O2 Daily Delivery Respiratory : Silicone Nasal Cannula     Given By:: Mouthpiece, Work Of Breathing / Effort: Mild  RUL Breath Sounds: Expiratory Wheezes, RML Breath Sounds: Diminished, RLL Breath Sounds: Diminished, MARC Breath Sounds: Expiratory Wheezes, LLL Breath Sounds: Diminished  Fluids:    Intake/Output Summary (Last 24 hours) at 17 0756  Last data filed at 04/10/17 1720   Gross per 24 hour   Intake    620 ml   Output      0 ml   Net    620 ml        GI/Nutrition:  Orders Placed This Encounter   Procedures   • Diet Order     Standing Status: Standing      Number of Occurrences: 1      Standing Expiration Date:      Order Specific Question:  Diet:     Answer:  Diabetic [3]     Medical Decision Making, by Problem:  Active Hospital Problems    Diagnosis   • Pulmonary emboli (CMS-[facility]) [I26.99] INR lower today, continue lovenox and coumadin  Acute kidney injury, unclear if due to lisinopril as this is new, will decrease lisinopril dose due to well controlled blood pressure, give lasix as chest xray reviewed by myself shows some pulmonary congestion, and recheck labs   • COPD exacerbation (CMS-[facility]) [J44.1] decrease steroid dose to bid, continue respiratory therapy   • Chronic respiratory failure (CMS-[facility]) [J96.10] supplemental oxygen   • Drug abuse, IV [F19.10] patient educated to stop use   • Diabetes type 2,  controlled (CMS-HCC) [E11.9] insulin to treat, decent control currently, will monitor as steroids are decreased   • Chronic narcotic dependence [F11.20] pain controlled on Morphine instant release here   • Hepatitis C [B19.20] outpatient follow up   • CAD (coronary artery disease) [I25.10] stable, diastolic dysfunction on echocardiogram with preserved ejection fraction   • Tobacco abuse [Z72.0] patient advised to stop use   • HTN (hypertension) [I10] controlled, will monitor on lower lisinopril dose   DNR    Labs reviewed, Medications reviewed and Radiology images reviewed  Fagan catheter: No Fgaan      DVT Prophylaxis: Enoxaparin (Lovenox) and Warfarin (Coumadin)    Ulcer prophylaxis: Not indicated  Antibiotics: Treating active infection/contamination beyond 24 hours perioperative coverage  Assessed for rehab: Patient returned to prior level of function, rehabilitation not indicated at this time

## 2017-04-11 NOTE — PROGRESS NOTES
Pt complained of a cut on forehead from shaving eyebrows.  Pt educated on anticoagulant therapy and bleeding precautions. Pt verbalized understanding of bleeding precautions.

## 2017-04-11 NOTE — PROGRESS NOTES
Pt noted to be very fidgety and distracted, slow to answer questions, lethargic at times but easy to arouse. A&Ox4. Family member noted by staff to be in bathroom with pt. Suspected drug use. [facility] [employer] notified, urine drug screen ordered.

## 2017-04-11 NOTE — PROGRESS NOTES
ID/CC:  Zenia Ordaz is a 66 y.o. female who presented on 4/3/2017 with chest pain and shortness of breath. She was found to have small acute bilateral pulmonary emboli. She was recently hospitalized for COPD exacerbation and CAP, and c/o that her SOB and cough have only worsened.       Interval History:  4/11 She continues to have a productive cough that she states is worsening. She says she is working hard to breath and fatigued. She is walking the hallway frequently and is eager to reduce her O2 requirements.    Review of Systems:   Constitutional: Negative for fever and chills.   HENT: Negative for congestion and sore throat.    Eyes: Negative for photophobia.   Respiratory: Positive for cough, shortness of breath and wheezing.    Cardiovascular: Negative for palpitations.   Gastrointestinal: Negative for nausea, vomiting and diarrhea.   Genitourinary: Negative for dysuria.   Musculoskeletal: Negative for myalgias.   Skin: Negative.    Neurological: Negative for dizziness, tingling, focal weakness and headaches.   Psychiatric/Behavioral: Negative for depression and suicidal ideas.     Physical Exam:  Vitals:  MaxTemp: 98.7 °F current temp: 98 °F //72 pulse 72 RR 18 SpO2 92 % on 3 L  Constitutional: Alert and Oriented to person, place, and time. In no apparent distress.   HEENT: Normocephalic and atraumatic. External ears normal EOM intact. No discharge in eyes. No conjunctival injection or pallor, No scleral icterus.   Neck: Neck supple. Trachea is midline, no masses. No JVD present. No lymphadenopathy.  Cardiovascular: Normal rate, regular rhythm and normal heart sounds.  No rubs, murmurs or gallops.   Pulmonary/Chest: Effort normal. Diffuse rhonchi. Symmetry with respirations. No respiratory distress. No tenderness.   Abdominal: Soft, non-tender, No distension. Bowel sounds were heard in all four quadrants. No hepatomegaly or splenomegaly. No CVA tenderness.   Genitourinary:  Not  examined.  Musculoskeletal: normal range of motion in all four extremities. Normal gait, No atrophy or weakness. No joint swelling or tenderness on examination.  Extremities: No edema. No cyanosis or clubbing. Pulses 2+ in bilateral lower extremities.  Neurological: Alert and Oriented, CN II-XII grossly intact. Normal strength and reflexes, normal gait. No focal signs noted.  Skin: Not diaphoretic.No rashes, erythema or wounds.  Psychiatric: Normal mood and affect. Behavior is normal. Thought content is somewhat scattered.      Labs  TROPONIN I   Date/Time Value Ref Range Status   04/03/2017 01:33 PM 0.04 0.00 - 0.04 ng/mL Final     Comment:     The Ultra Troponin I is a highly sensitive assay.  Effective 4-1-2011, the reference range for positive Troponin  has been changed.  This change follows the recommendation of  the American College of Cardiology (ACC) committee in  conjunction with the 99th percentile reference population.  ___________________________________________________  Normal ultra TNI:  0.00-0.04 ng/mL  Clinical Correlation Indicated:  0.05 - 0.78 ng/mL  Suggestive of MI:  >0.78 ng/mL     03/21/2017 09:13 AM <0.01 0.00 - 0.04 ng/mL Final     Comment:     The Ultra Troponin I is a highly sensitive assay.  Effective 4-1-2011, the reference range for positive Troponin  has been changed.  This change follows the recommendation of  the American College of Cardiology (ACC) committee in  conjunction with the 99th percentile reference population.  ___________________________________________________  Normal ultra TNI:  0.00-0.04 ng/mL  Clinical Correlation Indicated:  0.05 - 0.78 ng/mL  Suggestive of MI:  >0.78 ng/mL     03/18/2017 04:52 PM <0.01 0.00 - 0.04 ng/mL Final     Comment:     The Ultra Troponin I is a highly sensitive assay.  Effective 4-1-2011, the reference range for positive Troponin  has been changed.  This change follows the recommendation of  the American College of Cardiology (ACC) committee  in  conjunction with the 99th percentile reference population.  ___________________________________________________  Normal ultra TNI:  0.00-0.04 ng/mL  Clinical Correlation Indicated:  0.05 - 0.78 ng/mL  Suggestive of MI:  >0.78 ng/mL     03/14/2017 09:20 AM <0.01 0.00 - 0.04 ng/mL Final     Comment:     The Ultra Troponin I is a highly sensitive assay.  Effective 4-1-2011, the reference range for positive Troponin  has been changed.  This change follows the recommendation of  the American College of Cardiology (ACC) committee in  conjunction with the 99th percentile reference population.  ___________________________________________________  Normal ultra TNI:  0.00-0.04 ng/mL  Clinical Correlation Indicated:  0.05 - 0.78 ng/mL  Suggestive of MI:  >0.78 ng/mL     03/14/2017 05:02 AM 0.01 0.00 - 0.04 ng/mL Final     Comment:     The Ultra Troponin I is a highly sensitive assay.  Effective 4-1-2011, the reference range for positive Troponin  has been changed.  This change follows the recommendation of  the American College of Cardiology (ACC) committee in  conjunction with the 99th percentile reference population.  ___________________________________________________  Normal ultra TNI:  0.00-0.04 ng/mL  Clinical Correlation Indicated:  0.05 - 0.78 ng/mL  Suggestive of MI:  >0.78 ng/mL     ]  No results for input(s): LACTICACID in the last 72 hours.  Recent Labs      04/09/17   0222  04/11/17   0141   SODIUM  135  137   POTASSIUM  4.1  5.2   CHLORIDE  100  101   CO2  29  29   GLUCOSE  307*  114*   BUN  27*  61*   CREATININE  0.56  1.74*   CALCIUM  8.6  8.9      Recent Labs      04/09/17 0222  04/11/17   0141   WBC  5.6  10.2   HEMOGLOBIN  11.9*  11.8*   HEMATOCRIT  37.0  36.2*   MCV  84.9  83.6   MCH  27.3  27.3   MCHC  32.2*  32.6*   MPV  10.8  10.2   NEUTSPOLYS  69.20  88.30*   LYMPHOCYTES  23.80  5.10*   MONOCYTES  5.50  5.50   EOSINOPHILS  0.40  0.00   BASOPHILS  0.20  0.20     Recent Labs      04/05/17 0236   04/06/17   0301  04/07/17   0230  04/08/17   0222  04/09/17   0222  04/10/17   0012  04/11/17   0842   ASTSGOT  13   --    --    --   9*   --    --    ALTSGPT  30   --    --    --   19   --    --    TBILIRUBIN  0.3   --    --    --   0.2   --    --    GLOBULIN  2.8   --    --    --   2.1   --    --    INR  1.02  2.00*  2.66*  2.28*  2.20*  1.89*  2.76*       Results     ** No results found for the last 168 hours. **          Imaging    4/8 DX-CHEST-2 VIEWS  1.  Findings as described consistent with vascular congestion/edema.  2.  There is probably lower lobe atelectasis.     4/3 CT-CTA CHEST PULMONARY ARTERY W/ RECONS   1.  Small acute bilateral pulmonary emboli.  2.  Findings suggest pulmonary hypertension.  3.  No evidence indicate RIGHT ventricular strain.  4.  Trace RIGHT pleural fluid.  5.  No pneumonia or pneumothorax.  6.  LEFT ventricular enlargement.    Fluids:    Intake/Output Summary (Last 24 hours) at 04/11/17 1411  Last data filed at 04/10/17 1720   Gross per 24 hour   Intake    460 ml   Output      0 ml   Net    460 ml       Medical Decision Making, by Problem:    Active Problems:    Pulmonary emboli    Pt is bridging to coumadin with Lovanox. INR = 2.76   Can stop Lovanox and continue coumadin for 6 weeks with monitoring of INR    COPD exacerbation    She has continued dyspnia and cough that is productive of sputum.    Continue oxygen use with plan to use O2 at home   Continue methylPREDNISolone sod succ (SOLU-MEDROL) 125 MG injection 62.5 mg every 12 hours   Continue albuterol Q4, Symbicort,    Continue levofloxacin (LEVAQUIN) tablet 500 mg   Tussin

## 2017-04-11 NOTE — PROGRESS NOTES
Inpatient Anticoagulation Service Note    Date: 4/11/2017  Reason for Anticoagulation: New Pulmonary Embolism        Hemoglobin Value: 11.8  Hematocrit Value: 36.2  Lab Platelet Value: 232 (Results confirmed by repeat testing.)  Target INR: 2.0 to 3.0    INR from last 7 days     Date/Time INR Value    04/11/17 0842 (!)2.76    04/10/17 0012 (!)1.89    04/09/17 0222 (!)2.2    04/08/17 0222 (!)2.28    04/07/17 0230 (!)2.66    04/06/17 0301 (!)2    04/05/17 0236 1.02        Dose from last 7 days     Date/Time Dose (mg)    04/11/17 0842 5    04/10/17 0900 7.5    04/09/17 1600 5    04/08/17 1300 5    04/07/17 0230 5    04/06/17 1100 5    04/05/17 0237 10        Significant Interactions: Antibiotics, Aspirin, Corticosteroids, Statin, Other (Comments) (citalopram)  Bridge Therapy: Yes  Date of Last VTE Event: 04/03/17  Bridge Therapy Start Date: 04/04/17  Days of Overlap Therapy: 5     Comments: INR therapeutic with increase of warfarin dosing and bridge therapy of enoxaparin resumed. Will stop Lovenox tomorrow if INR remains >2    Plan:  Warfarin 5mg po today, trend INR. Stop Lovenox on 4/12 if INR >2  Education Material Provided?: Yes  Pharmacist suggested discharge dosing: Warfarin 7.5 mg po on Mondays and Thursdays with 5mg po all other days of the week     Ruby Womack, BinduD

## 2017-04-11 NOTE — CARE PLAN
Problem: Mobility  Goal: Risk for activity intolerance will decrease  Outcome: PROGRESSING AS EXPECTED  Pt is ambulating independently down the teryr with standby assist.    Problem: Psychosocial Needs:  Goal: Level of anxiety will decrease  Outcome: PROGRESSING AS EXPECTED  Pt educated on disease process and side effects of medication.  Family present at bedside and pt ambulating around floor with standby assist of family member or CNA.

## 2017-04-12 ENCOUNTER — APPOINTMENT (OUTPATIENT)
Dept: RADIOLOGY | Facility: MEDICAL CENTER | Age: 67
DRG: 175 | End: 2017-04-12
Attending: INTERNAL MEDICINE
Payer: MEDICAID

## 2017-04-12 VITALS
TEMPERATURE: 97.4 F | RESPIRATION RATE: 18 BRPM | BODY MASS INDEX: 34.65 KG/M2 | WEIGHT: 188.27 LBS | OXYGEN SATURATION: 97 % | SYSTOLIC BLOOD PRESSURE: 140 MMHG | HEIGHT: 62 IN | DIASTOLIC BLOOD PRESSURE: 54 MMHG | HEART RATE: 70 BPM

## 2017-04-12 LAB
ANION GAP SERPL CALC-SCNC: 9 MMOL/L (ref 0–11.9)
BUN SERPL-MCNC: 73 MG/DL (ref 8–22)
CALCIUM SERPL-MCNC: 8.9 MG/DL (ref 8.5–10.5)
CHLORIDE SERPL-SCNC: 99 MMOL/L (ref 96–112)
CO2 SERPL-SCNC: 25 MMOL/L (ref 20–33)
CREAT SERPL-MCNC: 1.07 MG/DL (ref 0.5–1.4)
GFR SERPL CREATININE-BSD FRML MDRD: 52 ML/MIN/1.73 M 2
GLUCOSE BLD-MCNC: 117 MG/DL (ref 65–99)
GLUCOSE BLD-MCNC: 171 MG/DL (ref 65–99)
GLUCOSE BLD-MCNC: 178 MG/DL (ref 65–99)
GLUCOSE BLD-MCNC: 188 MG/DL (ref 65–99)
GLUCOSE SERPL-MCNC: 189 MG/DL (ref 65–99)
POTASSIUM SERPL-SCNC: 5.4 MMOL/L (ref 3.6–5.5)
SODIUM SERPL-SCNC: 133 MMOL/L (ref 135–145)

## 2017-04-12 PROCEDURE — 80048 BASIC METABOLIC PNL TOTAL CA: CPT

## 2017-04-12 PROCEDURE — 700102 HCHG RX REV CODE 250 W/ 637 OVERRIDE(OP): Performed by: HOSPITALIST

## 2017-04-12 PROCEDURE — 700111 HCHG RX REV CODE 636 W/ 250 OVERRIDE (IP): Performed by: INTERNAL MEDICINE

## 2017-04-12 PROCEDURE — A9270 NON-COVERED ITEM OR SERVICE: HCPCS | Performed by: HOSPITALIST

## 2017-04-12 PROCEDURE — 36415 COLL VENOUS BLD VENIPUNCTURE: CPT

## 2017-04-12 PROCEDURE — 99239 HOSP IP/OBS DSCHRG MGMT >30: CPT | Performed by: INTERNAL MEDICINE

## 2017-04-12 PROCEDURE — A9270 NON-COVERED ITEM OR SERVICE: HCPCS | Performed by: INTERNAL MEDICINE

## 2017-04-12 PROCEDURE — 700102 HCHG RX REV CODE 250 W/ 637 OVERRIDE(OP): Performed by: INTERNAL MEDICINE

## 2017-04-12 PROCEDURE — 94640 AIRWAY INHALATION TREATMENT: CPT

## 2017-04-12 PROCEDURE — 700101 HCHG RX REV CODE 250: Performed by: HOSPITALIST

## 2017-04-12 PROCEDURE — 71010 DX-CHEST-PORTABLE (1 VIEW): CPT

## 2017-04-12 PROCEDURE — 82962 GLUCOSE BLOOD TEST: CPT

## 2017-04-12 RX ORDER — METHYLPREDNISOLONE 4 MG/1
8 TABLET ORAL
Status: DISCONTINUED | OUTPATIENT
Start: 2017-04-12 | End: 2017-04-12 | Stop reason: HOSPADM

## 2017-04-12 RX ORDER — ALBUTEROL SULFATE 90 UG/1
2 AEROSOL, METERED RESPIRATORY (INHALATION) EVERY 4 HOURS PRN
Qty: 1 INHALER | Refills: 0 | Status: SHIPPED | OUTPATIENT
Start: 2017-04-12 | End: 2017-04-13

## 2017-04-12 RX ORDER — METHYLPREDNISOLONE 4 MG/1
4 TABLET ORAL
Status: DISCONTINUED | OUTPATIENT
Start: 2017-04-13 | End: 2017-04-12 | Stop reason: HOSPADM

## 2017-04-12 RX ORDER — WARFARIN SODIUM 5 MG/1
TABLET ORAL
Qty: 30 TAB | Refills: 3 | Status: SHIPPED | OUTPATIENT
Start: 2017-04-12 | End: 2017-04-13

## 2017-04-12 RX ORDER — METHYLPREDNISOLONE 4 MG/1
4 TABLET ORAL
Status: DISCONTINUED | OUTPATIENT
Start: 2017-04-14 | End: 2017-04-12 | Stop reason: HOSPADM

## 2017-04-12 RX ORDER — METHYLPREDNISOLONE 4 MG/1
TABLET ORAL
Qty: 1 KIT | Refills: 0 | Status: SHIPPED | OUTPATIENT
Start: 2017-04-12 | End: 2017-04-13

## 2017-04-12 RX ORDER — WARFARIN SODIUM 7.5 MG/1
TABLET ORAL
Qty: 30 TAB | Refills: 3 | Status: SHIPPED | OUTPATIENT
Start: 2017-04-12 | End: 2017-04-13

## 2017-04-12 RX ORDER — METHYLPREDNISOLONE 4 MG/1
4 TABLET ORAL
Status: DISCONTINUED | OUTPATIENT
Start: 2017-04-12 | End: 2017-04-12 | Stop reason: HOSPADM

## 2017-04-12 RX ORDER — BUDESONIDE AND FORMOTEROL FUMARATE DIHYDRATE 160; 4.5 UG/1; UG/1
1 AEROSOL RESPIRATORY (INHALATION) 2 TIMES DAILY
Qty: 1 INHALER | Refills: 0 | Status: SHIPPED | OUTPATIENT
Start: 2017-04-12 | End: 2017-04-13

## 2017-04-12 RX ADMIN — MORPHINE SULFATE 15 MG: 15 TABLET ORAL at 08:09

## 2017-04-12 RX ADMIN — INSULIN LISPRO 3 UNITS: 100 INJECTION, SOLUTION INTRAVENOUS; SUBCUTANEOUS at 06:18

## 2017-04-12 RX ADMIN — ENOXAPARIN SODIUM 80 MG: 100 INJECTION SUBCUTANEOUS at 08:05

## 2017-04-12 RX ADMIN — LEVOFLOXACIN 500 MG: 500 TABLET, FILM COATED ORAL at 08:05

## 2017-04-12 RX ADMIN — ALBUTEROL SULFATE 2.5 MG: 2.5 SOLUTION RESPIRATORY (INHALATION) at 07:11

## 2017-04-12 RX ADMIN — GABAPENTIN 300 MG: 300 CAPSULE ORAL at 08:05

## 2017-04-12 RX ADMIN — METHYLPREDNISOLONE SODIUM SUCCINATE 62.5 MG: 125 INJECTION, POWDER, FOR SOLUTION INTRAMUSCULAR; INTRAVENOUS at 08:05

## 2017-04-12 RX ADMIN — BUDESONIDE AND FORMOTEROL FUMARATE DIHYDRATE 1 PUFF: 160; 4.5 AEROSOL RESPIRATORY (INHALATION) at 07:13

## 2017-04-12 RX ADMIN — METHYLPREDNISOLONE 4 MG: 4 TABLET ORAL at 11:40

## 2017-04-12 RX ADMIN — LISINOPRIL 10 MG: 20 TABLET ORAL at 08:05

## 2017-04-12 RX ADMIN — MORPHINE SULFATE 15 MG: 15 TABLET ORAL at 11:40

## 2017-04-12 RX ADMIN — CITALOPRAM HYDROBROMIDE 20 MG: 20 TABLET ORAL at 08:05

## 2017-04-12 RX ADMIN — INSULIN LISPRO 3 UNITS: 100 INJECTION, SOLUTION INTRAVENOUS; SUBCUTANEOUS at 11:42

## 2017-04-12 RX ADMIN — NICOTINE 21 MG: 21 PATCH TRANSDERMAL at 06:00

## 2017-04-12 RX ADMIN — MORPHINE SULFATE 15 MG: 15 TABLET ORAL at 05:35

## 2017-04-12 ASSESSMENT — PAIN SCALES - GENERAL
PAINLEVEL_OUTOF10: 7
PAINLEVEL_OUTOF10: 9

## 2017-04-12 NOTE — DISCHARGE PLANNING
[facility] declined due to poor history with the patient. DME referral sent to [facility].

## 2017-04-12 NOTE — PROGRESS NOTES
Pt. Is awake, on bed. A&Ox4 Pt. Refuses bed alarm despite the health education given such as fall risk and injury, and explaining the importance of having one. Pt. Verbalized understanding but continues to refuse. Instructed to call for help appropriately. Will continue to monitor.

## 2017-04-12 NOTE — DISCHARGE INSTRUCTIONS
Discharge Instructions    Discharged to home by car with relative. Discharged via wheelchair, hospital escort: Yes.  Special equipment needed: Oxygen    Be sure to schedule a follow-up appointment with your primary care doctor or any specialists as instructed.     Discharge Plan:   Diet Plan:  (Diabetic)  Activity Level:  (Activity as tolerated)  Confirmed Follow up Appointment: Patient to Call and Schedule Appointment  Confirmed Symptoms Management: Discussed  Medication Reconciliation Updated: Yes  Influenza Vaccine Indication: Not indicated: Previously immunized this influenza season and > 8 years of age    I understand that a diet low in cholesterol, fat, and sodium is recommended for good health. Unless I have been given specific instructions below for another diet, I accept this instruction as my diet prescription.   Other diet: Diabetic Diet    Special Instructions: None    · Is patient discharged on Warfarin / Coumadin?   Yes    You are receiving the drug warfarin. Please understand the importance of monitoring warfarin with scheduled PT/INR blood draws.  Follow-up with a call to your personal Doctor's office in 3 days to schedule a PT/INR. .    IMPORTANT: HOW TO USE THIS INFORMATION:  This is a summary and does NOT have all possible information about this product. This information does not assure that this product is safe, effective, or appropriate for you. This information is not individual medical advice and does not substitute for the advice of your health care professional. Always ask your health care professional for complete information about this product and your specific health needs.      WARFARIN - ORAL (WARF-uh-rin)      COMMON BRAND NAME(S): Coumadin      WARNING:  Warfarin can cause very serious (possibly fatal) bleeding. This is more likely to occur when you first start taking this medication or if you take too much warfarin. To decrease your risk for bleeding, your doctor or other health care  "provider will monitor you closely and check your lab results (INR test) to make sure you are not taking too much warfarin. Keep all medical and laboratory appointments. Tell your doctor right away if you notice any signs of serious bleeding. See also Side Effects section.      USES:  This medication is used to treat blood clots (such as in deep vein thrombosis-DVT or pulmonary embolus-PE) and/or to prevent new clots from forming in your body. Preventing harmful blood clots helps to reduce the risk of a stroke or heart attack. Conditions that increase your risk of developing blood clots include a certain type of irregular heart rhythm (atrial fibrillation), heart valve replacement, recent heart attack, and certain surgeries (such as hip/knee replacement). Warfarin is commonly called a \"blood thinner,\" but the more correct term is \"anticoagulant.\" It helps to keep blood flowing smoothly in your body by decreasing the amount of certain substances (clotting proteins) in your blood.      HOW TO USE:  Read the Medication Guide provided by your pharmacist before you start taking warfarin and each time you get a refill. If you have any questions, ask your doctor or pharmacist. Take this medication by mouth with or without food as directed by your doctor or other health care professional, usually once a day. It is very important to take it exactly as directed. Do not increase the dose, take it more frequently, or stop using it unless directed by your doctor. Dosage is based on your medical condition, laboratory tests (such as INR), and response to treatment. Your doctor or other health care provider will monitor you closely while you are taking this medication to determine the right dose for you. Use this medication regularly to get the most benefit from it. To help you remember, take it at the same time each day. It is important to eat a balanced, consistent diet while taking warfarin. Some foods can affect how warfarin " works in your body and may affect your treatment and dose. Avoid sudden large increases or decreases in your intake of foods high in vitamin K (such as broccoli, cauliflower, cabbage, brussels sprouts, kale, spinach, and other green leafy vegetables, liver, green tea, certain vitamin supplements). If you are trying to lose weight, check with your doctor before you try to go on a diet. Cranberry products may also affect how your warfarin works. Limit the amount of cranberry juice (16 ounces/480 milliliters a day) or other cranberry products you may drink or eat.      SIDE EFFECTS:  Nausea, loss of appetite, or stomach/abdominal pain may occur. If any of these effects persist or worsen, tell your doctor or pharmacist promptly. Remember that your doctor has prescribed this medication because he or she has judged that the benefit to you is greater than the risk of side effects. Many people using this medication do not have serious side effects. This medication can cause serious bleeding if it affects your blood clotting proteins too much (shown by unusually high INR lab results). Even if your doctor stops your medication, this risk of bleeding can continue for up to a week. Tell your doctor right away if you have any signs of serious bleeding, including: unusual pain/swelling/discomfort, unusual/easy bruising, prolonged bleeding from cuts or gums, persistent/frequent nosebleeds, unusually heavy/prolonged menstrual flow, pink/dark urine, coughing up blood, vomit that is bloody or looks like coffee grounds, severe headache, dizziness/fainting, unusual or persistent tiredness/weakness, bloody/black/tarry stools, chest pain, shortness of breath, difficulty swallowing. Tell your doctor right away if any of these unlikely but serious side effects occur: persistent nausea/vomiting, severe stomach/abdominal pain, yellowing eyes/skin. This drug rarely has caused very serious (possibly fatal) problems if its effects lead to small  blood clots (usually at the beginning of treatment). This can lead to severe skin/tissue damage that may require surgery or amputation if left untreated. Patients with certain blood conditions (protein C or S deficiency) may be at greater risk. Get medical help right away if any of these rare but serious side effects occur: painful/red/purplish patches on the skin (such as on the toe, breast, abdomen), change in the amount of urine, vision changes, confusion, slurred speech, weakness on one side of the body. A very serious allergic reaction to this drug is rare. However, get medical help right away if you notice any symptoms of a serious allergic reaction, including: rash, itching/swelling (especially of the face/tongue/throat), severe dizziness, trouble breathing. This is not a complete list of possible side effects. If you notice other effects not listed above, contact your doctor or pharmacist. In the [place] - Call your doctor for medical advice about side effects. You may report side effects to FDA at 3-913-ZLB-2321. In [place] - Call your doctor for medical advice about side effects. You may report side effects to [facility] [place] at [phone].      PRECAUTIONS:  Before taking warfarin, tell your doctor or pharmacist if you are allergic to it; or if you have any other allergies. This product may contain inactive ingredients, which can cause allergic reactions or other problems. Talk to your pharmacist for more details. Before using this medication, tell your doctor or pharmacist your medical history, especially of: blood disorders (such as anemia, hemophilia), bleeding problems (such as bleeding of the stomach/intestines, bleeding in the brain), blood vessel disorders (such as aneurysms), recent major injury/surgery, liver disease, alcohol use, mental/mood disorders (including memory problems), frequent falls/injuries. It is important that all your doctors and dentists know that you take warfarin. Before having  surgery or any medical/dental procedures, tell your doctor or dentist that you are taking this medication and about all the products you use (including prescription drugs, nonprescription drugs, and herbal products). Avoid getting injections into the muscles. If you must have an injection into a muscle (for example, a flu shot), it should be given in the arm. This way, it will be easier to check for bleeding and/or apply pressure bandages. This medication may cause stomach bleeding. Daily use of alcohol while using this medicine will increase your risk for stomach bleeding and may also affect how this medication works. Limit or avoid alcoholic beverages. If you have not been eating well, if you have an illness or infection that causes fever, vomiting, or diarrhea for more than 2 days, or if you start using any antibiotic medications, contact your doctor or pharmacist immediately because these conditions can affect how warfarin works. This medication can cause heavy bleeding. To lower the chance of getting cut, bruised, or injured, use great caution with sharp objects like safety razors and nail cutters. Use an electric razor when shaving and a soft toothbrush when brushing your teeth. Avoid activities such as contact sports. If you fall or injure yourself, especially if you hit your head, call your doctor immediately. Your doctor may need to check you. The Food & Drug Administration has stated that generic warfarin products are interchangeable. However, consult your doctor or pharmacist before switching warfarin products. Be careful not to take more than one medication that contains warfarin unless specifically directed by the doctor or health care provider who is monitoring your warfarin treatment. Older adults may be at greater risk for bleeding while using this drug. This medication is not recommended for use during pregnancy because of serious (possibly fatal) harm to an unborn baby. Discuss the use of reliable  "forms of birth control with your doctor. If you become pregnant or think you may be pregnant, tell your doctor immediately. If you are planning pregnancy, discuss a plan for managing your condition with your doctor before you become pregnant. Your doctor may switch the type of medication you use during pregnancy. Very small amounts of this medication may pass into breast milk but is unlikely to harm a nursing infant. Consult your doctor before breast-feeding.      DRUG INTERACTIONS:  Drug interactions may change how your medications work or increase your risk for serious side effects. This document does not contain all possible drug interactions. Keep a list of all the products you use (including prescription/nonprescription drugs and herbal products) and share it with your doctor and pharmacist. Do not start, stop, or change the dosage of any medicines without your doctor's approval. Warfarin interacts with many prescription, nonprescription, vitamin, and herbal products. This includes medications that are applied to the skin or inside the vagina or rectum. The interactions with warfarin usually result in an increase or decrease in the \"blood-thinning\" (anticoagulant) effect. Your doctor or other health care professional should closely monitor you to prevent serious bleeding or clotting problems. While taking warfarin, it is very important to tell your doctor or pharmacist of any changes in medications, vitamins, or herbal products that you are taking. Some products that may interact with this drug include: capecitabine, imatinib, mifepristone. Aspirin, aspirin-like drugs (salicylates), and nonsteroidal anti-inflammatory drugs (NSAIDs such as ibuprofen, naproxen, celecoxib) may have effects similar to warfarin. These drugs may increase the risk of bleeding problems if taken during treatment with warfarin. Carefully check all prescription/nonprescription product labels (including drugs applied to the skin such as " pain-relieving creams) since the products may contain NSAIDs or salicylates. Talk to your doctor about using a different medication (such as acetaminophen) to treat pain/fever. Low-dose aspirin and related drugs (such as clopidogrel, ticlopidine) should be continued if prescribed by your doctor for specific medical reasons such as heart attack or stroke prevention. Consult your doctor or pharmacist for more details. Many herbal products interact with warfarin. Tell your doctor before taking any herbal products, especially bromelains, coenzyme Q10, cranberry, danshen, dong quai, fenugreek, garlic, ginkgo biloba, ginseng, and Henny's wort, among others. This medication may interfere with a certain laboratory test to measure theophylline levels, possibly causing false test results. Make sure laboratory personnel and all your doctors know you use this drug.      OVERDOSE:  If overdose is suspected, contact a poison control center or emergency room immediately.  residents can call the  National Poison Hotline at 1-659.693.3844. [place] residents can call a provincial poison control center. Symptoms of overdose may include: bloody/black/tarry stools, pink/dark urine, unusual/prolonged bleeding.      NOTES:  Do not share this medication with others. Laboratory and/or medical tests (such as INR, complete blood count) must be performed periodically to monitor your progress or check for side effects. Consult your doctor for more details.      MISSED DOSE:  For the best possible benefit, do not miss any doses. If you do miss a dose and remember on the same day, take it as soon as you remember. If you remember on the next day, skip the missed dose and resume your usual dosing schedule. Do not double the dose to catch up because this could increase your risk for bleeding. Keep a record of missed doses to give to your doctor or pharmacist. Contact your doctor or pharmacist if you miss 2 or more doses in a row.      STORAGE:   Store at room temperature away from light and moisture. Do not store in the bathroom. Keep all medications away from children and pets. Do not flush medications down the toilet or pour them into a drain unless instructed to do so. Properly discard this product when it is  or no longer needed. Consult your pharmacist or local waste disposal company for more details about how to safely discard your product.      MEDICAL ALERT:  Your condition and medication can cause complications in a medical emergency. For information about enrolling in MedicAlert, call 1-402.713.7003 ([place]) or 1-269.991.4175 ([place]).      Information last revised 2010 Copyright(c)  [employer]             · Is patient Post Blood Transfusion?  No    Depression / Suicide Risk    As you are discharged from this [facility] facility, it is important to learn how to keep safe from harming yourself.    Recognize the warning signs:  · Abrupt changes in personality, positive or negative- including increase in energy   · Giving away possessions  · Change in eating patterns- significant weight changes-  positive or negative  · Change in sleeping patterns- unable to sleep or sleeping all the time   · Unwillingness or inability to communicate  · Depression  · Unusual sadness, discouragement and loneliness  · Talk of wanting to die  · Neglect of personal appearance   · Rebelliousness- reckless behavior  · Withdrawal from people/activities they love  · Confusion- inability to concentrate     If you or a loved one observes any of these behaviors or has concerns about self-harm, here's what you can do:  · Talk about it- your feelings and reasons for harming yourself  · Remove any means that you might use to hurt yourself (examples: pills, rope, extension cords, firearm)  · Get professional help from the community (Mental Health, Substance Abuse, psychological counseling)  · Do not be alone:Call your Safe Contact- someone whom you  trust who will be there for you.  · Call your local CRISIS HOTLINE 643-2003 or 850-827-4643  · Call your local Children's Mobile Crisis Response Team [place] (795) 665-5668 or www.[employer]  · Call the toll free National Suicide Prevention Hotlines   · National Suicide Prevention Lifeline 723-369-GDLX (2163)  · National Hope Line Network 800-SUICIDE (339-9340)      Chronic Obstructive Pulmonary Disease  Chronic obstructive pulmonary disease (COPD) is a common lung condition in which airflow from the lungs is limited. COPD is a general term that can be used to describe many different lung problems that limit airflow, including both chronic bronchitis and emphysema. If you have COPD, your lung function will probably never return to normal, but there are measures you can take to improve lung function and make yourself feel better.  CAUSES   · Smoking (common).  · Exposure to secondhand smoke.  · Genetic problems.  · Chronic inflammatory lung diseases or recurrent infections.  SYMPTOMS  · Shortness of breath, especially with physical activity.  · Deep, persistent (chronic) cough with a large amount of thick mucus.  · Wheezing.  · Rapid breaths (tachypnea).  · Gray or bluish discoloration (cyanosis) of the skin, especially in your fingers, toes, or lips.  · Fatigue.  · Weight loss.  · Frequent infections or episodes when breathing symptoms become much worse (exacerbations).  · Chest tightness.  DIAGNOSIS  Your health care provider will take a medical history and perform a physical examination to diagnose COPD. Additional tests for COPD may include:  · Lung (pulmonary) function tests.  · Chest X-ray.  · CT scan.  · Blood tests.  TREATMENT   Treatment for COPD may include:  · Inhaler and nebulizer medicines. These help manage the symptoms of COPD and make your breathing more comfortable.  · Supplemental oxygen. Supplemental oxygen is only helpful if you have a low oxygen level in your blood.  · Exercise and  physical activity. These are beneficial for nearly all people with COPD.  · Lung surgery or transplant.  · Nutrition therapy to gain weight, if you are underweight.  · Pulmonary rehabilitation. This may involve working with a team of health care providers and specialists, such as respiratory, occupational, and physical therapists.  HOME CARE INSTRUCTIONS  · Take all medicines (inhaled or pills) as directed by your health care provider.  · Avoid over-the-counter medicines or cough syrups that dry up your airway (such as antihistamines) and slow down the elimination of secretions unless instructed otherwise by your health care provider.  · If you are a smoker, the most important thing that you can do is stop smoking. Continuing to smoke will cause further lung damage and breathing trouble. Ask your health care provider for help with quitting smoking. He or she can direct you to community resources or hospitals that provide support.  · Avoid exposure to irritants such as smoke, chemicals, and fumes that aggravate your breathing.  · Use oxygen therapy and pulmonary rehabilitation if directed by your health care provider. If you require home oxygen therapy, ask your health care provider whether you should purchase a pulse oximeter to measure your oxygen level at home.  · Avoid contact with individuals who have a contagious illness.  · Avoid extreme temperature and humidity changes.  · Eat healthy foods. Eating smaller, more frequent meals and resting before meals may help you maintain your strength.  · Stay active, but balance activity with periods of rest. Exercise and physical activity will help you maintain your ability to do things you want to do.  · Preventing infection and hospitalization is very important when you have COPD. Make sure to receive all the vaccines your health care provider recommends, especially the pneumococcal and influenza vaccines. Ask your health care provider whether you need a pneumonia  vaccine.  · Learn and use relaxation techniques to manage stress.  · Learn and use controlled breathing techniques as directed by your health care provider. Controlled breathing techniques include:  ¨ Pursed lip breathing. Start by breathing in (inhaling) through your nose for 1 second. Then, purse your lips as if you were going to whistle and breathe out (exhale) through the pursed lips for 2 seconds.  ¨ Diaphragmatic breathing. Start by putting one hand on your abdomen just above your waist. Inhale slowly through your nose. The hand on your abdomen should move out. Then purse your lips and exhale slowly. You should be able to feel the hand on your abdomen moving in as you exhale.  · Learn and use controlled coughing to clear mucus from your lungs. Controlled coughing is a series of short, progressive coughs. The steps of controlled coughing are:  1. Lean your head slightly forward.  2. Breathe in deeply using diaphragmatic breathing.  3. Try to hold your breath for 3 seconds.  4. Keep your mouth slightly open while coughing twice.  5. Spit any mucus out into a tissue.  6. Rest and repeat the steps once or twice as needed.  SEEK MEDICAL CARE IF:  · You are coughing up more mucus than usual.  · There is a change in the color or thickness of your mucus.  · Your breathing is more labored than usual.  · Your breathing is faster than usual.  SEEK IMMEDIATE MEDICAL CARE IF:  · You have shortness of breath while you are resting.  · You have shortness of breath that prevents you from:  ¨ Being able to talk.  ¨ Performing your usual physical activities.  · You have chest pain lasting longer than 5 minutes.  · Your skin color is more cyanotic than usual.  · You measure low oxygen saturations for longer than 5 minutes with a pulse oximeter.  MAKE SURE YOU:  · Understand these instructions.  · Will watch your condition.  · Will get help right away if you are not doing well or get worse.     This information is not intended to  replace advice given to you by your health care provider. Make sure you discuss any questions you have with your health care provider.     Document Released: 09/27/2006 Document Revised: 01/08/2016 Document Reviewed: 08/14/2014  [employer] Interactive Patient Education ©2016 [employer]      Pulmonary Embolism  A pulmonary (lung) embolism (PE) is a blood clot that has traveled to the lung and results in a blockage of blood flow in the affected lung. Most clots come from deep veins in the legs or pelvis. PE is a dangerous and potentially life-threatening condition that can be treated if identified.  CAUSES  Blood clots form in a vein for different reasons. Usually several things cause blood clots. They include:  · The flow of blood slows down.  · The inside of the vein is damaged in some way.  · The person has a condition that makes the blood clot more easily.  RISK FACTORS  Some people are more likely than others to develop PE. Risk factors include:   · Smoking.  · Being overweight (obese).  · Sitting or lying still for a long time. This includes long-distance travel, paralysis, or recovery from an illness or surgery.  Other factors that increase risk are:   · Older age, especially over 75 years of age.  · Having a family history of blood clots or if you have already had a blood clot.  · Having major or lengthy surgery. This is especially true for surgery on the hip, knee, or belly (abdomen). Hip surgery is particularly high risk.  · Having a long, thin tube (catheter) placed inside a vein during a medical procedure.  · Breaking a hip or leg.  · Having cancer or cancer treatment.  · Medicines containing the female hormone estrogen. This includes birth control pills and hormone replacement therapy.  · Other circulation or heart problems.  · Pregnancy and childbirth.  · Hormone changes make the blood clot more easily during pregnancy.  · The fetus puts pressure on the veins of the pelvis.  · There is a risk of injury  to veins during delivery or a caesarean delivery. The risk is highest just after childbirth.    PREVENTION   · Exercise the legs regularly. Take a brisk 30 minute walk every day.  · Maintain a weight that is appropriate for your height.  · Avoid sitting or lying in bed for long periods of time without moving your legs.  · Women, particularly those over the age of 35 years, should consider the risks and benefits of taking estrogen medicines, including birth control pills.  · Do not smoke, especially if you take estrogen medicines.  · Long-distance travel can increase your risk. You should exercise your legs by walking or pumping the muscles every hour.  · Many of the risk factors above relate to situations that exist with hospitalization, either for illness, injury, or elective surgery. Prevention may include medical and nonmedical measures.    · Your health care provider will assess you for the need for venous thromboembolism prevention when you are admitted to the hospital. If you are having surgery, your surgeon will assess you the day of or day after surgery.     SYMPTOMS   The symptoms of a PE usually start suddenly and include:  · Shortness of breath.  · Coughing.  · Coughing up blood or blood-tinged mucus.  · Chest pain. Pain is often worse with deep breaths.  · Rapid heartbeat.  DIAGNOSIS   Your health care provider will take a medical history, perform a physical exam, and use rule-out criteria to assess your risk for PE. If your risk is intermediate or high, other tests may be done. These include:  7. Blood tests, such as studies of the clotting properties of your blood.  8. Imaging tests, such as ultrasound, CT, MRI, and other tests to see if you have clots in your legs or lungs.  9. An electrocardiogram. This can look for heart strain from blood clots in the lungs.  TREATMENT   · The most common treatment for a PE is blood thinning (anticoagulant) medicine, which reduces the blood's tendency to clot.  Anticoagulants can stop new blood clots from forming and old clots from growing. They cannot dissolve existing clots. Your body does this by itself over time. Anticoagulants can be given by mouth, through an intravenous (IV) tube, or by injection. Your health care provider will determine the best program for you.  · Less commonly, clot-dissolving medicines (thrombolytics) are used to dissolve a PE. They carry a high risk of bleeding, so they are used mainly in severe cases.  · Very rarely, a blood clot in the leg needs to be removed surgically.  · If you are unable to take anticoagulants, your health care provider may arrange for you to have a filter placed in a main vein in your abdomen. This filter prevents clots from traveling to your lungs.  HOME CARE INSTRUCTIONS   · Take all medicines as directed by your health care provider.  · Learn as much as you can about DVT.  · Wear a medical alert bracelet or carry a medical alert card.  · Ask your health care provider how soon you can go back to normal activities. It is important to stay active to prevent blood clots. If you are on anticoagulant medicine, avoid contact sports.  · It is very important to exercise. This is especially important while traveling, sitting, or standing for long periods of time. Exercise your legs by walking or by tightening and relaxing your leg muscles regularly. Take frequent walks.  · You may need to wear compression stockings. These are tight elastic stockings that apply pressure to the lower legs. This pressure can help keep the blood in the legs from clotting.  Taking Warfarin  Warfarin is a daily medicine that is taken by mouth. Your health care provider will advise you on the length of treatment (usually 3-6 months, sometimes lifelong). If you take warfarin:  · Understand how to take warfarin and foods that can affect how warfarin works in your body.  · Too much and too little warfarin are both dangerous. Too much warfarin increases  the risk of bleeding. Too little warfarin continues to allow the risk for blood clots.  Warfarin and Regular Blood Testing  While taking warfarin, you will need to have regular blood tests to measure your blood clotting time. These blood tests usually include both the prothrombin time (PT) and international normalized ratio (INR) tests. The PT and INR results allow your health care provider to adjust your dose of warfarin. It is very important that you have your PT and INR tested as often as directed by your health care provider.   Warfarin and Your Diet  Avoid major changes in your diet, or notify your health care provider before changing your diet. Arrange a visit with a registered dietitian to answer your questions. Many foods, especially foods high in vitamin K, can interfere with warfarin and affect the PT and INR results. You should eat a consistent amount of foods high in vitamin K. Foods high in vitamin K include:   · Spinach, kale, broccoli, cabbage, jose francisco and turnip greens, [place] sprouts, peas, cauliflower, seaweed, and parsley.  · Beef and pork liver.  · Green tea.  · Soybean oil.  Warfarin with Other Medicines  Many medicines can interfere with warfarin and affect the PT and INR results. You must:  · Tell your health care provider about any and all medicines, vitamins, and supplements you take, including aspirin and other over-the-counter anti-inflammatory medicines. Be especially cautious with aspirin and anti-inflammatory medicines. Ask your health care provider before taking these.  · Do not take or discontinue any prescribed or over-the-counter medicine except on the advice of your health care provider or pharmacist.  Warfarin Side Effects  Warfarin can have side effects, such as easy bruising and difficulty stopping bleeding. Ask your health care provider or pharmacist about other side effects of warfarin. You will need to:  · Hold pressure over cuts for longer than usual.  · Notify your  dentist and other health care providers that you are taking warfarin before you undergo any procedures where bleeding may occur.  Warfarin with Alcohol and Tobacco   · Drinking alcohol frequently can increase the effect of warfarin, leading to excess bleeding. It is best to avoid alcoholic drinks or consume only very small amounts while taking warfarin. Notify your health care provider if you change your alcohol intake.  · Do not use any tobacco products including cigarettes, chewing tobacco, or electronic cigarettes. If you smoke, quit. Ask your health care provider for help with quitting smoking.  Alternative Medicines to Warfarin: Factor Xa Inhibitor Medicines  · These blood thinning medicines are taken by mouth, usually for several weeks or longer. It is important to take the medicine every single day, at the same time each day.  · There are no regular blood tests required when using these medicines.  · There are fewer food and drug interactions than with warfarin.  · The side effects of this class of medicine is similar to that of warfarin, including excessive bruising or bleeding. Ask your health care provider or pharmacist about other potential side effects.  SEEK MEDICAL CARE IF:   · You notice a rapid heartbeat.  · You feel weaker or more tired than usual.  · You feel faint.  · You notice increased bruising.  · Your symptoms are not getting better in the time expected.  · You are having side effects of medicine.  SEEK IMMEDIATE MEDICAL CARE IF:   · You have chest pain.  · You have trouble breathing.  · You have new or increased swelling or pain in one leg.  · You cough up blood.  · You notice blood in vomit, in a bowel movement, or in urine.  · You have a fever.  Symptoms of PE may represent a serious problem that is an emergency. Do not wait to see if the symptoms will go away. Get medical help right away. Call your local emergency services (911 in the [place]). Do not drive yourself to the  [facility].       This information is not intended to replace advice given to you by your health care provider. Make sure you discuss any questions you have with your health care provider.     Document Released: 12/15/2001 Document Revised: 01/08/2016 Document Reviewed: 04/13/2016  [employer] Patient Education ©2016 [employer]

## 2017-04-12 NOTE — PROGRESS NOTES
Pt sleeping standing up with her head against the bedside table and without oxygen. Pt encouraged to lay in bed, NC set back in place and education about safety provided.

## 2017-04-12 NOTE — PROGRESS NOTES
"Pt barefoot carrying oxygen attempting to leave the unit with family member to go down stairs to the [facility], advised to go with staff member for safety. Pt stormed out and star yelling \"you are ruining everything, I don't trust anyone\" family member stated \"that's it, we are staying the night here in her room\". Spaced gave to pt to calm down, pt continued yelling random things in her room.   "

## 2017-04-12 NOTE — PROGRESS NOTES
Found pt with a bottle of prescribed celexa attempting to take a pill, education provided, medication collected and tagged to pharmacy.

## 2017-04-12 NOTE — DISCHARGE PLANNING
Medical Social Work  PC to [facility] to schedule transportation to [facility].

## 2017-04-12 NOTE — PROGRESS NOTES
Pt had bleeding from cut on eyebrow where she had shaved earlier in the day. Pt was educated and a small bandage was placed on left eyebrow and right forehead. Pt was educated again regarding bleeding precautions and anticoagulants. Pt verbalized understanding of bleeding precautions.

## 2017-04-12 NOTE — DISCHARGE PLANNING
Medical Social Work  Patient is staying with her niece and her  at the [facility] [address] on [phone].    Faxed choice to [facility].

## 2017-04-12 NOTE — CARE PLAN
Problem: Safety  Goal: Will remain free from falls  Outcome: PROGRESSING AS EXPECTED  Pt. With history of fall. Refuses bed alarm despite the health education given. Safety precautions in place. Bed kept low and locked, call light within reach, assisted as necessary.    Problem: Pain Management  Goal: Pain level will decrease to patient’s comfort goal  Outcome: PROGRESSING AS EXPECTED  Pt. With complaints of back pain. Medicated per MAR.

## 2017-04-12 NOTE — PROGRESS NOTES
Assumed care of pt at shift change, report received from day shift RN. Pt trying to turn off roommate's bed alarm off, attempted to provide education about medical equipment but pt started yelling asking me to leave her room and get a new nurse. Charge RN attempted to talk to pt but pt immediately started yelling aggressively. Security notified.

## 2017-04-12 NOTE — PROGRESS NOTES
Pt passing her room, fidgety, moving things over table over and over. Refuses this nurse for her FSBS and injections, assistance from another staff member provided. C/o 9/10 pain, nausea and bilat hands and feet tingling, medicated per MAR. Compliant with scheduled medication. Pt allowed me to assess her. Denies any other needs at this time. Bed in low position, call light within reach, hourly rounding in place.

## 2017-04-12 NOTE — CARE PLAN
Problem: Mobility  Goal: Risk for activity intolerance will decrease  Outcome: PROGRESSING AS EXPECTED  Pt ambulates constantly, steady gait.     Problem: Psychosocial Needs:  Goal: Level of anxiety will decrease  Outcome: PROGRESSING AS EXPECTED  Pt agitated and verbally aggressive, situation escalated to security for pt and unit safety.

## 2017-04-12 NOTE — PROGRESS NOTES
Pt. Noticed to have lighter. Lighter needs to be confiscated. Pt. Refused to surrender and got mad, threw things and was verbally aggressive towards staff. Situation escalated, charge RN notified, called security. Per pt. Wants to be released in the hospital. AZUL Jordan and MD Washington to see pt.

## 2017-04-12 NOTE — PROGRESS NOTES
Received report from day shift RN, assumed care. Pt. Is awake, sitting and eating breakfast. A&Ox4, restless. Up self with steady gait. With complaints of back pain rated 9/10, medicated per MAR. Denies n/v. Due medications given, no s/s of aspiration.      Checked pt's belongings. Found 4 medicine bottles. Tagged medications and sent to pharmacy.

## 2017-04-12 NOTE — FACE TO FACE
Face to Face Note  -  Durable Medical Equipment    Rosa Isela Washington M.D. - NPI: [id number]  I certify that this patient is under my care and that they had a durable medical equipment(DME)face to face encounter by myself that meets the physician DME face-to-face encounter requirements with this patient on:    Date of encounter:   Patient:                    MRN:                       YOB: 2015  Zenia Ordaz  [id number]  1950     The encounter with the patient was in whole, or in part, for the following medical condition, which is the primary reason for durable medical equipment:  COPD    I certify that, based on my findings, the following durable medical equipment is medically necessary:  Oxygen.    HOME O2 Saturation Measurements:(Values must be present for Home Oxygen orders)  Room air sat at rest: 88  Room air sat with amb: 80  With liters of O2: 3, O2 sat at rest with O2: 91  With Liters of O2: 5, O2 sat with amb with O2 : 95  Is the patient mobile?: Yes    My Clinical findings support the need for the above equipment due to:  Hypoxia    Supporting Symptoms: shortness of breath worse with exertion

## 2017-04-13 ENCOUNTER — HOSPITAL ENCOUNTER (INPATIENT)
Facility: MEDICAL CENTER | Age: 67
LOS: 9 days | DRG: 423 | End: 2017-04-22
Attending: EMERGENCY MEDICINE | Admitting: HOSPITALIST
Payer: MEDICAID

## 2017-04-13 ENCOUNTER — PATIENT OUTREACH (OUTPATIENT)
Dept: HEALTH INFORMATION MANAGEMENT | Facility: OTHER | Age: 67
End: 2017-04-13

## 2017-04-13 ENCOUNTER — APPOINTMENT (OUTPATIENT)
Dept: RADIOLOGY | Facility: MEDICAL CENTER | Age: 67
DRG: 423 | End: 2017-04-13
Attending: HOSPITALIST
Payer: MEDICAID

## 2017-04-13 ENCOUNTER — RESOLUTE PROFESSIONAL BILLING HOSPITAL PROF FEE (OUTPATIENT)
Dept: HOSPITALIST | Facility: MEDICAL CENTER | Age: 67
End: 2017-04-13
Payer: MEDICAID

## 2017-04-13 ENCOUNTER — APPOINTMENT (OUTPATIENT)
Dept: RADIOLOGY | Facility: MEDICAL CENTER | Age: 67
DRG: 423 | End: 2017-04-13
Attending: EMERGENCY MEDICINE
Payer: MEDICAID

## 2017-04-13 DIAGNOSIS — M54.32 LEFT SIDED SCIATICA: ICD-10-CM

## 2017-04-13 PROBLEM — B19.9: Status: ACTIVE | Noted: 2017-04-13

## 2017-04-13 LAB
ALBUMIN SERPL BCP-MCNC: 3.8 G/DL (ref 3.2–4.9)
ALBUMIN/GLOB SERPL: 1.4 G/DL
ALP SERPL-CCNC: 92 U/L (ref 30–99)
ALT SERPL-CCNC: 3221 U/L (ref 2–50)
AMPHET UR QL SCN: POSITIVE
ANION GAP SERPL CALC-SCNC: 8 MMOL/L (ref 0–11.9)
APAP SERPL-MCNC: <10 UG/ML (ref 10–30)
APPEARANCE UR: CLEAR
APTT PPP: 42.7 SEC (ref 24.7–36)
AST SERPL-CCNC: 2919 U/L (ref 12–45)
BARBITURATES UR QL SCN: NEGATIVE
BASOPHILS # BLD AUTO: 0 % (ref 0–1.8)
BASOPHILS # BLD: 0 K/UL (ref 0–0.12)
BENZODIAZ UR QL SCN: NEGATIVE
BILIRUB SERPL-MCNC: 1 MG/DL (ref 0.1–1.5)
BILIRUB UR QL STRIP.AUTO: NEGATIVE
BUN SERPL-MCNC: 33 MG/DL (ref 8–22)
BZE UR QL SCN: NEGATIVE
CALCIUM SERPL-MCNC: 9 MG/DL (ref 8.5–10.5)
CANNABINOIDS UR QL SCN: NEGATIVE
CHLORIDE SERPL-SCNC: 96 MMOL/L (ref 96–112)
CO2 SERPL-SCNC: 28 MMOL/L (ref 20–33)
COLOR UR: YELLOW
CREAT SERPL-MCNC: 0.61 MG/DL (ref 0.5–1.4)
EKG IMPRESSION: NORMAL
EOSINOPHIL # BLD AUTO: 0.13 K/UL (ref 0–0.51)
EOSINOPHIL NFR BLD: 1.8 % (ref 0–6.9)
EPI CELLS #/AREA URNS HPF: NORMAL /HPF
ERYTHROCYTE [DISTWIDTH] IN BLOOD BY AUTOMATED COUNT: 46.5 FL (ref 35.9–50)
GFR SERPL CREATININE-BSD FRML MDRD: >60 ML/MIN/1.73 M 2
GLOBULIN SER CALC-MCNC: 2.7 G/DL (ref 1.9–3.5)
GLUCOSE BLD-MCNC: 95 MG/DL (ref 65–99)
GLUCOSE SERPL-MCNC: 94 MG/DL (ref 65–99)
GLUCOSE UR STRIP.AUTO-MCNC: NEGATIVE MG/DL
HAV IGM SERPL QL IA: NEGATIVE
HBV CORE IGM SER QL: NEGATIVE
HBV SURFACE AG SER QL: NEGATIVE
HCT VFR BLD AUTO: 43 % (ref 37–47)
HCV AB SER QL: REACTIVE
HGB BLD-MCNC: 13.9 G/DL (ref 12–16)
INR PPP: 2.08 (ref 0.87–1.13)
KETONES UR STRIP.AUTO-MCNC: ABNORMAL MG/DL
LACTATE BLD-SCNC: 1.3 MMOL/L (ref 0.5–2)
LEUKOCYTE ESTERASE UR QL STRIP.AUTO: NEGATIVE
LYMPHOCYTES # BLD AUTO: 1.16 K/UL (ref 1–4.8)
LYMPHOCYTES NFR BLD: 15.7 % (ref 22–41)
MANUAL DIFF BLD: NORMAL
MCH RBC QN AUTO: 27.2 PG (ref 27–33)
MCHC RBC AUTO-ENTMCNC: 32.3 G/DL (ref 33.6–35)
MCV RBC AUTO: 84.1 FL (ref 81.4–97.8)
MDMA UR QL SCN: NEGATIVE
METHADONE UR QL SCN: NEGATIVE
MICRO URNS: ABNORMAL
MONOCYTES # BLD AUTO: 0.38 K/UL (ref 0–0.85)
MONOCYTES NFR BLD AUTO: 5.2 % (ref 0–13.4)
MORPHOLOGY BLD-IMP: NORMAL
MYELOCYTES NFR BLD MANUAL: 1.7 %
NEUTROPHILS # BLD AUTO: 5.59 K/UL (ref 2–7.15)
NEUTROPHILS NFR BLD: 73.9 % (ref 44–72)
NEUTS BAND NFR BLD MANUAL: 1.7 % (ref 0–10)
NITRITE UR QL STRIP.AUTO: NEGATIVE
NRBC # BLD AUTO: 0 K/UL
NRBC BLD AUTO-RTO: 0 /100 WBC
OPIATES UR QL SCN: POSITIVE
OVALOCYTES BLD QL SMEAR: NORMAL
OXYCODONE UR QL SCN: NEGATIVE
PCP UR QL SCN: NEGATIVE
PH UR STRIP.AUTO: 7 [PH]
PLATELET # BLD AUTO: 263 K/UL (ref 164–446)
PLATELET BLD QL SMEAR: NORMAL
PMV BLD AUTO: 9.8 FL (ref 9–12.9)
POIKILOCYTOSIS BLD QL SMEAR: NORMAL
POTASSIUM SERPL-SCNC: 4.2 MMOL/L (ref 3.6–5.5)
PROPOXYPH UR QL SCN: NEGATIVE
PROT SERPL-MCNC: 6.5 G/DL (ref 6–8.2)
PROT UR QL STRIP: 50 MG/DL
PROTHROMBIN TIME: 24 SEC (ref 12–14.6)
RBC # BLD AUTO: 5.11 M/UL (ref 4.2–5.4)
RBC # URNS HPF: NORMAL /HPF
RBC BLD AUTO: PRESENT
RBC UR QL AUTO: ABNORMAL
SALICYLATES SERPL-MCNC: 0 MG/DL (ref 15–25)
SMUDGE CELLS BLD QL SMEAR: NORMAL
SODIUM SERPL-SCNC: 132 MMOL/L (ref 135–145)
SP GR UR STRIP.AUTO: 1.02
VARIANT LYMPHS BLD QL SMEAR: NORMAL
WBC # BLD AUTO: 7.4 K/UL (ref 4.8–10.8)

## 2017-04-13 PROCEDURE — 93005 ELECTROCARDIOGRAM TRACING: CPT | Performed by: HOSPITALIST

## 2017-04-13 PROCEDURE — A9270 NON-COVERED ITEM OR SERVICE: HCPCS | Performed by: HOSPITALIST

## 2017-04-13 PROCEDURE — 99285 EMERGENCY DEPT VISIT HI MDM: CPT

## 2017-04-13 PROCEDURE — 80053 COMPREHEN METABOLIC PANEL: CPT

## 2017-04-13 PROCEDURE — 83605 ASSAY OF LACTIC ACID: CPT

## 2017-04-13 PROCEDURE — 304561 HCHG STAT O2

## 2017-04-13 PROCEDURE — 93005 ELECTROCARDIOGRAM TRACING: CPT

## 2017-04-13 PROCEDURE — 80307 DRUG TEST PRSMV CHEM ANLYZR: CPT

## 2017-04-13 PROCEDURE — 85007 BL SMEAR W/DIFF WBC COUNT: CPT

## 2017-04-13 PROCEDURE — 82962 GLUCOSE BLOOD TEST: CPT | Mod: 91

## 2017-04-13 PROCEDURE — 700111 HCHG RX REV CODE 636 W/ 250 OVERRIDE (IP): Performed by: HOSPITALIST

## 2017-04-13 PROCEDURE — 770006 HCHG ROOM/CARE - MED/SURG/GYN SEMI*

## 2017-04-13 PROCEDURE — 700105 HCHG RX REV CODE 258: Performed by: HOSPITALIST

## 2017-04-13 PROCEDURE — 85610 PROTHROMBIN TIME: CPT

## 2017-04-13 PROCEDURE — 700102 HCHG RX REV CODE 250 W/ 637 OVERRIDE(OP): Performed by: HOSPITALIST

## 2017-04-13 PROCEDURE — 87086 URINE CULTURE/COLONY COUNT: CPT

## 2017-04-13 PROCEDURE — 94760 N-INVAS EAR/PLS OXIMETRY 1: CPT

## 2017-04-13 PROCEDURE — 80074 ACUTE HEPATITIS PANEL: CPT

## 2017-04-13 PROCEDURE — 87040 BLOOD CULTURE FOR BACTERIA: CPT

## 2017-04-13 PROCEDURE — 87522 HEPATITIS C REVRS TRNSCRPJ: CPT

## 2017-04-13 PROCEDURE — 85027 COMPLETE CBC AUTOMATED: CPT

## 2017-04-13 PROCEDURE — 81001 URINALYSIS AUTO W/SCOPE: CPT

## 2017-04-13 PROCEDURE — 93010 ELECTROCARDIOGRAM REPORT: CPT | Performed by: INTERNAL MEDICINE

## 2017-04-13 PROCEDURE — 85730 THROMBOPLASTIN TIME PARTIAL: CPT

## 2017-04-13 PROCEDURE — 71010 DX-CHEST-PORTABLE (1 VIEW): CPT

## 2017-04-13 PROCEDURE — 99223 1ST HOSP IP/OBS HIGH 75: CPT | Performed by: HOSPITALIST

## 2017-04-13 RX ORDER — CITALOPRAM 20 MG/1
20 TABLET ORAL
Status: DISCONTINUED | OUTPATIENT
Start: 2017-04-14 | End: 2017-04-14

## 2017-04-13 RX ORDER — MORPHINE SULFATE 15 MG/1
15 TABLET, FILM COATED, EXTENDED RELEASE ORAL EVERY 12 HOURS
Status: DISCONTINUED | OUTPATIENT
Start: 2017-04-14 | End: 2017-04-22 | Stop reason: HOSPADM

## 2017-04-13 RX ORDER — INSULIN GLARGINE 100 [IU]/ML
32 INJECTION, SOLUTION SUBCUTANEOUS EVERY EVENING
Status: DISCONTINUED | OUTPATIENT
Start: 2017-04-14 | End: 2017-04-21

## 2017-04-13 RX ORDER — AMOXICILLIN 250 MG
2 CAPSULE ORAL 2 TIMES DAILY
Status: DISCONTINUED | OUTPATIENT
Start: 2017-04-13 | End: 2017-04-18

## 2017-04-13 RX ORDER — ALPRAZOLAM 0.5 MG/1
0.5 TABLET ORAL 3 TIMES DAILY PRN
Status: DISCONTINUED | OUTPATIENT
Start: 2017-04-13 | End: 2017-04-22 | Stop reason: HOSPADM

## 2017-04-13 RX ORDER — TRAZODONE HYDROCHLORIDE 50 MG/1
50 TABLET ORAL
Status: DISCONTINUED | OUTPATIENT
Start: 2017-04-13 | End: 2017-04-14

## 2017-04-13 RX ORDER — SIMVASTATIN 40 MG
20 TABLET ORAL EVERY EVENING
Status: DISCONTINUED | OUTPATIENT
Start: 2017-04-13 | End: 2017-04-13

## 2017-04-13 RX ORDER — POLYETHYLENE GLYCOL 3350 17 G/17G
1 POWDER, FOR SOLUTION ORAL
Status: DISCONTINUED | OUTPATIENT
Start: 2017-04-13 | End: 2017-04-18

## 2017-04-13 RX ORDER — WARFARIN SODIUM 7.5 MG/1
7.5 TABLET ORAL
Status: DISCONTINUED | OUTPATIENT
Start: 2017-04-13 | End: 2017-04-14

## 2017-04-13 RX ORDER — HEPARIN SODIUM 5000 [USP'U]/ML
5000 INJECTION, SOLUTION INTRAVENOUS; SUBCUTANEOUS EVERY 8 HOURS
Status: DISCONTINUED | OUTPATIENT
Start: 2017-04-13 | End: 2017-04-13

## 2017-04-13 RX ORDER — BISACODYL 10 MG
10 SUPPOSITORY, RECTAL RECTAL
Status: DISCONTINUED | OUTPATIENT
Start: 2017-04-13 | End: 2017-04-18

## 2017-04-13 RX ORDER — OXYCODONE HYDROCHLORIDE 10 MG/1
10 TABLET ORAL
Status: DISCONTINUED | OUTPATIENT
Start: 2017-04-13 | End: 2017-04-22 | Stop reason: HOSPADM

## 2017-04-13 RX ORDER — SODIUM CHLORIDE 9 MG/ML
INJECTION, SOLUTION INTRAVENOUS CONTINUOUS
Status: DISCONTINUED | OUTPATIENT
Start: 2017-04-13 | End: 2017-04-18

## 2017-04-13 RX ORDER — OXYCODONE HYDROCHLORIDE 5 MG/1
5 TABLET ORAL
Status: DISCONTINUED | OUTPATIENT
Start: 2017-04-13 | End: 2017-04-22 | Stop reason: HOSPADM

## 2017-04-13 RX ORDER — DEXTROSE MONOHYDRATE 25 G/50ML
25 INJECTION, SOLUTION INTRAVENOUS
Status: DISCONTINUED | OUTPATIENT
Start: 2017-04-13 | End: 2017-04-22 | Stop reason: HOSPADM

## 2017-04-13 RX ORDER — PROMETHAZINE HYDROCHLORIDE 25 MG/1
12.5-25 SUPPOSITORY RECTAL EVERY 4 HOURS PRN
Status: DISCONTINUED | OUTPATIENT
Start: 2017-04-13 | End: 2017-04-22 | Stop reason: HOSPADM

## 2017-04-13 RX ORDER — OXYCODONE HYDROCHLORIDE 10 MG/1
10 TABLET ORAL 2 TIMES DAILY PRN
Status: DISCONTINUED | OUTPATIENT
Start: 2017-04-13 | End: 2017-04-13

## 2017-04-13 RX ORDER — PROMETHAZINE HYDROCHLORIDE 25 MG/1
12.5-25 TABLET ORAL EVERY 4 HOURS PRN
Status: DISCONTINUED | OUTPATIENT
Start: 2017-04-13 | End: 2017-04-22 | Stop reason: HOSPADM

## 2017-04-13 RX ORDER — GABAPENTIN 300 MG/1
300 CAPSULE ORAL 3 TIMES DAILY
Status: DISCONTINUED | OUTPATIENT
Start: 2017-04-13 | End: 2017-04-14

## 2017-04-13 RX ORDER — LABETALOL HYDROCHLORIDE 5 MG/ML
10 INJECTION, SOLUTION INTRAVENOUS EVERY 4 HOURS PRN
Status: DISCONTINUED | OUTPATIENT
Start: 2017-04-13 | End: 2017-04-22 | Stop reason: HOSPADM

## 2017-04-13 RX ORDER — RISPERIDONE 0.5 MG/1
0.5 TABLET ORAL
Status: DISCONTINUED | OUTPATIENT
Start: 2017-04-13 | End: 2017-04-14

## 2017-04-13 RX ADMIN — OXYCODONE HYDROCHLORIDE 5 MG: 5 TABLET ORAL at 21:55

## 2017-04-13 RX ADMIN — PROCHLORPERAZINE EDISYLATE 10 MG: 5 INJECTION INTRAMUSCULAR; INTRAVENOUS at 23:07

## 2017-04-13 RX ADMIN — HYDROMORPHONE HYDROCHLORIDE 0.5 MG: 1 INJECTION, SOLUTION INTRAMUSCULAR; INTRAVENOUS; SUBCUTANEOUS at 23:07

## 2017-04-13 RX ADMIN — SODIUM CHLORIDE: 9 INJECTION, SOLUTION INTRAVENOUS at 21:30

## 2017-04-13 ASSESSMENT — PAIN SCALES - GENERAL: PAINLEVEL_OUTOF10: 10

## 2017-04-13 NOTE — DISCHARGE SUMMARY
DATE OF ADMISSION:  04/03/2017    DATE OF DISCHARGE:  04/12/2017    DISCHARGE DIAGNOSES:  1.  Chronic obstructive pulmonary disease with acute exacerbation.  2.  Acute pulmonary emboli.  3.  Acute-on-chronic respiratory failure.  4.  History of intravenous drug use.  5.  Type 2 diabetes mellitus.  6.  Narcotic dependence.  7.  Hepatitis C.  8.  Coronary artery disease.  9.  Cardiac diastolic dysfunction with preserved ejection fraction.  10.  Tobacco abuse and continued dependence.  11.  Essential hypertension.  12.  Do-not-resuscitate code status.    HOSPITAL COURSE:  The patient is a 66-year-old female who presented to the   hospital with complaints of chest pain and shortness of breath.  She was   evaluated in the emergency department, found to be hypoxic.  She was placed on   oxygen and a CT scan of the chest was obtained which did show small acute   bilateral pulmonary emboli.  Patient was admitted to the hospital and placed   on Coumadin as well as Lovenox for anticoagulation.  Her respiratory status   did improve, although she continue to require 3 liters oxygen by nasal   cannula.  Echocardiogram was obtained from 03/14/2017 which showed ejection   fraction of 60% with grade II diastolic dysfunction.  Patient was ambulating   steadily and had no other evidence of right heart strain.  She was placed on   steroid therapy for her chronic obstructive pulmonary disease exacerbation and   BUN did become quite elevated, up to 73 on this therapy.  Steroids were   tapered down and then discontinued.  Patient was set up with home oxygen and   did secure living quarters in [place] [id number].  She was also set up for followup with   the [facility] to maintain INR between 2 and 3 on her   Coumadin therapy.    DISPOSITION:  The patient is discharged home.    DISCHARGE MEDICATIONS:  Albuterol 2 puffs every 4 hours as needed for   shortness of breath, Symbicort 2 puffs twice daily, Coumadin alternating   between 5  and 7.5 mg daily to maintain INR between 2 and 3, Medrol Dosepak to   be taken as directed and tapered over 1 week, oxygen 3 liters by nasal cannula   at all times, morphine 15 mg 3 times daily, oxycodone 10 mg twice daily as   needed for breakthrough pain, aspirin 81 mg daily, Xanax 0.5 mg 3 times daily   as needed for anxiety, Celexa 20 mg daily, trazodone 50 mg at bedtime,   Neurontin 300 mg 3 times daily, Humalog insulin by sliding scale before each   meal and at bedtime, Lantus insulin 32 and subcutaneously every evening, Zocor   20 mg daily, Risperdal 0.5 mg at bedtime, Reglan 0.5 mg 3 times daily.    FOLLOWUP:  Followup is with the primary care provider in [facility]   within 1 week and with [facility] on Monday, 04/17/2017.    Time of discharge was 45 minutes, setting up home oxygen as well as   anticoagulation clinic followup and going over her medications and   encouragement of discontinuing tobacco use.       ____________________________________     MD EVONNE GUSMAN / MIKE    DD:  04/12/2017 13:31:12  DT:  04/12/2017 22:17:26    D#:  [id number]  Job#:  [id number]

## 2017-04-13 NOTE — IP AVS SNAPSHOT
" Home Care Instructions                                                                                                                  Name:Zenia Ordaz  Medical Record Number:[id number]  CSN: [id number]    YOB: 1950   Age: 66 y.o.  Sex: female  HT:1.549 m (5' 1\") WT: 84.5 kg (186 lb 4.6 oz)          Admit Date: 4/13/2017     Discharge Date:   Today's Date: 4/22/2017  Attending Doctor:  Antolin Millard M.D.                  Allergies:  Mushroom extract complex; Tylenol; and Zofran            Discharge Instructions       Discharge Instructions    Discharged to other by taxi with self. Discharged via walking, hospital escort: Yes.  Special equipment needed: Not Applicable    Be sure to schedule a follow-up appointment with your primary care doctor or any specialists as instructed.     Discharge Plan:   Diet Plan: Discussed  Activity Level: Discussed  Smoking Cessation Offered: Patient Counseled  Confirmed Follow up Appointment: Patient to Call and Schedule Appointment  Confirmed Symptoms Management: Discussed  Medication Reconciliation Updated: Yes  Influenza Vaccine Indication: Not indicated: Previously immunized this influenza season and > 8 years of age  Influenza Vaccine Given - only chart on this line when given: Influenza Vaccine Given (See MAR)    I understand that a diet low in cholesterol, fat, and sodium is recommended for good health. Unless I have been given specific instructions below for another diet, I accept this instruction as my diet prescription.   Other diet: low carb/low sodium    Special Instructions: None    · Is patient discharged on Warfarin / Coumadin?   Yes    You are receiving the drug warfarin. Please understand the importance of monitoring warfarin with scheduled PT/INR blood draws.  Follow-up with the [facility] in one week for INR lab.Warfarin Coagulopathy  Warfarin (Coumadin®) coagulopathy refers to bleeding that may occur as a complication of the medicine warfarin. " Warfarin is an oral blood thinner (anticoagulant). Warfarin is used for medical conditions where thinning of the blood is needed to prevent blood clots.   CAUSES  Bleeding is the most common and most serious complication of warfarin. The amount of bleeding is related to the warfarin dose and length of treatment. In addition, bleeding complications can also occur due to:  · Intentional or accidental warfarin overdose.  · Underlying medical conditions.  · Dietary changes.  · Medicine, herbal, supplement, or alcohol interactions.  SYMPTOMS  Severe bleeding while on warfarin may occur from any tissue or organ. Symptoms of the blood being too thin may include:  · Bleeding from the nose or gums.  · Blood in bowel movements which may appear as bright red, dark, or black tarry stools.  · Blood in the urine which may appear as pink, red, or brown urine.  · Unusual bruising or bruising easily.  · A cut that does not stop bleeding within 10 minutes.  · Vomiting blood or continuous nausea for more than 1 day.  · Coughing up blood.  · Broken blood vessels in your eye (subconjunctival hemorrhage).  · Abdominal or back pain with or without flank bruising.  · Sudden, severe headache.  · Sudden weakness or numbness of the face, arm, or leg, especially on one side of the body.  · Sudden confusion.  · Trouble speaking (aphasia) or understanding.  · Sudden trouble seeing in one or both eyes.  · Sudden trouble walking.  · Dizziness.  · Loss of balance or coordination.  · Vaginal bleeding.  · Swelling or pain at an injection site.  · Superficial fat tissue death (necrosis) which may cause skin scarring. This is more common in women and may first present as pain in the waist, thighs, or buttocks.  HOME CARE INSTRUCTIONS  · Always contact your health care provider of any concerns or signs of possible warfarin coagulopathy as soon as possible.  · Take warfarin exactly as directed by your health care provider. It is recommended that you take  your warfarin dose at the same time of the day. If you have been told to stop taking warfarin, do not resume taking warfarin until directed to do so by your health care provider. Follow your health care provider's instructions if you accidentally take an extra dose or miss a dose of warfarin. It is very important to take warfarin as directed since bleeding or blood clots could result in chronic or permanent injury, pain, or disability.  · Keep all follow-up appointments with your health care provider as directed. It is very important to keep your appointments. Not keeping appointments could result in a chronic or permanent injury, pain, or disability because warfarin is a medicine that requires close monitoring.  · While taking warfarin, you will need to have regular blood tests to measure your blood clotting time. These blood tests usually include both the prothrombin time (PT) and International Normalized Ratio (INR) tests. The PT and INR results allow your health care provider to adjust your dose of warfarin. The dose can change for many reasons. It is critically important that you have your PT and INR levels drawn exactly as directed. Your warfarin dose may stay the same or change depending on what the PT and INR results are. Be sure to follow up with your health care provider regarding your PT and INR test results and what your warfarin dosage should be.  · Many medicines can interfere with warfarin and affect the PT and INR results. You must tell your health care provider about any and all medicines you take. This includes all vitamins and supplements. Ask your health care provider before taking these. Prescription and over-the-counter medicine consistency is critical to warfarin management. It is important that potential interactions are checked before you start a new medicine. Be especially cautious with aspirin and anti-inflammatory medicines. Ask your health care provider before taking these. Medicines  such as antibiotics and acid-reducing medicine can interact with warfarin and can cause an increased warfarin effect. Warfarin can also interfere with the effectiveness of medicines you are taking. Do not take or discontinue any prescribed or over-the-counter medicine except on the advice of your health care provider or pharmacist.  · Some vitamins, supplements, and herbal products interfere with the effectiveness of warfarin. Vitamin E may increase the anticoagulant effects of warfarin. Vitamin K can cause warfarin to be less effective. Do not take or discontinue any vitamin, supplement, or herbal product except on the advice of your health care provider or pharmacist.  · Eat what you normally eat and keep the vitamin K content of your diet consistent. Avoid major changes in your diet, or notify your health care provider before changing your diet. Suddenly getting a lot more vitamin K could cause your blood to clot too quickly. A sudden decrease in vitamin K intake could cause your blood to clot too slowly. These changes in vitamin K intake could lead to dangerous blood clots or to bleeding. To keep your vitamin K intake consistent, you must be aware of which foods contain moderate or high amounts of vitamin K. Some foods that are high in vitamin K include spinach, kale, broccoli, cabbage, greens, [place] sprouts, asparagus, bok sebas, coleslaw, and parsley. If you drink green tea, drink the same amount each day. Arrange a visit with a dietitian to answer your questions.  · If you have a loss of appetite or get the stomach flu (viral gastroenteritis), talk to your health care provider as soon as possible. A decrease in your normal vitamin K intake can make you more sensitive to your usual dose of warfarin.  · Some medical conditions may increase your risk for bleeding while you are taking warfarin. A fever, diarrhea lasting more than a day, worsening heart failure, or worsening liver function are some medical  conditions that could affect warfarin. Contact your health care provider if you have any of these medical conditions.  · Be careful not to cut yourself when using sharp objects or while shaving.  · Alcohol can change the body's ability to handle warfarin. It is best to avoid alcoholic drinks or consume only very small amounts while taking warfarin. Notify your health care provider if you change your alcohol intake. A sudden increase in alcohol use can increase your risk of bleeding. Chronic alcohol use can cause warfarin to be less effective.  · Limit physical activities or sports that could result in a fall or cause injury.  · Do not use warfarin if you are pregnant.  · Inform all your health care providers and your dentist that you take warfarin.  · Inform all health care providers if you are taking warfarin and aspirin or platelet inhibitor medicines such as clopidogrel, ticagrelor, or prasugrel. Use of these medicines in addition to warfarin can increase your risk of bleeding or death. Taking these medicines together should only be done under the direct care of your health care providers.  SEEK IMMEDIATE MEDICAL CARE IF:  · You cough up blood.  · You have dark or black stools or there is bright red blood coming from your rectum.  · You vomit blood or have nausea for more than 1 day.  · You have blood in the urine or pink-colored urine.  · You have unusual bruising or have increased bruising.  · You have bleeding from the nose or gums that does not stop quickly.  · You have a cut that does not stop bleeding within 2-3 minutes.  · You have sudden weakness or numbness of the face, arm, or leg, especially on one side of the body.  · You have sudden confusion.  · You have trouble speaking (aphasia) or understanding.  · You have sudden trouble seeing in one or both eyes.  · You have sudden trouble walking.  · You have dizziness.  · You have a loss of balance or coordination.  · You have a sudden, severe  headache.  · You have a serious fall or head injury, even if you are not bleeding.  · You have swelling or pain at an injection site.  · You have unexplained tenderness or pain in the abdomen, back, waist, thighs, or buttocks.  Any of these symptoms may represent a serious problem that is an emergency. Do not wait to see if the symptoms will go away. Get medical help right away. Call your local emergency services (911 in [place] Do not drive yourself to the hospital.     This information is not intended to replace advice given to you by your health care provider. Make sure you discuss any questions you have with your health care provider.     Document Released: 11/26/2007 Document Revised: 05/03/2016 Document Reviewed: 05/28/2013  [employer] Patient Education ©2016 [employer]  .    IMPORTANT: HOW TO USE THIS INFORMATION:  This is a summary and does NOT have all possible information about this product. This information does not assure that this product is safe, effective, or appropriate for you. This information is not individual medical advice and does not substitute for the advice of your health care professional. Always ask your health care professional for complete information about this product and your specific health needs.      WARFARIN - ORAL (WARF-uh-rin)      COMMON BRAND NAME(S): Coumadin      WARNING:  Warfarin can cause very serious (possibly fatal) bleeding. This is more likely to occur when you first start taking this medication or if you take too much warfarin. To decrease your risk for bleeding, your doctor or other health care provider will monitor you closely and check your lab results (INR test) to make sure you are not taking too much warfarin. Keep all medical and laboratory appointments. Tell your doctor right away if you notice any signs of serious bleeding. See also Side Effects section.      USES:  This medication is used to treat blood clots (such as in deep vein thrombosis-DVT  "or pulmonary embolus-PE) and/or to prevent new clots from forming in your body. Preventing harmful blood clots helps to reduce the risk of a stroke or heart attack. Conditions that increase your risk of developing blood clots include a certain type of irregular heart rhythm (atrial fibrillation), heart valve replacement, recent heart attack, and certain surgeries (such as hip/knee replacement). Warfarin is commonly called a \"blood thinner,\" but the more correct term is \"anticoagulant.\" It helps to keep blood flowing smoothly in your body by decreasing the amount of certain substances (clotting proteins) in your blood.      HOW TO USE:  Read the Medication Guide provided by your pharmacist before you start taking warfarin and each time you get a refill. If you have any questions, ask your doctor or pharmacist. Take this medication by mouth with or without food as directed by your doctor or other health care professional, usually once a day. It is very important to take it exactly as directed. Do not increase the dose, take it more frequently, or stop using it unless directed by your doctor. Dosage is based on your medical condition, laboratory tests (such as INR), and response to treatment. Your doctor or other health care provider will monitor you closely while you are taking this medication to determine the right dose for you. Use this medication regularly to get the most benefit from it. To help you remember, take it at the same time each day. It is important to eat a balanced, consistent diet while taking warfarin. Some foods can affect how warfarin works in your body and may affect your treatment and dose. Avoid sudden large increases or decreases in your intake of foods high in vitamin K (such as broccoli, cauliflower, cabbage, brussels sprouts, kale, spinach, and other green leafy vegetables, liver, green tea, certain vitamin supplements). If you are trying to lose weight, check with your doctor before you try " to go on a diet. Cranberry products may also affect how your warfarin works. Limit the amount of cranberry juice (16 ounces/480 milliliters a day) or other cranberry products you may drink or eat.      SIDE EFFECTS:  Nausea, loss of appetite, or stomach/abdominal pain may occur. If any of these effects persist or worsen, tell your doctor or pharmacist promptly. Remember that your doctor has prescribed this medication because he or she has judged that the benefit to you is greater than the risk of side effects. Many people using this medication do not have serious side effects. This medication can cause serious bleeding if it affects your blood clotting proteins too much (shown by unusually high INR lab results). Even if your doctor stops your medication, this risk of bleeding can continue for up to a week. Tell your doctor right away if you have any signs of serious bleeding, including: unusual pain/swelling/discomfort, unusual/easy bruising, prolonged bleeding from cuts or gums, persistent/frequent nosebleeds, unusually heavy/prolonged menstrual flow, pink/dark urine, coughing up blood, vomit that is bloody or looks like coffee grounds, severe headache, dizziness/fainting, unusual or persistent tiredness/weakness, bloody/black/tarry stools, chest pain, shortness of breath, difficulty swallowing. Tell your doctor right away if any of these unlikely but serious side effects occur: persistent nausea/vomiting, severe stomach/abdominal pain, yellowing eyes/skin. This drug rarely has caused very serious (possibly fatal) problems if its effects lead to small blood clots (usually at the beginning of treatment). This can lead to severe skin/tissue damage that may require surgery or amputation if left untreated. Patients with certain blood conditions (protein C or S deficiency) may be at greater risk. Get medical help right away if any of these rare but serious side effects occur: painful/red/purplish patches on the skin  (such as on the toe, breast, abdomen), change in the amount of urine, vision changes, confusion, slurred speech, weakness on one side of the body. A very serious allergic reaction to this drug is rare. However, get medical help right away if you notice any symptoms of a serious allergic reaction, including: rash, itching/swelling (especially of the face/tongue/throat), severe dizziness, trouble breathing. This is not a complete list of possible side effects. If you notice other effects not listed above, contact your doctor or pharmacist. In the [place] - Call your doctor for medical advice about side effects. You may report side effects to FDA at 5-589-LWR-7226. In [place] - Call your doctor for medical advice about side effects. You may report side effects to [facility] [place] at 1-753.852.1945.      PRECAUTIONS:  Before taking warfarin, tell your doctor or pharmacist if you are allergic to it; or if you have any other allergies. This product may contain inactive ingredients, which can cause allergic reactions or other problems. Talk to your pharmacist for more details. Before using this medication, tell your doctor or pharmacist your medical history, especially of: blood disorders (such as anemia, hemophilia), bleeding problems (such as bleeding of the stomach/intestines, bleeding in the brain), blood vessel disorders (such as aneurysms), recent major injury/surgery, liver disease, alcohol use, mental/mood disorders (including memory problems), frequent falls/injuries. It is important that all your doctors and dentists know that you take warfarin. Before having surgery or any medical/dental procedures, tell your doctor or dentist that you are taking this medication and about all the products you use (including prescription drugs, nonprescription drugs, and herbal products). Avoid getting injections into the muscles. If you must have an injection into a muscle (for example, a flu shot), it should be given in the arm. This  way, it will be easier to check for bleeding and/or apply pressure bandages. This medication may cause stomach bleeding. Daily use of alcohol while using this medicine will increase your risk for stomach bleeding and may also affect how this medication works. Limit or avoid alcoholic beverages. If you have not been eating well, if you have an illness or infection that causes fever, vomiting, or diarrhea for more than 2 days, or if you start using any antibiotic medications, contact your doctor or pharmacist immediately because these conditions can affect how warfarin works. This medication can cause heavy bleeding. To lower the chance of getting cut, bruised, or injured, use great caution with sharp objects like safety razors and nail cutters. Use an electric razor when shaving and a soft toothbrush when brushing your teeth. Avoid activities such as contact sports. If you fall or injure yourself, especially if you hit your head, call your doctor immediately. Your doctor may need to check you. The Food & Drug Administration has stated that generic warfarin products are interchangeable. However, consult your doctor or pharmacist before switching warfarin products. Be careful not to take more than one medication that contains warfarin unless specifically directed by the doctor or health care provider who is monitoring your warfarin treatment. Older adults may be at greater risk for bleeding while using this drug. This medication is not recommended for use during pregnancy because of serious (possibly fatal) harm to an unborn baby. Discuss the use of reliable forms of birth control with your doctor. If you become pregnant or think you may be pregnant, tell your doctor immediately. If you are planning pregnancy, discuss a plan for managing your condition with your doctor before you become pregnant. Your doctor may switch the type of medication you use during pregnancy. Very small amounts of this medication may pass into  "breast milk but is unlikely to harm a nursing infant. Consult your doctor before breast-feeding.      DRUG INTERACTIONS:  Drug interactions may change how your medications work or increase your risk for serious side effects. This document does not contain all possible drug interactions. Keep a list of all the products you use (including prescription/nonprescription drugs and herbal products) and share it with your doctor and pharmacist. Do not start, stop, or change the dosage of any medicines without your doctor's approval. Warfarin interacts with many prescription, nonprescription, vitamin, and herbal products. This includes medications that are applied to the skin or inside the vagina or rectum. The interactions with warfarin usually result in an increase or decrease in the \"blood-thinning\" (anticoagulant) effect. Your doctor or other health care professional should closely monitor you to prevent serious bleeding or clotting problems. While taking warfarin, it is very important to tell your doctor or pharmacist of any changes in medications, vitamins, or herbal products that you are taking. Some products that may interact with this drug include: capecitabine, imatinib, mifepristone. Aspirin, aspirin-like drugs (salicylates), and nonsteroidal anti-inflammatory drugs (NSAIDs such as ibuprofen, naproxen, celecoxib) may have effects similar to warfarin. These drugs may increase the risk of bleeding problems if taken during treatment with warfarin. Carefully check all prescription/nonprescription product labels (including drugs applied to the skin such as pain-relieving creams) since the products may contain NSAIDs or salicylates. Talk to your doctor about using a different medication (such as acetaminophen) to treat pain/fever. Low-dose aspirin and related drugs (such as clopidogrel, ticlopidine) should be continued if prescribed by your doctor for specific medical reasons such as heart attack or stroke prevention. " Consult your doctor or pharmacist for more details. Many herbal products interact with warfarin. Tell your doctor before taking any herbal products, especially bromelains, coenzyme Q10, cranberry, danshen, dong quai, fenugreek, garlic, ginkgo biloba, ginseng, and Henny's wort, among others. This medication may interfere with a certain laboratory test to measure theophylline levels, possibly causing false test results. Make sure laboratory personnel and all your doctors know you use this drug.      OVERDOSE:  If overdose is suspected, contact a poison control center or emergency room immediately. [place] residents can call the  National Poison Hotline at 1-907.737.8206. [place] residents can call a provincial poison control center. Symptoms of overdose may include: bloody/black/tarry stools, pink/dark urine, unusual/prolonged bleeding.      NOTES:  Do not share this medication with others. Laboratory and/or medical tests (such as INR, complete blood count) must be performed periodically to monitor your progress or check for side effects. Consult your doctor for more details.      MISSED DOSE:  For the best possible benefit, do not miss any doses. If you do miss a dose and remember on the same day, take it as soon as you remember. If you remember on the next day, skip the missed dose and resume your usual dosing schedule. Do not double the dose to catch up because this could increase your risk for bleeding. Keep a record of missed doses to give to your doctor or pharmacist. Contact your doctor or pharmacist if you miss 2 or more doses in a row.      STORAGE:  Store at room temperature away from light and moisture. Do not store in the bathroom. Keep all medications away from children and pets. Do not flush medications down the toilet or pour them into a drain unless instructed to do so. Properly discard this product when it is  or no longer needed. Consult your pharmacist or local waste disposal company for more  details about how to safely discard your product.      MEDICAL ALERT:  Your condition and medication can cause complications in a medical emergency. For information about enrolling in MedicAlert, call 1-515.866.6694 ([place]) or 1-402.250.5477 ([place]).      Information last revised October 2010 Copyright(c) 2010 [employer]             · Is patient Post Blood Transfusion?  No    Depression / Suicide Risk    As you are discharged from this [facility] facility, it is important to learn how to keep safe from harming yourself.    Recognize the warning signs:  · Abrupt changes in personality, positive or negative- including increase in energy   · Giving away possessions  · Change in eating patterns- significant weight changes-  positive or negative  · Change in sleeping patterns- unable to sleep or sleeping all the time   · Unwillingness or inability to communicate  · Depression  · Unusual sadness, discouragement and loneliness  · Talk of wanting to die  · Neglect of personal appearance   · Rebelliousness- reckless behavior  · Withdrawal from people/activities they love  · Confusion- inability to concentrate     If you or a loved one observes any of these behaviors or has concerns about self-harm, here's what you can do:  · Talk about it- your feelings and reasons for harming yourself  · Remove any means that you might use to hurt yourself (examples: pills, rope, extension cords, firearm)  · Get professional help from the community (Mental Health, Substance Abuse, psychological counseling)  · Do not be alone:Call your Safe Contact- someone whom you trust who will be there for you.  · Call your local CRISIS HOTLINE 487-2455 or 019-563-6046  · Call your local Children's Mobile Crisis Response Team [place] (837) 638-2061 or www.thesixtyone  · Call the toll free National Suicide Prevention Hotlines   · National Suicide Prevention Lifeline 724-265-UHCX (6426)  · National Hope Line Network 800-SUICIDE  (171-9321)        Your appointments     Apr 26, 2017  8:00 AM   New Patient with IHVH EXAM 1   [facility]  (--)    [address]  [place] [address]   [phone]              Follow-up Information     1. Follow up with [facility] In 1 month.    Contact information    [place] [address] [phone]         Discharge Medication Instructions:    Below are the medications your physician expects you to take upon discharge:    Review all your home medications and newly ordered medications with your doctor and/or pharmacist. Follow medication instructions as directed by your doctor and/or pharmacist.    Please keep your medication list with you and share with your physician.               Medication List      START taking these medications        Instructions    Morning Afternoon Evening Bedtime    morphine ER 15 MG Tbcr tablet   Last time this was given:  15 mg on 4/22/2017  8:28 AM   Commonly known as:  MS CONTIN   Replaces:  morphine 15 MG tablet        Take 1 Tab by mouth every 12 hours.   Dose:  15 mg                        warfarin 2.5 MG Tabs   Last time this was given:  5 mg on 4/21/2017  5:18 PM   Commonly known as:  COUMADIN        Take 1 Tab by mouth every day.   Dose:  2.5 mg                          CONTINUE taking these medications        Instructions    Morning Afternoon Evening Bedtime    alprazolam 0.5 MG Tabs   Last time this was given:  0.5 mg on 4/22/2017  1:08 AM   Commonly known as:  XANAX        Take 1 Tab by mouth 3 times a day as needed for Sleep or Anxiety.   Dose:  0.5 mg                        aspirin EC 81 MG Tbec   Last time this was given:  81 mg on 4/22/2017  8:27 AM   Commonly known as:  ECOTRIN        Take 1 Tab by mouth every day.   Dose:  81 mg                        citalopram 20 MG Tabs   Last time this was given:  20 mg on 4/14/2017  9:07 AM   Commonly known as:  CELEXA         Take 1 Tab by mouth every day.   Dose:  20 mg                        gabapentin 300 MG Caps   Last time this was given:  300 mg on 4/14/2017  9:07 AM   Commonly known as:  NEURONTIN        Take 1 Cap by mouth 3 times a day.   Dose:  300 mg                        insulin glargine 100 UNIT/ML Soln   Last time this was given:  25 Units on 4/21/2017  9:09 PM   Commonly known as:  LANTUS        Inject 32 Units as instructed every evening.   Dose:  32 Units                        insulin lispro 100 UNIT/ML Soln   Last time this was given:  2 Units on 4/21/2017  9:08 PM   Commonly known as:  HUMALOG        Inject 2-10 Units as instructed 3 times a day before meals. Sliding scale 151-200 2 units 201-250 4 units 251-300 6 units 301-350 8 units 351-400 10 units <60 to >400 call MD   Dose:  2-10 Units                        metoclopramide 5 MG tablet   Commonly known as:  REGLAN        Take 1 Tab by mouth 3 times a day before meals. Indications: Nausea and Vomiting   Dose:  5 mg                        oxycodone immediate release 10 MG immediate release tablet   Last time this was given:  5 mg on 4/22/2017  1:08 AM   Commonly known as:  ROXICODONE        Take 1 Tab by mouth 2 times a day as needed for Moderate Pain.   Dose:  10 mg                        risperidone 0.5 MG Tabs   Last time this was given:  0.5 mg on 4/14/2017  1:22 AM   Commonly known as:  RISPERDAL        Take 1 Tab by mouth every bedtime.   Dose:  0.5 mg                        trazodone 50 MG Tabs   Last time this was given:  50 mg on 4/21/2017  1:09 AM   Commonly known as:  DESYREL        Take 1 Tab by mouth every day.   Dose:  50 mg                          STOP taking these medications     morphine 15 MG tablet   Commonly known as:  MS IR   Replaced by:  morphine ER 15 MG Tbcr tablet               simvastatin 20 MG Tabs   Commonly known as:  ZOCOR                    Where to Get Your Medications      Information about where to get these medications is not  yet available     ! Ask your nurse or doctor about these medications    - morphine ER 15 MG Tbcr tablet  - oxycodone immediate release 10 MG immediate release tablet  - warfarin 2.5 MG Tabs            Instructions           Diet / Nutrition:    Follow any diet instructions given to you by your doctor or the dietician, including how much salt (sodium) you are allowed each day.    If you are overweight, talk to your doctor about a weight reduction plan.    Activity:    Remain physically active following your doctor's instructions about exercise and activity.    Rest often.     Any time you become even a little tired or short of breath, SIT DOWN and rest.    Worsening Symptoms:    Report any of the following signs and symptoms to the doctor's office immediately:    *Pain of jaw, arm, or neck  *Chest pain not relieved by medication                               *Dizziness or loss of consciousness  *Difficulty breathing even when at rest   *More tired than usual                                       *Bleeding drainage or swelling of surgical site  *Swelling of feet, ankles, legs or stomach                 *Fever (>100ºF)  *Pink or blood tinged sputum  *Weight gain (3lbs/day or 5lbs /week)           *Shock from internal defibrillator (if applicable)  *Palpitations or irregular heartbeats                *Cool and/or numb extremities    Stroke Awareness    Common Risk Factors for Stroke include:    Age  Atrial Fibrillation  Carotid Artery Stenosis  Diabetes Mellitus  Excessive alcohol consumption  High blood pressure  Overweight   Physical inactivity  Smoking    Warning signs and symptoms of a stroke include:    *Sudden numbness or weakness of the face, arm or leg (especially on one side of the body).  *Sudden confusion, trouble speaking or understanding.  *Sudden trouble seeing in one or both eyes.  *Sudden trouble walking, dizziness, loss of balance or coordination.Sudden severe headache with no known cause.    It is very  important to get treatment quickly when a stroke occurs. If you experience any of the above warning signs, call 911 immediately.                   Disclaimer         Quit Smoking / Tobacco Use:    I understand the use of any tobacco products increases my chance of suffering from future heart disease or stroke and could cause other illnesses which may shorten my life. Quitting the use of tobacco products is the single most important thing I can do to improve my health. For further information on smoking / tobacco cessation call a Toll Free Quit Line at 1-723.905.6206 (*National Cancer [place]) or 1-849.952.2814 (American Lung Association) or you can access the web based program at www.lungusa.org.    [place] Tobacco Users Help Line:  (565) 575-6218       Toll Free: 1-966.915.7884  Quit Tobacco Program [facility] [phone]    Crisis Hotline:    [place] Crisis Hotline:  3-833-CAFAZVF or 1-617.222.5570    [place] Crisis Hotline:    1-107.541.4170 or 430-557-7577    Discharge Survey:   Thank you for choosing [facility]. We hope we did everything we could to make your hospital stay a pleasant one. You may be receiving a phone survey and we would appreciate your time and participation in answering the questions. Your input is very valuable to us in our efforts to improve our service to our patients and their families.        My signature on this form indicates that:    1. I have reviewed and understand the above information.  2. My questions regarding this information have been answered to my satisfaction.  3. I have formulated a plan with my discharge nurse to obtain my prescribed medications for home.                  Disclaimer         __________________________________                     __________       ________                       Patient Signature                                                 Date                    Time

## 2017-04-13 NOTE — IP AVS SNAPSHOT
" <p align=\"LEFT\"><IMG SRC=\"//[email or web address]/blob$/[email or web address]\" alt=\"Image\" WIDTH=\"50%\" HEIGHT=\"200\" BORDER=\"\"></p>                   Name:Zenia Ordaz  Medical Record Number:[id number]  CSN: [id number]    YOB: 1950   Age: 66 y.o.  Sex: female  HT:1.549 m (5' 1\") WT: 84.5 kg (186 lb 4.6 oz)          Admit Date: 4/13/2017     Discharge Date:   Today's Date: 4/22/2017  Attending Doctor:  Antolin Millard M.D.                  Allergies:  Mushroom extract complex; Tylenol; and Zofran          Your appointments     Apr 26, 2017  8:00 AM   New Patient with IHVH EXAM 1   [facility]  (--)    [address] [address]   [phone]              Follow-up Information     1. Follow up with [facility] In 1 month.    Contact information    [address] [address] [phone]         Medication List      Take these Medications        Instructions    alprazolam 0.5 MG Tabs   Commonly known as:  XANAX    Take 1 Tab by mouth 3 times a day as needed for Sleep or Anxiety.   Dose:  0.5 mg       aspirin EC 81 MG Tbec   Commonly known as:  ECOTRIN    Take 1 Tab by mouth every day.   Dose:  81 mg       citalopram 20 MG Tabs   Commonly known as:  CELEXA    Take 1 Tab by mouth every day.   Dose:  20 mg       gabapentin 300 MG Caps   Commonly known as:  NEURONTIN    Take 1 Cap by mouth 3 times a day.   Dose:  300 mg       insulin glargine 100 UNIT/ML Soln   Commonly known as:  LANTUS    Inject 32 Units as instructed every evening.   Dose:  32 Units       insulin lispro 100 UNIT/ML Soln   Commonly known as:  HUMALOG    Inject 2-10 Units as instructed 3 times a day before meals. Sliding scale 151-200 2 units 201-250 4 units 251-300 6 units 301-350 8 units 351-400 10 units <60 to >400 call MD   Dose:  2-10 Units       metoclopramide 5 MG tablet   Commonly known as:  REGLAN    Take 1 Tab by mouth 3 times a day before meals. " Indications: Nausea and Vomiting   Dose:  5 mg       morphine ER 15 MG Tbcr tablet   Commonly known as:  MS CONTIN    Take 1 Tab by mouth every 12 hours.   Dose:  15 mg       oxycodone immediate release 10 MG immediate release tablet   Commonly known as:  ROXICODONE    Take 1 Tab by mouth 2 times a day as needed for Moderate Pain.   Dose:  10 mg       risperidone 0.5 MG Tabs   Commonly known as:  RISPERDAL    Take 1 Tab by mouth every bedtime.   Dose:  0.5 mg       trazodone 50 MG Tabs   Commonly known as:  DESYREL    Take 1 Tab by mouth every day.   Dose:  50 mg       warfarin 2.5 MG Tabs   Commonly known as:  COUMADIN    Take 1 Tab by mouth every day.   Dose:  2.5 mg

## 2017-04-13 NOTE — IP AVS SNAPSHOT
[facility] Access Code: [email or web address]  Expires: 4/25/2017  1:00 PM    Your email address is not on file at [facility].  Email Addresses are required for you to sign up for [facility], please contact [phone] to verify your personal information and to provide your email address prior to attempting to register for [facility].    Zenia Ordaz  [place] [address]    Better Placet  A secure, online tool to manage your health information     [facility]’s [facility]® is a secure, online tool that connects you to your personalized health information from the privacy of your home -- day or night - making it very easy for you to manage your healthcare. Once the activation process is completed, you can even access your medical information using the [facility] spenser, which is available for free in the Apple Spenser store or Google Play store.     To learn more about [facility], visit [email or web address]    There are two levels of access available (as shown below):   My Chart Features  [facility] Primary Care Doctor [facility]  Specialists [facility]  Urgent  Care Non-[facility] Primary Care Doctor   Email your healthcare team securely and privately 24/7 X X X    Manage appointments: schedule your next appointment; view details of past/upcoming appointments X      Request prescription refills. X      View recent personal medical records, including lab and immunizations X X X X   View health record, including health history, allergies, medications X X X X   Read reports about your outpatient visits, procedures, consult and ER notes X X X X   See your discharge summary, which is a recap of your hospital and/or ER visit that includes your diagnosis, lab results, and care plan X X  X     How to register for Better [facility]:  Once your e-mail address has been verified, follow the following steps to sign up for [facility].     1. Go to  [email or web address]  2. Click on the Sign Up Now box, which takes you to the New Member Sign Up page. You will  need to provide the following information:  a. Enter your [facility] Access Code exactly as it appears at the top of this page. (You will not need to use this code after you’ve completed the sign-up process. If you do not sign up before the expiration date, you must request a new code.)   b. Enter your date of birth.   c. Enter your home email address.   d. Click Submit, and follow the next screen’s instructions.  3. Create a DiscoveRXt ID. This will be your [facility] login ID and cannot be changed, so think of one that is secure and easy to remember.  4. Create a [facility] password. You can change your password at any time.  5. Enter your Password Reset Question and Answer. This can be used at a later time if you forget your password.   6. Enter your e-mail address. This allows you to receive e-mail notifications when new information is available in [facility].  7. Click Sign Up. You can now view your health information.    For assistance activating your [facility] account, call [phone]

## 2017-04-13 NOTE — PROGRESS NOTES
"· Placed discharge outreach phone call to patient s/p hospital discharge 4/12/17.  Received recording stating \"the person you are calling cannot accept calls at this time.\"  "

## 2017-04-13 NOTE — PROGRESS NOTES
Patient called noc charge RN after DC 4/12 regarding missing prescriptions for controlled substances. These medications were listed as continue taking' home medications and no new prescriptions were written by Dr. Washington at the time of discharge. Patient called this morning regarding concerns and need for these medications. I verified with Dr. Washington that it was her intention to not prescribe these controlled substances. Spoke with patient at length about importance of following up with her PCP or provider who originally prescribed these medications. Patient stated she missed an appointment at the [facility] while in the hospital. I contacted [facility] to try and assist this patient and they stated they patient did not have a recently scheduled appointment, her last appointment was December 2016 at which time she was a no call no show. Attempted to contact the patient at the number she provided for a [address] where she is staying and I was unable to reach the patient.

## 2017-04-13 NOTE — IP AVS SNAPSHOT
4/22/2017    Zenia Ordaz  [facility] [address]    Dear Zenia:    [facility] wants to ensure your discharge home is safe and you or your loved ones have had all of your questions answered regarding your care after you leave the hospital.    Below is a list of resources and contact information should you have any questions regarding your hospital stay, follow-up instructions, or active medical symptoms.    Questions or Concerns Regarding… Contact   Medical Questions Related to Your Discharge  (7 days a week, 8am-5pm) Contact a Nurse Care Coordinator   [phone]   Medical Questions Not Related to Your Discharge  (24 hours a day / 7 days a week)  Contact the Nurse Health Line   [phone]    Medications or Discharge Instructions Refer to your discharge packet   or contact your [facility] Primary Care Provider   [phone]   Follow-up Appointment(s) Schedule your appointment via f4samurai   or contact Scheduling [phone]   Billing Review your statement via f4samurai  or contact Billing [phone]   Medical Records Review your records via f4samurai   or contact Medical Records [phone]     You may receive a telephone call within two days of discharge. This call is to make certain you understand your discharge instructions and have the opportunity to have any questions answered. You can also easily access your medical information, test results and upcoming appointments via the f4samurai free online health management tool. You can learn more and sign up at [facility]/f4samurai. For assistance setting up your f4samurai account, please call [phone].    Once again, we want to ensure your discharge home is safe and that you have a clear understanding of any next steps in your care. If you have any questions or concerns, please do not hesitate to contact us, we are here for you. Thank you for choosing [facility] for your healthcare needs.    Sincerely,    Your [facility] Healthcare Team

## 2017-04-14 ENCOUNTER — APPOINTMENT (OUTPATIENT)
Dept: RADIOLOGY | Facility: MEDICAL CENTER | Age: 67
DRG: 423 | End: 2017-04-14
Attending: HOSPITALIST
Payer: MEDICAID

## 2017-04-14 PROBLEM — R53.83 LETHARGY: Status: ACTIVE | Noted: 2017-04-14

## 2017-04-14 PROBLEM — J44.9 COPD (CHRONIC OBSTRUCTIVE PULMONARY DISEASE) (HCC): Status: ACTIVE | Noted: 2017-04-14

## 2017-04-14 PROBLEM — K76.82 HEPATIC ENCEPHALOPATHY (HCC): Status: ACTIVE | Noted: 2017-04-14

## 2017-04-14 PROBLEM — K76.82 HEPATIC ENCEPHALOPATHY (HCC): Status: RESOLVED | Noted: 2017-04-14 | Resolved: 2017-04-14

## 2017-04-14 LAB
ALBUMIN SERPL BCP-MCNC: 3.1 G/DL (ref 3.2–4.9)
ALBUMIN/GLOB SERPL: 1.5 G/DL
ALP SERPL-CCNC: 135 U/L (ref 30–99)
ALT SERPL-CCNC: 3638 U/L (ref 2–50)
AMMONIA PLAS-SCNC: 103 UMOL/L (ref 11–45)
AMMONIA PLAS-SCNC: 225 UMOL/L (ref 11–45)
ANION GAP SERPL CALC-SCNC: 6 MMOL/L (ref 0–11.9)
ANISOCYTOSIS BLD QL SMEAR: ABNORMAL
AST SERPL-CCNC: 3455 U/L (ref 12–45)
BASOPHILS # BLD AUTO: 0 % (ref 0–1.8)
BASOPHILS # BLD: 0 K/UL (ref 0–0.12)
BILIRUB SERPL-MCNC: 2 MG/DL (ref 0.1–1.5)
BUN SERPL-MCNC: 21 MG/DL (ref 8–22)
CALCIUM SERPL-MCNC: 8 MG/DL (ref 8.5–10.5)
CHLORIDE SERPL-SCNC: 97 MMOL/L (ref 96–112)
CO2 SERPL-SCNC: 25 MMOL/L (ref 20–33)
CREAT SERPL-MCNC: 0.56 MG/DL (ref 0.5–1.4)
EKG IMPRESSION: NORMAL
EOSINOPHIL # BLD AUTO: 0.13 K/UL (ref 0–0.51)
EOSINOPHIL NFR BLD: 2.7 % (ref 0–6.9)
ERYTHROCYTE [DISTWIDTH] IN BLOOD BY AUTOMATED COUNT: 46.4 FL (ref 35.9–50)
GFR SERPL CREATININE-BSD FRML MDRD: >60 ML/MIN/1.73 M 2
GLOBULIN SER CALC-MCNC: 2.1 G/DL (ref 1.9–3.5)
GLUCOSE BLD-MCNC: 104 MG/DL (ref 65–99)
GLUCOSE BLD-MCNC: 106 MG/DL (ref 65–99)
GLUCOSE BLD-MCNC: 146 MG/DL (ref 65–99)
GLUCOSE BLD-MCNC: 204 MG/DL (ref 65–99)
GLUCOSE BLD-MCNC: 84 MG/DL (ref 65–99)
GLUCOSE SERPL-MCNC: 188 MG/DL (ref 65–99)
HBV CORE AB SERPL QL IA: REACTIVE
HCT VFR BLD AUTO: 39.7 % (ref 37–47)
HGB BLD-MCNC: 12.6 G/DL (ref 12–16)
INR PPP: 2.02 (ref 0.87–1.13)
LYMPHOCYTES # BLD AUTO: 0.42 K/UL (ref 1–4.8)
LYMPHOCYTES NFR BLD: 8.9 % (ref 22–41)
MANUAL DIFF BLD: NORMAL
MCH RBC QN AUTO: 26.8 PG (ref 27–33)
MCHC RBC AUTO-ENTMCNC: 31.7 G/DL (ref 33.6–35)
MCV RBC AUTO: 84.3 FL (ref 81.4–97.8)
MICROCYTES BLD QL SMEAR: ABNORMAL
MONOCYTES # BLD AUTO: 0.21 K/UL (ref 0–0.85)
MONOCYTES NFR BLD AUTO: 4.5 % (ref 0–13.4)
MORPHOLOGY BLD-IMP: NORMAL
MYELOCYTES NFR BLD MANUAL: 1.8 %
NEUTROPHILS # BLD AUTO: 3.86 K/UL (ref 2–7.15)
NEUTROPHILS NFR BLD: 82.1 % (ref 44–72)
NRBC # BLD AUTO: 0 K/UL
NRBC BLD AUTO-RTO: 0 /100 WBC
PLATELET # BLD AUTO: 225 K/UL (ref 164–446)
PLATELET BLD QL SMEAR: NORMAL
PMV BLD AUTO: 9.7 FL (ref 9–12.9)
POTASSIUM SERPL-SCNC: 4 MMOL/L (ref 3.6–5.5)
PROT SERPL-MCNC: 5.2 G/DL (ref 6–8.2)
PROTHROMBIN TIME: 23.5 SEC (ref 12–14.6)
RBC # BLD AUTO: 4.71 M/UL (ref 4.2–5.4)
RBC BLD AUTO: PRESENT
SODIUM SERPL-SCNC: 128 MMOL/L (ref 135–145)
WBC # BLD AUTO: 4.7 K/UL (ref 4.8–10.8)

## 2017-04-14 PROCEDURE — A9270 NON-COVERED ITEM OR SERVICE: HCPCS

## 2017-04-14 PROCEDURE — 80053 COMPREHEN METABOLIC PANEL: CPT

## 2017-04-14 PROCEDURE — 85007 BL SMEAR W/DIFF WBC COUNT: CPT

## 2017-04-14 PROCEDURE — 85610 PROTHROMBIN TIME: CPT

## 2017-04-14 PROCEDURE — 86704 HEP B CORE ANTIBODY TOTAL: CPT

## 2017-04-14 PROCEDURE — 86376 MICROSOMAL ANTIBODY EACH: CPT

## 2017-04-14 PROCEDURE — 770006 HCHG ROOM/CARE - MED/SURG/GYN SEMI*

## 2017-04-14 PROCEDURE — 83516 IMMUNOASSAY NONANTIBODY: CPT

## 2017-04-14 PROCEDURE — A9270 NON-COVERED ITEM OR SERVICE: HCPCS | Performed by: HOSPITALIST

## 2017-04-14 PROCEDURE — 93976 VASCULAR STUDY: CPT

## 2017-04-14 PROCEDURE — 82962 GLUCOSE BLOOD TEST: CPT | Mod: 91

## 2017-04-14 PROCEDURE — 85027 COMPLETE CBC AUTOMATED: CPT

## 2017-04-14 PROCEDURE — 84630 ASSAY OF ZINC: CPT

## 2017-04-14 PROCEDURE — 87902 NFCT AGT GNTYP ALYS HEP C: CPT

## 2017-04-14 PROCEDURE — 82390 ASSAY OF CERULOPLASMIN: CPT

## 2017-04-14 PROCEDURE — 87522 HEPATITIS C REVRS TRNSCRPJ: CPT

## 2017-04-14 PROCEDURE — 93979 VASCULAR STUDY: CPT

## 2017-04-14 PROCEDURE — 36415 COLL VENOUS BLD VENIPUNCTURE: CPT

## 2017-04-14 PROCEDURE — 700105 HCHG RX REV CODE 258: Performed by: HOSPITALIST

## 2017-04-14 PROCEDURE — 700102 HCHG RX REV CODE 250 W/ 637 OVERRIDE(OP): Performed by: HOSPITALIST

## 2017-04-14 PROCEDURE — 51798 US URINE CAPACITY MEASURE: CPT

## 2017-04-14 PROCEDURE — 700102 HCHG RX REV CODE 250 W/ 637 OVERRIDE(OP): Performed by: PHARMACIST

## 2017-04-14 PROCEDURE — 86038 ANTINUCLEAR ANTIBODIES: CPT

## 2017-04-14 PROCEDURE — 82140 ASSAY OF AMMONIA: CPT

## 2017-04-14 PROCEDURE — 700102 HCHG RX REV CODE 250 W/ 637 OVERRIDE(OP)

## 2017-04-14 PROCEDURE — 99232 SBSQ HOSP IP/OBS MODERATE 35: CPT | Performed by: HOSPITALIST

## 2017-04-14 PROCEDURE — A9270 NON-COVERED ITEM OR SERVICE: HCPCS | Performed by: PHARMACIST

## 2017-04-14 RX ORDER — WARFARIN SODIUM 7.5 MG/1
7.5 TABLET ORAL
Status: COMPLETED | OUTPATIENT
Start: 2017-04-14 | End: 2017-04-14

## 2017-04-14 RX ORDER — WARFARIN SODIUM 5 MG/1
5 TABLET ORAL
Status: DISCONTINUED | OUTPATIENT
Start: 2017-04-15 | End: 2017-04-16

## 2017-04-14 RX ORDER — GABAPENTIN 100 MG/1
100 CAPSULE ORAL 3 TIMES DAILY
Status: DISCONTINUED | OUTPATIENT
Start: 2017-04-14 | End: 2017-04-14

## 2017-04-14 RX ADMIN — MORPHINE SULFATE 15 MG: 15 TABLET, EXTENDED RELEASE ORAL at 09:07

## 2017-04-14 RX ADMIN — STANDARDIZED SENNA CONCENTRATE AND DOCUSATE SODIUM 2 TABLET: 8.6; 5 TABLET, FILM COATED ORAL at 01:22

## 2017-04-14 RX ADMIN — INSULIN GLARGINE 32 UNITS: 100 INJECTION, SOLUTION SUBCUTANEOUS at 22:08

## 2017-04-14 RX ADMIN — GABAPENTIN 300 MG: 300 CAPSULE ORAL at 09:07

## 2017-04-14 RX ADMIN — SODIUM CHLORIDE: 9 INJECTION, SOLUTION INTRAVENOUS at 14:53

## 2017-04-14 RX ADMIN — ASPIRIN 81 MG: 81 TABLET ORAL at 09:07

## 2017-04-14 RX ADMIN — RISPERIDONE 0.5 MG: 0.5 TABLET, FILM COATED ORAL at 01:22

## 2017-04-14 RX ADMIN — MORPHINE SULFATE 15 MG: 15 TABLET, EXTENDED RELEASE ORAL at 01:22

## 2017-04-14 RX ADMIN — STANDARDIZED SENNA CONCENTRATE AND DOCUSATE SODIUM 2 TABLET: 8.6; 5 TABLET, FILM COATED ORAL at 21:58

## 2017-04-14 RX ADMIN — PROMETHAZINE HYDROCHLORIDE 25 MG: 25 TABLET ORAL at 22:06

## 2017-04-14 RX ADMIN — WARFARIN SODIUM 7.5 MG: 7.5 TABLET ORAL at 18:33

## 2017-04-14 RX ADMIN — CITALOPRAM HYDROBROMIDE 20 MG: 20 TABLET ORAL at 09:07

## 2017-04-14 RX ADMIN — MORPHINE SULFATE 15 MG: 15 TABLET, EXTENDED RELEASE ORAL at 21:58

## 2017-04-14 RX ADMIN — GABAPENTIN 300 MG: 300 CAPSULE ORAL at 01:22

## 2017-04-14 RX ADMIN — PROMETHAZINE HYDROCHLORIDE 25 MG: 25 TABLET ORAL at 01:27

## 2017-04-14 ASSESSMENT — ENCOUNTER SYMPTOMS
COUGH: 0
NAUSEA: 0
FEVER: 0
HEADACHES: 0
SENSORY CHANGE: 0
WEAKNESS: 1
BACK PAIN: 0
NECK PAIN: 0
SPEECH CHANGE: 0
ABDOMINAL PAIN: 1
FLANK PAIN: 0
SHORTNESS OF BREATH: 1
FOCAL WEAKNESS: 0
VOMITING: 0
CHILLS: 0
PALPITATIONS: 0
HALLUCINATIONS: 0

## 2017-04-14 ASSESSMENT — PAIN SCALES - GENERAL
PAINLEVEL_OUTOF10: 5
PAINLEVEL_OUTOF10: 4
PAINLEVEL_OUTOF10: 10
PAINLEVEL_OUTOF10: 4

## 2017-04-14 ASSESSMENT — LIFESTYLE VARIABLES
EVER_SMOKED: NEVER
SUBSTANCE_ABUSE: 1

## 2017-04-14 NOTE — ED PROVIDER NOTES
ED Provider Note    HPI: Patient is a 66-year-old female who presented to the emergency department April 13, 2017 at 4:54 PM with a chief complaint of shortness of breath.    Patient was discharged yesterday after being admitted for pulmonary embolism. She was not compliant with her medications. She was found with insulin syringes and the spoon in her bag raising concerns about possible drug use. No other somatic complaints.    Review of Systems: Not obtained due to presenting condition    Past medical/surgical history: COPD fibromyalgia hepatitis A hepatitis B hepatitis C diabetes coronary artery disease CVA back pain and myocardial infarction cervical cancer CHF MRSA infection IV drug use pulmonary embolism pneumonia hypertension seizure    Medications: Albuterol Symbicort Coumadin Medrol morphine Roxicodone aspirin Xanax Celexa trazodone insulin and Zocor Risperdal Reglan    Allergies: Tylenol Zofran    Social History: Patient smokes several cigarettes daily history of IV drug use      Physical exam: Constitutional: Obese female sleeping  Vital signs: Temperature 100.2 pulse 84 respirations 22 blood pressure 171/71 pulse oximetry 95% on 4 L room air oxygenation 86%  EYES: PERRL, EOMI, Conjunctivae and sclera normal, eyelids normal bilaterally.  Neck: Trachea midline. No cervical masses seen or palpated. Normal range of motion, supple. No meningeal signs elicited.  Cardiac: Regular rate and rhythm. S1-S2 present. No S3 or S4 present. No murmurs, rubs, or gallops heard. No edema or varicosities were seen.   Lungs: Clear to auscultation with good aeration throughout. No wheezes, rales, or rhonchi heard. Patient's chest wall moved symmetrically with each respiratory effort. Patient was not making use of accessory muscles of respiration in breathing.  Abdomen: Soft nontender to palpation. No rebound or guarding elicited. No organomegaly identified. No pulsatile abdominal masses identified.   Musculoskeletal:  no  pain  with palpitation or movement of muscle, bone or joint , no obvious musculoskeletal deformities identified.  Neurologic: But arousable somewhat slow to respond. Diffusely but symmetrically weak. No gross focal neurologic abnormalities identifiable exam somewhat problematic due to the patient's mental status.  Skin: no rash or lesion seen, no palpable dermatologic lesions identified.  Psychiatric: Appear to be somewhat anxious. She was not obviously delusional or hallucinating but the exam is difficult due to the patient's neurologic status    Medical decision making:  Auditory studies were obtained (please see labsheet follow results) significant findings included hepatic dysfunction with SGOT of 2919 and SGPT of 3221. Bilirubin normal at 1.0. White count was normal 7.1. Hepatitis panel is pending. Tylenol and salicylate levels are pending recheck by hospitalist service.    Chest x-ray obtained; low lung volumes are present with bibasilar atelectasis.    Patient admitted by hospitalist service. Patient appears to have severe hepatic dysfunction of uncertain etiology. At this time she does not appear to be septic. He is somewhat altered this may be due to drug use or some other cause. Further care and hospital course per attending physician summary    Impression 1) liver failure  2) altered level of consciousness

## 2017-04-14 NOTE — ED NOTES
Pt given box dinner at this time as she requests, pharmacy notified for pt pain medication release

## 2017-04-14 NOTE — PROGRESS NOTES
Inpatient Anticoagulation Service Note    Date: 2017  Reason for Anticoagulation: Pulmonary Embolism (4/3/17)  Hemoglobin Value: 12.6  Hematocrit Value: 39.7  Lab Platelet Value: 225  Target INR: 2.0 to 3.0  INR from last 7 days     Date/Time INR Value    17 0200 (!)2.02    17 1710 (!)2.08        Dose from last 7 days     Date/Time Dose (mg)    17 1300 7.5    17 2100 --        Significant Interactions: Aspirin, celexa, trazodone  Bridge Therapy: No     Comments: See previous note for H&P. Agree with 7.5 mg boost today and starting the patient on 5 mg daily thereafter. Coumadin might be stopped in relation to LFT increase. Interactions as above. INR for AM.    Plan:  7.5 mg tonight, INR for AM  Education Material Provided?: No (Declined handout)  Pharmacist suggested discharge dosin mg daily. Recommend patient have INR checked 2-3 days after discharge.     Vandana Dolan, PharmD

## 2017-04-14 NOTE — ED NOTES
Pauline from Lab called with critical result of RUP=1895 and ALT=3221 at 1825. Critical lab result read back to Pauline.   Dr. Villela notified of critical lab result at 1825.  Critical lab result read back by Dr. Villela.

## 2017-04-14 NOTE — RESPIRATORY CARE
COPD EDUCATION by COPD CLINICAL EDUCATOR  4/14/2017 at 7:07 AM by Mary Burciaga     Patient reviewed by COPD education team. Patient does not qualify for COPD program.

## 2017-04-14 NOTE — PROGRESS NOTES
Hospital Medicine Progress Note, Adult, Complex               Author: Carlo Munoz Date & Time created: 4/14/2017  2:50 PM     CC:  65 yo female hx of DM, COPD chronic resp failure on home o2, HTN, Hepatitis C recent dc 4-12-17 with dx Bilateral PE on warfarin admitted with lethargy , sob. findings of Hepatitis.    Interval History:    She states after Dc she went home , awoke with upper abd pain.  Utox + amphet.  Reports no illicit drug use,  although states she could have been laced.     Review of Systems:  Review of Systems   Constitutional: Negative for fever and chills.   HENT: Negative for congestion.    Respiratory: Positive for shortness of breath (w exertion .). Negative for cough.    Cardiovascular: Negative for chest pain, palpitations and leg swelling.   Gastrointestinal: Positive for abdominal pain. Negative for nausea and vomiting.   Genitourinary: Negative for hematuria and flank pain.   Musculoskeletal: Negative for back pain and neck pain.   Neurological: Positive for weakness. Negative for sensory change, speech change, focal weakness and headaches.   Psychiatric/Behavioral: Positive for substance abuse (reports no recent. ). Negative for hallucinations.       Physical Exam:  Physical Exam   Constitutional: No distress.   Eyes: EOM are normal. Scleral icterus is present.   Neck: Neck supple.   Cardiovascular: Normal rate and regular rhythm.    No murmur heard.  Pulmonary/Chest: No stridor. She has no wheezes. She has no rales.   Abdominal: Soft. Bowel sounds are normal. There is tenderness (mild upper quads). There is no rebound and no guarding.   Obese.    Musculoskeletal: She exhibits no edema or tenderness.   Neurological: She is alert.   Skin: Skin is warm and dry. She is not diaphoretic.   Multiple scabbed needle track marks.  Jaundiced.    Psychiatric: She has a normal mood and affect. Her behavior is normal.       Labs:        Invalid input(s): [id number]      Recent Labs      04/12/17    0336  17   1710  17   0200   SODIUM  133*  132*  128*   POTASSIUM  5.4  4.2  4.0   CHLORIDE  99  96  97   CO2  25  28  25   BUN  73*  33*  21   CREATININE  1.07  0.61  0.56   CALCIUM  8.9  9.0  8.0*     Recent Labs      17   0336  17   1710  17   0200   ALTSGPT   --   3221*  3638*   ASTSGOT   --   2919*  3455*   ALKPHOSPHAT   --   92  135*   TBILIRUBIN   --   1.0  2.0*   GLUCOSE  189*  94  188*     Recent Labs      17   0200   RBC  5.11  4.71   HEMOGLOBIN  13.9  12.6   HEMATOCRIT  43.0  39.7   PLATELETCT  263  225   PROTHROMBTM  24.0*  23.5*   APTT  42.7*   --    INR  2.08*  2.02*     Recent Labs      17   0200   WBC  7.4  4.7*   NEUTSPOLYS  73.90*  82.10*   LYMPHOCYTES  15.70*  8.90*   MONOCYTES  5.20  4.50   EOSINOPHILS  1.80  2.70   BASOPHILS  0.00  0.00   ASTSGOT  2919*  3455*   ALTSGPT  3221*  3638*   ALKPHOSPHAT  92  135*   TBILIRUBIN  1.0  2.0*           Hemodynamics:  Temp (24hrs), Av.3 °C (99.2 °F), Min:36.7 °C (98 °F), Max:37.9 °C (100.2 °F)  Temperature: 37.4 °C (99.3 °F)  Pulse  Av.8  Min: 69  Max: 84Heart Rate (Monitored): 81  Blood Pressure: 117/48 mmHg, NIBP: 155/69 mmHg     Respiratory:    Respiration: 18, Pulse Oximetry: 95 %     Work Of Breathing / Effort: Mild  RUL Breath Sounds: Fine Crackles, RML Breath Sounds: Fine Crackles, RLL Breath Sounds: Fine Crackles, MARC Breath Sounds: Fine Crackles, LLL Breath Sounds: Fine Crackles  Fluids:    Intake/Output Summary (Last 24 hours) at 17 1450  Last data filed at 17 1300   Gross per 24 hour   Intake    420 ml   Output      0 ml   Net    420 ml     Weight: 81.647 kg (180 lb)  GI/Nutrition:  Orders Placed This Encounter   Procedures   • DIET ORDER     Standing Status: Standing      Number of Occurrences: 1      Standing Expiration Date:      Order Specific Question:  Diet:     Answer:  Diabetic [3]     Order Specific Question:  Texture/Fiber modifications:      Answer:  Chopped Meat [5]     Medical Decision Making, by Problem:  Active Hospital Problems    Diagnosis   • *Hepatitis, fulminant [B19.9]- unclear cause- ? Drug induced , ischemic- although no hypontension , less likely  infectious - imaging no signs of obstruction / portal thrombosis   Monitor lfts  Discussed with dr Solomon to consult.   • Pulmonary emboli (CMS-HCC) [I26.99]- recent dx- stable resp symptoms  Warfarin.   • COPD (chronic obstructive pulmonary disease) (CMS-HCC) [J44.9]  Rt, bronchodilators.    • Lethargy [R53.83] elev ammonia level  Dc trazadone, risperdal , neurontin- consider lactulose / rifaxamin if mental status declines    • Type 2 diabetes mellitus, uncontrolled (CMS-HCC) [E11.65]  ISS prn    • Hyponatremia [E87.1]  IV ns- monitor Na- may need restriction if worsens.    • Drug abuse, IV [F19.10] utox + amphetamines.       Labs reviewed, Medications reviewed and Radiology images reviewed  Fagan catheter: No Fagan      DVT Prophylaxis: Warfarin (Coumadin)

## 2017-04-14 NOTE — ED NOTES
Med rec updated and complete per pt at bedside.  Allergies reviewed.  Pt discharged recently with:  Medrol DosePak  Warfarin 5mg on Sun. Tues. Wed. Frd. Sat.   Warfarin 7.5mg on Mon. Thurs.   Albuterol   Symbicort 160/4.5 one pugg twice daily  Pt states she has not picked them up and does not check her insulin because she doesn't have a tone.     Pt states everything is the same with her medications since she's been discharged and she was given her medications in the hospital.

## 2017-04-14 NOTE — ED NOTES
Lab to bedside for blood cultures.  Pt pulled off oxygen mask.  Placed on 5L nasal cannula and o2 sat maintained.

## 2017-04-14 NOTE — PROGRESS NOTES
GASTROENTEROLOGY INITIAL NOTE    Chief Complaint:  Abnormal LFTs.    History of Present Illness:   67 yo female with ongoing IVDU(urine positive for methamphetamines, +opioid) who was recently discharged on lovenox and coumadin from the hospital on 4/12 after treatment for bilateral PE was brought in by [facility] for SOB. On presentation to the ED she was found to have significantly elevated aminotransferases(4/14: 3455, ALT 3638, T bili-2, ) and GI was consulted for further evaluation and management.      Medical History:  COPD  Fibromyalgia  CAD  Pulmonary hypertension  CHF with grade II diastolic dysfunction with preserved EF  Cervical cancer treated 10 years ago with surgery    Family History:  Breast cancer and diabetes in mom    Social History:  Tobacco use:3 cigarettes/day, denies any alcohol or drug abuse.     ROS:  Constitutional: Denies fevers, Denies weight changes +cough, weakness, dizziness  Cardiovascular: Denies chest pain or palpitations   Respiratory: + SOB.   Gastrointestinal/Hepatic: + abdominal pain. Denies nausea, vomiting, diarrhea, constipation or GI bleeding   Genitourinary: Denies bladder dysfunction, dysuria or frequency   Musculoskeletal/Rheum: Denies joint pain and swelling   Skin/Breast: Denies rash, denies breast lumps or discharge   Neurological: Denies headache, confusion, memory loss or focal weakness/parasthesias   Psychiatric: denies mood disorder.     Physical exam:  Gen: slighlty pale, was somnolent at the time of my initial encounter, however improved mentation on second rounds  HEENT: EOMI, no tracheal deviation  Chest: dual HS, no m/r/g  Resp: basilar crackles, no wheezes  GI: abdominal bruises secondary to ?insulin shots as per pt, not ttp on exam. + asterixis  Ext: no edema     Medications:  Current Facility-Administered Medications   Medication Dose   • Respiratory Care per Protocol     • warfarin (COUMADIN) tablet 7.5 mg  7.5 mg   • [START ON 4/15/2017] warfarin  "(COUMADIN) tablet 5 mg  5 mg   • alprazolam (XANAX) tablet 0.5 mg  0.5 mg   • aspirin EC (ECOTRIN) tablet 81 mg  81 mg   • insulin glargine (LANTUS) injection 32 Units  32 Units   • labetalol (NORMODYNE,TRANDATE) injection 10 mg  10 mg   • promethazine (PHENERGAN) tablet 12.5-25 mg  12.5-25 mg   • promethazine (PHENERGAN) suppository 12.5-25 mg  12.5-25 mg   • prochlorperazine (COMPAZINE) injection 5-10 mg  5-10 mg   • senna-docusate (PERICOLACE or SENOKOT S) 8.6-50 MG per tablet 2 Tab  2 Tab    And   • polyethylene glycol/lytes (MIRALAX) PACKET 1 Packet  1 Packet    And   • magnesium hydroxide (MILK OF MAGNESIA) suspension 30 mL  30 mL    And   • bisacodyl (DULCOLAX) suppository 10 mg  10 mg   • NS infusion     • oxycodone immediate-release (ROXICODONE) tablet 5 mg  5 mg    And   • oxycodone immediate-release (ROXICODONE) tablet 10 mg  10 mg    And   • HYDROmorphone (DILAUDID) injection 0.5 mg  0.5 mg   • insulin lispro (HUMALOG) injection 2-9 Units  2-9 Units   • glucose 4 g chewable tablet 16 g  16 g    And   • dextrose 50% (D50W) injection 25 mL  25 mL   • MD ALERT... warfarin (COUMADIN) per pharmacy protocol     • morphine ER (MS CONTIN) tablet 15 mg  15 mg       Blood pressure 139/52, pulse 80, temperature 37.7 °C (99.8 °F), resp. rate 18, height 1.549 m (5' 1\"), weight 81.647 kg (180 lb), SpO2 90 %.        Recent Labs      04/13/17   1710  04/14/17   0200   WBC  7.4  4.7*   RBC  5.11  4.71   HEMOGLOBIN  13.9  12.6   HEMATOCRIT  43.0  39.7   MCV  84.1  84.3   MCH  27.2  26.8*   MCHC  32.3*  31.7*   RDW  46.5  46.4   PLATELETCT  263  225   MPV  9.8  9.7     Recent Labs      04/12/17   0336  04/13/17   1710  04/14/17   0200   SODIUM  133*  132*  128*   POTASSIUM  5.4  4.2  4.0   CHLORIDE  99  96  97   CO2  25  28  25   GLUCOSE  189*  94  188*   BUN  73*  33*  21       Patient Active Problem List    Diagnosis Date Noted   • Hepatitis, fulminant 04/13/2017     Priority: High   • Pulmonary emboli (CMS-HCC) " 04/03/2017     Priority: High   • COPD exacerbation (CMS-HCC) 04/03/2017     Priority: High   • Closed fracture of proximal end of humerus 03/18/2017     Priority: High   • Pneumonia 03/26/2016     Priority: High   • Chronic respiratory failure (CMS-HCC) 03/09/2016     Priority: High   • COPD (chronic obstructive pulmonary disease) (CMS-HCC) 04/14/2017     Priority: Medium   • Lethargy 04/14/2017     Priority: Medium   • Weakness 03/15/2017     Priority: Medium   • Type 2 diabetes mellitus, uncontrolled (CMS-HCC) 01/19/2016     Priority: Medium   • Hyponatremia 06/04/2014     Priority: Medium   • Drug abuse, IV 06/03/2014     Priority: Medium   • Diabetes type 2, controlled (CMS-HCC) 12/21/2013     Priority: Medium   • Microcytosis 06/04/2014     Priority: Low   • Chronic narcotic dependence 12/27/2013     Priority: Low   • History of hepatitis B 12/21/2013     Priority: Low   • On home oxygen therapy 12/21/2013     Priority: Low   • Hepatitis C 07/09/2013     Priority: Low   • CAD (coronary artery disease) 04/15/2013     Priority: Low   • Hx of completed stroke 04/15/2013     Priority: Low   • Obesity 03/24/2011     Priority: Low   • Tobacco abuse 03/24/2011     Priority: Low   • Diabetic neuropathy (CMS-HCC) 03/22/2017   • Closed nondisplaced fracture of proximal phalanx of lesser toe of left foot with delayed healing 03/19/2017   • Acute on chronic respiratory failure with hypoxia (CMS-HCC) 03/19/2017   • Humeral fracture 03/18/2017   • Fibromyalgia 04/26/2016   • Cerebral infarction (CMS-HCC) 03/08/2016   • Essential hypertension 01/19/2016   • Anxiety 01/19/2016   • Acute exacerbation of CHF (congestive heart failure) (CMS-HCC) 07/23/2014   • HTN (hypertension) 06/16/2014   • Gastroenteritis 06/05/2014         Diagnosis: Abnormal LFTs, suspect shocked liver(is on opioids and had a fall-?hypotension) vs. Autoimmune cause vs.?reactivation of hepatitis virus    Plan/Recommendations:  -Autoimmune panel-LAWRENCE, ASMA,  alpha 1 antitrypsin, Anti LKM1  -ceruloplasmin, iron panel to rule out hemochromatosis  -hep c viral load and genotype  -suggest ammonia levels and lactulose as patient had asterixis on exam and seems to have a waxing, waning mentation  -continue to monitor closely and trend Pt/inr and LFTs, repeat in the am.

## 2017-04-14 NOTE — PROGRESS NOTES
Inpatient Anticoagulation Service Note    Date: 4/13/2017  Reason for Anticoagulation: Pulmonary Embolism (4/3/17)        Hemoglobin Value: 13.9  Hematocrit Value: 43  Lab Platelet Value: 263  Target INR: 2.0 to 3.0    INR from last 7 days     Date/Time INR Value    04/13/17 1710 (!)2.08        Dose from last 7 days     Date/Time Dose (mg)    04/13/17 2100 7.5        Average Dose (mg):  Recent new start while in hospital - no established outpatient dose  Significant Interactions: Aspirin, celexa  Bridge Therapy: No     Comments: Patient with new PE 4/3/17 was bridged from therapeutic Lovenox to Coumadin therapy during recent admission.  Discharged from hospital 4/12/17 and didn't fill new scripts including Coumadin.  Last dose of Coumadin in hospital 4/11/17 = 5 mg.  Current weekly average = 4.6 mg daily with missed dose last night.  INR on admission today on low end of therapeutic range at 2.08.  Patient with acute elevation of LFT's.  D/W Dr. Iverson currently will continue Coumadin therapy, if no improvement in LFT's over next couple of days may consider trial off Coumadin to see if Coumadin may be cause of acute increase in LFT's.  Will d/c SQ heparin for VTE prophylaxis at this time.        Plan:  Reviewed recent dosing history and will give 7.5 mg tonight.  INR daily x 7 ordered.  Pharmacy to monitor and adjust as needed.        Education Material Provided?: No (ER patient, please follow up)  Pharmacist suggested discharge dosing: likely close to 5 mg daily with 7.5 mg 2-3 times weekly.           Cris Rico, PharmD, BCPS

## 2017-04-14 NOTE — H&P
HISTORY OF PRESENTING ILLNESS:  This is a 66-year-old  female who   comes in complaining of shortness of breath.  She was just discharged   yesterday with a prior diagnosis of bilateral pulmonary emboli.  She was on   Lovenox and Coumadin.  She had not filled her prescriptions.  She was sent   home with oxygen, but was not wearing that either.  She was also apparently   found with insulin syringes and a spoon in her bag, although when I asked her   if she is doing drugs that were not prescribed to her, she admitted to taking   some morphine off the street.  She denies injecting anything, however.  She   was a little more short of breath and required 3 liters of oxygen.  She was   somewhat lethargic and complaining of generalized pain to me.  Notably, I know   her from a prior admission and when I saw her then, she had previously been   noncompliant with her oxygen as well and had left AMA prior to that.  She did   come back and was found to have bilateral pulmonary emboli and had been using   IV drugs at home in addition.  Today, she was suddenly found to have AST of   2919 and ALT of 3221 and the hospitalist was consulted for admission.  She   complains of generalized abdominal pain, 10/10 in severity.    REVIEW OF SYSTEMS:  As above.  The remainder is negative in all systems except   as noted.    PAST MEDICAL HISTORY:  Fibromyalgia, chronic obstructive pulmonary disease,   chronic respiratory failure requiring 3 liters of oxygen at home, hepatitis B,   hepatitis C, diabetes mellitus type 2, coronary artery disease, hypertension,   pulmonary hypertension, diastolic dysfunction, tobacco abuse, IV drug abuse,   recent pulmonary emboli.    ALLERGIES:  TYLENOL AND ZOFRAN.    SOCIAL HISTORY:  She uses IV drugs and smokes.  She denies alcohol.    HOME MEDICATIONS:  MS Contin, oxycodone, aspirin, Xanax, Celexa, trazodone,   Neurontin, Humalog sliding scale, Lantus, simvastatin, Risperdal and Reglan.    PHYSICAL  EXAMINATION:  VITAL SIGNS:  Temperature 37.9, heart rate 74, respiratory rate 22, blood   pressure is 155/69, oxygenation is 98% on 3 liters and weight is 81 kilograms.  GENERAL:  This is an older middle-aged  female who appears older than   her stated age, resting comfortably in bed, in no apparent distress.  HEENT:  Pupils are equal, round and reactive to light.  There is no scleral   pallor or icterus.  Extraocular muscles are intact.  Mucous membranes are   moist.  There is no cervical or supraclavicular lymphadenopathy.  NECK:  Supple without thyromegaly.  CHEST:  Lungs are clear to auscultation throughout without any crackles or   wheezes.  There is good air entry and good respiratory effort.  CARDIOVASCULAR:  She has regular rate and rhythm without any murmurs, rubs or   gallops.  ABDOMEN:  Belly is soft, nontender and nondistended.  Positive bowel sounds.    No appreciable hepatosplenomegaly or mass.  EXTREMITIES:  2+ pulses throughout without any cyanosis, clubbing or edema.    Range of motion is intact.  Strength is 5/5 in the upper and lower   extremities.  NEUROLOGIC:  She is alert and oriented x4 without any focal neurological   deficits.  Sensorium is grossly intact.  Cranial nerves II through XII are   intact.    LABORATORY DATA:  White count 7.4, hemoglobin 13.9 and platelet count 263.    Sodium 132, potassium 4.2, chloride 96, bicarbonate 28, BUN 33, creatinine is   0.61, AST is 2919, ALT is 3221 and bilirubin is 1.  The remainder of the   chemistry is within normal limits including a lactate of 1.3.  Tylenol level   is less than 10.  Salicylate is 0.  Urine tox screen is positive for opiates   and amphetamines.  INR is 2.08.  Urinalysis is nitrite negative and leukocyte   esterase negative.  Hepatitis C antibody is reactive, but hepatitis B core and   surface antigen are negative.    MEDICAL DECISION MAKING:  This is a 66-year-old female who comes in with   lethargy, now resolved, and  generalized pain with acute fulminant hepatitis of   an unclear etiology, who will be admitted for at least 2 midnights for workup   and management.    ASSESSMENT AND PLAN:  1.  Fulminant hepatitis.  The source here is really unclear.  In discussion   with pharmacy, it is possible that this could be related to Coumadin.  This is   extremely rare.  I am more inclined to believe that it may be some sort of   adulterant in her drugs that she used.  I am going to keep the Coumadin for   now.  If her liver enzymes do not change in any way by the next couple of   days, stopping the Coumadin should improve her liver enzymes.  There is a   limited amount of data on Coumadin and hepatitis, especially related to   hepatitis C, however.  I do not think this is an acute viral hepatitis,   certainly not related to her hepatitis C.  I do not see anything else in her   medications that would cause this.  Of course, I will stop her statin for now   though we can resume it later.  2.  Recent bilateral pulmonary emboli.  I will continue her Coumadin as noted   above.  If we stop it, we probably need to bridge her with Lovenox though.  3.  Chronic pain disorder with drug seeking tendencies.  She told me point   blank that she will get morphine on the street if she does not have it here.    I will continue her MS Contin, but be careful not to escalate her pain   medication doses.  4.  Diabetes mellitus type 2.  I will continue her Lantus and sliding scale.  5.  Chronic respiratory failure.  She is on 3 liters.  We will continue that.  6.  Prophylaxis.  She will be on full-dose Coumadin as noted above.       ____________________________________     MD MOE Schrader / MIKE    DD:  04/13/2017 23:54:47  DT:  04/14/2017 02:33:19    D#:  [id number]  Job#:  [id number]

## 2017-04-14 NOTE — ED NOTES
Floor notified that pt is on way up to room, all belongings with pt on transport with transport tech

## 2017-04-14 NOTE — ED NOTES
"Pt BIB [facility] , pt was dc'd home yesterday from here after being admitted for a week , dx'd with bilat PE's , was dc'd to do the lovenox to coumadin bridge, pt has not filled her prescriptions, pt is also to be on home O2 which she does not wear, pt was found with \" insulin syringes and a spoon  in her bag by paramedics,, and became upset with JAQUELINESA when questioned about drug abuse \" , pt is drowsy upon arrival , responds to verbal stimuli   "

## 2017-04-14 NOTE — ED NOTES
Admitting md in to see pt.  Pt denies any drug use since discharge from hospital.  Pt answers staff questions with short answers.

## 2017-04-15 LAB
ALBUMIN SERPL BCP-MCNC: 2.7 G/DL (ref 3.2–4.9)
ALBUMIN/GLOB SERPL: 1.4 G/DL
ALP SERPL-CCNC: 139 U/L (ref 30–99)
ALT SERPL-CCNC: 3593 U/L (ref 2–50)
ANION GAP SERPL CALC-SCNC: 6 MMOL/L (ref 0–11.9)
AST SERPL-CCNC: 2285 U/L (ref 12–45)
BACTERIA UR CULT: NORMAL
BILIRUB SERPL-MCNC: 4.7 MG/DL (ref 0.1–1.5)
BUN SERPL-MCNC: 12 MG/DL (ref 8–22)
CALCIUM SERPL-MCNC: 7.5 MG/DL (ref 8.5–10.5)
CHLORIDE SERPL-SCNC: 103 MMOL/L (ref 96–112)
CO2 SERPL-SCNC: 24 MMOL/L (ref 20–33)
CREAT SERPL-MCNC: 0.47 MG/DL (ref 0.5–1.4)
GFR SERPL CREATININE-BSD FRML MDRD: >60 ML/MIN/1.73 M 2
GLOBULIN SER CALC-MCNC: 1.9 G/DL (ref 1.9–3.5)
GLUCOSE BLD-MCNC: 107 MG/DL (ref 65–99)
GLUCOSE BLD-MCNC: 108 MG/DL (ref 65–99)
GLUCOSE BLD-MCNC: 127 MG/DL (ref 65–99)
GLUCOSE BLD-MCNC: 133 MG/DL (ref 65–99)
GLUCOSE BLD-MCNC: 83 MG/DL (ref 65–99)
GLUCOSE SERPL-MCNC: 157 MG/DL (ref 65–99)
INR PPP: 1.72 (ref 0.87–1.13)
POTASSIUM SERPL-SCNC: 3.6 MMOL/L (ref 3.6–5.5)
PROT SERPL-MCNC: 4.6 G/DL (ref 6–8.2)
PROTHROMBIN TIME: 20.7 SEC (ref 12–14.6)
SIGNIFICANT IND 70042: NORMAL
SITE SITE: NORMAL
SODIUM SERPL-SCNC: 133 MMOL/L (ref 135–145)
SOURCE SOURCE: NORMAL

## 2017-04-15 PROCEDURE — 700102 HCHG RX REV CODE 250 W/ 637 OVERRIDE(OP): Performed by: HOSPITALIST

## 2017-04-15 PROCEDURE — 82962 GLUCOSE BLOOD TEST: CPT | Mod: 91

## 2017-04-15 PROCEDURE — A9270 NON-COVERED ITEM OR SERVICE: HCPCS | Performed by: INTERNAL MEDICINE

## 2017-04-15 PROCEDURE — 700102 HCHG RX REV CODE 250 W/ 637 OVERRIDE(OP)

## 2017-04-15 PROCEDURE — A9270 NON-COVERED ITEM OR SERVICE: HCPCS

## 2017-04-15 PROCEDURE — A9270 NON-COVERED ITEM OR SERVICE: HCPCS | Performed by: HOSPITALIST

## 2017-04-15 PROCEDURE — 700105 HCHG RX REV CODE 258: Performed by: HOSPITALIST

## 2017-04-15 PROCEDURE — 770006 HCHG ROOM/CARE - MED/SURG/GYN SEMI*

## 2017-04-15 PROCEDURE — 85610 PROTHROMBIN TIME: CPT

## 2017-04-15 PROCEDURE — 700102 HCHG RX REV CODE 250 W/ 637 OVERRIDE(OP): Performed by: INTERNAL MEDICINE

## 2017-04-15 PROCEDURE — 36415 COLL VENOUS BLD VENIPUNCTURE: CPT

## 2017-04-15 PROCEDURE — 80053 COMPREHEN METABOLIC PANEL: CPT

## 2017-04-15 PROCEDURE — 99232 SBSQ HOSP IP/OBS MODERATE 35: CPT | Performed by: HOSPITALIST

## 2017-04-15 RX ORDER — LACTULOSE 20 G/30ML
30 SOLUTION ORAL 2 TIMES DAILY
Status: DISCONTINUED | OUTPATIENT
Start: 2017-04-15 | End: 2017-04-19

## 2017-04-15 RX ADMIN — INSULIN GLARGINE 32 UNITS: 100 INJECTION, SOLUTION SUBCUTANEOUS at 21:17

## 2017-04-15 RX ADMIN — ASPIRIN 81 MG: 81 TABLET ORAL at 07:57

## 2017-04-15 RX ADMIN — OXYCODONE HYDROCHLORIDE 10 MG: 10 TABLET ORAL at 21:02

## 2017-04-15 RX ADMIN — MORPHINE SULFATE 15 MG: 15 TABLET, EXTENDED RELEASE ORAL at 21:02

## 2017-04-15 RX ADMIN — MORPHINE SULFATE 15 MG: 15 TABLET, EXTENDED RELEASE ORAL at 07:57

## 2017-04-15 RX ADMIN — LACTULOSE 30 ML: 20 SOLUTION ORAL at 17:36

## 2017-04-15 RX ADMIN — WARFARIN SODIUM 5 MG: 5 TABLET ORAL at 17:36

## 2017-04-15 RX ADMIN — OXYCODONE HYDROCHLORIDE 10 MG: 10 TABLET ORAL at 12:09

## 2017-04-15 RX ADMIN — LACTULOSE 30 ML: 20 SOLUTION ORAL at 21:02

## 2017-04-15 RX ADMIN — PROMETHAZINE HYDROCHLORIDE 25 MG: 25 TABLET ORAL at 07:57

## 2017-04-15 RX ADMIN — SODIUM CHLORIDE: 9 INJECTION, SOLUTION INTRAVENOUS at 07:57

## 2017-04-15 ASSESSMENT — ENCOUNTER SYMPTOMS
CARDIOVASCULAR NEGATIVE: 1
FOCAL WEAKNESS: 0
FEVER: 0
SHORTNESS OF BREATH: 0
COUGH: 0
BACK PAIN: 0
MUSCULOSKELETAL NEGATIVE: 1
VOMITING: 0
SPEECH CHANGE: 0
HALLUCINATIONS: 0
NAUSEA: 0
CHILLS: 0
ABDOMINAL PAIN: 1
NECK PAIN: 0
PALPITATIONS: 0
HEADACHES: 0
RESPIRATORY NEGATIVE: 1
FEVER: 1
WEAKNESS: 1
GASTROINTESTINAL NEGATIVE: 1

## 2017-04-15 ASSESSMENT — PAIN SCALES - GENERAL
PAINLEVEL_OUTOF10: 7
PAINLEVEL_OUTOF10: 8
PAINLEVEL_OUTOF10: ASSUMED PAIN PRESENT
PAINLEVEL_OUTOF10: 8

## 2017-04-15 ASSESSMENT — LIFESTYLE VARIABLES: SUBSTANCE_ABUSE: 1

## 2017-04-15 NOTE — PROGRESS NOTES
Assumed care of pt this AM. Pt sound asleep after being medicated during the night. Pt woke up at approx 0900 needing to use restroom. Pt is somewhat impulsive at this time; bed alarm in place.  Pt is up w/one, slightly wobbly. IV fluids running NS 100mL in left external neck. Skin has needle marks on right hand. Small wound/scratch on buttock. Pt is disheveled and unkempt. In the morning pt was falling asleep while eating. By lunch time she was much more alert and ate her whole lunch. BS wnl.

## 2017-04-15 NOTE — PROGRESS NOTES
Inpatient Anticoagulation Service Note    Date: 4/15/2017  Reason for Anticoagulation: Pulmonary Embolism (4/3/17)  Hemoglobin Value: 12.6  Hematocrit Value: 39.7  Lab Platelet Value: 225  Target INR: 2.0 to 3.0  INR from last 7 days     Date/Time INR Value    04/15/17 0159 (!)1.72    04/14/17 0200 (!)2.02    04/13/17 1710 (!)2.08        Dose from last 7 days     Date/Time Dose (mg)    04/15/17 1300 5    04/14/17 1300 7.5    04/13/17 2100 --        Average Dose (mg):  (New start last admission (~6mg/day))  Significant Interactions: Aspirin  Bridge Therapy: No     A/P:   Sub-therapeutic INR today.  Discussed INR and potential to start heparin drip with Dr. Munoz today in setting of recent bilateral PE.  Wants pharmacy to continue to dose warfarin without any bridge at this time.  During previous admission for PE earlier this month, patient was therapeutic while receiving ~ 6 mg/day.  Will continue with warfarin 5 mg PO daily for now and trend INR.  Have not seen full impact of 7.5 mg dose given yesterday.  Pharmacy will continue to follow.     Education Material Provided?: No (Declined handout)  Pharmacist suggested discharge dosing: Warfarin 5 mg PO daily with daily INR until LFTs normalize.     Pat Macias, PharmD, BCPS

## 2017-04-15 NOTE — PROGRESS NOTES
Lab called with a critical lab of [facility], spoke with Chidi Díaz, informed pt was AAOX3, no new orders given as pts mentation was intact, will continue to monitor.

## 2017-04-15 NOTE — PROGRESS NOTES
CNA informed me that a syringe was found in the pts bed. CNA put syringe in the sharps container. The charge nurse and I asked the pt about the syringe and she said she didn't know anything about it. When told we would need to look through her purse for medications the pt began screaming at staff and threatening to leave AMA. The hospitalist was paged and Dr Chidi Díaz called me back. When informed of the situation, he ordered that her bag would need to be searched by staff or security if she did not leave AMA. Pt continued screaming at staff but threw contents of her purse onto the bed and said we could look through them. Empty syringes were found and placed in a bag along with kitchen utensils, a lighter, a bottle a Gabapentin, and an Insulin pen. Pt was told these items could not remain in the room and would be kept elsewhere. Security came to the room to talk to pt. Pt irritable but agreeable to not have these items in the room and agreed not to leave AMA. Pt became calm and agreeable after this incident.

## 2017-04-15 NOTE — PROGRESS NOTES
Assumed care.  Report received from NOC RN. Pt sleeping peacefully, appears to be in no distress.  Pt board has been updated.  Bed alarm set, call light and phone within reach.

## 2017-04-15 NOTE — PROGRESS NOTES
Luis from Lab called with critical result of [id number] and ALT-3593 at 0345. Critical lab result read back to Luis.   This critical lab result is within parameters established by  for this patient

## 2017-04-15 NOTE — CARE PLAN
Problem: Communication  Goal: The ability to communicate needs accurately and effectively will improve  Outcome: PROGRESSING SLOWER THAN EXPECTED  Educated patient on plan of care. Updated white board. All questions answered.     Problem: Safety  Goal: Will remain free from falls  Outcome: PROGRESSING AS EXPECTED  Pt remained free from falls during shift. Pt oriented to call light, bed in low position and wheels locked. Pt encouraged to call for assistance. Bed alarm on and intact.

## 2017-04-15 NOTE — PROGRESS NOTES
Gastroenterology Progress Note     Author: Jaime Olivo   Date & Time Created: 4/15/2017 2:48 PM    Interval History:  Transaminases slight decrease  T Bili rising  Mental status normal    Review of Systems:  Review of Systems   Constitutional: Positive for fever. Negative for chills.   Respiratory: Negative.    Cardiovascular: Negative.    Gastrointestinal: Negative.    Musculoskeletal: Negative.    Skin: Negative for itching and rash.       Physical Exam:  Physical Exam   Constitutional: She is oriented to person, place, and time. She appears well-developed and well-nourished.   HENT:   Head: Normocephalic and atraumatic.   Eyes: Left eye exhibits no discharge. No scleral icterus.   Neck: No tracheal deviation present. No thyromegaly present.   Cardiovascular: Normal rate.    Pulmonary/Chest: Effort normal.   Abdominal: Soft. She exhibits no distension. There is no tenderness. There is no rebound and no guarding.   Neurological: She is alert and oriented to person, place, and time.   Skin: She is not diaphoretic.       Labs:        Invalid input(s): [id number]      Recent Labs      04/13/17   1710  04/14/17   0200  04/15/17   0159   SODIUM  132*  128*  133*   POTASSIUM  4.2  4.0  3.6   CHLORIDE  96  97  103   CO2  28  25  24   BUN  33*  21  12   CREATININE  0.61  0.56  0.47*   CALCIUM  9.0  8.0*  7.5*     Recent Labs      04/13/17   1710  04/14/17   0200  04/15/17   0159   ALTSGPT  3221*  3638*  3593*   ASTSGOT  2919*  3455*  2285*   ALKPHOSPHAT  92  135*  139*   TBILIRUBIN  1.0  2.0*  4.7*   GLUCOSE  94  188*  157*     Recent Labs      04/13/17   1710  04/14/17   0200  04/15/17   0159   RBC  5.11  4.71   --    HEMOGLOBIN  13.9  12.6   --    HEMATOCRIT  43.0  39.7   --    PLATELETCT  263  225   --    PROTHROMBTM  24.0*  23.5*  20.7*   APTT  42.7*   --    --    INR  2.08*  2.02*  1.72*     Recent Labs      04/13/17   1710  04/14/17   0200  04/15/17   0159   WBC  7.4  4.7*   --    NEUTSPOLYS  73.90*   82.10*   --    LYMPHOCYTES  15.70*  8.90*   --    MONOCYTES  5.20  4.50   --    EOSINOPHILS  1.80  2.70   --    BASOPHILS  0.00  0.00   --    ASTSGOT  2919*  3455*  2285*   ALTSGPT  3221*  3638*  3593*   ALKPHOSPHAT  92  135*  139*   TBILIRUBIN  1.0  2.0*  4.7*     Hemodynamics:  Temp (24hrs), Av.2 °C (98.9 °F), Min:36.4 °C (97.5 °F), Max:37.8 °C (100.1 °F)  Temperature: 36.7 °C (98 °F)  Pulse  Av.9  Min: 67  Max: 84   Blood Pressure: 132/56 mmHg     Respiratory:    Respiration: 18, Pulse Oximetry: 95 %     Work Of Breathing / Effort: Mild  RUL Breath Sounds: Inspiratory Wheezes, RML Breath Sounds: Inspiratory Wheezes, RLL Breath Sounds: Fine Crackles, MARC Breath Sounds: Inspiratory Wheezes, LLL Breath Sounds: Fine Crackles  Fluids:    Intake/Output Summary (Last 24 hours) at 04/15/17 1448  Last data filed at 04/15/17 1300   Gross per 24 hour   Intake   3206 ml   Output      0 ml   Net   3206 ml     Weight: 82.8 kg (182 lb 8.7 oz)  GI/Nutrition:  Orders Placed This Encounter   Procedures   • DIET ORDER     Standing Status: Standing      Number of Occurrences: 1      Standing Expiration Date:      Order Specific Question:  Diet:     Answer:  Diabetic [3]     Order Specific Question:  Texture/Fiber modifications:     Answer:  Chopped Meat [5]     Medical Decision Making, by Problem:  Active Hospital Problems    Diagnosis   • *Hepatitis, fulminant [B19.9]   • Pulmonary emboli (CMS-HCC) [I26.99]   • COPD (chronic obstructive pulmonary disease) (CMS-HCC) [J44.9]   • Lethargy [R53.83]   • Type 2 diabetes mellitus, uncontrolled (CMS-HCC) [E11.65]   • Hyponatremia [E87.1]   • Drug abuse, IV [F19.10]       Plan:  1. Still concern for shock liver:   Continue to monitor LFTs  2. Bili has increased to  4 -5 range but INR has not increased much and renal function OK as well as mental status so no fulminant failure:   Continue to monitor LFTs, INR, MS  3. No longer has asterixis, all sedatives being held (d/w  pharmacist)   Low dose lactulose  4. Still concern she had hypotensive event leading to this  5. Await serologies      Core Measures

## 2017-04-15 NOTE — PROGRESS NOTES
Hospital Medicine Progress Note, Adult, Complex               Author: Carlo Munoz Date & Time created: 4/15/2017  11:43 AM     CC:  65 yo female hx of DM, COPD chronic resp failure on home o2, HTN, Hepatitis C recent dc 4-12-17 with dx Bilateral PE on warfarin admitted with lethargy , sob. findings of Hepatitis.    Interval History:    Occ nausea. Eating well. Lfts improved . Does not recall recent IVDA.     Review of Systems:  Review of Systems   Constitutional: Negative for fever and chills.   HENT: Negative for congestion.    Respiratory: Negative for cough and shortness of breath.    Cardiovascular: Negative for chest pain, palpitations and leg swelling.   Gastrointestinal: Positive for abdominal pain. Negative for nausea and vomiting.   Musculoskeletal: Negative for back pain and neck pain.   Neurological: Positive for weakness. Negative for speech change, focal weakness and headaches.   Psychiatric/Behavioral: Positive for substance abuse (reports no recent. ). Negative for hallucinations.       Physical Exam:  Physical Exam   Constitutional: She is oriented to person, place, and time. No distress.   Eyes: EOM are normal. Scleral icterus is present.   Neck: Neck supple.   Cardiovascular: Normal rate and regular rhythm.    No murmur heard.  Pulmonary/Chest: No stridor. She has no wheezes. She has no rales.   Abdominal: Soft. Bowel sounds are normal. There is tenderness (mild upper quads). There is no rebound and no guarding.   Obese.    Musculoskeletal: She exhibits no edema or tenderness.   Neurological: She is alert and oriented to person, place, and time. No cranial nerve deficit.   Skin: Skin is warm and dry. She is not diaphoretic.   Multiple scabbed needle track marks.  Jaundiced.        Labs:        Invalid input(s): [id number]      Recent Labs      04/13/17   1710  04/14/17   0200  04/15/17   0159   SODIUM  132*  128*  133*   POTASSIUM  4.2  4.0  3.6   CHLORIDE  96  97  103   CO2  28  25  24   BUN   33*  21  12   CREATININE  0.61  0.56  0.47*   CALCIUM  9.0  8.0*  7.5*     Recent Labs      17   0200  04/15/17   0159   ALTSGPT  3221*  3638*  3593*   ASTSGOT  2919*  3455*  2285*   ALKPHOSPHAT  92  135*  139*   TBILIRUBIN  1.0  2.0*  4.7*   GLUCOSE  94  188*  157*     Recent Labs      04/13/17   1710  04/14/17   0200  04/15/17   0159   RBC  5.11  4.71   --    HEMOGLOBIN  13.9  12.6   --    HEMATOCRIT  43.0  39.7   --    PLATELETCT  263  225   --    PROTHROMBTM  24.0*  23.5*  20.7*   APTT  42.7*   --    --    INR  2.08*  2.02*  1.72*     Recent Labs      04/13/17   1710  04/14/17   0200  04/15/17   0159   WBC  7.4  4.7*   --    NEUTSPOLYS  73.90*  82.10*   --    LYMPHOCYTES  15.70*  8.90*   --    MONOCYTES  5.20  4.50   --    EOSINOPHILS  1.80  2.70   --    BASOPHILS  0.00  0.00   --    ASTSGOT  2919*  3455*  2285*   ALTSGPT  3221*  3638*  3593*   ALKPHOSPHAT  92  135*  139*   TBILIRUBIN  1.0  2.0*  4.7*           Hemodynamics:  Temp (24hrs), Av.2 °C (98.9 °F), Min:36.4 °C (97.5 °F), Max:37.8 °C (100.1 °F)  Temperature: 36.7 °C (98 °F)  Pulse  Av.9  Min: 67  Max: 84   Blood Pressure: 132/56 mmHg     Respiratory:    Respiration: 18, Pulse Oximetry: 95 %     Work Of Breathing / Effort: Mild  RUL Breath Sounds: Inspiratory Wheezes, RML Breath Sounds: Inspiratory Wheezes, RLL Breath Sounds: Fine Crackles, MARC Breath Sounds: Inspiratory Wheezes, LLL Breath Sounds: Fine Crackles  Fluids:    Intake/Output Summary (Last 24 hours) at 04/15/17 1143  Last data filed at 04/15/17 1000   Gross per 24 hour   Intake   3090 ml   Output      0 ml   Net   3090 ml     Weight: 82.8 kg (182 lb 8.7 oz)  GI/Nutrition:  Orders Placed This Encounter   Procedures   • DIET ORDER     Standing Status: Standing      Number of Occurrences: 1      Standing Expiration Date:      Order Specific Question:  Diet:     Answer:  Diabetic [3]     Order Specific Question:  Texture/Fiber modifications:     Answer:  Chopped  Meat [5]     Medical Decision Making, by Problem:  Active Hospital Problems    Diagnosis   • *Hepatitis, fulminant [B19.9]- unclear cause- ? Drug induced , ischemic- although no hypontension , less likely  infectious - imaging no signs of obstruction / portal thrombosis -- improved.  Monitor liver enzymes, GI input appreciated- fu autoimmune / hepatitis workup.  Prn anti emetics for nausea.   • Pulmonary emboli (CMS-HCC) [I26.99]- recent dx- stable resp symptoms-subtherap inr  Warfarin, monitor coags- adjust dosing as needed.   • COPD (chronic obstructive pulmonary disease) (CMS-HCC) [J44.9]  Rt, bronchodilators.    • Lethargy [R53.83] elev ammonia level- improved.  Stopped  trazadone, risperdal , neurontin.   Consider lactulose / rifaxamin if mental status declines    • Type 2 diabetes mellitus, uncontrolled (CMS-HCC) [E11.65]  ISS prn    • Hyponatremia [E87.1] improved.  IV ns- monitor Na   • Drug abuse, IV [F19.10] utox + amphetamines.       Labs reviewed, Medications reviewed and Radiology images reviewed  Fagan catheter: No Fagan      DVT Prophylaxis: Warfarin (Coumadin)

## 2017-04-16 LAB
ALBUMIN SERPL BCP-MCNC: 2.8 G/DL (ref 3.2–4.9)
ALBUMIN/GLOB SERPL: 1.5 G/DL
ALP SERPL-CCNC: 158 U/L (ref 30–99)
ALT SERPL-CCNC: 3355 U/L (ref 2–50)
ANION GAP SERPL CALC-SCNC: 5 MMOL/L (ref 0–11.9)
AST SERPL-CCNC: 1448 U/L (ref 12–45)
BILIRUB SERPL-MCNC: 4.1 MG/DL (ref 0.1–1.5)
BUN SERPL-MCNC: 8 MG/DL (ref 8–22)
CALCIUM SERPL-MCNC: 7.7 MG/DL (ref 8.5–10.5)
CERULOPLASMIN SERPL-MCNC: 28 MG/DL (ref 17–54)
CHLORIDE SERPL-SCNC: 104 MMOL/L (ref 96–112)
CO2 SERPL-SCNC: 25 MMOL/L (ref 20–33)
CREAT SERPL-MCNC: 0.36 MG/DL (ref 0.5–1.4)
GFR SERPL CREATININE-BSD FRML MDRD: >60 ML/MIN/1.73 M 2
GLOBULIN SER CALC-MCNC: 1.9 G/DL (ref 1.9–3.5)
GLUCOSE BLD-MCNC: 100 MG/DL (ref 65–99)
GLUCOSE BLD-MCNC: 127 MG/DL (ref 65–99)
GLUCOSE BLD-MCNC: 134 MG/DL (ref 65–99)
GLUCOSE SERPL-MCNC: 149 MG/DL (ref 65–99)
HCV RNA SERPL NAA+PROBE-ACNC: ABNORMAL IU/ML
HCV RNA SERPL NAA+PROBE-LOG IU: 6.6 LOG IU
HCV RNA SERPL QL NAA+PROBE: DETECTED
INR PPP: 2.73 (ref 0.87–1.13)
PATHOLOGY STUDY: ABNORMAL
POTASSIUM SERPL-SCNC: 3.8 MMOL/L (ref 3.6–5.5)
PROT SERPL-MCNC: 4.7 G/DL (ref 6–8.2)
PROTHROMBIN TIME: 29.8 SEC (ref 12–14.6)
SMA IGG SER-ACNC: 14 UNITS (ref 0–19)
SODIUM SERPL-SCNC: 134 MMOL/L (ref 135–145)

## 2017-04-16 PROCEDURE — 36415 COLL VENOUS BLD VENIPUNCTURE: CPT

## 2017-04-16 PROCEDURE — 99232 SBSQ HOSP IP/OBS MODERATE 35: CPT | Performed by: HOSPITALIST

## 2017-04-16 PROCEDURE — 700105 HCHG RX REV CODE 258: Performed by: HOSPITALIST

## 2017-04-16 PROCEDURE — 82962 GLUCOSE BLOOD TEST: CPT

## 2017-04-16 PROCEDURE — 80053 COMPREHEN METABOLIC PANEL: CPT

## 2017-04-16 PROCEDURE — A9270 NON-COVERED ITEM OR SERVICE: HCPCS | Performed by: INTERNAL MEDICINE

## 2017-04-16 PROCEDURE — A9270 NON-COVERED ITEM OR SERVICE: HCPCS | Performed by: HOSPITALIST

## 2017-04-16 PROCEDURE — 700111 HCHG RX REV CODE 636 W/ 250 OVERRIDE (IP): Performed by: HOSPITALIST

## 2017-04-16 PROCEDURE — 700102 HCHG RX REV CODE 250 W/ 637 OVERRIDE(OP): Performed by: INTERNAL MEDICINE

## 2017-04-16 PROCEDURE — 85610 PROTHROMBIN TIME: CPT

## 2017-04-16 PROCEDURE — 700102 HCHG RX REV CODE 250 W/ 637 OVERRIDE(OP): Performed by: HOSPITALIST

## 2017-04-16 PROCEDURE — 770006 HCHG ROOM/CARE - MED/SURG/GYN SEMI*

## 2017-04-16 RX ADMIN — OXYCODONE HYDROCHLORIDE 10 MG: 10 TABLET ORAL at 20:06

## 2017-04-16 RX ADMIN — OXYCODONE HYDROCHLORIDE 10 MG: 10 TABLET ORAL at 06:42

## 2017-04-16 RX ADMIN — MORPHINE SULFATE 15 MG: 15 TABLET, EXTENDED RELEASE ORAL at 20:06

## 2017-04-16 RX ADMIN — MORPHINE SULFATE 15 MG: 15 TABLET, EXTENDED RELEASE ORAL at 08:03

## 2017-04-16 RX ADMIN — ALPRAZOLAM 0.5 MG: 0.5 TABLET ORAL at 22:31

## 2017-04-16 RX ADMIN — OXYCODONE HYDROCHLORIDE 10 MG: 10 TABLET ORAL at 00:20

## 2017-04-16 RX ADMIN — PROCHLORPERAZINE EDISYLATE 10 MG: 5 INJECTION INTRAMUSCULAR; INTRAVENOUS at 22:31

## 2017-04-16 RX ADMIN — ASPIRIN 81 MG: 81 TABLET ORAL at 08:03

## 2017-04-16 RX ADMIN — LACTULOSE 30 ML: 20 SOLUTION ORAL at 20:06

## 2017-04-16 RX ADMIN — PROMETHAZINE HYDROCHLORIDE 25 MG: 25 TABLET ORAL at 15:52

## 2017-04-16 RX ADMIN — ALPRAZOLAM 0.5 MG: 0.5 TABLET ORAL at 00:20

## 2017-04-16 RX ADMIN — OXYCODONE HYDROCHLORIDE 10 MG: 10 TABLET ORAL at 15:55

## 2017-04-16 RX ADMIN — SODIUM CHLORIDE: 9 INJECTION, SOLUTION INTRAVENOUS at 09:37

## 2017-04-16 RX ADMIN — LACTULOSE 30 ML: 20 SOLUTION ORAL at 08:03

## 2017-04-16 RX ADMIN — INSULIN GLARGINE 32 UNITS: 100 INJECTION, SOLUTION SUBCUTANEOUS at 20:09

## 2017-04-16 ASSESSMENT — ENCOUNTER SYMPTOMS
NECK PAIN: 0
NAUSEA: 0
HALLUCINATIONS: 0
MYALGIAS: 1
BACK PAIN: 0
HEARTBURN: 0
VOMITING: 0
SHORTNESS OF BREATH: 0
BLOOD IN STOOL: 0
NAUSEA: 1
HEADACHES: 0
PALPITATIONS: 0
DIARRHEA: 0
CHILLS: 0
FEVER: 0
WEAKNESS: 1
SPEECH CHANGE: 0
ABDOMINAL PAIN: 1
FOCAL WEAKNESS: 0
COUGH: 0
RESPIRATORY NEGATIVE: 1
ABDOMINAL PAIN: 0

## 2017-04-16 ASSESSMENT — PAIN SCALES - GENERAL
PAINLEVEL_OUTOF10: 8
PAINLEVEL_OUTOF10: 5
PAINLEVEL_OUTOF10: 4
PAINLEVEL_OUTOF10: 8
PAINLEVEL_OUTOF10: 10
PAINLEVEL_OUTOF10: 4
PAINLEVEL_OUTOF10: 6
PAINLEVEL_OUTOF10: 7

## 2017-04-16 ASSESSMENT — LIFESTYLE VARIABLES: SUBSTANCE_ABUSE: 1

## 2017-04-16 NOTE — PROGRESS NOTES
Inpatient Anticoagulation Service Note    Date: 4/16/2017  Reason for Anticoagulation: Pulmonary Embolism (4/3/17)  Hemoglobin Value: 12.6  Hematocrit Value: 39.7  Lab Platelet Value: 225  Target INR: 2.0 to 3.0  INR from last 7 days     Date/Time INR Value    04/16/17 0121 (!)2.73    04/15/17 0159 (!)1.72    04/14/17 0200 (!)2.02    04/13/17 1710 (!)2.08        Dose from last 7 days     Date/Time Dose (mg)    04/16/17 1400 0    04/15/17 1300 5    04/14/17 1300 7.5    04/13/17 2100 --        Average Dose (mg):  (New start last admission (~6mg/day))  Significant Interactions: Aspirin    A/P:   Therapeutic INR with large increase in past 24 hour.  Discussed with Dr. Munoz again today.  LFTs are trending down but ALT going down more slowly.  Tbili also down today.  Hold warfarin tonight.  Follow-up INR tomorrow.  Pharmacy will continue to follow.     Education Material Provided?: No (Declined handout)  Pharmacist suggested discharge dosing: TBD; will require close INR monitoring until LFTs/liver function normalizes.       Pat Macias, PharmD, BCPS

## 2017-04-16 NOTE — PROGRESS NOTES
Andres from Lab called with critical result of ALT of 3355 U/L at 0200. Critical lab result read back to Andres.   This critical lab result is within parameters established by the Hospitalist team for this patient.

## 2017-04-16 NOTE — PROGRESS NOTES
Hospital Medicine Progress Note, Adult, Complex               Author: Carlo Munoz Date & Time created: 4/16/2017  11:16 AM     CC:  65 yo female hx of DM, COPD chronic resp failure on home o2, HTN, Hepatitis C recent dc 4-12-17 with dx Bilateral PE on warfarin admitted with lethargy , sob. findings of Hepatitis.    Interval History:    Improved Lfts.  INR rised to therap range.  Notes decreased appetite, nausea .    Review of Systems:  Review of Systems   Constitutional: Negative for fever.   Respiratory: Negative for cough and shortness of breath.    Cardiovascular: Negative for chest pain and palpitations.   Gastrointestinal: Positive for nausea and abdominal pain. Negative for vomiting.   Musculoskeletal: Positive for myalgias. Negative for back pain and neck pain.   Neurological: Positive for weakness. Negative for speech change and focal weakness.   Psychiatric/Behavioral: Positive for substance abuse (reports no recent. ). Negative for hallucinations.       Physical Exam:  Physical Exam   Constitutional: She is oriented to person, place, and time. No distress.   Drowsy, arousable, nad.   Eyes: EOM are normal. Scleral icterus is present.   Cardiovascular: Normal rate and regular rhythm.    No murmur heard.  Pulmonary/Chest: She has no wheezes. She has no rales.   Abdominal: Soft. Bowel sounds are normal. There is tenderness (mild upper quads). There is no guarding.   Obese.    Musculoskeletal: She exhibits no edema or tenderness.   Neurological: She is alert and oriented to person, place, and time. No cranial nerve deficit.   Skin: Skin is warm and dry. She is not diaphoretic.   Multiple scabbed needle track marks.  Jaundiced.        Labs:        Invalid input(s): [id number]      Recent Labs      04/14/17   0200  04/15/17   0159  04/16/17   0121   SODIUM  128*  133*  134*   POTASSIUM  4.0  3.6  3.8   CHLORIDE  97  103  104   CO2  25  24  25   BUN  21  12  8   CREATININE  0.56  0.47*  0.36*   CALCIUM  8.0*   7.5*  7.7*     Recent Labs      17   0200  04/15/17   01517   012   ALTSGPT  3638*  3593*  3355*   ASTSGOT  3455*  2285*  1448*   ALKPHOSPHAT  135*  139*  158*   TBILIRUBIN  2.0*  4.7*  4.1*   GLUCOSE  188*  157*  149*     Recent Labs      17   1710  17   0200  04/15/17   0159  04/16/17   012   RBC  5.11  4.71   --    --    HEMOGLOBIN  13.9  12.6   --    --    HEMATOCRIT  43.0  39.7   --    --    PLATELETCT  263  225   --    --    PROTHROMBTM  24.0*  23.5*  20.7*  29.8*   APTT  42.7*   --    --    --    INR  2.08*  2.02*  1.72*  2.73*     Recent Labs      17   17117   0200  04/15/17   0159  04/16/17   012   WBC  7.4  4.7*   --    --    NEUTSPOLYS  73.90*  82.10*   --    --    LYMPHOCYTES  15.70*  8.90*   --    --    MONOCYTES  5.20  4.50   --    --    EOSINOPHILS  1.80  2.70   --    --    BASOPHILS  0.00  0.00   --    --    ASTSGOT  2919*  3455*  2285*  1448*   ALTSGPT  3221*  3638*  3593*  3355*   ALKPHOSPHAT  92  135*  139*  158*   TBILIRUBIN  1.0  2.0*  4.7*  4.1*           Hemodynamics:  Temp (24hrs), Av.4 °C (97.6 °F), Min:36.1 °C (97 °F), Max:36.6 °C (97.9 °F)  Temperature: 36.5 °C (97.7 °F)  Pulse  Av.4  Min: 51  Max: 84   Blood Pressure: 125/63 mmHg     Respiratory:    Respiration: 20, Pulse Oximetry: 97 %     Work Of Breathing / Effort: Mild  RUL Breath Sounds: Expiratory Wheezes;Rhonchi, RML Breath Sounds: Rhonchi, RLL Breath Sounds: Crackles, MARC Breath Sounds: Expiratory Wheezes;Rhonchi, LLL Breath Sounds: Crackles  Fluids:    Intake/Output Summary (Last 24 hours) at 17 1116  Last data filed at 17 0930   Gross per 24 hour   Intake   2256 ml   Output      0 ml   Net   2256 ml        GI/Nutrition:  Orders Placed This Encounter   Procedures   • DIET ORDER     Standing Status: Standing      Number of Occurrences: 1      Standing Expiration Date:      Order Specific Question:  Diet:     Answer:  Diabetic [3]     Order Specific Question:   Texture/Fiber modifications:     Answer:  Chopped Meat [5]     Medical Decision Making, by Problem:  Active Hospital Problems    Diagnosis   • *Hepatitis, fulminant [B19.9]- unclear cause- ? Drug induced , ischemic- although no hypontension , less likely  infectious - imaging no signs of obstruction / portal thrombosis .  Monitor liver enzymes  GI input appreciated- fu autoimmune / hepatitis workup.   • Pulmonary emboli (CMS-HCC) [I26.99]- recent dx- stable resp symptoms-INR quickly rising, now therap.  Adjust Warfarin dose , monitor coags-  pharmacy .   • COPD (chronic obstructive pulmonary disease) (CMS-[facility]) [J44.9]  Rt, bronchodilators.    • Lethargy [R53.83] elev ammonia level- improved.  Stopped  trazadone, risperdal , neurontin.   Lactulose started per GI.   • Type 2 diabetes mellitus, uncontrolled (CMS-HCC) [E11.65]  ISS prn    • Hyponatremia [E87.1] improved.  Decrease IV NS.  Chronic pain, fibromyalgia  MS contin home dose.   Nausea  Prn iv antiemetics.   • Drug abuse, IV [F19.10] utox + amphetamines.     Discussed with RN.    Labs reviewed, Medications reviewed and Radiology images reviewed  Fagan catheter: No Fagan      DVT Prophylaxis: Warfarin (Coumadin)

## 2017-04-16 NOTE — PROGRESS NOTES
Received report from day RN. Assumed pt care. Discussed call light/phone system, communication board and POC. Pt is aao x 4 and verbalizes understanding. Pt admitted for liver disease. This RN administers PO lactulose for elevated Ammonia level. GI is following. Pt receives PO coumadin for recent PEs. Pt mobility assessed. Pt is a standby assist and calls appropriately. Pt refusing bed alarm despite education regarding fall risk. Fall precautions in place. Bed is locked and in low position, call light within reach. Treaded slippers in place. Pt needs met at this time. Hourly rounding in place.

## 2017-04-16 NOTE — PROGRESS NOTES
Assumed patient care at 0700. Received report from night shift. Assessment completed. POC discussed with pt, verbalizes understanding. A&Ox3, disoriented to time. Pt states pain 10/10 in abdomen, medicated per MAR. Denies nausea. Pt refusing bed alarm despite education. Personal possessions and call light placed within reach. Pt denies any additional needs at this time.

## 2017-04-16 NOTE — PROGRESS NOTES
Gastroenterology Progress Note     Author: Jaime Olivo   Date & Time Created: 4/16/2017 1:53 PM    Interval History:  No pain  LFts continue to slowly stabilize or improve    Review of Systems:  Review of Systems   Constitutional: Negative for fever and chills.   Respiratory: Negative.    Cardiovascular: Negative for chest pain.   Gastrointestinal: Negative for heartburn, nausea, vomiting, abdominal pain, diarrhea and blood in stool.   Neurological: Negative for headaches.       Physical Exam:  Physical Exam   Constitutional: She is oriented to person, place, and time. She appears well-developed and well-nourished. No distress.   Eyes: Scleral icterus is present.   Neck: No JVD present. No tracheal deviation present.   Pulmonary/Chest: Effort normal.   Abdominal: She exhibits no distension. There is no tenderness. There is no rebound and no guarding.   Neurological: She is alert and oriented to person, place, and time.   Skin: Skin is warm and dry. She is not diaphoretic.       Labs:        Invalid input(s): [id number]      Recent Labs      04/14/17   0200  04/15/17   0159  04/16/17   0121   SODIUM  128*  133*  134*   POTASSIUM  4.0  3.6  3.8   CHLORIDE  97  103  104   CO2  25  24  25   BUN  21  12  8   CREATININE  0.56  0.47*  0.36*   CALCIUM  8.0*  7.5*  7.7*     Recent Labs      04/14/17   0200  04/15/17   0159  04/16/17   0121   ALTSGPT  3638*  3593*  3355*   ASTSGOT  3455*  2285*  1448*   ALKPHOSPHAT  135*  139*  158*   TBILIRUBIN  2.0*  4.7*  4.1*   GLUCOSE  188*  157*  149*     Recent Labs      04/13/17   1710  04/14/17   0200  04/15/17   0159  04/16/17   0121   RBC  5.11  4.71   --    --    HEMOGLOBIN  13.9  12.6   --    --    HEMATOCRIT  43.0  39.7   --    --    PLATELETCT  263  225   --    --    PROTHROMBTM  24.0*  23.5*  20.7*  29.8*   APTT  42.7*   --    --    --    INR  2.08*  2.02*  1.72*  2.73*     Recent Labs      04/13/17 1710 04/14/17   0200  04/15/17   0159  04/16/17   0121   WBC   7.4  4.7*   --    --    NEUTSPOLYS  73.90*  82.10*   --    --    LYMPHOCYTES  15.70*  8.90*   --    --    MONOCYTES  5.20  4.50   --    --    EOSINOPHILS  1.80  2.70   --    --    BASOPHILS  0.00  0.00   --    --    ASTSGOT  2919*  3455*  2285*  1448*   ALTSGPT  3221*  3638*  3593*  3355*   ALKPHOSPHAT  92  135*  139*  158*   TBILIRUBIN  1.0  2.0*  4.7*  4.1*     Hemodynamics:  Temp (24hrs), Av.4 °C (97.6 °F), Min:36.1 °C (97 °F), Max:36.6 °C (97.9 °F)  Temperature: 36.5 °C (97.7 °F)  Pulse  Av.4  Min: 51  Max: 84   Blood Pressure: 125/63 mmHg     Respiratory:    Respiration: 20, Pulse Oximetry: 97 %     Work Of Breathing / Effort: Mild  RUL Breath Sounds: Expiratory Wheezes;Rhonchi, RML Breath Sounds: Rhonchi, RLL Breath Sounds: Crackles, MARC Breath Sounds: Expiratory Wheezes;Rhonchi, LLL Breath Sounds: Crackles  Fluids:    Intake/Output Summary (Last 24 hours) at 17 1353  Last data filed at 17 0930   Gross per 24 hour   Intake   1900 ml   Output      0 ml   Net   1900 ml        GI/Nutrition:  Orders Placed This Encounter   Procedures   • DIET ORDER     Standing Status: Standing      Number of Occurrences: 1      Standing Expiration Date:      Order Specific Question:  Diet:     Answer:  Diabetic [3]     Order Specific Question:  Texture/Fiber modifications:     Answer:  Chopped Meat [5]     Medical Decision Making, by Problem:  Active Hospital Problems    Diagnosis   • *Hepatitis, fulminant [B19.9]   • Pulmonary emboli (CMS-HCC) [I26.99]   • COPD (chronic obstructive pulmonary disease) (CMS-HCC) [J44.9]   • Lethargy [R53.83]   • Type 2 diabetes mellitus, uncontrolled (CMS-HCC) [E11.65]   • Hyponatremia [E87.1]   • Drug abuse, IV [F19.10]       Plan:  Hepatitis   Slow decline in AST but not ALT    Hyperbili   Appears to have peaked and now declining    Abnl LFTs Etiology unclear but still concern for liver shock with multiple psychotropic / pain meds    Coagulation difficult to assess with  recent coumadin    Absence of encephalopathy (clearly asterixisi unpon admission), normal renal function , so no fulminant failure    REC:  Hold on liver biopsy  Watchful waiting  Monitor INR, Creat, LFTs  Core Measures

## 2017-04-16 NOTE — PROGRESS NOTES
Patient alert and oriented. Uses call light appropriately. Verbalizes understanding of plan of care.  Continuing to monitor lab values and administer PRN and scheduled medications as necessary.  Denies additional needs at this time. Will continue to monitor.

## 2017-04-17 LAB
ALBUMIN SERPL BCP-MCNC: 2.5 G/DL (ref 3.2–4.9)
ALBUMIN/GLOB SERPL: 1.3 G/DL
ALP SERPL-CCNC: 152 U/L (ref 30–99)
ALT SERPL-CCNC: 2396 U/L (ref 2–50)
AMMONIA PLAS-SCNC: 50 UMOL/L (ref 11–45)
ANION GAP SERPL CALC-SCNC: 3 MMOL/L (ref 0–11.9)
AST SERPL-CCNC: 954 U/L (ref 12–45)
BILIRUB SERPL-MCNC: 3.1 MG/DL (ref 0.1–1.5)
BUN SERPL-MCNC: 11 MG/DL (ref 8–22)
CALCIUM SERPL-MCNC: 7.7 MG/DL (ref 8.5–10.5)
CHLORIDE SERPL-SCNC: 106 MMOL/L (ref 96–112)
CO2 SERPL-SCNC: 26 MMOL/L (ref 20–33)
CREAT SERPL-MCNC: 0.39 MG/DL (ref 0.5–1.4)
GFR SERPL CREATININE-BSD FRML MDRD: >60 ML/MIN/1.73 M 2
GLOBULIN SER CALC-MCNC: 2 G/DL (ref 1.9–3.5)
GLUCOSE BLD-MCNC: 105 MG/DL (ref 65–99)
GLUCOSE BLD-MCNC: 108 MG/DL (ref 65–99)
GLUCOSE BLD-MCNC: 143 MG/DL (ref 65–99)
GLUCOSE BLD-MCNC: 82 MG/DL (ref 65–99)
GLUCOSE SERPL-MCNC: 124 MG/DL (ref 65–99)
INR PPP: 4.34 (ref 0.87–1.13)
LKM AB TITR SER IF: NORMAL {TITER}
NUCLEAR IGG SER QL IA: NORMAL
POTASSIUM SERPL-SCNC: 4 MMOL/L (ref 3.6–5.5)
PROT SERPL-MCNC: 4.5 G/DL (ref 6–8.2)
PROTHROMBIN TIME: 42.9 SEC (ref 12–14.6)
SODIUM SERPL-SCNC: 135 MMOL/L (ref 135–145)

## 2017-04-17 PROCEDURE — 99232 SBSQ HOSP IP/OBS MODERATE 35: CPT | Performed by: HOSPITALIST

## 2017-04-17 PROCEDURE — 85610 PROTHROMBIN TIME: CPT

## 2017-04-17 PROCEDURE — 770006 HCHG ROOM/CARE - MED/SURG/GYN SEMI*

## 2017-04-17 PROCEDURE — 80053 COMPREHEN METABOLIC PANEL: CPT

## 2017-04-17 PROCEDURE — A9270 NON-COVERED ITEM OR SERVICE: HCPCS | Performed by: INTERNAL MEDICINE

## 2017-04-17 PROCEDURE — A9270 NON-COVERED ITEM OR SERVICE: HCPCS | Performed by: HOSPITALIST

## 2017-04-17 PROCEDURE — 700105 HCHG RX REV CODE 258: Performed by: HOSPITALIST

## 2017-04-17 PROCEDURE — 700102 HCHG RX REV CODE 250 W/ 637 OVERRIDE(OP): Performed by: HOSPITALIST

## 2017-04-17 PROCEDURE — 82962 GLUCOSE BLOOD TEST: CPT | Mod: 91

## 2017-04-17 PROCEDURE — 82140 ASSAY OF AMMONIA: CPT

## 2017-04-17 PROCEDURE — 36415 COLL VENOUS BLD VENIPUNCTURE: CPT

## 2017-04-17 PROCEDURE — 700102 HCHG RX REV CODE 250 W/ 637 OVERRIDE(OP): Performed by: INTERNAL MEDICINE

## 2017-04-17 RX ADMIN — MORPHINE SULFATE 15 MG: 15 TABLET, EXTENDED RELEASE ORAL at 21:11

## 2017-04-17 RX ADMIN — ASPIRIN 81 MG: 81 TABLET ORAL at 07:54

## 2017-04-17 RX ADMIN — LACTULOSE 30 ML: 20 SOLUTION ORAL at 07:55

## 2017-04-17 RX ADMIN — PROMETHAZINE HYDROCHLORIDE 25 MG: 25 TABLET ORAL at 17:32

## 2017-04-17 RX ADMIN — OXYCODONE HYDROCHLORIDE 5 MG: 5 TABLET ORAL at 17:32

## 2017-04-17 RX ADMIN — LACTULOSE 30 ML: 20 SOLUTION ORAL at 21:11

## 2017-04-17 RX ADMIN — INSULIN GLARGINE 32 UNITS: 100 INJECTION, SOLUTION SUBCUTANEOUS at 21:14

## 2017-04-17 RX ADMIN — SODIUM CHLORIDE: 9 INJECTION, SOLUTION INTRAVENOUS at 08:04

## 2017-04-17 RX ADMIN — MORPHINE SULFATE 15 MG: 15 TABLET, EXTENDED RELEASE ORAL at 07:54

## 2017-04-17 RX ADMIN — OXYCODONE HYDROCHLORIDE 5 MG: 5 TABLET ORAL at 21:11

## 2017-04-17 RX ADMIN — OXYCODONE HYDROCHLORIDE 5 MG: 5 TABLET ORAL at 05:34

## 2017-04-17 RX ADMIN — PROMETHAZINE HYDROCHLORIDE 25 MG: 25 TABLET ORAL at 17:33

## 2017-04-17 ASSESSMENT — PAIN SCALES - GENERAL
PAINLEVEL_OUTOF10: 5
PAINLEVEL_OUTOF10: 8
PAINLEVEL_OUTOF10: 9
PAINLEVEL_OUTOF10: 4
PAINLEVEL_OUTOF10: 8

## 2017-04-17 ASSESSMENT — ENCOUNTER SYMPTOMS
PALPITATIONS: 0
BLOOD IN STOOL: 0
SPUTUM PRODUCTION: 0
WEAKNESS: 1
HEARTBURN: 0
MYALGIAS: 1
VOMITING: 0
BACK PAIN: 0
NAUSEA: 1
DIARRHEA: 0
SHORTNESS OF BREATH: 0
NECK PAIN: 0
COUGH: 1
CHILLS: 0
HEADACHES: 0
ABDOMINAL PAIN: 1
ABDOMINAL PAIN: 0
COUGH: 0
FOCAL WEAKNESS: 0
SPEECH CHANGE: 0
FEVER: 0
HALLUCINATIONS: 0

## 2017-04-17 ASSESSMENT — LIFESTYLE VARIABLES: SUBSTANCE_ABUSE: 1

## 2017-04-17 NOTE — PROGRESS NOTES
Diabetes education: Met with Pt this am . Please see consult note.  Plan: Pt will need prescriptions for One touch ultra test strips and delica lancets, to test four times a day. She will also need prescriptions for Lantus solostar pen ( if she can keep the non used ones refrigerated or vial), Humalog Kwik pen or vial (same restriction), with sliding scale written out and for ac only, either issac pen needles (box of 100 4 mm) or 1/2 cc syringes (box of 100), depending on pens vs vials. All prescriptions need to have dx code, directions, quantity and refills for Medicaid to cover. Please call [phone] if needs change.

## 2017-04-17 NOTE — CONSULTS
Diabetes education: Met with Zenia, who has a hx of diabetes on insulin. Pt was just discharged 4/12 and readmitted on 4/13. Pt had prescriptions for her insulin, both Humalog and Lantus per discharge note. Pt did not get these filled. Pt states she does not have a meter but was using the One touch ultra mini meter. Pt was given a one touch mini and meter set up and at bedside.  Pt states she uses Lantus pens and  Regular vials. Pt is currently homeless per chart. Not sure how she will keep the insulin pens ( prescription gives 5 pens unlike vials which she gets one) refrigerated.   Plan: Pt will need prescriptions for One touch ultra test strips and delica lancets, to test four times a day. She will also need prescriptions for Lantus solostar pen ( if she can keep the non used ones refrigerated or vial), Humalog Kwik pen or vial (same restriction), with sliding scale written out and for ac only, either issac pen needles (box of 100 4 mm) or 1/2 cc syringes (box of 100), depending on pens vs vials. All prescriptions need to have dx code, directions, quantity and refills for Medicaid to cover. Please call [phone] if needs change.
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
## 2017-04-17 NOTE — PROGRESS NOTES
Assumed patient care at 0700. Received report from night shift. Assessment completed. POC discussed with pt, verbalizes understanding. A&Ox4. Pt states pain 8/10 in abdomen, medicated per MAR. Nausea being pain with PRN medication. Pt refusing bed alarm despite education. Personal possessions and call light placed within reach. Pt denies any additional needs at this time.

## 2017-04-17 NOTE — PROGRESS NOTES
Gastroenterology Progress Note        Date & Time Created: 4/17/2017 11:35 AM    Interval History:  Complains of some nausea and decreased appetite. Diffuse abdominal pain, slightly improved from before.   LFTs continue to slowly improve, however, coags increased today. Warfarin being held.     Review of Systems:  Review of Systems   Constitutional: Negative for fever and chills.   Respiratory: Positive for cough. Negative for sputum production.    Cardiovascular: Negative for chest pain.   Gastrointestinal: Positive for nausea. Negative for heartburn, vomiting, abdominal pain, diarrhea and blood in stool.   Neurological: Positive for weakness. Negative for headaches.       Physical Exam:  Physical Exam   Constitutional: She is oriented to person, place, and time. She appears well-developed and well-nourished. No distress.   Eyes: Scleral icterus is present.   Neck: No JVD present. No tracheal deviation present.   Pulmonary/Chest: Effort normal.   Abdominal: Soft. Bowel sounds are normal. She exhibits no distension. There is tenderness. There is no rebound and no guarding.   Complains of mild tenderness diffusely, however not ttp   Neurological: She is alert and oriented to person, place, and time.   Skin: Skin is warm and dry. She is not diaphoretic.       Labs:        Invalid input(s): [id number]      Recent Labs      04/15/17   0159  04/16/17   0121  04/17/17   0247   SODIUM  133*  134*  135   POTASSIUM  3.6  3.8  4.0   CHLORIDE  103  104  106   CO2  24  25  26   BUN  12  8  11   CREATININE  0.47*  0.36*  0.39*   CALCIUM  7.5*  7.7*  7.7*     Recent Labs      04/15/17   0159  04/16/17   0121  04/17/17   0247   ALTSGPT  3593*  3355*  2396*   ASTSGOT  2285*  1448*  954*   ALKPHOSPHAT  139*  158*  152*   TBILIRUBIN  4.7*  4.1*  3.1*   GLUCOSE  157*  149*  124*     Recent Labs      04/15/17   0159  04/16/17   0121  04/17/17   0247   PROTHROMBTM  20.7*  29.8*  42.9*   INR  1.72*  2.73*  4.34*     Recent Labs       04/15/17   0159  17   0121  17   0247   ASTSGOT  2285*  1448*  954*   ALTSGPT  3593*  3355*  2396*   ALKPHOSPHAT  139*  158*  152*   TBILIRUBIN  4.7*  4.1*  3.1*     Hemodynamics:  Temp (24hrs), Av.4 °C (97.6 °F), Min:36.3 °C (97.3 °F), Max:36.7 °C (98.1 °F)  Temperature: 36.7 °C (98.1 °F)  Pulse  Av.3  Min: 50  Max: 84   Blood Pressure: 143/43 mmHg     Respiratory:    Respiration: 18, Pulse Oximetry: 98 %     Work Of Breathing / Effort: Mild  RUL Breath Sounds: Rhonchi;Expiratory Wheezes, RML Breath Sounds: Rhonchi, RLL Breath Sounds: Rhonchi, MARC Breath Sounds: Expiratory Wheezes;Rhonchi, LLL Breath Sounds: Rhonchi  Fluids:    Intake/Output Summary (Last 24 hours) at 17 1353  Last data filed at 17 0930   Gross per 24 hour   Intake   1900 ml   Output      0 ml   Net   1900 ml        GI/Nutrition:  Orders Placed This Encounter   Procedures   • DIET ORDER     Standing Status: Standing      Number of Occurrences: 1      Standing Expiration Date:      Order Specific Question:  Diet:     Answer:  Diabetic [3]     Order Specific Question:  Texture/Fiber modifications:     Answer:  Chopped Meat [5]     Medical Decision Making, by Problem:  Active Hospital Problems    Diagnosis   • *Hepatitis, fulminant [B19.9]   • Pulmonary emboli (CMS-HCC) [I26.99]   • COPD (chronic obstructive pulmonary disease) (CMS-HCC) [J44.9]   • Lethargy [R53.83]   • Type 2 diabetes mellitus, uncontrolled (CMS-HCC) [E11.65]   • Hyponatremia [E87.1]   • Drug abuse, IV [F19.10]       Plan:  Hepatitis: slow decline in AST as well as ALT. Abnormal LFTs likely secondary to shocked liver from ischemia secondary to ?clots vs. Medications vs. Drug induced. Bilirubinemia also slowly declining  Coagulation : Pt's INR and PT almost doubled since yesterday, currently INR 4.34(2.73 yest) and PT 42.9(29.8 yest).   Encephalopathy: Ammonia levels have trended down, bsence of encephalopathy (AxOx3, asterixis on admission but not  present anymore)  Recommendations:  Watchful waiting, continue to monitor INR, Creatinine and LFTs  Not a transplant candidate given ongoing drug abuse, counseled on quitting and the need to remain 6 month drug free  Need to remain drug free to be considered for hep C treatment  Enforced nutrition with the patient, only chance that patient has for quicker recovery.   Can consider fluoroquinolones for empiric coverage      Core Measures

## 2017-04-18 LAB
ALBUMIN SERPL BCP-MCNC: 2.7 G/DL (ref 3.2–4.9)
ALBUMIN/GLOB SERPL: 1.3 G/DL
ALP SERPL-CCNC: 163 U/L (ref 30–99)
ALT SERPL-CCNC: 1722 U/L (ref 2–50)
ANION GAP SERPL CALC-SCNC: 6 MMOL/L (ref 0–11.9)
AST SERPL-CCNC: 419 U/L (ref 12–45)
BACTERIA BLD CULT: NORMAL
BACTERIA BLD CULT: NORMAL
BILIRUB SERPL-MCNC: 2.2 MG/DL (ref 0.1–1.5)
BUN SERPL-MCNC: 9 MG/DL (ref 8–22)
CALCIUM SERPL-MCNC: 7.9 MG/DL (ref 8.5–10.5)
CHLORIDE SERPL-SCNC: 104 MMOL/L (ref 96–112)
CO2 SERPL-SCNC: 27 MMOL/L (ref 20–33)
CREAT SERPL-MCNC: 0.32 MG/DL (ref 0.5–1.4)
GFR SERPL CREATININE-BSD FRML MDRD: >60 ML/MIN/1.73 M 2
GLOBULIN SER CALC-MCNC: 2.1 G/DL (ref 1.9–3.5)
GLUCOSE BLD-MCNC: 102 MG/DL (ref 65–99)
GLUCOSE BLD-MCNC: 83 MG/DL (ref 65–99)
GLUCOSE BLD-MCNC: 98 MG/DL (ref 65–99)
GLUCOSE SERPL-MCNC: 82 MG/DL (ref 65–99)
HCV RNA SERPL NAA+PROBE-ACNC: ABNORMAL IU/ML
HCV RNA SERPL NAA+PROBE-LOG IU: 5.6 LOG IU
HCV RNA SERPL QL NAA+PROBE: DETECTED
INR PPP: 2.89 (ref 0.87–1.13)
PATHOLOGY STUDY: ABNORMAL
POTASSIUM SERPL-SCNC: 3.7 MMOL/L (ref 3.6–5.5)
PROT SERPL-MCNC: 4.8 G/DL (ref 6–8.2)
PROTHROMBIN TIME: 31.1 SEC (ref 12–14.6)
SIGNIFICANT IND 70042: NORMAL
SIGNIFICANT IND 70042: NORMAL
SITE SITE: NORMAL
SITE SITE: NORMAL
SODIUM SERPL-SCNC: 137 MMOL/L (ref 135–145)
SOURCE SOURCE: NORMAL
SOURCE SOURCE: NORMAL
TEST NAME 95000: NORMAL
ZINC BLD-MCNC: 603 UG/DL (ref 440–860)

## 2017-04-18 PROCEDURE — 94760 N-INVAS EAR/PLS OXIMETRY 1: CPT

## 2017-04-18 PROCEDURE — 770006 HCHG ROOM/CARE - MED/SURG/GYN SEMI*

## 2017-04-18 PROCEDURE — 85025 COMPLETE CBC W/AUTO DIFF WBC: CPT

## 2017-04-18 PROCEDURE — A9270 NON-COVERED ITEM OR SERVICE: HCPCS | Performed by: HOSPITALIST

## 2017-04-18 PROCEDURE — 82140 ASSAY OF AMMONIA: CPT

## 2017-04-18 PROCEDURE — 82962 GLUCOSE BLOOD TEST: CPT | Mod: 91

## 2017-04-18 PROCEDURE — 700102 HCHG RX REV CODE 250 W/ 637 OVERRIDE(OP): Performed by: INTERNAL MEDICINE

## 2017-04-18 PROCEDURE — 700102 HCHG RX REV CODE 250 W/ 637 OVERRIDE(OP)

## 2017-04-18 PROCEDURE — A9270 NON-COVERED ITEM OR SERVICE: HCPCS

## 2017-04-18 PROCEDURE — A9270 NON-COVERED ITEM OR SERVICE: HCPCS | Performed by: INTERNAL MEDICINE

## 2017-04-18 PROCEDURE — 700102 HCHG RX REV CODE 250 W/ 637 OVERRIDE(OP): Performed by: HOSPITALIST

## 2017-04-18 PROCEDURE — 80053 COMPREHEN METABOLIC PANEL: CPT | Mod: 91

## 2017-04-18 PROCEDURE — 85610 PROTHROMBIN TIME: CPT | Mod: 91

## 2017-04-18 PROCEDURE — 700105 HCHG RX REV CODE 258: Performed by: HOSPITALIST

## 2017-04-18 PROCEDURE — 36415 COLL VENOUS BLD VENIPUNCTURE: CPT

## 2017-04-18 PROCEDURE — 99232 SBSQ HOSP IP/OBS MODERATE 35: CPT | Performed by: HOSPITALIST

## 2017-04-18 RX ORDER — WARFARIN SODIUM 5 MG/1
5 TABLET ORAL
Status: COMPLETED | OUTPATIENT
Start: 2017-04-18 | End: 2017-04-18

## 2017-04-18 RX ADMIN — ASPIRIN 81 MG: 81 TABLET ORAL at 10:58

## 2017-04-18 RX ADMIN — INSULIN GLARGINE 32 UNITS: 100 INJECTION, SOLUTION SUBCUTANEOUS at 19:57

## 2017-04-18 RX ADMIN — OXYCODONE HYDROCHLORIDE 10 MG: 10 TABLET ORAL at 18:29

## 2017-04-18 RX ADMIN — WARFARIN SODIUM 5 MG: 5 TABLET ORAL at 18:04

## 2017-04-18 RX ADMIN — SODIUM CHLORIDE: 9 INJECTION, SOLUTION INTRAVENOUS at 17:57

## 2017-04-18 RX ADMIN — MORPHINE SULFATE 15 MG: 15 TABLET, EXTENDED RELEASE ORAL at 10:58

## 2017-04-18 RX ADMIN — OXYCODONE HYDROCHLORIDE 5 MG: 5 TABLET ORAL at 06:50

## 2017-04-18 RX ADMIN — OXYCODONE HYDROCHLORIDE 10 MG: 10 TABLET ORAL at 00:42

## 2017-04-18 RX ADMIN — INSULIN LISPRO 2 UNITS: 100 INJECTION, SOLUTION INTRAVENOUS; SUBCUTANEOUS at 19:56

## 2017-04-18 RX ADMIN — MORPHINE SULFATE 15 MG: 15 TABLET, EXTENDED RELEASE ORAL at 19:49

## 2017-04-18 RX ADMIN — ALPRAZOLAM 0.5 MG: 0.5 TABLET ORAL at 19:49

## 2017-04-18 RX ADMIN — LACTULOSE 30 ML: 20 SOLUTION ORAL at 19:49

## 2017-04-18 RX ADMIN — STANDARDIZED SENNA CONCENTRATE AND DOCUSATE SODIUM 2 TABLET: 8.6; 5 TABLET, FILM COATED ORAL at 10:59

## 2017-04-18 RX ADMIN — ALPRAZOLAM 0.5 MG: 0.5 TABLET ORAL at 00:43

## 2017-04-18 RX ADMIN — LACTULOSE 30 ML: 20 SOLUTION ORAL at 10:59

## 2017-04-18 ASSESSMENT — ENCOUNTER SYMPTOMS
SPUTUM PRODUCTION: 0
ABDOMINAL PAIN: 0
BLOOD IN STOOL: 0
WEAKNESS: 1
HEADACHES: 0
COUGH: 1
NAUSEA: 1
NAUSEA: 0
FEVER: 0
VOMITING: 0
PALPITATIONS: 0
CHILLS: 0
HEARTBURN: 0
DIARRHEA: 0

## 2017-04-18 ASSESSMENT — PAIN SCALES - GENERAL
PAINLEVEL_OUTOF10: 6
PAINLEVEL_OUTOF10: 8
PAINLEVEL_OUTOF10: 0
PAINLEVEL_OUTOF10: 7
PAINLEVEL_OUTOF10: 6
PAINLEVEL_OUTOF10: 6

## 2017-04-18 NOTE — PROGRESS NOTES
ROSA from Lab called with critical result of ALT 1722 at 1054. Critical lab result read back to Jermainein.   This critical lab result is within parameters established by hospitalist for this patient

## 2017-04-18 NOTE — PROGRESS NOTES
Gastroenterology Progress Note        Date & Time Created: 4/18/2017 2:38 PM    Interval History:  Appetite still little but improved from yesterday.   Lfts and coagulation trending down.   Patient continues to complain of tiredness(wearing off of crystalmeth?)     Review of Systems:  Review of Systems   Constitutional: Negative for fever and chills.   Respiratory: Positive for cough. Negative for sputum production.    Cardiovascular: Negative for chest pain.   Gastrointestinal: Positive for nausea. Negative for heartburn, vomiting, abdominal pain, diarrhea and blood in stool.   Neurological: Positive for weakness. Negative for headaches.       Physical Exam:  Physical Exam   Constitutional: She is oriented to person, place, and time. She appears well-developed and well-nourished. No distress.   Eyes: Scleral icterus is present.   Neck: No JVD present. No tracheal deviation present.   Pulmonary/Chest: Effort normal.   Abdominal: Soft. Bowel sounds are normal. She exhibits no distension. There is tenderness. There is no rebound and no guarding.   Complains of mild tenderness diffusely, however not ttp   Neurological: She is alert and oriented to person, place, and time.   Skin: Skin is warm and dry. She is not diaphoretic.       Labs:        Invalid input(s): [id number]      Recent Labs      04/16/17 0121 04/17/17 0247 04/18/17   0942   SODIUM  134*  135  137   POTASSIUM  3.8  4.0  3.7   CHLORIDE  104  106  104   CO2  25  26  27   BUN  8  11  9   CREATININE  0.36*  0.39*  0.32*   CALCIUM  7.7*  7.7*  7.9*     Recent Labs      04/16/17 0121 04/17/17 0247 04/18/17   0942   ALTSGPT  3355*  2396*  1722*   ASTSGOT  1448*  954*  419*   ALKPHOSPHAT  158*  152*  163*   TBILIRUBIN  4.1*  3.1*  2.2*   GLUCOSE  149*  124*  82     Recent Labs      04/16/17 0121 04/17/17 0247 04/18/17   0013   PROTHROMBTM  29.8*  42.9*  31.1*   INR  2.73*  4.34*  2.89*     Recent Labs      04/16/17 0121 04/17/17 0247   17   0942   ASTSGOT  1448*  954*  419*   ALTSGPT  3355*  2396*  1722*   ALKPHOSPHAT  158*  152*  163*   TBILIRUBIN  4.1*  3.1*  2.2*     Hemodynamics:  Temp (24hrs), Av.1 °C (97 °F), Min:35.8 °C (96.5 °F), Max:36.4 °C (97.5 °F)  Temperature: 36.2 °C (97.1 °F)  Pulse  Av.9  Min: 50  Max: 84   Blood Pressure: 121/62 mmHg     Respiratory:    Respiration: 18, Pulse Oximetry: 99 %     Work Of Breathing / Effort: Mild  RUL Breath Sounds: Expiratory Wheezes;Rhonchi, RML Breath Sounds: Rhonchi, RLL Breath Sounds: Diminished;Rhonchi, MARC Breath Sounds: Expiratory Wheezes;Rhonchi, LLL Breath Sounds: Diminished;Rhonchi  Fluids:    Intake/Output Summary (Last 24 hours) at 17 1353  Last data filed at 17 0930   Gross per 24 hour   Intake   1900 ml   Output      0 ml   Net   1900 ml        GI/Nutrition:  Orders Placed This Encounter   Procedures   • DIET ORDER     Standing Status: Standing      Number of Occurrences: 1      Standing Expiration Date:      Order Specific Question:  Diet:     Answer:  Diabetic [3]     Order Specific Question:  Texture/Fiber modifications:     Answer:  Chopped Meat [5]     Medical Decision Making, by Problem:  Active Hospital Problems    Diagnosis   • *Hepatitis, fulminant [B19.9]   • Pulmonary emboli (CMS-HCC) [I26.99]   • COPD (chronic obstructive pulmonary disease) (CMS-HCC) [J44.9]   • Lethargy [R53.83]   • Type 2 diabetes mellitus, uncontrolled (CMS-HCC) [E11.65]   • Hyponatremia [E87.1]   • Drug abuse, IV [F19.10]       Plan:  Hepatitis:AST as well as ALT trended down. Abnormal LFTs likely secondary to shocked liver from ischemia secondary to ?clots vs. Medications vs. Drug induced. Bilirubinemia also declining  Coagulation : Pt's INR and PT trended down INR 2.89 (4.34 yest) and PT 31.8(42.9 yest).   Encephalopathy: Ammonia levels have trended down, absence of encephalopathy (AxOx3, asterixis on admission but not present anymore)  Recommendations:  INR, Creatinine and  LFTs trending down. Will need to follow up with digestive health outpatient in 2-3 weeks with repeat labs.   Not a transplant candidate given ongoing drug abuse, counseled on quitting and the need to remain 6 month drug free  Need to remain drug free to be considered for hep C treatment  Enforced nutrition with the patient, only chance that patient has for quicker recovery.   Autoimmune panel and further workup has been negative to this point.     Thank you for allowing us to participate in this patient's care. We will sign off at this point as nothing further to offer from the GI standpoint and patient is clinically improving. Please contact us for any questions.      Core Measures

## 2017-04-18 NOTE — PROGRESS NOTES
Rounding completed on pt. Report given at bedside between night shift RN and day shift RN. Assumed care of pt.

## 2017-04-18 NOTE — PROGRESS NOTES
Received report from day RN. Assumed pt care. Discussed call light/phone system, communication board and POC. Pt is aao x 4 and verbalizes understanding. Pt is irritable at times. Pt admitted for liver disease. Labs are being monitored by the MD. Pt receives PO coumadin for recent PEs. Pt mobility assessed. Pt is a standby assist and calls appropriately. Pt refusing bed alarm despite education regarding fall risk. Fall precautions in place. Bed is locked and in low position, call light within reach. Treaded slippers in place. Pt needs met at this time. Hourly rounding in place.

## 2017-04-18 NOTE — PROGRESS NOTES
Inpatient Anticoagulation Service Note    Date: 4/18/2017  Reason for Anticoagulation: Pulmonary Embolism (4/3/17)        Hemoglobin Value: 12.6  Hematocrit Value: 39.7  Lab Platelet Value: 225  Target INR: 2.0 to 3.0    INR from last 7 days     Date/Time INR Value    04/18/17 0013 (!)2.89    04/17/17 0247 (!)4.34    04/16/17 0121 (!)2.73    04/15/17 0159 (!)1.72    04/14/17 0200 (!)2.02    04/13/17 1710 (!)2.08        Dose from last 7 days     Date/Time Dose (mg)    04/18/17 1000 5    04/17/17 1200 0    04/16/17 1400 0    04/15/17 1300 5    04/14/17 1300 7.5    04/13/17 2100 --        Average Dose (mg):  (New start last admission (~6mg/day))  Significant Interactions: Aspirin  Bridge Therapy: No     Comments: INR with significant downward trend, now within therapeutic range. Will begin dosing again today. No new LFTs today, though would anticipate improvement given previous trend. No S/S bleeding noted. No changes to interacting medications. Will continue to follow.    Plan:  5 mg tonight, INR tomorrow  Education Material Provided?: No (Declined handout)  Pharmacist suggested discharge dosing: likely 5 mg daily, anticipate supratherapeutic INRs were secondary to acute hepatitis, will need INR within 72 hours of discharge     Latisha Morris, PHARMD

## 2017-04-18 NOTE — PROGRESS NOTES
Hospital Medicine Progress Note, Adult, Complex               Author: Carlo Munoz Date & Time created: 4/17/2017  5:50 PM     CC:  65 yo female hx of DM, COPD chronic resp failure on home o2, HTN, Hepatitis C recent dc 4-12-17 with dx Bilateral PE on warfarin admitted with lethargy , sob. findings of Hepatitis.    Interval History:    Admits to using Crystal meth after recent discharge.  LFTs slowly improved. INR Supratherap -  Less fatigue this afternoon.     Review of Systems:  Review of Systems   Constitutional: Negative for fever and chills.   Respiratory: Negative for cough and shortness of breath.    Cardiovascular: Negative for chest pain, palpitations and leg swelling.   Gastrointestinal: Positive for nausea and abdominal pain (improved. ). Negative for vomiting.   Musculoskeletal: Positive for myalgias. Negative for back pain and neck pain.   Neurological: Positive for weakness. Negative for speech change and focal weakness.   Psychiatric/Behavioral: Positive for substance abuse. Negative for hallucinations.       Physical Exam:  Physical Exam   Constitutional: She is oriented to person, place, and time. No distress.   Drowsy, arousable, nad.   Eyes: EOM are normal. Scleral icterus is present.   Neck: No JVD present.   Cardiovascular: Normal rate and regular rhythm.    No murmur heard.  Pulmonary/Chest: No stridor. She has no wheezes. She has no rales.   Abdominal: Soft. Bowel sounds are normal. There is tenderness (mild upper quads). There is no rebound and no guarding.   Obese.    Musculoskeletal: She exhibits no edema or tenderness.   Neurological: She is alert and oriented to person, place, and time. No cranial nerve deficit.   Skin: Skin is warm and dry. She is not diaphoretic.   Multiple scabbed needle track marks.  Jaundiced.    Psychiatric: She has a normal mood and affect. Her behavior is normal.       Labs:        Invalid input(s): [id number]      Recent Labs      04/15/17   0159  04/16/17    01217   0247   SODIUM  133*  134*  135   POTASSIUM  3.6  3.8  4.0   CHLORIDE  103  104  106   CO2  24  25  26   BUN  12  8  11   CREATININE  0.47*  0.36*  0.39*   CALCIUM  7.5*  7.7*  7.7*     Recent Labs      04/15/17   0159  17   0121  17   0247   ALTSGPT  3593*  3355*  2396*   ASTSGOT  2285*  1448*  954*   ALKPHOSPHAT  139*  158*  152*   TBILIRUBIN  4.7*  4.1*  3.1*   GLUCOSE  157*  149*  124*     Recent Labs      04/15/17   0159  17   01217   0247   PROTHROMBTM  20.7*  29.8*  42.9*   INR  1.72*  2.73*  4.34*     Recent Labs      04/15/17   01517   01217   0247   ASTSGOT  2285*  1448*  954*   ALTSGPT  3593*  3355*  2396*   ALKPHOSPHAT  139*  158*  152*   TBILIRUBIN  4.7*  4.1*  3.1*           Hemodynamics:  Temp (24hrs), Av.4 °C (97.5 °F), Min:36.1 °C (97 °F), Max:36.7 °C (98.1 °F)  Temperature: 36.1 °C (97 °F)  Pulse  Av.7  Min: 50  Max: 84   Blood Pressure: 117/66 mmHg     Respiratory:    Respiration: 18, Pulse Oximetry: 94 %     Work Of Breathing / Effort: Mild  RUL Breath Sounds: Rhonchi;Expiratory Wheezes, RML Breath Sounds: Rhonchi, RLL Breath Sounds: Rhonchi, MARC Breath Sounds: Expiratory Wheezes;Rhonchi, LLL Breath Sounds: Rhonchi  Fluids:    Intake/Output Summary (Last 24 hours) at 17 1750  Last data filed at 17 1300   Gross per 24 hour   Intake   1476 ml   Output      0 ml   Net   1476 ml        GI/Nutrition:  Orders Placed This Encounter   Procedures   • DIET ORDER     Standing Status: Standing      Number of Occurrences: 1      Standing Expiration Date:      Order Specific Question:  Diet:     Answer:  Diabetic [3]     Order Specific Question:  Texture/Fiber modifications:     Answer:  Chopped Meat [5]     Medical Decision Making, by Problem:  Active Hospital Problems    Diagnosis   • *Hepatitis [B19.9]- unclear cause- ? Drug induced , ischemic / shock- although no hypotension , less likely  infectious - imaging no signs of  obstruction / portal thrombosis .  Monitor liver enzymes  GI input appreciated- autoimmune workup so far negative- fu additional serologies.  Lactulose    • Pulmonary emboli (CMS-HCC) [I26.99]- recent dx- stable Resp symptoms-Supra Therap INR  Off warfarin- fu Coags.   • COPD (chronic obstructive pulmonary disease) (CMS-HCC) [J44.9]  Rt, bronchodilators.    • Lethargy [R53.83] elev ammonia level- improved symptoms.-- holding trazadone, risperdal , neurontin.   Lactulose    • Type 2 diabetes mellitus, uncontrolled (CMS-HCC) [E11.65]  ISS prn    • Hyponatremia [E87.1] corrected.    IV NS.  Chronic pain, fibromyalgia  MS contin home dose.   Nausea  Prn iv antiemetics.   • Drug abuse, IV [F19.10] amphetamines-- active.     Discussed with RN/ SS    Labs reviewed, Medications reviewed and Radiology images reviewed  Fagan catheter: No Fagan      DVT Prophylaxis: Warfarin (Coumadin)

## 2017-04-19 LAB
ALBUMIN SERPL BCP-MCNC: 2.6 G/DL (ref 3.2–4.9)
ALBUMIN/GLOB SERPL: 1.2 G/DL
ALP SERPL-CCNC: 167 U/L (ref 30–99)
ALT SERPL-CCNC: 1332 U/L (ref 2–50)
AMMONIA PLAS-SCNC: 85 UMOL/L (ref 11–45)
ANION GAP SERPL CALC-SCNC: 3 MMOL/L (ref 0–11.9)
AST SERPL-CCNC: 244 U/L (ref 12–45)
BASOPHILS # BLD AUTO: 0.6 % (ref 0–1.8)
BASOPHILS # BLD: 0.04 K/UL (ref 0–0.12)
BILIRUB SERPL-MCNC: 1.7 MG/DL (ref 0.1–1.5)
BUN SERPL-MCNC: 17 MG/DL (ref 8–22)
CALCIUM SERPL-MCNC: 8 MG/DL (ref 8.5–10.5)
CHLORIDE SERPL-SCNC: 106 MMOL/L (ref 96–112)
CO2 SERPL-SCNC: 28 MMOL/L (ref 20–33)
CREAT SERPL-MCNC: 0.47 MG/DL (ref 0.5–1.4)
EOSINOPHIL # BLD AUTO: 0.05 K/UL (ref 0–0.51)
EOSINOPHIL NFR BLD: 0.7 % (ref 0–6.9)
ERYTHROCYTE [DISTWIDTH] IN BLOOD BY AUTOMATED COUNT: 51.1 FL (ref 35.9–50)
GFR SERPL CREATININE-BSD FRML MDRD: >60 ML/MIN/1.73 M 2
GLOBULIN SER CALC-MCNC: 2.2 G/DL (ref 1.9–3.5)
GLUCOSE BLD-MCNC: 114 MG/DL (ref 65–99)
GLUCOSE BLD-MCNC: 117 MG/DL (ref 65–99)
GLUCOSE BLD-MCNC: 130 MG/DL (ref 65–99)
GLUCOSE BLD-MCNC: 177 MG/DL (ref 65–99)
GLUCOSE BLD-MCNC: 95 MG/DL (ref 65–99)
GLUCOSE SERPL-MCNC: 162 MG/DL (ref 65–99)
HCT VFR BLD AUTO: 37.9 % (ref 37–47)
HGB BLD-MCNC: 11.8 G/DL (ref 12–16)
IMM GRANULOCYTES # BLD AUTO: 0.21 K/UL (ref 0–0.11)
IMM GRANULOCYTES NFR BLD AUTO: 3 % (ref 0–0.9)
INR PPP: 2.22 (ref 0.87–1.13)
LYMPHOCYTES # BLD AUTO: 2.06 K/UL (ref 1–4.8)
LYMPHOCYTES NFR BLD: 29.1 % (ref 22–41)
MCH RBC QN AUTO: 26.6 PG (ref 27–33)
MCHC RBC AUTO-ENTMCNC: 31.1 G/DL (ref 33.6–35)
MCV RBC AUTO: 85.6 FL (ref 81.4–97.8)
MONOCYTES # BLD AUTO: 0.54 K/UL (ref 0–0.85)
MONOCYTES NFR BLD AUTO: 7.6 % (ref 0–13.4)
NEUTROPHILS # BLD AUTO: 4.17 K/UL (ref 2–7.15)
NEUTROPHILS NFR BLD: 59 % (ref 44–72)
NRBC # BLD AUTO: 0 K/UL
NRBC BLD AUTO-RTO: 0 /100 WBC
PLATELET # BLD AUTO: 128 K/UL (ref 164–446)
PMV BLD AUTO: 11.3 FL (ref 9–12.9)
POTASSIUM SERPL-SCNC: 4.1 MMOL/L (ref 3.6–5.5)
PROT SERPL-MCNC: 4.8 G/DL (ref 6–8.2)
PROTHROMBIN TIME: 25.3 SEC (ref 12–14.6)
RBC # BLD AUTO: 4.43 M/UL (ref 4.2–5.4)
SODIUM SERPL-SCNC: 137 MMOL/L (ref 135–145)
WBC # BLD AUTO: 7.1 K/UL (ref 4.8–10.8)

## 2017-04-19 PROCEDURE — 770006 HCHG ROOM/CARE - MED/SURG/GYN SEMI*

## 2017-04-19 PROCEDURE — A9270 NON-COVERED ITEM OR SERVICE: HCPCS | Performed by: INTERNAL MEDICINE

## 2017-04-19 PROCEDURE — A9270 NON-COVERED ITEM OR SERVICE: HCPCS

## 2017-04-19 PROCEDURE — 82962 GLUCOSE BLOOD TEST: CPT

## 2017-04-19 PROCEDURE — 700102 HCHG RX REV CODE 250 W/ 637 OVERRIDE(OP): Performed by: HOSPITALIST

## 2017-04-19 PROCEDURE — 700102 HCHG RX REV CODE 250 W/ 637 OVERRIDE(OP)

## 2017-04-19 PROCEDURE — 700111 HCHG RX REV CODE 636 W/ 250 OVERRIDE (IP): Performed by: HOSPITALIST

## 2017-04-19 PROCEDURE — 99232 SBSQ HOSP IP/OBS MODERATE 35: CPT | Performed by: HOSPITALIST

## 2017-04-19 PROCEDURE — A9270 NON-COVERED ITEM OR SERVICE: HCPCS | Performed by: HOSPITALIST

## 2017-04-19 PROCEDURE — 700102 HCHG RX REV CODE 250 W/ 637 OVERRIDE(OP): Performed by: INTERNAL MEDICINE

## 2017-04-19 RX ORDER — WARFARIN SODIUM 5 MG/1
5 TABLET ORAL
Status: DISCONTINUED | OUTPATIENT
Start: 2017-04-19 | End: 2017-04-20

## 2017-04-19 RX ADMIN — MORPHINE SULFATE 15 MG: 15 TABLET, EXTENDED RELEASE ORAL at 08:14

## 2017-04-19 RX ADMIN — OXYCODONE HYDROCHLORIDE 10 MG: 10 TABLET ORAL at 01:02

## 2017-04-19 RX ADMIN — PROCHLORPERAZINE EDISYLATE 10 MG: 5 INJECTION INTRAMUSCULAR; INTRAVENOUS at 01:01

## 2017-04-19 RX ADMIN — OXYCODONE HYDROCHLORIDE 10 MG: 10 TABLET ORAL at 17:17

## 2017-04-19 RX ADMIN — OXYCODONE HYDROCHLORIDE 10 MG: 10 TABLET ORAL at 21:49

## 2017-04-19 RX ADMIN — ALPRAZOLAM 0.5 MG: 0.5 TABLET ORAL at 21:49

## 2017-04-19 RX ADMIN — OXYCODONE HYDROCHLORIDE 10 MG: 10 TABLET ORAL at 08:14

## 2017-04-19 RX ADMIN — WARFARIN SODIUM 5 MG: 5 TABLET ORAL at 17:12

## 2017-04-19 RX ADMIN — INSULIN GLARGINE 32 UNITS: 100 INJECTION, SOLUTION SUBCUTANEOUS at 21:49

## 2017-04-19 RX ADMIN — LACTULOSE 30 ML: 20 SOLUTION ORAL at 21:52

## 2017-04-19 RX ADMIN — MORPHINE SULFATE 15 MG: 15 TABLET, EXTENDED RELEASE ORAL at 21:49

## 2017-04-19 RX ADMIN — LACTULOSE 30 ML: 20 SOLUTION ORAL at 08:14

## 2017-04-19 RX ADMIN — ASPIRIN 81 MG: 81 TABLET ORAL at 08:13

## 2017-04-19 ASSESSMENT — PAIN SCALES - GENERAL
PAINLEVEL_OUTOF10: 7
PAINLEVEL_OUTOF10: 7
PAINLEVEL_OUTOF10: 10
PAINLEVEL_OUTOF10: 9
PAINLEVEL_OUTOF10: 6
PAINLEVEL_OUTOF10: 6
PAINLEVEL_OUTOF10: 0
PAINLEVEL_OUTOF10: 6
PAINLEVEL_OUTOF10: 7
PAINLEVEL_OUTOF10: 6

## 2017-04-19 ASSESSMENT — ENCOUNTER SYMPTOMS
VOMITING: 0
DIAPHORESIS: 0
NAUSEA: 0
PALPITATIONS: 0

## 2017-04-19 NOTE — CARE PLAN
Problem: Discharge Barriers/Planning  Goal: Patient’s continuum of care needs will be met  Intervention: Explain discharge instructions and medication reconcilliation to patient and significant other/support system  Pt instructed on poc, spoke with CM, and informed pt on needs that will be addressed on discharge.       Problem: Respiratory:  Goal: Respiratory status will improve  Intervention: Educate and encourage incentive spirometry usage  Pt encouraged to c/db, use i.s. To clear lungs/secretions. Pt encouraged to ambulate to help prevent decline in pulmonary status.

## 2017-04-19 NOTE — CARE PLAN
Problem: Bowel/Gastric:  Goal: Normal bowel function is maintained or improved  Intervention: Educate patient and significant other/support system about signs and symptoms of constipation and interventions to implement  Pt on lactulose, having regular BM.      Problem: Knowledge Deficit  Goal: Knowledge of disease process/condition, treatment plan, diagnostic tests, and medications will improve  Intervention: Assess knowledge level of disease process/condition, treatment plan, diagnostic tests, and medications  Pt frequently asking for snacks with high sugar content, educated on diabetic diet, pt verbalizes understanding of need to control sugars, needs reminders.

## 2017-04-19 NOTE — PROGRESS NOTES
"Received report from [facility] nurse. Assessment complete. Patient is A&Ox4, states pain \"all over\" 10/10, medicated per MAR, up with SBA, on 2L NC, no family present, PIV patent.Patient is resting now. Call light within reach, bed in lowest position, treaded socks in place.    "

## 2017-04-19 NOTE — PROGRESS NOTES
Received report from day RN. Assumed pt care. Discussed call light/phone system, communication board and POC. Pt is aao x 4 and verbalizes understanding. Pt admitted for liver disease. Labs are being monitored. Pt receives PO coumadin for recent PEs. Pt reports pain to upper abdomen. RN administers PO oxy PRN per MAR. Pt reports nausea, RN administers IV compazine PRN per MAR. Pt mobility assessed. Pt is a standby assist and calls appropriately. Pt refusing bed alarm despite education regarding fall risk. Fall precautions in place. Bed is locked and in low position, call light within reach. Treaded slippers in place. Pt needs met at this time. Hourly rounding in place.

## 2017-04-19 NOTE — PROGRESS NOTES
Pt refusing bed alarm despite safety education. Pt A&O x4 and verbalizes understanding to call before getting OOB.

## 2017-04-19 NOTE — PROGRESS NOTES
Inpatient Anticoagulation Service Note    Date: 4/19/2017  Reason for Anticoagulation: Pulmonary Embolism (4/3/17)        Hemoglobin Value: 11.8  Hematocrit Value: 37.9  Lab Platelet Value: 128  Target INR: 2.0 to 3.0    INR from last 7 days     Date/Time INR Value    04/18/17 2343 (!)2.22    04/18/17 0013 (!)2.89    04/17/17 0247 (!)4.34    04/16/17 0121 (!)2.73    04/15/17 0159 (!)1.72    04/14/17 0200 (!)2.02    04/13/17 1710 (!)2.08        Dose from last 7 days     Date/Time Dose (mg)    04/19/17 1000 5    04/18/17 1000 5    04/17/17 1200 0    04/16/17 1400 0    04/15/17 1300 5    04/14/17 1300 7.5    04/13/17 2100 --        Average Dose (mg):  (New start last admission (~6mg/day))  Significant Interactions: Aspirin  Bridge Therapy: No     Comments: INR therapeutic again today. Will continue with 5 mg daily for now. No changes to interacting drugs. LFTs, Tbili continue to improve.  Will likely be able to resume scheduled dosing now that liver function has improved. Will continue to follow.    Plan:  5 mg daily for now, may need increased dose if INR continues to trend down tomorrow  Education Material Provided?: No (Declined handout)  Pharmacist suggested discharge dosing: TBD, likely 5 mg daily with INR within 48 hours of discharge     Latisha Morris, PHARMD

## 2017-04-19 NOTE — PROGRESS NOTES
Hospital Medicine Progress Note, Adult, Complex               Author:  Antolin Millard Date & Time created: 4/18/2017  5:58 PM     CC:  67 yo female hx of DM, COPD chronic resp failure on home o2, HTN, Hepatitis C recent dc 4-12-17 with dx Bilateral PE on warfarin admitted with lethargy , sob. findings of Hepatitis.    Interval History:    Is fatigued, feels generally unwell  Not short of breath, no chest pain  Abdominal pain improving, no N/V    Review of Systems:  Review of Systems   Cardiovascular: Negative for chest pain and palpitations.   Gastrointestinal: Negative for nausea and vomiting.   Skin: Negative for itching and rash.       Physical Exam:  Physical Exam   Constitutional: She is oriented to person, place, and time. No distress.   Drowsy, arousable, nad.   Eyes: EOM are normal. Right eye exhibits no discharge. Left eye exhibits no discharge. Scleral icterus is present.   Neck: No JVD present.   Cardiovascular: Normal rate and regular rhythm.    No murmur heard.  Pulmonary/Chest: No stridor. She has no wheezes. She has no rales.   Abdominal: Soft. Bowel sounds are normal. She exhibits no distension. There is tenderness (mild upper quads). There is no rebound and no guarding.   Musculoskeletal: She exhibits no edema.   Neurological: She is alert and oriented to person, place, and time. No cranial nerve deficit.   Skin: Skin is warm and dry. She is not diaphoretic.   Multiple scabbed needle track marks.  Jaundiced.    Psychiatric: She has a normal mood and affect. Her behavior is normal.       Labs:        Invalid input(s): [id number]      Recent Labs      04/16/17   0121  04/17/17   0247  04/18/17   0942   SODIUM  134*  135  137   POTASSIUM  3.8  4.0  3.7   CHLORIDE  104  106  104   CO2  25  26  27   BUN  8  11  9   CREATININE  0.36*  0.39*  0.32*   CALCIUM  7.7*  7.7*  7.9*     Recent Labs      04/16/17   0121  04/17/17   0247  04/18/17   0942   ALTSGPT  3355*  2396*  1722*   ASTSGOT  1448*  954*  419*    ALKPHOSPHAT  158*  152*  163*   TBILIRUBIN  4.1*  3.1*  2.2*   GLUCOSE  149*  124*  82     Recent Labs      17   0121  17   0247  17   0013   PROTHROMBTM  29.8*  42.9*  31.1*   INR  2.73*  4.34*  2.89*     Recent Labs      17   0121  17   0247  17   0942   ASTSGOT  1448*  954*  419*   ALTSGPT  3355*  2396*  1722*   ALKPHOSPHAT  158*  152*  163*   TBILIRUBIN  4.1*  3.1*  2.2*           Hemodynamics:  Temp (24hrs), Av.3 °C (97.3 °F), Min:35.8 °C (96.5 °F), Max:36.6 °C (97.9 °F)  Temperature: 36.6 °C (97.9 °F)  Pulse  Av.3  Min: 50  Max: 84   Blood Pressure: 147/67 mmHg     Respiratory:    Respiration: 18, Pulse Oximetry: 100 %, O2 Daily Delivery Respiratory : Silicone Nasal Cannula     Work Of Breathing / Effort: Mild  RUL Breath Sounds: Expiratory Wheezes;Rhonchi, RML Breath Sounds: Rhonchi, RLL Breath Sounds: Diminished;Rhonchi, MARC Breath Sounds: Expiratory Wheezes;Rhonchi, LLL Breath Sounds: Diminished;Rhonchi  Fluids:    Intake/Output Summary (Last 24 hours) at 17 1758  Last data filed at 17 1756   Gross per 24 hour   Intake   2472 ml   Output      0 ml   Net   2472 ml        GI/Nutrition:  Orders Placed This Encounter   Procedures   • DIET ORDER     Standing Status: Standing      Number of Occurrences: 1      Standing Expiration Date:      Order Specific Question:  Diet:     Answer:  Diabetic [3]     Order Specific Question:  Texture/Fiber modifications:     Answer:  Chopped Meat [5]     Medical Decision Making, by Problem:  Active Hospital Problems    Diagnosis   • *Hepatitis [B19.9]  - unclear cause- ? Drug induced , ischemic / shock- although no hypotension , less likely  infectious - imaging no signs of obstruction / portal thrombosis .  -GI consult appreciated  -liver enzymes are trending down   • Pulmonary emboli (CMS-HCC) [I26.99]- recent dx- stable Resp symptoms  -INR supra therapeutic due to coumadin   • COPD (chronic obstructive pulmonary  disease) (CMS-HCC) [J44.9]  -Rt, bronchodilators.    • Lethargy [R53.83]   -?related to elevated ammonia level  -recheck AM, consider stopping lactulose at that time   • Type 2 diabetes mellitus, uncontrolled (CMS-HCC) [E11.65]  -insulin sliding scale   • Hyponatremia [E87.1]   -improved   • Drug abuse, IV [F19.10] amphetamines-- active.       Labs reviewed, Medications reviewed and Radiology images reviewed  Fagan catheter: No Fagan      DVT Prophylaxis: Warfarin (Coumadin)

## 2017-04-20 ENCOUNTER — APPOINTMENT (OUTPATIENT)
Dept: RADIOLOGY | Facility: MEDICAL CENTER | Age: 67
DRG: 423 | End: 2017-04-20
Attending: HOSPITALIST
Payer: MEDICAID

## 2017-04-20 LAB
ALBUMIN SERPL BCP-MCNC: 2.7 G/DL (ref 3.2–4.9)
ALBUMIN/GLOB SERPL: 1.1 G/DL
ALP SERPL-CCNC: 164 U/L (ref 30–99)
ALT SERPL-CCNC: 891 U/L (ref 2–50)
AMMONIA PLAS-SCNC: 60 UMOL/L (ref 11–45)
ANION GAP SERPL CALC-SCNC: 8 MMOL/L (ref 0–11.9)
AST SERPL-CCNC: 103 U/L (ref 12–45)
BASOPHILS # BLD AUTO: 0.8 % (ref 0–1.8)
BASOPHILS # BLD: 0.05 K/UL (ref 0–0.12)
BILIRUB SERPL-MCNC: 1.2 MG/DL (ref 0.1–1.5)
BUN SERPL-MCNC: 25 MG/DL (ref 8–22)
CALCIUM SERPL-MCNC: 8.6 MG/DL (ref 8.5–10.5)
CHLORIDE SERPL-SCNC: 103 MMOL/L (ref 96–112)
CO2 SERPL-SCNC: 26 MMOL/L (ref 20–33)
CREAT SERPL-MCNC: 0.67 MG/DL (ref 0.5–1.4)
EOSINOPHIL # BLD AUTO: 0.06 K/UL (ref 0–0.51)
EOSINOPHIL NFR BLD: 0.9 % (ref 0–6.9)
ERYTHROCYTE [DISTWIDTH] IN BLOOD BY AUTOMATED COUNT: 51.2 FL (ref 35.9–50)
GFR SERPL CREATININE-BSD FRML MDRD: >60 ML/MIN/1.73 M 2
GLOBULIN SER CALC-MCNC: 2.5 G/DL (ref 1.9–3.5)
GLUCOSE BLD-MCNC: 103 MG/DL (ref 65–99)
GLUCOSE BLD-MCNC: 114 MG/DL (ref 65–99)
GLUCOSE BLD-MCNC: 120 MG/DL (ref 65–99)
GLUCOSE BLD-MCNC: 124 MG/DL (ref 65–99)
GLUCOSE BLD-MCNC: 149 MG/DL (ref 65–99)
GLUCOSE SERPL-MCNC: 103 MG/DL (ref 65–99)
HCT VFR BLD AUTO: 36.4 % (ref 37–47)
HGB BLD-MCNC: 11.5 G/DL (ref 12–16)
IMM GRANULOCYTES # BLD AUTO: 0.28 K/UL (ref 0–0.11)
IMM GRANULOCYTES NFR BLD AUTO: 4.3 % (ref 0–0.9)
INR PPP: 2.91 (ref 0.87–1.13)
LYMPHOCYTES # BLD AUTO: 2.04 K/UL (ref 1–4.8)
LYMPHOCYTES NFR BLD: 31.2 % (ref 22–41)
MCH RBC QN AUTO: 27.2 PG (ref 27–33)
MCHC RBC AUTO-ENTMCNC: 31.6 G/DL (ref 33.6–35)
MCV RBC AUTO: 86.1 FL (ref 81.4–97.8)
MONOCYTES # BLD AUTO: 0.54 K/UL (ref 0–0.85)
MONOCYTES NFR BLD AUTO: 8.3 % (ref 0–13.4)
NEUTROPHILS # BLD AUTO: 3.56 K/UL (ref 2–7.15)
NEUTROPHILS NFR BLD: 54.5 % (ref 44–72)
NRBC # BLD AUTO: 0 K/UL
NRBC BLD AUTO-RTO: 0 /100 WBC
PLATELET # BLD AUTO: 112 K/UL (ref 164–446)
PMV BLD AUTO: 11 FL (ref 9–12.9)
POTASSIUM SERPL-SCNC: 4.3 MMOL/L (ref 3.6–5.5)
PROT SERPL-MCNC: 5.2 G/DL (ref 6–8.2)
PROTHROMBIN TIME: 31.3 SEC (ref 12–14.6)
RBC # BLD AUTO: 4.23 M/UL (ref 4.2–5.4)
SODIUM SERPL-SCNC: 137 MMOL/L (ref 135–145)
WBC # BLD AUTO: 6.5 K/UL (ref 4.8–10.8)

## 2017-04-20 PROCEDURE — A9270 NON-COVERED ITEM OR SERVICE: HCPCS | Performed by: HOSPITALIST

## 2017-04-20 PROCEDURE — 700102 HCHG RX REV CODE 250 W/ 637 OVERRIDE(OP): Performed by: HOSPITALIST

## 2017-04-20 PROCEDURE — 80053 COMPREHEN METABOLIC PANEL: CPT

## 2017-04-20 PROCEDURE — 700102 HCHG RX REV CODE 250 W/ 637 OVERRIDE(OP): Performed by: PHARMACIST

## 2017-04-20 PROCEDURE — 770006 HCHG ROOM/CARE - MED/SURG/GYN SEMI*

## 2017-04-20 PROCEDURE — 82962 GLUCOSE BLOOD TEST: CPT | Mod: 91

## 2017-04-20 PROCEDURE — 85610 PROTHROMBIN TIME: CPT

## 2017-04-20 PROCEDURE — A9270 NON-COVERED ITEM OR SERVICE: HCPCS | Performed by: PHARMACIST

## 2017-04-20 PROCEDURE — 82140 ASSAY OF AMMONIA: CPT

## 2017-04-20 PROCEDURE — 85025 COMPLETE CBC W/AUTO DIFF WBC: CPT

## 2017-04-20 PROCEDURE — 36415 COLL VENOUS BLD VENIPUNCTURE: CPT

## 2017-04-20 PROCEDURE — 99232 SBSQ HOSP IP/OBS MODERATE 35: CPT | Performed by: HOSPITALIST

## 2017-04-20 RX ORDER — WARFARIN SODIUM 2.5 MG/1
2.5 TABLET ORAL
Status: COMPLETED | OUTPATIENT
Start: 2017-04-20 | End: 2017-04-20

## 2017-04-20 RX ADMIN — OXYCODONE HYDROCHLORIDE 10 MG: 10 TABLET ORAL at 21:04

## 2017-04-20 RX ADMIN — OXYCODONE HYDROCHLORIDE 5 MG: 5 TABLET ORAL at 05:50

## 2017-04-20 RX ADMIN — ALPRAZOLAM 0.5 MG: 0.5 TABLET ORAL at 21:04

## 2017-04-20 RX ADMIN — WARFARIN SODIUM 2.5 MG: 2.5 TABLET ORAL at 18:04

## 2017-04-20 RX ADMIN — MORPHINE SULFATE 15 MG: 15 TABLET, EXTENDED RELEASE ORAL at 21:04

## 2017-04-20 RX ADMIN — INSULIN GLARGINE 32 UNITS: 100 INJECTION, SOLUTION SUBCUTANEOUS at 21:02

## 2017-04-20 RX ADMIN — OXYCODONE HYDROCHLORIDE 10 MG: 10 TABLET ORAL at 14:54

## 2017-04-20 RX ADMIN — ASPIRIN 81 MG: 81 TABLET ORAL at 10:02

## 2017-04-20 RX ADMIN — MORPHINE SULFATE 15 MG: 15 TABLET, EXTENDED RELEASE ORAL at 10:02

## 2017-04-20 ASSESSMENT — PAIN SCALES - GENERAL
PAINLEVEL_OUTOF10: 5
PAINLEVEL_OUTOF10: 10
PAINLEVEL_OUTOF10: 5
PAINLEVEL_OUTOF10: 8
PAINLEVEL_OUTOF10: 5

## 2017-04-20 ASSESSMENT — ENCOUNTER SYMPTOMS
COUGH: 1
NAUSEA: 0
HEMOPTYSIS: 0
VOMITING: 0
PALPITATIONS: 0

## 2017-04-20 ASSESSMENT — LIFESTYLE VARIABLES
ALCOHOL_USE: NO
EVER_SMOKED: YES

## 2017-04-20 NOTE — PROGRESS NOTES
Inpatient Anticoagulation Service Note    Date: 4/20/2017  Reason for Anticoagulation: Pulmonary Embolism (4/3/17)        Hemoglobin Value: 11.5  Hematocrit Value: 36.4  Lab Platelet Value: 112  Target INR: 2.0 to 3.0    INR from last 7 days     Date/Time INR Value    04/20/17 0328 (!)2.91    04/18/17 2343 (!)2.22    04/18/17 0013 (!)2.89    04/17/17 0247 (!)4.34    04/16/17 0121 (!)2.73    04/15/17 0159 (!)1.72    04/14/17 0200 (!)2.02    04/13/17 1710 (!)2.08        Dose from last 7 days     Date/Time Dose (mg)    04/20/17 1600 2.5    04/19/17 1000 5    04/18/17 1000 5    04/17/17 1200 0    04/16/17 1400 0    04/15/17 1300 5    04/14/17 1300 7.5    04/13/17 2100 0        Average Dose (mg):  New start last admission (~6mg/day)  Significant Interactions: Aspirin  Bridge Therapy: No     Comments: INR remains therapeutic with large increase in INR following re-initiation of dosing following held doses for large increase and supratherapeutic INRs.  Patient on low dose aspirin for history of MI x 2 - spoke with MD will continue at this time.  Patient is hesitant regarding ability to follow up for INR monitoring as an outpatient - I notified MD and they will work on prior to discharge.  Patient was willing to accept education pamphlet from me and took to review.  LFT's and T. bili continue to trend downwards.  Current weekly average of Coumadin dose = 3.2 mg daily with 3 days worth of no doses during this admission.      Plan:  Will d/c 5 mg dose tonight, give 2.5 mg daily and check INR daily x 5 ordered.  Pharmacy to monitor and adjust as needed.         Education Material Provided?: Yes (isiha 4/201/17)  Pharmacist suggested discharge dosing: likely close to 2.5 mg daily except 5 mg every M/W/F with INR 48-72 hours post discharge and continued close INR monitoring especially with resolving hepatitis.        Cris Rico, PharmD, BCPS

## 2017-04-20 NOTE — PROGRESS NOTES
Pt is AOx4 and resting comfortably in bed this AM. Pt had O2 on, but with walking PO test she maintained 91-98% O2 level. Pt ambulated several times on the unit this afternoon. Pt states she has no where to go if dishcharged. Pt does not want to go to a shelter. INR wnl. VS wnl.

## 2017-04-20 NOTE — CARE PLAN
Problem: Infection  Goal: Will remain free from infection  Intervention: Assess signs and symptoms of infection  Pt is w/o signs or symptoms of infection. VS all within norm.      Problem: Bowel/Gastric:  Goal: Will not experience complications related to bowel motility  Intervention: Assess baseline bowel pattern  Pt does not c/o of BM problems. Is having regular movements.

## 2017-04-20 NOTE — PROGRESS NOTES
Hospital Medicine Progress Note, Adult, Complex               Author:  Antolin Millard Date & Time created: 4/19/2017  9:51 PM     CC:  67 yo female hx of DM, COPD chronic resp failure on home o2, HTN, Hepatitis C recent dc 4-12-17 with dx Bilateral PE on warfarin admitted with lethargy , sob. findings of Hepatitis.    Interval History:    Feeling stronger today, less fatigued  Doesn't feel short of breath, no chest pain, no palpitations  Complains of gas pain in abdomen, thinks lactulose is making it worse    Review of Systems:  Review of Systems   Constitutional: Positive for malaise/fatigue. Negative for diaphoresis.   Cardiovascular: Negative for chest pain and palpitations.   Gastrointestinal: Negative for nausea and vomiting.   Skin: Negative for itching and rash.       Physical Exam:  Physical Exam   Constitutional: She is oriented to person, place, and time. No distress.   Drowsy, arousable, nad.   HENT:   Head: Atraumatic.   Eyes: EOM are normal. Right eye exhibits no discharge. Left eye exhibits no discharge. Scleral icterus is present.   Neck: No JVD present. No thyromegaly present.   Cardiovascular: Normal rate and regular rhythm.    No murmur heard.  Pulmonary/Chest: No stridor. She has no wheezes. She has no rales.   Abdominal: Soft. Bowel sounds are normal. She exhibits no distension. There is tenderness (mild upper quads). There is no rebound.   Musculoskeletal: She exhibits no edema.   Neurological: She is alert and oriented to person, place, and time. No cranial nerve deficit.   Skin: Skin is warm and dry. No rash noted. She is not diaphoretic.   Multiple scabbed needle track marks.  Jaundiced.    Psychiatric: She has a normal mood and affect. Her behavior is normal.       Labs:        Invalid input(s): [id number]      Recent Labs      04/17/17   0247  04/18/17   0942  04/18/17   2343   SODIUM  135  137  137   POTASSIUM  4.0  3.7  4.1   CHLORIDE  106  104  106   CO2  26  27  28   BUN  11  9  17    CREATININE  0.39*  0.32*  0.47*   CALCIUM  7.7*  7.9*  8.0*     Recent Labs      17   0942  17   2343   ALTSGPT  2396*  1722*  1332*   ASTSGOT  954*  419*  244*   ALKPHOSPHAT  152*  163*  167*   TBILIRUBIN  3.1*  2.2*  1.7*   GLUCOSE  124*  82  162*     Recent Labs      17   0013  17   2343   RBC   --    --   4.43   HEMOGLOBIN   --    --   11.8*   HEMATOCRIT   --    --   37.9   PLATELETCT   --    --   128*   PROTHROMBTM  42.9*  31.1*  25.3*   INR  4.34*  2.89*  2.22*     Recent Labs      17   0942  17   2343   WBC   --    --   7.1   NEUTSPOLYS   --    --   59.00   LYMPHOCYTES   --    --   29.10   MONOCYTES   --    --   7.60   EOSINOPHILS   --    --   0.70   BASOPHILS   --    --   0.60   ASTSGOT  954*  419*  244*   ALTSGPT  2396*  1722*  1332*   ALKPHOSPHAT  152*  163*  167*   TBILIRUBIN  3.1*  2.2*  1.7*           Hemodynamics:  Temp (24hrs), Av.3 °C (97.4 °F), Min:36.1 °C (97 °F), Max:36.4 °C (97.6 °F)  Temperature: 36.1 °C (97 °F)  Pulse  Av.4  Min: 50  Max: 84   Blood Pressure: 125/64 mmHg     Respiratory:    Respiration: 16, Pulse Oximetry: 94 %     Work Of Breathing / Effort: Mild  RML Breath Sounds: Crackles, RLL Breath Sounds: Diminished, MARC Breath Sounds: Crackles, LLL Breath Sounds: Diminished  Fluids:    Intake/Output Summary (Last 24 hours) at 17  Last data filed at 17 1800   Gross per 24 hour   Intake    480 ml   Output      0 ml   Net    480 ml        GI/Nutrition:  Orders Placed This Encounter   Procedures   • DIET ORDER     Standing Status: Standing      Number of Occurrences: 1      Standing Expiration Date:      Order Specific Question:  Diet:     Answer:  Diabetic [3]     Order Specific Question:  Texture/Fiber modifications:     Answer:  Chopped Meat [5]     Medical Decision Making, by Problem:  Active Hospital Problems    Diagnosis   • *Hepatitis [B19.9]  -is acute, had normal liver  enzymes during recent admission  -etiology unclear, would suspect toxin vs ischemic  -GI consult appreciated  -liver enzymes are trending down  -stop lactulose and assess response   • Pulmonary emboli (CMS-HCC) [I26.99]- recent dx- stable Resp symptoms  -continue coumadin  -stop ASA   • COPD (chronic obstructive pulmonary disease) (CMS-HCC) [J44.9]  -Rt, bronchodilators.    • Lethargy [R53.83]   -improved   • Type 2 diabetes mellitus, uncontrolled (CMS-HCC) [E11.65]  -insulin sliding scale   • Hyponatremia [E87.1]   -improved   • Drug abuse, IV [F19.10] amphetamines-- active.     Requires anticoagulation, home O2 and insulin at discharge.      Labs reviewed and Medications reviewed  Fagan catheter: No Fagan      DVT Prophylaxis: Warfarin (Coumadin)

## 2017-04-20 NOTE — PROGRESS NOTES
Assumed care, assessment complete.  Report received from day shift RN. Pt A&Ox4. Pt reports moderate pain, medicated per MAR.  pain at this time.  Pt educated on hourly rounding and phone extension numbers. Pt board has been updated. Pt denies any additional needs at this time. Refused bed alarm with education,, call light and phone within reach.

## 2017-04-21 ENCOUNTER — PATIENT OUTREACH (OUTPATIENT)
Dept: HEALTH INFORMATION MANAGEMENT | Facility: OTHER | Age: 67
End: 2017-04-21

## 2017-04-21 LAB
GLUCOSE BLD-MCNC: 101 MG/DL (ref 65–99)
GLUCOSE BLD-MCNC: 149 MG/DL (ref 65–99)
GLUCOSE BLD-MCNC: 150 MG/DL (ref 65–99)
GLUCOSE BLD-MCNC: 173 MG/DL (ref 65–99)
GLUCOSE BLD-MCNC: 64 MG/DL (ref 65–99)
GLUCOSE BLD-MCNC: 73 MG/DL (ref 65–99)
HCV GENTYP SERPL NAA+PROBE: NORMAL
INR PPP: 2.59 (ref 0.87–1.13)
PROTHROMBIN TIME: 28.6 SEC (ref 12–14.6)

## 2017-04-21 PROCEDURE — 82962 GLUCOSE BLOOD TEST: CPT | Mod: 91

## 2017-04-21 PROCEDURE — 99232 SBSQ HOSP IP/OBS MODERATE 35: CPT | Performed by: HOSPITALIST

## 2017-04-21 PROCEDURE — 770006 HCHG ROOM/CARE - MED/SURG/GYN SEMI*

## 2017-04-21 PROCEDURE — 85610 PROTHROMBIN TIME: CPT

## 2017-04-21 PROCEDURE — A9270 NON-COVERED ITEM OR SERVICE: HCPCS | Performed by: HOSPITALIST

## 2017-04-21 PROCEDURE — A9270 NON-COVERED ITEM OR SERVICE: HCPCS | Performed by: FAMILY MEDICINE

## 2017-04-21 PROCEDURE — 700102 HCHG RX REV CODE 250 W/ 637 OVERRIDE(OP): Performed by: FAMILY MEDICINE

## 2017-04-21 PROCEDURE — 36415 COLL VENOUS BLD VENIPUNCTURE: CPT

## 2017-04-21 PROCEDURE — 700102 HCHG RX REV CODE 250 W/ 637 OVERRIDE(OP): Performed by: HOSPITALIST

## 2017-04-21 RX ORDER — WARFARIN SODIUM 2.5 MG/1
2.5 TABLET ORAL
Status: DISCONTINUED | OUTPATIENT
Start: 2017-04-22 | End: 2017-04-22 | Stop reason: HOSPADM

## 2017-04-21 RX ORDER — TRAZODONE HYDROCHLORIDE 50 MG/1
50 TABLET ORAL ONCE
Status: COMPLETED | OUTPATIENT
Start: 2017-04-21 | End: 2017-04-21

## 2017-04-21 RX ORDER — WARFARIN SODIUM 2.5 MG/1
2.5 TABLET ORAL DAILY
Qty: 7 TAB | Refills: 0 | Status: ON HOLD | OUTPATIENT
Start: 2017-04-21 | End: 2017-04-29

## 2017-04-21 RX ORDER — WARFARIN SODIUM 2.5 MG/1
2.5 TABLET ORAL DAILY
Qty: 7 TAB | Refills: 0 | Status: SHIPPED | OUTPATIENT
Start: 2017-04-21 | End: 2017-04-21

## 2017-04-21 RX ORDER — WARFARIN SODIUM 5 MG/1
5 TABLET ORAL
Status: DISCONTINUED | OUTPATIENT
Start: 2017-04-21 | End: 2017-04-22 | Stop reason: HOSPADM

## 2017-04-21 RX ORDER — INSULIN GLARGINE 100 [IU]/ML
25 INJECTION, SOLUTION SUBCUTANEOUS EVERY EVENING
Status: DISCONTINUED | OUTPATIENT
Start: 2017-04-21 | End: 2017-04-22 | Stop reason: HOSPADM

## 2017-04-21 RX ORDER — WARFARIN SODIUM 5 MG/1
5 TABLET ORAL
Status: DISCONTINUED | OUTPATIENT
Start: 2017-04-21 | End: 2017-04-21

## 2017-04-21 RX ORDER — WARFARIN SODIUM 5 MG/1
5 TABLET ORAL
Status: DISCONTINUED | OUTPATIENT
Start: 2017-04-22 | End: 2017-04-21

## 2017-04-21 RX ORDER — WARFARIN SODIUM 2.5 MG/1
2.5 TABLET ORAL
Status: DISCONTINUED | OUTPATIENT
Start: 2017-04-22 | End: 2017-04-21

## 2017-04-21 RX ADMIN — TRAZODONE HYDROCHLORIDE 50 MG: 50 TABLET ORAL at 01:09

## 2017-04-21 RX ADMIN — OXYCODONE HYDROCHLORIDE 5 MG: 5 TABLET ORAL at 21:14

## 2017-04-21 RX ADMIN — MORPHINE SULFATE 15 MG: 15 TABLET, EXTENDED RELEASE ORAL at 09:29

## 2017-04-21 RX ADMIN — INSULIN LISPRO 2 UNITS: 100 INJECTION, SOLUTION INTRAVENOUS; SUBCUTANEOUS at 21:08

## 2017-04-21 RX ADMIN — ALPRAZOLAM 0.5 MG: 0.5 TABLET ORAL at 21:09

## 2017-04-21 RX ADMIN — ALPRAZOLAM 0.5 MG: 0.5 TABLET ORAL at 10:56

## 2017-04-21 RX ADMIN — OXYCODONE HYDROCHLORIDE 10 MG: 10 TABLET ORAL at 01:57

## 2017-04-21 RX ADMIN — ASPIRIN 81 MG: 81 TABLET ORAL at 09:29

## 2017-04-21 RX ADMIN — ALPRAZOLAM 0.5 MG: 0.5 TABLET ORAL at 01:57

## 2017-04-21 RX ADMIN — INSULIN GLARGINE 25 UNITS: 100 INJECTION, SOLUTION SUBCUTANEOUS at 21:09

## 2017-04-21 RX ADMIN — OXYCODONE HYDROCHLORIDE 5 MG: 5 TABLET ORAL at 17:15

## 2017-04-21 RX ADMIN — MORPHINE SULFATE 15 MG: 15 TABLET, EXTENDED RELEASE ORAL at 21:09

## 2017-04-21 RX ADMIN — WARFARIN SODIUM 5 MG: 5 TABLET ORAL at 17:18

## 2017-04-21 ASSESSMENT — PAIN SCALES - GENERAL
PAINLEVEL_OUTOF10: 10
PAINLEVEL_OUTOF10: 8

## 2017-04-21 ASSESSMENT — ENCOUNTER SYMPTOMS
NAUSEA: 0
WEIGHT LOSS: 0
VOMITING: 0
COUGH: 1
HEMOPTYSIS: 0
PALPITATIONS: 0

## 2017-04-21 NOTE — CARE PLAN
Problem: Safety  Goal: Will remain free from injury  Intervention: Educate patient and significant other/support system about adaptive mobility strategies and safe transfers  Pt ambulates steadily and has been educated on safe mobility.      Problem: Venous Thromboembolism (VTW)/Deep Vein Thrombosis (DVT) Prevention:  Goal: Patient will participate in Venous Thrombosis (VTE)/Deep Vein Thrombosis (DVT)Prevention Measures  Intervention: Encourage patient to perform ankle flex, foot rotation, and knee flex exercises in addition to other prophylatic measures every hour while awake  Pt walks frequently on unit

## 2017-04-21 NOTE — PROGRESS NOTES
Inpatient Anticoagulation Service Note    Date: 2017  Reason for Anticoagulation: Pulmonary Embolism (4/3/17)        Hemoglobin Value: 11.5  Hematocrit Value: 36.4  Lab Platelet Value: 112  Target INR: 2.0 to 3.0    INR from last 7 days     Date/Time INR Value    17 0148 (!)2.59    17 0328 (!)2.91    17 2343 (!)2.22    17 0013 (!)2.89    17 0247 (!)4.34    17 0121 (!)2.73    04/15/17 0159 (!)1.72        Dose from last 7 days     Date/Time Dose (mg)    17 1500 5    17 1600 2.5    17 1000 5    17 1000 5    17 1200 0    17 1400 0    04/15/17 1300 5        Average Dose (mg):  (New start last admission (~6mg/day))  Significant Interactions: Aspirin  Bridge Therapy: No     Comments: INR remains within range, with reduced dose of Coumadin given last night.  Current weekly average is ~ 3.5 mg daily.  No reported s/s of bleeding noted.        Plan:  Will start 2.5 mg daily except 5 mg on // with 5 mg to be given tonight.  INR daily x 4 ordered.        Education Material Provided?: Yes  Pharmacist suggested discharge dosin.5 mg on Tuesday, Thursday, Saturday and  and 5 mg on Monday, Wednesday and Friday with INR follow up 48-75 hours post discharge         Cris Rico, PharmD, BCPS

## 2017-04-21 NOTE — PROGRESS NOTES
Assumed care, assessment complete.  Report received from day shift RN. Pt A&Ox4. Pt reports pain 8/10, medicated per MAR.  Pt educated on hourly rounding and phone extension numbers. Pt board has been updated. Pt denies any additional needs at this time.  Refused bed alarm with education. call light and phone within reach.

## 2017-04-21 NOTE — PROGRESS NOTES
Hospital Medicine Progress Note, Adult, Complex               Author:  Antolin Millard Date & Time created: 4/20/2017  8:45 PM     CC:  65 yo female hx of DM, COPD chronic resp failure on home o2, HTN, Hepatitis C recent dc 4-12-17 with dx Bilateral PE on warfarin admitted with lethargy , sob. findings of Hepatitis.    Interval History:    Again stronger, ambulating around the unit, maintaining O2 saturations off oxygen, at time short of breath  Anxious and disorganized, asks when she can leave the hospital, ask about rehab or placement  No chest pain, no hemoptysis    Review of Systems:  Review of Systems   Respiratory: Positive for cough. Negative for hemoptysis.    Cardiovascular: Negative for chest pain and palpitations.   Gastrointestinal: Negative for nausea and vomiting.   Skin: Negative for itching and rash.       Physical Exam:  Physical Exam   Constitutional: She is oriented to person, place, and time. No distress.   Drowsy, arousable, nad.   HENT:   Head: Atraumatic.   Eyes: EOM are normal. Right eye exhibits no discharge. Left eye exhibits no discharge. Scleral icterus is present.   Neck: No JVD present. No thyromegaly present.   Cardiovascular: Normal rate and regular rhythm.    No murmur heard.  Pulmonary/Chest: No stridor. She has no wheezes. She has no rales.   Abdominal: Soft. Bowel sounds are normal. She exhibits no distension. There is no tenderness (mild upper quads). There is no rebound.   Musculoskeletal: She exhibits no edema.   Neurological: She is alert and oriented to person, place, and time. No cranial nerve deficit.   Skin: Skin is warm and dry. She is not diaphoretic.   Psychiatric: She has a normal mood and affect. Her behavior is normal.       Labs:        Invalid input(s): [id number]      Recent Labs      04/18/17   0942  04/18/17   2343  04/20/17   0328   SODIUM  137  137  137   POTASSIUM  3.7  4.1  4.3   CHLORIDE  104  106  103   CO2  27  28  26   BUN  9  17  25*   CREATININE   0.32*  0.47*  0.67   CALCIUM  7.9*  8.0*  8.6     Recent Labs      17   0942  17   2343  17   0328   ALTSGPT  1722*  1332*  891*   ASTSGOT  419*  244*  103*   ALKPHOSPHAT  163*  167*  164*   TBILIRUBIN  2.2*  1.7*  1.2   GLUCOSE  82  162*  103*     Recent Labs      17   0013  17   0328   RBC   --   4.43  4.23   HEMOGLOBIN   --   11.8*  11.5*   HEMATOCRIT   --   37.9  36.4*   PLATELETCT   --   128*  112*   PROTHROMBTM  31.1*  25.3*  31.3*   INR  2.89*  2.22*  2.91*     Recent Labs      17   0942  173  17   0328   WBC   --   7.1  6.5   NEUTSPOLYS   --   59.00  54.50   LYMPHOCYTES   --   29.10  31.20   MONOCYTES   --   7.60  8.30   EOSINOPHILS   --   0.70  0.90   BASOPHILS   --   0.60  0.80   ASTSGOT  419*  244*  103*   ALTSGPT  1722*  1332*  891*   ALKPHOSPHAT  163*  167*  164*   TBILIRUBIN  2.2*  1.7*  1.2           Hemodynamics:  Temp (24hrs), Av.3 °C (97.4 °F), Min:35.8 °C (96.5 °F), Max:36.6 °C (97.9 °F)  Temperature: 36.6 °C (97.9 °F)  Pulse  Av  Min: 50  Max: 84   Blood Pressure: 149/69 mmHg     Respiratory:    Respiration: 16, Pulse Oximetry: 100 %     Work Of Breathing / Effort: Mild  RUL Breath Sounds: Clear, RML Breath Sounds: Clear, RLL Breath Sounds: Diminished, MARC Breath Sounds: Clear, LLL Breath Sounds: Clear  Fluids:    Intake/Output Summary (Last 24 hours) at 17  Last data filed at 17 1300   Gross per 24 hour   Intake    480 ml   Output      0 ml   Net    480 ml        GI/Nutrition:  Orders Placed This Encounter   Procedures   • DIET ORDER     Standing Status: Standing      Number of Occurrences: 1      Standing Expiration Date:      Order Specific Question:  Diet:     Answer:  Diabetic [3]     Order Specific Question:  Texture/Fiber modifications:     Answer:  Chopped Meat [5]     Medical Decision Making, by Problem:  Active Hospital Problems    Diagnosis   • *Hepatitis [B19.9]  -is acute, had normal  liver enzymes during recent admission  -etiology unclear, would suspect toxin vs ischemic  -GI consult appreciated  -liver enzymes are trending down  -lactulose stopped, repeat ammonia AM   • Pulmonary emboli (CMS-HCC) [I26.99]- recent dx- stable Resp symptoms  -continue coumadin   • COPD (chronic obstructive pulmonary disease) (CMS-HCC) [J44.9]  -Rt, bronchodilators.    • Lethargy [R53.83]   -improved   • Type 2 diabetes mellitus, uncontrolled (CMS-HCC) [E11.65]  -insulin sliding scale   • Hyponatremia [E87.1]   -improved   • Drug abuse, IV [F19.10] amphetamines-- active.     Will require anticoagulation and insulin at discharge      Labs reviewed and Medications reviewed  Fagan catheter: No Fagan      DVT Prophylaxis: Warfarin (Coumadin)

## 2017-04-21 NOTE — PROGRESS NOTES
Pt is walking on unit, AOx4, trying to find a friend to stay with. O2 levels are wnl, lungs sound clear. Blood sugar running low this AM 64mg/dl just before breakfast. Came up easily w/juice and meal. Pt expected to be DCd today.

## 2017-04-22 ENCOUNTER — APPOINTMENT (OUTPATIENT)
Dept: RADIOLOGY | Facility: MEDICAL CENTER | Age: 67
DRG: 423 | End: 2017-04-22
Attending: EMERGENCY MEDICINE
Payer: MEDICAID

## 2017-04-22 ENCOUNTER — RESOLUTE PROFESSIONAL BILLING HOSPITAL PROF FEE (OUTPATIENT)
Dept: HOSPITALIST | Facility: MEDICAL CENTER | Age: 67
End: 2017-04-22
Payer: MEDICAID

## 2017-04-22 ENCOUNTER — HOSPITAL ENCOUNTER (INPATIENT)
Facility: MEDICAL CENTER | Age: 67
LOS: 7 days | DRG: 423 | End: 2017-04-29
Attending: EMERGENCY MEDICINE | Admitting: INTERNAL MEDICINE
Payer: MEDICAID

## 2017-04-22 VITALS
BODY MASS INDEX: 35.17 KG/M2 | SYSTOLIC BLOOD PRESSURE: 131 MMHG | WEIGHT: 186.29 LBS | HEIGHT: 61 IN | HEART RATE: 51 BPM | RESPIRATION RATE: 18 BRPM | OXYGEN SATURATION: 100 % | DIASTOLIC BLOOD PRESSURE: 67 MMHG | TEMPERATURE: 97 F

## 2017-04-22 DIAGNOSIS — J44.1 COPD EXACERBATION (HCC): ICD-10-CM

## 2017-04-22 DIAGNOSIS — I26.99 PULMONARY EMBOLISM, OTHER: ICD-10-CM

## 2017-04-22 LAB
ALBUMIN SERPL BCP-MCNC: 3 G/DL (ref 3.2–4.9)
ALBUMIN/GLOB SERPL: 1 G/DL
ALP SERPL-CCNC: 166 U/L (ref 30–99)
ALT SERPL-CCNC: 397 U/L (ref 2–50)
ANION GAP SERPL CALC-SCNC: 4 MMOL/L (ref 0–11.9)
APTT PPP: 34.9 SEC (ref 24.7–36)
AST SERPL-CCNC: 45 U/L (ref 12–45)
BASOPHILS # BLD AUTO: 0.9 % (ref 0–1.8)
BASOPHILS # BLD: 0.06 K/UL (ref 0–0.12)
BILIRUB SERPL-MCNC: 0.9 MG/DL (ref 0.1–1.5)
BNP SERPL-MCNC: 182 PG/ML (ref 0–100)
BUN SERPL-MCNC: 28 MG/DL (ref 8–22)
CALCIUM SERPL-MCNC: 9.1 MG/DL (ref 8.5–10.5)
CHLORIDE SERPL-SCNC: 105 MMOL/L (ref 96–112)
CO2 SERPL-SCNC: 26 MMOL/L (ref 20–33)
CREAT SERPL-MCNC: 0.6 MG/DL (ref 0.5–1.4)
EOSINOPHIL # BLD AUTO: 0.06 K/UL (ref 0–0.51)
EOSINOPHIL NFR BLD: 0.9 % (ref 0–6.9)
ERYTHROCYTE [DISTWIDTH] IN BLOOD BY AUTOMATED COUNT: 50.6 FL (ref 35.9–50)
GFR SERPL CREATININE-BSD FRML MDRD: >60 ML/MIN/1.73 M 2
GLOBULIN SER CALC-MCNC: 3.1 G/DL (ref 1.9–3.5)
GLUCOSE BLD-MCNC: 367 MG/DL (ref 65–99)
GLUCOSE BLD-MCNC: 90 MG/DL (ref 65–99)
GLUCOSE SERPL-MCNC: 269 MG/DL (ref 65–99)
HCT VFR BLD AUTO: 37.5 % (ref 37–47)
HGB BLD-MCNC: 11.7 G/DL (ref 12–16)
INR PPP: 2.28 (ref 0.87–1.13)
INR PPP: 2.39 (ref 0.87–1.13)
LYMPHOCYTES # BLD AUTO: 1.87 K/UL (ref 1–4.8)
LYMPHOCYTES NFR BLD: 30.1 % (ref 22–41)
MANUAL DIFF BLD: NORMAL
MCH RBC QN AUTO: 26.8 PG (ref 27–33)
MCHC RBC AUTO-ENTMCNC: 31.2 G/DL (ref 33.6–35)
MCV RBC AUTO: 85.8 FL (ref 81.4–97.8)
METAMYELOCYTES NFR BLD MANUAL: 1.8 %
MONOCYTES # BLD AUTO: 0.27 K/UL (ref 0–0.85)
MONOCYTES NFR BLD AUTO: 4.4 % (ref 0–13.4)
MORPHOLOGY BLD-IMP: NORMAL
MYELOCYTES NFR BLD MANUAL: 1.8 %
NEUTROPHILS # BLD AUTO: 3.73 K/UL (ref 2–7.15)
NEUTROPHILS NFR BLD: 57.5 % (ref 44–72)
NEUTS BAND NFR BLD MANUAL: 2.6 % (ref 0–10)
NRBC # BLD AUTO: 0 K/UL
NRBC BLD AUTO-RTO: 0 /100 WBC
PLATELET # BLD AUTO: 125 K/UL (ref 164–446)
PLATELET BLD QL SMEAR: NORMAL
PMV BLD AUTO: 11.5 FL (ref 9–12.9)
POTASSIUM SERPL-SCNC: 4.8 MMOL/L (ref 3.6–5.5)
PROT SERPL-MCNC: 6.1 G/DL (ref 6–8.2)
PROTHROMBIN TIME: 25.8 SEC (ref 12–14.6)
PROTHROMBIN TIME: 26.8 SEC (ref 12–14.6)
RBC # BLD AUTO: 4.37 M/UL (ref 4.2–5.4)
RBC BLD AUTO: NORMAL
SODIUM SERPL-SCNC: 135 MMOL/L (ref 135–145)
TROPONIN I SERPL-MCNC: <0.01 NG/ML (ref 0–0.04)
WBC # BLD AUTO: 6.2 K/UL (ref 4.8–10.8)

## 2017-04-22 PROCEDURE — 700102 HCHG RX REV CODE 250 W/ 637 OVERRIDE(OP): Performed by: HOSPITALIST

## 2017-04-22 PROCEDURE — 85610 PROTHROMBIN TIME: CPT | Mod: 91

## 2017-04-22 PROCEDURE — 36415 COLL VENOUS BLD VENIPUNCTURE: CPT

## 2017-04-22 PROCEDURE — 99223 1ST HOSP IP/OBS HIGH 75: CPT | Performed by: INTERNAL MEDICINE

## 2017-04-22 PROCEDURE — A9270 NON-COVERED ITEM OR SERVICE: HCPCS | Performed by: HOSPITALIST

## 2017-04-22 PROCEDURE — 82962 GLUCOSE BLOOD TEST: CPT | Mod: 91

## 2017-04-22 RX ORDER — MORPHINE SULFATE 15 MG/1
15 TABLET, FILM COATED, EXTENDED RELEASE ORAL EVERY 12 HOURS
Status: DISCONTINUED | OUTPATIENT
Start: 2017-04-22 | End: 2017-04-29 | Stop reason: HOSPADM

## 2017-04-22 RX ORDER — IPRATROPIUM BROMIDE AND ALBUTEROL SULFATE 2.5; .5 MG/3ML; MG/3ML
3 SOLUTION RESPIRATORY (INHALATION)
Status: DISCONTINUED | OUTPATIENT
Start: 2017-04-22 | End: 2017-04-23

## 2017-04-22 RX ORDER — OXYCODONE HYDROCHLORIDE 10 MG/1
10 TABLET ORAL 2 TIMES DAILY PRN
Qty: 8 TAB | Refills: 0 | Status: ON HOLD | OUTPATIENT
Start: 2017-04-22 | End: 2017-04-29

## 2017-04-22 RX ORDER — METHYLPREDNISOLONE SODIUM SUCCINATE 125 MG/2ML
125 INJECTION, POWDER, LYOPHILIZED, FOR SOLUTION INTRAMUSCULAR; INTRAVENOUS ONCE
Status: COMPLETED | OUTPATIENT
Start: 2017-04-22 | End: 2017-04-22

## 2017-04-22 RX ORDER — TRAZODONE HYDROCHLORIDE 50 MG/1
50 TABLET ORAL
Status: DISCONTINUED | OUTPATIENT
Start: 2017-04-22 | End: 2017-04-29 | Stop reason: HOSPADM

## 2017-04-22 RX ORDER — ALPRAZOLAM 0.5 MG/1
0.5 TABLET ORAL 3 TIMES DAILY PRN
Status: DISCONTINUED | OUTPATIENT
Start: 2017-04-22 | End: 2017-04-29 | Stop reason: HOSPADM

## 2017-04-22 RX ORDER — OXYCODONE HYDROCHLORIDE 10 MG/1
10 TABLET ORAL 2 TIMES DAILY PRN
Status: DISCONTINUED | OUTPATIENT
Start: 2017-04-22 | End: 2017-04-27

## 2017-04-22 RX ORDER — AMOXICILLIN 250 MG
2 CAPSULE ORAL 2 TIMES DAILY
Status: DISCONTINUED | OUTPATIENT
Start: 2017-04-22 | End: 2017-04-29 | Stop reason: HOSPADM

## 2017-04-22 RX ORDER — METOCLOPRAMIDE 10 MG/1
5 TABLET ORAL
Status: DISCONTINUED | OUTPATIENT
Start: 2017-04-23 | End: 2017-04-29 | Stop reason: HOSPADM

## 2017-04-22 RX ORDER — INSULIN GLARGINE 100 [IU]/ML
32 INJECTION, SOLUTION SUBCUTANEOUS EVERY EVENING
Status: DISCONTINUED | OUTPATIENT
Start: 2017-04-22 | End: 2017-04-23

## 2017-04-22 RX ORDER — ALBUTEROL SULFATE 2.5 MG/3ML
2.5 SOLUTION RESPIRATORY (INHALATION)
Status: DISCONTINUED | OUTPATIENT
Start: 2017-04-22 | End: 2017-04-22

## 2017-04-22 RX ORDER — ONDANSETRON 2 MG/ML
4 INJECTION INTRAMUSCULAR; INTRAVENOUS EVERY 4 HOURS PRN
Status: DISCONTINUED | OUTPATIENT
Start: 2017-04-22 | End: 2017-04-23

## 2017-04-22 RX ORDER — WARFARIN SODIUM 2.5 MG/1
2.5 TABLET ORAL
Status: DISCONTINUED | OUTPATIENT
Start: 2017-04-22 | End: 2017-04-25

## 2017-04-22 RX ORDER — METHYLPREDNISOLONE SODIUM SUCCINATE 125 MG/2ML
62.5 INJECTION, POWDER, LYOPHILIZED, FOR SOLUTION INTRAMUSCULAR; INTRAVENOUS EVERY 8 HOURS
Status: DISCONTINUED | OUTPATIENT
Start: 2017-04-23 | End: 2017-04-23

## 2017-04-22 RX ORDER — CITALOPRAM 20 MG/1
20 TABLET ORAL
Status: DISCONTINUED | OUTPATIENT
Start: 2017-04-22 | End: 2017-04-29 | Stop reason: HOSPADM

## 2017-04-22 RX ORDER — GABAPENTIN 300 MG/1
300 CAPSULE ORAL 3 TIMES DAILY
Status: DISCONTINUED | OUTPATIENT
Start: 2017-04-22 | End: 2017-04-29 | Stop reason: HOSPADM

## 2017-04-22 RX ORDER — MORPHINE SULFATE 15 MG/1
15 TABLET, FILM COATED, EXTENDED RELEASE ORAL EVERY 12 HOURS
Qty: 8 TAB | Refills: 0 | Status: ON HOLD | OUTPATIENT
Start: 2017-04-22 | End: 2017-05-10

## 2017-04-22 RX ORDER — BUDESONIDE AND FORMOTEROL FUMARATE DIHYDRATE 80; 4.5 UG/1; UG/1
2 AEROSOL RESPIRATORY (INHALATION)
Status: DISCONTINUED | OUTPATIENT
Start: 2017-04-22 | End: 2017-04-23

## 2017-04-22 RX ORDER — BISACODYL 10 MG
10 SUPPOSITORY, RECTAL RECTAL
Status: DISCONTINUED | OUTPATIENT
Start: 2017-04-22 | End: 2017-04-29 | Stop reason: HOSPADM

## 2017-04-22 RX ORDER — ENALAPRILAT 1.25 MG/ML
1.25 INJECTION INTRAVENOUS EVERY 6 HOURS PRN
Status: DISCONTINUED | OUTPATIENT
Start: 2017-04-22 | End: 2017-04-23

## 2017-04-22 RX ORDER — RISPERIDONE 0.5 MG/1
0.5 TABLET ORAL
Status: DISCONTINUED | OUTPATIENT
Start: 2017-04-22 | End: 2017-04-29 | Stop reason: HOSPADM

## 2017-04-22 RX ORDER — POLYETHYLENE GLYCOL 3350 17 G/17G
1 POWDER, FOR SOLUTION ORAL
Status: DISCONTINUED | OUTPATIENT
Start: 2017-04-22 | End: 2017-04-29 | Stop reason: HOSPADM

## 2017-04-22 RX ADMIN — ALBUTEROL SULFATE 2.5 MG: 2.5 SOLUTION RESPIRATORY (INHALATION) at 18:04

## 2017-04-22 RX ADMIN — MORPHINE SULFATE 15 MG: 15 TABLET, EXTENDED RELEASE ORAL at 20:43

## 2017-04-22 RX ADMIN — ALPRAZOLAM 0.5 MG: 0.5 TABLET ORAL at 01:08

## 2017-04-22 RX ADMIN — BUDESONIDE AND FORMOTEROL FUMARATE DIHYDRATE 2 PUFF: 80; 4.5 AEROSOL RESPIRATORY (INHALATION) at 23:32

## 2017-04-22 RX ADMIN — ALPRAZOLAM 0.5 MG: 0.5 TABLET ORAL at 20:43

## 2017-04-22 RX ADMIN — METHYLPREDNISOLONE SODIUM SUCCINATE 125 MG: 125 INJECTION, POWDER, FOR SOLUTION INTRAMUSCULAR; INTRAVENOUS at 17:53

## 2017-04-22 RX ADMIN — GABAPENTIN 300 MG: 300 CAPSULE ORAL at 20:43

## 2017-04-22 RX ADMIN — IPRATROPIUM BROMIDE AND ALBUTEROL SULFATE 3 ML: .5; 3 SOLUTION RESPIRATORY (INHALATION) at 18:05

## 2017-04-22 RX ADMIN — OXYCODONE HYDROCHLORIDE 5 MG: 5 TABLET ORAL at 01:08

## 2017-04-22 RX ADMIN — RISPERIDONE 0.5 MG: 0.5 TABLET, FILM COATED ORAL at 23:32

## 2017-04-22 RX ADMIN — MORPHINE SULFATE 15 MG: 15 TABLET, EXTENDED RELEASE ORAL at 08:28

## 2017-04-22 RX ADMIN — ASPIRIN 81 MG: 81 TABLET ORAL at 08:27

## 2017-04-22 RX ADMIN — WARFARIN SODIUM 2.5 MG: 2.5 TABLET ORAL at 20:57

## 2017-04-22 RX ADMIN — IPRATROPIUM BROMIDE AND ALBUTEROL SULFATE 3 ML: .5; 3 SOLUTION RESPIRATORY (INHALATION) at 23:43

## 2017-04-22 ASSESSMENT — COPD QUESTIONNAIRES
DURING THE PAST 4 WEEKS HOW MUCH DID YOU FEEL SHORT OF BREATH: MOST  OR ALL OF THE TIME
DURING THE PAST 4 WEEKS HOW MUCH DID YOU FEEL SHORT OF BREATH: MOST  OR ALL OF THE TIME
HAVE YOU SMOKED AT LEAST 100 CIGARETTES IN YOUR ENTIRE LIFE: YES
DO YOU EVER COUGH UP ANY MUCUS OR PHLEGM?: YES, A FEW DAYS A WEEK OR MONTH
COPD SCREENING SCORE: 8
COPD SCREENING SCORE: 8
HAVE YOU SMOKED AT LEAST 100 CIGARETTES IN YOUR ENTIRE LIFE: YES
DO YOU EVER COUGH UP ANY MUCUS OR PHLEGM?: YES, A FEW DAYS A WEEK OR MONTH

## 2017-04-22 ASSESSMENT — PAIN SCALES - GENERAL
PAINLEVEL_OUTOF10: 10
PAINLEVEL_OUTOF10: ASSUMED PAIN PRESENT
PAINLEVEL_OUTOF10: 8

## 2017-04-22 ASSESSMENT — PATIENT HEALTH QUESTIONNAIRE - PHQ9
SUM OF ALL RESPONSES TO PHQ9 QUESTIONS 1 AND 2: 0
1. LITTLE INTEREST OR PLEASURE IN DOING THINGS: NOT AT ALL
2. FEELING DOWN, DEPRESSED, IRRITABLE, OR HOPELESS: NOT AT ALL
SUM OF ALL RESPONSES TO PHQ QUESTIONS 1-9: 0

## 2017-04-22 ASSESSMENT — LIFESTYLE VARIABLES
EVER_SMOKED: YES
DO YOU DRINK ALCOHOL: NO
EVER_SMOKED: YES
ALCOHOL_USE: NO
EVER_SMOKED: YES

## 2017-04-22 NOTE — PROGRESS NOTES
Hospital Medicine Progress Note, Adult, Complex               Author:  Antolin Millard Date & Time created: 4/21/2017  11:54 PM     CC:  65 yo female hx of DM, COPD chronic resp failure on home o2, HTN, Hepatitis C recent dc 4-12-17 with dx Bilateral PE on warfarin admitted with lethargy , sob. findings of Hepatitis.    Interval History:    Awake, more alert, walks around the unit  Denies abdominal pain  At times short of breath, saturations are in 90's on room air, is on room air  Upset that the hospital cannot provide housing, she has called a friend and can stay at her house tomorrow      Review of Systems:  Review of Systems   Constitutional: Positive for malaise/fatigue. Negative for weight loss.   Respiratory: Positive for cough. Negative for hemoptysis.    Cardiovascular: Negative for chest pain and palpitations.   Gastrointestinal: Negative for nausea and vomiting.       Physical Exam:  Physical Exam   Constitutional: She is oriented to person, place, and time. No distress.   Drowsy, arousable, nad.   HENT:   Head: Normocephalic and atraumatic.   Eyes: EOM are normal. Right eye exhibits no discharge. Left eye exhibits no discharge. Scleral icterus is present.   Neck: No thyromegaly present.   Cardiovascular: Normal rate and regular rhythm.    No murmur heard.  Pulmonary/Chest: She has no wheezes. She has no rales.   Abdominal: Soft. Bowel sounds are normal. She exhibits no distension. There is no tenderness (mild upper quads). There is no rebound.   Musculoskeletal: She exhibits no edema.   Neurological: She is alert and oriented to person, place, and time. No cranial nerve deficit.   Skin: Skin is warm and dry. She is not diaphoretic.   Psychiatric: She has a normal mood and affect. Her behavior is normal.       Labs:        Invalid input(s): [id number]      Recent Labs      04/20/17   0328   SODIUM  137   POTASSIUM  4.3   CHLORIDE  103   CO2  26   BUN  25*   CREATININE  0.67   CALCIUM  8.6     Recent Labs       17   0328   ALTSGPT  891*   ASTSGOT  103*   ALKPHOSPHAT  164*   TBILIRUBIN  1.2   GLUCOSE  103*     Recent Labs      17   0328  17   0148   RBC  4.23   --    HEMOGLOBIN  11.5*   --    HEMATOCRIT  36.4*   --    PLATELETCT  112*   --    PROTHROMBTM  31.3*  28.6*   INR  2.91*  2.59*     Recent Labs      17   0328   WBC  6.5   NEUTSPOLYS  54.50   LYMPHOCYTES  31.20   MONOCYTES  8.30   EOSINOPHILS  0.90   BASOPHILS  0.80   ASTSGOT  103*   ALTSGPT  891*   ALKPHOSPHAT  164*   TBILIRUBIN  1.2           Hemodynamics:  Temp (24hrs), Av.7 °C (98 °F), Min:36.3 °C (97.4 °F), Max:37.3 °C (99.1 °F)  Temperature: 37.3 °C (99.1 °F)  Pulse  Av.2  Min: 50  Max: 84   Blood Pressure: 139/68 mmHg     Respiratory:    Respiration: 18, Pulse Oximetry: 99 %     Work Of Breathing / Effort: Mild  RUL Breath Sounds: Clear, RML Breath Sounds: Diminished, RLL Breath Sounds: Diminished, MARC Breath Sounds: Clear, LLL Breath Sounds: Diminished  Fluids:    Intake/Output Summary (Last 24 hours) at 17 2354  Last data filed at 17 0800   Gross per 24 hour   Intake   1140 ml   Output      0 ml   Net   1140 ml        GI/Nutrition:  Orders Placed This Encounter   Procedures   • DIET ORDER     Standing Status: Standing      Number of Occurrences: 1      Standing Expiration Date:      Order Specific Question:  Diet:     Answer:  Diabetic [3]     Order Specific Question:  Texture/Fiber modifications:     Answer:  Chopped Meat [5]     Medical Decision Making, by Problem:  Active Hospital Problems    Diagnosis   • *Hepatitis [B19.9]  -is acute, had normal liver enzymes during recent admission  -etiology unclear, would suspect toxin vs ischemic  -GI consult appreciated  -liver enzymes are trending down  -lactulose stopped, no longer encephalopathic  -follow up with digestive health   • Pulmonary emboli (CMS-HCC) [I26.99]- recent dx- stable Resp symptoms  -continue coumadin   • COPD (chronic obstructive pulmonary  disease) (CMS-HCC) [J44.9]  -Rt, bronchodilators.   -oxygenation is fine on room air   • Lethargy [R53.83]   -improved   • Type 2 diabetes mellitus, uncontrolled (CMS-HCC) [E11.65]  -insulin sliding scale   • Hyponatremia [E87.1]   -improved   • Drug abuse, IV [F19.10] amphetamines-- active.     Will require anticoagulation and insulin at discharge      Labs reviewed and Medications reviewed  Fagan catheter: No Fagan      DVT Prophylaxis: Warfarin (Coumadin)            Probable discharge home tomorrow, follow up with [facility], PCP.

## 2017-04-22 NOTE — IP AVS SNAPSHOT
" <p align=\"LEFT\"><IMG SRC=\"//[email or web address]\" alt=\"Image\" WIDTH=\"50%\" HEIGHT=\"200\" BORDER=\"\"></p>                   Name:Zenia Ordaz  Medical Record Number:[id number]  CSN: [id number]    YOB: 1950   Age: 66 y.o.  Sex: female  HT:1.549 m (5' 1\") WT: 85 kg (187 lb 6.3 oz)          Admit Date: 4/22/2017     Discharge Date:   Today's Date: 4/29/2017  Attending Doctor:  Renato Sawyer M.D.                  Allergies:  Mushroom extract complex; Tylenol; and Zofran          Follow-up Information     1. Follow up with [facility]. Schedule an appointment as soon as possible for a visit in 2 weeks.    Why:  Hospital follow-up appointment with PCP    Contact information    [address] [place] [address] [phone]        2. Follow up with Pcp Pt States None .    Specialty:  Family Medicine         Medication List      Take these Medications        Instructions    alprazolam 0.5 MG Tabs   Commonly known as:  XANAX    Take 1 Tab by mouth 3 times a day as needed for Sleep or Anxiety.   Dose:  0.5 mg       amlodipine 10 MG Tabs   Commonly known as:  NORVASC    Take 1 Tab by mouth every day.   Dose:  10 mg       aspirin EC 81 MG Tbec   Commonly known as:  ECOTRIN    Take 1 Tab by mouth every day.   Dose:  81 mg       budesonide-formoterol 80-4.5 MCG/ACT Aero   Commonly known as:  SYMBICORT    Inhale 2 Puffs by mouth 2 Times a Day.   Dose:  2 Puff       citalopram 20 MG Tabs   Commonly known as:  CELEXA    Take 1 Tab by mouth every day.   Dose:  20 mg       gabapentin 300 MG Caps   Commonly known as:  NEURONTIN    Take 1 Cap by mouth 3 times a day.   Dose:  300 mg       Guaifenesin 1200 MG Tb12    Take 1 Tab by mouth every 12 hours.   Dose:  1200 mg       insulin glargine 100 UNIT/ML Soln   Commonly known as:  LANTUS    Inject 32 Units as instructed every evening.   Dose:  32 Units       methylPREDNISolone 4 MG Tabs   Commonly known as:  MEDROL    Take 2 mg BID for 2 days, then " 1 mg BID for 2 days, then 1 mg daily for 2 days.       metoclopramide 5 MG tablet   Commonly known as:  REGLAN    Take 1 Tab by mouth 3 times a day before meals. Indications: Nausea and Vomiting   Dose:  5 mg       morphine ER 15 MG Tbcr tablet   Commonly known as:  MS CONTIN    Take 1 Tab by mouth every 12 hours.   Dose:  15 mg       risperidone 0.5 MG Tabs   Commonly known as:  RISPERDAL    Take 1 Tab by mouth every bedtime.   Dose:  0.5 mg       trazodone 50 MG Tabs   Commonly known as:  DESYREL    Take 1 Tab by mouth every day.   Dose:  50 mg

## 2017-04-22 NOTE — IP AVS SNAPSHOT
4/29/2017    Zenia Ordaz  [facility] [id number]    Dear Zenia:    [facility] wants to ensure your discharge home is safe and you or your loved ones have had all of your questions answered regarding your care after you leave the hospital.    Below is a list of resources and contact information should you have any questions regarding your hospital stay, follow-up instructions, or active medical symptoms.    Questions or Concerns Regarding… Contact   Medical Questions Related to Your Discharge  (7 days a week, 8am-5pm) Contact a Nurse Care Coordinator   [phone]   Medical Questions Not Related to Your Discharge  (24 hours a day / 7 days a week)  Contact the Nurse Health Line   [phone]    Medications or Discharge Instructions Refer to your discharge packet   or contact your [facility] Primary Care Provider   [phone]   Follow-up Appointment(s) Schedule your appointment via Meme[employer]   or contact Scheduling [phone]   Billing Review your statement via Memeo  or contact Billing [phone]   Medical Records Review your records via Memeo   or contact Medical Records [phone]     You may receive a telephone call within two days of discharge. This call is to make certain you understand your discharge instructions and have the opportunity to have any questions answered. You can also easily access your medical information, test results and upcoming appointments via the [employer] free online health management tool. You can learn more and sign up at [email or web address]. For assistance setting up your Memeo account, please call [phone].    Once again, we want to ensure your discharge home is safe and that you have a clear understanding of any next steps in your care. If you have any questions or concerns, please do not hesitate to contact us, we are here for you. Thank you for choosing [facility] for your healthcare needs.    Sincerely,    Your [facility] Healthcare Team

## 2017-04-22 NOTE — DISCHARGE INSTRUCTIONS
Discharge Instructions    Discharged to other by taxi with self. Discharged via walking, hospital escort: Yes.  Special equipment needed: Not Applicable    Be sure to schedule a follow-up appointment with your primary care doctor or any specialists as instructed.     Discharge Plan:   Diet Plan: Discussed  Activity Level: Discussed  Smoking Cessation Offered: Patient Counseled  Confirmed Follow up Appointment: Patient to Call and Schedule Appointment  Confirmed Symptoms Management: Discussed  Medication Reconciliation Updated: Yes  Influenza Vaccine Indication: Not indicated: Previously immunized this influenza season and > 8 years of age  Influenza Vaccine Given - only chart on this line when given: Influenza Vaccine Given (See MAR)    I understand that a diet low in cholesterol, fat, and sodium is recommended for good health. Unless I have been given specific instructions below for another diet, I accept this instruction as my diet prescription.   Other diet: low carb/low sodium    Special Instructions: None    · Is patient discharged on Warfarin / Coumadin?   Yes    You are receiving the drug warfarin. Please understand the importance of monitoring warfarin with scheduled PT/INR blood draws.  Follow-up with the [facility] in one week for INR lab.Warfarin Coagulopathy  Warfarin (Coumadin®) coagulopathy refers to bleeding that may occur as a complication of the medicine warfarin. Warfarin is an oral blood thinner (anticoagulant). Warfarin is used for medical conditions where thinning of the blood is needed to prevent blood clots.   CAUSES  Bleeding is the most common and most serious complication of warfarin. The amount of bleeding is related to the warfarin dose and length of treatment. In addition, bleeding complications can also occur due to:  · Intentional or accidental warfarin overdose.  · Underlying medical conditions.  · Dietary changes.  · Medicine, herbal, supplement, or alcohol  interactions.  SYMPTOMS  Severe bleeding while on warfarin may occur from any tissue or organ. Symptoms of the blood being too thin may include:  · Bleeding from the nose or gums.  · Blood in bowel movements which may appear as bright red, dark, or black tarry stools.  · Blood in the urine which may appear as pink, red, or brown urine.  · Unusual bruising or bruising easily.  · A cut that does not stop bleeding within 10 minutes.  · Vomiting blood or continuous nausea for more than 1 day.  · Coughing up blood.  · Broken blood vessels in your eye (subconjunctival hemorrhage).  · Abdominal or back pain with or without flank bruising.  · Sudden, severe headache.  · Sudden weakness or numbness of the face, arm, or leg, especially on one side of the body.  · Sudden confusion.  · Trouble speaking (aphasia) or understanding.  · Sudden trouble seeing in one or both eyes.  · Sudden trouble walking.  · Dizziness.  · Loss of balance or coordination.  · Vaginal bleeding.  · Swelling or pain at an injection site.  · Superficial fat tissue death (necrosis) which may cause skin scarring. This is more common in women and may first present as pain in the waist, thighs, or buttocks.  HOME CARE INSTRUCTIONS  · Always contact your health care provider of any concerns or signs of possible warfarin coagulopathy as soon as possible.  · Take warfarin exactly as directed by your health care provider. It is recommended that you take your warfarin dose at the same time of the day. If you have been told to stop taking warfarin, do not resume taking warfarin until directed to do so by your health care provider. Follow your health care provider's instructions if you accidentally take an extra dose or miss a dose of warfarin. It is very important to take warfarin as directed since bleeding or blood clots could result in chronic or permanent injury, pain, or disability.  · Keep all follow-up appointments with your health care provider as directed.  It is very important to keep your appointments. Not keeping appointments could result in a chronic or permanent injury, pain, or disability because warfarin is a medicine that requires close monitoring.  · While taking warfarin, you will need to have regular blood tests to measure your blood clotting time. These blood tests usually include both the prothrombin time (PT) and International Normalized Ratio (INR) tests. The PT and INR results allow your health care provider to adjust your dose of warfarin. The dose can change for many reasons. It is critically important that you have your PT and INR levels drawn exactly as directed. Your warfarin dose may stay the same or change depending on what the PT and INR results are. Be sure to follow up with your health care provider regarding your PT and INR test results and what your warfarin dosage should be.  · Many medicines can interfere with warfarin and affect the PT and INR results. You must tell your health care provider about any and all medicines you take. This includes all vitamins and supplements. Ask your health care provider before taking these. Prescription and over-the-counter medicine consistency is critical to warfarin management. It is important that potential interactions are checked before you start a new medicine. Be especially cautious with aspirin and anti-inflammatory medicines. Ask your health care provider before taking these. Medicines such as antibiotics and acid-reducing medicine can interact with warfarin and can cause an increased warfarin effect. Warfarin can also interfere with the effectiveness of medicines you are taking. Do not take or discontinue any prescribed or over-the-counter medicine except on the advice of your health care provider or pharmacist.  · Some vitamins, supplements, and herbal products interfere with the effectiveness of warfarin. Vitamin E may increase the anticoagulant effects of warfarin. Vitamin K can cause warfarin  to be less effective. Do not take or discontinue any vitamin, supplement, or herbal product except on the advice of your health care provider or pharmacist.  · Eat what you normally eat and keep the vitamin K content of your diet consistent. Avoid major changes in your diet, or notify your health care provider before changing your diet. Suddenly getting a lot more vitamin K could cause your blood to clot too quickly. A sudden decrease in vitamin K intake could cause your blood to clot too slowly. These changes in vitamin K intake could lead to dangerous blood clots or to bleeding. To keep your vitamin K intake consistent, you must be aware of which foods contain moderate or high amounts of vitamin K. Some foods that are high in vitamin K include spinach, kale, broccoli, cabbage, greens, [place] sprouts, asparagus, bok sebas, coleslaw, and parsley. If you drink green tea, drink the same amount each day. Arrange a visit with a dietitian to answer your questions.  · If you have a loss of appetite or get the stomach flu (viral gastroenteritis), talk to your health care provider as soon as possible. A decrease in your normal vitamin K intake can make you more sensitive to your usual dose of warfarin.  · Some medical conditions may increase your risk for bleeding while you are taking warfarin. A fever, diarrhea lasting more than a day, worsening heart failure, or worsening liver function are some medical conditions that could affect warfarin. Contact your health care provider if you have any of these medical conditions.  · Be careful not to cut yourself when using sharp objects or while shaving.  · Alcohol can change the body's ability to handle warfarin. It is best to avoid alcoholic drinks or consume only very small amounts while taking warfarin. Notify your health care provider if you change your alcohol intake. A sudden increase in alcohol use can increase your risk of bleeding. Chronic alcohol use can cause warfarin  to be less effective.  · Limit physical activities or sports that could result in a fall or cause injury.  · Do not use warfarin if you are pregnant.  · Inform all your health care providers and your dentist that you take warfarin.  · Inform all health care providers if you are taking warfarin and aspirin or platelet inhibitor medicines such as clopidogrel, ticagrelor, or prasugrel. Use of these medicines in addition to warfarin can increase your risk of bleeding or death. Taking these medicines together should only be done under the direct care of your health care providers.  SEEK IMMEDIATE MEDICAL CARE IF:  · You cough up blood.  · You have dark or black stools or there is bright red blood coming from your rectum.  · You vomit blood or have nausea for more than 1 day.  · You have blood in the urine or pink-colored urine.  · You have unusual bruising or have increased bruising.  · You have bleeding from the nose or gums that does not stop quickly.  · You have a cut that does not stop bleeding within 2-3 minutes.  · You have sudden weakness or numbness of the face, arm, or leg, especially on one side of the body.  · You have sudden confusion.  · You have trouble speaking (aphasia) or understanding.  · You have sudden trouble seeing in one or both eyes.  · You have sudden trouble walking.  · You have dizziness.  · You have a loss of balance or coordination.  · You have a sudden, severe headache.  · You have a serious fall or head injury, even if you are not bleeding.  · You have swelling or pain at an injection site.  · You have unexplained tenderness or pain in the abdomen, back, waist, thighs, or buttocks.  Any of these symptoms may represent a serious problem that is an emergency. Do not wait to see if the symptoms will go away. Get medical help right away. Call your local emergency services (911 in [place] Do not drive yourself to the hospital.     This information is not intended to replace advice given to you by  "your health care provider. Make sure you discuss any questions you have with your health care provider.     Document Released: 11/26/2007 Document Revised: 05/03/2016 Document Reviewed: 05/28/2013  [employer] Patient Education ©2016 [employer]  .    IMPORTANT: HOW TO USE THIS INFORMATION:  This is a summary and does NOT have all possible information about this product. This information does not assure that this product is safe, effective, or appropriate for you. This information is not individual medical advice and does not substitute for the advice of your health care professional. Always ask your health care professional for complete information about this product and your specific health needs.      WARFARIN - ORAL (WARF-uh-rin)      COMMON BRAND NAME(S): Coumadin      WARNING:  Warfarin can cause very serious (possibly fatal) bleeding. This is more likely to occur when you first start taking this medication or if you take too much warfarin. To decrease your risk for bleeding, your doctor or other health care provider will monitor you closely and check your lab results (INR test) to make sure you are not taking too much warfarin. Keep all medical and laboratory appointments. Tell your doctor right away if you notice any signs of serious bleeding. See also Side Effects section.      USES:  This medication is used to treat blood clots (such as in deep vein thrombosis-DVT or pulmonary embolus-PE) and/or to prevent new clots from forming in your body. Preventing harmful blood clots helps to reduce the risk of a stroke or heart attack. Conditions that increase your risk of developing blood clots include a certain type of irregular heart rhythm (atrial fibrillation), heart valve replacement, recent heart attack, and certain surgeries (such as hip/knee replacement). Warfarin is commonly called a \"blood thinner,\" but the more correct term is \"anticoagulant.\" It helps to keep blood flowing smoothly in your " body by decreasing the amount of certain substances (clotting proteins) in your blood.      HOW TO USE:  Read the Medication Guide provided by your pharmacist before you start taking warfarin and each time you get a refill. If you have any questions, ask your doctor or pharmacist. Take this medication by mouth with or without food as directed by your doctor or other health care professional, usually once a day. It is very important to take it exactly as directed. Do not increase the dose, take it more frequently, or stop using it unless directed by your doctor. Dosage is based on your medical condition, laboratory tests (such as INR), and response to treatment. Your doctor or other health care provider will monitor you closely while you are taking this medication to determine the right dose for you. Use this medication regularly to get the most benefit from it. To help you remember, take it at the same time each day. It is important to eat a balanced, consistent diet while taking warfarin. Some foods can affect how warfarin works in your body and may affect your treatment and dose. Avoid sudden large increases or decreases in your intake of foods high in vitamin K (such as broccoli, cauliflower, cabbage, brussels sprouts, kale, spinach, and other green leafy vegetables, liver, green tea, certain vitamin supplements). If you are trying to lose weight, check with your doctor before you try to go on a diet. Cranberry products may also affect how your warfarin works. Limit the amount of cranberry juice (16 ounces/480 milliliters a day) or other cranberry products you may drink or eat.      SIDE EFFECTS:  Nausea, loss of appetite, or stomach/abdominal pain may occur. If any of these effects persist or worsen, tell your doctor or pharmacist promptly. Remember that your doctor has prescribed this medication because he or she has judged that the benefit to you is greater than the risk of side effects. Many people using this  medication do not have serious side effects. This medication can cause serious bleeding if it affects your blood clotting proteins too much (shown by unusually high INR lab results). Even if your doctor stops your medication, this risk of bleeding can continue for up to a week. Tell your doctor right away if you have any signs of serious bleeding, including: unusual pain/swelling/discomfort, unusual/easy bruising, prolonged bleeding from cuts or gums, persistent/frequent nosebleeds, unusually heavy/prolonged menstrual flow, pink/dark urine, coughing up blood, vomit that is bloody or looks like coffee grounds, severe headache, dizziness/fainting, unusual or persistent tiredness/weakness, bloody/black/tarry stools, chest pain, shortness of breath, difficulty swallowing. Tell your doctor right away if any of these unlikely but serious side effects occur: persistent nausea/vomiting, severe stomach/abdominal pain, yellowing eyes/skin. This drug rarely has caused very serious (possibly fatal) problems if its effects lead to small blood clots (usually at the beginning of treatment). This can lead to severe skin/tissue damage that may require surgery or amputation if left untreated. Patients with certain blood conditions (protein C or S deficiency) may be at greater risk. Get medical help right away if any of these rare but serious side effects occur: painful/red/purplish patches on the skin (such as on the toe, breast, abdomen), change in the amount of urine, vision changes, confusion, slurred speech, weakness on one side of the body. A very serious allergic reaction to this drug is rare. However, get medical help right away if you notice any symptoms of a serious allergic reaction, including: rash, itching/swelling (especially of the face/tongue/throat), severe dizziness, trouble breathing. This is not a complete list of possible side effects. If you notice other effects not listed above, contact your doctor or  pharmacist. In the [place] - Call your doctor for medical advice about side effects. You may report side effects to FDA at 0-833-HPX-1200. In [place] - Call your doctor for medical advice about side effects. You may report side effects to [facility] [place] at [phone].      PRECAUTIONS:  Before taking warfarin, tell your doctor or pharmacist if you are allergic to it; or if you have any other allergies. This product may contain inactive ingredients, which can cause allergic reactions or other problems. Talk to your pharmacist for more details. Before using this medication, tell your doctor or pharmacist your medical history, especially of: blood disorders (such as anemia, hemophilia), bleeding problems (such as bleeding of the stomach/intestines, bleeding in the brain), blood vessel disorders (such as aneurysms), recent major injury/surgery, liver disease, alcohol use, mental/mood disorders (including memory problems), frequent falls/injuries. It is important that all your doctors and dentists know that you take warfarin. Before having surgery or any medical/dental procedures, tell your doctor or dentist that you are taking this medication and about all the products you use (including prescription drugs, nonprescription drugs, and herbal products). Avoid getting injections into the muscles. If you must have an injection into a muscle (for example, a flu shot), it should be given in the arm. This way, it will be easier to check for bleeding and/or apply pressure bandages. This medication may cause stomach bleeding. Daily use of alcohol while using this medicine will increase your risk for stomach bleeding and may also affect how this medication works. Limit or avoid alcoholic beverages. If you have not been eating well, if you have an illness or infection that causes fever, vomiting, or diarrhea for more than 2 days, or if you start using any antibiotic medications, contact your doctor or pharmacist immediately because  these conditions can affect how warfarin works. This medication can cause heavy bleeding. To lower the chance of getting cut, bruised, or injured, use great caution with sharp objects like safety razors and nail cutters. Use an electric razor when shaving and a soft toothbrush when brushing your teeth. Avoid activities such as contact sports. If you fall or injure yourself, especially if you hit your head, call your doctor immediately. Your doctor may need to check you. The Food & Drug Administration has stated that generic warfarin products are interchangeable. However, consult your doctor or pharmacist before switching warfarin products. Be careful not to take more than one medication that contains warfarin unless specifically directed by the doctor or health care provider who is monitoring your warfarin treatment. Older adults may be at greater risk for bleeding while using this drug. This medication is not recommended for use during pregnancy because of serious (possibly fatal) harm to an unborn baby. Discuss the use of reliable forms of birth control with your doctor. If you become pregnant or think you may be pregnant, tell your doctor immediately. If you are planning pregnancy, discuss a plan for managing your condition with your doctor before you become pregnant. Your doctor may switch the type of medication you use during pregnancy. Very small amounts of this medication may pass into breast milk but is unlikely to harm a nursing infant. Consult your doctor before breast-feeding.      DRUG INTERACTIONS:  Drug interactions may change how your medications work or increase your risk for serious side effects. This document does not contain all possible drug interactions. Keep a list of all the products you use (including prescription/nonprescription drugs and herbal products) and share it with your doctor and pharmacist. Do not start, stop, or change the dosage of any medicines without your doctor's approval.  "Warfarin interacts with many prescription, nonprescription, vitamin, and herbal products. This includes medications that are applied to the skin or inside the vagina or rectum. The interactions with warfarin usually result in an increase or decrease in the \"blood-thinning\" (anticoagulant) effect. Your doctor or other health care professional should closely monitor you to prevent serious bleeding or clotting problems. While taking warfarin, it is very important to tell your doctor or pharmacist of any changes in medications, vitamins, or herbal products that you are taking. Some products that may interact with this drug include: capecitabine, imatinib, mifepristone. Aspirin, aspirin-like drugs (salicylates), and nonsteroidal anti-inflammatory drugs (NSAIDs such as ibuprofen, naproxen, celecoxib) may have effects similar to warfarin. These drugs may increase the risk of bleeding problems if taken during treatment with warfarin. Carefully check all prescription/nonprescription product labels (including drugs applied to the skin such as pain-relieving creams) since the products may contain NSAIDs or salicylates. Talk to your doctor about using a different medication (such as acetaminophen) to treat pain/fever. Low-dose aspirin and related drugs (such as clopidogrel, ticlopidine) should be continued if prescribed by your doctor for specific medical reasons such as heart attack or stroke prevention. Consult your doctor or pharmacist for more details. Many herbal products interact with warfarin. Tell your doctor before taking any herbal products, especially bromelains, coenzyme Q10, cranberry, danshen, dong quai, fenugreek, garlic, ginkgo biloba, ginseng, and Henny's wort, among others. This medication may interfere with a certain laboratory test to measure theophylline levels, possibly causing false test results. Make sure laboratory personnel and all your doctors know you use this drug.      OVERDOSE:  If overdose is " suspected, contact a poison control center or emergency room immediately. [place] residents can call the [place] National Poison Hotline at 1-934.400.1353. [place] residents can call a provincial poison control center. Symptoms of overdose may include: bloody/black/tarry stools, pink/dark urine, unusual/prolonged bleeding.      NOTES:  Do not share this medication with others. Laboratory and/or medical tests (such as INR, complete blood count) must be performed periodically to monitor your progress or check for side effects. Consult your doctor for more details.      MISSED DOSE:  For the best possible benefit, do not miss any doses. If you do miss a dose and remember on the same day, take it as soon as you remember. If you remember on the next day, skip the missed dose and resume your usual dosing schedule. Do not double the dose to catch up because this could increase your risk for bleeding. Keep a record of missed doses to give to your doctor or pharmacist. Contact your doctor or pharmacist if you miss 2 or more doses in a row.      STORAGE:  Store at room temperature away from light and moisture. Do not store in the bathroom. Keep all medications away from children and pets. Do not flush medications down the toilet or pour them into a drain unless instructed to do so. Properly discard this product when it is  or no longer needed. Consult your pharmacist or local waste disposal company for more details about how to safely discard your product.      MEDICAL ALERT:  Your condition and medication can cause complications in a medical emergency. For information about enrolling in MedicAlert, call 1-324.694.2956 ([place]) or 1-619.502.1699 ([place]).      Information last revised 2010 Copyright(c) 2010 [employer]             · Is patient Post Blood Transfusion?  No    Depression / Suicide Risk    As you are [facility], it is important to learn how to keep safe from harming  yourself.    Recognize the warning signs:  · Abrupt changes in personality, positive or negative- including increase in energy   · Giving away possessions  · Change in eating patterns- significant weight changes-  positive or negative  · Change in sleeping patterns- unable to sleep or sleeping all the time   · Unwillingness or inability to communicate  · Depression  · Unusual sadness, discouragement and loneliness  · Talk of wanting to die  · Neglect of personal appearance   · Rebelliousness- reckless behavior  · Withdrawal from people/activities they love  · Confusion- inability to concentrate     If you or a loved one observes any of these behaviors or has concerns about self-harm, here's what you can do:  · Talk about it- your feelings and reasons for harming yourself  · Remove any means that you might use to hurt yourself (examples: pills, rope, extension cords, firearm)  · Get professional help from the community (Mental Health, Substance Abuse, psychological counseling)  · Do not be alone:Call your Safe Contact- someone whom you trust who will be there for you.  · Call your local CRISIS HOTLINE 905-6741 or 192-118-0659  · Call your local Children's Mobile Crisis Response Team [place] (945) 404-4322 or www.LE TOTE  · Call the toll free National Suicide Prevention Hotlines   · National Suicide Prevention Lifeline 180-553-TEZS (6874)  · National Hope Line Network 800-SUICIDE (169-4546)

## 2017-04-22 NOTE — CARE PLAN
Problem: Venous Thromboembolism (VTW)/Deep Vein Thrombosis (DVT) Prevention:  Goal: Patient will participate in Venous Thrombosis (VTE)/Deep Vein Thrombosis (DVT)Prevention Measures  Pt declines SCD's. Pt ambulates in the hallways.    Problem: Discharge Barriers/Planning  Goal: Patient’s continuum of care needs will be met  Pt is homeless and has set up arrangements with friends/family for when d/c.

## 2017-04-22 NOTE — DISCHARGE PLANNING
Medical Social Work    AZUL received page from pt's hospitalist and nurse. Pt is anticipated to d/c to community today. Pt requires assistance with transport to [facility] pharmacy on [address]. AZUL confirmed pt does not need additional transport from pharmacy with nurse. Nurse confirmed pt does not need additional transport from pharmacy. Pt does not have any friends/family who can assist her with transport.    AZUL faxed request for transport via [facility] van to [facility]. AZUL will update pt nurse with transport time.

## 2017-04-22 NOTE — IP AVS SNAPSHOT
" Home Care Instructions                                                                                                                  Name:Zenia Ordaz  Medical Record Number:[id number]  CSN: [id number]    YOB: 1950   Age: 66 y.o.  Sex: female  HT:1.549 m (5' 1\") WT: 85 kg (187 lb 6.3 oz)          Admit Date: 4/22/2017     Discharge Date:   Today's Date: 4/29/2017  Attending Doctor:  Renato Sawyer M.D.                  Allergies:  Mushroom extract complex; Tylenol; and Zofran            Discharge Instructions       Discharge Instructions    Discharged to home by taxi with self. Discharged via wheelchair, hospital escort: Yes.  Special equipment needed: Not Applicable    Be sure to schedule a follow-up appointment with your primary care doctor or any specialists as instructed.     Discharge Plan:   Diet Plan: Discussed  Activity Level: Discussed  Smoking Cessation Offered: Patient Refused  Confirmed Follow up Appointment: Patient to Call and Schedule Appointment  Confirmed Symptoms Management: Discussed  Medication Reconciliation Updated: Yes  Influenza Vaccine Indication: Patient Refuses    I understand that a diet low in cholesterol, fat, and sodium is recommended for good health. Unless I have been given specific instructions below for another diet, I accept this instruction as my diet prescription.   Other diet: regular    Special Instructions:   Discharge patient Home   Diet low fat, low sugar, heart healthy with 9-13 servings of fruits and vegetables   Activities as tolerated   Follow ups with PCP in 7-10 days, call for appointment   Meds per med rec sheet   No smoking, no alcohol, no caffeine   Wear seat belt in motorized vehicle   Take medications as perscribed   Keep appointments   If symptoms worsen call PCP, 911 or urgent care.  · Is patient discharged on Warfarin / Coumadin?   No     · Is patient Post Blood Transfusion?  No    Depression / Suicide Risk    As you are discharged from " this [facility] facility, it is important to learn how to keep safe from harming yourself.    Recognize the warning signs:  · Abrupt changes in personality, positive or negative- including increase in energy   · Giving away possessions  · Change in eating patterns- significant weight changes-  positive or negative  · Change in sleeping patterns- unable to sleep or sleeping all the time   · Unwillingness or inability to communicate  · Depression  · Unusual sadness, discouragement and loneliness  · Talk of wanting to die  · Neglect of personal appearance   · Rebelliousness- reckless behavior  · Withdrawal from people/activities they love  · Confusion- inability to concentrate     If you or a loved one observes any of these behaviors or has concerns about self-harm, here's what you can do:  · Talk about it- your feelings and reasons for harming yourself  · Remove any means that you might use to hurt yourself (examples: pills, rope, extension cords, firearm)  · Get professional help from the community (Mental Health, Substance Abuse, psychological counseling)  · Do not be alone:Call your Safe Contact- someone whom you trust who will be there for you.  · Call your local CRISIS HOTLINE 402-4947 or 727-617-3379  · Call your local Children's Mobile Crisis Response Team [place] (412) 812-4944 or www.[employer])  · Call the toll free National Suicide Prevention Hotlines   · National Suicide Prevention Lifeline 111-432-LFKN (1974)  · National Hope Line Network 800-SUICIDE (557-7823)        Follow-up Information     1. [facility] Schedule an appointment as soon as possible for a visit in 2 weeks.    Why:  Hospital follow-up appointment with PCP    Contact information    [address] [place] [address] [id number] [phone]        2. Follow up with Pcp Pt States None .    Specialty:  Family Medicine         Discharge Medication Instructions:    Below are the medications your physician  expects you to take upon discharge:    Review all your home medications and newly ordered medications with your doctor and/or pharmacist. Follow medication instructions as directed by your doctor and/or pharmacist.    Please keep your medication list with you and share with your physician.               Medication List      START taking these medications        Instructions    Morning Afternoon Evening Bedtime    amlodipine 10 MG Tabs   Last time this was given:  10 mg on 4/29/2017  9:30 AM   Commonly known as:  NORVASC        Take 1 Tab by mouth every day.   Dose:  10 mg                        budesonide-formoterol 80-4.5 MCG/ACT Aero   Last time this was given:  2 Puffs on 4/29/2017  9:32 AM   Commonly known as:  SYMBICORT        Inhale 2 Puffs by mouth 2 Times a Day.   Dose:  2 Puff                        Guaifenesin 1200 MG Tb12   Last time this was given:  1,200 mg on 4/29/2017  9:30 AM        Take 1 Tab by mouth every 12 hours.   Dose:  1200 mg                        methylPREDNISolone 4 MG Tabs   Last time this was given:  4 mg on 4/29/2017  9:31 AM   Commonly known as:  MEDROL        Take 2 mg BID for 2 days, then 1 mg BID for 2 days, then 1 mg daily for 2 days.                          CONTINUE taking these medications        Instructions    Morning Afternoon Evening Bedtime    alprazolam 0.5 MG Tabs   Last time this was given:  0.5 mg on 4/27/2017 12:09 AM   Commonly known as:  XANAX        Take 1 Tab by mouth 3 times a day as needed for Sleep or Anxiety.   Dose:  0.5 mg                        aspirin EC 81 MG Tbec   Last time this was given:  81 mg on 4/29/2017  9:30 AM   Commonly known as:  ECOTRIN        Take 1 Tab by mouth every day.   Dose:  81 mg                        citalopram 20 MG Tabs   Last time this was given:  20 mg on 4/29/2017  9:30 AM   Commonly known as:  CELEXA        Take 1 Tab by mouth every day.   Dose:  20 mg                        gabapentin 300 MG Caps   Last time this was given:   300 mg on 4/29/2017  9:30 AM   Commonly known as:  NEURONTIN        Take 1 Cap by mouth 3 times a day.   Dose:  300 mg                        insulin glargine 100 UNIT/ML Soln   Last time this was given:  25 Units on 4/28/2017  9:18 PM   Commonly known as:  LANTUS        Inject 32 Units as instructed every evening.   Dose:  32 Units                        metoclopramide 5 MG tablet   Last time this was given:  5 mg on 4/29/2017  6:02 AM   Commonly known as:  REGLAN        Take 1 Tab by mouth 3 times a day before meals. Indications: Nausea and Vomiting   Dose:  5 mg                        morphine ER 15 MG Tbcr tablet   Last time this was given:  15 mg on 4/29/2017  9:31 AM   Commonly known as:  MS CONTIN        Take 1 Tab by mouth every 12 hours.   Dose:  15 mg                        risperidone 0.5 MG Tabs   Last time this was given:  0.5 mg on 4/28/2017  9:15 PM   Commonly known as:  RISPERDAL        Take 1 Tab by mouth every bedtime.   Dose:  0.5 mg                        trazodone 50 MG Tabs   Last time this was given:  50 mg on 4/28/2017  9:15 PM   Commonly known as:  DESYREL        Take 1 Tab by mouth every day.   Dose:  50 mg                          STOP taking these medications     oxycodone immediate release 10 MG immediate release tablet   Commonly known as:  ROXICODONE               warfarin 2.5 MG Tabs   Commonly known as:  COUMADIN                    Where to Get Your Medications      Information about where to get these medications is not yet available     ! Ask your nurse or doctor about these medications    - amlodipine 10 MG Tabs  - budesonide-formoterol 80-4.5 MCG/ACT Aero  - Guaifenesin 1200 MG Tb12  - methylPREDNISolone 4 MG Tabs            Instructions           Diet / Nutrition:    Follow any diet instructions given to you by your doctor or the dietician, including how much salt (sodium) you are allowed each day.    If you are overweight, talk to your doctor about a weight reduction  plan.    Activity:    Remain physically active following your doctor's instructions about exercise and activity.    Rest often.     Any time you become even a little tired or short of breath, SIT DOWN and rest.    Worsening Symptoms:    Report any of the following signs and symptoms to the doctor's office immediately:    *Pain of jaw, arm, or neck  *Chest pain not relieved by medication                               *Dizziness or loss of consciousness  *Difficulty breathing even when at rest   *More tired than usual                                       *Bleeding drainage or swelling of surgical site  *Swelling of feet, ankles, legs or stomach                 *Fever (>100ºF)  *Pink or blood tinged sputum  *Weight gain (3lbs/day or 5lbs /week)           *Shock from internal defibrillator (if applicable)  *Palpitations or irregular heartbeats                *Cool and/or numb extremities    Stroke Awareness    Common Risk Factors for Stroke include:    Age  Atrial Fibrillation  Carotid Artery Stenosis  Diabetes Mellitus  Excessive alcohol consumption  High blood pressure  Overweight   Physical inactivity  Smoking    Warning signs and symptoms of a stroke include:    *Sudden numbness or weakness of the face, arm or leg (especially on one side of the body).  *Sudden confusion, trouble speaking or understanding.  *Sudden trouble seeing in one or both eyes.  *Sudden trouble walking, dizziness, loss of balance or coordination.Sudden severe headache with no known cause.    It is very important to get treatment quickly when a stroke occurs. If you experience any of the above warning signs, call 911 immediately.                   Disclaimer         Quit Smoking / Tobacco Use:    I understand the use of any tobacco products increases my chance of suffering from future heart disease or stroke and could cause other illnesses which may shorten my life. Quitting the use of tobacco products is the single most important thing I can  do to improve my health. For further information on smoking / tobacco cessation call a Toll Free Quit Line at 1-489.774.9586 (*National Cancer [place]) or 1-810.926.3885 (American Lung Association) or you can access the web based program at www.lungusa.org.    [place] Tobacco Users Help Line:  (537) 680-8275       Toll Free: 1-637.256.8175  Quit Tobacco Program [facility] (742)253-2732    Crisis Hotline:    [place] Crisis Hotline:  9-437-IAKNHDG or 1-522.756.9834    [place] Crisis Hotline:    1-336.720.2267 or 620-476-3366    Discharge Survey:   Thank you for choosing [facility]. We hope we did everything we could to make your hospital stay a pleasant one. You may be receiving a phone survey and we would appreciate your time and participation in answering the questions. Your input is very valuable to us in our efforts to improve our service to our patients and their families.        My signature on this form indicates that:    1. I have reviewed and understand the above information.  2. My questions regarding this information have been answered to my satisfaction.  3. I have formulated a plan with my discharge nurse to obtain my prescribed medications for home.                  Disclaimer         __________________________________                     __________       ________                       Patient Signature                                                 Date                    Time

## 2017-04-22 NOTE — PROGRESS NOTES
Assumed care, assessment complete.  Report received from day shift RN. Pt A&Ox4. Pt reports pain 10/10, medicated per MAR pain at this time.  Pt educated on hourly rounding and phone extension numbers. Pt board has been updated. Pt denies any additional needs at this time.  Pt refuses bed alarm, call light and phone within reach.

## 2017-04-22 NOTE — DISCHARGE PLANNING
Transport arranged with [facility]. Patient will be leaving today @1030 via the [facility] van going to [facility]. AZUL Cespedes notified.

## 2017-04-22 NOTE — IP AVS SNAPSHOT
L-3 GCS Access Code: [email or web address]  Expires: 5/29/2017 12:39 PM    Your email address is not on file at [facility] [employer].  Email Addresses are required for you to sign up for L-[employer] G[employer], please contact [phone] to verify your personal information and to provide your email address prior to attempting to register for L-3 GCS.    [place] [address]    Photofyt  A secure, online tool to manage your health information     [employer]’s L-3 GCS® is a secure, online tool that connects you to your personalized health information from the privacy of your home -- day or night - making it very easy for you to manage your healthcare. Once the activation process is completed, you can even access your medical information using the L-3 GCS spenser, which is available for free in the Apple Spenser store or Google Play store.     To learn more about L-3 GCS, visit [email or web address]    There are two levels of access available (as shown below):   My Chart Features  [facility] Primary Care Doctor [facility]  Specialists [facility]  Urgent  Care Non-[facility] Primary Care Doctor   Email your healthcare team securely and privately 24/7 X X X    Manage appointments: schedule your next appointment; view details of past/upcoming appointments X      Request prescription refills. X      View recent personal medical records, including lab and immunizations X X X X   View health record, including health history, allergies, medications X X X X   Read reports about your outpatient visits, procedures, consult and ER notes X X X X   See your discharge summary, which is a recap of your hospital and/or ER visit that includes your diagnosis, lab results, and care plan X X  X     How to register for Photofyt:  Once your e-mail address has been verified, follow the following steps to sign up for Photofyt.     1. Go to  [email or web address]  2. Click on the Sign Up Now box, which takes you to the New Member Sign Up page. You will  need to provide the following information:  a. Enter your Shope[employer] Access Code exactly as it appears at the top of this page. (You will not need to use this code after you’ve completed the sign-up process. If you do not sign up before the expiration date, you must request a new code.)   b. Enter your date of birth.   c. Enter your home email address.   d. Click Submit, and follow the next screen’s instructions.  3. Create a Sybarit ID. This will be your Shopeando login ID and cannot be changed, so think of one that is secure and easy to remember.  4. Create a Shopeando password. You can change your password at any time.  5. Enter your Password Reset Question and Answer. This can be used at a later time if you forget your password.   6. Enter your e-mail address. This allows you to receive e-mail notifications when new information is available in Shopeando.  7. Click Sign Up. You can now view your health information.    For assistance activating your Shopeando account, call [phone]

## 2017-04-22 NOTE — PROGRESS NOTES
Pt was very calm this AM and willing to take coumadin and follow up with clinic. IV was removed 4/21/2017. Pt is dressed, ate breakfast and waiting for [facility] van to take her to [facility].

## 2017-04-22 NOTE — DISCHARGE PLANNING
Medical Social Work    SW placed call to pt's nurse to provide transportation time confirmation. Pt to d/c to [facility] pharmacy at 10:30am and will walk to her friends house after picking up her medications from that location.

## 2017-04-22 NOTE — ED NOTES
BIB [facility] from outside [facility], pt reports severe episode of shortness of breath/wheezing.  Pt discharged from hospital this AM w/ recent PE, rx coumadin.  Reports unable to fill coumadin RX at 3 pharmacies.  Albuterol neb en route. Wheezing  Resolved.  BS clear per [facility].  180/80.  Hr 60's, resp 30 initially.

## 2017-04-23 ENCOUNTER — PATIENT OUTREACH (OUTPATIENT)
Dept: HEALTH INFORMATION MANAGEMENT | Facility: OTHER | Age: 67
End: 2017-04-23

## 2017-04-23 PROBLEM — J96.21 ACUTE ON CHRONIC RESPIRATORY FAILURE WITH HYPOXEMIA (HCC): Status: ACTIVE | Noted: 2017-04-23

## 2017-04-23 PROBLEM — F19.10 IV DRUG ABUSE (HCC): Status: ACTIVE | Noted: 2017-04-23

## 2017-04-23 PROBLEM — Z59.00 HOMELESS: Status: ACTIVE | Noted: 2017-04-23

## 2017-04-23 PROBLEM — F39 MOOD DISORDER (HCC): Status: ACTIVE | Noted: 2017-04-23

## 2017-04-23 PROBLEM — D68.32 HEMORRHAGIC DISORDER DUE TO EXTRINSIC CIRCULATING ANTICOAGULANTS (HCC): Status: ACTIVE | Noted: 2017-04-23

## 2017-04-23 PROBLEM — Z91.148 NON COMPLIANCE W MEDICATION REGIMEN: Status: ACTIVE | Noted: 2017-04-23

## 2017-04-23 PROBLEM — F11.20 OPIOID DEPENDENCE (HCC): Status: ACTIVE | Noted: 2017-04-23

## 2017-04-23 PROBLEM — E66.01 MORBIDLY OBESE (HCC): Status: ACTIVE | Noted: 2017-04-23

## 2017-04-23 LAB
ALBUMIN SERPL BCP-MCNC: 3.1 G/DL (ref 3.2–4.9)
ALBUMIN/GLOB SERPL: 0.9 G/DL
ALP SERPL-CCNC: 165 U/L (ref 30–99)
ALT SERPL-CCNC: 323 U/L (ref 2–50)
ANION GAP SERPL CALC-SCNC: 10 MMOL/L (ref 0–11.9)
AST SERPL-CCNC: 26 U/L (ref 12–45)
BASOPHILS # BLD AUTO: 0.3 % (ref 0–1.8)
BASOPHILS # BLD: 0.04 K/UL (ref 0–0.12)
BILIRUB SERPL-MCNC: 1.4 MG/DL (ref 0.1–1.5)
BUN SERPL-MCNC: 23 MG/DL (ref 8–22)
CALCIUM SERPL-MCNC: 9.1 MG/DL (ref 8.5–10.5)
CHLORIDE SERPL-SCNC: 100 MMOL/L (ref 96–112)
CO2 SERPL-SCNC: 20 MMOL/L (ref 20–33)
CREAT SERPL-MCNC: 0.74 MG/DL (ref 0.5–1.4)
EOSINOPHIL # BLD AUTO: 0 K/UL (ref 0–0.51)
EOSINOPHIL NFR BLD: 0 % (ref 0–6.9)
ERYTHROCYTE [DISTWIDTH] IN BLOOD BY AUTOMATED COUNT: 49.9 FL (ref 35.9–50)
GFR SERPL CREATININE-BSD FRML MDRD: >60 ML/MIN/1.73 M 2
GLOBULIN SER CALC-MCNC: 3.3 G/DL (ref 1.9–3.5)
GLUCOSE BLD-MCNC: 333 MG/DL (ref 65–99)
GLUCOSE BLD-MCNC: 394 MG/DL (ref 65–99)
GLUCOSE BLD-MCNC: 394 MG/DL (ref 65–99)
GLUCOSE BLD-MCNC: 438 MG/DL (ref 65–99)
GLUCOSE BLD-MCNC: 440 MG/DL (ref 65–99)
GLUCOSE SERPL-MCNC: 543 MG/DL (ref 65–99)
HCT VFR BLD AUTO: 37.2 % (ref 37–47)
HGB BLD-MCNC: 11.7 G/DL (ref 12–16)
IMM GRANULOCYTES # BLD AUTO: 0.16 K/UL (ref 0–0.11)
IMM GRANULOCYTES NFR BLD AUTO: 1.4 % (ref 0–0.9)
LYMPHOCYTES # BLD AUTO: 0.85 K/UL (ref 1–4.8)
LYMPHOCYTES NFR BLD: 7.3 % (ref 22–41)
MCH RBC QN AUTO: 26.6 PG (ref 27–33)
MCHC RBC AUTO-ENTMCNC: 31.5 G/DL (ref 33.6–35)
MCV RBC AUTO: 84.5 FL (ref 81.4–97.8)
MONOCYTES # BLD AUTO: 0.3 K/UL (ref 0–0.85)
MONOCYTES NFR BLD AUTO: 2.6 % (ref 0–13.4)
NEUTROPHILS # BLD AUTO: 10.36 K/UL (ref 2–7.15)
NEUTROPHILS NFR BLD: 88.4 % (ref 44–72)
NRBC # BLD AUTO: 0 K/UL
NRBC BLD AUTO-RTO: 0 /100 WBC
PLATELET # BLD AUTO: 154 K/UL (ref 164–446)
PMV BLD AUTO: 10.7 FL (ref 9–12.9)
POTASSIUM SERPL-SCNC: 4.9 MMOL/L (ref 3.6–5.5)
PROT SERPL-MCNC: 6.4 G/DL (ref 6–8.2)
RBC # BLD AUTO: 4.4 M/UL (ref 4.2–5.4)
SODIUM SERPL-SCNC: 130 MMOL/L (ref 135–145)
WBC # BLD AUTO: 11.7 K/UL (ref 4.8–10.8)

## 2017-04-23 PROCEDURE — 99232 SBSQ HOSP IP/OBS MODERATE 35: CPT | Performed by: INTERNAL MEDICINE

## 2017-04-23 RX ORDER — BUDESONIDE AND FORMOTEROL FUMARATE DIHYDRATE 80; 4.5 UG/1; UG/1
2 AEROSOL RESPIRATORY (INHALATION) 2 TIMES DAILY
Status: DISCONTINUED | OUTPATIENT
Start: 2017-04-23 | End: 2017-04-29 | Stop reason: HOSPADM

## 2017-04-23 RX ORDER — NICOTINE 21 MG/24HR
21 PATCH, TRANSDERMAL 24 HOURS TRANSDERMAL
Status: DISCONTINUED | OUTPATIENT
Start: 2017-04-23 | End: 2017-04-29 | Stop reason: HOSPADM

## 2017-04-23 RX ORDER — METHYLPREDNISOLONE 4 MG/1
4 TABLET ORAL EVERY 6 HOURS
Status: DISCONTINUED | OUTPATIENT
Start: 2017-04-26 | End: 2017-04-23

## 2017-04-23 RX ORDER — METHYLPREDNISOLONE 4 MG/1
4 TABLET ORAL EVERY 4 HOURS
Status: DISCONTINUED | OUTPATIENT
Start: 2017-04-23 | End: 2017-04-23

## 2017-04-23 RX ORDER — IPRATROPIUM BROMIDE AND ALBUTEROL SULFATE 2.5; .5 MG/3ML; MG/3ML
3 SOLUTION RESPIRATORY (INHALATION)
Status: DISCONTINUED | OUTPATIENT
Start: 2017-04-24 | End: 2017-04-24

## 2017-04-23 RX ORDER — METHYLPREDNISOLONE 4 MG/1
4 TABLET ORAL DAILY
Status: DISCONTINUED | OUTPATIENT
Start: 2017-05-05 | End: 2017-04-23

## 2017-04-23 RX ORDER — METHYLPREDNISOLONE 4 MG/1
4 TABLET ORAL EVERY 8 HOURS
Status: DISCONTINUED | OUTPATIENT
Start: 2017-04-29 | End: 2017-04-23

## 2017-04-23 RX ORDER — INSULIN GLARGINE 100 [IU]/ML
36 INJECTION, SOLUTION SUBCUTANEOUS EVERY EVENING
Status: DISCONTINUED | OUTPATIENT
Start: 2017-04-23 | End: 2017-04-24

## 2017-04-23 RX ORDER — METHYLPREDNISOLONE 4 MG/1
4 TABLET ORAL 2 TIMES DAILY
Status: DISCONTINUED | OUTPATIENT
Start: 2017-04-23 | End: 2017-04-24

## 2017-04-23 RX ORDER — AMLODIPINE BESYLATE 10 MG/1
10 TABLET ORAL
Status: DISCONTINUED | OUTPATIENT
Start: 2017-04-23 | End: 2017-04-29 | Stop reason: HOSPADM

## 2017-04-23 RX ORDER — HYDRALAZINE HYDROCHLORIDE 25 MG/1
25 TABLET, FILM COATED ORAL EVERY 4 HOURS PRN
Status: DISCONTINUED | OUTPATIENT
Start: 2017-04-23 | End: 2017-04-29 | Stop reason: HOSPADM

## 2017-04-23 RX ORDER — METHYLPREDNISOLONE 4 MG/1
4 TABLET ORAL 2 TIMES DAILY
Status: DISCONTINUED | OUTPATIENT
Start: 2017-05-02 | End: 2017-04-23

## 2017-04-23 RX ADMIN — AMLODIPINE BESYLATE 10 MG: 10 TABLET ORAL at 15:02

## 2017-04-23 RX ADMIN — METOCLOPRAMIDE 5 MG: 10 TABLET ORAL at 17:47

## 2017-04-23 RX ADMIN — INSULIN LISPRO 6 UNITS: 100 INJECTION, SOLUTION INTRAVENOUS; SUBCUTANEOUS at 06:00

## 2017-04-23 RX ADMIN — GABAPENTIN 300 MG: 300 CAPSULE ORAL at 20:07

## 2017-04-23 RX ADMIN — INSULIN GLARGINE 32 UNITS: 100 INJECTION, SOLUTION SUBCUTANEOUS at 00:55

## 2017-04-23 RX ADMIN — ALPRAZOLAM 0.5 MG: 0.5 TABLET ORAL at 22:35

## 2017-04-23 RX ADMIN — METHYLPREDNISOLONE 4 MG: 4 TABLET ORAL at 20:06

## 2017-04-23 RX ADMIN — OXYCODONE HYDROCHLORIDE 10 MG: 10 TABLET ORAL at 03:16

## 2017-04-23 RX ADMIN — METHYLPREDNISOLONE 4 MG: 4 TABLET ORAL at 15:02

## 2017-04-23 RX ADMIN — WARFARIN SODIUM 2.5 MG: 2.5 TABLET ORAL at 17:46

## 2017-04-23 RX ADMIN — IPRATROPIUM BROMIDE AND ALBUTEROL SULFATE 3 ML: .5; 3 SOLUTION RESPIRATORY (INHALATION) at 10:55

## 2017-04-23 RX ADMIN — INSULIN GLARGINE 36 UNITS: 100 INJECTION, SOLUTION SUBCUTANEOUS at 20:46

## 2017-04-23 RX ADMIN — INSULIN LISPRO 8 UNITS: 100 INJECTION, SOLUTION INTRAVENOUS; SUBCUTANEOUS at 00:52

## 2017-04-23 RX ADMIN — INSULIN LISPRO 9 UNITS: 100 INJECTION, SOLUTION INTRAVENOUS; SUBCUTANEOUS at 11:12

## 2017-04-23 RX ADMIN — INSULIN LISPRO 8 UNITS: 100 INJECTION, SOLUTION INTRAVENOUS; SUBCUTANEOUS at 17:52

## 2017-04-23 RX ADMIN — TRAZODONE HYDROCHLORIDE 50 MG: 50 TABLET ORAL at 22:35

## 2017-04-23 RX ADMIN — IPRATROPIUM BROMIDE AND ALBUTEROL SULFATE 3 ML: .5; 3 SOLUTION RESPIRATORY (INHALATION) at 03:27

## 2017-04-23 RX ADMIN — MORPHINE SULFATE 15 MG: 15 TABLET, EXTENDED RELEASE ORAL at 20:07

## 2017-04-23 RX ADMIN — GABAPENTIN 300 MG: 300 CAPSULE ORAL at 15:02

## 2017-04-23 RX ADMIN — INSULIN LISPRO 8 UNITS: 100 INJECTION, SOLUTION INTRAVENOUS; SUBCUTANEOUS at 20:46

## 2017-04-23 RX ADMIN — IPRATROPIUM BROMIDE AND ALBUTEROL SULFATE 3 ML: .5; 3 SOLUTION RESPIRATORY (INHALATION) at 14:09

## 2017-04-23 RX ADMIN — GABAPENTIN 300 MG: 300 CAPSULE ORAL at 09:10

## 2017-04-23 RX ADMIN — MORPHINE SULFATE 15 MG: 15 TABLET, EXTENDED RELEASE ORAL at 09:11

## 2017-04-23 RX ADMIN — IPRATROPIUM BROMIDE AND ALBUTEROL SULFATE 3 ML: .5; 3 SOLUTION RESPIRATORY (INHALATION) at 06:42

## 2017-04-23 RX ADMIN — METHYLPREDNISOLONE SODIUM SUCCINATE 62.5 MG: 125 INJECTION, POWDER, FOR SOLUTION INTRAMUSCULAR; INTRAVENOUS at 03:08

## 2017-04-23 RX ADMIN — OXYCODONE HYDROCHLORIDE 10 MG: 10 TABLET ORAL at 17:47

## 2017-04-23 RX ADMIN — ASPIRIN 81 MG: 81 TABLET ORAL at 09:11

## 2017-04-23 RX ADMIN — BUDESONIDE AND FORMOTEROL FUMARATE DIHYDRATE 2 PUFF: 80; 4.5 AEROSOL RESPIRATORY (INHALATION) at 06:42

## 2017-04-23 RX ADMIN — IPRATROPIUM BROMIDE AND ALBUTEROL SULFATE 3 ML: .5; 3 SOLUTION RESPIRATORY (INHALATION) at 18:30

## 2017-04-23 RX ADMIN — METOCLOPRAMIDE 5 MG: 10 TABLET ORAL at 06:03

## 2017-04-23 RX ADMIN — BUDESONIDE AND FORMOTEROL FUMARATE DIHYDRATE 2 PUFF: 80; 4.5 AEROSOL RESPIRATORY (INHALATION) at 20:07

## 2017-04-23 RX ADMIN — METOCLOPRAMIDE 5 MG: 10 TABLET ORAL at 12:47

## 2017-04-23 RX ADMIN — RISPERIDONE 0.5 MG: 0.5 TABLET, FILM COATED ORAL at 20:06

## 2017-04-23 RX ADMIN — CITALOPRAM HYDROBROMIDE 20 MG: 20 TABLET ORAL at 09:11

## 2017-04-23 ASSESSMENT — PAIN SCALES - GENERAL
PAINLEVEL_OUTOF10: 5
PAINLEVEL_OUTOF10: 3
PAINLEVEL_OUTOF10: 0
PAINLEVEL_OUTOF10: 3
PAINLEVEL_OUTOF10: 8
PAINLEVEL_OUTOF10: 0
PAINLEVEL_OUTOF10: 7
PAINLEVEL_OUTOF10: 3
PAINLEVEL_OUTOF10: 0
PAINLEVEL_OUTOF10: 5

## 2017-04-23 ASSESSMENT — ENCOUNTER SYMPTOMS
VOMITING: 0
ABDOMINAL PAIN: 0
WHEEZING: 1
COUGH: 1
NAUSEA: 0
SHORTNESS OF BREATH: 1
SPUTUM PRODUCTION: 0

## 2017-04-23 ASSESSMENT — COPD QUESTIONNAIRES
HAVE YOU SMOKED AT LEAST 100 CIGARETTES IN YOUR ENTIRE LIFE: YES
DURING THE PAST 4 WEEKS HOW MUCH DID YOU FEEL SHORT OF BREATH: MOST  OR ALL OF THE TIME
DO YOU EVER COUGH UP ANY MUCUS OR PHLEGM?: YES, A FEW DAYS A WEEK OR MONTH
COPD SCREENING SCORE: 8

## 2017-04-23 ASSESSMENT — LIFESTYLE VARIABLES
DO YOU DRINK ALCOHOL: NO
EVER_SMOKED: YES

## 2017-04-23 NOTE — ED NOTES
Pt ambulated up to bathroom with steady gait. Back to bed and requesting pain medication. Awaiting orders. Pt aware of POC.

## 2017-04-23 NOTE — ED PROVIDER NOTES
ED Provider Note    CHIEF COMPLAINT  Chief Complaint   Patient presents with   • Shortness of Breath       HPI  Zenia Ordaz is a 66 y.o. female who presents for evaluation of shortness of breath.  The patient was admitted to the hospital recently for COPD exacerbation and treatment of pulmonary embolism.  The patient was discharged this morning.  She states that she did not have any oxygen and that her daughter had not arranged for her to go to [place] [address].  The patient's was sitting at a casino when she developed increasing shortness of breath.  The patient was brought in by EMS.  The patient complains of a cough along with wheezing.  She denies: Fever, chest pain, abdominal pain, vomiting, diarrhea.  No other acute symptomatology or complaints.    REVIEW OF SYSTEMS  See HPI for further details. All other systems negative.    PAST MEDICAL HISTORY  Past Medical History   Diagnosis Date   • COPD    • Fibromyalgia    • Hepatitis B    • Hepatitis C    • Hepatitis A    • Diabetes    • CAD (coronary artery disease)      mi x 2   • Stroke (CMS-HCC) 1982   • Backpain    • Myocardial infarct (CMS-HCC) 1989, 1991     2 per patient   • Cancer (CMS-HCC) Cervical ~2003   • Congestive heart failure (CMS-HCC)    • MRSA infection    • Drug abuse    • Hypertension    • Fall    • Pneumonia    • Seizure (CMS-HCC)        FAMILY HISTORY  Family History   Problem Relation Age of Onset   • Cancer Mother    • Cancer Sister    • Cancer Maternal Aunt        SOCIAL HISTORY  Positive tobacco use; denies alcohol use; past history of polysubstance abuse;    SURGICAL HISTORY  Past Surgical History   Procedure Laterality Date   • Other cardiac surgery       doesn't remember if she had stent placed during a heart cath   • Incision and drainage orthopedic  7/13/2013     Performed by Surinder Ozuna M.D. at SURGERY [facility]       CURRENT MEDICATIONS  Home Medications     Reviewed by Corina Sparrow R.N. (Registered Nurse) on 04/22/17  "at 1645  Med List Status: Partial    Medication Last Dose Status    alprazolam (XANAX) 0.5 MG Tab unk Active    aspirin EC (ECOTRIN) 81 MG Tablet Delayed Response unk Active    citalopram (CELEXA) 20 MG Tab 4/13/2017 Active    gabapentin (NEURONTIN) 300 MG Cap 4/13/2017 Active    insulin glargine (LANTUS) 100 UNIT/ML Solution unk Active    insulin lispro (HUMALOG) 100 UNIT/ML Solution unk Active    metoclopramide (REGLAN) 5 MG tablet unk Active    morphine ER (MS CONTIN) 15 MG Tab CR tablet  Active    oxycodone immediate release (ROXICODONE) 10 MG immediate release tablet  Active    risperidone (RISPERDAL) 0.5 MG Tab unk Active    trazodone (DESYREL) 50 MG Tab unk Active    warfarin (COUMADIN) 2.5 MG Tab  Active                ALLERGIES  Allergies   Allergen Reactions   • Mushroom Extract Complex Rash and Unspecified     Pt gets a rash and breaks out in a sweat when eats mushrooms   • Tylenol Rash     Pt states \"I get a body rash\".   • Zofran Rash     Pt states \"I get a body rash\".       PHYSICAL EXAM  VITAL SIGNS: /62 mmHg  Pulse 64  Temp(Src) 36.5 °C (97.7 °F)  Resp 22  Ht 1.549 m (5' 1\")  Wt 83.462 kg (184 lb)  BMI 34.78 kg/m2  SpO2 99%   Constitutional: 66-year-old female, appears much older stated age, in mild respiratory distress, oriented ×3  HENT: ,Atraumatic, Bilateral external ears normal, tympanic membranes clear, Oropharynx mildly dry, No oral exudates, Nose normal.   Eyes: PERRL, EOMI, Conjunctiva normal, No discharge.   Neck: Normal range of motion, No tenderness, Supple, No stridor.   Lymphatic: No lymphadenopathy noted.   Cardiovascular: Normal heart rate, Normal rhythm, No murmurs, No rubs, No gallops.   Thorax & Lungs: Decreased breath sounds bilaterally with bilateral rhonchi and expiratory wheezing, no stridor, no rales. No chest tenderness.   Abdomen: Soft, nontender, nondistended, no organomegaly, positive bowel sounds normal in quality. No guarding or rebound.  Skin: Mildly " decreased skin turgor, pale, warm, dry. No rashes, petechiae, purpura. Normal capillary refill.   Back: No tenderness, No CVA tenderness.   Extremities: Intact distal pulses, very mild bilateral lower extremity edema, No tenderness, No cyanosis, No clubbing. Vascular: Pulses are 1+, symmetric in the upper and lower extremities.  Musculoskeletal: Diffuse arthritic changes. No tenderness to palpation or major deformities noted.   Neurologic: Anxious, oriented x 3, Normal motor function, Normal sensory function, No gross focal deficits noted.   Psychiatric: Affect anxious, Judgment normal, Mood normal.     EKG  I have interpreted: Rate 65, rhythm sinus, axis normal, ID QRS Q-T intervals normal, no acute STEMI, 12-lead EKG, no acute change compared to previous tracings;    RADIOLOGY/PROCEDURES  DX-CHEST-PORTABLE (1 VIEW)   Final Result         1.  Borderline cardiomegaly and mild perihilar congestive changes are again noted.      2.  No consolidations identified.            COURSE & MEDICAL DECISION MAKING  Pertinent Labs & Imaging studies reviewed. (See chart for details)  1.  Monitor  2.  IV normal saline  3.  Solu-Medrol  4.  Albuterol by SVN    Laboratory studies: CMP shows a random glucose of 269, BUN 28, , alkaline phosphatase 166, otherwise within normal; troponin less than 0.01; PTT 25/INR 2.28, PTT 34;    Discussion/consultation: At this time, the patient presents with shortness of breath.  The patient has exacerbation of her COPD.  Patient is adequately anticoagulated.  There is no evidence pneumonia or sepsis at this time.  I spoke with the hospitalist on-call.  The patient will be admitted for further monitoring, treatment, and care.    FINAL IMPRESSION  1. COPD exacerbation (CMS-[facility])    2. Pulmonary embolism, other (CMS-[facility])        PLAN  1.  Patient will be admitted for further monitoring, treatment, and care.    Electronically signed by: Guy G Gansert, 4/22/2017 5:17 PM

## 2017-04-23 NOTE — H&P
HOSPITAL MEDICINE HISTORY/ PHYSICAL    Date & Time note created:    4/22/2017   9:41 PM       Date & Time Patient was seen:   4/22/2017    Patient ID:   Name: Zenia Ordaz. YOB: 1950. Age: 66 y.o. female. MRN: [id number]    Admitting Attending:  Debora Galvan     PCP : Pcp Pt States None        Chief Complaint:       Shortness of breath    History of Present Illness:    Sushma is a 66 y.o. female w/h/o COPD, fibromyalgia, diabetes, chronic back pain, hypertension   essential, history of polysubstance abuse, who presents with complaints of shortness of breath.   The patient was admitted to the hospital recently for acute encephalopathy possibly due to acute   hepatitis etiology. Patient was then treated in the hospital for the past several days and just discharged   this morning. However patient said she has not Altered at home and she was unable to fill her   prescription after being discharged. Patient also complains of severe shortness of breath. Patient   admitted currently she is still smoking. In the ER patient's liver function enzyme is getting better.   The patient was brought in by EMS.  The patient complains of a cough along with wheezing. Patient   otherwise denies fever, chills, nausea, vomiting, adb pain, CP, headache, constipation, diarrhea, or sputum.    Review of Systems:      per HPI otherwise 14 points reviewed 12 systems neg per AMA/CMS criteria.        Past Medical/ Family / Social history (PFSH):   Past Medical History   Diagnosis Date   • COPD    • Fibromyalgia    • Hepatitis B    • Hepatitis C    • Hepatitis A    • Diabetes    • CAD (coronary artery disease)      mi x 2   • Stroke (CMS-HCC) 1982   • Backpain    • Myocardial infarct (CMS-HCC) 1989, 1991     2 per patient   • Cancer (CMS-HCC) Cervical ~2003   • Congestive heart failure (CMS-HCC)    • MRSA infection    • Drug abuse    • Hypertension    • Fall    • Pneumonia    • Seizure (CMS-HCC)      Past Surgical History  "  Procedure Laterality Date   • Other cardiac surgery       doesn't remember if she had stent placed during a heart cath   • Incision and drainage orthopedic  7/13/2013     Performed by Surinder Ozuna M.D. at [facility]     Current Outpatient Medications:  No current facility-administered medications on file prior to encounter.     Current Outpatient Prescriptions on File Prior to Encounter   Medication Sig Dispense Refill   • morphine ER (MS CONTIN) 15 MG Tab CR tablet Take 1 Tab by mouth every 12 hours. 8 Tab 0   • oxycodone immediate release (ROXICODONE) 10 MG immediate release tablet Take 1 Tab by mouth 2 times a day as needed for Moderate Pain. 8 Tab 0   • warfarin (COUMADIN) 2.5 MG Tab Take 1 Tab by mouth every day. 7 Tab 0   • aspirin EC (ECOTRIN) 81 MG Tablet Delayed Response Take 1 Tab by mouth every day. 30 Tab 2   • alprazolam (XANAX) 0.5 MG Tab Take 1 Tab by mouth 3 times a day as needed for Sleep or Anxiety. 90 Tab 0   • citalopram (CELEXA) 20 MG Tab Take 1 Tab by mouth every day. 30 Tab 2   • trazodone (DESYREL) 50 MG Tab Take 1 Tab by mouth every day. 30 Tab 2   • gabapentin (NEURONTIN) 300 MG Cap Take 1 Cap by mouth 3 times a day. 90 Cap 2   • insulin glargine (LANTUS) 100 UNIT/ML Solution Inject 32 Units as instructed every evening. 10 mL 2   • risperidone (RISPERDAL) 0.5 MG Tab Take 1 Tab by mouth every bedtime. 30 Tab 2   • metoclopramide (REGLAN) 5 MG tablet Take 1 Tab by mouth 3 times a day before meals. Indications: Nausea and Vomiting 90 Tab 2     Medication Allergy/Sensitivities:  Allergies   Allergen Reactions   • Mushroom Extract Complex Rash and Unspecified     Pt gets a rash and breaks out in a sweat when eats mushrooms   • Tylenol Rash     Pt states \"I get a body rash\".   • Zofran Rash     Pt states \"I get a body rash\".     Family History:  Family History   Problem Relation Age of Onset   • Cancer Mother    • Cancer Sister    • Cancer Maternal Aunt       reviewed and " "felt non pertinent to this encounter     Social History:  Social History   Substance Use Topics   • Smoking status: Current Every Day Smoker -- 0.12 packs/day for 53 years     Types: Cigarettes   • Smokeless tobacco: Never Used      Comment: smokes 1/2 ppd   • Alcohol Use: No     #################################################################  Physical Exam:   Vitals/ General Appearance:   Weight/BMI: Body mass index is 34.78 kg/(m^2).  Blood pressure 130/62, pulse 73, temperature 36.5 °C (97.7 °F), resp. rate 17, height 1.549 m (5' 1\"), weight 83.462 kg (184 lb), SpO2 99 %.   Filed Vitals:    04/22/17 1901 04/22/17 1930 04/22/17 2046 04/22/17 2100   BP:       Pulse: 68 71 73 73   Temp:       Resp: 17      Height:       Weight:       SpO2: 99% 97% 100% 99%    Oxygen Therapy:  Pulse Oximetry: 99 %, O2 (LPM): 1.5, O2 Delivery: Nasal Cannula    Constitutional:  well developed, well nourished, non-toxic, no acute distress aao x 4  HENMT: Normocephalic, atraumatic, b/l ears normal, nose normal  Eyes:  EOMI, conjunctiva normal, no discharge  Neck: no tracheal deviation, supple  Cardiovascular: normal heart rate, normal rhythm, no murmurs, no rubs or gallops; no cyanosis, clubbing or edema  Lungs: Respiratory effort is increased, bilateral lungs significant rhonchi and wheezing. No significant refills.   Abdomen: soft, non-tender, no guarding or rebound, active BS, no mass  Skin: warm, dry, no erythema, no rash  Neurologic: Alert and oriented, strength 5/5, no focal deficits, CN II-XII normal  Psychiatric: No anxiety or depression  Lymph node: No lymphadenopathy appreciated in the neck groin and axillary area.   Extremities: Bilateral lower extremities no pitting edema, bilateral pulses symmetric  #################################################################  Lab Data Review:    Objective  Recent Results (from the past 24 hour(s))   PROTHROMBIN TIME    Collection Time: 04/22/17  2:21 AM   Result Value Ref Range    PT " 26.8 (H) 12.0 - 14.6 sec    INR 2.39 (H) 0.87 - 1.13   ACCU-CHEK GLUCOSE    Collection Time: 04/22/17  8:05 AM   Result Value Ref Range    Glucose - Accu-Ck 90 65 - 99 mg/dL   CBC w/ Differential    Collection Time: 04/22/17  4:36 PM   Result Value Ref Range    WBC 6.2 4.8 - 10.8 K/uL    RBC 4.37 4.20 - 5.40 M/uL    Hemoglobin 11.7 (L) 12.0 - 16.0 g/dL    Hematocrit 37.5 37.0 - 47.0 %    MCV 85.8 81.4 - 97.8 fL    MCH 26.8 (L) 27.0 - 33.0 pg    MCHC 31.2 (L) 33.6 - 35.0 g/dL    RDW 50.6 (H) 35.9 - 50.0 fL    Platelet Count 125 (L) 164 - 446 K/uL    MPV 11.5 9.0 - 12.9 fL    Nucleated RBC 0.00 /100 WBC    NRBC (Absolute) 0.00 K/uL    Neutrophils-Polys 57.50 44.00 - 72.00 %    Lymphocytes 30.10 22.00 - 41.00 %    Monocytes 4.40 0.00 - 13.40 %    Eosinophils 0.90 0.00 - 6.90 %    Basophils 0.90 0.00 - 1.80 %    Neutrophils (Absolute) 3.73 2.00 - 7.15 K/uL    Lymphs (Absolute) 1.87 1.00 - 4.80 K/uL    Monos (Absolute) 0.27 0.00 - 0.85 K/uL    Eos (Absolute) 0.06 0.00 - 0.51 K/uL    Baso (Absolute) 0.06 0.00 - 0.12 K/uL   Complete Metabolic Panel (CMP)    Collection Time: 04/22/17  4:36 PM   Result Value Ref Range    Sodium 135 135 - 145 mmol/L    Potassium 4.8 3.6 - 5.5 mmol/L    Chloride 105 96 - 112 mmol/L    Co2 26 20 - 33 mmol/L    Anion Gap 4.0 0.0 - 11.9    Glucose 269 (H) 65 - 99 mg/dL    Bun 28 (H) 8 - 22 mg/dL    Creatinine 0.60 0.50 - 1.40 mg/dL    Calcium 9.1 8.5 - 10.5 mg/dL    AST(SGOT) 45 12 - 45 U/L    ALT(SGPT) 397 (H) 2 - 50 U/L    Alkaline Phosphatase 166 (H) 30 - 99 U/L    Total Bilirubin 0.9 0.1 - 1.5 mg/dL    Albumin 3.0 (L) 3.2 - 4.9 g/dL    Total Protein 6.1 6.0 - 8.2 g/dL    Globulin 3.1 1.9 - 3.5 g/dL    A-G Ratio 1.0 g/dL   Btype Natriuretic Peptide    Collection Time: 04/22/17  4:36 PM   Result Value Ref Range    B Natriuretic Peptide 182 (H) 0 - 100 pg/mL   Troponin STAT    Collection Time: 04/22/17  4:36 PM   Result Value Ref Range    Troponin I <0.01 0.00 - 0.04 ng/mL   PT/INR    Collection  Time: 04/22/17  4:36 PM   Result Value Ref Range    PT 25.8 (H) 12.0 - 14.6 sec    INR 2.28 (H) 0.87 - 1.13   APTT    Collection Time: 04/22/17  4:36 PM   Result Value Ref Range    APTT 34.9 24.7 - 36.0 sec   ESTIMATED GFR    Collection Time: 04/22/17  4:36 PM   Result Value Ref Range    GFR If African American >60 >60 mL/min/1.73 m 2    GFR If Non African American >60 >60 mL/min/1.73 m 2   DIFFERENTIAL MANUAL    Collection Time: 04/22/17  4:36 PM   Result Value Ref Range    Bands-Stabs 2.60 0.00 - 10.00 %    Metamyelocytes 1.80 %    Myelocytes 1.80 %    Manual Diff Status PERFORMED    PERIPHERAL SMEAR REVIEW    Collection Time: 04/22/17  4:36 PM   Result Value Ref Range    Peripheral Smear Review see below    PLATELET ESTIMATE    Collection Time: 04/22/17  4:36 PM   Result Value Ref Range    Plt Estimation Decreased    MORPHOLOGY    Collection Time: 04/22/17  4:36 PM   Result Value Ref Range    RBC Morphology Normal        (click the triangle to expand results)    Imaging/Procedures Review:    DX-CHEST-PORTABLE (1 VIEW)   Final Result         1.  Borderline cardiomegaly and mild perihilar congestive changes are again noted.      2.  No consolidations identified.        EKG:   per my independant read:  HR: 75, Normal Sinus Rhythm, no ST/T changes    Assessment and Plan:      1. Acute respiratory failure, unclear hypoxemia versus hypercapnic  - Likely due to COPD exacerbation, patient is still smoker,  -Also suspect patient has substance abuse as outpatient  -Patient currently does not have O2 at home, needing O2  - O2 supplement here, breathing treatment, bronchodilator,  - IV steroid      2. COPD exacerbation  - See above plan    3. Elevated liver enzyme  - Improving, likely due to acute liver injury from unclear etiology, possibly from drug use  - Currently no signs of encephalopathy, continue to monitor    4. Chronic anemia  HH stable.  0-   5. Diabetes type II uncontrolled  -Currently stable, continue use of a  sliding scale     6. Medical noncompliance    7. Elevated liver enzyme, improving, likely due to acute hepatitis from unclear etiology, suspect drug use.    8. Pulmonary embolism currently on anticoagulation therapy  - Continue his warfarin  - There are INR at goal    9. History of substance abuse    10. Prophylaxis: warfarin  11. Code: DNR per patient   12. Dispo: she will be admitted to inpatient for management that is expected to take more than 2 midnights     Spent 11 minutes on tobacco cessation counseling including nicotine patches, gum, and dangers of smoking.    The pt indicated that will quit.     [id number] (smoking and tobacco cessation counseling visit; intensive, greater than 10 minutes).

## 2017-04-23 NOTE — PROGRESS NOTES
Patient BS came back high on CMP, RN checked finger stick and result was 438. Per Dr. Taylor give 18 units of humalog at this time and she will change the sliding scale insulin.

## 2017-04-23 NOTE — RESPIRATORY CARE
COPD EDUCATION by COPD CLINICAL EDUCATOR  4/23/2017 at 6:13 AM by Michelle Acosta     Patient reviewed by COPD education team. Patient does not qualify for COPD program.

## 2017-04-23 NOTE — CARE PLAN
Problem: Acute care of the COPD Patient  Goal: Optimal outcomes for the COPD Patient  Outcome: PROGRESSING AS EXPECTED  Discussed COPD management including oxygen and medications. Discussed importance of oxygen therapy and pulse ox monitoring while in hospital. Pt allowing O2 via NC as well as continuous pulse ox at this time.    Problem: Safety  Goal: Will remain free from injury  Outcome: PROGRESSING AS EXPECTED  Pt verbalizes importance of using call bell before getting OOB. Ambulates with stand by assist.

## 2017-04-23 NOTE — PROGRESS NOTES
Received pt from ED. No acute distress, no complaints at this time. Pt oriented to unit, call bell within reach.

## 2017-04-23 NOTE — CARE PLAN
Problem: Communication  Goal: The ability to communicate needs accurately and effectively will improve  Outcome: PROGRESSING AS EXPECTED  POC discussed with pt. All questions and concerns have been addressed.     Problem: Medication  Goal: Compliance with prescribed medication will improve  Outcome: PROGRESSING AS EXPECTED  Talked to pt. About importance of medication compliance. Pt. Verbalized understanding and said she will do better to be compliant

## 2017-04-23 NOTE — PROGRESS NOTES
Pt resting quietly in bed. BG elevated, message sent to pharmacy - awaiting insulin. Pt denies complaints at this time. Safety precautions (bed alarm, socks, nursing rounds, bed in low locked position) in place.

## 2017-04-23 NOTE — PROGRESS NOTES
Skin assessment by 2 RNs completed. Small healing abrasions to bilateral shins. Scabs intact. No other skin breakdown noted.

## 2017-04-23 NOTE — PROGRESS NOTES
Hospital Medicine Progress Note, Adult, Complex               Author: Helen Taylor Date & Time created: 4/23/2017  9:26 AM     Interval History:  This is a 65 YO female w/ PMH DM (lantus*), DM neuropathy (gabapentin*), Mood disorder  (celexa, trazodone, risperdal, xanax*),opioid dependence (MS contin, oxycodone*), PE (warfarin*), Hep ABC, Currently using IV drugs (denies but had paraphernalia on recent admit) presents for 7th time in 5 weeks. She is homeless. (*=? Compliance)  Admitted 3/14-3/18, 3/18-3/21 (AMA), 3/21-3/22 (AMA), 3/22-3/26,4/3-4/12, 4/13-4/22, and re-admitted on 4/22. She is back w/ SOB, she continues to smoke and states unable to fill Rx's but INR stable as just left. IV access lost. DIscussed w/ RN      Review of Systems:  Review of Systems   Respiratory: Positive for cough, shortness of breath and wheezing. Negative for sputum production.    Cardiovascular: Negative for chest pain.   Gastrointestinal: Negative for nausea, vomiting and abdominal pain.       Physical Exam:  Physical Exam   Constitutional: She is oriented to person, place, and time. She appears well-developed. No distress.   Obese, disheveled   HENT:   Head: Normocephalic and atraumatic.   edentulous   Eyes: EOM are normal. Pupils are equal, round, and reactive to light. Right eye exhibits no discharge. Left eye exhibits no discharge.   Neck: Neck supple.   Cardiovascular: Normal rate and regular rhythm.    Pulmonary/Chest: No respiratory distress. She has wheezes.   Really good full and richcbreath sounds and excursion- at end of expiration she pushes air out to gain faint high pitched wheeze   Abdominal: Soft. She exhibits no distension. There is no tenderness.   Musculoskeletal: She exhibits edema. She exhibits no tenderness.   Neurological: She is alert and oriented to person, place, and time.   Skin: Skin is warm and dry. She is not diaphoretic.   Psychiatric: She has a normal mood and affect.   Nursing note and vitals  reviewed.      Labs:        Invalid input(s): [id number]  Recent Labs      17   1636   TROPONINI  <0.01   BNPBTYPENAT  182*     Recent Labs      17   1636   SODIUM  135   POTASSIUM  4.8   CHLORIDE  105   CO2  26   BUN  28*   CREATININE  0.60   CALCIUM  9.1     Recent Labs      17   1636   ALTSGPT  397*   ASTSGOT  45   ALKPHOSPHAT  166*   TBILIRUBIN  0.9   GLUCOSE  269*     Recent Labs      17   0148  17   0221  17   1636   RBC   --    --   4.37   HEMOGLOBIN   --    --   11.7*   HEMATOCRIT   --    --   37.5   PLATELETCT   --    --   125*   PROTHROMBTM  28.6*  26.8*  25.8*   APTT   --    --   34.9   INR  2.59*  2.39*  2.28*     Recent Labs      17   1636   WBC  6.2   NEUTSPOLYS  57.50   LYMPHOCYTES  30.10   MONOCYTES  4.40   EOSINOPHILS  0.90   BASOPHILS  0.90   ASTSGOT  45   ALTSGPT  397*   ALKPHOSPHAT  166*   TBILIRUBIN  0.9           Hemodynamics:  Temp (24hrs), Av.3 °C (97.3 °F), Min:36.1 °C (96.9 °F), Max:36.6 °C (97.8 °F)  Temperature: 36.2 °C (97.2 °F)  Pulse  Av.9  Min: 64  Max: 84Heart Rate (Monitored): 66  Blood Pressure: (!) 181/86 mmHg (Rn informed), NIBP: (!) 182/87 mmHg     Respiratory:    Respiration: 20, Pulse Oximetry: 96 %, O2 Daily Delivery Respiratory : Silicone Nasal Cannula     Given By:: Mouthpiece, #MDI/DPI Given: MDI/DPI x 1, Work Of Breathing / Effort: Moderate  RUL Breath Sounds: Diminished, RML Breath Sounds: Expiratory Wheezes, RLL Breath Sounds: Expiratory Wheezes, MARC Breath Sounds: Diminished, LLL Breath Sounds: Expiratory Wheezes  Fluids:    Intake/Output Summary (Last 24 hours) at 17 0926  Last data filed at 17 0605   Gross per 24 hour   Intake    480 ml   Output      0 ml   Net    480 ml     Weight: 90.4 kg (199 lb 4.7 oz)  GI/Nutrition:  Orders Placed This Encounter   Procedures   • Diet Order     Standing Status: Standing      Number of Occurrences: 1      Standing Expiration Date:      Order Specific Question:   Diet:     Answer:  Hepatic [9]     Order Specific Question:  Diet:     Answer:  Consistent Carbohydrate [4]     Medical Decision Making, by Problem:  Active Hospital Problems    Diagnosis   • COPD exacerbation (CMS-HCC) [J44.1]? Patient is forcing end exp wheeze- decrease steroids, OK for medical   • Pulmonary emboli (CMS-HCC) [I26.99] INR therapeutic as she has not been out of hospital more than 24 hrs since 4/3   • Type 2 diabetes mellitus, uncontrolled (CMS-HCC) [E11.65] decreased steroids, increased lantus   • Hepatitis C [B19.20] h/o   • Tobacco abuse [Z72.0] patch ordered   • Morbidly obese (CMS-HCC) [E66.01]   • Opioid dependence (CMS-HCC) [] ongoing IVDA   • Mood disorder (CMS-HCC) [F39] on meds   • Hemorrhagic disorder due to extrinsic circulating anticoagulants (CMS-HCC) [D68.32] on warfarin, INR OK   • Acute on chronic respiratory failure with hypoxemia (CMS-HCC) [J96.21] minimal wheeze   • Non compliance w medication regimen [Z91.14] SW consult   • Homeless [Z59.0]SW consult   • IV drug abuse [F19.10]   • Diabetic neuropathy (CMS-HCC) [E11.40]   • Essential hypertension [I10] not at goal, added norvasc and prn hydralazine       Labs reviewed and Radiology images reviewed  Fagan catheter: No Fagan      DVT Prophylaxis: Warfarin (Coumadin)    Ulcer prophylaxis: Not indicated    Assessed for rehab: Patient returned to prior level of function, rehabilitation not indicated at this time

## 2017-04-23 NOTE — PROGRESS NOTES
Inpatient Anticoagulation Service Note    Date: 4/23/2017  Reason for Anticoagulation: Pulmonary Embolism        Hemoglobin Value: 11.7  Hematocrit Value: 37.5  Lab Platelet Value: 125  Target INR: 2.0 to 3.0    INR from last 7 days     Date/Time INR Value    04/22/17 1636 (!)2.28        Dose from last 7 days     Date/Time Dose (mg)    04/22/17  2.5        Significant Interactions: Aspirin, Corticosteroids, Other (Celexa)  Bridge Therapy: No   Comments: Pt presents to ED with c/o SOB. Pt d/c'ed from [facility] earlier in the morning. Pt on chronic anticoagulation with warfarin 2.5 mg po daily for PE with INR goal 2.0-3.0. Most recent INR therapeutic at 2.28. Some drug-drug interaction noted.    Plan: Will administer warfarin 2.5 mg po daily and check INR. Pharmacy will monitor.     Pharmacist suggested discharge dosing: current home regimen     Srikanth Valencia

## 2017-04-23 NOTE — DISCHARGE SUMMARY
PRIMARY CARE PROVIDER:  At the [facility].    Gastroenterology digestive [facility].    DISCHARGE DIAGNOSES:  1.  Acute liver injury.  2.  Encephalopathy, likely multifactorial from hepatic encephalopathy.  3.  Pulmonary embolism, on anticoagulation.  4.  Chronic obstructive pulmonary disease.  5.  Uncontrolled type 2 diabetes.  6.  Hyponatremia.  7.  History of substance abuse.    DISCHARGE MEDICATIONS:  1.  Coumadin 2.5 mg daily.  2.  Morphine 15 mg twice daily, total of 8 tablets dispensed.  3.  Xanax 0.5 mg 3 times a day as needed for sleep.  4.  Celexa 20 mg daily.  5.  Gabapentin 300 mg 3 times a day.  6.  Lantus 32 units in the evening.  7.  Lispro sliding scale.  8.  Reglan 5 mg 3 times a day before meals.  9.  Oxycodone 10 mg twice daily as needed for moderate pain, total of 8   tablets dispensed.  10.  Risperidone 0.5 mg at bedtime.  11.  Trazodone 50 mg daily.    MAJOR STUDIES AND PROCEDURES PERFORMED WHILE INPATIENT:  Chest x-ray   04/13/2017, low lung volumes with hypoventilatory change and bibasilar   atelectasis.    HOSPITAL COURSE:  1.  Acute liver injury.  Patient presented with acute liver injury of unknown   etiology.  AST, ALT peaked above 3000.  Patient has slightly elevated   bilirubin and elevated ammonia.  It is suspected that she may be   encephalopathic from the elevated ammonia, which peaked at 225.  The patient   was seen by gastroenterology in consultation with transaminase levels this   high, I suspect that this was toxin and/or ischemic.  Her levels substantially   decreased and current AST is 103, ALT is 891, bilirubin is near normal.  Her   ammonia is also near normal.  She is off the lactulose.  [facility] has   recommended followup in their office.  2.  Encephalopathy, likely multifactorial, but at least in part due to hepatic   encephalopathy.  At this time, the patient does not need to continue   lactulose.  3.  Chronic pain disorder.  The patient is marginally housed.  There  are   concerns about her compliance with medical treatment.  She is a difficult pain   management patient.  I have prescribed her 4 days' worth of medications to   control her chronic pain and recommended follow up with [facility].  I have   told her that I am unsure if any community physician would be comfortable   prescribing her medications long-term.  4.  Chronic obstructive pulmonary disease.  The patient has been on oxygen in   the past.  Her oxygen saturation is clearly stable during this   hospitalization.  She has been ambulating around the unit without any signs of   desaturation.  She no longer needs oxygen.  5.  Pulmonary embolism.  The patient is on anticoagulation.  Her INR is   therapeutic.  She has been prescribed Coumadin for the next 7 days.  She has   follow up with [facility] on April 26 at 8:00 a.m.    DISCHARGE DISPOSITION:  The patient is going to stay with a friend.    DIET:  Regular as tolerated, carbohydrate controlled b.i.d.    FOLLOWUP PLAN:  The patient is to follow up at the [facility], follow up   with [facility] in 6 weeks and follow up with [facility].    Forty minutes was spent discharging this patient from the hospital.       ____________________________________     MD MINE OVIEDO / MIKE    DD:  04/22/2017 09:05:29  DT:  04/22/2017 18:39:43    D#:  [id number]  Job#:  [id number]

## 2017-04-23 NOTE — PROGRESS NOTES
Inpatient Anticoagulation Service Note    Date: 4/23/2017  Reason for Anticoagulation: Pulmonary Embolism        Hemoglobin Value: 11.7  Hematocrit Value: 37.5  Lab Platelet Value: 125  Target INR: 2.0 to 3.0    INR from last 7 days     Date/Time INR Value    04/22/17 1636 (!)2.28        Dose from last 7 days     Date/Time Dose (mg)    04/23/17 0900 2.5    04/23/17 0600 2.5        Average Dose (mg): 2.5  Significant Interactions: Aspirin, Corticosteroids, Other (Comments) (SSRI)  Bridge Therapy: No     Comments: No new INR this am - pt take warfarin as outpatient for history of PE.  H/H slightly low, however stable compared to prior admission (few days ago).  No signs of bleeding noted per chart review.  Will continue with patient's home regimen of 2.5 mg daily and assess INR in am.  Pharmacy will continue to monitor.    Plan:  Warfarin 2.5 mg  Education Material Provided?: No (chronic anticoagulation)  Pharmacist suggested discharge dosing: Likely warfarin 2.5 mg daily.  INR within 7 days of discharge.     Helga Heard, PharmD.  PGY-1 Resident  [phone]

## 2017-04-23 NOTE — ED NOTES
Pt yelling out loudly about her pain medication. Pt medicated per admitting orders. Pt requesting food. Pt provided with snack. Aware of POC. Awaiting bed assignment.

## 2017-04-23 NOTE — PROGRESS NOTES
Assumed care of pt. at 0700     Bedside report received from DARRELL Rodriguez   POC discussed with pt. At bedside and Pt. verbalizes understanding.  Pt. Is Awake and AOx 4  Call light within reach  with appropriate instructions to call for assistance  Bed locked and in lowest position.    Will continue to monitor

## 2017-04-24 LAB
GLUCOSE BLD-MCNC: 231 MG/DL (ref 65–99)
GLUCOSE BLD-MCNC: 289 MG/DL (ref 65–99)
GLUCOSE BLD-MCNC: 317 MG/DL (ref 65–99)
GLUCOSE BLD-MCNC: 331 MG/DL (ref 65–99)
GLUCOSE BLD-MCNC: 342 MG/DL (ref 65–99)
GLUCOSE BLD-MCNC: 483 MG/DL (ref 65–99)
GLUCOSE BLD-MCNC: 486 MG/DL (ref 65–99)
INR PPP: 2.3 (ref 0.87–1.13)
PROTHROMBIN TIME: 26 SEC (ref 12–14.6)

## 2017-04-24 PROCEDURE — 99232 SBSQ HOSP IP/OBS MODERATE 35: CPT | Performed by: INTERNAL MEDICINE

## 2017-04-24 RX ORDER — GUAIFENESIN 600 MG/1
1200 TABLET, EXTENDED RELEASE ORAL EVERY 12 HOURS
Status: DISCONTINUED | OUTPATIENT
Start: 2017-04-24 | End: 2017-04-29 | Stop reason: HOSPADM

## 2017-04-24 RX ORDER — INSULIN GLARGINE 100 [IU]/ML
20 INJECTION, SOLUTION SUBCUTANEOUS 2 TIMES DAILY
Status: DISCONTINUED | OUTPATIENT
Start: 2017-04-24 | End: 2017-04-25

## 2017-04-24 RX ORDER — IPRATROPIUM BROMIDE AND ALBUTEROL SULFATE 2.5; .5 MG/3ML; MG/3ML
3 SOLUTION RESPIRATORY (INHALATION)
Status: DISCONTINUED | OUTPATIENT
Start: 2017-04-24 | End: 2017-04-29 | Stop reason: HOSPADM

## 2017-04-24 RX ORDER — METHYLPREDNISOLONE 4 MG/1
8 TABLET ORAL 3 TIMES DAILY
Status: DISCONTINUED | OUTPATIENT
Start: 2017-04-24 | End: 2017-04-27

## 2017-04-24 RX ADMIN — AMLODIPINE BESYLATE 10 MG: 10 TABLET ORAL at 07:59

## 2017-04-24 RX ADMIN — INSULIN LISPRO 5 UNITS: 100 INJECTION, SOLUTION INTRAVENOUS; SUBCUTANEOUS at 17:11

## 2017-04-24 RX ADMIN — IPRATROPIUM BROMIDE AND ALBUTEROL SULFATE 3 ML: .5; 3 SOLUTION RESPIRATORY (INHALATION) at 07:08

## 2017-04-24 RX ADMIN — BUDESONIDE AND FORMOTEROL FUMARATE DIHYDRATE 2 PUFF: 80; 4.5 AEROSOL RESPIRATORY (INHALATION) at 21:09

## 2017-04-24 RX ADMIN — METOCLOPRAMIDE 5 MG: 10 TABLET ORAL at 11:32

## 2017-04-24 RX ADMIN — BUDESONIDE AND FORMOTEROL FUMARATE DIHYDRATE 2 PUFF: 80; 4.5 AEROSOL RESPIRATORY (INHALATION) at 07:08

## 2017-04-24 RX ADMIN — GABAPENTIN 300 MG: 300 CAPSULE ORAL at 07:59

## 2017-04-24 RX ADMIN — MORPHINE SULFATE 15 MG: 15 TABLET, EXTENDED RELEASE ORAL at 07:59

## 2017-04-24 RX ADMIN — IPRATROPIUM BROMIDE AND ALBUTEROL SULFATE 3 ML: .5; 3 SOLUTION RESPIRATORY (INHALATION) at 18:40

## 2017-04-24 RX ADMIN — OXYCODONE HYDROCHLORIDE 10 MG: 10 TABLET ORAL at 03:47

## 2017-04-24 RX ADMIN — RISPERIDONE 0.5 MG: 0.5 TABLET, FILM COATED ORAL at 21:09

## 2017-04-24 RX ADMIN — INSULIN LISPRO 3 UNITS: 100 INJECTION, SOLUTION INTRAVENOUS; SUBCUTANEOUS at 11:30

## 2017-04-24 RX ADMIN — IPRATROPIUM BROMIDE AND ALBUTEROL SULFATE 3 ML: .5; 3 SOLUTION RESPIRATORY (INHALATION) at 11:16

## 2017-04-24 RX ADMIN — METOCLOPRAMIDE 5 MG: 10 TABLET ORAL at 17:10

## 2017-04-24 RX ADMIN — ASPIRIN 81 MG: 81 TABLET ORAL at 07:59

## 2017-04-24 RX ADMIN — MORPHINE SULFATE 15 MG: 15 TABLET, EXTENDED RELEASE ORAL at 21:09

## 2017-04-24 RX ADMIN — GUAIFENESIN 1200 MG: 600 TABLET, EXTENDED RELEASE ORAL at 21:09

## 2017-04-24 RX ADMIN — METHYLPREDNISOLONE 8 MG: 4 TABLET ORAL at 21:09

## 2017-04-24 RX ADMIN — TRAZODONE HYDROCHLORIDE 50 MG: 50 TABLET ORAL at 17:10

## 2017-04-24 RX ADMIN — INSULIN GLARGINE 20 UNITS: 100 INJECTION, SOLUTION SUBCUTANEOUS at 21:12

## 2017-04-24 RX ADMIN — GABAPENTIN 300 MG: 300 CAPSULE ORAL at 14:56

## 2017-04-24 RX ADMIN — METOCLOPRAMIDE 5 MG: 10 TABLET ORAL at 06:18

## 2017-04-24 RX ADMIN — OXYCODONE HYDROCHLORIDE 10 MG: 10 TABLET ORAL at 14:56

## 2017-04-24 RX ADMIN — GUAIFENESIN 1200 MG: 600 TABLET, EXTENDED RELEASE ORAL at 14:56

## 2017-04-24 RX ADMIN — INSULIN LISPRO 9 UNITS: 100 INJECTION, SOLUTION INTRAVENOUS; SUBCUTANEOUS at 21:17

## 2017-04-24 RX ADMIN — INSULIN GLARGINE 20 UNITS: 100 INJECTION, SOLUTION SUBCUTANEOUS at 14:58

## 2017-04-24 RX ADMIN — INSULIN LISPRO 6 UNITS: 100 INJECTION, SOLUTION INTRAVENOUS; SUBCUTANEOUS at 06:18

## 2017-04-24 RX ADMIN — WARFARIN SODIUM 2.5 MG: 2.5 TABLET ORAL at 17:10

## 2017-04-24 RX ADMIN — METHYLPREDNISOLONE 8 MG: 4 TABLET ORAL at 14:56

## 2017-04-24 RX ADMIN — METHYLPREDNISOLONE 4 MG: 4 TABLET ORAL at 07:59

## 2017-04-24 RX ADMIN — GABAPENTIN 300 MG: 300 CAPSULE ORAL at 21:09

## 2017-04-24 RX ADMIN — CITALOPRAM HYDROBROMIDE 20 MG: 20 TABLET ORAL at 07:59

## 2017-04-24 ASSESSMENT — ENCOUNTER SYMPTOMS
NAUSEA: 0
COUGH: 1
VOMITING: 0
SPUTUM PRODUCTION: 0
ABDOMINAL PAIN: 0
WHEEZING: 1
SHORTNESS OF BREATH: 1

## 2017-04-24 ASSESSMENT — PAIN SCALES - GENERAL
PAINLEVEL_OUTOF10: 3
PAINLEVEL_OUTOF10: 4
PAINLEVEL_OUTOF10: 6
PAINLEVEL_OUTOF10: 4
PAINLEVEL_OUTOF10: 4
PAINLEVEL_OUTOF10: 6
PAINLEVEL_OUTOF10: 5
PAINLEVEL_OUTOF10: 4
PAINLEVEL_OUTOF10: 8

## 2017-04-24 ASSESSMENT — COPD QUESTIONNAIRES
HAVE YOU SMOKED AT LEAST 100 CIGARETTES IN YOUR ENTIRE LIFE: YES
DO YOU EVER COUGH UP ANY MUCUS OR PHLEGM?: YES, A FEW DAYS A WEEK OR MONTH
COPD SCREENING SCORE: 8
DURING THE PAST 4 WEEKS HOW MUCH DID YOU FEEL SHORT OF BREATH: MOST  OR ALL OF THE TIME

## 2017-04-24 ASSESSMENT — LIFESTYLE VARIABLES: EVER_SMOKED: YES

## 2017-04-24 NOTE — DISCHARGE PLANNING
Care Transition Team Discharge Planning    Anticipated Discharge Information  Anticipated discharge disposition:  (Homeless/streets)  Discharge Contact Phone Number: [phone]              Discharge Plan:  SW spoke to pt who is requesting assistance in finding a place to live. She became homeless this month and is having a hard time finding a place in her income range. Client reports she gets $500 on the first of the month and $300 on the 10th from spousal benefits. SW left message for Carmen at [phone] to see if they can have any female beds open.

## 2017-04-24 NOTE — CARE PLAN
Problem: Safety  Goal: Will remain free from injury  Outcome: PROGRESSING AS EXPECTED  Patient educated on importance of using the call light if in need of assistance. Patient verbalizes understanding. Bed locked in lowest position, non skid socks on, personal belongings and call light within reach, appropriate sign placed on door.    Problem: Respiratory:  Goal: Respiratory status will improve  Oxygen titrated to keep O2 around 89-92 percent.

## 2017-04-24 NOTE — CARE PLAN
Problem: Safety  Goal: Will remain free from injury  Outcome: PROGRESSING AS EXPECTED  Pt uses call bell before getting OOB, ambulates without assist once set up with portable O2.    Problem: Communication  Goal: The ability to communicate needs accurately and effectively will improve  Outcome: PROGRESSING AS EXPECTED  Pt is able to communicate needs, as well as presence, severity, and location of pain.

## 2017-04-24 NOTE — PROGRESS NOTES
Pt ambulating laps around unit with portable oxygen. No assist required for ambulation at this time.

## 2017-04-24 NOTE — PROGRESS NOTES
Hospital Medicine Progress Note, Adult, Complex               Author: Helen Taylor Date & Time created: 4/24/2017  4:54 PM     Interval History:  This is a 67 YO female w/ PMH DM (lantus*), DM neuropathy (gabapentin*), Mood disorder  (celexa, trazodone, risperdal, xanax*),opioid dependence (MS contin, oxycodone*), PE (warfarin*), Hep ABC, Currently using IV drugs (denies but had paraphernalia on recent admit) presents for 7th time in 5 weeks. She is homeless. (*=? Compliance)  Admitted 3/14-3/18, 3/18-3/21 (AMA), 3/21-3/22 (AMA), 3/22-3/26,4/3-4/12, 4/13-4/22, and re-admitted on 4/22. She is back w/ SOB, she continues to smoke and states unable to fill Rx's but INR stable as just left.    4/23  IV access lost. DIscussed w/ RN    4/24 wheezing worse- steroids increased, discussed w/ , is pending eval for housing      Review of Systems:  Review of Systems   Respiratory: Positive for cough, shortness of breath and wheezing. Negative for sputum production.    Cardiovascular: Negative for chest pain.   Gastrointestinal: Negative for nausea, vomiting and abdominal pain.       Physical Exam:  Physical Exam   Constitutional: She is oriented to person, place, and time. She appears well-developed. No distress.   Obese, disheveled   HENT:   Head: Normocephalic and atraumatic.   edentulous   Eyes: EOM are normal. Pupils are equal, round, and reactive to light. Right eye exhibits no discharge. Left eye exhibits no discharge.   Neck: Neck supple.   Cardiovascular: Normal rate and regular rhythm.    Pulmonary/Chest: No respiratory distress. She has wheezes.   Diffuse coarse wheezing all over, increased cough   Abdominal: Soft. She exhibits no distension. There is no tenderness.   Musculoskeletal: She exhibits edema (improved). She exhibits no tenderness.   Neurological: She is alert and oriented to person, place, and time.   Skin: Skin is warm and dry. She is not diaphoretic.   Psychiatric: She has a normal  mood and affect.   Nursing note and vitals reviewed.      Labs:        Invalid input(s): [id number]  Recent Labs      17   163   TROPONINI  <0.01   BNPBTYPENAT  182*     Recent Labs      17   1636  17   1329   SODIUM  135  130*   POTASSIUM  4.8  4.9   CHLORIDE  105  100   CO2  26  20   BUN  28*  23*   CREATININE  0.60  0.74   CALCIUM  9.1  9.1     Recent Labs      17   1636  17   1329   ALTSGPT  397*  323*   ASTSGOT  45  26   ALKPHOSPHAT  166*  165*   TBILIRUBIN  0.9  1.4   GLUCOSE  269*  543*     Recent Labs      17   0221  17   16317   1329  17   0309   RBC   --   4.37  4.40   --    HEMOGLOBIN   --   11.7*  11.7*   --    HEMATOCRIT   --   37.5  37.2   --    PLATELETCT   --   125*  154*   --    PROTHROMBTM  26.8*  25.8*   --   26.0*   APTT   --   34.9   --    --    INR  2.39*  2.28*   --   2.30*     Recent Labs      17   16317   1329   WBC  6.2  11.7*   NEUTSPOLYS  57.50  88.40*   LYMPHOCYTES  30.10  7.30*   MONOCYTES  4.40  2.60   EOSINOPHILS  0.90  0.00   BASOPHILS  0.90  0.30   ASTSGOT  45  26   ALTSGPT  397*  323*   ALKPHOSPHAT  166*  165*   TBILIRUBIN  0.9  1.4           Hemodynamics:  Temp (24hrs), Av.4 °C (97.5 °F), Min:36.3 °C (97.3 °F), Max:36.6 °C (97.9 °F)  Temperature: 36.4 °C (97.6 °F)  Pulse  Av.1  Min: 55  Max: 94   Blood Pressure: 141/77 mmHg     Respiratory:    Respiration: 18, Pulse Oximetry: 97 %, O2 Daily Delivery Respiratory : Silicone Nasal Cannula     Given By:: Mouthpiece, #MDI/DPI Given: MDI/DPI x 1, Work Of Breathing / Effort: Mild  RUL Breath Sounds: Diminished, RML Breath Sounds: Diminished, RLL Breath Sounds: Diminished, MARC Breath Sounds: Diminished, LLL Breath Sounds: Diminished  Fluids:    Intake/Output Summary (Last 24 hours) at 17 1654  Last data filed at 17   Gross per 24 hour   Intake    240 ml   Output      0 ml   Net    240 ml        GI/Nutrition:  Orders Placed This Encounter    Procedures   • Diet Order     Standing Status: Standing      Number of Occurrences: 1      Standing Expiration Date:      Order Specific Question:  Diet:     Answer:  Hepatic [9]     Order Specific Question:  Diet:     Answer:  Consistent Carbohydrate [4]     Medical Decision Making, by Problem:  Active Hospital Problems    Diagnosis   • COPD exacerbation (CMS-HCC) [J44.1]worse, steroids increased   • Pulmonary emboli (CMS-HCC) [I26.99] INR therapeutic as she has not been out of hospital more than 24 hrs since 4/3   • Type 2 diabetes mellitus, uncontrolled (CMS-[facility]) [E11.65] not at goal, increased and split lantus   • Hepatitis C [B19.20] h/o   • Tobacco abuse [Z72.0] patch    • Morbidly obese (CMS-[facility]) [E66.01]   • Opioid dependence (CMS-HCC) [] ongoing IVDA   • Mood disorder (CMS-HCC) [F39] on meds   • Hemorrhagic disorder due to extrinsic circulating anticoagulants (CMS-HCC) [D68.32] on warfarin, INR OK   • Acute on chronic respiratory failure with hypoxemia (CMS-[facility]) [J96.21] minimal wheeze   • Non compliance w medication regimen [Z91.14] SW consult   • Homeless [Z59.0] has someone coming to [facility] tomorrow of housing   • IV drug abuse [F19.10]   • Diabetic neuropathy (CMS-HCC) [E11.40]   • Essential hypertension [I10] @ goal       Labs reviewed and Radiology images reviewed  Fagan catheter: No Fagan      DVT Prophylaxis: Warfarin (Coumadin)    Ulcer prophylaxis: Not indicated    Assessed for rehab: Patient returned to prior level of function, rehabilitation not indicated at this time
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
## 2017-04-24 NOTE — PROGRESS NOTES
"Bedside report completed. Pt sitting in bed, tearful. States \"I just got melancholy thinking about my son\". Emotional support provided. Bed alarm on, call bell within reach. Pt verbalizes understanding of fall prevention strategies.  "

## 2017-04-24 NOTE — PROGRESS NOTES
Inpatient Anticoagulation Service Note    Date: 2017  Reason for Anticoagulation: Pulmonary Embolism        Hemoglobin Value: 11.7  Hematocrit Value: 37.2  Lab Platelet Value: 154  Target INR: 2.0 to 3.0    INR from last 7 days     Date/Time INR Value    17 0309 (!)2.3    17 1636 (!)2.28        Dose from last 7 days     Date/Time Dose (mg)    17 1200 2.5    17 0900 2.5    17 0600 2.5        Average Dose (mg): 2.5 (2.5mg qday)  Significant Interactions: Aspirin, Corticosteroids (citalopram)  Bridge Therapy: No     Comments:   1. INR remains therapeutic   2. Drug interactions   Noted above - no interval change noted   Diet: hepatic/consistent CHO  3. Hematology   Cell lines have remained relatively stable up to . No signs/symptoms of hemorrhage noted.  4. Plan   Will continue 2.5mg po qday   INR with AM labs through     May consider switching to qMWF if INR remains stable tomorrow      Education Material Provided?: No (Chronic AC Pt)  Pharmacist suggested discharge dosin.5mg po qday; repeat INR within one week of DC     Linh Adair, PharmD, BCPS

## 2017-04-24 NOTE — DISCHARGE PLANNING
Care Transition Team Discharge Planning    Anticipated Discharge Information  Anticipated discharge disposition:  (Homeless/streets)  Discharge Contact Phone Number: [phone]              Discharge Plan:  AZUL spoke to Carmen at [facility] who is coming to assess the patient tomorrow to see if she is appropriate for their program.

## 2017-04-25 LAB
GLUCOSE BLD-MCNC: 154 MG/DL (ref 65–99)
GLUCOSE BLD-MCNC: 374 MG/DL (ref 65–99)
GLUCOSE BLD-MCNC: 390 MG/DL (ref 65–99)
GLUCOSE BLD-MCNC: 404 MG/DL (ref 65–99)
INR PPP: 1.64 (ref 0.87–1.13)
PROTHROMBIN TIME: 19.9 SEC (ref 12–14.6)

## 2017-04-25 PROCEDURE — 99233 SBSQ HOSP IP/OBS HIGH 50: CPT | Performed by: INTERNAL MEDICINE

## 2017-04-25 RX ORDER — INSULIN GLARGINE 100 [IU]/ML
30 INJECTION, SOLUTION SUBCUTANEOUS 2 TIMES DAILY
Status: DISCONTINUED | OUTPATIENT
Start: 2017-04-25 | End: 2017-04-27

## 2017-04-25 RX ORDER — WARFARIN SODIUM 5 MG/1
5 TABLET ORAL
Status: DISCONTINUED | OUTPATIENT
Start: 2017-04-25 | End: 2017-04-26

## 2017-04-25 RX ADMIN — OXYCODONE HYDROCHLORIDE 10 MG: 10 TABLET ORAL at 00:11

## 2017-04-25 RX ADMIN — MORPHINE SULFATE 15 MG: 15 TABLET, EXTENDED RELEASE ORAL at 21:29

## 2017-04-25 RX ADMIN — METHYLPREDNISOLONE 8 MG: 4 TABLET ORAL at 14:11

## 2017-04-25 RX ADMIN — AMLODIPINE BESYLATE 10 MG: 10 TABLET ORAL at 10:42

## 2017-04-25 RX ADMIN — GABAPENTIN 300 MG: 300 CAPSULE ORAL at 10:42

## 2017-04-25 RX ADMIN — WARFARIN SODIUM 5 MG: 5 TABLET ORAL at 16:50

## 2017-04-25 RX ADMIN — INSULIN LISPRO 2 UNITS: 100 INJECTION, SOLUTION INTRAVENOUS; SUBCUTANEOUS at 10:51

## 2017-04-25 RX ADMIN — METOCLOPRAMIDE 5 MG: 10 TABLET ORAL at 16:50

## 2017-04-25 RX ADMIN — RISPERIDONE 0.5 MG: 0.5 TABLET, FILM COATED ORAL at 21:29

## 2017-04-25 RX ADMIN — INSULIN LISPRO 8 UNITS: 100 INJECTION, SOLUTION INTRAVENOUS; SUBCUTANEOUS at 06:11

## 2017-04-25 RX ADMIN — GUAIFENESIN 1200 MG: 600 TABLET, EXTENDED RELEASE ORAL at 21:29

## 2017-04-25 RX ADMIN — BUDESONIDE AND FORMOTEROL FUMARATE DIHYDRATE 2 PUFF: 80; 4.5 AEROSOL RESPIRATORY (INHALATION) at 10:45

## 2017-04-25 RX ADMIN — METHYLPREDNISOLONE 8 MG: 4 TABLET ORAL at 10:42

## 2017-04-25 RX ADMIN — BUDESONIDE AND FORMOTEROL FUMARATE DIHYDRATE 2 PUFF: 80; 4.5 AEROSOL RESPIRATORY (INHALATION) at 21:30

## 2017-04-25 RX ADMIN — GUAIFENESIN 1200 MG: 600 TABLET, EXTENDED RELEASE ORAL at 10:41

## 2017-04-25 RX ADMIN — GABAPENTIN 300 MG: 300 CAPSULE ORAL at 14:11

## 2017-04-25 RX ADMIN — ASPIRIN 81 MG: 81 TABLET ORAL at 10:41

## 2017-04-25 RX ADMIN — MORPHINE SULFATE 15 MG: 15 TABLET, EXTENDED RELEASE ORAL at 10:44

## 2017-04-25 RX ADMIN — METOCLOPRAMIDE 5 MG: 10 TABLET ORAL at 10:43

## 2017-04-25 RX ADMIN — TRAZODONE HYDROCHLORIDE 50 MG: 50 TABLET ORAL at 21:29

## 2017-04-25 RX ADMIN — INSULIN GLARGINE 30 UNITS: 100 INJECTION, SOLUTION SUBCUTANEOUS at 21:28

## 2017-04-25 RX ADMIN — INSULIN LISPRO 9 UNITS: 100 INJECTION, SOLUTION INTRAVENOUS; SUBCUTANEOUS at 17:02

## 2017-04-25 RX ADMIN — INSULIN LISPRO 3 UNITS: 100 INJECTION, SOLUTION INTRAVENOUS; SUBCUTANEOUS at 21:29

## 2017-04-25 RX ADMIN — METHYLPREDNISOLONE 8 MG: 4 TABLET ORAL at 21:28

## 2017-04-25 RX ADMIN — CITALOPRAM HYDROBROMIDE 20 MG: 20 TABLET ORAL at 10:44

## 2017-04-25 RX ADMIN — METOCLOPRAMIDE 5 MG: 10 TABLET ORAL at 05:59

## 2017-04-25 RX ADMIN — INSULIN GLARGINE 20 UNITS: 100 INJECTION, SOLUTION SUBCUTANEOUS at 10:52

## 2017-04-25 RX ADMIN — GABAPENTIN 300 MG: 300 CAPSULE ORAL at 21:29

## 2017-04-25 ASSESSMENT — ENCOUNTER SYMPTOMS
EYE DISCHARGE: 0
FLANK PAIN: 0
DIZZINESS: 0
DEPRESSION: 0
NERVOUS/ANXIOUS: 1
SPUTUM PRODUCTION: 0
STRIDOR: 0
SHORTNESS OF BREATH: 1
BLURRED VISION: 0
COUGH: 1
FOCAL WEAKNESS: 0
HEARTBURN: 0
INSOMNIA: 0
HEADACHES: 0
WHEEZING: 1
DIARRHEA: 0
SENSORY CHANGE: 0
MYALGIAS: 0
ABDOMINAL PAIN: 0
WEAKNESS: 1
FEVER: 0

## 2017-04-25 ASSESSMENT — PAIN SCALES - GENERAL
PAINLEVEL_OUTOF10: 8
PAINLEVEL_OUTOF10: 8
PAINLEVEL_OUTOF10: 6
PAINLEVEL_OUTOF10: 8

## 2017-04-25 NOTE — PROGRESS NOTES
2 consecutive FSBS greater than 480, MD notified. New orders received. Will continue to monitor throughout night

## 2017-04-25 NOTE — PROGRESS NOTES
Inpatient Anticoagulation Service Note    Date: 2017  Reason for Anticoagulation: Pulmonary Embolism        Hemoglobin Value: 11.7  Hematocrit Value: 37.2  Lab Platelet Value: 154  Target INR: 2.0 to 3.0    INR from last 7 days     Date/Time INR Value    17 1057 (!)1.64    17 0309 (!)2.3    17 1636 (!)2.28        Dose from last 7 days     Date/Time Dose (mg)    17 1100 5    17 1200 2.5    17 0900 2.5    17 0600 2.5        Average Dose (mg): 2.5 (2.5mg qday)  Significant Interactions: Aspirin, Corticosteroids (citalopram;)  Bridge Therapy: No    Comments:   1. Large decrease in INR over night. This is noted despite resumption of the patient's outpatient dose    Pt is homeless, suspect pt is getting more vitamin K than usual through her hospital meals    This theory is supported by pt's persistent hyperglycemia while inpatient despite and increase in doses of insulin compared to her outpatient regimen  2. Drug interactions   Noted above - no interval change noted   Diet: Diet: hepatic/consistent CHO  3. Hematology   NNL since ; however, no signs/symptoms of hemorrhage noted  4. Plan   Will schedule 5mg/day    INR with AM labs through       Education Material Provided?: No (Chronic AC Pt)  Pharmacist suggested discharge dosin.5mg/day AFTER discharge. Repeat INR within 96 hours of DC     Linh Adair, PharmD, BCPS

## 2017-04-25 NOTE — PROGRESS NOTES
Bedside report received from Day RN. Patient's chart and MAR reviewed. 12 hour chart check complete. Pt is awake in bed. Pt is AOx4. Patient was updated on plan of care for the day. Questions answered and concerns addressed.  Pt denies any additional needs at this time. White board updated. Call light and personal belongings within reach, bed in lowest position.

## 2017-04-25 NOTE — CARE PLAN
Problem: Safety  Goal: Will remain free from injury  Outcome: PROGRESSING AS EXPECTED  Fall precautions in place. Bed in lowest position. Non-skid socks in place. Personal possessions within reach. Mobility sign on door. Bed-alarm on. Call light within reach. Pt educated regarding fall prevention and states understanding.     Problem: Knowledge Deficit  Goal: Knowledge of disease process/condition, treatment plan, diagnostic tests, and medications will improve  Outcome: PROGRESSING AS EXPECTED  Pt educated regarding plan of care and medications. All questions answered.

## 2017-04-25 NOTE — PROGRESS NOTES
Assumed care of pt at noon. Pt a+ox4 , no complaints at this time. Ambulating steadily in room . bg this evening 404- [facility] by MD

## 2017-04-26 ENCOUNTER — APPOINTMENT (OUTPATIENT)
Dept: RADIOLOGY | Facility: MEDICAL CENTER | Age: 67
DRG: 423 | End: 2017-04-26
Attending: INTERNAL MEDICINE
Payer: MEDICAID

## 2017-04-26 ENCOUNTER — APPOINTMENT (OUTPATIENT)
Dept: VASCULAR LAB | Facility: MEDICAL CENTER | Age: 67
End: 2017-04-26
Attending: INTERNAL MEDICINE
Payer: MEDICAID

## 2017-04-26 LAB
ANION GAP SERPL CALC-SCNC: 5 MMOL/L (ref 0–11.9)
BUN SERPL-MCNC: 23 MG/DL (ref 8–22)
CALCIUM SERPL-MCNC: 9 MG/DL (ref 8.5–10.5)
CHLORIDE SERPL-SCNC: 101 MMOL/L (ref 96–112)
CO2 SERPL-SCNC: 29 MMOL/L (ref 20–33)
CREAT SERPL-MCNC: 0.62 MG/DL (ref 0.5–1.4)
GFR SERPL CREATININE-BSD FRML MDRD: >60 ML/MIN/1.73 M 2
GLUCOSE BLD-MCNC: 155 MG/DL (ref 65–99)
GLUCOSE BLD-MCNC: 198 MG/DL (ref 65–99)
GLUCOSE BLD-MCNC: 204 MG/DL (ref 65–99)
GLUCOSE BLD-MCNC: 249 MG/DL (ref 65–99)
GLUCOSE BLD-MCNC: 415 MG/DL (ref 65–99)
GLUCOSE BLD-MCNC: 557 MG/DL (ref 65–99)
GLUCOSE BLD-MCNC: 577 MG/DL (ref 65–99)
GLUCOSE SERPL-MCNC: 395 MG/DL (ref 65–99)
INR PPP: 1.41 (ref 0.87–1.13)
POTASSIUM SERPL-SCNC: 4.3 MMOL/L (ref 3.6–5.5)
PROTHROMBIN TIME: 17.7 SEC (ref 12–14.6)
SODIUM SERPL-SCNC: 135 MMOL/L (ref 135–145)

## 2017-04-26 PROCEDURE — 06H03DZ INSERTION OF INTRALUMINAL DEVICE INTO INFERIOR VENA CAVA, PERCUTANEOUS APPROACH: ICD-10-PCS | Performed by: RADIOLOGY

## 2017-04-26 PROCEDURE — 99232 SBSQ HOSP IP/OBS MODERATE 35: CPT | Performed by: INTERNAL MEDICINE

## 2017-04-26 RX ORDER — WARFARIN SODIUM 5 MG/1
5 TABLET ORAL
Status: DISCONTINUED | OUTPATIENT
Start: 2017-04-26 | End: 2017-04-26

## 2017-04-26 RX ORDER — SODIUM CHLORIDE 9 MG/ML
500 INJECTION, SOLUTION INTRAVENOUS PRN
Status: DISCONTINUED | OUTPATIENT
Start: 2017-04-26 | End: 2017-04-29 | Stop reason: HOSPADM

## 2017-04-26 RX ORDER — GUAIFENESIN/DEXTROMETHORPHAN 100-10MG/5
5 SYRUP ORAL EVERY 6 HOURS PRN
Status: DISCONTINUED | OUTPATIENT
Start: 2017-04-26 | End: 2017-04-29 | Stop reason: HOSPADM

## 2017-04-26 RX ORDER — WARFARIN SODIUM 4 MG/1
4 TABLET ORAL
Status: DISCONTINUED | OUTPATIENT
Start: 2017-04-27 | End: 2017-04-26

## 2017-04-26 RX ORDER — WARFARIN SODIUM 2.5 MG/1
2.5 TABLET ORAL
Status: DISCONTINUED | OUTPATIENT
Start: 2017-04-28 | End: 2017-04-26

## 2017-04-26 RX ORDER — MIDAZOLAM HYDROCHLORIDE 1 MG/ML
INJECTION INTRAMUSCULAR; INTRAVENOUS
Status: COMPLETED
Start: 2017-04-26 | End: 2017-04-26

## 2017-04-26 RX ORDER — MIDAZOLAM HYDROCHLORIDE 1 MG/ML
.5-2 INJECTION INTRAMUSCULAR; INTRAVENOUS PRN
Status: ACTIVE | OUTPATIENT
Start: 2017-04-26 | End: 2017-04-26

## 2017-04-26 RX ADMIN — METOCLOPRAMIDE 5 MG: 10 TABLET ORAL at 09:43

## 2017-04-26 RX ADMIN — BUDESONIDE AND FORMOTEROL FUMARATE DIHYDRATE 2 PUFF: 80; 4.5 AEROSOL RESPIRATORY (INHALATION) at 20:22

## 2017-04-26 RX ADMIN — BUDESONIDE AND FORMOTEROL FUMARATE DIHYDRATE 2 PUFF: 80; 4.5 AEROSOL RESPIRATORY (INHALATION) at 09:00

## 2017-04-26 RX ADMIN — GUAIFENESIN 1200 MG: 600 TABLET, EXTENDED RELEASE ORAL at 09:42

## 2017-04-26 RX ADMIN — ASPIRIN 81 MG: 81 TABLET ORAL at 09:43

## 2017-04-26 RX ADMIN — INSULIN GLARGINE 30 UNITS: 100 INJECTION, SOLUTION SUBCUTANEOUS at 11:32

## 2017-04-26 RX ADMIN — GABAPENTIN 300 MG: 300 CAPSULE ORAL at 09:43

## 2017-04-26 RX ADMIN — GUAIFENESIN 1200 MG: 600 TABLET, EXTENDED RELEASE ORAL at 20:27

## 2017-04-26 RX ADMIN — OXYCODONE HYDROCHLORIDE 10 MG: 10 TABLET ORAL at 16:47

## 2017-04-26 RX ADMIN — IOHEXOL 30 ML: 300 INJECTION, SOLUTION INTRAVENOUS at 13:34

## 2017-04-26 RX ADMIN — MORPHINE SULFATE 15 MG: 15 TABLET, EXTENDED RELEASE ORAL at 09:43

## 2017-04-26 RX ADMIN — GABAPENTIN 300 MG: 300 CAPSULE ORAL at 16:42

## 2017-04-26 RX ADMIN — MORPHINE SULFATE 15 MG: 15 TABLET, EXTENDED RELEASE ORAL at 20:28

## 2017-04-26 RX ADMIN — GABAPENTIN 300 MG: 300 CAPSULE ORAL at 20:27

## 2017-04-26 RX ADMIN — METHYLPREDNISOLONE 8 MG: 4 TABLET ORAL at 20:27

## 2017-04-26 RX ADMIN — AMLODIPINE BESYLATE 10 MG: 10 TABLET ORAL at 09:43

## 2017-04-26 RX ADMIN — METHYLPREDNISOLONE 8 MG: 4 TABLET ORAL at 10:09

## 2017-04-26 RX ADMIN — TRAZODONE HYDROCHLORIDE 50 MG: 50 TABLET ORAL at 20:28

## 2017-04-26 RX ADMIN — INSULIN GLARGINE 30 UNITS: 100 INJECTION, SOLUTION SUBCUTANEOUS at 20:22

## 2017-04-26 RX ADMIN — MIDAZOLAM HYDROCHLORIDE 2 MG: 1 INJECTION INTRAMUSCULAR; INTRAVENOUS at 13:15

## 2017-04-26 RX ADMIN — INSULIN LISPRO 3 UNITS: 100 INJECTION, SOLUTION INTRAVENOUS; SUBCUTANEOUS at 16:59

## 2017-04-26 RX ADMIN — CITALOPRAM HYDROBROMIDE 20 MG: 20 TABLET ORAL at 09:42

## 2017-04-26 RX ADMIN — METOCLOPRAMIDE 5 MG: 10 TABLET ORAL at 16:44

## 2017-04-26 RX ADMIN — OXYCODONE HYDROCHLORIDE 10 MG: 10 TABLET ORAL at 06:17

## 2017-04-26 RX ADMIN — RISPERIDONE 0.5 MG: 0.5 TABLET, FILM COATED ORAL at 20:28

## 2017-04-26 RX ADMIN — MIDAZOLAM 2 MG: 1 INJECTION INTRAMUSCULAR; INTRAVENOUS at 13:15

## 2017-04-26 RX ADMIN — METHYLPREDNISOLONE 8 MG: 4 TABLET ORAL at 16:42

## 2017-04-26 RX ADMIN — METOCLOPRAMIDE 5 MG: 10 TABLET ORAL at 06:13

## 2017-04-26 RX ADMIN — INSULIN LISPRO 9 UNITS: 100 INJECTION, SOLUTION INTRAVENOUS; SUBCUTANEOUS at 20:23

## 2017-04-26 ASSESSMENT — PAIN SCALES - GENERAL
PAINLEVEL_OUTOF10: 0
PAINLEVEL_OUTOF10: 5
PAINLEVEL_OUTOF10: 4
PAINLEVEL_OUTOF10: 0
PAINLEVEL_OUTOF10: 0
PAINLEVEL_OUTOF10: 8
PAINLEVEL_OUTOF10: 5

## 2017-04-26 ASSESSMENT — ENCOUNTER SYMPTOMS
NAUSEA: 0
WHEEZING: 1
DIZZINESS: 0
FLANK PAIN: 0
WEAKNESS: 1
NECK PAIN: 0
COUGH: 1
STRIDOR: 0
CHILLS: 0
HEADACHES: 0
ABDOMINAL PAIN: 0
SPUTUM PRODUCTION: 0
INSOMNIA: 0
VOMITING: 0
PHOTOPHOBIA: 0
FEVER: 0
SHORTNESS OF BREATH: 1
NERVOUS/ANXIOUS: 1
DOUBLE VISION: 0

## 2017-04-26 ASSESSMENT — LIFESTYLE VARIABLES: DO YOU DRINK ALCOHOL: NO

## 2017-04-26 NOTE — PROGRESS NOTES
Hospital Medicine Progress Note, Adult, Complex               Author: Renato Mcfaddensouravolu Date & Time created: 4/26/2017  3:38 PM     Interval History:  65 y/o female with COPD exacerbation and h/o PE    H/O PE-placed IVC filter today as patient is poor candidate for AC and lack of medical compliance. Discussed this with her and she agrees. Working on housing options.    COPD-slowly improving. She feels that she has less wheezing today.    Review of Systems:  Review of Systems   Constitutional: Negative for fever and chills.   Eyes: Negative for double vision and photophobia.   Respiratory: Positive for cough, shortness of breath and wheezing. Negative for sputum production and stridor.         Slightly improved today     Cardiovascular: Negative for chest pain and leg swelling.   Gastrointestinal: Negative for nausea, vomiting and abdominal pain.   Genitourinary: Negative for dysuria and flank pain.   Musculoskeletal: Negative for neck pain.   Skin: Negative for itching and rash.   Neurological: Positive for weakness (a little better today). Negative for dizziness and headaches.   Psychiatric/Behavioral: Negative for suicidal ideas. The patient is nervous/anxious. The patient does not have insomnia.    All other systems reviewed and are negative.      Physical Exam:  Physical Exam   Constitutional: She is oriented to person, place, and time. She appears well-developed and well-nourished. No distress.   HENT:   Head: Normocephalic and atraumatic.   NC in place   Eyes: Conjunctivae are normal. Right eye exhibits no discharge. Left eye exhibits no discharge.   Neck: Neck supple.   Cardiovascular: Normal rate and regular rhythm.  Exam reveals no gallop and no friction rub.    No murmur heard.  Pulmonary/Chest: Effort normal. No stridor. No respiratory distress. She has wheezes (scant, but diffuse).   Abdominal: Soft. Bowel sounds are normal. She exhibits no distension. There is no tenderness.   Musculoskeletal: She exhibits  no tenderness.   Neurological: She is alert and oriented to person, place, and time. No cranial nerve deficit.   Skin: Skin is warm and dry. She is not diaphoretic. No erythema.   Psychiatric: She has a normal mood and affect. Her behavior is normal.   Nursing note and vitals reviewed.      Labs:        Invalid input(s): [id number]      Recent Labs      17   0413   SODIUM  135   POTASSIUM  4.3   CHLORIDE  101   CO2  29   BUN  23*   CREATININE  0.62   CALCIUM  9.0     Recent Labs      17   0413   GLUCOSE  395*     Recent Labs      17   0309  17   1057  17   0126   PROTHROMBTM  26.0*  19.9*  17.7*   INR  2.30*  1.64*  1.41*               Hemodynamics:  Temp (24hrs), Av.5 °C (97.7 °F), Min:36.2 °C (97.1 °F), Max:36.9 °C (98.4 °F)  Temperature: 36.4 °C (97.6 °F)  Pulse  Av.4  Min: 55  Max: 97Heart Rate (Monitored): 66  Blood Pressure: (!) 166/74 mmHg, NIBP: 109/60 mmHg     Respiratory:    Respiration: 18, Pulse Oximetry: 99 %     Work Of Breathing / Effort: Moderate  RUL Breath Sounds: Clear, RML Breath Sounds: Clear, RLL Breath Sounds: Clear, MARC Breath Sounds: Clear, LLL Breath Sounds: Clear  Fluids:    Intake/Output Summary (Last 24 hours) at 17 1538  Last data filed at 17 1700   Gross per 24 hour   Intake    480 ml   Output      0 ml   Net    480 ml     Weight: 98 kg (216 lb 0.8 oz)  GI/Nutrition:  Orders Placed This Encounter   Procedures   • DIET ORDER     Standing Status: Standing      Number of Occurrences: 1      Standing Expiration Date:      Order Specific Question:  Diet:     Answer:  Consistent Carbohydrate [4]     Medical Decision Making, by Problem:  Active Hospital Problems    Diagnosis   • COPD exacerbation (CMS-HCC) [J44.1]solumedrol taper, will decrease further tomorrow  -appears to be improving     • Pulmonary emboli (CMS-HCC) [I26.99]  -stop all AC, poor candidate given non compliance and homelessness  -place IVC filter today     • COPD (chronic  obstructive pulmonary disease) (CMS-HCC) [J44.9]   • Type 2 diabetes mellitus, uncontrolled (CMS-HCC) [E11.65]SSI, diabetic diet, sugars are better today  -continue increased lantus dosing today and continue to adjust PRN     • Hepatitis C [B19.20]IVDU history     • Tobacco abuse [Z72.0]cessation counseling     • Morbidly obese (CMS-HCC) [E66.01]dietary modifications     • Opioid dependence (CMS-HCC) [F11.20]ms contin, no changes today     • Mood disorder (CMS-HCC) [F39]celexa     • Hemorrhagic disorder due to extrinsic circulating anticoagulants (CMS-HCC) [D68.32]-stopped as outlined above     • Acute on chronic respiratory failure with hypoxemia (CMS-HCC) [J96.21]2 liters at baseline  -at her baseline again today, no new issues, continue above treatments     • Non compliance w medication regimen [Z91.14]without domicile  -working on housing for patient   • Homeless [Z59.0]   • IV drug abuse [F19.10]   • Diabetic neuropathy (CMS-HCC) [E11.40]neurontin     • Essential hypertension [I10]-well controlled  -continue norvasc, outpatient meds       Labs reviewed, Medications reviewed and Radiology images reviewed  Fagan catheter: No Fagan      DVT: IVC filter.        Assessed for rehab: Patient was assess for and/or received rehabilitation services during this hospitalization

## 2017-04-26 NOTE — PROGRESS NOTES
Pt with complaints of not feeling well, requested fsbs check. FSBS 567. APN notified and new orders received

## 2017-04-26 NOTE — OR SURGEON
Immediate Post- Operative Note        PostOp Diagnosis: PE hx. Poor candidate for AC.       Procedure(s): IVC filter placement.       Estimated Blood Loss: Less than 5 ml        Complications: None        4/26/2017     1315 PM     Sree Marrero

## 2017-04-26 NOTE — PROGRESS NOTES
Pt left floor with oxygen tank and without notifiying nursing that she was leaving floor. Security and Dr. Thao informed that pt left floor without notifying nursing.

## 2017-04-26 NOTE — DISCHARGE PLANNING
Medical Social Work    SW called Carmen with [facility] to follow-up and see if she had met with pt yet for shelter/housing options yet. Carmen indicated she has not yet seen pt and they do not have a female bed available because of an emergency from [facility] that would be taking it. Carmen reported she would see the pt tomorrow just to determine if she would be appropriate for any future referrals, but at this time they do not have a bed.

## 2017-04-26 NOTE — PROGRESS NOTES
Inpatient Anticoagulation Service Note  Date: 4/26/2017  Estimated Creatinine Clearance: 98.3 mL/min (by C-G formula based on Cr of 0.62).  Reason for Anticoagulation: Pulmonary Embolism   Hemoglobin Value: 11.7  Hematocrit Value: 37.2  Lab Platelet Value: 154  Target INR: 2.0 to 3.0  INR from last 7 days     Date/Time INR Value    04/26/17 0126 (!)1.41    04/25/17 1057 (!)1.64    04/24/17 0309 (!)2.3    04/22/17 1636 (!)2.28        Dose from last 7 days     Date/Time Dose (mg)    04/26/17 1000 5    04/25/17 1100 5    04/24/17 1200 2.5    04/23/17 0900 2.5    04/23/17 0600 2.5        Average Dose (mg): TBD (Home Dose: ~2.5 mg daily per chart review)  Significant Interactions: Aspirin, Corticosteroids, Other (Comments) (amlodipine, citalopram, trazodone)  Bridge Therapy: Yes (enoxaparin ~1 mg/kg BID)  Bridge Therapy Start Date: 04/26/17  Days of Overlap Therapy: 0   INR Value Greater than 2 Prior to Discontinuation of Parenteral Anticoagulation: Not Applicable   Reversal Agent Administered: Not Applicable  Comments: INR down slightly overnight, agree with prior assessment of dietary impact (given social situation). Continued warfarin interactions. New AC bridge with enoxaparin initiated 4/26. No H/H or PLT today. No overt bleeding noted per chart review. Will give 5 mg again tonight and trend INR with AM labs. Likely to need continued dose adjustments during admission. Likely to need close follow-up on discharge (e.g. within 24-48 hours).    Plan:  Warfarin 5 mg 4/26/7  Education Material Provided?: No  Pharmacist suggested discharge dosing: warfarin 2.5 mg daily (after hospital discharge)    [place] Isidro, PHARMD

## 2017-04-26 NOTE — PROGRESS NOTES
"Hospital Medicine Progress Note, Adult, Complex               Author: Carolyn Austin Date & Time created: 4/25/2017  6:34 PM     Interval History:  Patient feeling better, she is looking forward to meeting with the \"housing people\" tomorrow to find out if there is a place for her to stay.      Review of Systems:  Review of Systems   Constitutional: Negative for fever.   Eyes: Negative for blurred vision and discharge.   Respiratory: Positive for cough, shortness of breath and wheezing. Negative for sputum production and stridor.    Cardiovascular: Negative for chest pain and leg swelling.   Gastrointestinal: Negative for heartburn, abdominal pain and diarrhea.   Genitourinary: Negative for dysuria and flank pain.   Musculoskeletal: Negative for myalgias and joint pain.   Skin: Negative for itching and rash.   Neurological: Positive for weakness. Negative for dizziness, sensory change, focal weakness and headaches.   Psychiatric/Behavioral: Negative for depression. The patient is nervous/anxious. The patient does not have insomnia.        Physical Exam:  Physical Exam   Constitutional: She is oriented to person, place, and time. She appears well-developed and well-nourished. No distress.   HENT:   Head: Normocephalic and atraumatic.   Eyes: Conjunctivae are normal. No scleral icterus.   Neck: Neck supple. No JVD present.   Cardiovascular: Normal rate and regular rhythm.  Exam reveals no gallop and no friction rub.    No murmur heard.  Pulmonary/Chest: Effort normal and breath sounds normal. No respiratory distress. She exhibits no tenderness.   Abdominal: Soft. Bowel sounds are normal. She exhibits no distension and no mass. There is no tenderness.   Musculoskeletal: She exhibits no edema.   Neurological: She is alert and oriented to person, place, and time. No cranial nerve deficit.   Skin: Skin is warm and dry. She is not diaphoretic. No erythema.   Psychiatric: She has a normal mood and affect. Her behavior is " normal.   Nursing note and vitals reviewed.      Labs:        Invalid input(s): [id number]      Recent Labs      17   1329   SODIUM  130*   POTASSIUM  4.9   CHLORIDE  100   CO2  20   BUN  23*   CREATININE  0.74   CALCIUM  9.1     Recent Labs      17   1329   ALTSGPT  323*   ASTSGOT  26   ALKPHOSPHAT  165*   TBILIRUBIN  1.4   GLUCOSE  543*     Recent Labs      17   1329  17   0309  17   1057   RBC  4.40   --    --    HEMOGLOBIN  11.7*   --    --    HEMATOCRIT  37.2   --    --    PLATELETCT  154*   --    --    PROTHROMBTM   --   26.0*  19.9*   INR   --   2.30*  1.64*     Recent Labs      17   1329   WBC  11.7*   NEUTSPOLYS  88.40*   LYMPHOCYTES  7.30*   MONOCYTES  2.60   EOSINOPHILS  0.00   BASOPHILS  0.30   ASTSGOT  26   ALTSGPT  323*   ALKPHOSPHAT  165*   TBILIRUBIN  1.4           Hemodynamics:  Temp (24hrs), Av.2 °C (97.2 °F), Min:36.1 °C (96.9 °F), Max:36.3 °C (97.4 °F)  Temperature: 36.2 °C (97.1 °F)  Pulse  Av.2  Min: 55  Max: 94   Blood Pressure: 139/80 mmHg     Respiratory:    Respiration: 17, Pulse Oximetry: 96 %, O2 Daily Delivery Respiratory : Silicone Nasal Cannula     Given By:: Mouthpiece, Work Of Breathing / Effort: Moderate  RUL Breath Sounds: Diminished, RML Breath Sounds: Diminished, RLL Breath Sounds: Diminished, MARC Breath Sounds: Diminished, LLL Breath Sounds: Pleural Rub  Fluids:    Intake/Output Summary (Last 24 hours) at 17 1834  Last data filed at 17 1700   Gross per 24 hour   Intake    960 ml   Output      0 ml   Net    960 ml     Weight: 96.2 kg (212 lb 1.3 oz)  GI/Nutrition:  Orders Placed This Encounter   Procedures   • Diet Order     Standing Status: Standing      Number of Occurrences: 1      Standing Expiration Date:      Order Specific Question:  Diet:     Answer:  Hepatic [9]     Order Specific Question:  Diet:     Answer:  Consistent Carbohydrate [4]     Medical Decision Making, by Problem:  Active Hospital Problems     Diagnosis   • COPD exacerbation (CMS-HCC) [J44.1]solumedrol taper     • Pulmonary emboli (CMS-HCC) [I26.99]coumadin, INR 1.64     • COPD (chronic obstructive pulmonary disease) (CMS-HCC) [J44.9]   • Type 2 diabetes mellitus, uncontrolled (CMS-HCC) [E11.65]SSI, diabetic diet, increase lantus     • Hepatitis C [B19.20]IVDU history     • Tobacco abuse [Z72.0]cessation counseling     • Morbidly obese (CMS-HCC) [E66.01]dietary modifications     • Opioid dependence (CMS-HCC) [F11.20]ms contin     • Mood disorder (CMS-HCC) [F39]celexa     • Hemorrhagic disorder due to extrinsic circulating anticoagulants (CMS-HCC) [D68.32]coumadin     • Acute on chronic respiratory failure with hypoxemia (CMS-HCC) [J96.21]2 liters at baseline     • Non compliance w medication regimen [Z91.14]without domicile     • Homeless [Z59.0]   • IV drug abuse [F19.10]   • Diabetic neuropathy (CMS-HCC) [E11.40]neurontin     • Essential hypertension [I10]       Labs reviewed, Medications reviewed and Radiology images reviewed  Fagan catheter: No Fagan      DVT Prophylaxis: Warfarin (Coumadin)

## 2017-04-26 NOTE — PROGRESS NOTES
Pt educated regarding diet and need for restricted intake due to critically high glucose levels. Found pt getting food from kitchen. Pt verbalizes risk of noncompliance

## 2017-04-26 NOTE — PROGRESS NOTES
IR NOTE:  [employer] MEDICAL CELECT PLATINUM VENA CAVA FILTER PLACED BY DR. BEARD, REF [id number], LOT [id number], VIA RIGHT IJ, PT TOLERATED WELL AND HEMODYNAMICALLY STABLE ON 3L NC.

## 2017-04-27 LAB
ANION GAP SERPL CALC-SCNC: 2 MMOL/L (ref 0–11.9)
BUN SERPL-MCNC: 24 MG/DL (ref 8–22)
CALCIUM SERPL-MCNC: 9.1 MG/DL (ref 8.5–10.5)
CHLORIDE SERPL-SCNC: 101 MMOL/L (ref 96–112)
CO2 SERPL-SCNC: 32 MMOL/L (ref 20–33)
CREAT SERPL-MCNC: 0.55 MG/DL (ref 0.5–1.4)
ERYTHROCYTE [DISTWIDTH] IN BLOOD BY AUTOMATED COUNT: 49.5 FL (ref 35.9–50)
GFR SERPL CREATININE-BSD FRML MDRD: >60 ML/MIN/1.73 M 2
GLUCOSE BLD-MCNC: 102 MG/DL (ref 65–99)
GLUCOSE BLD-MCNC: 147 MG/DL (ref 65–99)
GLUCOSE BLD-MCNC: 245 MG/DL (ref 65–99)
GLUCOSE BLD-MCNC: 320 MG/DL (ref 65–99)
GLUCOSE SERPL-MCNC: 121 MG/DL (ref 65–99)
HCT VFR BLD AUTO: 34.9 % (ref 37–47)
HGB BLD-MCNC: 10.9 G/DL (ref 12–16)
MCH RBC QN AUTO: 26.7 PG (ref 27–33)
MCHC RBC AUTO-ENTMCNC: 31.2 G/DL (ref 33.6–35)
MCV RBC AUTO: 85.5 FL (ref 81.4–97.8)
PLATELET # BLD AUTO: 220 K/UL (ref 164–446)
PMV BLD AUTO: 10.9 FL (ref 9–12.9)
POTASSIUM SERPL-SCNC: 4.7 MMOL/L (ref 3.6–5.5)
RBC # BLD AUTO: 4.08 M/UL (ref 4.2–5.4)
SODIUM SERPL-SCNC: 135 MMOL/L (ref 135–145)
WBC # BLD AUTO: 11 K/UL (ref 4.8–10.8)

## 2017-04-27 PROCEDURE — 99232 SBSQ HOSP IP/OBS MODERATE 35: CPT | Performed by: INTERNAL MEDICINE

## 2017-04-27 RX ORDER — DEXTROSE MONOHYDRATE 25 G/50ML
25 INJECTION, SOLUTION INTRAVENOUS
Status: DISCONTINUED | OUTPATIENT
Start: 2017-04-27 | End: 2017-04-29 | Stop reason: HOSPADM

## 2017-04-27 RX ORDER — TRAMADOL HYDROCHLORIDE 50 MG/1
50 TABLET ORAL EVERY 6 HOURS PRN
Status: DISCONTINUED | OUTPATIENT
Start: 2017-04-27 | End: 2017-04-29 | Stop reason: HOSPADM

## 2017-04-27 RX ORDER — INSULIN GLARGINE 100 [IU]/ML
32 INJECTION, SOLUTION SUBCUTANEOUS 2 TIMES DAILY
Status: DISCONTINUED | OUTPATIENT
Start: 2017-04-27 | End: 2017-04-28

## 2017-04-27 RX ORDER — METHYLPREDNISOLONE 4 MG/1
4 TABLET ORAL
Status: DISCONTINUED | OUTPATIENT
Start: 2017-04-27 | End: 2017-04-29 | Stop reason: HOSPADM

## 2017-04-27 RX ADMIN — METOCLOPRAMIDE 5 MG: 10 TABLET ORAL at 09:12

## 2017-04-27 RX ADMIN — INSULIN LISPRO 6 UNITS: 100 INJECTION, SOLUTION INTRAVENOUS; SUBCUTANEOUS at 20:52

## 2017-04-27 RX ADMIN — TRAZODONE HYDROCHLORIDE 50 MG: 50 TABLET ORAL at 20:47

## 2017-04-27 RX ADMIN — NICOTINE 21 MG: 21 PATCH, EXTENDED RELEASE TRANSDERMAL at 05:34

## 2017-04-27 RX ADMIN — GUAIFENESIN AND DEXTROMETHORPHAN 5 ML: 100; 10 SYRUP ORAL at 23:17

## 2017-04-27 RX ADMIN — TRAMADOL HYDROCHLORIDE 50 MG: 50 TABLET, COATED ORAL at 14:41

## 2017-04-27 RX ADMIN — ASPIRIN 81 MG: 81 TABLET ORAL at 09:11

## 2017-04-27 RX ADMIN — TRAMADOL HYDROCHLORIDE 50 MG: 50 TABLET, COATED ORAL at 23:17

## 2017-04-27 RX ADMIN — RISPERIDONE 0.5 MG: 0.5 TABLET, FILM COATED ORAL at 20:46

## 2017-04-27 RX ADMIN — GABAPENTIN 300 MG: 300 CAPSULE ORAL at 09:11

## 2017-04-27 RX ADMIN — INSULIN GLARGINE 32 UNITS: 100 INJECTION, SOLUTION SUBCUTANEOUS at 20:55

## 2017-04-27 RX ADMIN — AMLODIPINE BESYLATE 10 MG: 10 TABLET ORAL at 09:12

## 2017-04-27 RX ADMIN — OXYCODONE HYDROCHLORIDE 10 MG: 10 TABLET ORAL at 05:42

## 2017-04-27 RX ADMIN — MORPHINE SULFATE 15 MG: 15 TABLET, EXTENDED RELEASE ORAL at 09:11

## 2017-04-27 RX ADMIN — METOCLOPRAMIDE 5 MG: 10 TABLET ORAL at 16:27

## 2017-04-27 RX ADMIN — IPRATROPIUM BROMIDE AND ALBUTEROL SULFATE 3 ML: .5; 3 SOLUTION RESPIRATORY (INHALATION) at 07:14

## 2017-04-27 RX ADMIN — GUAIFENESIN 1200 MG: 600 TABLET, EXTENDED RELEASE ORAL at 20:46

## 2017-04-27 RX ADMIN — INSULIN GLARGINE 30 UNITS: 100 INJECTION, SOLUTION SUBCUTANEOUS at 09:13

## 2017-04-27 RX ADMIN — BUDESONIDE AND FORMOTEROL FUMARATE DIHYDRATE 2 PUFF: 80; 4.5 AEROSOL RESPIRATORY (INHALATION) at 09:13

## 2017-04-27 RX ADMIN — BUDESONIDE AND FORMOTEROL FUMARATE DIHYDRATE 2 PUFF: 80; 4.5 AEROSOL RESPIRATORY (INHALATION) at 20:46

## 2017-04-27 RX ADMIN — GABAPENTIN 300 MG: 300 CAPSULE ORAL at 20:47

## 2017-04-27 RX ADMIN — MORPHINE SULFATE 15 MG: 15 TABLET, EXTENDED RELEASE ORAL at 20:47

## 2017-04-27 RX ADMIN — GABAPENTIN 300 MG: 300 CAPSULE ORAL at 16:26

## 2017-04-27 RX ADMIN — METHYLPREDNISOLONE 8 MG: 4 TABLET ORAL at 12:41

## 2017-04-27 RX ADMIN — CITALOPRAM HYDROBROMIDE 20 MG: 20 TABLET ORAL at 09:11

## 2017-04-27 RX ADMIN — GUAIFENESIN 1200 MG: 600 TABLET, EXTENDED RELEASE ORAL at 09:12

## 2017-04-27 RX ADMIN — METOCLOPRAMIDE 5 MG: 10 TABLET ORAL at 05:34

## 2017-04-27 RX ADMIN — INSULIN LISPRO 3 UNITS: 100 INJECTION, SOLUTION INTRAVENOUS; SUBCUTANEOUS at 12:42

## 2017-04-27 RX ADMIN — GUAIFENESIN AND DEXTROMETHORPHAN 5 ML: 100; 10 SYRUP ORAL at 00:09

## 2017-04-27 RX ADMIN — METHYLPREDNISOLONE 4 MG: 4 TABLET ORAL at 16:28

## 2017-04-27 RX ADMIN — ALPRAZOLAM 0.5 MG: 0.5 TABLET ORAL at 00:09

## 2017-04-27 ASSESSMENT — ENCOUNTER SYMPTOMS
DIZZINESS: 0
NERVOUS/ANXIOUS: 1
WEAKNESS: 1
INSOMNIA: 0
SHORTNESS OF BREATH: 1
WHEEZING: 0
HEADACHES: 0
STRIDOR: 0
COUGH: 1
VOMITING: 0
SPUTUM PRODUCTION: 0
NECK PAIN: 0
NAUSEA: 0
FEVER: 0
FLANK PAIN: 0
BLURRED VISION: 0

## 2017-04-27 ASSESSMENT — PAIN SCALES - GENERAL
PAINLEVEL_OUTOF10: 7
PAINLEVEL_OUTOF10: 8
PAINLEVEL_OUTOF10: 0
PAINLEVEL_OUTOF10: 7

## 2017-04-27 NOTE — PROGRESS NOTES
Received bedside report from Huma RN .  Pt is a/o x 4.  POC discussed w/ pt, verbalizes understanding,  Bed is locked, low, belongings in reach, call light in reach.  Discussed controlling her intake to avoid spikes in blood sugar as previously seen, voiced compliance.  Noted that pt has already taken fruit and linda crackers for her hs snack.

## 2017-04-27 NOTE — CARE PLAN
Problem: Safety  Goal: Will remain free from injury  Outcome: PROGRESSING AS EXPECTED  Bed locked in low position, call light in reach, treaded socks on..    Problem: Knowledge Deficit  Goal: Knowledge of disease process/condition, treatment plan, diagnostic tests, and medications will improve  Outcome: PROGRESSING AS EXPECTED  POC discussed, pt verbalizes understanding.

## 2017-04-27 NOTE — DISCHARGE PLANNING
Medical Social Work    SW received hostile voicemail from pt demanding SW speak with her immediately. SW attempted to meet with pt at bedside and discuss discharge planning, but when SW entered the room pt would not respond to SW calling her name and was snoring loudly. SW left a shelter list on pt's bedside table and advised assigned RN that SW left a shelter list and will attempt to meet with pt at a later time this afternoon if she is awake.    Plan: SW will attempt to meet with pt and provide any additional resources necessary as a part of her discharge planning.

## 2017-04-28 LAB
GLUCOSE BLD-MCNC: 268 MG/DL (ref 65–99)
GLUCOSE BLD-MCNC: 55 MG/DL (ref 65–99)
GLUCOSE BLD-MCNC: 76 MG/DL (ref 65–99)
GLUCOSE BLD-MCNC: 91 MG/DL (ref 65–99)

## 2017-04-28 PROCEDURE — 99232 SBSQ HOSP IP/OBS MODERATE 35: CPT | Performed by: INTERNAL MEDICINE

## 2017-04-28 RX ORDER — INSULIN GLARGINE 100 [IU]/ML
25 INJECTION, SOLUTION SUBCUTANEOUS EVERY EVENING
Status: DISCONTINUED | OUTPATIENT
Start: 2017-04-28 | End: 2017-04-29 | Stop reason: HOSPADM

## 2017-04-28 RX ADMIN — METHYLPREDNISOLONE 4 MG: 4 TABLET ORAL at 17:32

## 2017-04-28 RX ADMIN — MORPHINE SULFATE 15 MG: 15 TABLET, EXTENDED RELEASE ORAL at 09:16

## 2017-04-28 RX ADMIN — BUDESONIDE AND FORMOTEROL FUMARATE DIHYDRATE 2 PUFF: 80; 4.5 AEROSOL RESPIRATORY (INHALATION) at 09:20

## 2017-04-28 RX ADMIN — INSULIN GLARGINE 32 UNITS: 100 INJECTION, SOLUTION SUBCUTANEOUS at 09:20

## 2017-04-28 RX ADMIN — AMLODIPINE BESYLATE 10 MG: 10 TABLET ORAL at 09:16

## 2017-04-28 RX ADMIN — INSULIN LISPRO 5 UNITS: 100 INJECTION, SOLUTION INTRAVENOUS; SUBCUTANEOUS at 17:31

## 2017-04-28 RX ADMIN — TRAMADOL HYDROCHLORIDE 50 MG: 50 TABLET, COATED ORAL at 05:28

## 2017-04-28 RX ADMIN — INSULIN LISPRO 5 UNITS: 100 INJECTION, SOLUTION INTRAVENOUS; SUBCUTANEOUS at 21:17

## 2017-04-28 RX ADMIN — MORPHINE SULFATE 15 MG: 15 TABLET, EXTENDED RELEASE ORAL at 21:15

## 2017-04-28 RX ADMIN — GUAIFENESIN 1200 MG: 600 TABLET, EXTENDED RELEASE ORAL at 21:14

## 2017-04-28 RX ADMIN — GUAIFENESIN 1200 MG: 600 TABLET, EXTENDED RELEASE ORAL at 09:15

## 2017-04-28 RX ADMIN — GABAPENTIN 300 MG: 300 CAPSULE ORAL at 17:32

## 2017-04-28 RX ADMIN — RISPERIDONE 0.5 MG: 0.5 TABLET, FILM COATED ORAL at 21:15

## 2017-04-28 RX ADMIN — ASPIRIN 81 MG: 81 TABLET ORAL at 09:15

## 2017-04-28 RX ADMIN — TRAMADOL HYDROCHLORIDE 50 MG: 50 TABLET, COATED ORAL at 11:55

## 2017-04-28 RX ADMIN — METOCLOPRAMIDE 5 MG: 10 TABLET ORAL at 11:49

## 2017-04-28 RX ADMIN — TRAZODONE HYDROCHLORIDE 50 MG: 50 TABLET ORAL at 21:15

## 2017-04-28 RX ADMIN — INSULIN GLARGINE 25 UNITS: 100 INJECTION, SOLUTION SUBCUTANEOUS at 21:18

## 2017-04-28 RX ADMIN — METOCLOPRAMIDE 5 MG: 10 TABLET ORAL at 06:47

## 2017-04-28 RX ADMIN — METHYLPREDNISOLONE 4 MG: 4 TABLET ORAL at 09:19

## 2017-04-28 RX ADMIN — CITALOPRAM HYDROBROMIDE 20 MG: 20 TABLET ORAL at 09:16

## 2017-04-28 RX ADMIN — METOCLOPRAMIDE 5 MG: 10 TABLET ORAL at 17:32

## 2017-04-28 RX ADMIN — GABAPENTIN 300 MG: 300 CAPSULE ORAL at 09:15

## 2017-04-28 RX ADMIN — METHYLPREDNISOLONE 4 MG: 4 TABLET ORAL at 11:49

## 2017-04-28 RX ADMIN — BUDESONIDE AND FORMOTEROL FUMARATE DIHYDRATE 2 PUFF: 80; 4.5 AEROSOL RESPIRATORY (INHALATION) at 21:14

## 2017-04-28 RX ADMIN — GABAPENTIN 300 MG: 300 CAPSULE ORAL at 21:14

## 2017-04-28 ASSESSMENT — PAIN SCALES - GENERAL
PAINLEVEL_OUTOF10: 8
PAINLEVEL_OUTOF10: 6
PAINLEVEL_OUTOF10: 6
PAINLEVEL_OUTOF10: 5
PAINLEVEL_OUTOF10: 7
PAINLEVEL_OUTOF10: 9

## 2017-04-28 ASSESSMENT — ENCOUNTER SYMPTOMS
WEAKNESS: 0
INSOMNIA: 0
STRIDOR: 0
ABDOMINAL PAIN: 0
HEADACHES: 0
FEVER: 0
SPUTUM PRODUCTION: 0
NERVOUS/ANXIOUS: 1
WHEEZING: 0
FLANK PAIN: 0
SHORTNESS OF BREATH: 1
DIZZINESS: 0
BACK PAIN: 0
NECK PAIN: 0
COUGH: 1

## 2017-04-28 NOTE — PROGRESS NOTES
Assumed care of pt.  Bedside report received and whiteboard updated. Discussed plan of care for the day and answered questions.  Chart and MAR reviewed.  Call light and personal belongings are within reach.  Bed in low position and locked. Pt has no needs at this time.    Pt has been walking the halls. She is hoping to go home today

## 2017-04-28 NOTE — PROGRESS NOTES
Hospital Medicine Progress Note, Adult, Complex               Author: Renato Mcfaddensouravolu Date & Time created: 4/28/2017  12:38 PM     Interval History:  65 y/o female with COPD exacerbation and h/o PE    H/O PE-no new breathing issues today. Denies any new chest pain. Feels better walking around. She is very concerned about getting her oxygen.     COPD-feels like she is back at her baseline. No more wheezing. Walking the hallways with her o2 tank on wheels.    Review of Systems:  Review of Systems   Constitutional: Negative for fever.   Respiratory: Positive for cough and shortness of breath. Negative for sputum production, wheezing and stridor.         Nearly resolved     Cardiovascular: Negative for chest pain and leg swelling.   Gastrointestinal: Negative for abdominal pain.   Genitourinary: Negative for dysuria and flank pain.   Musculoskeletal: Negative for back pain, joint pain and neck pain.   Skin: Negative for itching and rash.   Neurological: Negative for dizziness, weakness and headaches.   Psychiatric/Behavioral: Negative for suicidal ideas. The patient is nervous/anxious. The patient does not have insomnia.         Tangential, impulsive, no changes   All other systems reviewed and are negative.      Physical Exam:  Physical Exam   Constitutional: She is oriented to person, place, and time. She appears well-developed and well-nourished. No distress.   Mumbles a lot to herself   HENT:   Head: Normocephalic and atraumatic.   NC in place   Eyes: Conjunctivae are normal. Right eye exhibits no discharge. Left eye exhibits no discharge.   Neck: Neck supple.   Cardiovascular: Normal rate and regular rhythm.  Exam reveals no gallop and no friction rub.    No murmur heard.  Pulmonary/Chest: Effort normal. No stridor. No respiratory distress. She has no wheezes (completely gone today).   Abdominal: Soft. Bowel sounds are normal. She exhibits no distension. There is no tenderness.   Musculoskeletal: She exhibits no  tenderness.   Neurological: She is alert and oriented to person, place, and time. No cranial nerve deficit.   Skin: Skin is warm and dry. She is not diaphoretic. No erythema.   Psychiatric: She has a normal mood and affect. Her behavior is normal.   Nursing note and vitals reviewed.      Labs:        Invalid input(s): [id number]      Recent Labs      17   SODIUM  135  135   POTASSIUM  4.3  4.7   CHLORIDE  101  101   CO2  29  32   BUN  23*  24*   CREATININE  0.62  0.55   CALCIUM  9.0  9.1     Recent Labs      17   GLUCOSE  395*  121*     Recent Labs      17   0126  17   RBC   --   4.08*   HEMOGLOBIN   --   10.9*   HEMATOCRIT   --   34.9*   PLATELETCT   --   220   PROTHROMBTM  17.7*   --    INR  1.41*   --      Recent Labs      17   WBC  11.0*           Hemodynamics:  Temp (24hrs), Av.3 °C (97.3 °F), Min:35.8 °C (96.5 °F), Max:36.4 °C (97.6 °F)  Temperature: 36.4 °C (97.6 °F)  Pulse  Av.3  Min: 55  Max: 97   Blood Pressure: 159/89 mmHg     Respiratory:    Respiration: 20, Pulse Oximetry: 100 %     Work Of Breathing / Effort: Mild  RUL Breath Sounds: Diminished, RML Breath Sounds: Diminished, RLL Breath Sounds: Diminished, MARC Breath Sounds: Diminished;Expiratory Wheezes, LLL Breath Sounds: Diminished  Fluids:    Intake/Output Summary (Last 24 hours) at 17 1238  Last data filed at 17 0700   Gross per 24 hour   Intake    240 ml   Output      0 ml   Net    240 ml        GI/Nutrition:  Orders Placed This Encounter   Procedures   • DIET ORDER     Standing Status: Standing      Number of Occurrences: 1      Standing Expiration Date:      Order Specific Question:  Diet:     Answer:  Consistent Carbohydrate [4]     Medical Decision Making, by Problem:  Active Hospital Problems    Diagnosis   • COPD exacerbation (CMS-HCC) [J44.1]-essentially resolved at this point  -continue current therapies, try to wean o2 as  much as possible  -will wean the medrol down again tomorrow     • Pulmonary emboli (CMS-[facility]) [I26.99]  -s/p IVC filter     • COPD (chronic obstructive pulmonary disease) (CMS-[facility]) [J44.9]   • Type 2 diabetes mellitus, uncontrolled (CMS-HCC) [E11.65]SSI, diabetic diet, sugars are better today  -decrease lantus today since she was low blood sugar this AM and patient is coming down on her steroids  -very labile blood sugars, but mostly low this AM     • Hepatitis C [B19.20]IVDU history     • Tobacco abuse [Z72.0]cessation counseling     • Morbidly obese (CMS-HCC) [E66.01]dietary modifications     • Opioid dependence (CMS-HCC) [F11.20]ms contin, no changes today     • Mood disorder (CMS-HCC) [F39]celexa     • Hemorrhagic disorder due to extrinsic circulating anticoagulants (CMS-HCC) [D68.32]-stopped as outlined above     • Acute on chronic respiratory failure with hypoxemia (CMS-HCC) [J96.21]2 liters at baseline  -at her baseline again today, no new issues, continue above treatments     • Non compliance w medication regimen [Z91.14]without domicile  -working on housing for patient, has income, will need to go to the [facility], still waiting to hear about this   • Homeless [Z59.0]   • IV drug abuse [F19.10]   • Diabetic neuropathy (CMS-[facility]) [E11.40]neurontin     • Essential hypertension [I10]-well controlled  -continue norvasc, outpatient meds       Labs reviewed, Medications reviewed and Radiology images reviewed  Fagan catheter: No Fagan      DVT: IVC filter.        Assessed for rehab: Patient was assess for and/or received rehabilitation services during this hospitalization

## 2017-04-28 NOTE — PROGRESS NOTES
Assumed care at 1915. Bedside report received from Day RN Millie. Patient's chart and MAR reviewed. 12 hour chart check complete. Pt. Sitting in chair. Patient was updated on plan of care for the night. Questions answered and concerns addressed.  Pt denies any additional needs at this time. White board updated. Call light, phone and personal belongings within reach. Vital signs stable

## 2017-04-28 NOTE — CARE PLAN
Problem: Knowledge Deficit  Goal: Knowledge of disease process/condition, treatment plan, diagnostic tests, and medications will improve  Outcome: PROGRESSING AS EXPECTED  Pt educated regarding plan of care for the day, activity, diet, and medications. Verbalized understanding.         Problem: Pain Management  Goal: Pain level will decrease to patient’s comfort goal  Outcome: PROGRESSING AS EXPECTED  Pt assessed for pain Q4h and medicated PRN. Pt instructed to notify RN of any new or increasing pain to prevent it from becoming intolerable. Verbalized understanding.

## 2017-04-28 NOTE — DISCHARGE PLANNING
Medical Social Work    AZUL met pt at bedside and inquired with her about her d/c plans. Pt stated that she wants to go to [facility] as it is close to everything. She reported she currently has $500 in her bank account and she will get an additional $300 on the 8th. Pt states she does have home O2 through [facility].     AZUL called [facility] to inquire about monthlies, to which AZUL was informed the monthly rate is approximately $700 though there are no rooms available. AZUL notes that nearly all monthlies in [place] have a wait list due to the housing crisis and it is highly unlikely pt would secure one of these rooms prior to her medical clearance.     AZUL called Carmen with [facility] to inquire about the meeting with pt and if any referrals were made. Carmen reported [facility] does not have any space at this time and they cannot take her. She stated pt will get services and referrals in the out-patient setting until [facility] has a bed, but until that time there is nothing they can do. Carmen reported that they are going to try and become pt's payee for her monthly checks and they would assist her in finding a motel once they are a payee until [facility] has an opening. Carmen indicated it could take up to 2 months for this to take place.     AZUL advised Hospitalist DARRELL Matias of the above. Pt has the resources to go to a motel as she has $500 in her possession with another $300 to be deposited on the 8th of May. Pt is able to go to the shelter. Pt does not prefer the above options, but they are viable and safe. Pt is in connection with [facility] and is on their waiting list for services.     Plan: Pt will need to either arrange a motel room with her own financial resources OR d/c to the shelter when medically cleared. Pt will receive services in the outpatient setting from [facility] as available. Pt already established with [facility] for her home O2. SW will remain available for any additional d/c needs.

## 2017-04-29 ENCOUNTER — APPOINTMENT (OUTPATIENT)
Dept: RADIOLOGY | Facility: MEDICAL CENTER | Age: 67
DRG: 423 | End: 2017-04-29
Attending: INTERNAL MEDICINE
Payer: MEDICAID

## 2017-04-29 VITALS
BODY MASS INDEX: 35.38 KG/M2 | HEIGHT: 61 IN | DIASTOLIC BLOOD PRESSURE: 71 MMHG | OXYGEN SATURATION: 95 % | HEART RATE: 66 BPM | WEIGHT: 187.39 LBS | TEMPERATURE: 97.3 F | RESPIRATION RATE: 20 BRPM | SYSTOLIC BLOOD PRESSURE: 149 MMHG

## 2017-04-29 LAB
GLUCOSE BLD-MCNC: 139 MG/DL (ref 65–99)
GLUCOSE BLD-MCNC: 212 MG/DL (ref 65–99)
GLUCOSE BLD-MCNC: 265 MG/DL (ref 65–99)

## 2017-04-29 PROCEDURE — 99239 HOSP IP/OBS DSCHRG MGMT >30: CPT | Performed by: INTERNAL MEDICINE

## 2017-04-29 RX ORDER — GUAIFENESIN 1200 MG/1
1200 TABLET, EXTENDED RELEASE ORAL EVERY 12 HOURS
Qty: 28 TAB | Refills: 1 | Status: SHIPPED | OUTPATIENT
Start: 2017-04-29 | End: 2017-06-14

## 2017-04-29 RX ORDER — METHYLPREDNISOLONE 4 MG/1
TABLET ORAL
Qty: 10 TAB | Refills: 0 | Status: ON HOLD | OUTPATIENT
Start: 2017-04-29 | End: 2017-05-10

## 2017-04-29 RX ORDER — AMLODIPINE BESYLATE 10 MG/1
10 TABLET ORAL DAILY
Qty: 30 TAB | Refills: 1 | Status: ON HOLD | OUTPATIENT
Start: 2017-04-29 | End: 2017-05-10

## 2017-04-29 RX ORDER — BUDESONIDE AND FORMOTEROL FUMARATE DIHYDRATE 80; 4.5 UG/1; UG/1
2 AEROSOL RESPIRATORY (INHALATION) 2 TIMES DAILY
Qty: 1 INHALER | Refills: 2 | Status: ON HOLD | OUTPATIENT
Start: 2017-04-29 | End: 2017-05-23

## 2017-04-29 RX ADMIN — GABAPENTIN 300 MG: 300 CAPSULE ORAL at 09:30

## 2017-04-29 RX ADMIN — CITALOPRAM HYDROBROMIDE 20 MG: 20 TABLET ORAL at 09:30

## 2017-04-29 RX ADMIN — INSULIN LISPRO 3 UNITS: 100 INJECTION, SOLUTION INTRAVENOUS; SUBCUTANEOUS at 13:27

## 2017-04-29 RX ADMIN — MORPHINE SULFATE 15 MG: 15 TABLET, EXTENDED RELEASE ORAL at 09:31

## 2017-04-29 RX ADMIN — BUDESONIDE AND FORMOTEROL FUMARATE DIHYDRATE 2 PUFF: 80; 4.5 AEROSOL RESPIRATORY (INHALATION) at 09:32

## 2017-04-29 RX ADMIN — METOCLOPRAMIDE 5 MG: 10 TABLET ORAL at 13:20

## 2017-04-29 RX ADMIN — AMLODIPINE BESYLATE 10 MG: 10 TABLET ORAL at 09:30

## 2017-04-29 RX ADMIN — METHYLPREDNISOLONE 4 MG: 4 TABLET ORAL at 13:20

## 2017-04-29 RX ADMIN — GUAIFENESIN 1200 MG: 600 TABLET, EXTENDED RELEASE ORAL at 09:30

## 2017-04-29 RX ADMIN — METOCLOPRAMIDE 5 MG: 10 TABLET ORAL at 06:02

## 2017-04-29 RX ADMIN — ASPIRIN 81 MG: 81 TABLET ORAL at 09:30

## 2017-04-29 RX ADMIN — GABAPENTIN 300 MG: 300 CAPSULE ORAL at 13:21

## 2017-04-29 RX ADMIN — METHYLPREDNISOLONE 4 MG: 4 TABLET ORAL at 09:31

## 2017-04-29 RX ADMIN — TRAMADOL HYDROCHLORIDE 50 MG: 50 TABLET, COATED ORAL at 04:18

## 2017-04-29 ASSESSMENT — PATIENT HEALTH QUESTIONNAIRE - PHQ9
SUM OF ALL RESPONSES TO PHQ QUESTIONS 1-9: 0
2. FEELING DOWN, DEPRESSED, IRRITABLE, OR HOPELESS: NOT AT ALL
1. LITTLE INTEREST OR PLEASURE IN DOING THINGS: NOT AT ALL
SUM OF ALL RESPONSES TO PHQ9 QUESTIONS 1 AND 2: 0

## 2017-04-29 ASSESSMENT — PAIN SCALES - GENERAL
PAINLEVEL_OUTOF10: 3
PAINLEVEL_OUTOF10: 9

## 2017-04-29 NOTE — CARE PLAN
Problem: Safety  Goal: Will remain free from falls  Outcome: PROGRESSING AS EXPECTED  Fall precautions in place. Bed in lowest position. Non-skid socks in place. Personal possessions within reach. Mobility sign on door. Bed-alarm on. Call light within reach. Pt educated regarding fall prevention and states understanding.        Problem: Communication  Goal: The ability to communicate needs accurately and effectively will improve  Outcome: PROGRESSING AS EXPECTED  Pt is oriented to the unit and has been educated on the use of the call light, pt verbalizes understanding

## 2017-04-29 NOTE — CARE PLAN
Problem: Communication  Goal: The ability to communicate needs accurately and effectively will improve  Outcome: PROGRESSING AS EXPECTED  Pt. Is able to express comfort needs effectively. Pt. C/o pain but denies any discomfort at this time and is able to vocalize wants and needs.     Problem: Bowel/Gastric:  Goal: Normal bowel function is maintained or improved  Outcome: PROGRESSING AS EXPECTED  Pt. Is having normal bowel movements. She had two today without issue. Refusing stool softeners at this time.

## 2017-04-29 NOTE — DISCHARGE INSTRUCTIONS
Discharge Instructions    Discharged to home by taxi with self. Discharged via wheelchair, hospital escort: Yes.  Special equipment needed: Not Applicable    Be sure to schedule a follow-up appointment with your primary care doctor or any specialists as instructed.     Discharge Plan:   Diet Plan: Discussed  Activity Level: Discussed  Smoking Cessation Offered: Patient Refused  Confirmed Follow up Appointment: Patient to Call and Schedule Appointment  Confirmed Symptoms Management: Discussed  Medication Reconciliation Updated: Yes  Influenza Vaccine Indication: Patient Refuses    I understand that a diet low in cholesterol, fat, and sodium is recommended for good health. Unless I have been given specific instructions below for another diet, I accept this instruction as my diet prescription.   Other diet: regular    Special Instructions:   Discharge patient Home   Diet low fat, low sugar, heart healthy with 9-13 servings of fruits and vegetables   Activities as tolerated   Follow ups with PCP in 7-10 days, call for appointment   Meds per med rec sheet   No smoking, no alcohol, no caffeine   Wear seat belt in motorized vehicle   Take medications as perscribed   Keep appointments   If symptoms worsen call PCP, 911 or urgent care.  · Is patient discharged on Warfarin / Coumadin?   No     · Is patient Post Blood Transfusion?  No    Depression / Suicide Risk    As you are discharged from this [facility] facility, it is important to learn how to keep safe from harming yourself.    Recognize the warning signs:  · Abrupt changes in personality, positive or negative- including increase in energy   · Giving away possessions  · Change in eating patterns- significant weight changes-  positive or negative  · Change in sleeping patterns- unable to sleep or sleeping all the time   · Unwillingness or inability to communicate  · Depression  · Unusual sadness, discouragement and loneliness  · Talk of wanting to die  · Neglect of  personal appearance   · Rebelliousness- reckless behavior  · Withdrawal from people/activities they love  · Confusion- inability to concentrate     If you or a loved one observes any of these behaviors or has concerns about self-harm, here's what you can do:  · Talk about it- your feelings and reasons for harming yourself  · Remove any means that you might use to hurt yourself (examples: pills, rope, extension cords, firearm)  · Get professional help from the community (Mental Health, Substance Abuse, psychological counseling)  · Do not be alone:Call your Safe Contact- someone whom you trust who will be there for you.  · Call your local CRISIS HOTLINE 246-5182 or 830-468-1077  · Call your local Children's Mobile Crisis Response Team [place] (251) 527-5468 or www.iKONVERSE  · Call the toll free National Suicide Prevention Hotlines   · National Suicide Prevention Lifeline 341-358-KAYV (5030)  · National Hope Line Network 800-SUICIDE (138-7119)

## 2017-04-29 NOTE — PROGRESS NOTES
Discharged home, no tele, no iv. Prescriptions and discharge paper work given. Pt. Spoke with her sister numerous times regarding financial situation. Pt.s sister agreed to wire her money. Vss, no further needs noted.

## 2017-04-29 NOTE — CARE PLAN
Problem: Safety  Goal: Will remain free from falls  Intervention: Implement fall precautions    04/29/17 0800   OTHER   Environmental Precautions Treaded Slipper Socks on Patient;Personal Belongings, Wastebasket, Call Bell etc. in Easy Reach;Report Given to Other Health Care Providers Regarding Fall Risk;Bed in Low Position;Communication Sign for Patients & Families;Mobility Assessed & Appropriate Sign Placed   IV Pole on Same Side of Bed as Bathroom Yes   Bedrails Bedrails Closest to Bathroom Down   Chair/Bed Strip Alarm Patient Educated Regarding Fall Risk and Need for Bed Alarm, Understands and Continues to Refuse   Bed Alarm (Built in - for ICU ONLY) Patient Educated Regarding Fall Risk and Need for Bed Alarm, Understands and Continues to Refuse           Problem: Knowledge Deficit  Goal: Knowledge of disease process/condition, treatment plan, diagnostic tests, and medications will improve  Outcome: PROGRESSING AS EXPECTED  Plan of care discussed and questions answered.

## 2017-04-29 NOTE — PROGRESS NOTES
Assumed care at 1900, bedside report received from day shift RN. Patient is AOx4 with no signs of distress. Initial assessment completed, orders reviewed, call light within reach, and hourly rounding in place. POC addressed with patient, no additional questions at this time.

## 2017-04-30 ENCOUNTER — PATIENT OUTREACH (OUTPATIENT)
Dept: HEALTH INFORMATION MANAGEMENT | Facility: OTHER | Age: 67
End: 2017-04-30

## 2017-04-30 LAB — EKG IMPRESSION: NORMAL

## 2017-04-30 NOTE — DISCHARGE SUMMARY
DATE OF ADMISSION:  2017    DATE OF DISCHARGE:  2017    DISCHARGE DIAGNOSES:  1.  Pulmonary emboli with deep vein thrombosis, status post inferior vena cava   filter.  2.  Acute chronic obstructive pulmonary disease exacerbation.  3.  Acute on chronic respiratory failure with hypoxia.  4.  Medical nonadherence.  5.  Homelessness.  6.  Intravenous drug use.  7.  Dementia, likely secondary to intravenous drug use.  8.  Hepatitis C, treatment na?ve.  9.  Ongoing tobacco abuse.  10.  Morbidly obese.  11.  Poor psychosocial situation.  12.  Opioid dependence.  13.  Mood disorder.  14.  Hemorrhagic disorder due to extrinsic circulating anticoagulants.  15.  Diabetic neuropathy.  16.  Hypertension.    CONSULTATIONS DONE ON THIS HOSPITAL STAY:  None.    INTERVENTIONS DONE ON THIS HOSPITAL STAY:  The patient underwent a successful   IVC filter placement by Dr. Marrero on 2017.    BLOOD OR BLOOD PRODUCTS RECEIVED ON THIS HOSPITAL STAY:  None.    DIAGNOSTIC EVALUATIONS PERFORMED ON THIS HOSPITAL STAY:  IMAGIN.  X-ray of the humerus 2-view:  Healing fracture of the surgical neck of the   right humerus with slight impaction.  2.  IR IVC filter.  3.  Ultrasound and fluoroscopic guided placement of a Cook select retrievable.    Inferior venacavogram within normal limits with no evidence of caval   thrombus or occlusion.  4.  Portable chest x-ray.  Borderline cardiomegaly, mild perihilar congestive   changes are again noted.  No consolidations identified.    COURSE OF HOSPITALIZATION:  The patient is a 66-year-old female who is a very   frequent visitor to [facility] and homeless, who came in with shortness of breath.    She was admitted by my colleague, Dr. Debora Galvan and please refer to his H and   P for further details on 2017.  Patient was found to have acute COPD   exacerbation with acute respiratory failure, hypoxia secondary to ongoing   tobacco abuse as well as medical nonadherence.  Additionally, she  was noted to   have a subtherapeutic INR.  She never got her Coumadin filled.  The patient   was placed on oxygen supplementation as well as bronchodilators, IV steroids.    This slowly improved.  We got her off the IV steroids, transitioned to   Medrol.  We took off antibiotics.  She was back to room air on the last 3 days   of hospitalization.  Given the patient is homeless, has a very poor   psychosocial situation and has very poor insight into disease, I elected to   put an IVC filter into her as I feel like her risk of medical nonadherence is   extraordinarily high and she is a very poor candidate for anticoagulation.    This is a retrievable filter and she should have a repeat ultrasound of the   legs done in October 2017 and IVC filter can be removed then.  We also tried   to work with _____ homeless shelter take her but they are full right now;   however, though, she was verified at [facility] and then she will eventually go   _____ tomorrow.  Patient is deemed stable for discharge.  I did not not refill   any of her anxiolytic or narcotic medications as the patient claims that she   lost them and she wanted more refills and I told her she cannot get more   refills.    DISPOSITION:  Discharged home.    DIET:  Low fat, low sugar, heart healthy diet.    ACTIVITIES:  As tolerated.    FOLLOWUP:  Primary care physician in 1-2 weeks.    MEDICATIONS AT THE TIME OF DISCHARGE:  1.  Xanax 0.5 mg tabs t.i.d. as needed for anxiety.  2.  Amlodipine 10 mg tablet daily.  3.  Aspirin enteric coated 81 mg daily.  4.  Symbicort 2 puffs b.i.d.  5.  Celexa 20 mg tablets every day.  6.  Gabapentin 300 mg tablets t.i.d.  7.  Guaifenesin long acting 1200 mg tablets every 12 hours.  8.  Lantus 32 units every evening.  9.  Medrol 2 mg b.i.d. for 2 days, 1 mg b.i.d. for 2 days, then 1 mg daily for   2 days and stop.  10.  Reglan 5 mg tablets t.i.d. before meals.  11.  MS Contin 15 mg tablets every 12 hours, I did not refill this  medication.  12.  Stop taking oxycodone immediate release as needed.  13.  Risperidone 0.5 mg tablet at bedtime.  14.  Trazodone 50 mg tablets everyday.  15.  Stop taking Coumadin.    DISCHARGE INSTRUCTIONS:  The patient at this point is advised no smoking, no   alcohol, no caffeine.  Wear seatbelt while in motorized vehicle, take   medications as prescribed, keep appointments as scheduled.  If symptoms worsen   or new ones develop, call the primary physician's office, 911, or nearest   urgent care facility.    Discharge process lasted 38 minutes.       ____________________________________     MD LAVONNE FRANCES / MIKE    DD:  04/29/2017 14:08:23  DT:  04/29/2017 22:34:18    D#:  [id number]  Job#:  [id number]

## 2017-04-30 NOTE — PROGRESS NOTES
"· Placed discharge outreach phone call to patient s/p hospital discharge 4/29/17.  Received recording stating \"the person you are calling cannot accept calls at this time.\"  No answer, no option to leave voicemail.  "

## 2017-05-01 ENCOUNTER — TELEPHONE (OUTPATIENT)
Dept: VASCULAR LAB | Facility: MEDICAL CENTER | Age: 67
End: 2017-05-01

## 2017-05-01 NOTE — TELEPHONE ENCOUNTER
[facility]    Unable to reach pt by phone, will try again at a later time.    Edgar Moralez, WILLYD

## 2017-05-02 ENCOUNTER — TELEPHONE (OUTPATIENT)
Dept: VASCULAR LAB | Facility: MEDICAL CENTER | Age: 67
End: 2017-05-02

## 2017-05-02 NOTE — TELEPHONE ENCOUNTER
Left message with emergency contact, who has had no contact with her. Verified with Security that she indeed  her cell phone, which is not accepting calls at this time.  Rona Welch, PHARMD

## 2017-05-03 ENCOUNTER — HOSPITAL ENCOUNTER (EMERGENCY)
Facility: MEDICAL CENTER | Age: 67
End: 2017-05-03
Attending: EMERGENCY MEDICINE
Payer: MEDICAID

## 2017-05-03 ENCOUNTER — APPOINTMENT (OUTPATIENT)
Dept: RADIOLOGY | Facility: MEDICAL CENTER | Age: 67
End: 2017-05-03
Attending: EMERGENCY MEDICINE
Payer: MEDICAID

## 2017-05-03 VITALS
TEMPERATURE: 98.6 F | HEIGHT: 61 IN | RESPIRATION RATE: 16 BRPM | OXYGEN SATURATION: 97 % | DIASTOLIC BLOOD PRESSURE: 81 MMHG | HEART RATE: 84 BPM | BODY MASS INDEX: 37.76 KG/M2 | SYSTOLIC BLOOD PRESSURE: 156 MMHG | WEIGHT: 200 LBS

## 2017-05-03 DIAGNOSIS — L03.115 CELLULITIS OF RIGHT LOWER EXTREMITY: ICD-10-CM

## 2017-05-03 LAB
ALBUMIN SERPL BCP-MCNC: 3.8 G/DL (ref 3.2–4.9)
ALBUMIN/GLOB SERPL: 1.2 G/DL
ALP SERPL-CCNC: 90 U/L (ref 30–99)
ALT SERPL-CCNC: 50 U/L (ref 2–50)
ANION GAP SERPL CALC-SCNC: 6 MMOL/L (ref 0–11.9)
APTT PPP: 28 SEC (ref 24.7–36)
AST SERPL-CCNC: 23 U/L (ref 12–45)
BASOPHILS # BLD AUTO: 0.3 % (ref 0–1.8)
BASOPHILS # BLD: 0.06 K/UL (ref 0–0.12)
BILIRUB SERPL-MCNC: 1.2 MG/DL (ref 0.1–1.5)
BNP SERPL-MCNC: 42 PG/ML (ref 0–100)
BUN SERPL-MCNC: 27 MG/DL (ref 8–22)
CALCIUM SERPL-MCNC: 9 MG/DL (ref 8.5–10.5)
CHLORIDE SERPL-SCNC: 101 MMOL/L (ref 96–112)
CO2 SERPL-SCNC: 25 MMOL/L (ref 20–33)
CREAT SERPL-MCNC: 0.63 MG/DL (ref 0.5–1.4)
EKG IMPRESSION: NORMAL
EOSINOPHIL # BLD AUTO: 0.07 K/UL (ref 0–0.51)
EOSINOPHIL NFR BLD: 0.4 % (ref 0–6.9)
ERYTHROCYTE [DISTWIDTH] IN BLOOD BY AUTOMATED COUNT: 49.8 FL (ref 35.9–50)
GFR SERPL CREATININE-BSD FRML MDRD: >60 ML/MIN/1.73 M 2
GLOBULIN SER CALC-MCNC: 3.1 G/DL (ref 1.9–3.5)
GLUCOSE SERPL-MCNC: 140 MG/DL (ref 65–99)
HCT VFR BLD AUTO: 35.8 % (ref 37–47)
HGB BLD-MCNC: 11.7 G/DL (ref 12–16)
IMM GRANULOCYTES # BLD AUTO: 0.24 K/UL (ref 0–0.11)
IMM GRANULOCYTES NFR BLD AUTO: 1.4 % (ref 0–0.9)
INR PPP: 1.07 (ref 0.87–1.13)
LIPASE SERPL-CCNC: 10 U/L (ref 11–82)
LYMPHOCYTES # BLD AUTO: 2.6 K/UL (ref 1–4.8)
LYMPHOCYTES NFR BLD: 14.7 % (ref 22–41)
MCH RBC QN AUTO: 27.5 PG (ref 27–33)
MCHC RBC AUTO-ENTMCNC: 32.7 G/DL (ref 33.6–35)
MCV RBC AUTO: 84.2 FL (ref 81.4–97.8)
MONOCYTES # BLD AUTO: 1.46 K/UL (ref 0–0.85)
MONOCYTES NFR BLD AUTO: 8.2 % (ref 0–13.4)
NEUTROPHILS # BLD AUTO: 13.29 K/UL (ref 2–7.15)
NEUTROPHILS NFR BLD: 75 % (ref 44–72)
NRBC # BLD AUTO: 0.02 K/UL
NRBC BLD AUTO-RTO: 0.1 /100 WBC
PLATELET # BLD AUTO: 296 K/UL (ref 164–446)
PMV BLD AUTO: 9.9 FL (ref 9–12.9)
POTASSIUM SERPL-SCNC: 3.5 MMOL/L (ref 3.6–5.5)
PROT SERPL-MCNC: 6.9 G/DL (ref 6–8.2)
PROTHROMBIN TIME: 14.2 SEC (ref 12–14.6)
RBC # BLD AUTO: 4.25 M/UL (ref 4.2–5.4)
SODIUM SERPL-SCNC: 132 MMOL/L (ref 135–145)
TROPONIN I SERPL-MCNC: <0.01 NG/ML (ref 0–0.04)
TROPONIN I SERPL-MCNC: <0.01 NG/ML (ref 0–0.04)
WBC # BLD AUTO: 17.7 K/UL (ref 4.8–10.8)

## 2017-05-03 PROCEDURE — 93005 ELECTROCARDIOGRAM TRACING: CPT

## 2017-05-03 PROCEDURE — 83690 ASSAY OF LIPASE: CPT

## 2017-05-03 PROCEDURE — 85025 COMPLETE CBC W/AUTO DIFF WBC: CPT

## 2017-05-03 PROCEDURE — 84484 ASSAY OF TROPONIN QUANT: CPT

## 2017-05-03 PROCEDURE — 87040 BLOOD CULTURE FOR BACTERIA: CPT

## 2017-05-03 PROCEDURE — 85730 THROMBOPLASTIN TIME PARTIAL: CPT

## 2017-05-03 PROCEDURE — A9270 NON-COVERED ITEM OR SERVICE: HCPCS | Performed by: EMERGENCY MEDICINE

## 2017-05-03 PROCEDURE — 85610 PROTHROMBIN TIME: CPT

## 2017-05-03 PROCEDURE — 71010 DX-CHEST-LIMITED (1 VIEW): CPT

## 2017-05-03 PROCEDURE — 99284 EMERGENCY DEPT VISIT MOD MDM: CPT

## 2017-05-03 PROCEDURE — 700102 HCHG RX REV CODE 250 W/ 637 OVERRIDE(OP): Performed by: EMERGENCY MEDICINE

## 2017-05-03 PROCEDURE — 83880 ASSAY OF NATRIURETIC PEPTIDE: CPT

## 2017-05-03 PROCEDURE — 80053 COMPREHEN METABOLIC PANEL: CPT

## 2017-05-03 RX ORDER — CEPHALEXIN 500 MG/1
500 CAPSULE ORAL ONCE
Status: COMPLETED | OUTPATIENT
Start: 2017-05-03 | End: 2017-05-03

## 2017-05-03 RX ORDER — CEPHALEXIN 500 MG/1
500 CAPSULE ORAL 4 TIMES DAILY
Qty: 28 CAP | Refills: 0 | Status: ON HOLD | OUTPATIENT
Start: 2017-05-03 | End: 2017-05-10

## 2017-05-03 RX ORDER — CLINDAMYCIN PHOSPHATE 600 MG/50ML
600 INJECTION, SOLUTION INTRAVENOUS ONCE
Status: DISCONTINUED | OUTPATIENT
Start: 2017-05-03 | End: 2017-05-03

## 2017-05-03 RX ADMIN — CEPHALEXIN 500 MG: 500 CAPSULE ORAL at 03:16

## 2017-05-03 ASSESSMENT — PAIN SCALES - GENERAL: PAINLEVEL_OUTOF10: 10

## 2017-05-03 NOTE — ED NOTES
Patient to follow up with PCP in the morning. Patient refusing to sign discharge instructions. Patient does verbalize understanding of home medications. Patient ambulatory upon discharge. Patient leaving via wheelchair. Patient to call for cab ride from [employer].
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
## 2017-05-03 NOTE — ED NOTES
Patient not tolerating cardiac monitoring, patient refuses to sit still and making nonsensical noises complaining she needs to make calls to the social security office to get her money.

## 2017-05-03 NOTE — ED NOTES
"Patient standing on feet banging on the wall and phone in the room saying she needs to get a hold of the social security office so she can \"get at my money.\" Patient educated on current time and made aware the social security office is probably not open at the current time. Patient screaming profanities due to not being able to use the phone. Patient given the opportunity sign out of ED, but states she wants to stay in the hospital and be treated.  "

## 2017-05-03 NOTE — ED AVS SNAPSHOT
Home Care Instructions                                                                                                                Zenia Ordaz   MRN: [id number]    Department:  [facility], Emergency Dept   Date of Visit:  5/3/2017            [facility], Emergency Dept    [address] [place] [id number]    Phone:  [phone]      You were seen by     Craig Gill M.D.      Your Diagnosis Was     Cellulitis of right lower extremity     L03.115       These are the medications you received during your hospitalization from 05/03/2017 0114 to 05/03/2017 0506     Date/Time Order Dose Route Action    05/03/2017 0316 cephALEXin (KEFLEX) capsule 500 mg 500 mg Oral Given      Follow-up Information     1. Schedule an appointment as soon as possible for a visit with Pcp Pt States None.    Specialty:  Family Medicine        2. Follow up with [facility], Emergency Dept.    Specialty:  Emergency Medicine    Why:  If symptoms worsen    Contact information    [address] [place] [id number] [phone]      Medication Information     Review all of your home medications and newly ordered medications with your primary doctor and/or pharmacist as soon as possible. Follow medication instructions as directed by your doctor and/or pharmacist.     Please keep your complete medication list with you and share with your physician. Update the information when medications are discontinued, doses are changed, or new medications (including over-the-counter products) are added; and carry medication information at all times in the event of emergency situations.               Medication List      START taking these medications        Instructions    Morning Afternoon Evening Bedtime    cephALEXin 500 MG Caps   Last time this was given:  500 mg on 5/3/2017  3:16 AM   Commonly known as:  KEFLEX        Take 1 Cap by mouth 4 times a day.   Dose:  500 mg                          ASK your doctor about these medications        Instructions    Morning Afternoon Evening Bedtime    alprazolam 0.5 MG Tabs   Commonly known as:  XANAX        Take 1 Tab by mouth 3 times a day as needed for Sleep or Anxiety.   Dose:  0.5 mg                        amlodipine 10 MG Tabs   Commonly known as:  NORVASC        Take 1 Tab by mouth every day.   Dose:  10 mg                        aspirin EC 81 MG Tbec   Commonly known as:  ECOTRIN        Take 1 Tab by mouth every day.   Dose:  81 mg                        budesonide-formoterol 80-4.5 MCG/ACT Aero   Commonly known as:  SYMBICORT        Inhale 2 Puffs by mouth 2 Times a Day.   Dose:  2 Puff                        citalopram 20 MG Tabs   Commonly known as:  CELEXA        Take 1 Tab by mouth every day.   Dose:  20 mg                        gabapentin 300 MG Caps   Commonly known as:  NEURONTIN        Take 1 Cap by mouth 3 times a day.   Dose:  300 mg                        Guaifenesin 1200 MG Tb12        Take 1 Tab by mouth every 12 hours.   Dose:  1200 mg                        insulin glargine 100 UNIT/ML Soln   Commonly known as:  LANTUS        Inject 32 Units as instructed every evening.   Dose:  32 Units                        methylPREDNISolone 4 MG Tabs   Commonly known as:  MEDROL        Take 2 mg BID for 2 days, then 1 mg BID for 2 days, then 1 mg daily for 2 days.                        metoclopramide 5 MG tablet   Commonly known as:  REGLAN        Take 1 Tab by mouth 3 times a day before meals. Indications: Nausea and Vomiting   Dose:  5 mg                        morphine ER 15 MG Tbcr tablet   Commonly known as:  MS CONTIN        Take 1 Tab by mouth every 12 hours.   Dose:  15 mg                        risperidone 0.5 MG Tabs   Commonly known as:  RISPERDAL        Take 1 Tab by mouth every bedtime.   Dose:  0.5 mg                        trazodone 50 MG Tabs   Commonly known as:  DESYREL        Take 1 Tab by mouth every day.   Dose:   50 mg                             Where to Get Your Medications      You can get these medications from any pharmacy     Bring a paper prescription for each of these medications    - cephALEXin 500 MG Caps            Procedures and tests performed during your visit     APTT    BLOOD CULTURE x2    Btype Natriuretic Peptide    CBC with Differential    Cardiac Monitoring    Complete Metabolic Panel (CMP)    DX-CHEST-LIMITED (1 VIEW)    EKG (ER)    ESTIMATED GFR    Lipase    Maintain O2 sats greater than 94%    OLD EKG    Oxygen Therapy per Protocol    Prothrombin Time    Saline Lock    TROPONIN    Troponin        Discharge Instructions       Cellulitis  Cellulitis is an infection of the skin and the tissue under the skin. The infected area is usually red and tender. This happens most often in the arms and lower legs.  HOME CARE   · Take your antibiotic medicine as told. Finish the medicine even if you start to feel better.  · Keep the infected arm or leg raised (elevated).  · Put a warm cloth on the area up to 4 times per day.  · Only take medicines as told by your doctor.  · Keep all doctor visits as told.  GET HELP IF:  · You see red streaks on the skin coming from the infected area.  · Your red area gets bigger or turns a dark color.  · Your bone or joint under the infected area is painful after the skin heals.  · Your infection comes back in the same area or different area.  · You have a puffy (swollen) bump in the infected area.  · You have new symptoms.  · You have a fever.  GET HELP RIGHT AWAY IF:   · You feel very sleepy.  · You throw up (vomit) or have watery poop (diarrhea).  · You feel sick and have muscle aches and pains.  MAKE SURE YOU:   · Understand these instructions.  · Will watch your condition.  · Will get help right away if you are not doing well or get worse.     This information is not intended to replace advice given to you by your health care provider. Make sure you discuss any questions you  have with your health care provider.     Document Released: 06/05/2009 Document Revised: 01/08/2016 Document Reviewed: 03/04/2013  Elsevier Interactive Patient Education ©2016 [employer]            Patient Information     Patient Information    Following emergency treatment: all patient requiring follow-up care must return either to a private physician or a clinic if your condition worsens before you are able to obtain further medical attention, please return to the emergency room.     Billing Information    At [facility], we work to make the billing process streamlined for our patients.  Our Representatives are here to answer any questions you may have regarding your hospital bill.  If you have insurance coverage and have supplied your insurance information to us, we will submit a claim to your insurer on your behalf.  Should you have any questions regarding your bill, we can be reached online or by phone as follows:  Online: You are able pay your bills online or live chat with our representatives about any billing questions you may have. We are here to help Monday - Friday from 8:00am to 7:30pm and 9:00am - 12:00pm on Saturdays.  Please visit [email or web address]  for more information.   Phone:  [phone] or [phone]    Please note that your emergency physician, surgeon, pathologist, radiologist, anesthesiologist, and other specialists are not employed by [facility] and will therefore bill separately for their services.  Please contact them directly for any questions concerning their bills at the numbers below:     Emergency Physician Services:  [phone]  [facility]:  [phone]  [facility]:  [phone]  [facility]:  [phone]    1. Your final bill may vary from the amount quoted upon discharge if all procedures are not complete at that time, or if your doctor has additional procedures of which we are not  aware. You will receive an additional bill if you return to the Emergency Department at [facility] for suture removal regardless of the facility of which the sutures were placed.     2. Please arrange for settlement of this account at the emergency registration.    3. All self-pay accounts are due in full at the time of treatment.  If you are unable to meet this obligation then payment is expected within 4-5 days.     4. If you have had radiology studies (CT, X-ray, Ultrasound, MRI), you have received a preliminary result during your emergency department visit. Please contact the radiology department [phone] to receive a copy of your final result. Please discuss the Final result with your primary physician or with the follow up physician provided.     Crisis Hotline:  [place] Crisis Hotline:  8-858-VEXOQPX or 1-190.118.3114  [place] Crisis Hotline:    1-467.714.2426 or 425-975-9719         ED Discharge Follow Up Questions    1. In order to provide you with very good care, we would like to follow up with a phone call in the next few days.  May we have your permission to contact you?     YES /  NO    2. What is the best phone number to call you? (       )_____-__________    3. What is the best time to call you?      Morning  /  Afternoon  /  Evening                   Patient Signature:  ____________________________________________________________    Date:  ____________________________________________________________
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
## 2017-05-03 NOTE — ED AVS SNAPSHOT
5/3/2017    Zenia Ordaz  [facility] [address]    Dear Zenia:    [facility] wants to ensure your discharge home is safe and you or your loved ones have had all of your questions answered regarding your care after you leave the hospital.    Below is a list of resources and contact information should you have any questions regarding your hospital stay, follow-up instructions, or active medical symptoms.    Questions or Concerns Regarding… Contact   Medical Questions Related to Your Discharge  (7 days a week, 8am-5pm) Contact a Nurse Care Coordinator   [phone]   Medical Questions Not Related to Your Discharge  (24 hours a day / 7 days a week)  Contact the Nurse Health Line   [phone]    Medications or Discharge Instructions Refer to your discharge packet   or contact your [facility] Primary Care Provider   [phone]   Follow-up Appointment(s) Schedule your appointment via [email or web address]   or contact Scheduling [phone]   Billing Review your statement via [email or web address]  or contact Billing [phone]   Medical Records Review your records via [email or web address]   or contact Medical Records [phone]     You may receive a telephone call within two days of discharge. This call is to make certain you understand your discharge instructions and have the opportunity to have any questions answered. You can also easily access your medical information, test results and upcoming appointments via the [email or web address] free online health management tool. You can learn more and sign up at [email or web address]. For assistance setting up your [email or web address] account, please call [phone].    Once again, we want to ensure your discharge home is safe and that you have a clear understanding of any next steps in your care. If you have any questions or concerns, please do not hesitate to contact us, we are here for you. Thank you for choosing [facility] for your healthcare needs.    Sincerely,    Your [facility] Healthcare Team
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
## 2017-05-03 NOTE — DISCHARGE INSTRUCTIONS
Cellulitis  Cellulitis is an infection of the skin and the tissue under the skin. The infected area is usually red and tender. This happens most often in the arms and lower legs.  HOME CARE   · Take your antibiotic medicine as told. Finish the medicine even if you start to feel better.  · Keep the infected arm or leg raised (elevated).  · Put a warm cloth on the area up to 4 times per day.  · Only take medicines as told by your doctor.  · Keep all doctor visits as told.  GET HELP IF:  · You see red streaks on the skin coming from the infected area.  · Your red area gets bigger or turns a dark color.  · Your bone or joint under the infected area is painful after the skin heals.  · Your infection comes back in the same area or different area.  · You have a puffy (swollen) bump in the infected area.  · You have new symptoms.  · You have a fever.  GET HELP RIGHT AWAY IF:   · You feel very sleepy.  · You throw up (vomit) or have watery poop (diarrhea).  · You feel sick and have muscle aches and pains.  MAKE SURE YOU:   · Understand these instructions.  · Will watch your condition.  · Will get help right away if you are not doing well or get worse.     This information is not intended to replace advice given to you by your health care provider. Make sure you discuss any questions you have with your health care provider.     Document Released: 06/05/2009 Document Revised: 01/08/2016 Document Reviewed: 03/04/2013  [employer] Patient Education ©2016 [employer]

## 2017-05-05 ENCOUNTER — APPOINTMENT (OUTPATIENT)
Dept: RADIOLOGY | Facility: MEDICAL CENTER | Age: 67
DRG: 917 | End: 2017-05-05
Attending: EMERGENCY MEDICINE
Payer: MEDICAID

## 2017-05-05 ENCOUNTER — RESOLUTE PROFESSIONAL BILLING HOSPITAL PROF FEE (OUTPATIENT)
Dept: HOSPITALIST | Facility: MEDICAL CENTER | Age: 67
End: 2017-05-05
Payer: MEDICAID

## 2017-05-05 ENCOUNTER — HOSPITAL ENCOUNTER (INPATIENT)
Facility: MEDICAL CENTER | Age: 67
LOS: 5 days | DRG: 917 | End: 2017-05-10
Attending: EMERGENCY MEDICINE | Admitting: INTERNAL MEDICINE
Payer: MEDICAID

## 2017-05-05 DIAGNOSIS — R09.02 HYPOXIA: ICD-10-CM

## 2017-05-05 DIAGNOSIS — L03.115 CELLULITIS OF RIGHT LOWER EXTREMITY: ICD-10-CM

## 2017-05-05 DIAGNOSIS — I26.99 OTHER PULMONARY EMBOLISM WITHOUT ACUTE COR PULMONALE, UNSPECIFIED CHRONICITY (HCC): ICD-10-CM

## 2017-05-05 DIAGNOSIS — R05.9 COUGH: ICD-10-CM

## 2017-05-05 DIAGNOSIS — F19.90 IVDU (INTRAVENOUS DRUG USER): ICD-10-CM

## 2017-05-05 PROBLEM — R07.9 CHEST PAIN: Status: ACTIVE | Noted: 2017-05-05

## 2017-05-05 LAB
ALBUMIN SERPL BCP-MCNC: 3.5 G/DL (ref 3.2–4.9)
ALBUMIN/GLOB SERPL: 1.2 G/DL
ALP SERPL-CCNC: 89 U/L (ref 30–99)
ALT SERPL-CCNC: 42 U/L (ref 2–50)
ANION GAP SERPL CALC-SCNC: 4 MMOL/L (ref 0–11.9)
APTT PPP: 22.6 SEC (ref 24.7–36)
AST SERPL-CCNC: 31 U/L (ref 12–45)
BASOPHILS # BLD AUTO: 0.9 % (ref 0–1.8)
BASOPHILS # BLD: 0.09 K/UL (ref 0–0.12)
BILIRUB SERPL-MCNC: 0.8 MG/DL (ref 0.1–1.5)
BUN SERPL-MCNC: 27 MG/DL (ref 8–22)
CALCIUM SERPL-MCNC: 9 MG/DL (ref 8.5–10.5)
CHLORIDE SERPL-SCNC: 102 MMOL/L (ref 96–112)
CK SERPL-CCNC: 538 U/L (ref 0–154)
CO2 SERPL-SCNC: 28 MMOL/L (ref 20–33)
CREAT SERPL-MCNC: 0.81 MG/DL (ref 0.5–1.4)
CRP SERPL HS-MCNC: 3.28 MG/DL (ref 0–0.75)
EKG IMPRESSION: NORMAL
EOSINOPHIL # BLD AUTO: 0.05 K/UL (ref 0–0.51)
EOSINOPHIL NFR BLD: 0.5 % (ref 0–6.9)
ERYTHROCYTE [DISTWIDTH] IN BLOOD BY AUTOMATED COUNT: 53.1 FL (ref 35.9–50)
ERYTHROCYTE [SEDIMENTATION RATE] IN BLOOD BY WESTERGREN METHOD: 33 MM/HOUR (ref 0–30)
GFR SERPL CREATININE-BSD FRML MDRD: >60 ML/MIN/1.73 M 2
GLOBULIN SER CALC-MCNC: 3 G/DL (ref 1.9–3.5)
GLUCOSE BLD-MCNC: 113 MG/DL (ref 65–99)
GLUCOSE SERPL-MCNC: 188 MG/DL (ref 65–99)
HCT VFR BLD AUTO: 36.5 % (ref 37–47)
HGB BLD-MCNC: 11.1 G/DL (ref 12–16)
IMM GRANULOCYTES # BLD AUTO: 0.16 K/UL (ref 0–0.11)
IMM GRANULOCYTES NFR BLD AUTO: 1.5 % (ref 0–0.9)
INR PPP: 1.04 (ref 0.87–1.13)
LACTATE BLD-SCNC: 1.8 MMOL/L (ref 0.5–2)
LYMPHOCYTES # BLD AUTO: 2.02 K/UL (ref 1–4.8)
LYMPHOCYTES NFR BLD: 19.4 % (ref 22–41)
MAGNESIUM SERPL-MCNC: 2.2 MG/DL (ref 1.5–2.5)
MCH RBC QN AUTO: 27 PG (ref 27–33)
MCHC RBC AUTO-ENTMCNC: 30.4 G/DL (ref 33.6–35)
MCV RBC AUTO: 88.8 FL (ref 81.4–97.8)
MONOCYTES # BLD AUTO: 1.08 K/UL (ref 0–0.85)
MONOCYTES NFR BLD AUTO: 10.4 % (ref 0–13.4)
NEUTROPHILS # BLD AUTO: 7.02 K/UL (ref 2–7.15)
NEUTROPHILS NFR BLD: 67.3 % (ref 44–72)
NRBC # BLD AUTO: 0 K/UL
NRBC BLD AUTO-RTO: 0 /100 WBC
PHOSPHATE SERPL-MCNC: 2.5 MG/DL (ref 2.5–4.5)
PLATELET # BLD AUTO: 261 K/UL (ref 164–446)
PMV BLD AUTO: 10.1 FL (ref 9–12.9)
POTASSIUM SERPL-SCNC: 4.3 MMOL/L (ref 3.6–5.5)
PROT SERPL-MCNC: 6.5 G/DL (ref 6–8.2)
PROTHROMBIN TIME: 13.9 SEC (ref 12–14.6)
RBC # BLD AUTO: 4.11 M/UL (ref 4.2–5.4)
SODIUM SERPL-SCNC: 134 MMOL/L (ref 135–145)
TROPONIN I SERPL-MCNC: 0.01 NG/ML (ref 0–0.04)
TROPONIN I SERPL-MCNC: 0.01 NG/ML (ref 0–0.04)
TROPONIN I SERPL-MCNC: <0.01 NG/ML (ref 0–0.04)
WBC # BLD AUTO: 10.4 K/UL (ref 4.8–10.8)

## 2017-05-05 PROCEDURE — 99223 1ST HOSP IP/OBS HIGH 75: CPT | Performed by: INTERNAL MEDICINE

## 2017-05-05 PROCEDURE — 84145 PROCALCITONIN (PCT): CPT

## 2017-05-05 PROCEDURE — 700105 HCHG RX REV CODE 258: Performed by: EMERGENCY MEDICINE

## 2017-05-05 PROCEDURE — A9270 NON-COVERED ITEM OR SERVICE: HCPCS | Performed by: INTERNAL MEDICINE

## 2017-05-05 PROCEDURE — 83605 ASSAY OF LACTIC ACID: CPT

## 2017-05-05 PROCEDURE — 71275 CT ANGIOGRAPHY CHEST: CPT

## 2017-05-05 PROCEDURE — 80053 COMPREHEN METABOLIC PANEL: CPT

## 2017-05-05 PROCEDURE — 93010 ELECTROCARDIOGRAM REPORT: CPT | Mod: 76 | Performed by: INTERNAL MEDICINE

## 2017-05-05 PROCEDURE — 84100 ASSAY OF PHOSPHORUS: CPT

## 2017-05-05 PROCEDURE — 700111 HCHG RX REV CODE 636 W/ 250 OVERRIDE (IP): Performed by: INTERNAL MEDICINE

## 2017-05-05 PROCEDURE — 82962 GLUCOSE BLOOD TEST: CPT

## 2017-05-05 PROCEDURE — 700117 HCHG RX CONTRAST REV CODE 255: Performed by: EMERGENCY MEDICINE

## 2017-05-05 PROCEDURE — 700102 HCHG RX REV CODE 250 W/ 637 OVERRIDE(OP): Performed by: INTERNAL MEDICINE

## 2017-05-05 PROCEDURE — 36415 COLL VENOUS BLD VENIPUNCTURE: CPT

## 2017-05-05 PROCEDURE — 99285 EMERGENCY DEPT VISIT HI MDM: CPT

## 2017-05-05 PROCEDURE — 700101 HCHG RX REV CODE 250: Performed by: INTERNAL MEDICINE

## 2017-05-05 PROCEDURE — 84484 ASSAY OF TROPONIN QUANT: CPT

## 2017-05-05 PROCEDURE — 700105 HCHG RX REV CODE 258: Performed by: INTERNAL MEDICINE

## 2017-05-05 PROCEDURE — 87040 BLOOD CULTURE FOR BACTERIA: CPT

## 2017-05-05 PROCEDURE — 85610 PROTHROMBIN TIME: CPT

## 2017-05-05 PROCEDURE — 85652 RBC SED RATE AUTOMATED: CPT

## 2017-05-05 PROCEDURE — 85730 THROMBOPLASTIN TIME PARTIAL: CPT

## 2017-05-05 PROCEDURE — 96361 HYDRATE IV INFUSION ADD-ON: CPT

## 2017-05-05 PROCEDURE — 96374 THER/PROPH/DIAG INJ IV PUSH: CPT

## 2017-05-05 PROCEDURE — 770020 HCHG ROOM/CARE - TELE (206)

## 2017-05-05 PROCEDURE — 83735 ASSAY OF MAGNESIUM: CPT

## 2017-05-05 PROCEDURE — 71010 DX-CHEST-PORTABLE (1 VIEW): CPT

## 2017-05-05 PROCEDURE — 93005 ELECTROCARDIOGRAM TRACING: CPT | Performed by: INTERNAL MEDICINE

## 2017-05-05 PROCEDURE — 82550 ASSAY OF CK (CPK): CPT

## 2017-05-05 PROCEDURE — 86140 C-REACTIVE PROTEIN: CPT

## 2017-05-05 PROCEDURE — 85025 COMPLETE CBC W/AUTO DIFF WBC: CPT

## 2017-05-05 PROCEDURE — 700111 HCHG RX REV CODE 636 W/ 250 OVERRIDE (IP): Performed by: EMERGENCY MEDICINE

## 2017-05-05 PROCEDURE — 94760 N-INVAS EAR/PLS OXIMETRY 1: CPT

## 2017-05-05 RX ORDER — DEXTROSE MONOHYDRATE 25 G/50ML
25 INJECTION, SOLUTION INTRAVENOUS
Status: DISCONTINUED | OUTPATIENT
Start: 2017-05-05 | End: 2017-05-10 | Stop reason: HOSPADM

## 2017-05-05 RX ORDER — AMPICILLIN AND SULBACTAM 2; 1 G/1; G/1
3 INJECTION, POWDER, FOR SOLUTION INTRAMUSCULAR; INTRAVENOUS ONCE
Status: DISCONTINUED | OUTPATIENT
Start: 2017-05-05 | End: 2017-05-05

## 2017-05-05 RX ORDER — AMOXICILLIN 250 MG
2 CAPSULE ORAL 2 TIMES DAILY
Status: DISCONTINUED | OUTPATIENT
Start: 2017-05-05 | End: 2017-05-10

## 2017-05-05 RX ORDER — HALOPERIDOL 5 MG/ML
2.5 INJECTION INTRAMUSCULAR
Status: DISCONTINUED | OUTPATIENT
Start: 2017-05-05 | End: 2017-05-09

## 2017-05-05 RX ORDER — HALOPERIDOL 5 MG/ML
5 INJECTION INTRAMUSCULAR ONCE
Status: COMPLETED | OUTPATIENT
Start: 2017-05-05 | End: 2017-05-05

## 2017-05-05 RX ORDER — HALOPERIDOL 5 MG/ML
5 INJECTION INTRAMUSCULAR ONCE
Status: DISCONTINUED | OUTPATIENT
Start: 2017-05-05 | End: 2017-05-05

## 2017-05-05 RX ORDER — POLYETHYLENE GLYCOL 3350 17 G/17G
1 POWDER, FOR SOLUTION ORAL
Status: DISCONTINUED | OUTPATIENT
Start: 2017-05-05 | End: 2017-05-10 | Stop reason: HOSPADM

## 2017-05-05 RX ORDER — LABETALOL HYDROCHLORIDE 5 MG/ML
10 INJECTION, SOLUTION INTRAVENOUS EVERY 4 HOURS PRN
Status: DISCONTINUED | OUTPATIENT
Start: 2017-05-05 | End: 2017-05-09

## 2017-05-05 RX ORDER — CITALOPRAM 20 MG/1
20 TABLET ORAL
Status: DISCONTINUED | OUTPATIENT
Start: 2017-05-05 | End: 2017-05-10 | Stop reason: HOSPADM

## 2017-05-05 RX ORDER — POTASSIUM CHLORIDE 20 MEQ/1
40 TABLET, EXTENDED RELEASE ORAL ONCE
Status: COMPLETED | OUTPATIENT
Start: 2017-05-05 | End: 2017-05-06

## 2017-05-05 RX ORDER — AMLODIPINE BESYLATE 10 MG/1
10 TABLET ORAL DAILY
Status: DISCONTINUED | OUTPATIENT
Start: 2017-05-05 | End: 2017-05-09

## 2017-05-05 RX ORDER — SODIUM CHLORIDE 9 MG/ML
1000 INJECTION, SOLUTION INTRAVENOUS
Status: DISCONTINUED | OUTPATIENT
Start: 2017-05-05 | End: 2017-05-10 | Stop reason: HOSPADM

## 2017-05-05 RX ORDER — METOCLOPRAMIDE 10 MG/1
5 TABLET ORAL
Status: DISCONTINUED | OUTPATIENT
Start: 2017-05-05 | End: 2017-05-10 | Stop reason: HOSPADM

## 2017-05-05 RX ORDER — SODIUM CHLORIDE 9 MG/ML
INJECTION, SOLUTION INTRAVENOUS
Status: DISCONTINUED
Start: 2017-05-05 | End: 2017-05-05

## 2017-05-05 RX ORDER — PROMETHAZINE HYDROCHLORIDE 25 MG/1
12.5-25 SUPPOSITORY RECTAL EVERY 4 HOURS PRN
Status: DISCONTINUED | OUTPATIENT
Start: 2017-05-05 | End: 2017-05-10 | Stop reason: HOSPADM

## 2017-05-05 RX ORDER — INSULIN GLARGINE 100 [IU]/ML
10 INJECTION, SOLUTION SUBCUTANEOUS EVERY EVENING
Status: DISCONTINUED | OUTPATIENT
Start: 2017-05-05 | End: 2017-05-09

## 2017-05-05 RX ORDER — TRAZODONE HYDROCHLORIDE 50 MG/1
50 TABLET ORAL
Status: DISCONTINUED | OUTPATIENT
Start: 2017-05-05 | End: 2017-05-10 | Stop reason: HOSPADM

## 2017-05-05 RX ORDER — RISPERIDONE 0.5 MG/1
0.5 TABLET ORAL
Status: DISCONTINUED | OUTPATIENT
Start: 2017-05-05 | End: 2017-05-10 | Stop reason: HOSPADM

## 2017-05-05 RX ORDER — BUDESONIDE AND FORMOTEROL FUMARATE DIHYDRATE 80; 4.5 UG/1; UG/1
2 AEROSOL RESPIRATORY (INHALATION) 2 TIMES DAILY
Status: DISCONTINUED | OUTPATIENT
Start: 2017-05-05 | End: 2017-05-10 | Stop reason: HOSPADM

## 2017-05-05 RX ORDER — SODIUM CHLORIDE 9 MG/ML
30 INJECTION, SOLUTION INTRAVENOUS
Status: DISCONTINUED | OUTPATIENT
Start: 2017-05-05 | End: 2017-05-05

## 2017-05-05 RX ORDER — GABAPENTIN 300 MG/1
300 CAPSULE ORAL 3 TIMES DAILY
Status: DISCONTINUED | OUTPATIENT
Start: 2017-05-05 | End: 2017-05-10 | Stop reason: HOSPADM

## 2017-05-05 RX ORDER — FUROSEMIDE 10 MG/ML
20 INJECTION INTRAMUSCULAR; INTRAVENOUS ONCE
Status: COMPLETED | OUTPATIENT
Start: 2017-05-05 | End: 2017-05-05

## 2017-05-05 RX ORDER — BISACODYL 10 MG
10 SUPPOSITORY, RECTAL RECTAL
Status: DISCONTINUED | OUTPATIENT
Start: 2017-05-05 | End: 2017-05-10 | Stop reason: HOSPADM

## 2017-05-05 RX ORDER — SODIUM CHLORIDE 9 MG/ML
1000 INJECTION, SOLUTION INTRAVENOUS ONCE
Status: DISCONTINUED | OUTPATIENT
Start: 2017-05-05 | End: 2017-05-05

## 2017-05-05 RX ORDER — ALPRAZOLAM 0.5 MG/1
0.5 TABLET ORAL 3 TIMES DAILY PRN
Status: DISCONTINUED | OUTPATIENT
Start: 2017-05-05 | End: 2017-05-10 | Stop reason: HOSPADM

## 2017-05-05 RX ORDER — PROMETHAZINE HYDROCHLORIDE 25 MG/1
12.5-25 TABLET ORAL EVERY 4 HOURS PRN
Status: DISCONTINUED | OUTPATIENT
Start: 2017-05-05 | End: 2017-05-10 | Stop reason: HOSPADM

## 2017-05-05 RX ORDER — MORPHINE SULFATE 15 MG/1
15 TABLET, FILM COATED, EXTENDED RELEASE ORAL EVERY 12 HOURS
Status: DISCONTINUED | OUTPATIENT
Start: 2017-05-05 | End: 2017-05-07

## 2017-05-05 RX ADMIN — ENOXAPARIN SODIUM 40 MG: 100 INJECTION SUBCUTANEOUS at 22:22

## 2017-05-05 RX ADMIN — DOXYCYCLINE 100 MG: 100 INJECTION, POWDER, LYOPHILIZED, FOR SOLUTION INTRAVENOUS at 23:28

## 2017-05-05 RX ADMIN — HALOPERIDOL LACTATE 5 MG: 5 INJECTION, SOLUTION INTRAMUSCULAR at 10:17

## 2017-05-05 RX ADMIN — AMPICILLIN SODIUM AND SULBACTAM SODIUM 3 G: 2; 1 INJECTION, POWDER, FOR SOLUTION INTRAMUSCULAR; INTRAVENOUS at 22:17

## 2017-05-05 RX ADMIN — FUROSEMIDE 20 MG: 10 INJECTION, SOLUTION INTRAVENOUS at 23:32

## 2017-05-05 RX ADMIN — SODIUM CHLORIDE 1000 ML: 9 INJECTION, SOLUTION INTRAVENOUS at 10:30

## 2017-05-05 RX ADMIN — IOHEXOL 100 ML: 350 INJECTION, SOLUTION INTRAVENOUS at 14:41

## 2017-05-05 ASSESSMENT — PAIN SCALES - WONG BAKER: WONGBAKER_NUMERICALRESPONSE: HURTS A WHOLE LOT

## 2017-05-05 NOTE — IP AVS SNAPSHOT
[employer] Access Code: [email or web address]  Expires: 5/29/2017 12:39 PM    Your email address is not on file at [facility].  Email Addresses are required for you to sign up for [employer], please contact [phone] to verify your personal information and to provide your email address prior to attempting to register for [employer].    Zenia Ordaz  [facility] [place] [address]    [employer]  A secure, online tool to manage your health information     [facility]’s [employer]® is a secure, online tool that connects you to your personalized health information from the privacy of your home -- day or night - making it very easy for you to manage your healthcare. Once the activation process is completed, you can even access your medical information using the [employer] spenser, which is available for free in the Apple Spenser store or Google Play store.     To learn more about [employer], visit [email or web address]    There are two levels of access available (as shown below):   My Chart Features  [facility] Primary Care Doctor [facility]  Specialists [facility]  Urgent  Care Non-[facility] Primary Care Doctor   Email your healthcare team securely and privately 24/7 X X X    Manage appointments: schedule your next appointment; view details of past/upcoming appointments X      Request prescription refills. X      View recent personal medical records, including lab and immunizations X X X X   View health record, including health history, allergies, medications X X X X   Read reports about your outpatient visits, procedures, consult and ER notes X X X X   See your discharge summary, which is a recap of your hospital and/or ER visit that includes your diagnosis, lab results, and care plan X X  X     How to register for [employer]:  Once your e-mail address has been verified, follow the following steps to sign up for [employer].     1. Go to  [email or web address]  2. Click on the Sign Up Now box, which takes you to the New Member Sign Up page. You will  need to provide the following information:  a. Enter your [employer] Access Code exactly as it appears at the top of this page. (You will not need to use this code after you’ve completed the sign-up process. If you do not sign up before the expiration date, you must request a new code.)   b. Enter your date of birth.   c. Enter your home email address.   d. Click Submit, and follow the next screen’s instructions.  3. Create a [employer] ID. This will be your [employer] login ID and cannot be changed, so think of one that is secure and easy to remember.  4. Create a [employer]Log password. You can change your password at any time.  5. Enter your Password Reset Question and Answer. This can be used at a later time if you forget your password.   6. Enter your e-mail address. This allows you to receive e-mail notifications when new information is available in Did[employer].  7. Click Sign Up. You can now view your health information.    For assistance activating your [employer] account, call [phone]

## 2017-05-05 NOTE — IP AVS SNAPSHOT
" Home Care Instructions                                                                                                                  Name:Zenia Ordaz  Medical Record Number:[id number]  CSN: [id number]    YOB: 1950   Age: 66 y.o.  Sex: female  HT:1.549 m (5' 0.98\") WT: 86.6 kg (190 lb 14.7 oz)          Admit Date: 5/5/2017     Discharge Date:   Today's Date: 5/10/2017  Attending Doctor:  Ralph Dorado M.D.                  Allergies:  Mushroom extract complex; Tylenol; and Zofran            Discharge Instructions       Discharge Instructions    Discharged to home by taxi with relative. Discharged via wheelchair, hospital escort: Yes.  Special equipment needed: Cane    Be sure to schedule a follow-up appointment with your primary care doctor or any specialists as instructed.     Discharge Plan:   Smoking Cessation Offered: Patient Counseled  Influenza Vaccine Indication: Indicated: Not available from distributor/    I understand that a diet low in cholesterol, fat, and sodium is recommended for good health. Unless I have been given specific instructions below for another diet, I accept this instruction as my diet prescription.   Other diet: 1400kcal diabetic diet    Special Instructions:Cellulitis  Cellulitis is an infection of the skin and the tissue beneath it. The infected area is usually red and tender. Cellulitis occurs most often in the arms and lower legs.   CAUSES   Cellulitis is caused by bacteria that enter the skin through cracks or cuts in the skin. The most common types of bacteria that cause cellulitis are staphylococci and streptococci.  SIGNS AND SYMPTOMS   · Redness and warmth.  · Swelling.  · Tenderness or pain.  · Fever.  DIAGNOSIS   Your health care provider can usually determine what is wrong based on a physical exam. Blood tests may also be done.  TREATMENT   Treatment usually involves taking an antibiotic medicine.  HOME CARE INSTRUCTIONS   · Take your antibiotic " medicine as directed by your health care provider. Finish the antibiotic even if you start to feel better.  · Keep the infected arm or leg elevated to reduce swelling.  · Apply a warm cloth to the affected area up to 4 times per day to relieve pain.  · Take medicines only as directed by your health care provider.  · Keep all follow-up visits as directed by your health care provider.  SEEK MEDICAL CARE IF:   · You notice red streaks coming from the infected area.  · Your red area gets larger or turns dark in color.  · Your bone or joint underneath the infected area becomes painful after the skin has healed.  · Your infection returns in the same area or another area.  · You notice a swollen bump in the infected area.  · You develop new symptoms.  · You have a fever.  SEEK IMMEDIATE MEDICAL CARE IF:   · You feel very sleepy.  · You develop vomiting or diarrhea.  · You have a general ill feeling (malaise) with muscle aches and pains.  MAKE SURE YOU:   · Understand these instructions.  · Will watch your condition.  · Will get help right away if you are not doing well or get worse.     This information is not intended to replace advice given to you by your health care provider. Make sure you discuss any questions you have with your health care provider.     Document Released: 09/27/2006 Document Revised: 01/08/2016 Document Reviewed: 03/04/2013  [employer] Interactive Patient Education ©2016 [employer]    1500 Calorie Diabetic Diet  The 1500 calorie diabetic diet limits calories to 1500 each day. Following this diet and making healthy meal choices can help improve overall health. It controls blood glucose (sugar) levels and can also help lower blood pressure and cholesterol.   SERVING SIZES  Measuring foods and serving sizes helps to make sure you are getting the right amount of food. The list below tells how big or small some common serving sizes are.  · 1 oz.........4 stacked dice.   · 3 oz.........Deck of cards.   · 1  tsp........Tip of little finger.   · 1 tbs........Thumb.   · 2 tbs........Golf ball.   · ½ cup.......Half of a fist.   · 1 cup........A fist.   GUIDELINES FOR CHOOSING FOODS  The goal of this diet is to eat a variety of foods and limit calories to 1500 each day. This can be done by choosing foods that are low in calories and fat. The diet also suggests eating small amounts of food frequently. Doing this helps control your blood glucose levels, so they do not get too high or too low. Each meal or snack may include a protein food source to help you feel more satisfied. Try to eat about the same amount of food around the same time each day. This includes weekend days, travel days, and days off work. Space your meals about 4 to 5 hours apart, and add a snack between them, if you wish.   For example, a daily food plan could include breakfast, a morning snack, lunch, dinner, and an evening snack. Healthy meals and snacks have different types of foods, including whole grains, vegetables, fruits, lean meats, poultry, fish, and dairy products. As you plan your meals, select a variety of foods. Choose from the bread and starch, vegetable, fruit, dairy, and meat/protein groups. Examples of foods from each group are listed below, with their suggested serving sizes. Use measuring cups and spoons to become familiar with what a healthy portion looks like.  Bread and Starch  Each serving equals 15 grams of carbohydrate.  · 1 slice bread.   · ¼ bagel.   · ¾ cup cold cereal (unsweetened).   · ½ cup hot cereal or mashed potatoes.   · 1 small potato (size of a computer mouse).   ·  cup cooked pasta or rice.   · ½ English muffin.   · 1 cup broth-based soup.   · 3 cups of popcorn.   · 4 to 6 whole-wheat crackers.   · ½ cup cooked beans, peas, or corn.   Vegetables  Each serving equals 5 grams of carbohydrate.  · ½ cup cooked vegetables.   · 1 cup raw vegetables.   · ½ cup tomato or vegetable juice.   Fruit  Each serving equals 15 grams  of carbohydrate.  · 1 small apple or orange.   · 1 ¼ cup watermelon or strawberries.   · ½ cup applesauce (no sugar added).   · 2 tbs raisins.   · ½ banana.   · ½ cup canned fruit, packed in water or in its own juice.   · ½ cup unsweetened fruit juice.   Dairy  Each serving equals 12 to 15 grams of carbohydrate.  · 1 cup fat-free milk.   · 6 oz artificially sweetened yogurt or plain yogurt.   · 1 cup low-fat buttermilk.   · 1 cup soy milk.   · 1 cup almond milk.   Meat/Protein  · 1 large egg.   · 2 to 3 oz meat, poultry, or fish.   · ¼ cup low-fat cottage cheese.   · 1 tbs peanut butter.   · 1 oz low-fat cheese.   · ¼ cup tuna, packed in water.   · ½ cup tofu.   Fat  · 1 tsp oil.   · 1 tsp trans-fat-free margarine.   · 1 tsp butter.   · 1 tsp mayonnaise.   · 2 tbs avocado.   · 1 tbs salad dressing.   · 1 tbs cream cheese.   · 2 tbs sour cream.   SAMPLE 1500 CALORIE DIET PLAN  Breakfast  · ½ whole-wheat English muffin (1 carb serving).   · 1 tsp trans-fat-free margarine.   · 1 scrambled egg.   · 1 cup fat-free milk (1 carb serving).   · 1 small orange (1 carb serving).   Lunch  · Chicken wrap.   · 1 whole-wheat tortilla, 8-inch (1½ carb servings).   · 2 oz chicken breast, sliced.   · 2 tbs low-fat salad dressing, such as Italian.   · ¼ cup shredded lettuce.   · 2 slices tomato.   · ½ cup carrot sticks.   · 1 small apple (1 carb serving).   Afternoon Snack  · 3 linda cracker squares (1 carb serving).   · 1 tbs peanut butter.   Dinner  · 2 oz lean pork chop, broiled.   · 1 cup brown rice (3 carb servings).   · ½ cup steamed carrots.   · ½ cup green beans.   · 1 cup fat-free milk (1 carb serving).   · 1 tsp trans-fat-free margarine.   Evening Snack  · ½ cup low-fat cottage cheese.   · 1 small peach or pear, sliced (or ½ cup canned in water) (1 carb serving).   MEAL PLAN  You can use this worksheet to help you make a daily meal plan based on the 1500 calorie diabetic diet suggestions. If you are using this plan to help  you control your blood glucose, you may interchange carbohydrate containing foods (dairy, starches, and fruits). Select a variety of fresh foods of varying colors and flavors. The total amount of carbohydrate in your meals or snacks is more important than making sure you include all of the food groups every time you eat. You can choose from approximately this many of the following foods to build your day's meals:  · 6 Starches.   · 3 Vegetables.   · 2 Fruits.   · 2 Dairy.   · 4 to 6 oz Meat/Protein.   · Up to 3 Fats.   Your dietician can use this worksheet to help you decide how many servings and which types of foods are right for you.  BREAKFAST  Food Group and Servings / Food Choice  Starch _________________________________________________________  Dairy __________________________________________________________  Fruit ___________________________________________________________  Meat/Protein____________________________________________________  Fat ____________________________________________________________  LUNCH  Food Group and Servings / Food Choice   Starch _________________________________________________________  Meat/Protein ___________________________________________________  Vegetables _____________________________________________________  Fruit __________________________________________________________  Dairy __________________________________________________________  Fat ____________________________________________________________  AFTERNOON SNACK  Food Group and Servings / Food Choice  Dairy __________________________________________________________  Starch _________________________________________________________  Meat/Protein____________________________________________________  Fruit ___________________________________________________________  DINNER  Food Group and Servings / Food Choice  Starch _________________________________________________________  Meat/Protein  ___________________________________________________  Dairy __________________________________________________________  Vegetable ______________________________________________________  Fruit ___________________________________________________________  Fat ____________________________________________________________  EVENING SNACK  Food Group and Servings / Food Choice  Fruit ___________________________________________________________  Meat/Protein ____________________________________________________  Dairy __________________________________________________________  Starch __________________________________________________________  DAILY TOTALS  Starches _________________________  Vegetables _______________________  Fruits ____________________________  Dairy ____________________________  Meat/Protein_____________________  Fats _____________________________  Document Released: 07/10/2006 Document Revised: 03/11/2013 Document Reviewed: 11/04/2010  [employer]® Patient Information ©2013 [employer] [facility].  Pneumonia, Adult  Pneumonia is an infection of the lungs.   CAUSES  Pneumonia may be caused by bacteria or a virus. Usually, the infection is caused by breathing in droplets from an infected person's cough or sneeze.   SYMPTOMS   Symptoms of pneumonia include:  · Cough.  · Fever.  · Chest pain.  · Rapid breathing.  · Shortness of breath.  · Shaking chills.  · Mucus production.  DIAGNOSIS   If you have the common symptoms of pneumonia, often your health care provider will confirm the diagnosis with a chest X-ray. The X-ray will show an abnormality in the lung if you have pneumonia. Other tests may be done on your blood, urine, or mucus (sputum) to find the specific cause of your pneumonia. A blood gas test or pulse oximetry test may be needed to check how well your lungs are working.  TREATMENT   Your treatment will depend on whether your pneumonia is caused by bacteria or a virus.   · Bacterial pneumonia is treated with  antibiotic medicine.  · Pneumonia that is caused by the influenza virus may be treated with an antiviral medicine.  · Pneumonia that is caused by a virus other than influenza will not respond to antibiotic medicine. This type of pneumonia will have to run its course.   HOME CARE INSTRUCTIONS   · Cough suppressants may be used if you are losing too much rest from coughing at night. However, you should try to avoid taking cough suppresants. This is because coughing helps to remove mucus from your lungs.  · Sleep in a semi-upright position at night. Try sleeping in a reclining chair, or place a few pillows under your head.  · Try using a cold steam vaporizer or humidifier in your home or bedroom. This may help loosen your mucus.  · If you were prescribed an antibiotic medicine, finish all of it even if you start to feel better.  · If you were prescribed an expectorant, take it as directed by your health care provider. This medicine loosens the mucus so you can cough it up.  · Take medicines only as directed by your health care provider.  · Do not smoke. If you are a smoker and continue to smoke, your cough may last several weeks after your pneumonia has cleared.  · Get rest when you feel tired, or as needed.  PREVENTION  A pneumococcal shot (vaccine) is available to prevent a common bacterial cause of pneumonia. This is usually suggested for:  · People over 65 years old.  · People on chemotherapy.  · People with chronic lung problems, such as bronchitis or emphysema.  · People with immune system problems.  If you are over 65 years old or have a high risk condition, you may receive the pneumococcal vaccine if you have not received it before. In some countries, a routine influenza vaccine is also recommended. This vaccine can help prevent some cases of pneumonia. You may be offered the influenza vaccine as part of your care.  If you are a smoker, it is time to quit in order to prevent pneumonia in the future. You may  receive instructions on how to stop smoking. Your health care provider can provide medicines and counseling to help you quit.  SEEK MEDICAL CARE IF:  · You have a fever.  · You cannot control your cough with suppressants at night, and you keep losing sleep.  SEEK IMMEDIATE MEDICAL CARE IF:   · You have worsening shortness of breath.  · You have increased chest pain.  · Your sickness becomes worse, especially if you are an older adult or have a weakened immune system.  · You cough up blood.  · You have pain that is getting worse or is not controlled with medicines.  · Your symptoms are getting worse rather than better.     This information is not intended to replace advice given to you by your health care provider. Make sure you discuss any questions you have with your health care provider.     Document Released: 12/18/2006 Document Revised: 01/08/2016 Document Reviewed: 04/13/2016  [employer] Interactive Patient Education ©2016 [employer]      · Is patient discharged on Warfarin / Coumadin?   Yes    You are receiving the drug warfarin. Please understand the importance of monitoring warfarin with scheduled PT/INR blood draws.  Follow-up with the [facility] in one week for INR lab..    IMPORTANT: HOW TO USE THIS INFORMATION:  This is a summary and does NOT have all possible information about this product. This information does not assure that this product is safe, effective, or appropriate for you. This information is not individual medical advice and does not substitute for the advice of your health care professional. Always ask your health care professional for complete information about this product and your specific health needs.      WARFARIN - ORAL (WARF-uh-rin)      COMMON BRAND NAME(S): Coumadin      WARNING:  Warfarin can cause very serious (possibly fatal) bleeding. This is more likely to occur when you first start taking this medication or if you take too much warfarin. To decrease your risk for  "bleeding, your doctor or other health care provider will monitor you closely and check your lab results (INR test) to make sure you are not taking too much warfarin. Keep all medical and laboratory appointments. Tell your doctor right away if you notice any signs of serious bleeding. See also Side Effects section.      USES:  This medication is used to treat blood clots (such as in deep vein thrombosis-DVT or pulmonary embolus-PE) and/or to prevent new clots from forming in your body. Preventing harmful blood clots helps to reduce the risk of a stroke or heart attack. Conditions that increase your risk of developing blood clots include a certain type of irregular heart rhythm (atrial fibrillation), heart valve replacement, recent heart attack, and certain surgeries (such as hip/knee replacement). Warfarin is commonly called a \"blood thinner,\" but the more correct term is \"anticoagulant.\" It helps to keep blood flowing smoothly in your body by decreasing the amount of certain substances (clotting proteins) in your blood.      HOW TO USE:  Read the Medication Guide provided by your pharmacist before you start taking warfarin and each time you get a refill. If you have any questions, ask your doctor or pharmacist. Take this medication by mouth with or without food as directed by your doctor or other health care professional, usually once a day. It is very important to take it exactly as directed. Do not increase the dose, take it more frequently, or stop using it unless directed by your doctor. Dosage is based on your medical condition, laboratory tests (such as INR), and response to treatment. Your doctor or other health care provider will monitor you closely while you are taking this medication to determine the right dose for you. Use this medication regularly to get the most benefit from it. To help you remember, take it at the same time each day. It is important to eat a balanced, consistent diet while taking " warfarin. Some foods can affect how warfarin works in your body and may affect your treatment and dose. Avoid sudden large increases or decreases in your intake of foods high in vitamin K (such as broccoli, cauliflower, cabbage, brussels sprouts, kale, spinach, and other green leafy vegetables, liver, green tea, certain vitamin supplements). If you are trying to lose weight, check with your doctor before you try to go on a diet. Cranberry products may also affect how your warfarin works. Limit the amount of cranberry juice (16 ounces/480 milliliters a day) or other cranberry products you may drink or eat.      SIDE EFFECTS:  Nausea, loss of appetite, or stomach/abdominal pain may occur. If any of these effects persist or worsen, tell your doctor or pharmacist promptly. Remember that your doctor has prescribed this medication because he or she has judged that the benefit to you is greater than the risk of side effects. Many people using this medication do not have serious side effects. This medication can cause serious bleeding if it affects your blood clotting proteins too much (shown by unusually high INR lab results). Even if your doctor stops your medication, this risk of bleeding can continue for up to a week. Tell your doctor right away if you have any signs of serious bleeding, including: unusual pain/swelling/discomfort, unusual/easy bruising, prolonged bleeding from cuts or gums, persistent/frequent nosebleeds, unusually heavy/prolonged menstrual flow, pink/dark urine, coughing up blood, vomit that is bloody or looks like coffee grounds, severe headache, dizziness/fainting, unusual or persistent tiredness/weakness, bloody/black/tarry stools, chest pain, shortness of breath, difficulty swallowing. Tell your doctor right away if any of these unlikely but serious side effects occur: persistent nausea/vomiting, severe stomach/abdominal pain, yellowing eyes/skin. This drug rarely has caused very serious (possibly  fatal) problems if its effects lead to small blood clots (usually at the beginning of treatment). This can lead to severe skin/tissue damage that may require surgery or amputation if left untreated. Patients with certain blood conditions (protein C or S deficiency) may be at greater risk. Get medical help right away if any of these rare but serious side effects occur: painful/red/purplish patches on the skin (such as on the toe, breast, abdomen), change in the amount of urine, vision changes, confusion, slurred speech, weakness on one side of the body. A very serious allergic reaction to this drug is rare. However, get medical help right away if you notice any symptoms of a serious allergic reaction, including: rash, itching/swelling (especially of the face/tongue/throat), severe dizziness, trouble breathing. This is not a complete list of possible side effects. If you notice other effects not listed above, contact your doctor or pharmacist. In the [place] - Call your doctor for medical advice about side effects. You may report side effects to FDA at 7-903-ANF-5132. In [place] - Call your doctor for medical advice about side effects. You may report side effects to [facility] [place] at [phone].      PRECAUTIONS:  Before taking warfarin, tell your doctor or pharmacist if you are allergic to it; or if you have any other allergies. This product may contain inactive ingredients, which can cause allergic reactions or other problems. Talk to your pharmacist for more details. Before using this medication, tell your doctor or pharmacist your medical history, especially of: blood disorders (such as anemia, hemophilia), bleeding problems (such as bleeding of the stomach/intestines, bleeding in the brain), blood vessel disorders (such as aneurysms), recent major injury/surgery, liver disease, alcohol use, mental/mood disorders (including memory problems), frequent falls/injuries. It is important that all your doctors and dentists  know that you take warfarin. Before having surgery or any medical/dental procedures, tell your doctor or dentist that you are taking this medication and about all the products you use (including prescription drugs, nonprescription drugs, and herbal products). Avoid getting injections into the muscles. If you must have an injection into a muscle (for example, a flu shot), it should be given in the arm. This way, it will be easier to check for bleeding and/or apply pressure bandages. This medication may cause stomach bleeding. Daily use of alcohol while using this medicine will increase your risk for stomach bleeding and may also affect how this medication works. Limit or avoid alcoholic beverages. If you have not been eating well, if you have an illness or infection that causes fever, vomiting, or diarrhea for more than 2 days, or if you start using any antibiotic medications, contact your doctor or pharmacist immediately because these conditions can affect how warfarin works. This medication can cause heavy bleeding. To lower the chance of getting cut, bruised, or injured, use great caution with sharp objects like safety razors and nail cutters. Use an electric razor when shaving and a soft toothbrush when brushing your teeth. Avoid activities such as contact sports. If you fall or injure yourself, especially if you hit your head, call your doctor immediately. Your doctor may need to check you. The Food & Drug Administration has stated that generic warfarin products are interchangeable. However, consult your doctor or pharmacist before switching warfarin products. Be careful not to take more than one medication that contains warfarin unless specifically directed by the doctor or health care provider who is monitoring your warfarin treatment. Older adults may be at greater risk for bleeding while using this drug. This medication is not recommended for use during pregnancy because of serious (possibly fatal) harm to  "an unborn baby. Discuss the use of reliable forms of birth control with your doctor. If you become pregnant or think you may be pregnant, tell your doctor immediately. If you are planning pregnancy, discuss a plan for managing your condition with your doctor before you become pregnant. Your doctor may switch the type of medication you use during pregnancy. Very small amounts of this medication may pass into breast milk but is unlikely to harm a nursing infant. Consult your doctor before breast-feeding.      DRUG INTERACTIONS:  Drug interactions may change how your medications work or increase your risk for serious side effects. This document does not contain all possible drug interactions. Keep a list of all the products you use (including prescription/nonprescription drugs and herbal products) and share it with your doctor and pharmacist. Do not start, stop, or change the dosage of any medicines without your doctor's approval. Warfarin interacts with many prescription, nonprescription, vitamin, and herbal products. This includes medications that are applied to the skin or inside the vagina or rectum. The interactions with warfarin usually result in an increase or decrease in the \"blood-thinning\" (anticoagulant) effect. Your doctor or other health care professional should closely monitor you to prevent serious bleeding or clotting problems. While taking warfarin, it is very important to tell your doctor or pharmacist of any changes in medications, vitamins, or herbal products that you are taking. Some products that may interact with this drug include: capecitabine, imatinib, mifepristone. Aspirin, aspirin-like drugs (salicylates), and nonsteroidal anti-inflammatory drugs (NSAIDs such as ibuprofen, naproxen, celecoxib) may have effects similar to warfarin. These drugs may increase the risk of bleeding problems if taken during treatment with warfarin. Carefully check all prescription/nonprescription product labels " (including drugs applied to the skin such as pain-relieving creams) since the products may contain NSAIDs or salicylates. Talk to your doctor about using a different medication (such as acetaminophen) to treat pain/fever. Low-dose aspirin and related drugs (such as clopidogrel, ticlopidine) should be continued if prescribed by your doctor for specific medical reasons such as heart attack or stroke prevention. Consult your doctor or pharmacist for more details. Many herbal products interact with warfarin. Tell your doctor before taking any herbal products, especially bromelains, coenzyme Q10, cranberry, danshen, dong quai, fenugreek, garlic, ginkgo biloba, ginseng, and Henny's wort, among others. This medication may interfere with a certain laboratory test to measure theophylline levels, possibly causing false test results. Make sure laboratory personnel and all your doctors know you use this drug.      OVERDOSE:  If overdose is suspected, contact a poison control center or emergency room immediately.  residents can call the [place] [place] Poison Hotline at 1-590.324.6222. [place] residents can call a provincial poison control center. Symptoms of overdose may include: bloody/black/tarry stools, pink/dark urine, unusual/prolonged bleeding.      NOTES:  Do not share this medication with others. Laboratory and/or medical tests (such as INR, complete blood count) must be performed periodically to monitor your progress or check for side effects. Consult your doctor for more details.      MISSED DOSE:  For the best possible benefit, do not miss any doses. If you do miss a dose and remember on the same day, take it as soon as you remember. If you remember on the next day, skip the missed dose and resume your usual dosing schedule. Do not double the dose to catch up because this could increase your risk for bleeding. Keep a record of missed doses to give to your doctor or pharmacist. Contact your doctor or pharmacist if you  miss 2 or more doses in a row.      STORAGE:  Store at room temperature away from light and moisture. Do not store in the bathroom. Keep all medications away from children and pets. Do not flush medications down the toilet or pour them into a drain unless instructed to do so. Properly discard this product when it is  or no longer needed. Consult your pharmacist or local waste disposal company for more details about how to safely discard your product.      MEDICAL ALERT:  Your condition and medication can cause complications in a medical emergency. For information about enrolling in MedicAlert, call 1-171.318.4052 ([place]) or 1-461.727.9203 ([place]).      Information last revised 2010 Copyright(c)  [employer]             · Is patient Post Blood Transfusion?  No    Depression / Suicide Risk    As you are discharged from this [facility] facility, it is important to learn how to keep safe from harming yourself.    Recognize the warning signs:  · Abrupt changes in personality, positive or negative- including increase in energy   · Giving away possessions  · Change in eating patterns- significant weight changes-  positive or negative  · Change in sleeping patterns- unable to sleep or sleeping all the time   · Unwillingness or inability to communicate  · Depression  · Unusual sadness, discouragement and loneliness  · Talk of wanting to die  · Neglect of personal appearance   · Rebelliousness- reckless behavior  · Withdrawal from people/activities they love  · Confusion- inability to concentrate     If you or a loved one observes any of these behaviors or has concerns about self-harm, here's what you can do:  · Talk about it- your feelings and reasons for harming yourself  · Remove any means that you might use to hurt yourself (examples: pills, rope, extension cords, firearm)  · Get professional help from the community (Mental Health, Substance Abuse, psychological counseling)  · Do not be  alone:Call your Safe Contact- someone whom you trust who will be there for you.  · Call your local CRISIS HOTLINE 042-9279 or 882-141-1328  · Call your local Children's Mobile Crisis Response Team [place] (661) 259-5303 or www.[employer]  · Call the toll free National Suicide Prevention Hotlines   · National Suicide Prevention Lifeline 575-562-BMGX (1797)  · [employer] Hope Line Network 800-SUICIDE (420-2343)        Your appointments     May 11, 2017  4:30 PM   New Patient with [facility] EXAM 4   [facility]  (--)    [address] [address] [phone]              Follow-up Information     1. Schedule an appointment as soon as possible for a visit with Pcp Pt States None.    Specialty:  Family Medicine    Why:  Pt states she already has appt June 8, 2017 @ 8:45        2. Follow up with [facility] In 2 days.    Why:  Call to schedule appt in 2-3 days to check INR    Contact information    [address] [facility] [id number] [phone]           Discharge Medication Instructions:    Below are the medications your physician expects you to take upon discharge:    Review all your home medications and newly ordered medications with your doctor and/or pharmacist. Follow medication instructions as directed by your doctor and/or pharmacist.    Please keep your medication list with you and share with your physician.               Medication List      START taking these medications        Instructions    Morning Afternoon Evening Bedtime    amoxicillin-clavulanate 875-125 MG Tabs   Last time this was given:  1 Tab on 5/10/2017  8:35 AM   Commonly known as:  AUGMENTIN        Take 1 Tab by mouth every 12 hours.   Dose:  1 Tab                        carvedilol 6.25 MG Tabs   Last time this was given:  6.25 mg on 5/10/2017  8:34 AM   Commonly known as:  COREG        Take 1 Tab by mouth 2 times a day, with meals.   Dose:  6.25 mg                           doxycycline monohydrate 100 MG tablet   Last time this was given:  100 mg on 5/10/2017  8:35 AM   Commonly known as:  ADOXA        Take 1 Tab by mouth every 12 hours.   Dose:  100 mg                        metformin 850 MG Tabs   Last time this was given:  850 mg on 5/10/2017  8:38 AM   Commonly known as:  GLUCOPHAGE        Take 1 Tab by mouth 3 times a day, with meals.   Dose:  850 mg                        warfarin 5 MG Tabs   Last time this was given:  5 mg on 5/9/2017  5:39 PM   Commonly known as:  COUMADIN        Take 1 Tab by mouth COUMADIN-DAILY.   Dose:  5 mg                          CONTINUE taking these medications        Instructions    Morning Afternoon Evening Bedtime    alprazolam 0.5 MG Tabs   Last time this was given:  0.5 mg on 5/10/2017  1:05 AM   Commonly known as:  XANAX        Take 1 Tab by mouth 3 times a day as needed for Sleep or Anxiety.   Dose:  0.5 mg                        budesonide-formoterol 80-4.5 MCG/ACT Aero   Last time this was given:  2 Puffs on 5/10/2017  8:38 AM   Commonly known as:  SYMBICORT        Inhale 2 Puffs by mouth 2 Times a Day.   Dose:  2 Puff                        citalopram 20 MG Tabs   Last time this was given:  20 mg on 5/10/2017 11:59 AM   Commonly known as:  CELEXA        Take 1 Tab by mouth every day.   Dose:  20 mg                        gabapentin 300 MG Caps   Last time this was given:  300 mg on 5/10/2017  8:35 AM   Commonly known as:  NEURONTIN        Take 1 Cap by mouth 3 times a day.   Dose:  300 mg                        Guaifenesin 1200 MG Tb12        Take 1 Tab by mouth every 12 hours.   Dose:  1200 mg                        metoclopramide 5 MG tablet   Last time this was given:  5 mg on 5/10/2017 11:58 AM   Commonly known as:  REGLAN        Take 1 Tab by mouth 3 times a day before meals. Indications: Nausea and Vomiting   Dose:  5 mg                        morphine ER 15 MG Tbcr tablet   Last time this was given:  15 mg on 5/7/2017   8:30 AM   Commonly known as:  MS CONTIN        Take 1 Tab by mouth every 12 hours.   Dose:  15 mg                        risperidone 0.5 MG Tabs   Last time this was given:  0.5 mg on 5/9/2017  8:26 PM   Commonly known as:  RISPERDAL        Take 1 Tab by mouth every bedtime.   Dose:  0.5 mg                        trazodone 50 MG Tabs   Last time this was given:  50 mg on 5/9/2017  8:26 PM   Commonly known as:  DESYREL        Take 1 Tab by mouth every day.   Dose:  50 mg                          STOP taking these medications     amlodipine 10 MG Tabs   Commonly known as:  NORVASC               aspirin EC 81 MG Tbec   Commonly known as:  ECOTRIN               cephALEXin 500 MG Caps   Commonly known as:  KEFLEX               insulin glargine 100 UNIT/ML Soln   Commonly known as:  LANTUS               methylPREDNISolone 4 MG Tabs   Commonly known as:  MEDROL                    Where to Get Your Medications      You can get these medications from any pharmacy     Bring a paper prescription for each of these medications    - amoxicillin-clavulanate 875-125 MG Tabs      Information about where to get these medications is not yet available     ! Ask your nurse or doctor about these medications    - carvedilol 6.25 MG Tabs  - doxycycline monohydrate 100 MG tablet  - metformin 850 MG Tabs  - morphine ER 15 MG Tbcr tablet  - warfarin 5 MG Tabs            Orders for after discharge     REFERRAL TO ANTICOAGULATION MONITORING    Complete by:  As directed    If this Referral to the anticoagulation clinic is being ordered with a Referral to home health, then schedule the anticoagulation visit after the home health treatments are completed.             Instructions           Diet / Nutrition:    Follow any diet instructions given to you by your doctor or the dietician, including how much salt (sodium) you are allowed each day.    If you are overweight, talk to your doctor about a weight reduction plan.    Activity:    Remain  physically active following your doctor's instructions about exercise and activity.    Rest often.     Any time you become even a little tired or short of breath, SIT DOWN and rest.    Worsening Symptoms:    Report any of the following signs and symptoms to the doctor's office immediately:    *Pain of jaw, arm, or neck  *Chest pain not relieved by medication                               *Dizziness or loss of consciousness  *Difficulty breathing even when at rest   *More tired than usual                                       *Bleeding drainage or swelling of surgical site  *Swelling of feet, ankles, legs or stomach                 *Fever (>100ºF)  *Pink or blood tinged sputum  *Weight gain (3lbs/day or 5lbs /week)           *Shock from internal defibrillator (if applicable)  *Palpitations or irregular heartbeats                *Cool and/or numb extremities    Stroke Awareness    Common Risk Factors for Stroke include:    Age  Atrial Fibrillation  Carotid Artery Stenosis  Diabetes Mellitus  Excessive alcohol consumption  High blood pressure  Overweight   Physical inactivity  Smoking    Warning signs and symptoms of a stroke include:    *Sudden numbness or weakness of the face, arm or leg (especially on one side of the body).  *Sudden confusion, trouble speaking or understanding.  *Sudden trouble seeing in one or both eyes.  *Sudden trouble walking, dizziness, loss of balance or coordination.Sudden severe headache with no known cause.    It is very important to get treatment quickly when a stroke occurs. If you experience any of the above warning signs, call 911 immediately.                   Disclaimer         Quit Smoking / Tobacco Use:    I understand the use of any tobacco products increases my chance of suffering from future heart disease or stroke and could cause other illnesses which may shorten my life. Quitting the use of tobacco products is the single most important thing I can do to improve my health. For  further information on smoking / tobacco cessation call a Toll Free Quit Line at 1-763.954.4779 (*National [employer] [place]) or 1-659.249.3026 (American Lung Association) or you can access the web based program at www.lungusa.org.    [place] Tobacco Users Help Line:  (249) 410-3350       Toll Free: 1-956.450.1287  Quit Tobacco Program [facility] (495)235-0227    Crisis Hotline:    [place] Crisis Hotline:  5-453-SXJRZQJ or 1-467.951.9275    [place] Crisis Hotline:    1-277.186.9108 or 691-579-2585    Discharge Survey:   Thank you for choosing [facility]. We hope we did everything we could to make your hospital stay a pleasant one. You may be receiving a phone survey and we would appreciate your time and participation in answering the questions. Your input is very valuable to us in our efforts to improve our service to our patients and their families.        My signature on this form indicates that:    1. I have reviewed and understand the above information.  2. My questions regarding this information have been answered to my satisfaction.  3. I have formulated a plan with my discharge nurse to obtain my prescribed medications for home.                  Disclaimer         __________________________________                     __________       ________                       Patient Signature                                                 Date                    Time

## 2017-05-05 NOTE — ED NOTES
2nd. IV placed via US has blown in CT. hospitalist to BS to place IV. US guided IJ placed by Dr Damon.

## 2017-05-05 NOTE — ED NOTES
Pt back from CT ct was unable to perform procedure. A second IV placed with Ultrasound. Patient back to CT

## 2017-05-05 NOTE — ED PROVIDER NOTES
ED Provider Note    Scribed for Truong Viera M.D. by Rohini Moscoso. 5/5/2017  9:59 AM    Primary care provider: Pcp Pt States None  Means of arrival: Nina   History obtained from: Patient   History limited by: altered level of consciousness     CHIEF COMPLAINT  Chief Complaint   Patient presents with   • ALOC       HPI  Zenia Ordaz is a 66 y.o. female who presents to the Emergency Department due to altered level of consciousness. Patient reports severe chest pain that is exacerbated while breathing. She notes cough with associated sputum production. Patient was seen 2 days ago for cellulitis to the lower extremities. Patient has a history of methamphetamine use. She denies fever or chills.     History of present illness limited by patient's altered level of consciousness.     REVIEW OF SYSTEMS  Pertinent positives include altered level of consciousness, chest pain, cough, sputum production.   Pertinent negatives include no fever, chills.    C    Review of systems limited by patient's altered level of consciousness.     PAST MEDICAL HISTORY   has a past medical history of COPD; Fibromyalgia; Hepatitis B; Hepatitis C; Hepatitis A; Diabetes; CAD (coronary artery disease); Stroke (CMS-HCC) (1982); Backpain; Myocardial infarct (CMS-HCC) (1989, 1991); Cancer (CMS-HCC) (Cervical ~2003); Congestive heart failure (CMS-[facility]); MRSA infection; Drug abuse; Hypertension; Fall; Pneumonia; and Seizure (CMS-[facility]).    SURGICAL HISTORY   has past surgical history that includes other cardiac surgery and incision and drainage orthopedic (7/13/2013).    SOCIAL HISTORY  Social History   Substance Use Topics   • Smoking status: Current Every Day Smoker -- 0.12 packs/day for 53 years     Types: Cigarettes   • Smokeless tobacco: Never Used      Comment: smokes 1/2 ppd   • Alcohol Use: No      History   Drug Use No     Comment: Hx of heroin meth       FAMILY HISTORY  Family History   Problem Relation Age of Onset   • Cancer Mother   "  • Cancer Sister    • Cancer Maternal Aunt        CURRENT MEDICATIONS  No current facility-administered medications on file prior to encounter.     Current Outpatient Prescriptions on File Prior to Encounter   Medication Sig Dispense Refill   • cephALEXin (KEFLEX) 500 MG Cap Take 1 Cap by mouth 4 times a day. 28 Cap 0   • amlodipine (NORVASC) 10 MG Tab Take 1 Tab by mouth every day. 30 Tab 1   • budesonide-formoterol (SYMBICORT) 80-4.5 MCG/ACT Aerosol Inhale 2 Puffs by mouth 2 Times a Day. 1 Inhaler 2   • guaifenesin LA 1200 MG TABLET SR 12 HR Take 1 Tab by mouth every 12 hours. 28 Tab 1   • methylPREDNISolone (MEDROL) 4 MG Tab Take 2 mg BID for 2 days, then 1 mg BID for 2 days, then 1 mg daily for 2 days. 10 Tab 0   • morphine ER (MS CONTIN) 15 MG Tab CR tablet Take 1 Tab by mouth every 12 hours. 8 Tab 0   • aspirin EC (ECOTRIN) 81 MG Tablet Delayed Response Take 1 Tab by mouth every day. 30 Tab 2   • alprazolam (XANAX) 0.5 MG Tab Take 1 Tab by mouth 3 times a day as needed for Sleep or Anxiety. 90 Tab 0   • citalopram (CELEXA) 20 MG Tab Take 1 Tab by mouth every day. 30 Tab 2   • trazodone (DESYREL) 50 MG Tab Take 1 Tab by mouth every day. 30 Tab 2   • gabapentin (NEURONTIN) 300 MG Cap Take 1 Cap by mouth 3 times a day. 90 Cap 2   • insulin glargine (LANTUS) 100 UNIT/ML Solution Inject 32 Units as instructed every evening. 10 mL 2   • risperidone (RISPERDAL) 0.5 MG Tab Take 1 Tab by mouth every bedtime. 30 Tab 2   • metoclopramide (REGLAN) 5 MG tablet Take 1 Tab by mouth 3 times a day before meals. Indications: Nausea and Vomiting 90 Tab 2       ALLERGIES  Allergies   Allergen Reactions   • Mushroom Extract Complex Rash and Unspecified     Pt gets a rash and breaks out in a sweat when eats mushrooms   • Tylenol Rash     Pt states \"I get a body rash\".   • Zofran Rash     Pt states \"I get a body rash\".       PHYSICAL EXAM  VITAL SIGNS: Wt 90 kg (198 lb 6.6 oz)    Nursing note and vitals reviewed.  Constitutional: " Screaming and yelling, being restrained by staff. Chronically ill appearing.   HENT: Head is normocephalic and atraumatic. Oropharynx is clear and moist without exudate or erythema.   Eyes: Pupils are equal, round, and reactive to light. Conjunctiva are normal.   Cardiovascular: Tachycardic. Regular rhythm. No murmur heard. Normal radial pulses.  Pulmonary/Chest: Breath sounds normal. No wheezes or rales. Productive cough noted.   Abdominal: Soft and non-tender. No distention    Musculoskeletal: Extremities exhibit normal range of motion without edema or tenderness.   Neurological: Awake, alert and oriented to person, place, and time. No focal deficits noted.  Skin: Skin is warm and dry. Erythema of the right lower extremity from the ankle to the knee.   Psychiatric: Normal mood and affect. Appropriate for clinical situation    LABS  Results for orders placed or performed during the hospital encounter of 05/05/17   CBC WITH DIFFERENTIAL   Result Value Ref Range    WBC 10.4 4.8 - 10.8 K/uL    RBC 4.11 (L) 4.20 - 5.40 M/uL    Hemoglobin 11.1 (L) 12.0 - 16.0 g/dL    Hematocrit 36.5 (L) 37.0 - 47.0 %    MCV 88.8 81.4 - 97.8 fL    MCH 27.0 27.0 - 33.0 pg    MCHC 30.4 (L) 33.6 - 35.0 g/dL    RDW 53.1 (H) 35.9 - 50.0 fL    Platelet Count 261 164 - 446 K/uL    MPV 10.1 9.0 - 12.9 fL    Neutrophils-Polys 67.30 44.00 - 72.00 %    Lymphocytes 19.40 (L) 22.00 - 41.00 %    Monocytes 10.40 0.00 - 13.40 %    Eosinophils 0.50 0.00 - 6.90 %    Basophils 0.90 0.00 - 1.80 %    Immature Granulocytes 1.50 (H) 0.00 - 0.90 %    Nucleated RBC 0.00 /100 WBC    Neutrophils (Absolute) 7.02 2.00 - 7.15 K/uL    Lymphs (Absolute) 2.02 1.00 - 4.80 K/uL    Monos (Absolute) 1.08 (H) 0.00 - 0.85 K/uL    Eos (Absolute) 0.05 0.00 - 0.51 K/uL    Baso (Absolute) 0.09 0.00 - 0.12 K/uL    Immature Granulocytes (abs) 0.16 (H) 0.00 - 0.11 K/uL    NRBC (Absolute) 0.00 K/uL   LACTIC ACID   Result Value Ref Range    Lactic Acid 1.8 0.5 - 2.0 mmol/L       All  labs reviewed by me.    RADIOLOGY  DX-CHEST-PORTABLE (1 VIEW)   Final Result      Pulmonary vascular congestion is similar to the prior exam.      CT-CTA CHEST PULMONARY ARTERY W/ RECONS    (Results Pending)   The radiologist's interpretation of all radiological studies have been reviewed by me.    COURSE & MEDICAL DECISION MAKING  Nursing notes, VS, PMSFHx reviewed in chart.     Review of past medical records shows the patient was seen here 2 days ago and diagnosed with right lower extremity cellulitis. There is also significant concern for drug seeking behavior. She presented with ankle to shin redness and was discharged home with a prescription for Keflex.     9:59 AM - Patient seen and examined at bedside. Patient will be treated with Haldol 5 mg IV and IV fluids for sepsis protocol. Ordered chest x-ray, lactic acid, urinalysis, urine culture, creatine kinase, troponin, CBC with differential, PTT, CMP, APTT, and blood culture to evaluate her symptoms. The differential diagnoses include but are not limited to: pneumonia, pulmonary embolism, cellulitis with failure of outpatient treatment.      11:12 AM Spoke with Dr. Mendez, hospitalist, concerning patient case. Agreed to admit patient for further treatment and evaluation.     11:53 AM the patient presents today with cellulitis with failure of outpatient treatment. She's been hypoxic on room air. Chest x-ray reveals findings consistent with pulmonary vascular congestion. The patient will be treated with antibiotics appropriate for cellulitis. She will be admitted to the on-call hospitalist.    DISPOSITION:  Patient will be admitted to Dr. Mendez in guarded condition.      FINAL IMPRESSION  1. Cellulitis of right lower extremity    2. Cough    3. Hypoxia    4. IVDU (intravenous drug user)          Rohini GALLOWAY), axel scribing for, and in the presence of, Truong Viera M.D..    Electronically signed by: Rohini Moscoso (Betty), 5/5/2017    Truong GALLOWAY  M.D. personally performed the services described in this documentation, as scribed by Rohini Moscoso in my presence, and it is both accurate and complete.    The note accurately reflects work and decisions made by me.  Truong Viera  5/5/2017  11:53 AM

## 2017-05-05 NOTE — ED NOTES
Pt BIBA with ALOC. Upon arrival patient was combative but walking around and talking. Patient yelling and not making sense she does c/o pain in chest though and has a cough with a lot of sputum. Shins are red, patient was admitted 7 days ago for cellulitis

## 2017-05-05 NOTE — IP AVS SNAPSHOT
" <p align=\"LEFT\"><IMG SRC=\"//[email or web address]$/[email or web address]\" alt=\"Image\" WIDTH=\"50%\" HEIGHT=\"200\" BORDER=\"\"></p>                   Name:Zenia Ordaz  Medical Record Number:[id number]  CSN: [id number]    YOB: 1950   Age: 66 y.o.  Sex: female  HT:1.549 m (5' 0.98\") WT: 86.6 kg (190 lb 14.7 oz)          Admit Date: 5/5/2017     Discharge Date:   Today's Date: 5/10/2017  Attending Doctor:  Ralph Dorado M.D.                  Allergies:  Mushroom extract complex; Tylenol; and Zofran          Your appointments     May 11, 2017  4:30 PM   New Patient with [facility] EXAM 4   [facility]  (--)    [address] [place] [address]   [phone]              Follow-up Information     1. Schedule an appointment as soon as possible for a visit with Pcp Pt States None.    Specialty:  Family Medicine    Why:  Pt states she already has appt June 8, 2017 @ 8:45        2. Follow up with [facility] In 2 days.    Why:  Call to schedule appt in 2-3 days to check INR    Contact information    [address] [place] [id number]  [phone]           Medication List      Take these Medications        Instructions    alprazolam 0.5 MG Tabs   Commonly known as:  XANAX    Take 1 Tab by mouth 3 times a day as needed for Sleep or Anxiety.   Dose:  0.5 mg       amoxicillin-clavulanate 875-125 MG Tabs   Commonly known as:  AUGMENTIN    Take 1 Tab by mouth every 12 hours.   Dose:  1 Tab       budesonide-formoterol 80-4.5 MCG/ACT Aero   Commonly known as:  SYMBICORT    Inhale 2 Puffs by mouth 2 Times a Day.   Dose:  2 Puff       carvedilol 6.25 MG Tabs   Commonly known as:  COREG    Take 1 Tab by mouth 2 times a day, with meals.   Dose:  6.25 mg       citalopram 20 MG Tabs   Commonly known as:  CELEXA    Take 1 Tab by mouth every day.   Dose:  20 mg       doxycycline monohydrate 100 MG tablet   Commonly known as:  ADOXA    Take 1 Tab by mouth every 12 hours.   Dose: "  100 mg       gabapentin 300 MG Caps   Commonly known as:  NEURONTIN    Take 1 Cap by mouth 3 times a day.   Dose:  300 mg       Guaifenesin 1200 MG Tb12    Take 1 Tab by mouth every 12 hours.   Dose:  1200 mg       metformin 850 MG Tabs   Commonly known as:  GLUCOPHAGE    Take 1 Tab by mouth 3 times a day, with meals.   Dose:  850 mg       metoclopramide 5 MG tablet   Commonly known as:  REGLAN    Take 1 Tab by mouth 3 times a day before meals. Indications: Nausea and Vomiting   Dose:  5 mg       morphine ER 15 MG Tbcr tablet   Commonly known as:  MS CONTIN    Take 1 Tab by mouth every 12 hours.   Dose:  15 mg       risperidone 0.5 MG Tabs   Commonly known as:  RISPERDAL    Take 1 Tab by mouth every bedtime.   Dose:  0.5 mg       trazodone 50 MG Tabs   Commonly known as:  DESYREL    Take 1 Tab by mouth every day.   Dose:  50 mg       warfarin 5 MG Tabs   Commonly known as:  COUMADIN    Take 1 Tab by mouth COUMADIN-DAILY.   Dose:  5 mg

## 2017-05-05 NOTE — IP AVS SNAPSHOT
5/10/2017    Zenia Ordaz  [facility] [address]    Dear Zenia:    On license of [facility] wants to ensure your discharge home is safe and you or your loved ones have had all of your questions answered regarding your care after you leave the hospital.    Below is a list of resources and contact information should you have any questions regarding your hospital stay, follow-up instructions, or active medical symptoms.    Questions or Concerns Regarding… Contact   Medical Questions Related to Your Discharge  (7 days a week, 8am-5pm) Contact a Nurse Care Coordinator   [phone]   Medical Questions Not Related to Your Discharge  (24 hours a day / 7 days a week)  Contact the Nurse Health Line   [phone]    Medications or Discharge Instructions Refer to your discharge packet   or contact your [facility] Primary Care Provider   [phone]   Follow-up Appointment(s) Schedule your appointment via [employer]   or contact Scheduling [phone]   Billing Review your statement via [employer]  or contact Billing [phone]   Medical Records Review your records via [employer]   or contact Medical Records [phone]     You may receive a telephone call within two days of discharge. This call is to make certain you understand your discharge instructions and have the opportunity to have any questions answered. You can also easily access your medical information, test results and upcoming appointments via the [employer] free online health management tool. You can learn more and sign up at [employer]. For assistance setting up your [employer] account, please call [phone].    Once again, we want to ensure your discharge home is safe and that you have a clear understanding of any next steps in your care. If you have any questions or concerns, please do not hesitate to contact us, we are here for you. Thank you for choosing [facility] for your healthcare needs.    Sincerely,    Your [facility] Healthcare Team

## 2017-05-06 PROBLEM — J18.9 COMMUNITY ACQUIRED PNEUMONIA: Status: ACTIVE | Noted: 2017-05-06

## 2017-05-06 PROBLEM — L03.119 LOWER EXTREMITY CELLULITIS: Status: ACTIVE | Noted: 2017-05-06

## 2017-05-06 PROBLEM — E87.6 HYPOKALEMIA: Status: ACTIVE | Noted: 2017-05-06

## 2017-05-06 LAB
ALBUMIN SERPL BCP-MCNC: 3.4 G/DL (ref 3.2–4.9)
ALBUMIN/GLOB SERPL: 1.1 G/DL
ALP SERPL-CCNC: 86 U/L (ref 30–99)
ALT SERPL-CCNC: 33 U/L (ref 2–50)
AMPHET UR QL SCN: NEGATIVE
ANION GAP SERPL CALC-SCNC: 11 MMOL/L (ref 0–11.9)
APPEARANCE UR: CLEAR
AST SERPL-CCNC: 27 U/L (ref 12–45)
BARBITURATES UR QL SCN: NEGATIVE
BASOPHILS # BLD AUTO: 0.9 % (ref 0–1.8)
BASOPHILS # BLD: 0.07 K/UL (ref 0–0.12)
BENZODIAZ UR QL SCN: POSITIVE
BILIRUB SERPL-MCNC: 0.8 MG/DL (ref 0.1–1.5)
BILIRUB UR QL STRIP.AUTO: NEGATIVE
BUN SERPL-MCNC: 12 MG/DL (ref 8–22)
BZE UR QL SCN: NEGATIVE
CALCIUM SERPL-MCNC: 8.5 MG/DL (ref 8.5–10.5)
CANNABINOIDS UR QL SCN: NEGATIVE
CHLORIDE SERPL-SCNC: 106 MMOL/L (ref 96–112)
CHOLEST SERPL-MCNC: 151 MG/DL (ref 100–199)
CO2 SERPL-SCNC: 26 MMOL/L (ref 20–33)
COLOR UR: YELLOW
CREAT SERPL-MCNC: 0.56 MG/DL (ref 0.5–1.4)
EOSINOPHIL # BLD AUTO: 0.08 K/UL (ref 0–0.51)
EOSINOPHIL NFR BLD: 1.1 % (ref 0–6.9)
ERYTHROCYTE [DISTWIDTH] IN BLOOD BY AUTOMATED COUNT: 51.8 FL (ref 35.9–50)
EST. AVERAGE GLUCOSE BLD GHB EST-MCNC: 157 MG/DL
GFR SERPL CREATININE-BSD FRML MDRD: >60 ML/MIN/1.73 M 2
GLOBULIN SER CALC-MCNC: 3 G/DL (ref 1.9–3.5)
GLUCOSE BLD-MCNC: 102 MG/DL (ref 65–99)
GLUCOSE BLD-MCNC: 145 MG/DL (ref 65–99)
GLUCOSE BLD-MCNC: 221 MG/DL (ref 65–99)
GLUCOSE BLD-MCNC: 92 MG/DL (ref 65–99)
GLUCOSE SERPL-MCNC: 95 MG/DL (ref 65–99)
GLUCOSE UR STRIP.AUTO-MCNC: NEGATIVE MG/DL
HBA1C MFR BLD: 7.1 % (ref 0–5.6)
HCT VFR BLD AUTO: 36.6 % (ref 37–47)
HDLC SERPL-MCNC: 63 MG/DL
HGB BLD-MCNC: 11.6 G/DL (ref 12–16)
IMM GRANULOCYTES # BLD AUTO: 0.09 K/UL (ref 0–0.11)
IMM GRANULOCYTES NFR BLD AUTO: 1.2 % (ref 0–0.9)
KETONES UR STRIP.AUTO-MCNC: ABNORMAL MG/DL
LDLC SERPL CALC-MCNC: 69 MG/DL
LEUKOCYTE ESTERASE UR QL STRIP.AUTO: NEGATIVE
LYMPHOCYTES # BLD AUTO: 1.67 K/UL (ref 1–4.8)
LYMPHOCYTES NFR BLD: 22 % (ref 22–41)
MCH RBC QN AUTO: 27.4 PG (ref 27–33)
MCHC RBC AUTO-ENTMCNC: 31.7 G/DL (ref 33.6–35)
MCV RBC AUTO: 86.5 FL (ref 81.4–97.8)
MDMA UR QL SCN: NEGATIVE
METHADONE UR QL SCN: NEGATIVE
MICRO URNS: ABNORMAL
MONOCYTES # BLD AUTO: 0.83 K/UL (ref 0–0.85)
MONOCYTES NFR BLD AUTO: 10.9 % (ref 0–13.4)
NEUTROPHILS # BLD AUTO: 4.84 K/UL (ref 2–7.15)
NEUTROPHILS NFR BLD: 63.9 % (ref 44–72)
NITRITE UR QL STRIP.AUTO: NEGATIVE
NRBC # BLD AUTO: 0 K/UL
NRBC BLD AUTO-RTO: 0 /100 WBC
OPIATES UR QL SCN: POSITIVE
OXYCODONE UR QL SCN: NEGATIVE
PCP UR QL SCN: NEGATIVE
PH UR STRIP.AUTO: 6 [PH]
PLATELET # BLD AUTO: 243 K/UL (ref 164–446)
PMV BLD AUTO: 9.8 FL (ref 9–12.9)
POTASSIUM SERPL-SCNC: 3.3 MMOL/L (ref 3.6–5.5)
PROCALCITONIN SERPL-MCNC: 0.1 NG/ML
PROPOXYPH UR QL SCN: NEGATIVE
PROT SERPL-MCNC: 6.4 G/DL (ref 6–8.2)
PROT UR QL STRIP: NEGATIVE MG/DL
RBC # BLD AUTO: 4.23 M/UL (ref 4.2–5.4)
RBC UR QL AUTO: NEGATIVE
SODIUM SERPL-SCNC: 143 MMOL/L (ref 135–145)
SP GR UR STRIP.AUTO: >1.035
TRIGL SERPL-MCNC: 93 MG/DL (ref 0–149)
TROPONIN I SERPL-MCNC: <0.01 NG/ML (ref 0–0.04)
WBC # BLD AUTO: 7.6 K/UL (ref 4.8–10.8)

## 2017-05-06 PROCEDURE — 80061 LIPID PANEL: CPT

## 2017-05-06 PROCEDURE — 36415 COLL VENOUS BLD VENIPUNCTURE: CPT

## 2017-05-06 PROCEDURE — 700102 HCHG RX REV CODE 250 W/ 637 OVERRIDE(OP): Performed by: INTERNAL MEDICINE

## 2017-05-06 PROCEDURE — 770001 HCHG ROOM/CARE - MED/SURG/GYN PRIV*

## 2017-05-06 PROCEDURE — 700105 HCHG RX REV CODE 258: Performed by: INTERNAL MEDICINE

## 2017-05-06 PROCEDURE — A9270 NON-COVERED ITEM OR SERVICE: HCPCS | Performed by: INTERNAL MEDICINE

## 2017-05-06 PROCEDURE — 700101 HCHG RX REV CODE 250: Performed by: INTERNAL MEDICINE

## 2017-05-06 PROCEDURE — A9270 NON-COVERED ITEM OR SERVICE: HCPCS | Performed by: HOSPITALIST

## 2017-05-06 PROCEDURE — 700111 HCHG RX REV CODE 636 W/ 250 OVERRIDE (IP): Performed by: INTERNAL MEDICINE

## 2017-05-06 PROCEDURE — 80307 DRUG TEST PRSMV CHEM ANLYZR: CPT

## 2017-05-06 PROCEDURE — 85025 COMPLETE CBC W/AUTO DIFF WBC: CPT

## 2017-05-06 PROCEDURE — 87086 URINE CULTURE/COLONY COUNT: CPT

## 2017-05-06 PROCEDURE — 700102 HCHG RX REV CODE 250 W/ 637 OVERRIDE(OP): Performed by: HOSPITALIST

## 2017-05-06 PROCEDURE — 83036 HEMOGLOBIN GLYCOSYLATED A1C: CPT

## 2017-05-06 PROCEDURE — 82962 GLUCOSE BLOOD TEST: CPT

## 2017-05-06 PROCEDURE — 99232 SBSQ HOSP IP/OBS MODERATE 35: CPT | Performed by: HOSPITALIST

## 2017-05-06 PROCEDURE — 84484 ASSAY OF TROPONIN QUANT: CPT

## 2017-05-06 PROCEDURE — 80053 COMPREHEN METABOLIC PANEL: CPT

## 2017-05-06 PROCEDURE — 81003 URINALYSIS AUTO W/O SCOPE: CPT

## 2017-05-06 RX ORDER — POTASSIUM CHLORIDE 20 MEQ/1
40 TABLET, EXTENDED RELEASE ORAL 2 TIMES DAILY
Status: COMPLETED | OUTPATIENT
Start: 2017-05-06 | End: 2017-05-07

## 2017-05-06 RX ADMIN — GABAPENTIN 300 MG: 300 CAPSULE ORAL at 11:42

## 2017-05-06 RX ADMIN — AMPICILLIN SODIUM AND SULBACTAM SODIUM 3 G: 2; 1 INJECTION, POWDER, FOR SOLUTION INTRAMUSCULAR; INTRAVENOUS at 09:31

## 2017-05-06 RX ADMIN — ASPIRIN 81 MG: 81 TABLET ORAL at 11:42

## 2017-05-06 RX ADMIN — ALPRAZOLAM 0.5 MG: 0.5 TABLET ORAL at 20:55

## 2017-05-06 RX ADMIN — METOCLOPRAMIDE 5 MG: 10 TABLET ORAL at 18:03

## 2017-05-06 RX ADMIN — ALPRAZOLAM 0.5 MG: 0.5 TABLET ORAL at 11:43

## 2017-05-06 RX ADMIN — POTASSIUM CHLORIDE 40 MEQ: 1500 TABLET, EXTENDED RELEASE ORAL at 20:56

## 2017-05-06 RX ADMIN — CITALOPRAM HYDROBROMIDE 20 MG: 20 TABLET ORAL at 11:43

## 2017-05-06 RX ADMIN — POTASSIUM CHLORIDE 40 MEQ: 1500 TABLET, EXTENDED RELEASE ORAL at 11:42

## 2017-05-06 RX ADMIN — DOXYCYCLINE 100 MG: 100 INJECTION, POWDER, LYOPHILIZED, FOR SOLUTION INTRAVENOUS at 09:31

## 2017-05-06 RX ADMIN — GABAPENTIN 300 MG: 300 CAPSULE ORAL at 20:56

## 2017-05-06 RX ADMIN — METOCLOPRAMIDE 5 MG: 10 TABLET ORAL at 11:43

## 2017-05-06 RX ADMIN — RISPERIDONE 0.5 MG: 1 TABLET, FILM COATED ORAL at 20:55

## 2017-05-06 RX ADMIN — AMPICILLIN SODIUM AND SULBACTAM SODIUM 3 G: 2; 1 INJECTION, POWDER, FOR SOLUTION INTRAMUSCULAR; INTRAVENOUS at 18:04

## 2017-05-06 RX ADMIN — AMPICILLIN SODIUM AND SULBACTAM SODIUM 3 G: 2; 1 INJECTION, POWDER, FOR SOLUTION INTRAMUSCULAR; INTRAVENOUS at 04:35

## 2017-05-06 RX ADMIN — MORPHINE SULFATE 15 MG: 15 TABLET, EXTENDED RELEASE ORAL at 20:55

## 2017-05-06 RX ADMIN — BUDESONIDE AND FORMOTEROL FUMARATE DIHYDRATE 2 PUFF: 80; 4.5 AEROSOL RESPIRATORY (INHALATION) at 21:02

## 2017-05-06 RX ADMIN — LABETALOL HYDROCHLORIDE 10 MG: 5 INJECTION INTRAVENOUS at 00:17

## 2017-05-06 RX ADMIN — GABAPENTIN 300 MG: 300 CAPSULE ORAL at 18:03

## 2017-05-06 RX ADMIN — TRAZODONE HYDROCHLORIDE 50 MG: 50 TABLET ORAL at 20:56

## 2017-05-06 RX ADMIN — INSULIN GLARGINE 10 UNITS: 100 INJECTION, SOLUTION SUBCUTANEOUS at 21:04

## 2017-05-06 RX ADMIN — ENOXAPARIN SODIUM 40 MG: 100 INJECTION SUBCUTANEOUS at 11:44

## 2017-05-06 RX ADMIN — AMLODIPINE BESYLATE 10 MG: 10 TABLET ORAL at 11:43

## 2017-05-06 RX ADMIN — MORPHINE SULFATE 15 MG: 15 TABLET, EXTENDED RELEASE ORAL at 11:42

## 2017-05-06 ASSESSMENT — ENCOUNTER SYMPTOMS
VOMITING: 0
ABDOMINAL PAIN: 0
PALPITATIONS: 0
CHILLS: 0
FEVER: 0
COUGH: 0
HEADACHES: 0
MYALGIAS: 0
SHORTNESS OF BREATH: 0
NAUSEA: 0

## 2017-05-06 ASSESSMENT — PAIN SCALES - GENERAL
PAINLEVEL_OUTOF10: 5
PAINLEVEL_OUTOF10: 10
PAINLEVEL_OUTOF10: 3

## 2017-05-06 NOTE — PROGRESS NOTES
Received report. Pt arrived to unit at 1715 via GURNEY escorted by transport staff.  VS stable. Assessment complete.  No signs of distress noted at this time.  Tele monitor in place; monitor notified. Pt obtunded, but responsive to voice.  AAOx2, disoriented to date & event. Per report, pt given Haldol by [facility] & has been sleeping since then. Fall precautions & appropriate signs in place. Pt oriented to unit routine, call lights/phone system. RN extension number provided.  Pt educated re: fall precautions. Bed alarm in place. Will continue to monitor.

## 2017-05-06 NOTE — PROGRESS NOTES
2 RN skin assessment completed. Pt's skin is intact, no open wounds or pressure ulcers.  There is a wound that has started to scab on pt's right posterior cheek (near the ear).  Some bruising noted on ULE. Pt's lower legs are red, warm to the touch, palpable 2+ pedal pulses.
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
## 2017-05-06 NOTE — RESPIRATORY CARE
COPD EDUCATION by COPD CLINICAL EDUCATOR  5/6/2017 at 6:10 AM by Sophy Castanon     Patient reviewed by COPD education team. Patient does not qualify for COPD program.

## 2017-05-07 LAB
ANION GAP SERPL CALC-SCNC: 7 MMOL/L (ref 0–11.9)
BUN SERPL-MCNC: 15 MG/DL (ref 8–22)
CALCIUM SERPL-MCNC: 8.2 MG/DL (ref 8.5–10.5)
CHLORIDE SERPL-SCNC: 109 MMOL/L (ref 96–112)
CO2 SERPL-SCNC: 25 MMOL/L (ref 20–33)
CREAT SERPL-MCNC: 0.51 MG/DL (ref 0.5–1.4)
ERYTHROCYTE [DISTWIDTH] IN BLOOD BY AUTOMATED COUNT: 51.7 FL (ref 35.9–50)
GFR SERPL CREATININE-BSD FRML MDRD: >60 ML/MIN/1.73 M 2
GLUCOSE BLD-MCNC: 125 MG/DL (ref 65–99)
GLUCOSE BLD-MCNC: 178 MG/DL (ref 65–99)
GLUCOSE BLD-MCNC: 201 MG/DL (ref 65–99)
GLUCOSE SERPL-MCNC: 172 MG/DL (ref 65–99)
HCT VFR BLD AUTO: 36.9 % (ref 37–47)
HGB BLD-MCNC: 11.3 G/DL (ref 12–16)
MAGNESIUM SERPL-MCNC: 1.9 MG/DL (ref 1.5–2.5)
MCH RBC QN AUTO: 26.5 PG (ref 27–33)
MCHC RBC AUTO-ENTMCNC: 30.6 G/DL (ref 33.6–35)
MCV RBC AUTO: 86.6 FL (ref 81.4–97.8)
PLATELET # BLD AUTO: 222 K/UL (ref 164–446)
PMV BLD AUTO: 9.9 FL (ref 9–12.9)
POTASSIUM SERPL-SCNC: 4 MMOL/L (ref 3.6–5.5)
RBC # BLD AUTO: 4.26 M/UL (ref 4.2–5.4)
SODIUM SERPL-SCNC: 141 MMOL/L (ref 135–145)
WBC # BLD AUTO: 6.2 K/UL (ref 4.8–10.8)

## 2017-05-07 PROCEDURE — 700102 HCHG RX REV CODE 250 W/ 637 OVERRIDE(OP): Performed by: INTERNAL MEDICINE

## 2017-05-07 PROCEDURE — 83735 ASSAY OF MAGNESIUM: CPT

## 2017-05-07 PROCEDURE — 700102 HCHG RX REV CODE 250 W/ 637 OVERRIDE(OP): Performed by: NURSE PRACTITIONER

## 2017-05-07 PROCEDURE — 700102 HCHG RX REV CODE 250 W/ 637 OVERRIDE(OP): Performed by: HOSPITALIST

## 2017-05-07 PROCEDURE — 700105 HCHG RX REV CODE 258: Performed by: INTERNAL MEDICINE

## 2017-05-07 PROCEDURE — 770006 HCHG ROOM/CARE - MED/SURG/GYN SEMI*

## 2017-05-07 PROCEDURE — 80048 BASIC METABOLIC PNL TOTAL CA: CPT

## 2017-05-07 PROCEDURE — A9270 NON-COVERED ITEM OR SERVICE: HCPCS | Performed by: HOSPITALIST

## 2017-05-07 PROCEDURE — 36415 COLL VENOUS BLD VENIPUNCTURE: CPT

## 2017-05-07 PROCEDURE — A9270 NON-COVERED ITEM OR SERVICE: HCPCS | Performed by: INTERNAL MEDICINE

## 2017-05-07 PROCEDURE — 700101 HCHG RX REV CODE 250: Performed by: INTERNAL MEDICINE

## 2017-05-07 PROCEDURE — A9270 NON-COVERED ITEM OR SERVICE: HCPCS | Performed by: NURSE PRACTITIONER

## 2017-05-07 PROCEDURE — 85027 COMPLETE CBC AUTOMATED: CPT

## 2017-05-07 PROCEDURE — 82962 GLUCOSE BLOOD TEST: CPT | Mod: 91

## 2017-05-07 PROCEDURE — 99232 SBSQ HOSP IP/OBS MODERATE 35: CPT | Performed by: INTERNAL MEDICINE

## 2017-05-07 PROCEDURE — 700111 HCHG RX REV CODE 636 W/ 250 OVERRIDE (IP): Performed by: INTERNAL MEDICINE

## 2017-05-07 RX ORDER — DOXYCYCLINE 100 MG/1
100 TABLET ORAL EVERY 12 HOURS
Status: DISCONTINUED | OUTPATIENT
Start: 2017-05-07 | End: 2017-05-10 | Stop reason: HOSPADM

## 2017-05-07 RX ORDER — METHADONE HYDROCHLORIDE 5 MG/1
5 TABLET ORAL 2 TIMES DAILY
Status: DISCONTINUED | OUTPATIENT
Start: 2017-05-07 | End: 2017-05-08

## 2017-05-07 RX ORDER — AMOXICILLIN AND CLAVULANATE POTASSIUM 875; 125 MG/1; MG/1
1 TABLET, FILM COATED ORAL EVERY 12 HOURS
Status: DISCONTINUED | OUTPATIENT
Start: 2017-05-07 | End: 2017-05-10 | Stop reason: HOSPADM

## 2017-05-07 RX ORDER — LIDOCAINE 50 MG/G
1 PATCH TOPICAL EVERY 24 HOURS
Status: DISCONTINUED | OUTPATIENT
Start: 2017-05-07 | End: 2017-05-10 | Stop reason: HOSPADM

## 2017-05-07 RX ORDER — OXYCODONE HYDROCHLORIDE 5 MG/1
5 TABLET ORAL EVERY 4 HOURS PRN
Status: DISCONTINUED | OUTPATIENT
Start: 2017-05-07 | End: 2017-05-07

## 2017-05-07 RX ADMIN — DOXYCYCLINE 100 MG: 100 TABLET ORAL at 13:15

## 2017-05-07 RX ADMIN — AMPICILLIN SODIUM AND SULBACTAM SODIUM 3 G: 2; 1 INJECTION, POWDER, FOR SOLUTION INTRAMUSCULAR; INTRAVENOUS at 08:31

## 2017-05-07 RX ADMIN — METOCLOPRAMIDE 5 MG: 10 TABLET ORAL at 08:30

## 2017-05-07 RX ADMIN — CITALOPRAM HYDROBROMIDE 20 MG: 20 TABLET ORAL at 12:33

## 2017-05-07 RX ADMIN — BUDESONIDE AND FORMOTEROL FUMARATE DIHYDRATE 2 PUFF: 80; 4.5 AEROSOL RESPIRATORY (INHALATION) at 21:38

## 2017-05-07 RX ADMIN — ALPRAZOLAM 0.5 MG: 0.5 TABLET ORAL at 18:10

## 2017-05-07 RX ADMIN — AMOXICILLIN AND CLAVULANATE POTASSIUM 1 TABLET: 875; 125 TABLET, FILM COATED ORAL at 21:39

## 2017-05-07 RX ADMIN — AMLODIPINE BESYLATE 10 MG: 10 TABLET ORAL at 08:30

## 2017-05-07 RX ADMIN — POTASSIUM CHLORIDE 40 MEQ: 1500 TABLET, EXTENDED RELEASE ORAL at 08:30

## 2017-05-07 RX ADMIN — METHADONE HYDROCHLORIDE 5 MG: 5 TABLET ORAL at 21:39

## 2017-05-07 RX ADMIN — OXYCODONE HYDROCHLORIDE 5 MG: 5 TABLET ORAL at 16:15

## 2017-05-07 RX ADMIN — MORPHINE SULFATE 15 MG: 15 TABLET, EXTENDED RELEASE ORAL at 08:30

## 2017-05-07 RX ADMIN — AMOXICILLIN AND CLAVULANATE POTASSIUM 1 TABLET: 875; 125 TABLET, FILM COATED ORAL at 13:15

## 2017-05-07 RX ADMIN — ENOXAPARIN SODIUM 40 MG: 100 INJECTION SUBCUTANEOUS at 08:30

## 2017-05-07 RX ADMIN — METOCLOPRAMIDE 5 MG: 10 TABLET ORAL at 17:47

## 2017-05-07 RX ADMIN — OXYCODONE HYDROCHLORIDE 5 MG: 5 TABLET ORAL at 06:48

## 2017-05-07 RX ADMIN — BUDESONIDE AND FORMOTEROL FUMARATE DIHYDRATE 2 PUFF: 80; 4.5 AEROSOL RESPIRATORY (INHALATION) at 08:32

## 2017-05-07 RX ADMIN — GABAPENTIN 300 MG: 300 CAPSULE ORAL at 21:39

## 2017-05-07 RX ADMIN — AMPICILLIN SODIUM AND SULBACTAM SODIUM 3 G: 2; 1 INJECTION, POWDER, FOR SOLUTION INTRAMUSCULAR; INTRAVENOUS at 02:55

## 2017-05-07 RX ADMIN — ALPRAZOLAM 0.5 MG: 0.5 TABLET ORAL at 09:35

## 2017-05-07 RX ADMIN — OXYCODONE HYDROCHLORIDE 5 MG: 5 TABLET ORAL at 12:33

## 2017-05-07 RX ADMIN — LIDOCAINE 1 PATCH: 50 PATCH TOPICAL at 17:47

## 2017-05-07 RX ADMIN — RISPERIDONE 0.5 MG: 1 TABLET, FILM COATED ORAL at 21:39

## 2017-05-07 RX ADMIN — GABAPENTIN 300 MG: 300 CAPSULE ORAL at 15:35

## 2017-05-07 RX ADMIN — DOXYCYCLINE 100 MG: 100 TABLET ORAL at 21:39

## 2017-05-07 RX ADMIN — METOCLOPRAMIDE 5 MG: 10 TABLET ORAL at 12:33

## 2017-05-07 RX ADMIN — GABAPENTIN 300 MG: 300 CAPSULE ORAL at 08:30

## 2017-05-07 RX ADMIN — ASPIRIN 81 MG: 81 TABLET ORAL at 08:30

## 2017-05-07 RX ADMIN — TRAZODONE HYDROCHLORIDE 50 MG: 50 TABLET ORAL at 21:39

## 2017-05-07 RX ADMIN — INSULIN GLARGINE 10 UNITS: 100 INJECTION, SOLUTION SUBCUTANEOUS at 21:32

## 2017-05-07 ASSESSMENT — ENCOUNTER SYMPTOMS
HEADACHES: 0
COUGH: 0
CHILLS: 0
ABDOMINAL PAIN: 0
FEVER: 0
PALPITATIONS: 0
NAUSEA: 0
MYALGIAS: 0
VOMITING: 0
SHORTNESS OF BREATH: 0

## 2017-05-07 ASSESSMENT — PAIN SCALES - GENERAL
PAINLEVEL_OUTOF10: 0
PAINLEVEL_OUTOF10: 6
PAINLEVEL_OUTOF10: 9

## 2017-05-07 ASSESSMENT — LIFESTYLE VARIABLES
EVER_SMOKED: YES
PACK_YEARS: 50
ALCOHOL_USE: NO
DO YOU DRINK ALCOHOL: NO

## 2017-05-07 NOTE — CARE PLAN
Problem: Infection  Goal: Will remain free from infection  Outcome: PROGRESSING AS EXPECTED  Established IV access once pt was transferred to floor. Messaged Pharmacy to reschedule IV ABX that were previously held d/t lack of IV access. Started IV ABX at 0300 on 5/7/17.     Problem: Respiratory:  Goal: Respiratory status will improve  Outcome: PROGRESSING AS EXPECTED  Pt on 2L O2 via NC and continuous PO. SaO2 ranges from 95-97%, even while asleep.

## 2017-05-07 NOTE — PROGRESS NOTES
Hospital Medicine Interval Note    Date of Service: 2017    Chief Complaint  66 year-old female admitted 2017 with chest pain, likely 2/2 mixture of community acquired pna and meth use, also ble cellulitis     Interval Problem Update  Lethargic and falls asleep quickly, then wakes up and asks for pain meds  Does have s/s pna, treating with abx; trop negative  Ready for swallow eval now that wakes up more  RLE warm to touch; no swelling; continuing abx    Consultants/Specialty  None    Disposition  Transfer to [facility] floor    FC  D/W tx team on rounds        Review of Systems   Constitutional: Negative for fever and chills.   Respiratory: Negative for cough and shortness of breath.    Cardiovascular: Positive for chest pain (left side rib pain). Negative for palpitations.   Gastrointestinal: Negative for nausea, vomiting and abdominal pain.   Genitourinary: Negative for dysuria.   Musculoskeletal: Negative for myalgias.   Neurological: Negative for headaches.      Physical Exam  Laboratory/Imaging   Hemodynamics  Temp (24hrs), Av.6 °C (97.9 °F), Min:36.2 °C (97.1 °F), Max:36.9 °C (98.5 °F)   Temperature: 36.9 °C (98.5 °F)  Pulse  Av.6  Min: 54  Max: 86    Blood Pressure: 153/80 mmHg      Respiratory      Respiration: 18, Pulse Oximetry: 95 %     Work Of Breathing / Effort: Mild  RUL Breath Sounds: Rhonchi, RML Breath Sounds: Diminished, RLL Breath Sounds: Diminished, MARC Breath Sounds: Rhonchi, LLL Breath Sounds: Diminished    Fluids       Nutrition  Orders Placed This Encounter   Procedures   • DIET ORDER     Standing Status: Standing      Number of Occurrences: 1      Standing Expiration Date:      Order Specific Question:  Diet:     Answer:  Consistent Carbohydrate [4]     Order Specific Question:  Texture/Fiber modifications:     Answer:  Dysphagia 3(Mechanical Soft)specify fluid consistency(question 6) [3]     Physical Exam   Constitutional: She is oriented to person, place, and time. She appears  "well-developed and well-nourished.   disheveled   HENT:   Head: Normocephalic and atraumatic.   Cardiovascular: Normal rate, regular rhythm and normal heart sounds.    No murmur heard.  Pulmonary/Chest: Effort normal. No respiratory distress. She has no wheezes. She has rales (left side).   Abdominal: Soft. Bowel sounds are normal. She exhibits no distension. There is no tenderness.   Musculoskeletal: Normal range of motion. She exhibits no edema.   Neurological: She is alert and oriented to person, place, and time.   Falls asleep easily   Skin: Skin is warm and dry. There is erythema (RLE ankle to shin; left ankle).   Nursing note and vitals reviewed.      Recent Labs      05/05/17   1105  05/06/17   0504   WBC  10.4  7.6   RBC  4.11*  4.23   HEMOGLOBIN  11.1*  11.6*   HEMATOCRIT  36.5*  36.6*   MCV  88.8  86.5   MCH  27.0  27.4   MCHC  30.4*  31.7*   RDW  53.1*  51.8*   PLATELETCT  261  243   MPV  10.1  9.8     Recent Labs      05/05/17   1105  05/06/17   0504   SODIUM  134*  143   POTASSIUM  4.3  3.3*   CHLORIDE  102  106   CO2  28  26   GLUCOSE  188*  95   BUN  27*  12   CREATININE  0.81  0.56   CALCIUM  9.0  8.5     Recent Labs      05/05/17   1104   APTT  22.6*   INR  1.04         Recent Labs      05/06/17   0504   TRIGLYCERIDE  93   HDL  63   LDL  69          Assessment/Plan     * Community acquired pneumonia (present on admission)  Assessment & Plan  -unasyn/doxy  -cx pending, RT protocol, IS    Lower extremity cellulitis (present on admission)  Assessment & Plan  bilateral, continue unasyn, no open wounds; old scars from injecting    Drug abuse, IV (present on admission)  Assessment & Plan  -counseled, last used a couple days ago  -says she uses meth for \"energy\"  -tried to inject in her legs but no veins    Acute on chronic respiratory failure with hypoxia (CMS-HCC) (present on admission)  Assessment & Plan  -continue O2  -worsened by pna    Pulmonary emboli (CMS-[facility]) (present on admission)  Assessment & " Plan  -history, has IVC filter, none seen on CTA now    COPD (chronic obstructive pulmonary disease) (CMS-HCC) (present on admission)  Assessment & Plan  -continue O2, IS, RT protocol, no wheezing, no steroids necessary  -symbicort    Chest pain (present on admission)  Assessment & Plan  -2/2 pna, on same side, also could be associated with meth use  -trop negative    Hypokalemia (present on admission)  Assessment & Plan  -replacing, check mag      Labs reviewed, Medications reviewed, Radiology images reviewed and EKG reviewed  Fagan catheter: No Fagan      DVT Prophylaxis: Enoxaparin (Lovenox)    Ulcer prophylaxis: Not indicated  Antibiotics: Treating active infection/contamination beyond 24 hours perioperative coverage

## 2017-05-07 NOTE — PROGRESS NOTES
Pt transferred to floor w/ Aura Pimentel and Patricia. Daughter at bedside providing emotional support. Report received from DARRELL Russo. Assessment complete and agree with previous assessment from DARRELL Dumont. Labs and orders noted. AOx4 but lethargic; pt falls back to asleep after each question. No signs of labored breathing. Denies pain. Safety precautions in place including patient call light within reach, bed alarm on, personal possessions nearby, bed in low position and locked, hourly rounding in practice, and non-skid socks in place.

## 2017-05-07 NOTE — PROGRESS NOTES
"Assumed care of patient at  0700. Received report from RN. Patient is AOX4 . Assessment complete. Labs reviewed.Patient and RN discussed plan of care. Patient questions answered. Patient needs are met at this time. Bed in lowest and locked position. Upper bed rails up.  Call light is within reach. Hourly rounding in place.  /61 mmHg  Pulse 66  Temp(Src) 36.3 °C (97.3 °F)  Resp 18  Ht 1.549 m (5' 1\")  Wt 79.8 kg (175 lb 14.8 oz)  BMI 33.26 kg/m2  SpO2 96%    "

## 2017-05-07 NOTE — PROGRESS NOTES
Spoke with Dr. Tran regarding patient's pain level and diaphoretic and swellling to right hand. Dr. Tran ordered Methadone 5 mg BID, and a lidocaine patch. RRT saw patient and stated that she needs to stay on 3L O2 regardless of saturation rate.

## 2017-05-07 NOTE — ASSESSMENT & PLAN NOTE
"-counseled, last used a couple days ago  -says she uses meth for \"energy\"  -tried to inject in her legs but no veins  "
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
## 2017-05-07 NOTE — PROGRESS NOTES
"Bedside report completed, assumed pt care. Assessment complete. Pt resting in bed, A&O x 4. Pt medicated for pain and anxiety. Unable to obtain IV access after several attempts by 3 RNs with the ultra sound. Pt refuses to be \"stuck\" again. Call to APN for updates, orders to have ED place EJ. Call to ED, no available staff to place EJ at this time. Will call again later. Discussed POC with pt. Call light within reach, bed alarm on. Snacks and fluids provided. Pt up frequently to the bathroom with loose stool. All needs met.   "

## 2017-05-08 ENCOUNTER — APPOINTMENT (OUTPATIENT)
Dept: RADIOLOGY | Facility: MEDICAL CENTER | Age: 67
DRG: 917 | End: 2017-05-08
Attending: INTERNAL MEDICINE
Payer: MEDICAID

## 2017-05-08 LAB
ANION GAP SERPL CALC-SCNC: 7 MMOL/L (ref 0–11.9)
BACTERIA BLD CULT: NORMAL
BACTERIA UR CULT: NORMAL
BUN SERPL-MCNC: 15 MG/DL (ref 8–22)
CALCIUM SERPL-MCNC: 8.8 MG/DL (ref 8.5–10.5)
CHLORIDE SERPL-SCNC: 104 MMOL/L (ref 96–112)
CO2 SERPL-SCNC: 25 MMOL/L (ref 20–33)
CREAT SERPL-MCNC: 0.57 MG/DL (ref 0.5–1.4)
EKG IMPRESSION: NORMAL
EKG IMPRESSION: NORMAL
ERYTHROCYTE [DISTWIDTH] IN BLOOD BY AUTOMATED COUNT: 53.3 FL (ref 35.9–50)
GFR SERPL CREATININE-BSD FRML MDRD: >60 ML/MIN/1.73 M 2
GLUCOSE BLD-MCNC: 139 MG/DL (ref 65–99)
GLUCOSE BLD-MCNC: 149 MG/DL (ref 65–99)
GLUCOSE BLD-MCNC: 154 MG/DL (ref 65–99)
GLUCOSE BLD-MCNC: 165 MG/DL (ref 65–99)
GLUCOSE BLD-MCNC: 183 MG/DL (ref 65–99)
GLUCOSE SERPL-MCNC: 218 MG/DL (ref 65–99)
HCT VFR BLD AUTO: 38.3 % (ref 37–47)
HGB BLD-MCNC: 11.7 G/DL (ref 12–16)
MCH RBC QN AUTO: 26.8 PG (ref 27–33)
MCHC RBC AUTO-ENTMCNC: 30.5 G/DL (ref 33.6–35)
MCV RBC AUTO: 87.8 FL (ref 81.4–97.8)
PLATELET # BLD AUTO: 222 K/UL (ref 164–446)
PMV BLD AUTO: 9.9 FL (ref 9–12.9)
POTASSIUM SERPL-SCNC: 4.2 MMOL/L (ref 3.6–5.5)
RBC # BLD AUTO: 4.36 M/UL (ref 4.2–5.4)
SIGNIFICANT IND 70042: NORMAL
SIGNIFICANT IND 70042: NORMAL
SITE SITE: NORMAL
SITE SITE: NORMAL
SODIUM SERPL-SCNC: 136 MMOL/L (ref 135–145)
SOURCE SOURCE: NORMAL
SOURCE SOURCE: NORMAL
WBC # BLD AUTO: 6.1 K/UL (ref 4.8–10.8)

## 2017-05-08 PROCEDURE — 770006 HCHG ROOM/CARE - MED/SURG/GYN SEMI*

## 2017-05-08 PROCEDURE — 700101 HCHG RX REV CODE 250: Performed by: INTERNAL MEDICINE

## 2017-05-08 PROCEDURE — A9270 NON-COVERED ITEM OR SERVICE: HCPCS | Performed by: INTERNAL MEDICINE

## 2017-05-08 PROCEDURE — 700102 HCHG RX REV CODE 250 W/ 637 OVERRIDE(OP): Performed by: NURSE PRACTITIONER

## 2017-05-08 PROCEDURE — 82962 GLUCOSE BLOOD TEST: CPT | Mod: 91

## 2017-05-08 PROCEDURE — 700102 HCHG RX REV CODE 250 W/ 637 OVERRIDE(OP): Performed by: INTERNAL MEDICINE

## 2017-05-08 PROCEDURE — 99233 SBSQ HOSP IP/OBS HIGH 50: CPT | Performed by: INTERNAL MEDICINE

## 2017-05-08 PROCEDURE — 700111 HCHG RX REV CODE 636 W/ 250 OVERRIDE (IP): Performed by: INTERNAL MEDICINE

## 2017-05-08 PROCEDURE — A9270 NON-COVERED ITEM OR SERVICE: HCPCS | Performed by: NURSE PRACTITIONER

## 2017-05-08 RX ORDER — CAPSAICIN 0.025 %
CREAM (GRAM) TOPICAL 3 TIMES DAILY
Status: DISCONTINUED | OUTPATIENT
Start: 2017-05-08 | End: 2017-05-10 | Stop reason: HOSPADM

## 2017-05-08 RX ORDER — GUAIFENESIN/DEXTROMETHORPHAN 100-10MG/5
5 SYRUP ORAL EVERY 4 HOURS PRN
Status: DISCONTINUED | OUTPATIENT
Start: 2017-05-08 | End: 2017-05-10 | Stop reason: HOSPADM

## 2017-05-08 RX ORDER — METHADONE HYDROCHLORIDE 10 MG/1
10 TABLET ORAL 2 TIMES DAILY
Status: DISCONTINUED | OUTPATIENT
Start: 2017-05-08 | End: 2017-05-10 | Stop reason: HOSPADM

## 2017-05-08 RX ORDER — OXYCODONE HYDROCHLORIDE 5 MG/1
5 TABLET ORAL
Status: COMPLETED | OUTPATIENT
Start: 2017-05-08 | End: 2017-05-08

## 2017-05-08 RX ADMIN — GABAPENTIN 300 MG: 300 CAPSULE ORAL at 20:34

## 2017-05-08 RX ADMIN — BUDESONIDE AND FORMOTEROL FUMARATE DIHYDRATE 2 PUFF: 80; 4.5 AEROSOL RESPIRATORY (INHALATION) at 08:22

## 2017-05-08 RX ADMIN — METOCLOPRAMIDE 5 MG: 10 TABLET ORAL at 05:40

## 2017-05-08 RX ADMIN — INSULIN GLARGINE 10 UNITS: 100 INJECTION, SOLUTION SUBCUTANEOUS at 20:41

## 2017-05-08 RX ADMIN — BUDESONIDE AND FORMOTEROL FUMARATE DIHYDRATE 2 PUFF: 80; 4.5 AEROSOL RESPIRATORY (INHALATION) at 20:34

## 2017-05-08 RX ADMIN — ENOXAPARIN SODIUM 40 MG: 100 INJECTION SUBCUTANEOUS at 08:11

## 2017-05-08 RX ADMIN — CAPSAICIN: 0.25 CREAM TOPICAL at 12:38

## 2017-05-08 RX ADMIN — CAPSAICIN: 0.25 CREAM TOPICAL at 18:04

## 2017-05-08 RX ADMIN — OXYCODONE HYDROCHLORIDE 5 MG: 5 TABLET ORAL at 00:16

## 2017-05-08 RX ADMIN — GABAPENTIN 300 MG: 300 CAPSULE ORAL at 08:11

## 2017-05-08 RX ADMIN — LIDOCAINE 1 PATCH: 50 PATCH TOPICAL at 18:37

## 2017-05-08 RX ADMIN — STANDARDIZED SENNA CONCENTRATE AND DOCUSATE SODIUM 2 TABLET: 8.6; 5 TABLET, FILM COATED ORAL at 08:11

## 2017-05-08 RX ADMIN — GUAIFENESIN AND DEXTROMETHORPHAN 5 ML: 100; 10 SYRUP ORAL at 12:37

## 2017-05-08 RX ADMIN — ALPRAZOLAM 0.5 MG: 0.5 TABLET ORAL at 20:34

## 2017-05-08 RX ADMIN — RISPERIDONE 0.5 MG: 1 TABLET, FILM COATED ORAL at 20:34

## 2017-05-08 RX ADMIN — ASPIRIN 81 MG: 81 TABLET ORAL at 08:11

## 2017-05-08 RX ADMIN — AMOXICILLIN AND CLAVULANATE POTASSIUM 1 TABLET: 875; 125 TABLET, FILM COATED ORAL at 20:34

## 2017-05-08 RX ADMIN — CAPSAICIN: 0.25 CREAM TOPICAL at 21:00

## 2017-05-08 RX ADMIN — AMOXICILLIN AND CLAVULANATE POTASSIUM 1 TABLET: 875; 125 TABLET, FILM COATED ORAL at 08:11

## 2017-05-08 RX ADMIN — METHADONE HYDROCHLORIDE 10 MG: 10 TABLET ORAL at 08:45

## 2017-05-08 RX ADMIN — TRAZODONE HYDROCHLORIDE 50 MG: 50 TABLET ORAL at 20:34

## 2017-05-08 RX ADMIN — CITALOPRAM HYDROBROMIDE 20 MG: 20 TABLET ORAL at 12:38

## 2017-05-08 RX ADMIN — METOCLOPRAMIDE 5 MG: 10 TABLET ORAL at 18:02

## 2017-05-08 RX ADMIN — STANDARDIZED SENNA CONCENTRATE AND DOCUSATE SODIUM 2 TABLET: 8.6; 5 TABLET, FILM COATED ORAL at 20:34

## 2017-05-08 RX ADMIN — DOXYCYCLINE 100 MG: 100 TABLET ORAL at 08:11

## 2017-05-08 RX ADMIN — GUAIFENESIN AND DEXTROMETHORPHAN 5 ML: 100; 10 SYRUP ORAL at 00:16

## 2017-05-08 RX ADMIN — GABAPENTIN 300 MG: 300 CAPSULE ORAL at 15:18

## 2017-05-08 RX ADMIN — GUAIFENESIN AND DEXTROMETHORPHAN 5 ML: 100; 10 SYRUP ORAL at 05:40

## 2017-05-08 RX ADMIN — AMLODIPINE BESYLATE 10 MG: 10 TABLET ORAL at 08:12

## 2017-05-08 RX ADMIN — ALPRAZOLAM 0.5 MG: 0.5 TABLET ORAL at 12:44

## 2017-05-08 RX ADMIN — METHADONE HYDROCHLORIDE 10 MG: 10 TABLET ORAL at 20:34

## 2017-05-08 RX ADMIN — DOXYCYCLINE 100 MG: 100 TABLET ORAL at 22:03

## 2017-05-08 RX ADMIN — METOCLOPRAMIDE 5 MG: 10 TABLET ORAL at 12:38

## 2017-05-08 ASSESSMENT — ENCOUNTER SYMPTOMS
MYALGIAS: 0
COUGH: 1
ABDOMINAL PAIN: 0
FEVER: 0
HEADACHES: 0
SPUTUM PRODUCTION: 1
NAUSEA: 0
PALPITATIONS: 0
CHILLS: 0
SHORTNESS OF BREATH: 0
VOMITING: 0

## 2017-05-08 ASSESSMENT — PAIN SCALES - GENERAL
PAINLEVEL_OUTOF10: 9
PAINLEVEL_OUTOF10: 10
PAINLEVEL_OUTOF10: 4
PAINLEVEL_OUTOF10: 4
PAINLEVEL_OUTOF10: 10

## 2017-05-08 NOTE — ADDENDUM NOTE
Encounter addended by: Perez Lau M.D. on: 5/7/2017  5:05 PM<BR>     Documentation filed: Clinical Notes
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
## 2017-05-08 NOTE — CARE PLAN
Problem: Venous Thromboembolism (VTW)/Deep Vein Thrombosis (DVT) Prevention:  Goal: Patient will participate in Venous Thrombosis (VTE)/Deep Vein Thrombosis (DVT)Prevention Measures  Pt receives Lovenox subcutaneous per MAR order.     Problem: Pain Management  Goal: Pain level will decrease to patient’s comfort goal  Intervention: Follow pain managment plan developed in collaboration with patient and Interdisciplinary Team  Gave pt Methadone 5mg PO per MAR order. Also called APN for additional pain med as pt c/o of 9/10 pain and discomfort from cough so hard. Cough medicine also administered to ease coughing.

## 2017-05-08 NOTE — PROGRESS NOTES
"Pt reports that her social security card and \"a few other cards are missing, I could have left them on my previous floor.\" Checked with charge RN on Tele 7, charge RN checked in pt's med drawer, vanity/side table, and lost and found. No cards present for pt. Will notify pt.   "

## 2017-05-08 NOTE — PROGRESS NOTES
Rec'd report from day shift RN. Assumed pt care. Assessment completed. AA&OX4. C/O of left lower rib pain, evident as pt has a strong congested cough. Instructed pt to splint cough with pillow to chest. Pt demonstrated appropriate use of IS. Medicated per MAR. Pt ambulates to the bathroom with SBA, has a slow wide based gait, uses personal cane at bedside. Bed in lowest position, bed locked, bed alarm on for safety, treaded socks in place, RN and CNA numbers provided, call light within reach.

## 2017-05-08 NOTE — PROGRESS NOTES
Paged JESICA Dupont to update on pt status, pt is loud yelling out in pain, demanding pain meds. Pt has a strong congested cough, pain to left rib cage area from coughing. JESICA put in orders for pain med and cough medicine, will medicate per MAR.

## 2017-05-08 NOTE — PROGRESS NOTES
Rounding completed on pt. Report given at bedside between night shift RN and day shift RN. Assumed care of pt. Pt states pain 10/10, medicate per orders for pain. Call light in reach. Pt oriented x4.

## 2017-05-08 NOTE — PROGRESS NOTES
Pt is demanding food not consistent with ADA, consistent carb diet. Explained to pt the need to adhere to ADA diet. Pt still wanting food, demanding pain meds as well. Pt is very loud, sets off bed alarm. Continual pt rounding done to meet pts needs.

## 2017-05-08 NOTE — CARE PLAN
Problem: Safety  Goal: Will remain free from injury  Outcome: PROGRESSING AS EXPECTED  Bed in lowest position, call light within reach. Bed alarm on. Patient educated regarding safety precautions. Room free of clutter.     Problem: Skin Integrity  Goal: Risk for impaired skin integrity will decrease  Outcome: PROGRESSING AS EXPECTED  Patient assessed for skin breakdown. Draw sheet used for repositioning. Patient encouraged to perform dorsi and plantar flexion every hour while in bed.

## 2017-05-08 NOTE — PROGRESS NOTES
Hospital Medicine Interval Note    Date of Service: 2017    Chief Complaint  66 year-old female admitted 2017 with chest pain, likely 2/2 cap and meth use, also LE cellulitis transferred from [facility] to [facility] floor on .     Interval Problem Update     Patient lethargic during examination this morning. She complains of chest pain when coughing. Denies any fever/chills, N/V.      Pt complains of left chest wall pain associated with coughing. Pt upset that her narcotics were discontinued. Pt educated as she is withdrawing from meth, she has been started on methadone for pain management and she should not be taking narcotics and methadone at same time.     Consultants/Specialty  None    Disposition  Home once medically improved    Plan of care discussed with patient, bedside RN and at multidisciplinary rounds.         Review of Systems   Constitutional: Negative for fever and chills.   Respiratory: Positive for cough and sputum production. Negative for shortness of breath.    Cardiovascular: Positive for chest pain (left lateral aspect). Negative for palpitations.   Gastrointestinal: Negative for nausea, vomiting and abdominal pain.   Genitourinary: Negative for dysuria.   Musculoskeletal: Negative for myalgias.   Neurological: Negative for headaches.      Physical Exam  Laboratory/Imaging   Hemodynamics  Temp (24hrs), Av.2 °C (97.2 °F), Min:36.1 °C (96.9 °F), Max:36.4 °C (97.5 °F)   Temperature: 36.2 °C (97.2 °F)  Pulse  Av.3  Min: 50  Max: 86    Blood Pressure: 125/59 mmHg      Respiratory      Respiration: 19, Pulse Oximetry: 97 %     Work Of Breathing / Effort: Mild  RUL Breath Sounds: Diminished, RML Breath Sounds: Diminished, RLL Breath Sounds: Expiratory Wheezes, MARC Breath Sounds: Diminished, LLL Breath Sounds: Expiratory Wheezes    Fluids       Nutrition  Orders Placed This Encounter   Procedures   • DIET ORDER     Standing Status: Standing      Number of Occurrences: 1      Standing  Expiration Date:      Order Specific Question:  Diet:     Answer:  Consistent Carbohydrate [4]     Order Specific Question:  Texture/Fiber modifications:     Answer:  Dysphagia 3(Mechanical Soft)specify fluid consistency(question 6) [3]     Physical Exam   Constitutional: She is oriented to person, place, and time. She appears well-developed and well-nourished.   disheveled   HENT:   Head: Normocephalic and atraumatic.   Cardiovascular: Normal rate, regular rhythm and normal heart sounds.    No murmur heard.  Pulmonary/Chest: Effort normal. No respiratory distress. She has no wheezes. She has rales (left side). She exhibits tenderness (left lateral chest wall).   Abdominal: Soft. Bowel sounds are normal. She exhibits no distension. There is no tenderness.   Musculoskeletal: Normal range of motion. She exhibits no edema.   Neurological: She is alert and oriented to person, place, and time.   Skin: Skin is warm and dry. No erythema (erythema improved compared to previous exam RLE, mild warmth to touch).   Nursing note and vitals reviewed.      Recent Labs      05/06/17   0504  05/07/17   0158  05/07/17   2344   WBC  7.6  6.2  6.1   RBC  4.23  4.26  4.36   HEMOGLOBIN  11.6*  11.3*  11.7*   HEMATOCRIT  36.6*  36.9*  38.3   MCV  86.5  86.6  87.8   MCH  27.4  26.5*  26.8*   MCHC  31.7*  30.6*  30.5*   RDW  51.8*  51.7*  53.3*   PLATELETCT  243  222  222   MPV  9.8  9.9  9.9     Recent Labs      05/06/17   0504  05/07/17   0158  05/07/17   2344   SODIUM  143  141  136   POTASSIUM  3.3*  4.0  4.2   CHLORIDE  106  109  104   CO2  26  25  25   GLUCOSE  95  172*  218*   BUN  12  15  15   CREATININE  0.56  0.51  0.57   CALCIUM  8.5  8.2*  8.8     Recent Labs      05/05/17   1104   APTT  22.6*   INR  1.04         Recent Labs      05/06/17   0504   TRIGLYCERIDE  93   HDL  63   LDL  69          Assessment/Plan     * Community acquired pneumonia (present on admission)  Assessment & Plan  -unasyn/doxy switched to PO  "augmentin/doxy  - blood cx NGTD, RT protocol, IS    Lower extremity cellulitis (present on admission)  Assessment & Plan  - cont abx as above, no open wounds; old scars from injecting    Drug abuse, IV (present on admission)  Assessment & Plan  - cessation counseling provided, last used a couple days ago  -says she uses meth for \"energy\"  -tried to inject in her legs but no veins    Acute on chronic respiratory failure with hypoxia (CMS-HCC) (present on admission)  Assessment & Plan  - improving, 2/2 to PNA   -continue O2    Pulmonary emboli (CMS-[facility]) (present on admission)  Assessment & Plan  -history, s/p IVC filter  - CTA thorax on this admission neg for PE      COPD (chronic obstructive pulmonary disease) (CMS-[facility]) (present on admission)  Assessment & Plan  - no acute exacerbation   -continue O2, IS, RT protocol, no wheezing, no steroids necessary  -symbicort, started on spiriva     Chest pain (present on admission)  Assessment & Plan  -2/2 pna, and possible meth use  -trop negative  - pt narcotics dc'ed as component of meth withdrawal, on methadone 10 mg BID, lidocaine patch and capsaicin cream   - pt states she has previously been on methadone for years to help with withdrawal    Hypokalemia (present on admission)  Assessment & Plan  - resolved    Labs reviewed, Medications reviewed, Radiology images reviewed and EKG reviewed  Fagan catheter: No Fagan      DVT Prophylaxis: Enoxaparin (Lovenox)    Ulcer prophylaxis: Not indicated  Antibiotics: Treating active infection/contamination beyond 24 hours perioperative coverage            "

## 2017-05-09 LAB
ANION GAP SERPL CALC-SCNC: 7 MMOL/L (ref 0–11.9)
BUN SERPL-MCNC: 16 MG/DL (ref 8–22)
CALCIUM SERPL-MCNC: 8.8 MG/DL (ref 8.5–10.5)
CHLORIDE SERPL-SCNC: 104 MMOL/L (ref 96–112)
CO2 SERPL-SCNC: 26 MMOL/L (ref 20–33)
CREAT SERPL-MCNC: 0.55 MG/DL (ref 0.5–1.4)
ERYTHROCYTE [DISTWIDTH] IN BLOOD BY AUTOMATED COUNT: 51 FL (ref 35.9–50)
GFR SERPL CREATININE-BSD FRML MDRD: >60 ML/MIN/1.73 M 2
GLUCOSE BLD-MCNC: 143 MG/DL (ref 65–99)
GLUCOSE BLD-MCNC: 175 MG/DL (ref 65–99)
GLUCOSE BLD-MCNC: 85 MG/DL (ref 65–99)
GLUCOSE BLD-MCNC: 98 MG/DL (ref 65–99)
GLUCOSE SERPL-MCNC: 254 MG/DL (ref 65–99)
HCT VFR BLD AUTO: 36.4 % (ref 37–47)
HGB BLD-MCNC: 11.2 G/DL (ref 12–16)
MCH RBC QN AUTO: 26.7 PG (ref 27–33)
MCHC RBC AUTO-ENTMCNC: 30.8 G/DL (ref 33.6–35)
MCV RBC AUTO: 86.9 FL (ref 81.4–97.8)
PLATELET # BLD AUTO: 214 K/UL (ref 164–446)
PMV BLD AUTO: 10.1 FL (ref 9–12.9)
POTASSIUM SERPL-SCNC: 4.2 MMOL/L (ref 3.6–5.5)
RBC # BLD AUTO: 4.19 M/UL (ref 4.2–5.4)
SODIUM SERPL-SCNC: 137 MMOL/L (ref 135–145)
WBC # BLD AUTO: 4.7 K/UL (ref 4.8–10.8)

## 2017-05-09 PROCEDURE — 700102 HCHG RX REV CODE 250 W/ 637 OVERRIDE(OP): Performed by: INTERNAL MEDICINE

## 2017-05-09 PROCEDURE — 36415 COLL VENOUS BLD VENIPUNCTURE: CPT

## 2017-05-09 PROCEDURE — A9270 NON-COVERED ITEM OR SERVICE: HCPCS | Performed by: HOSPITALIST

## 2017-05-09 PROCEDURE — 770006 HCHG ROOM/CARE - MED/SURG/GYN SEMI*

## 2017-05-09 PROCEDURE — 99233 SBSQ HOSP IP/OBS HIGH 50: CPT | Performed by: HOSPITALIST

## 2017-05-09 PROCEDURE — 700102 HCHG RX REV CODE 250 W/ 637 OVERRIDE(OP): Performed by: HOSPITALIST

## 2017-05-09 PROCEDURE — 82962 GLUCOSE BLOOD TEST: CPT | Mod: 91

## 2017-05-09 PROCEDURE — 700111 HCHG RX REV CODE 636 W/ 250 OVERRIDE (IP): Performed by: INTERNAL MEDICINE

## 2017-05-09 PROCEDURE — A9270 NON-COVERED ITEM OR SERVICE: HCPCS

## 2017-05-09 PROCEDURE — 80048 BASIC METABOLIC PNL TOTAL CA: CPT

## 2017-05-09 PROCEDURE — 700101 HCHG RX REV CODE 250: Performed by: INTERNAL MEDICINE

## 2017-05-09 PROCEDURE — 85027 COMPLETE CBC AUTOMATED: CPT

## 2017-05-09 PROCEDURE — 700102 HCHG RX REV CODE 250 W/ 637 OVERRIDE(OP)

## 2017-05-09 PROCEDURE — A9270 NON-COVERED ITEM OR SERVICE: HCPCS | Performed by: INTERNAL MEDICINE

## 2017-05-09 PROCEDURE — 700105 HCHG RX REV CODE 258: Performed by: HOSPITALIST

## 2017-05-09 RX ORDER — SODIUM CHLORIDE 9 MG/ML
INJECTION, SOLUTION INTRAVENOUS CONTINUOUS
Status: DISCONTINUED | OUTPATIENT
Start: 2017-05-09 | End: 2017-05-10 | Stop reason: HOSPADM

## 2017-05-09 RX ORDER — CARVEDILOL 6.25 MG/1
6.25 TABLET ORAL 2 TIMES DAILY WITH MEALS
Status: DISCONTINUED | OUTPATIENT
Start: 2017-05-09 | End: 2017-05-10 | Stop reason: HOSPADM

## 2017-05-09 RX ORDER — WARFARIN SODIUM 5 MG/1
5 TABLET ORAL
Status: DISCONTINUED | OUTPATIENT
Start: 2017-05-09 | End: 2017-05-10 | Stop reason: HOSPADM

## 2017-05-09 RX ADMIN — DOXYCYCLINE 100 MG: 100 TABLET ORAL at 11:18

## 2017-05-09 RX ADMIN — GABAPENTIN 300 MG: 300 CAPSULE ORAL at 15:33

## 2017-05-09 RX ADMIN — METFORMIN HYDROCHLORIDE 850 MG: 850 TABLET, FILM COATED ORAL at 11:17

## 2017-05-09 RX ADMIN — BUDESONIDE AND FORMOTEROL FUMARATE DIHYDRATE 2 PUFF: 80; 4.5 AEROSOL RESPIRATORY (INHALATION) at 08:18

## 2017-05-09 RX ADMIN — METOCLOPRAMIDE 5 MG: 10 TABLET ORAL at 11:18

## 2017-05-09 RX ADMIN — CARVEDILOL 6.25 MG: 6.25 TABLET, FILM COATED ORAL at 17:37

## 2017-05-09 RX ADMIN — METFORMIN HYDROCHLORIDE 850 MG: 850 TABLET, FILM COATED ORAL at 17:39

## 2017-05-09 RX ADMIN — TRAZODONE HYDROCHLORIDE 50 MG: 50 TABLET ORAL at 20:26

## 2017-05-09 RX ADMIN — METHADONE HYDROCHLORIDE 10 MG: 10 TABLET ORAL at 08:17

## 2017-05-09 RX ADMIN — AMOXICILLIN AND CLAVULANATE POTASSIUM 1 TABLET: 875; 125 TABLET, FILM COATED ORAL at 08:17

## 2017-05-09 RX ADMIN — GABAPENTIN 300 MG: 300 CAPSULE ORAL at 08:17

## 2017-05-09 RX ADMIN — SODIUM CHLORIDE: 9 INJECTION, SOLUTION INTRAVENOUS at 15:32

## 2017-05-09 RX ADMIN — METHADONE HYDROCHLORIDE 10 MG: 10 TABLET ORAL at 20:26

## 2017-05-09 RX ADMIN — AMLODIPINE BESYLATE 10 MG: 10 TABLET ORAL at 08:17

## 2017-05-09 RX ADMIN — BUDESONIDE AND FORMOTEROL FUMARATE DIHYDRATE 2 PUFF: 80; 4.5 AEROSOL RESPIRATORY (INHALATION) at 20:26

## 2017-05-09 RX ADMIN — RISPERIDONE 0.5 MG: 1 TABLET, FILM COATED ORAL at 20:26

## 2017-05-09 RX ADMIN — WARFARIN SODIUM 5 MG: 5 TABLET ORAL at 17:39

## 2017-05-09 RX ADMIN — ASPIRIN 81 MG: 81 TABLET ORAL at 08:17

## 2017-05-09 RX ADMIN — DOXYCYCLINE 100 MG: 100 TABLET ORAL at 20:27

## 2017-05-09 RX ADMIN — METOCLOPRAMIDE 5 MG: 10 TABLET ORAL at 17:37

## 2017-05-09 RX ADMIN — METOCLOPRAMIDE 5 MG: 10 TABLET ORAL at 05:49

## 2017-05-09 RX ADMIN — ENOXAPARIN SODIUM 40 MG: 100 INJECTION SUBCUTANEOUS at 08:18

## 2017-05-09 RX ADMIN — LIDOCAINE 1 PATCH: 50 PATCH TOPICAL at 17:37

## 2017-05-09 RX ADMIN — GABAPENTIN 300 MG: 300 CAPSULE ORAL at 20:26

## 2017-05-09 RX ADMIN — AMOXICILLIN AND CLAVULANATE POTASSIUM 1 TABLET: 875; 125 TABLET, FILM COATED ORAL at 20:26

## 2017-05-09 RX ADMIN — CITALOPRAM HYDROBROMIDE 20 MG: 20 TABLET ORAL at 11:18

## 2017-05-09 ASSESSMENT — ENCOUNTER SYMPTOMS
VOMITING: 0
CHILLS: 0
SHORTNESS OF BREATH: 1
FEVER: 0
NAUSEA: 0

## 2017-05-09 ASSESSMENT — PAIN SCALES - GENERAL
PAINLEVEL_OUTOF10: 10
PAINLEVEL_OUTOF10: 7
PAINLEVEL_OUTOF10: 10

## 2017-05-09 NOTE — PROGRESS NOTES
"Inpatient Anticoagulation Service Note    Date: 5/9/2017  Reason for Anticoagulation: Pulmonary Embolism        Hemoglobin Value: 11.2  Hematocrit Value: 36.4  Lab Platelet Value: 214  Target INR: 2.0 to 3.0    INR from last 7 days     Date/Time INR Value    05/05/17 1104 1.04        Dose from last 7 days     Date/Time Dose (mg)    05/09/17 1200 5        Significant Interactions: Antibiotics, Aspirin (citalopram, LMWH)  Bridge Therapy: No (enoxaparin 40mg subcutaneous qday)    Comments:   1. Warfarin resumed for a PE Dx'd on 4/10   Repeat Ct on 5/5 did not reveal any PEs   Prophylactic dose enoxaparin continued as \"bridge therapy\"  2. Drug interactions   Noted above   Diet: Consistent CHO  3. Hematology   Cell lines are low, but stable compared to personal controls  4. Plan   Will schedule 5mg/day as pt previously required this dose while inpatient   INR with AM labs through 5/13   DC enoxaparin once INR > 2       Education Material Provided?: Yes (4/10)  Pharmacist suggested discharge dosing: to be determined    Linh Adair, PharmD, BCPS                          "

## 2017-05-09 NOTE — PROGRESS NOTES
Pt asleep, on 2L nc O2, call light within reach, bed in low and lock position. Encourage pt to call for help when needed, pt verbalized understanding. Will continue to monitor pt.

## 2017-05-09 NOTE — PROGRESS NOTES
"Hospital Medicine Progress Note, Adult, Complex               Author: Ralph Dorado Date & Time created: 5/9/2017  3:19 PM     Interval History:  Admitted for ALOC and CP.  Was recently hospitalized for PE and is s/p IVC filter.  Having same \"pleurisy pain\".  No N/V.    Review of Systems:  Review of Systems   Constitutional: Negative for fever and chills.   Respiratory: Positive for shortness of breath.    Cardiovascular: Positive for chest pain and leg swelling.   Gastrointestinal: Negative for nausea and vomiting.   All other systems reviewed and are negative.      Physical Exam:  Physical Exam  Nursing note and vitals reviewed.  Constitutional: She is oriented to person, place, and time. She appears well-developed and well-nourished overweight.   HENT:   Head: Normocephalic and atraumatic.   Right Ear: External ear normal.   Left Ear: External ear normal.   Nose: Nose normal.   Mouth/Throat: Oropharynx is small with Mallanpati score of 3.  Mucosa is clear and moist.   Eyes: Conjunctivae and extraocular motions are normal. Pupils are equal, round, and reactive to light.   Neck: Normal range of motion. Neck supple.   Cardiovascular: Normal rate, regular rhythm, normal heart sounds and intact distal pulses.  +4 BLEE.  Pulmonary/Chest: Effort normal and breath sounds normal.   Abdominal: Soft. Bowel sounds are normal.   Musculoskeletal: Normal range of motion.   Neurological: She is alert and oriented to person, place, and time.   Skin: Skin is warm and dry.       Labs:        Invalid input(s): [id number]      Recent Labs      05/07/17   0158 05/07/17   2344  05/09/17   0121   SODIUM  141  136  137   POTASSIUM  4.0  4.2  4.2   CHLORIDE  109  104  104   CO2  25  25  26   BUN  15  15  16   CREATININE  0.51  0.57  0.55   MAGNESIUM  1.9   --    --    CALCIUM  8.2*  8.8  8.8     Recent Labs      05/07/17   0158 05/07/17   2344  05/09/17   0121   GLUCOSE  172*  218*  254*     Recent Labs      05/07/17   0158  " 17   2344  17   0121   RBC  4.26  4.36  4.19*   HEMOGLOBIN  11.3*  11.7*  11.2*   HEMATOCRIT  36.9*  38.3  36.4*   PLATELETCT  222  222  214     Recent Labs      17   0158  17   2344  17   0121   WBC  6.2  6.1  4.7*           Hemodynamics:  Temp (24hrs), Av.3 °C (97.3 °F), Min:35.9 °C (96.7 °F), Max:36.7 °C (98 °F)  Temperature: 36.2 °C (97.2 °F)  Pulse  Av.2  Min: 50  Max: 86   Blood Pressure: 152/72 mmHg     Respiratory:    Respiration: 18, Pulse Oximetry: 96 %     Work Of Breathing / Effort: Mild  RUL Breath Sounds: Diminished;Expiratory Wheezes;Coarse Crackles, RML Breath Sounds: Diminished;Expiratory Wheezes;Coarse Crackles, RLL Breath Sounds: Diminished;Expiratory Wheezes, MARC Breath Sounds: Diminished;Expiratory Wheezes;Clear After Suction, LLL Breath Sounds: Diminished;Expiratory Wheezes;Coarse Crackles  Fluids:    Intake/Output Summary (Last 24 hours) at 17 1519  Last data filed at 17 0900   Gross per 24 hour   Intake    980 ml   Output      0 ml   Net    980 ml     Weight: 86.6 kg (190 lb 14.7 oz)  GI/Nutrition:  Orders Placed This Encounter   Procedures   • DIET ORDER     Standing Status: Standing      Number of Occurrences: 1      Standing Expiration Date:      Order Specific Question:  Diet:     Answer:  Consistent Carbohydrate [4]     Order Specific Question:  Calorie modifications:     Answer:  1800 kcals [4]     Order Specific Question:  Texture/Fiber modifications:     Answer:  Dysphagia 3(Mechanical Soft)specify fluid consistency(question 6) [3]     Medical Decision Making, by Problem:  Active Hospital Problems    Diagnosis   • COPD (chronic obstructive pulmonary disease) (CMS-[facility]) [J44.9]/*Community acquired pneumonia [J18.9]/Acute on chronic respiratory failure with hypoxia (CMS-HCC) [J96.21] - cont PO Abx, O2, RT, IS, Vax and gentle IVF.   • Lower extremity cellulitis [L03.119] - no evidence of cellulitis.     • Chest pain [R07.9] - prob from  PE.  NSAID and sx management.   • DM 2 uncontrolled - home regimen and cover c ISS.   • Pulmonary emboli (CMS-HCC) [I26.99] - s/p IVC filter.  Start Coumadin.   • Obesity - encourage Kcal restriction.   • Drug abuse, IV [F19.10] - cessation ed.  Min narc when possible.   Tobacco abuse - cessation ed.  Nicoderm.  Non compliance - pt education and encouragement.  Dementia - min narc/sed when possible.  Hep B/C - outpatient follow up.  HTN - labile.  CCR and follow.  Leg edema - change CCB to coreg.    FM - cautious narcotic use.  Stable issues - hypokalemia,   Preventives - stool soft.  Dispo - complex/guarded.      EKG reviewed, Medications reviewed, Radiology images reviewed and Labs reviewed  Fagan catheter: No Fagan      DVT Prophylaxis: Warfarin (Coumadin)    Ulcer prophylaxis: Not indicated    Assessed for rehab: Patient returned to prior level of function, rehabilitation not indicated at this time

## 2017-05-10 ENCOUNTER — APPOINTMENT (OUTPATIENT)
Dept: RADIOLOGY | Facility: MEDICAL CENTER | Age: 67
DRG: 917 | End: 2017-05-10
Attending: HOSPITALIST
Payer: MEDICAID

## 2017-05-10 VITALS
HEART RATE: 64 BPM | OXYGEN SATURATION: 90 % | HEIGHT: 61 IN | TEMPERATURE: 97.5 F | DIASTOLIC BLOOD PRESSURE: 64 MMHG | BODY MASS INDEX: 36.05 KG/M2 | SYSTOLIC BLOOD PRESSURE: 125 MMHG | WEIGHT: 190.92 LBS | RESPIRATION RATE: 16 BRPM

## 2017-05-10 LAB
BACTERIA BLD CULT: NORMAL
BACTERIA BLD CULT: NORMAL
GLUCOSE BLD-MCNC: 146 MG/DL (ref 65–99)
GLUCOSE BLD-MCNC: 92 MG/DL (ref 65–99)
INR PPP: 0.89 (ref 0.87–1.13)
PROTHROMBIN TIME: 12.3 SEC (ref 12–14.6)
SIGNIFICANT IND 70042: NORMAL
SIGNIFICANT IND 70042: NORMAL
SITE SITE: NORMAL
SITE SITE: NORMAL
SOURCE SOURCE: NORMAL
SOURCE SOURCE: NORMAL

## 2017-05-10 PROCEDURE — 700102 HCHG RX REV CODE 250 W/ 637 OVERRIDE(OP): Performed by: INTERNAL MEDICINE

## 2017-05-10 PROCEDURE — 82962 GLUCOSE BLOOD TEST: CPT

## 2017-05-10 PROCEDURE — 99239 HOSP IP/OBS DSCHRG MGMT >30: CPT | Performed by: HOSPITALIST

## 2017-05-10 PROCEDURE — 700102 HCHG RX REV CODE 250 W/ 637 OVERRIDE(OP): Performed by: HOSPITALIST

## 2017-05-10 PROCEDURE — A9270 NON-COVERED ITEM OR SERVICE: HCPCS | Performed by: HOSPITALIST

## 2017-05-10 PROCEDURE — 700111 HCHG RX REV CODE 636 W/ 250 OVERRIDE (IP): Performed by: INTERNAL MEDICINE

## 2017-05-10 PROCEDURE — A9270 NON-COVERED ITEM OR SERVICE: HCPCS | Performed by: INTERNAL MEDICINE

## 2017-05-10 PROCEDURE — 36415 COLL VENOUS BLD VENIPUNCTURE: CPT

## 2017-05-10 PROCEDURE — 85610 PROTHROMBIN TIME: CPT

## 2017-05-10 RX ORDER — DOXYCYCLINE 100 MG/1
100 TABLET ORAL EVERY 12 HOURS
Qty: 4 TAB | Refills: 0 | Status: ON HOLD | OUTPATIENT
Start: 2017-05-10 | End: 2017-05-23

## 2017-05-10 RX ORDER — CARVEDILOL 6.25 MG/1
6.25 TABLET ORAL 2 TIMES DAILY WITH MEALS
Qty: 60 TAB | Refills: 1 | Status: SHIPPED | OUTPATIENT
Start: 2017-05-10 | End: 2017-05-20

## 2017-05-10 RX ORDER — AMOXICILLIN AND CLAVULANATE POTASSIUM 875; 125 MG/1; MG/1
1 TABLET, FILM COATED ORAL EVERY 12 HOURS
Qty: 4 TAB | Refills: 0 | Status: ON HOLD
Start: 2017-05-10 | End: 2017-05-23

## 2017-05-10 RX ORDER — MORPHINE SULFATE 15 MG/1
15 TABLET, FILM COATED, EXTENDED RELEASE ORAL EVERY 12 HOURS
Qty: 8 TAB | Refills: 0 | Status: ON HOLD | OUTPATIENT
Start: 2017-05-10 | End: 2017-05-23

## 2017-05-10 RX ORDER — WARFARIN SODIUM 5 MG/1
5 TABLET ORAL
Qty: 30 TAB | Refills: 1 | Status: SHIPPED | OUTPATIENT
Start: 2017-05-10 | End: 2017-06-14

## 2017-05-10 RX ADMIN — GABAPENTIN 300 MG: 300 CAPSULE ORAL at 08:35

## 2017-05-10 RX ADMIN — DOXYCYCLINE 100 MG: 100 TABLET ORAL at 08:35

## 2017-05-10 RX ADMIN — AMOXICILLIN AND CLAVULANATE POTASSIUM 1 TABLET: 875; 125 TABLET, FILM COATED ORAL at 08:35

## 2017-05-10 RX ADMIN — METHADONE HYDROCHLORIDE 10 MG: 10 TABLET ORAL at 08:34

## 2017-05-10 RX ADMIN — ALPRAZOLAM 0.5 MG: 0.5 TABLET ORAL at 01:05

## 2017-05-10 RX ADMIN — METOCLOPRAMIDE 5 MG: 10 TABLET ORAL at 05:32

## 2017-05-10 RX ADMIN — BUDESONIDE AND FORMOTEROL FUMARATE DIHYDRATE 2 PUFF: 80; 4.5 AEROSOL RESPIRATORY (INHALATION) at 08:38

## 2017-05-10 RX ADMIN — METFORMIN HYDROCHLORIDE 850 MG: 850 TABLET, FILM COATED ORAL at 08:38

## 2017-05-10 RX ADMIN — ENOXAPARIN SODIUM 40 MG: 100 INJECTION SUBCUTANEOUS at 08:38

## 2017-05-10 RX ADMIN — ASPIRIN 81 MG: 81 TABLET ORAL at 08:35

## 2017-05-10 RX ADMIN — CITALOPRAM HYDROBROMIDE 20 MG: 20 TABLET ORAL at 11:59

## 2017-05-10 RX ADMIN — CARVEDILOL 6.25 MG: 6.25 TABLET, FILM COATED ORAL at 08:34

## 2017-05-10 RX ADMIN — CAPSAICIN: 0.25 CREAM TOPICAL at 09:00

## 2017-05-10 RX ADMIN — METOCLOPRAMIDE 5 MG: 10 TABLET ORAL at 11:58

## 2017-05-10 ASSESSMENT — LIFESTYLE VARIABLES: EVER_SMOKED: YES

## 2017-05-10 ASSESSMENT — PAIN SCALES - GENERAL
PAINLEVEL_OUTOF10: 10
PAINLEVEL_OUTOF10: 8
PAINLEVEL_OUTOF10: 10

## 2017-05-10 NOTE — DISCHARGE INSTRUCTIONS
Discharge Instructions    Discharged to home by taxi with relative. Discharged via wheelchair, hospital escort: Yes.  Special equipment needed: Cane    Be sure to schedule a follow-up appointment with your primary care doctor or any specialists as instructed.     Discharge Plan:   Smoking Cessation Offered: Patient Counseled  Influenza Vaccine Indication: Indicated: Not available from distributor/    I understand that a diet low in cholesterol, fat, and sodium is recommended for good health. Unless I have been given specific instructions below for another diet, I accept this instruction as my diet prescription.   Other diet: 1400kcal diabetic diet    Special Instructions:Cellulitis  Cellulitis is an infection of the skin and the tissue beneath it. The infected area is usually red and tender. Cellulitis occurs most often in the arms and lower legs.   CAUSES   Cellulitis is caused by bacteria that enter the skin through cracks or cuts in the skin. The most common types of bacteria that cause cellulitis are staphylococci and streptococci.  SIGNS AND SYMPTOMS   · Redness and warmth.  · Swelling.  · Tenderness or pain.  · Fever.  DIAGNOSIS   Your health care provider can usually determine what is wrong based on a physical exam. Blood tests may also be done.  TREATMENT   Treatment usually involves taking an antibiotic medicine.  HOME CARE INSTRUCTIONS   · Take your antibiotic medicine as directed by your health care provider. Finish the antibiotic even if you start to feel better.  · Keep the infected arm or leg elevated to reduce swelling.  · Apply a warm cloth to the affected area up to 4 times per day to relieve pain.  · Take medicines only as directed by your health care provider.  · Keep all follow-up visits as directed by your health care provider.  SEEK MEDICAL CARE IF:   · You notice red streaks coming from the infected area.  · Your red area gets larger or turns dark in color.  · Your bone or joint  underneath the infected area becomes painful after the skin has healed.  · Your infection returns in the same area or another area.  · You notice a swollen bump in the infected area.  · You develop new symptoms.  · You have a fever.  SEEK IMMEDIATE MEDICAL CARE IF:   · You feel very sleepy.  · You develop vomiting or diarrhea.  · You have a general ill feeling (malaise) with muscle aches and pains.  MAKE SURE YOU:   · Understand these instructions.  · Will watch your condition.  · Will get help right away if you are not doing well or get worse.     This information is not intended to replace advice given to you by your health care provider. Make sure you discuss any questions you have with your health care provider.     Document Released: 09/27/2006 Document Revised: 01/08/2016 Document Reviewed: 03/04/2013  [employer] Interactive Patient Education ©2016 [employer]    1500 Calorie Diabetic Diet  The 1500 calorie diabetic diet limits calories to 1500 each day. Following this diet and making healthy meal choices can help improve overall health. It controls blood glucose (sugar) levels and can also help lower blood pressure and cholesterol.   SERVING SIZES  Measuring foods and serving sizes helps to make sure you are getting the right amount of food. The list below tells how big or small some common serving sizes are.  · 1 oz.........4 stacked dice.   · 3 oz.........Deck of cards.   · 1 tsp........Tip of little finger.   · 1 tbs........Thumb.   · 2 tbs........Golf ball.   · ½ cup.......Half of a fist.   · 1 cup........A fist.   GUIDELINES FOR CHOOSING FOODS  The goal of this diet is to eat a variety of foods and limit calories to 1500 each day. This can be done by choosing foods that are low in calories and fat. The diet also suggests eating small amounts of food frequently. Doing this helps control your blood glucose levels, so they do not get too high or too low. Each meal or snack may include a protein food source  to help you feel more satisfied. Try to eat about the same amount of food around the same time each day. This includes weekend days, travel days, and days off work. Space your meals about 4 to 5 hours apart, and add a snack between them, if you wish.   For example, a daily food plan could include breakfast, a morning snack, lunch, dinner, and an evening snack. Healthy meals and snacks have different types of foods, including whole grains, vegetables, fruits, lean meats, poultry, fish, and dairy products. As you plan your meals, select a variety of foods. Choose from the bread and starch, vegetable, fruit, dairy, and meat/protein groups. Examples of foods from each group are listed below, with their suggested serving sizes. Use measuring cups and spoons to become familiar with what a healthy portion looks like.  Bread and Starch  Each serving equals 15 grams of carbohydrate.  · 1 slice bread.   · ¼ bagel.   · ¾ cup cold cereal (unsweetened).   · ½ cup hot cereal or mashed potatoes.   · 1 small potato (size of a computer mouse).   ·  cup cooked pasta or rice.   · ½ English muffin.   · 1 cup broth-based soup.   · 3 cups of popcorn.   · 4 to 6 whole-wheat crackers.   · ½ cup cooked beans, peas, or corn.   Vegetables  Each serving equals 5 grams of carbohydrate.  · ½ cup cooked vegetables.   · 1 cup raw vegetables.   · ½ cup tomato or vegetable juice.   Fruit  Each serving equals 15 grams of carbohydrate.  · 1 small apple or orange.   · 1 ¼ cup watermelon or strawberries.   · ½ cup applesauce (no sugar added).   · 2 tbs raisins.   · ½ banana.   · ½ cup canned fruit, packed in water or in its own juice.   · ½ cup unsweetened fruit juice.   Dairy  Each serving equals 12 to 15 grams of carbohydrate.  · 1 cup fat-free milk.   · 6 oz artificially sweetened yogurt or plain yogurt.   · 1 cup low-fat buttermilk.   · 1 cup soy milk.   · 1 cup almond milk.   Meat/Protein  · 1 large egg.   · 2 to 3 oz meat, poultry, or fish.   · ¼  cup low-fat cottage cheese.   · 1 tbs peanut butter.   · 1 oz low-fat cheese.   · ¼ cup tuna, packed in water.   · ½ cup tofu.   Fat  · 1 tsp oil.   · 1 tsp trans-fat-free margarine.   · 1 tsp butter.   · 1 tsp mayonnaise.   · 2 tbs avocado.   · 1 tbs salad dressing.   · 1 tbs cream cheese.   · 2 tbs sour cream.   SAMPLE 1500 CALORIE DIET PLAN  Breakfast  · ½ whole-wheat English muffin (1 carb serving).   · 1 tsp trans-fat-free margarine.   · 1 scrambled egg.   · 1 cup fat-free milk (1 carb serving).   · 1 small orange (1 carb serving).   Lunch  · Chicken wrap.   · 1 whole-wheat tortilla, 8-inch (1½ carb servings).   · 2 oz chicken breast, sliced.   · 2 tbs low-fat salad dressing, such as Italian.   · ¼ cup shredded lettuce.   · 2 slices tomato.   · ½ cup carrot sticks.   · 1 small apple (1 carb serving).   Afternoon Snack  · 3 linda cracker squares (1 carb serving).   · 1 tbs peanut butter.   Dinner  · 2 oz lean pork chop, broiled.   · 1 cup brown rice (3 carb servings).   · ½ cup steamed carrots.   · ½ cup green beans.   · 1 cup fat-free milk (1 carb serving).   · 1 tsp trans-fat-free margarine.   Evening Snack  · ½ cup low-fat cottage cheese.   · 1 small peach or pear, sliced (or ½ cup canned in water) (1 carb serving).   MEAL PLAN  You can use this worksheet to help you make a daily meal plan based on the 1500 calorie diabetic diet suggestions. If you are using this plan to help you control your blood glucose, you may interchange carbohydrate containing foods (dairy, starches, and fruits). Select a variety of fresh foods of varying colors and flavors. The total amount of carbohydrate in your meals or snacks is more important than making sure you include all of the food groups every time you eat. You can choose from approximately this many of the following foods to build your day's meals:  · 6 Starches.   · 3 Vegetables.   · 2 Fruits.   · 2 Dairy.   · 4 to 6 oz Meat/Protein.   · Up to 3 Fats.   Your dietician  can use this worksheet to help you decide how many servings and which types of foods are right for you.  BREAKFAST  Food Group and Servings / Food Choice  Starch _________________________________________________________  Dairy __________________________________________________________  Fruit ___________________________________________________________  Meat/Protein____________________________________________________  Fat ____________________________________________________________  LUNCH  Food Group and Servings / Food Choice   Starch _________________________________________________________  Meat/Protein ___________________________________________________  Vegetables _____________________________________________________  Fruit __________________________________________________________  Dairy __________________________________________________________  Fat ____________________________________________________________  AFTERNOON SNACK  Food Group and Servings / Food Choice  Dairy __________________________________________________________  Starch _________________________________________________________  Meat/Protein____________________________________________________  Fruit ___________________________________________________________  DINNER  Food Group and Servings / Food Choice  Starch _________________________________________________________  Meat/Protein ___________________________________________________  Dairy __________________________________________________________  Vegetable ______________________________________________________  Fruit ___________________________________________________________  Fat ____________________________________________________________  EVENING SNACK  Food Group and Servings / Food Choice  Fruit ___________________________________________________________  Meat/Protein ____________________________________________________  Dairy  __________________________________________________________  Starch __________________________________________________________  DAILY TOTALS  Starches _________________________  Vegetables _______________________  Fruits ____________________________  Dairy ____________________________  Meat/Protein_____________________  Fats _____________________________  Document Released: 07/10/2006 Document Revised: 03/11/2013 Document Reviewed: 11/04/2010  [employer]® Patient Information ©2013 [employer] [facility].  Pneumonia, Adult  Pneumonia is an infection of the lungs.   CAUSES  Pneumonia may be caused by bacteria or a virus. Usually, the infection is caused by breathing in droplets from an infected person's cough or sneeze.   SYMPTOMS   Symptoms of pneumonia include:  · Cough.  · Fever.  · Chest pain.  · Rapid breathing.  · Shortness of breath.  · Shaking chills.  · Mucus production.  DIAGNOSIS   If you have the common symptoms of pneumonia, often your health care provider will confirm the diagnosis with a chest X-ray. The X-ray will show an abnormality in the lung if you have pneumonia. Other tests may be done on your blood, urine, or mucus (sputum) to find the specific cause of your pneumonia. A blood gas test or pulse oximetry test may be needed to check how well your lungs are working.  TREATMENT   Your treatment will depend on whether your pneumonia is caused by bacteria or a virus.   · Bacterial pneumonia is treated with antibiotic medicine.  · Pneumonia that is caused by the influenza virus may be treated with an antiviral medicine.  · Pneumonia that is caused by a virus other than influenza will not respond to antibiotic medicine. This type of pneumonia will have to run its course.   HOME CARE INSTRUCTIONS   · Cough suppressants may be used if you are losing too much rest from coughing at night. However, you should try to avoid taking cough suppresants. This is because coughing helps to remove mucus from your lungs.  · Sleep  in a semi-upright position at night. Try sleeping in a reclining chair, or place a few pillows under your head.  · Try using a cold steam vaporizer or humidifier in your home or bedroom. This may help loosen your mucus.  · If you were prescribed an antibiotic medicine, finish all of it even if you start to feel better.  · If you were prescribed an expectorant, take it as directed by your health care provider. This medicine loosens the mucus so you can cough it up.  · Take medicines only as directed by your health care provider.  · Do not smoke. If you are a smoker and continue to smoke, your cough may last several weeks after your pneumonia has cleared.  · Get rest when you feel tired, or as needed.  PREVENTION  A pneumococcal shot (vaccine) is available to prevent a common bacterial cause of pneumonia. This is usually suggested for:  · People over 65 years old.  · People on chemotherapy.  · People with chronic lung problems, such as bronchitis or emphysema.  · People with immune system problems.  If you are over 65 years old or have a high risk condition, you may receive the pneumococcal vaccine if you have not received it before. In some countries, a routine influenza vaccine is also recommended. This vaccine can help prevent some cases of pneumonia. You may be offered the influenza vaccine as part of your care.  If you are a smoker, it is time to quit in order to prevent pneumonia in the future. You may receive instructions on how to stop smoking. Your health care provider can provide medicines and counseling to help you quit.  SEEK MEDICAL CARE IF:  · You have a fever.  · You cannot control your cough with suppressants at night, and you keep losing sleep.  SEEK IMMEDIATE MEDICAL CARE IF:   · You have worsening shortness of breath.  · You have increased chest pain.  · Your sickness becomes worse, especially if you are an older adult or have a weakened immune system.  · You cough up blood.  · You have pain that is  getting worse or is not controlled with medicines.  · Your symptoms are getting worse rather than better.     This information is not intended to replace advice given to you by your health care provider. Make sure you discuss any questions you have with your health care provider.     Document Released: 12/18/2006 Document Revised: 01/08/2016 Document Reviewed: 04/13/2016  [employer] Interactive Patient Education ©2016 [employer]      · Is patient discharged on Warfarin / Coumadin?   Yes    You are receiving the drug warfarin. Please understand the importance of monitoring warfarin with scheduled PT/INR blood draws.  Follow[facility]up with the [facility] in one week for INR lab..    IMPORTANT: HOW TO USE THIS INFORMATION:  This is a summary and does NOT have all possible information about this product. This information does not assure that this product is safe, effective, or appropriate for you. This information is not individual medical advice and does not substitute for the advice of your health care professional. Always ask your health care professional for complete information about this product and your specific health needs.      WARFARIN - ORAL (WARF-uh-rin)      COMMON BRAND NAME(S): Coumadin      WARNING:  Warfarin can cause very serious (possibly fatal) bleeding. This is more likely to occur when you first start taking this medication or if you take too much warfarin. To decrease your risk for bleeding, your doctor or other health care provider will monitor you closely and check your lab results (INR test) to make sure you are not taking too much warfarin. Keep all medical and laboratory appointments. Tell your doctor right away if you notice any signs of serious bleeding. See also Side Effects section.      USES:  This medication is used to treat blood clots (such as in deep vein thrombosis-DVT or pulmonary embolus-PE) and/or to prevent new clots from forming in your body. Preventing harmful blood clots  "helps to reduce the risk of a stroke or heart attack. Conditions that increase your risk of developing blood clots include a certain type of irregular heart rhythm (atrial fibrillation), heart valve replacement, recent heart attack, and certain surgeries (such as hip/knee replacement). Warfarin is commonly called a \"blood thinner,\" but the more correct term is \"anticoagulant.\" It helps to keep blood flowing smoothly in your body by decreasing the amount of certain substances (clotting proteins) in your blood.      HOW TO USE:  Read the Medication Guide provided by your pharmacist before you start taking warfarin and each time you get a refill. If you have any questions, ask your doctor or pharmacist. Take this medication by mouth with or without food as directed by your doctor or other health care professional, usually once a day. It is very important to take it exactly as directed. Do not increase the dose, take it more frequently, or stop using it unless directed by your doctor. Dosage is based on your medical condition, laboratory tests (such as INR), and response to treatment. Your doctor or other health care provider will monitor you closely while you are taking this medication to determine the right dose for you. Use this medication regularly to get the most benefit from it. To help you remember, take it at the same time each day. It is important to eat a balanced, consistent diet while taking warfarin. Some foods can affect how warfarin works in your body and may affect your treatment and dose. Avoid sudden large increases or decreases in your intake of foods high in vitamin K (such as broccoli, cauliflower, cabbage, brussels sprouts, kale, spinach, and other green leafy vegetables, liver, green tea, certain vitamin supplements). If you are trying to lose weight, check with your doctor before you try to go on a diet. Cranberry products may also affect how your warfarin works. Limit the amount of cranberry " juice (16 ounces/480 milliliters a day) or other cranberry products you may drink or eat.      SIDE EFFECTS:  Nausea, loss of appetite, or stomach/abdominal pain may occur. If any of these effects persist or worsen, tell your doctor or pharmacist promptly. Remember that your doctor has prescribed this medication because he or she has judged that the benefit to you is greater than the risk of side effects. Many people using this medication do not have serious side effects. This medication can cause serious bleeding if it affects your blood clotting proteins too much (shown by unusually high INR lab results). Even if your doctor stops your medication, this risk of bleeding can continue for up to a week. Tell your doctor right away if you have any signs of serious bleeding, including: unusual pain/swelling/discomfort, unusual/easy bruising, prolonged bleeding from cuts or gums, persistent/frequent nosebleeds, unusually heavy/prolonged menstrual flow, pink/dark urine, coughing up blood, vomit that is bloody or looks like coffee grounds, severe headache, dizziness/fainting, unusual or persistent tiredness/weakness, bloody/black/tarry stools, chest pain, shortness of breath, difficulty swallowing. Tell your doctor right away if any of these unlikely but serious side effects occur: persistent nausea/vomiting, severe stomach/abdominal pain, yellowing eyes/skin. This drug rarely has caused very serious (possibly fatal) problems if its effects lead to small blood clots (usually at the beginning of treatment). This can lead to severe skin/tissue damage that may require surgery or amputation if left untreated. Patients with certain blood conditions (protein C or S deficiency) may be at greater risk. Get medical help right away if any of these rare but serious side effects occur: painful/red/purplish patches on the skin (such as on the toe, breast, abdomen), change in the amount of urine, vision changes, confusion, slurred  speech, weakness on one side of the body. A very serious allergic reaction to this drug is rare. However, get medical help right away if you notice any symptoms of a serious allergic reaction, including: rash, itching/swelling (especially of the face/tongue/throat), severe dizziness, trouble breathing. This is not a complete list of possible side effects. If you notice other effects not listed above, contact your doctor or pharmacist. In the [place] - Call your doctor for medical advice about side effects. You may report side effects to FDA at 1-006-GJQ-6592. In [place] - Call your doctor for medical advice about side effects. You may report side effects to [facility] [place] at 1-613.895.1983.      PRECAUTIONS:  Before taking warfarin, tell your doctor or pharmacist if you are allergic to it; or if you have any other allergies. This product may contain inactive ingredients, which can cause allergic reactions or other problems. Talk to your pharmacist for more details. Before using this medication, tell your doctor or pharmacist your medical history, especially of: blood disorders (such as anemia, hemophilia), bleeding problems (such as bleeding of the stomach/intestines, bleeding in the brain), blood vessel disorders (such as aneurysms), recent major injury/surgery, liver disease, alcohol use, mental/mood disorders (including memory problems), frequent falls/injuries. It is important that all your doctors and dentists know that you take warfarin. Before having surgery or any medical/dental procedures, tell your doctor or dentist that you are taking this medication and about all the products you use (including prescription drugs, nonprescription drugs, and herbal products). Avoid getting injections into the muscles. If you must have an injection into a muscle (for example, a flu shot), it should be given in the arm. This way, it will be easier to check for bleeding and/or apply pressure bandages. This medication may cause  stomach bleeding. Daily use of alcohol while using this medicine will increase your risk for stomach bleeding and may also affect how this medication works. Limit or avoid alcoholic beverages. If you have not been eating well, if you have an illness or infection that causes fever, vomiting, or diarrhea for more than 2 days, or if you start using any antibiotic medications, contact your doctor or pharmacist immediately because these conditions can affect how warfarin works. This medication can cause heavy bleeding. To lower the chance of getting cut, bruised, or injured, use great caution with sharp objects like safety razors and nail cutters. Use an electric razor when shaving and a soft toothbrush when brushing your teeth. Avoid activities such as contact sports. If you fall or injure yourself, especially if you hit your head, call your doctor immediately. Your doctor may need to check you. The Food & Drug Administration has stated that generic warfarin products are interchangeable. However, consult your doctor or pharmacist before switching warfarin products. Be careful not to take more than one medication that contains warfarin unless specifically directed by the doctor or health care provider who is monitoring your warfarin treatment. Older adults may be at greater risk for bleeding while using this drug. This medication is not recommended for use during pregnancy because of serious (possibly fatal) harm to an unborn baby. Discuss the use of reliable forms of birth control with your doctor. If you become pregnant or think you may be pregnant, tell your doctor immediately. If you are planning pregnancy, discuss a plan for managing your condition with your doctor before you become pregnant. Your doctor may switch the type of medication you use during pregnancy. Very small amounts of this medication may pass into breast milk but is unlikely to harm a nursing infant. Consult your doctor before breast-feeding.     "  DRUG INTERACTIONS:  Drug interactions may change how your medications work or increase your risk for serious side effects. This document does not contain all possible drug interactions. Keep a list of all the products you use (including prescription/nonprescription drugs and herbal products) and share it with your doctor and pharmacist. Do not start, stop, or change the dosage of any medicines without your doctor's approval. Warfarin interacts with many prescription, nonprescription, vitamin, and herbal products. This includes medications that are applied to the skin or inside the vagina or rectum. The interactions with warfarin usually result in an increase or decrease in the \"blood-thinning\" (anticoagulant) effect. Your doctor or other health care professional should closely monitor you to prevent serious bleeding or clotting problems. While taking warfarin, it is very important to tell your doctor or pharmacist of any changes in medications, vitamins, or herbal products that you are taking. Some products that may interact with this drug include: capecitabine, imatinib, mifepristone. Aspirin, aspirin-like drugs (salicylates), and nonsteroidal anti-inflammatory drugs (NSAIDs such as ibuprofen, naproxen, celecoxib) may have effects similar to warfarin. These drugs may increase the risk of bleeding problems if taken during treatment with warfarin. Carefully check all prescription/nonprescription product labels (including drugs applied to the skin such as pain-relieving creams) since the products may contain NSAIDs or salicylates. Talk to your doctor about using a different medication (such as acetaminophen) to treat pain/fever. Low-dose aspirin and related drugs (such as clopidogrel, ticlopidine) should be continued if prescribed by your doctor for specific medical reasons such as heart attack or stroke prevention. Consult your doctor or pharmacist for more details. Many herbal products interact with warfarin. Tell " your doctor before taking any herbal products, especially bromelains, coenzyme Q10, cranberry, danshen, dong quai, fenugreek, garlic, ginkgo biloba, ginseng, and Henny's wort, among others. This medication may interfere with a certain laboratory test to measure theophylline levels, possibly causing false test results. Make sure laboratory personnel and all your doctors know you use this drug.      OVERDOSE:  If overdose is suspected, contact a poison control center or emergency room immediately. [place] residents can call the  National Poison Hotline at 1-835.836.5846. [place] residents can call a provincial poison control center. Symptoms of overdose may include: bloody/black/tarry stools, pink/dark urine, unusual/prolonged bleeding.      NOTES:  Do not share this medication with others. Laboratory and/or medical tests (such as INR, complete blood count) must be performed periodically to monitor your progress or check for side effects. Consult your doctor for more details.      MISSED DOSE:  For the best possible benefit, do not miss any doses. If you do miss a dose and remember on the same day, take it as soon as you remember. If you remember on the next day, skip the missed dose and resume your usual dosing schedule. Do not double the dose to catch up because this could increase your risk for bleeding. Keep a record of missed doses to give to your doctor or pharmacist. Contact your doctor or pharmacist if you miss 2 or more doses in a row.      STORAGE:  Store at room temperature away from light and moisture. Do not store in the bathroom. Keep all medications away from children and pets. Do not flush medications down the toilet or pour them into a drain unless instructed to do so. Properly discard this product when it is  or no longer needed. Consult your pharmacist or local waste disposal company for more details about how to safely discard your product.      MEDICAL ALERT:  Your condition and medication  can cause complications in a medical emergency. For information about enrolling in MedicAlert, call 1-149.200.4689 ([place]) or 1-187.620.9591 ([place]).      Information last revised October 2010 Copyright(c) 2010 [employer]             · Is patient Post Blood Transfusion?  No    Depression / Suicide Risk    As you are discharged from this [facility] facility, it is important to learn how to keep safe from harming yourself.    Recognize the warning signs:  · Abrupt changes in personality, positive or negative- including increase in energy   · Giving away possessions  · Change in eating patterns- significant weight changes-  positive or negative  · Change in sleeping patterns- unable to sleep or sleeping all the time   · Unwillingness or inability to communicate  · Depression  · Unusual sadness, discouragement and loneliness  · Talk of wanting to die  · Neglect of personal appearance   · Rebelliousness- reckless behavior  · Withdrawal from people/activities they love  · Confusion- inability to concentrate     If you or a loved one observes any of these behaviors or has concerns about self-harm, here's what you can do:  · Talk about it- your feelings and reasons for harming yourself  · Remove any means that you might use to hurt yourself (examples: pills, rope, extension cords, firearm)  · Get professional help from the community (Mental Health, Substance Abuse, psychological counseling)  · Do not be alone:Call your Safe Contact- someone whom you trust who will be there for you.  · Call your local CRISIS HOTLINE 678-8839 or 717-674-5780  · Call your local Children's Mobile Crisis Response Team [place] (755) 923-3849 or www.[place]pi  · Call the toll free National Suicide Prevention Hotlines   · National Suicide Prevention Lifeline 645-900-MDJP (7708)  · National Hope Line Network 800-SUICIDE (006-5757)

## 2017-05-10 NOTE — PROGRESS NOTES
Inpatient Anticoagulation Service Note    Date: 5/10/2017  Reason for Anticoagulation: Pulmonary Embolism        Hemoglobin Value: 11.2  Hematocrit Value: 36.4  Lab Platelet Value: 214  Target INR: 2.0 to 3.0    INR from last 7 days     Date/Time INR Value    05/10/17 0300 0.89    05/05/17 1104 1.04        Dose from last 7 days     Date/Time Dose (mg)    05/10/17 1200 5    05/09/17 1200 5        Significant Interactions: Antibiotics, Aspirin (citalopram, LMWH)  Bridge Therapy: No (enoxaparin 40mg subcutaneous qday)     Comments:  1. INR remains subtherapeutic at present which is expected   Anticipate an INR increase by tomorrow   Review of prior dosing in the hospital suggests pt required ~ 5mg/day   IVC filter placed on 4/22 (during her last admission)  2. Drug interactions   Noted above - no interval change  3. Hematology   5/9 cell lines were low, but relatively stable compared to personal controls  4. Plan   Will continue 5mg/day and trend INR   INR with AM labs through 5/13   DC enoxaparin once INR > 2    Education Material Provided?: Yes (4/10)  Pharmacist suggested discharge dosing: tentative recommendation: 5mg/day, repeat INR within 96 hours of DC     Linh Adair, [place], BCPS

## 2017-05-10 NOTE — PROGRESS NOTES
Patient alert and oriented. 2 liters per NC. Independent in room. Methadone given for pain. No complications with diet. Patient cooperative with care. Call light within reach. Bed locked and in low position. Patient encouraged to call with any needs/concerns.

## 2017-05-11 ENCOUNTER — PATIENT OUTREACH (OUTPATIENT)
Dept: HEALTH INFORMATION MANAGEMENT | Facility: OTHER | Age: 67
End: 2017-05-11

## 2017-05-11 NOTE — PROGRESS NOTES
"· Placed discharge outreach phone call to patient s/p hospital discharge 5/10/17.  Received recording stating \"the subscriber you have dialed is not in service.\"  No answer, no option to leave voicemail.  "

## 2017-05-11 NOTE — PROGRESS NOTES
Pt instructed on all discharge information, medications, prescriptions, diagnosis. No questions at discharge. Prescriptions given. All belongings sent with pt.

## 2017-05-11 NOTE — DISCHARGE SUMMARY
DATE OF ADMISSION:  05/05/2017    DATE OF DISCHARGE:  05/10/2017    DISCHARGE DIAGNOSES:  1.  Pleuritic chest pain secondary to recent pulmonary embolism.  2.  Bilateral lower extremity edema.  3.  Hypokalemia.  4.  Diabetes type 2, uncontrolled.    OTHER DIAGNOSES:  Multiple hospitalizations and recent hospitalization for   pulmonary embolism, status post inferior vena cava filter placement, obesity,   chronic obstructive pulmonary disease with continued tobacco dependence,   noncompliance, IV drug abuse, dementia, hepatitis B and C, fibromyalgia with   opioid dependence, hypertension, diabetes type 2.    ADMISSION HISTORY:  Please refer to admission note of 05/05/2017 for details.    HOSPITAL COURSE:  The patient is a 66-year-old female with history of multiple   medical problems including IV drug use, noncompliance, history of COPD with   continued tobacco abuse and recent hospitalization for PE, status post IVC   filter placement.  Patient now presents with complaints of altered level of   consciousness and chest pain.  Upon admission, the patient was evaluated with   EKG with findings of sinus rhythm.  Chest x-ray showed pulmonary congestion.    Chest CT angiogram showed questionable artifact.  Blood cultures study was   negative x2.  Patient was cautiously given pain medication and electrolytes   were repleted.  Patient was found to have uncontrolled diabetes type 2.    Patient was placed on 1800 kilocalorie diabetic diet along with initiation of   metformin.  With these measures, patient's symptoms improved significantly.    Patient was also initiated on Coumadin.  There was a concern for drug seeking   component as patient appeared calm and comfortable, but when queried, she says   she has significant pleurisy and wanted some pain medication.  In light of   the likely etiology being PE with associated inflammation, patient will not be   increased on any narcotics.  We will follow with the Coumadin protocol.     Patient was also encouraged to stop her tobacco and IV drug abuse.  At the   time of the dictation, patient was eating and drinking well, having good bowel   movements, and was hemodynamically stable.    CONSULTATIONS OBTAINED:  None.    PROCEDURES:  Chest x-ray, chest CT angiogram on 05/05/2017.    DISCHARGE MEDICATIONS:  Alprazolam 0.5 mg t.i.d. p.r.n. for anxiety, Augmentin   875/125 one tab q.12 hours, Symbicort 2 puffs b.i.d., carvedilol 6.25 mg   b.i.d., citalopram 20 mg daily, doxycycline 100 mg b.i.d., gabapentin 200 mg   t.i.d., guaifenesin 200 mg q.12 hours, metformin 850 mg t.i.d., metoclopramide   5 mg t.i.d. before meals, MS Contin 15 mg q. 12 hours, Risperdal 0.5 mg at   bedtime, trazodone 50 mg daily, Coumadin 5 mg daily.    DISCHARGE DIET:  1400 kilocalorie diabetic diet.    DISCHARGE ACTIVITY:  Gradual increase in activity with cessation from tobacco   and ID as well as polysubstance abuse.    DISCHARGE FOLLOWUP:  1.  With PCP/[facility] in approximately 2-3 weeks.  2.  With Coumadin clinic in approximately 2 days.    TOTAL DISCHARGE TIME PROCESS:  Thirty-five minutes.       ____________________________________     MD SHANNAN TORIBIO / MIKE    DD:  05/10/2017 14:45:09  DT:  05/11/2017 05:20:20    D#:  [id number]  Job#:  [id number]    cc: MELITA BRIGGS DO

## 2017-05-15 ENCOUNTER — PATIENT OUTREACH (OUTPATIENT)
Dept: HEALTH INFORMATION MANAGEMENT | Facility: OTHER | Age: 67
End: 2017-05-15

## 2017-05-15 ENCOUNTER — TELEPHONE (OUTPATIENT)
Dept: VASCULAR LAB | Facility: MEDICAL CENTER | Age: 67
End: 2017-05-15

## 2017-05-15 NOTE — TELEPHONE ENCOUNTER
[facility]    Telephone contact information is not working, will try again at a later time.    Edgar Moralez, PHARMD

## 2017-05-19 NOTE — PROGRESS NOTES
"Pt ambulated to the bathroom with standby assist. Pt obtunded at beginning of shift, lethargic around 0200. Pt more awake this morning and requesting food. Per pt \"I gotta get something to eat, you can't do this to a diabetic.\" (Glucose 95). Pt c/o pain in left side of rib cage r/t coughing. Per pt \"that's why I called the ambulance marita it hurts to breathe.\"   NPO per MD order. Pt falls back to sleep immediately following this conversation. Will pass along to day team, unsafe for swallow eval at this time.   " 19-May-2017

## 2017-05-20 ENCOUNTER — HOSPITAL ENCOUNTER (INPATIENT)
Facility: MEDICAL CENTER | Age: 67
LOS: 3 days | DRG: 603 | End: 2017-05-23
Attending: EMERGENCY MEDICINE | Admitting: INTERNAL MEDICINE
Payer: MEDICAID

## 2017-05-20 ENCOUNTER — RESOLUTE PROFESSIONAL BILLING HOSPITAL PROF FEE (OUTPATIENT)
Dept: HOSPITALIST | Facility: MEDICAL CENTER | Age: 67
End: 2017-05-20
Payer: MEDICAID

## 2017-05-20 ENCOUNTER — APPOINTMENT (OUTPATIENT)
Dept: RADIOLOGY | Facility: MEDICAL CENTER | Age: 67
DRG: 603 | End: 2017-05-20
Attending: EMERGENCY MEDICINE
Payer: MEDICAID

## 2017-05-20 DIAGNOSIS — F41.9 ANXIETY: ICD-10-CM

## 2017-05-20 DIAGNOSIS — J44.1 ACUTE EXACERBATION OF CHRONIC OBSTRUCTIVE PULMONARY DISEASE (COPD) (HCC): ICD-10-CM

## 2017-05-20 DIAGNOSIS — M79.605 PAIN IN BOTH LOWER EXTREMITIES: ICD-10-CM

## 2017-05-20 DIAGNOSIS — F19.10 DRUG ABUSE, IV (HCC): ICD-10-CM

## 2017-05-20 DIAGNOSIS — L03.116 CELLULITIS OF LEFT LOWER EXTREMITY: ICD-10-CM

## 2017-05-20 DIAGNOSIS — M79.604 PAIN IN BOTH LOWER EXTREMITIES: ICD-10-CM

## 2017-05-20 LAB
ALBUMIN SERPL BCP-MCNC: 4 G/DL (ref 3.2–4.9)
ALBUMIN/GLOB SERPL: 1.1 G/DL
ALP SERPL-CCNC: 81 U/L (ref 30–99)
ALT SERPL-CCNC: 18 U/L (ref 2–50)
ANION GAP SERPL CALC-SCNC: 5 MMOL/L (ref 0–11.9)
APTT PPP: 37.1 SEC (ref 24.7–36)
AST SERPL-CCNC: 18 U/L (ref 12–45)
BASOPHILS # BLD AUTO: 1 % (ref 0–1.8)
BASOPHILS # BLD: 0.06 K/UL (ref 0–0.12)
BILIRUB SERPL-MCNC: 0.6 MG/DL (ref 0.1–1.5)
BNP SERPL-MCNC: 132 PG/ML (ref 0–100)
BNP SERPL-MCNC: 85 PG/ML (ref 0–100)
BUN SERPL-MCNC: 23 MG/DL (ref 8–22)
CALCIUM SERPL-MCNC: 9.5 MG/DL (ref 8.5–10.5)
CHLORIDE SERPL-SCNC: 99 MMOL/L (ref 96–112)
CO2 SERPL-SCNC: 29 MMOL/L (ref 20–33)
CREAT SERPL-MCNC: 0.73 MG/DL (ref 0.5–1.4)
EKG IMPRESSION: NORMAL
EOSINOPHIL # BLD AUTO: 0.16 K/UL (ref 0–0.51)
EOSINOPHIL NFR BLD: 2.6 % (ref 0–6.9)
ERYTHROCYTE [DISTWIDTH] IN BLOOD BY AUTOMATED COUNT: 49.1 FL (ref 35.9–50)
GFR SERPL CREATININE-BSD FRML MDRD: >60 ML/MIN/1.73 M 2
GLOBULIN SER CALC-MCNC: 3.6 G/DL (ref 1.9–3.5)
GLUCOSE BLD-MCNC: 173 MG/DL (ref 65–99)
GLUCOSE SERPL-MCNC: 110 MG/DL (ref 65–99)
HCT VFR BLD AUTO: 43 % (ref 37–47)
HGB BLD-MCNC: 13.3 G/DL (ref 12–16)
IMM GRANULOCYTES # BLD AUTO: 0.04 K/UL (ref 0–0.11)
IMM GRANULOCYTES NFR BLD AUTO: 0.6 % (ref 0–0.9)
INR PPP: 1.72 (ref 0.87–1.13)
LIPASE SERPL-CCNC: 7 U/L (ref 11–82)
LYMPHOCYTES # BLD AUTO: 1.54 K/UL (ref 1–4.8)
LYMPHOCYTES NFR BLD: 24.6 % (ref 22–41)
MCH RBC QN AUTO: 26.3 PG (ref 27–33)
MCHC RBC AUTO-ENTMCNC: 30.9 G/DL (ref 33.6–35)
MCV RBC AUTO: 85.1 FL (ref 81.4–97.8)
MONOCYTES # BLD AUTO: 0.49 K/UL (ref 0–0.85)
MONOCYTES NFR BLD AUTO: 7.8 % (ref 0–13.4)
NEUTROPHILS # BLD AUTO: 3.96 K/UL (ref 2–7.15)
NEUTROPHILS NFR BLD: 63.4 % (ref 44–72)
NRBC # BLD AUTO: 0 K/UL
NRBC BLD AUTO-RTO: 0 /100 WBC
PLATELET # BLD AUTO: 220 K/UL (ref 164–446)
PMV BLD AUTO: 10.3 FL (ref 9–12.9)
POTASSIUM SERPL-SCNC: 3.7 MMOL/L (ref 3.6–5.5)
PROT SERPL-MCNC: 7.6 G/DL (ref 6–8.2)
PROTHROMBIN TIME: 20.7 SEC (ref 12–14.6)
RBC # BLD AUTO: 5.05 M/UL (ref 4.2–5.4)
SODIUM SERPL-SCNC: 133 MMOL/L (ref 135–145)
TROPONIN I SERPL-MCNC: <0.01 NG/ML (ref 0–0.04)
WBC # BLD AUTO: 6.3 K/UL (ref 4.8–10.8)

## 2017-05-20 PROCEDURE — 96365 THER/PROPH/DIAG IV INF INIT: CPT

## 2017-05-20 PROCEDURE — 83880 ASSAY OF NATRIURETIC PEPTIDE: CPT | Mod: 91

## 2017-05-20 PROCEDURE — 87040 BLOOD CULTURE FOR BACTERIA: CPT | Mod: 91

## 2017-05-20 PROCEDURE — 80053 COMPREHEN METABOLIC PANEL: CPT

## 2017-05-20 PROCEDURE — 85730 THROMBOPLASTIN TIME PARTIAL: CPT

## 2017-05-20 PROCEDURE — 304562 HCHG STAT O2 MASK/CANNULA

## 2017-05-20 PROCEDURE — 93971 EXTREMITY STUDY: CPT | Mod: 26 | Performed by: SURGERY

## 2017-05-20 PROCEDURE — 700111 HCHG RX REV CODE 636 W/ 250 OVERRIDE (IP): Performed by: EMERGENCY MEDICINE

## 2017-05-20 PROCEDURE — 99285 EMERGENCY DEPT VISIT HI MDM: CPT

## 2017-05-20 PROCEDURE — 93005 ELECTROCARDIOGRAM TRACING: CPT

## 2017-05-20 PROCEDURE — 96361 HYDRATE IV INFUSION ADD-ON: CPT

## 2017-05-20 PROCEDURE — 99407 BEHAV CHNG SMOKING > 10 MIN: CPT | Performed by: INTERNAL MEDICINE

## 2017-05-20 PROCEDURE — 700111 HCHG RX REV CODE 636 W/ 250 OVERRIDE (IP): Performed by: INTERNAL MEDICINE

## 2017-05-20 PROCEDURE — 80074 ACUTE HEPATITIS PANEL: CPT

## 2017-05-20 PROCEDURE — A9270 NON-COVERED ITEM OR SERVICE: HCPCS | Performed by: EMERGENCY MEDICINE

## 2017-05-20 PROCEDURE — 302128 INFUSION PUMP: Performed by: INTERNAL MEDICINE

## 2017-05-20 PROCEDURE — 93971 EXTREMITY STUDY: CPT

## 2017-05-20 PROCEDURE — 700102 HCHG RX REV CODE 250 W/ 637 OVERRIDE(OP): Performed by: EMERGENCY MEDICINE

## 2017-05-20 PROCEDURE — 51798 US URINE CAPACITY MEASURE: CPT

## 2017-05-20 PROCEDURE — 700105 HCHG RX REV CODE 258: Performed by: EMERGENCY MEDICINE

## 2017-05-20 PROCEDURE — 700101 HCHG RX REV CODE 250: Performed by: EMERGENCY MEDICINE

## 2017-05-20 PROCEDURE — 770006 HCHG ROOM/CARE - MED/SURG/GYN SEMI*

## 2017-05-20 PROCEDURE — 85025 COMPLETE CBC W/AUTO DIFF WBC: CPT

## 2017-05-20 PROCEDURE — 83690 ASSAY OF LIPASE: CPT

## 2017-05-20 PROCEDURE — A9270 NON-COVERED ITEM OR SERVICE: HCPCS | Performed by: INTERNAL MEDICINE

## 2017-05-20 PROCEDURE — 36415 COLL VENOUS BLD VENIPUNCTURE: CPT

## 2017-05-20 PROCEDURE — 700105 HCHG RX REV CODE 258: Performed by: INTERNAL MEDICINE

## 2017-05-20 PROCEDURE — 84484 ASSAY OF TROPONIN QUANT: CPT

## 2017-05-20 PROCEDURE — 304561 HCHG STAT O2

## 2017-05-20 PROCEDURE — 94640 AIRWAY INHALATION TREATMENT: CPT

## 2017-05-20 PROCEDURE — 82962 GLUCOSE BLOOD TEST: CPT

## 2017-05-20 PROCEDURE — 99223 1ST HOSP IP/OBS HIGH 75: CPT | Mod: 25 | Performed by: INTERNAL MEDICINE

## 2017-05-20 PROCEDURE — 700102 HCHG RX REV CODE 250 W/ 637 OVERRIDE(OP): Performed by: INTERNAL MEDICINE

## 2017-05-20 PROCEDURE — 94760 N-INVAS EAR/PLS OXIMETRY 1: CPT

## 2017-05-20 PROCEDURE — 85610 PROTHROMBIN TIME: CPT

## 2017-05-20 PROCEDURE — 71010 DX-CHEST-LIMITED (1 VIEW): CPT

## 2017-05-20 RX ORDER — CARVEDILOL 6.25 MG/1
6.25 TABLET ORAL EVERY EVENING
Status: DISCONTINUED | OUTPATIENT
Start: 2017-05-20 | End: 2017-05-23 | Stop reason: HOSPADM

## 2017-05-20 RX ORDER — CEFTRIAXONE 2 G/1
2 INJECTION, POWDER, FOR SOLUTION INTRAMUSCULAR; INTRAVENOUS ONCE
Status: COMPLETED | OUTPATIENT
Start: 2017-05-20 | End: 2017-05-20

## 2017-05-20 RX ORDER — OXYCODONE AND ACETAMINOPHEN 10; 325 MG/1; MG/1
1 TABLET ORAL ONCE
Status: DISCONTINUED | OUTPATIENT
Start: 2017-05-20 | End: 2017-05-20

## 2017-05-20 RX ORDER — POLYETHYLENE GLYCOL 3350 17 G/17G
1 POWDER, FOR SOLUTION ORAL
Status: DISCONTINUED | OUTPATIENT
Start: 2017-05-21 | End: 2017-05-23 | Stop reason: HOSPADM

## 2017-05-20 RX ORDER — SODIUM CHLORIDE 9 MG/ML
INJECTION, SOLUTION INTRAVENOUS CONTINUOUS
Status: DISCONTINUED | OUTPATIENT
Start: 2017-05-20 | End: 2017-05-20

## 2017-05-20 RX ORDER — ONDANSETRON 4 MG/1
4 TABLET, ORALLY DISINTEGRATING ORAL EVERY 4 HOURS PRN
Status: DISCONTINUED | OUTPATIENT
Start: 2017-05-20 | End: 2017-05-23 | Stop reason: HOSPADM

## 2017-05-20 RX ORDER — WARFARIN SODIUM 5 MG/1
5 TABLET ORAL
Status: CANCELLED | OUTPATIENT
Start: 2017-05-20

## 2017-05-20 RX ORDER — ONDANSETRON 2 MG/ML
4 INJECTION INTRAMUSCULAR; INTRAVENOUS ONCE
Status: DISCONTINUED | OUTPATIENT
Start: 2017-05-20 | End: 2017-05-20

## 2017-05-20 RX ORDER — GABAPENTIN 300 MG/1
300 CAPSULE ORAL 3 TIMES DAILY
Status: DISCONTINUED | OUTPATIENT
Start: 2017-05-20 | End: 2017-05-23 | Stop reason: HOSPADM

## 2017-05-20 RX ORDER — PROMETHAZINE HYDROCHLORIDE 25 MG/1
12.5-25 TABLET ORAL EVERY 4 HOURS PRN
Status: DISCONTINUED | OUTPATIENT
Start: 2017-05-20 | End: 2017-05-23 | Stop reason: HOSPADM

## 2017-05-20 RX ORDER — OXYCODONE HYDROCHLORIDE 10 MG/1
10 TABLET ORAL EVERY 8 HOURS PRN
Status: DISCONTINUED | OUTPATIENT
Start: 2017-05-20 | End: 2017-05-21

## 2017-05-20 RX ORDER — PREDNISONE 20 MG/1
60 TABLET ORAL ONCE
Status: COMPLETED | OUTPATIENT
Start: 2017-05-20 | End: 2017-05-20

## 2017-05-20 RX ORDER — AMOXICILLIN 250 MG
2 CAPSULE ORAL 2 TIMES DAILY
Status: DISCONTINUED | OUTPATIENT
Start: 2017-05-21 | End: 2017-05-23 | Stop reason: HOSPADM

## 2017-05-20 RX ORDER — BISACODYL 10 MG
10 SUPPOSITORY, RECTAL RECTAL
Status: DISCONTINUED | OUTPATIENT
Start: 2017-05-21 | End: 2017-05-23 | Stop reason: HOSPADM

## 2017-05-20 RX ORDER — PREDNISONE 20 MG/1
60 TABLET ORAL DAILY
Qty: 30 TAB | Refills: 0 | Status: SHIPPED | OUTPATIENT
Start: 2017-05-20 | End: 2017-06-14

## 2017-05-20 RX ORDER — CITALOPRAM 20 MG/1
20 TABLET ORAL EVERY EVENING
Status: DISCONTINUED | OUTPATIENT
Start: 2017-05-20 | End: 2017-05-23 | Stop reason: HOSPADM

## 2017-05-20 RX ORDER — CARVEDILOL 6.25 MG/1
6.25 TABLET ORAL EVERY EVENING
Status: ON HOLD | COMMUNITY
End: 2017-06-21

## 2017-05-20 RX ORDER — OXYCODONE HYDROCHLORIDE 5 MG/1
10 TABLET ORAL ONCE
Status: COMPLETED | OUTPATIENT
Start: 2017-05-20 | End: 2017-05-20

## 2017-05-20 RX ORDER — TRAZODONE HYDROCHLORIDE 50 MG/1
50 TABLET ORAL NIGHTLY
COMMUNITY

## 2017-05-20 RX ORDER — PROMETHAZINE HYDROCHLORIDE 25 MG/1
12.5-25 SUPPOSITORY RECTAL EVERY 4 HOURS PRN
Status: DISCONTINUED | OUTPATIENT
Start: 2017-05-20 | End: 2017-05-23 | Stop reason: HOSPADM

## 2017-05-20 RX ORDER — PREDNISONE 20 MG/1
60 TABLET ORAL DAILY
Qty: 15 TAB | Refills: 0 | Status: SHIPPED | OUTPATIENT
Start: 2017-05-20 | End: 2017-05-25

## 2017-05-20 RX ORDER — SODIUM CHLORIDE 9 MG/ML
INJECTION, SOLUTION INTRAVENOUS CONTINUOUS
Status: DISCONTINUED | OUTPATIENT
Start: 2017-05-20 | End: 2017-05-21

## 2017-05-20 RX ORDER — ONDANSETRON 2 MG/ML
4 INJECTION INTRAMUSCULAR; INTRAVENOUS EVERY 4 HOURS PRN
Status: DISCONTINUED | OUTPATIENT
Start: 2017-05-20 | End: 2017-05-23 | Stop reason: HOSPADM

## 2017-05-20 RX ORDER — BUDESONIDE AND FORMOTEROL FUMARATE DIHYDRATE 80; 4.5 UG/1; UG/1
2 AEROSOL RESPIRATORY (INHALATION) 2 TIMES DAILY
Status: DISCONTINUED | OUTPATIENT
Start: 2017-05-20 | End: 2017-05-23 | Stop reason: HOSPADM

## 2017-05-20 RX ORDER — TRAZODONE HYDROCHLORIDE 50 MG/1
50 TABLET ORAL NIGHTLY
Status: DISCONTINUED | OUTPATIENT
Start: 2017-05-20 | End: 2017-05-23 | Stop reason: HOSPADM

## 2017-05-20 RX ORDER — GUAIFENESIN 600 MG/1
1200 TABLET, EXTENDED RELEASE ORAL EVERY 12 HOURS
Status: DISCONTINUED | OUTPATIENT
Start: 2017-05-20 | End: 2017-05-20

## 2017-05-20 RX ORDER — WARFARIN SODIUM 7.5 MG/1
7.5 TABLET ORAL
Status: COMPLETED | OUTPATIENT
Start: 2017-05-20 | End: 2017-05-20

## 2017-05-20 RX ORDER — OXYCODONE HYDROCHLORIDE 10 MG/1
10 TABLET ORAL EVERY 8 HOURS PRN
Status: ON HOLD | COMMUNITY
End: 2017-05-23

## 2017-05-20 RX ORDER — DEXTROSE MONOHYDRATE 25 G/50ML
25 INJECTION, SOLUTION INTRAVENOUS
Status: DISCONTINUED | OUTPATIENT
Start: 2017-05-20 | End: 2017-05-23 | Stop reason: HOSPADM

## 2017-05-20 RX ORDER — CITALOPRAM 20 MG/1
20 TABLET ORAL EVERY EVENING
COMMUNITY

## 2017-05-20 RX ORDER — RISPERIDONE 0.5 MG/1
0.5 TABLET ORAL
Status: DISCONTINUED | OUTPATIENT
Start: 2017-05-20 | End: 2017-05-23 | Stop reason: HOSPADM

## 2017-05-20 RX ORDER — ALPRAZOLAM 0.25 MG/1
0.5 TABLET ORAL 3 TIMES DAILY PRN
Status: DISCONTINUED | OUTPATIENT
Start: 2017-05-20 | End: 2017-05-23 | Stop reason: HOSPADM

## 2017-05-20 RX ORDER — MORPHINE SULFATE 15 MG/1
15 TABLET, FILM COATED, EXTENDED RELEASE ORAL EVERY 12 HOURS
Status: DISCONTINUED | OUTPATIENT
Start: 2017-05-20 | End: 2017-05-21

## 2017-05-20 RX ADMIN — CEFTRIAXONE 2 G: 2 INJECTION, POWDER, FOR SOLUTION INTRAMUSCULAR; INTRAVENOUS at 16:13

## 2017-05-20 RX ADMIN — RISPERIDONE 0.5 MG: 0.5 TABLET, FILM COATED ORAL at 20:51

## 2017-05-20 RX ADMIN — PREDNISONE 60 MG: 20 TABLET ORAL at 05:56

## 2017-05-20 RX ADMIN — WARFARIN SODIUM 7.5 MG: 7.5 TABLET ORAL at 20:51

## 2017-05-20 RX ADMIN — SODIUM CHLORIDE: 9 INJECTION, SOLUTION INTRAVENOUS at 19:50

## 2017-05-20 RX ADMIN — GABAPENTIN 300 MG: 300 CAPSULE ORAL at 20:51

## 2017-05-20 RX ADMIN — INSULIN LISPRO 1 UNITS: 100 INJECTION, SOLUTION INTRAVENOUS; SUBCUTANEOUS at 21:36

## 2017-05-20 RX ADMIN — CARVEDILOL 6.25 MG: 6.25 TABLET, FILM COATED ORAL at 20:51

## 2017-05-20 RX ADMIN — OXYCODONE HYDROCHLORIDE 10 MG: 5 TABLET ORAL at 14:26

## 2017-05-20 RX ADMIN — ALBUTEROL SULFATE 10 MG/HR: 2.5 SOLUTION RESPIRATORY (INHALATION) at 02:10

## 2017-05-20 RX ADMIN — AMPICILLIN SODIUM AND SULBACTAM SODIUM 3 G: 2; 1 INJECTION, POWDER, FOR SOLUTION INTRAMUSCULAR; INTRAVENOUS at 23:54

## 2017-05-20 RX ADMIN — TRAZODONE HYDROCHLORIDE 50 MG: 50 TABLET ORAL at 20:51

## 2017-05-20 RX ADMIN — VANCOMYCIN HYDROCHLORIDE 2000 MG: 100 INJECTION, POWDER, LYOPHILIZED, FOR SOLUTION INTRAVENOUS at 19:57

## 2017-05-20 RX ADMIN — CITALOPRAM HYDROBROMIDE 20 MG: 20 TABLET ORAL at 20:51

## 2017-05-20 RX ADMIN — OXYCODONE HYDROCHLORIDE 10 MG: 10 TABLET ORAL at 19:56

## 2017-05-20 RX ADMIN — SODIUM CHLORIDE: 9 INJECTION, SOLUTION INTRAVENOUS at 16:13

## 2017-05-20 RX ADMIN — MORPHINE SULFATE 15 MG: 15 TABLET, EXTENDED RELEASE ORAL at 20:51

## 2017-05-20 RX ADMIN — BUDESONIDE AND FORMOTEROL FUMARATE DIHYDRATE 2 PUFF: 80; 4.5 AEROSOL RESPIRATORY (INHALATION) at 20:51

## 2017-05-20 ASSESSMENT — LIFESTYLE VARIABLES
EVER_SMOKED: YES
DO YOU DRINK ALCOHOL: NO

## 2017-05-20 ASSESSMENT — COPD QUESTIONNAIRES
COPD SCREENING SCORE: 5
HAVE YOU SMOKED AT LEAST 100 CIGARETTES IN YOUR ENTIRE LIFE: YES
DO YOU EVER COUGH UP ANY MUCUS OR PHLEGM?: NO/ONLY WITH OCCASIONAL COLDS OR INFECTIONS
DURING THE PAST 4 WEEKS HOW MUCH DID YOU FEEL SHORT OF BREATH: NONE/LITTLE OF THE TIME

## 2017-05-20 ASSESSMENT — PAIN SCALES - GENERAL
PAINLEVEL_OUTOF10: 8
PAINLEVEL_OUTOF10: 10
PAINLEVEL_OUTOF10: 4

## 2017-05-20 NOTE — IP AVS SNAPSHOT
" <p align=\"LEFT\"><IMG SRC=\"//[email or web address]\" alt=\"Image\" WIDTH=\"50%\" HEIGHT=\"200\" BORDER=\"\"></p>                   Name:Zenia Ordaz  Medical Record Number:[id number]  CSN: [id number]    YOB: 1950   Age: 66 y.o.  Sex: female  HT:1.549 m (5' 1\") WT: 81.647 kg (180 lb)          Admit Date: 5/20/2017     Discharge Date:   Today's Date: 5/23/2017  Attending Doctor:  Rupesh Kaplan D.O.                  Allergies:  Mushroom extract complex; Tylenol; and Zofran          Follow-up Information     1. Follow up with Joseph Foster M.D.. Go on 6/8/2017.    Specialty:  Family Medicine    Why:  please arrive at 745am for an 8:00am appointment. Thank you    Contact information    [facility] [address] [address]  [phone]           Medication List      Take these Medications        Instructions    albuterol 108 (90 BASE) MCG/ACT Aers inhalation aerosol    Inhale 2 Puffs by mouth every four hours as needed for Shortness of Breath.   Dose:  2 Puff       alprazolam 0.5 MG Tabs   What changed:  reasons to take this   Commonly known as:  XANAX    Take 1 Tab by mouth 3 times a day as needed for Anxiety.   Dose:  0.5 mg       amoxicillin-clavulanate 875-125 MG Tabs   What changed:  when to take this   Commonly known as:  AUGMENTIN    Take 1 Tab by mouth 2 times a day for 5 days.   Dose:  1 Tab       budesonide-formoterol 80-4.5 MCG/ACT Aero   Commonly known as:  SYMBICORT    Inhale 2 Puffs by mouth 2 Times a Day.   Dose:  2 Puff       carvedilol 6.25 MG Tabs   Commonly known as:  COREG    Take 6.25 mg by mouth every evening.   Dose:  6.25 mg       citalopram 20 MG Tabs   Commonly known as:  CELEXA    Take 20 mg by mouth every evening.   Dose:  20 mg       doxycycline monohydrate 100 MG tablet   Commonly known as:  ADOXA    Take 1 Tab by mouth every 12 hours for 5 days.   Dose:  100 mg       gabapentin 300 MG Caps   Commonly known as:  NEURONTIN    Take 1 Cap by mouth 3 times a day.   "   Dose:  300 mg       Guaifenesin 1200 MG Tb12    Take 1 Tab by mouth every 12 hours.   Dose:  1200 mg       metformin 850 MG Tabs   Commonly known as:  GLUCOPHAGE    Take 850 mg by mouth every evening.   Dose:  850 mg       morphine ER 15 MG Tbcr tablet   Commonly known as:  MS CONTIN    Take 1 Tab by mouth every 12 hours.   Dose:  15 mg       nitroglycerin 0.4 MG Subl   Commonly known as:  NITROSTAT    1 tab taken sublingually every 5 minutes as need for chest pain.  Up to 3 doses per day in total.       oxycodone immediate release 10 MG immediate release tablet   What changed:  reasons to take this   Commonly known as:  ROXICODONE    Take 1 Tab by mouth every 8 hours as needed for Severe Pain.   Dose:  10 mg       * predniSONE 20 MG Tabs   Commonly known as:  DELTASONE    Take 3 Tabs by mouth every day.   Dose:  60 mg       * predniSONE 20 MG Tabs   Commonly known as:  DELTASONE    Take 3 Tabs by mouth every day for 5 days.   Dose:  60 mg       risperidone 0.5 MG Tabs   Commonly known as:  RISPERDAL    Take 1 Tab by mouth every bedtime.   Dose:  0.5 mg       trazodone 50 MG Tabs   Commonly known as:  DESYREL    Take 50 mg by mouth every evening.   Dose:  50 mg       warfarin 5 MG Tabs   Commonly known as:  COUMADIN    Take 1 Tab by mouth COUMADIN-DAILY.   Dose:  5 mg       * Notice:  This list has 2 medication(s) that are the same as other medications prescribed for you. Read the directions carefully, and ask your doctor or other care provider to review them with you.

## 2017-05-20 NOTE — ED NOTES
Pt at rest room air saturation is 87%. Pt ambulation room air saturation is 80%. Pt needs 3L of oxygen ambulating 92%. Pt at rest oxygen saturation 91% 2L.

## 2017-05-20 NOTE — DISCHARGE PLANNING
Spoke with Skylar at [facility], per Skylar they are very busy and she will have equipment delivered as soon as possible.

## 2017-05-20 NOTE — ED AVS SNAPSHOT
Home Care Instructions                                                                                                                Zenia Ordaz   MRN: [id number]    Department:  [facility], Emergency Dept   Date of Visit:  5/20/2017            [facility], Emergency Dept    [address] [facility] [address]    Phone:  [phone]      You were seen by     1. Vannesa Castellanos M.D.    2. Mely Levy D.O.      Your Diagnosis Was     Shortness of breath     R06.02       These are the medications you received during your hospitalization from 05/20/2017 0119 to 05/20/2017 0542     Date/Time Order Dose Route Action    05/20/2017 0210 albuterol (PROVENTIL) 2.5 mg/0.5 mL nebulizer solution 10 mg/hr Nebulization Given      Follow-up Information     1. Follow up with [facility].    Contact information    [address] [place] [id number]  [phone]          2. Follow up with [facility].    Contact information    [address] [place] [id number]  [phone]      Medication Information     Review all of your home medications and newly ordered medications with your primary doctor and/or pharmacist as soon as possible. Follow medication instructions as directed by your doctor and/or pharmacist.     Please keep your complete medication list with you and share with your physician. Update the information when medications are discontinued, doses are changed, or new medications (including over-the-counter products) are added; and carry medication information at all times in the event of emergency situations.               Medication List      START taking these medications        Instructions    Morning Afternoon Evening Bedtime    predniSONE 20 MG Tabs   Commonly known as:  DELTASONE        Take 3 Tabs by mouth every day.   Dose:  60 mg                          ASK your doctor about these medications        Instructions    Morning Afternoon  Evening Bedtime    alprazolam 0.5 MG Tabs   Commonly known as:  XANAX        Take 1 Tab by mouth 3 times a day as needed for Sleep or Anxiety.   Dose:  0.5 mg                        amoxicillin-clavulanate 875-125 MG Tabs   Commonly known as:  AUGMENTIN        Take 1 Tab by mouth every 12 hours.   Dose:  1 Tab                        budesonide-formoterol 80-4.5 MCG/ACT Aero   Commonly known as:  SYMBICORT        Inhale 2 Puffs by mouth 2 Times a Day.   Dose:  2 Puff                        carvedilol 6.25 MG Tabs   Commonly known as:  COREG        Take 1 Tab by mouth 2 times a day, with meals.   Dose:  6.25 mg                        citalopram 20 MG Tabs   Commonly known as:  CELEXA        Take 1 Tab by mouth every day.   Dose:  20 mg                        doxycycline monohydrate 100 MG tablet   Commonly known as:  ADOXA        Take 1 Tab by mouth every 12 hours.   Dose:  100 mg                        gabapentin 300 MG Caps   Commonly known as:  NEURONTIN        Take 1 Cap by mouth 3 times a day.   Dose:  300 mg                        Guaifenesin 1200 MG Tb12        Take 1 Tab by mouth every 12 hours.   Dose:  1200 mg                        metformin 850 MG Tabs   Commonly known as:  GLUCOPHAGE        Take 1 Tab by mouth 3 times a day, with meals.   Dose:  850 mg                        metoclopramide 5 MG tablet   Commonly known as:  REGLAN        Take 1 Tab by mouth 3 times a day before meals. Indications: Nausea and Vomiting   Dose:  5 mg                        morphine ER 15 MG Tbcr tablet   Commonly known as:  MS CONTIN        Take 1 Tab by mouth every 12 hours.   Dose:  15 mg                        risperidone 0.5 MG Tabs   Commonly known as:  RISPERDAL        Take 1 Tab by mouth every bedtime.   Dose:  0.5 mg                        trazodone 50 MG Tabs   Commonly known as:  DESYREL        Take 1 Tab by mouth every day.   Dose:  50 mg                        warfarin 5 MG Tabs   Commonly known as:  COUMADIN          Take 1 Tab by mouth COUMADIN-DAILY.   Dose:  5 mg                             Where to Get Your Medications      You can get these medications from any pharmacy     Bring a paper prescription for each of these medications    - predniSONE 20 MG Tabs            Procedures and tests performed during your visit     Procedure/Test Number of Times Performed    BLOOD CULTURE 2    BTYPE NATRIURETIC PEPTIDE 1    CARDIAC MONITORING 1    CBC WITH DIFFERENTIAL 1    COMP METABOLIC PANEL 1    DX-CHEST-LIMITED (1 VIEW) 1    EKG (ER) 1    ESTIMATED GFR 1    O2 Protocol 1    OLD EKG 1    PT/INR 1    PTT 1    SALINE LOCK 1        Discharge Instructions       Chronic Obstructive Pulmonary Disease Exacerbation  Chronic obstructive pulmonary disease (COPD) is a common lung condition in which airflow from the lungs is limited. COPD is a general term that can be used to describe many different lung problems that limit airflow, including chronic bronchitis and emphysema. COPD exacerbations are episodes when breathing symptoms become much worse and require extra treatment. Without treatment, COPD exacerbations can be life threatening, and frequent COPD exacerbations can cause further damage to your lungs.  CAUSES  · Respiratory infections.  · Exposure to smoke.  · Exposure to air pollution, chemical fumes, or dust.  Sometimes there is no apparent cause or trigger.  RISK FACTORS  · Smoking cigarettes.  · Older age.  · Frequent prior COPD exacerbations.  SIGNS AND SYMPTOMS  · Increased coughing.  · Increased thick spit (sputum) production.  · Increased wheezing.  · Increased shortness of breath.  · Rapid breathing.  · Chest tightness.  DIAGNOSIS  Your medical history, a physical exam, and tests will help your health care provider make a diagnosis. Tests may include:  · A chest X-ray.  · Basic lab tests.  · Sputum testing.  · An arterial blood gas test.  TREATMENT  Depending on the severity of your COPD exacerbation, you may need to be  admitted to a hospital for treatment. Some of the treatments commonly used to treat COPD exacerbations are:   · Antibiotic medicines.  · Bronchodilators. These are drugs that expand the air passages. They may be given with an inhaler or nebulizer. Spacer devices may be needed to help improve drug delivery.  · Corticosteroid medicines.  · Supplemental oxygen therapy.  · Airway clearing techniques, such as noninvasive ventilation (NIV) and positive expiratory pressure (PEP). These provide respiratory support through a mask or other noninvasive device.  HOME CARE INSTRUCTIONS  · Do not smoke. Quitting smoking is very important to prevent COPD from getting worse and exacerbations from happening as often.  · Avoid exposure to all substances that irritate the airway, especially to tobacco smoke.  · If you were prescribed an antibiotic medicine, finish it all even if you start to feel better.  · Take all medicines as directed by your health care provider. It is important to use correct technique with inhaled medicines.  · Drink enough fluids to keep your urine clear or pale yellow (unless you have a medical condition that requires fluid restriction).  · Use a cool mist vaporizer. This makes it easier to clear your chest when you cough.  · If you have a home nebulizer and oxygen, continue to use them as directed.  · Maintain all necessary vaccinations to prevent infections.  · Exercise regularly.  · Eat a healthy diet.  · Keep all follow-up appointments as directed by your health care provider.  SEEK IMMEDIATE MEDICAL CARE IF:  · You have worsening shortness of breath.  · You have trouble talking.  · You have severe chest pain.  · You have blood in your sputum.  · You have a fever.  · You have weakness, vomit repeatedly, or faint.  · You feel confused.  · You continue to get worse.  MAKE SURE YOU:  · Understand these instructions.  · Will watch your condition.  · Will get help right away if you are not doing well or get  worse.     This information is not intended to replace advice given to you by your health care provider. Make sure you discuss any questions you have with your health care provider.     Document Released: 10/14/2008 Document Revised: 01/08/2016 Document Reviewed: 08/22/2014  Elsevier Interactive Patient Education ©2016 [employer]            Patient Information     Patient Information    Following emergency treatment: all patient requiring follow-up care must return either to a private physician or a clinic if your condition worsens before you are able to obtain further medical attention, please return to the emergency room.     Billing Information    At [facility], we work to make the billing process streamlined for our patients.  Our Representatives are here to answer any questions you may have regarding your hospital bill.  If you have insurance coverage and have supplied your insurance information to us, we will submit a claim to your insurer on your behalf.  Should you have any questions regarding your bill, we can be reached online or by phone as follows:  Online: You are able pay your bills online or live chat with our representatives about any billing questions you may have. We are here to help Monday - Friday from 8:00am to 7:30pm and 9:00am - 12:00pm on Saturdays.  Please visit [email or web address]  for more information.   Phone:  [phone] or [phone]    Please note that your emergency physician, surgeon, pathologist, radiologist, anesthesiologist, and other specialists are not employed by [facility] and will therefore bill separately for their services.  Please contact them directly for any questions concerning their bills at the numbers below:     Emergency Physician Services:  [phone]  [facility]:  [phone]  [facility]:  [phone]  [facility]:  [phone]    1. Your final bill may vary from the  amount quoted upon discharge if all procedures are not complete at that time, or if your doctor has additional procedures of which we are not aware. You will receive an additional bill if you return to the Emergency Department at [facility] for suture removal regardless of the facility of which the sutures were placed.     2. Please arrange for settlement of this account at the emergency registration.    3. All self-pay accounts are due in full at the time of treatment.  If you are unable to meet this obligation then payment is expected within 4-5 days.     4. If you have had radiology studies (CT, X-ray, Ultrasound, MRI), you have received a preliminary result during your emergency department visit. Please contact the radiology department [phone] to receive a copy of your final result. Please discuss the Final result with your primary physician or with the follow up physician provided.     Crisis Hotline:  [place] Crisis Hotline:  0-812-DQFLFPG or 1-679.883.7204  [place] Crisis Hotline:    1-304.293.4058 or 986-471-6590         ED Discharge Follow Up Questions    1. In order to provide you with very good care, we would like to follow up with a phone call in the next few days.  May we have your permission to contact you?     YES /  NO    2. What is the best phone number to call you? (       )_____-__________    3. What is the best time to call you?      Morning  /  Afternoon  /  Evening                   Patient Signature:  ____________________________________________________________    Date:  ____________________________________________________________

## 2017-05-20 NOTE — DISCHARGE PLANNING
Patient informed preffered will provide O2 but if patient is non compliant and doesn't reurn equipment they will drop her.  Also informed he rshe has been denied by other DME companies and this is her last chance.

## 2017-05-20 NOTE — ED PROVIDER NOTES
"ED Provider Note  CHIEF COMPLAINT  Leg pain and swelling.    HPI  Zenia Ordaz is a 66 y.o. female who complains of increasing pain and swelling to both legs more so to the left lower leg.  has a history of COPD. Been on outpatient oral antibiotics for the swelling in her legs. She does have a history of DVT and has an IVC filter in place. Pain is gotten worse over the past several days. She is having difficulty walking. She was hypoxic on arrival here. She is here mainly for this leg pain and swelling. Patient was initially seen by Dr. Vannesa Arndt and who evaluated her and was gone at discharge her home after they arrange home O2. And she transferred the care of the patient to Dr. Mely Estes. And then I saw the patient because patient was still here several hours and she was on my list so I wanted to check her. Complains of this leg pain and swelling and she has very significant swelling in both legs worse on the left than the right and the left leg is deathly warm and red and hot.    REVIEW OF SYSTEMS  No headache, no chest pain, no abdominal pain.  ALL OTHER SYSTEMS NEGATIVE    ALLERGIES  Allergies   Allergen Reactions   • Mushroom Extract Complex Rash and Unspecified     Pt gets a rash and breaks out in a sweat when eats mushrooms   • Tylenol Rash     Pt states \"I get a body rash\".   • Zofran Rash     Pt states \"I get a body rash\".       CURRENT MEDICATIONS  Home Medications     Reviewed by Howie Dykes (Pharmacy Tech) on 05/20/17 at 1609  Med List Status: Complete    Medication Last Dose Status    alprazolam (XANAX) 0.5 MG Tab week Active    amoxicillin-clavulanate (AUGMENTIN) 875-125 MG Tab 5/12/2017 Active    budesonide-formoterol (SYMBICORT) 80-4.5 MCG/ACT Aerosol week Active    carvedilol (COREG) 6.25 MG Tab 5/19/2017 Active    citalopram (CELEXA) 20 MG Tab 5/19/2017 Active    doxycycline monohydrate (ADOXA) 100 MG tablet 5/12/2017 Active    gabapentin (NEURONTIN) 300 MG Cap week Active " "   guaifenesin LA 1200 MG TABLET SR 12 HR week Active    metformin (GLUCOPHAGE) 850 MG Tab 5/19/2017 Active    metoclopramide (REGLAN) 5 MG tablet 5/19/2017 Active    morphine ER (MS CONTIN) 15 MG Tab CR tablet 5/17/2017 Active    oxycodone immediate release (ROXICODONE) 10 MG immediate release tablet 5/17/2017 Active    risperidone (RISPERDAL) 0.5 MG Tab 5/19/2017 Active    trazodone (DESYREL) 50 MG Tab 5/19/2017 Active    warfarin (COUMADIN) 5 MG Tab 5/19/2017 Active                PAST MEDICAL HISTORY  Past Medical History   Diagnosis Date   • COPD    • Fibromyalgia    • Hepatitis B    • Hepatitis C    • Hepatitis A    • Diabetes    • CAD (coronary artery disease)      mi x 2   • Stroke (CMS-HCC) 1982   • Backpain    • Myocardial infarct (CMS-HCC) 1989, 1991     2 per patient   • Cancer (CMS-HCC) Cervical ~2003   • Congestive heart failure (CMS-HCC)    • MRSA infection    • Drug abuse    • Hypertension    • Fall    • Pneumonia    • Seizure (CMS-HCC)        SURGICAL HISTORY  Past Surgical History   Procedure Laterality Date   • Other cardiac surgery       doesn't remember if she had stent placed during a heart cath   • Incision and drainage orthopedic  7/13/2013     Performed by Surinder Ozuna M.D. at SURGERY [facility]       FAMILY HISTORY  Family History   Problem Relation Age of Onset   • Cancer Mother    • Cancer Sister    • Cancer Maternal Aunt        SOCIAL HISTORY  Cigarette smoker with a history of methamphetamine abuse.    PHYSICAL EXAM  GENERAL: Alert female adult  VITAL SIGNS: /59 mmHg  Pulse 67  Temp(Src) 37 °C (98.6 °F)  Resp 12  Ht 1.549 m (5' 1\")  Wt 81.647 kg (180 lb)  BMI 34.03 kg/m2  SpO2 94%  Constitutional: Alert  adult   HENT: Scalp is normal size and nontender. Ears are clear. Nose is clear. Throat is clear with no stridor no drooling no trismus. Teeth are all intact.  Eyes: Pupils equal round and reactive to light, extraocular motor fall. There is no scleral " icterus.  Neck: Neck is supple and nontender. There is no meningismus. No adenitis. No thyromegaly.  Lymphatic: No adenopathy.   Cardiovascular: Heart regular rhythm without murmurs or gallops   Thorax & Lungs: No chest wall tenderness. Lungs are clear. Patient has good breath sounds bilateral. No rales, no rhonchi, no wheezes.  Abdomen: Abdomen is soft, nontender, not rigid, no guarding, and no organomegaly. There is no palpable hernia   Skin: Warm, pink, and dry with no erythema and no rash.   Back: Nontender, no midline bony tenderness, no flank tenderness.                                              Extremities: Full range of motion left leg is warm and red and hot and certainly could be a DVT or cellulitis or both. She may have a chronic DVT with a cellulitis. Could be just a DVT with just vascular insufficiency but it is concerning because of the warmth.  Neurologic: Alert & oriented . Cranial nerves are grossly intact as tested. Patient moves all 4 extremities well. Patient has good strong flexion and extension of the ankle joints knee joints hip joints and elbow joints. Sensation is normal and symmetrical in the upper and lower extremities.   Psychiatric: Patient is alert oriented coherent and rational.       RADIOLOGY/PROCEDURES  DX-CHEST-LIMITED (1 VIEW)   Final Result      1.  Stable enlargement of the cardiomediastinal silhouette.   2.  Mild left basilar atelectasis.      LE VENOUS DUPLEX    (Results Pending)         COURSE & MEDICAL DECISION MAKING  Patient arrives here for evaluation of leg pain and swelling which is increasing in nature. Worse on the left than the right. She does have an IVC filter in place. The patient was initially evaluated by Dr. Vannesa Arndt and and Dr. Mely Estes. She was waiting for home O2 and was to be discharged home The patient was still on my list after several hours in the ED so went in and evaluated her. Concerned that she has an acute cellulitis which is not  responding to oral antibiotics.    Plan: #1 IV #2 lab #3 ultrasound of the leg to rule out DVT  4.blood cultures and lactate level. #5. I discussed the antibiotic choice with clinical pharmacist and with agreed on Rocephin.    Results for orders placed or performed during the hospital encounter of 05/20/17   BTYPE NATRIURETIC PEPTIDE   Result Value Ref Range    B Natriuretic Peptide 85 0 - 100 pg/mL   CBC WITH DIFFERENTIAL   Result Value Ref Range    WBC 6.3 4.8 - 10.8 K/uL    RBC 5.05 4.20 - 5.40 M/uL    Hemoglobin 13.3 12.0 - 16.0 g/dL    Hematocrit 43.0 37.0 - 47.0 %    MCV 85.1 81.4 - 97.8 fL    MCH 26.3 (L) 27.0 - 33.0 pg    MCHC 30.9 (L) 33.6 - 35.0 g/dL    RDW 49.1 35.9 - 50.0 fL    Platelet Count 220 164 - 446 K/uL    MPV 10.3 9.0 - 12.9 fL    Neutrophils-Polys 63.40 44.00 - 72.00 %    Lymphocytes 24.60 22.00 - 41.00 %    Monocytes 7.80 0.00 - 13.40 %    Eosinophils 2.60 0.00 - 6.90 %    Basophils 1.00 0.00 - 1.80 %    Immature Granulocytes 0.60 0.00 - 0.90 %    Nucleated RBC 0.00 /100 WBC    Neutrophils (Absolute) 3.96 2.00 - 7.15 K/uL    Lymphs (Absolute) 1.54 1.00 - 4.80 K/uL    Monos (Absolute) 0.49 0.00 - 0.85 K/uL    Eos (Absolute) 0.16 0.00 - 0.51 K/uL    Baso (Absolute) 0.06 0.00 - 0.12 K/uL    Immature Granulocytes (abs) 0.04 0.00 - 0.11 K/uL    NRBC (Absolute) 0.00 K/uL   COMP METABOLIC PANEL   Result Value Ref Range    Sodium 133 (L) 135 - 145 mmol/L    Potassium 3.7 3.6 - 5.5 mmol/L    Chloride 99 96 - 112 mmol/L    Co2 29 20 - 33 mmol/L    Anion Gap 5.0 0.0 - 11.9    Glucose 110 (H) 65 - 99 mg/dL    Bun 23 (H) 8 - 22 mg/dL    Creatinine 0.73 0.50 - 1.40 mg/dL    Calcium 9.5 8.5 - 10.5 mg/dL    AST(SGOT) 18 12 - 45 U/L    ALT(SGPT) 18 2 - 50 U/L    Alkaline Phosphatase 81 30 - 99 U/L    Total Bilirubin 0.6 0.1 - 1.5 mg/dL    Albumin 4.0 3.2 - 4.9 g/dL    Total Protein 7.6 6.0 - 8.2 g/dL    Globulin 3.6 (H) 1.9 - 3.5 g/dL    A-G Ratio 1.1 g/dL   PT/INR   Result Value Ref Range    PT 20.7 (H) 12.0  - 14.6 sec    INR 1.72 (H) 0.87 - 1.13   PTT   Result Value Ref Range    APTT 37.1 (H) 24.7 - 36.0 sec   ESTIMATED GFR   Result Value Ref Range    GFR If African American >60 >60 mL/min/1.73 m 2    GFR If Non African American >60 >60 mL/min/1.73 m 2   BTYPE NATRIURETIC PEPTIDE   Result Value Ref Range    B Natriuretic Peptide 132 (H) 0 - 100 pg/mL   LIPASE   Result Value Ref Range    Lipase 7 (L) 11 - 82 U/L   TROPONIN   Result Value Ref Range    Troponin I <0.01 0.00 - 0.04 ng/mL   EKG (ER)   Result Value Ref Range    Report       [facility] Emergency Dept.    Test Date:  2017  Pt Name:    OMAR KENNEDY              Department: ER  MRN:        [id number]                      Room:       [facility]  Gender:     F                            Technician: [id number]  :        1950                   Requested By:ER TRIAGE PROTOCOL  Order #:    [id number]                    Reading MD:    Measurements  Intervals                                Axis  Rate:       61                           P:          65  ND:         184                          QRS:        29  QRSD:       96                           T:          85  QT:         420  QTc:        423    Interpretive Statements  SINUS RHYTHM  VENTRICULAR PREMATURE COMPLEX  BORDERLINE T WAVE ABNORMALITIES  BASELINE WANDER IN LEAD(S) V3  Compared to ECG 2017 23:02:53  Ventricular premature complex(es) now present  Sinus bradycardia no longer present  T-wave abnormality still present        Hospitalist has been called and case discussed. The patient stable on admission after 6 PM.      FINAL IMPRESSION  1. Cellulitis to the left lower extremity.  2. History of DVT with an IVC filter.  3.  IV drug use.   4. COPD. With hypoxemia.      Electronically signed by: Gary Gansert, 2017/6 p.m.

## 2017-05-20 NOTE — ED NOTES
Pt up to the restroom w/sba gait steady.  Meal provided to pt. Now asking for pain pill for her legs.

## 2017-05-20 NOTE — IP AVS SNAPSHOT
" Home Care Instructions                                                                                                                  Name:Zenia Ordaz  Medical Record Number:[id number]  CSN: [id number]    YOB: 1950   Age: 66 y.o.  Sex: female  HT:1.549 m (5' 1\") WT: 81.647 kg (180 lb)          Admit Date: 5/20/2017     Discharge Date:   Today's Date: 5/23/2017  Attending Doctor:  Rupesh Kaplan D.O.                  Allergies:  Mushroom extract complex; Tylenol; and Zofran            Discharge Instructions       Discharge Instructions    Discharged to home by taxi with self. Discharged via wheelchair, hospital escort: Yes.  Special equipment needed: Not Applicable    Be sure to schedule a follow-up appointment with your primary care doctor or any specialists as instructed.     Discharge Plan:   Smoking Cessation Offered: Patient Refused  Influenza Vaccine Indication: Not indicated: Previously immunized this influenza season and > 8 years of age    I understand that a diet low in cholesterol, fat, and sodium is recommended for good health. Unless I have been given specific instructions below for another diet, I accept this instruction as my diet prescription.   Other diet: Diabetic    Special Instructions: None    · Is patient discharged on Warfarin / Coumadin?   Yes    You are receiving the drug warfarin. Please understand the importance of monitoring warfarin with scheduled PT/INR blood draws.  Follow[facility]up with the [facility] in one week for INR lab..    IMPORTANT: HOW TO USE THIS INFORMATION:  This is a summary and does NOT have all possible information about this product. This information does not assure that this product is safe, effective, or appropriate for you. This information is not individual medical advice and does not substitute for the advice of your health care professional. Always ask your health care professional for complete information about this product and your specific health " "needs.      WARFARIN - ORAL (WARF-uh-rin)      COMMON BRAND NAME(S): Coumadin      WARNING:  Warfarin can cause very serious (possibly fatal) bleeding. This is more likely to occur when you first start taking this medication or if you take too much warfarin. To decrease your risk for bleeding, your doctor or other health care provider will monitor you closely and check your lab results (INR test) to make sure you are not taking too much warfarin. Keep all medical and laboratory appointments. Tell your doctor right away if you notice any signs of serious bleeding. See also Side Effects section.      USES:  This medication is used to treat blood clots (such as in deep vein thrombosis-DVT or pulmonary embolus-PE) and/or to prevent new clots from forming in your body. Preventing harmful blood clots helps to reduce the risk of a stroke or heart attack. Conditions that increase your risk of developing blood clots include a certain type of irregular heart rhythm (atrial fibrillation), heart valve replacement, recent heart attack, and certain surgeries (such as hip/knee replacement). Warfarin is commonly called a \"blood thinner,\" but the more correct term is \"anticoagulant.\" It helps to keep blood flowing smoothly in your body by decreasing the amount of certain substances (clotting proteins) in your blood.      HOW TO USE:  Read the Medication Guide provided by your pharmacist before you start taking warfarin and each time you get a refill. If you have any questions, ask your doctor or pharmacist. Take this medication by mouth with or without food as directed by your doctor or other health care professional, usually once a day. It is very important to take it exactly as directed. Do not increase the dose, take it more frequently, or stop using it unless directed by your doctor. Dosage is based on your medical condition, laboratory tests (such as INR), and response to treatment. Your doctor or other health care provider " will monitor you closely while you are taking this medication to determine the right dose for you. Use this medication regularly to get the most benefit from it. To help you remember, take it at the same time each day. It is important to eat a balanced, consistent diet while taking warfarin. Some foods can affect how warfarin works in your body and may affect your treatment and dose. Avoid sudden large increases or decreases in your intake of foods high in vitamin K (such as broccoli, cauliflower, cabbage, brussels sprouts, kale, spinach, and other green leafy vegetables, liver, green tea, certain vitamin supplements). If you are trying to lose weight, check with your doctor before you try to go on a diet. Cranberry products may also affect how your warfarin works. Limit the amount of cranberry juice (16 ounces/480 milliliters a day) or other cranberry products you may drink or eat.      SIDE EFFECTS:  Nausea, loss of appetite, or stomach/abdominal pain may occur. If any of these effects persist or worsen, tell your doctor or pharmacist promptly. Remember that your doctor has prescribed this medication because he or she has judged that the benefit to you is greater than the risk of side effects. Many people using this medication do not have serious side effects. This medication can cause serious bleeding if it affects your blood clotting proteins too much (shown by unusually high INR lab results). Even if your doctor stops your medication, this risk of bleeding can continue for up to a week. Tell your doctor right away if you have any signs of serious bleeding, including: unusual pain/swelling/discomfort, unusual/easy bruising, prolonged bleeding from cuts or gums, persistent/frequent nosebleeds, unusually heavy/prolonged menstrual flow, pink/dark urine, coughing up blood, vomit that is bloody or looks like coffee grounds, severe headache, dizziness/fainting, unusual or persistent tiredness/weakness,  bloody/black/tarry stools, chest pain, shortness of breath, difficulty swallowing. Tell your doctor right away if any of these unlikely but serious side effects occur: persistent nausea/vomiting, severe stomach/abdominal pain, yellowing eyes/skin. This drug rarely has caused very serious (possibly fatal) problems if its effects lead to small blood clots (usually at the beginning of treatment). This can lead to severe skin/tissue damage that may require surgery or amputation if left untreated. Patients with certain blood conditions (protein C or S deficiency) may be at greater risk. Get medical help right away if any of these rare but serious side effects occur: painful/red/purplish patches on the skin (such as on the toe, breast, abdomen), change in the amount of urine, vision changes, confusion, slurred speech, weakness on one side of the body. A very serious allergic reaction to this drug is rare. However, get medical help right away if you notice any symptoms of a serious allergic reaction, including: rash, itching/swelling (especially of the face/tongue/throat), severe dizziness, trouble breathing. This is not a complete list of possible side effects. If you notice other effects not listed above, contact your doctor or pharmacist. In the [place] - Call your doctor for medical advice about side effects. You may report side effects to FDA at 4-125-GOE-3737. In [place] - Call your doctor for medical advice about side effects. You may report side effects to [facility] [place] at 1-308.628.3832.      PRECAUTIONS:  Before taking warfarin, tell your doctor or pharmacist if you are allergic to it; or if you have any other allergies. This product may contain inactive ingredients, which can cause allergic reactions or other problems. Talk to your pharmacist for more details. Before using this medication, tell your doctor or pharmacist your medical history, especially of: blood disorders (such as anemia, hemophilia), bleeding  problems (such as bleeding of the stomach/intestines, bleeding in the brain), blood vessel disorders (such as aneurysms), recent major injury/surgery, liver disease, alcohol use, mental/mood disorders (including memory problems), frequent falls/injuries. It is important that all your doctors and dentists know that you take warfarin. Before having surgery or any medical/dental procedures, tell your doctor or dentist that you are taking this medication and about all the products you use (including prescription drugs, nonprescription drugs, and herbal products). Avoid getting injections into the muscles. If you must have an injection into a muscle (for example, a flu shot), it should be given in the arm. This way, it will be easier to check for bleeding and/or apply pressure bandages. This medication may cause stomach bleeding. Daily use of alcohol while using this medicine will increase your risk for stomach bleeding and may also affect how this medication works. Limit or avoid alcoholic beverages. If you have not been eating well, if you have an illness or infection that causes fever, vomiting, or diarrhea for more than 2 days, or if you start using any antibiotic medications, contact your doctor or pharmacist immediately because these conditions can affect how warfarin works. This medication can cause heavy bleeding. To lower the chance of getting cut, bruised, or injured, use great caution with sharp objects like safety razors and nail cutters. Use an electric razor when shaving and a soft toothbrush when brushing your teeth. Avoid activities such as contact sports. If you fall or injure yourself, especially if you hit your head, call your doctor immediately. Your doctor may need to check you. The Food & Drug Administration has stated that generic warfarin products are interchangeable. However, consult your doctor or pharmacist before switching warfarin products. Be careful not to take more than one medication that  "contains warfarin unless specifically directed by the doctor or health care provider who is monitoring your warfarin treatment. Older adults may be at greater risk for bleeding while using this drug. This medication is not recommended for use during pregnancy because of serious (possibly fatal) harm to an unborn baby. Discuss the use of reliable forms of birth control with your doctor. If you become pregnant or think you may be pregnant, tell your doctor immediately. If you are planning pregnancy, discuss a plan for managing your condition with your doctor before you become pregnant. Your doctor may switch the type of medication you use during pregnancy. Very small amounts of this medication may pass into breast milk but is unlikely to harm a nursing infant. Consult your doctor before breast-feeding.      DRUG INTERACTIONS:  Drug interactions may change how your medications work or increase your risk for serious side effects. This document does not contain all possible drug interactions. Keep a list of all the products you use (including prescription/nonprescription drugs and herbal products) and share it with your doctor and pharmacist. Do not start, stop, or change the dosage of any medicines without your doctor's approval. Warfarin interacts with many prescription, nonprescription, vitamin, and herbal products. This includes medications that are applied to the skin or inside the vagina or rectum. The interactions with warfarin usually result in an increase or decrease in the \"blood-thinning\" (anticoagulant) effect. Your doctor or other health care professional should closely monitor you to prevent serious bleeding or clotting problems. While taking warfarin, it is very important to tell your doctor or pharmacist of any changes in medications, vitamins, or herbal products that you are taking. Some products that may interact with this drug include: capecitabine, imatinib, mifepristone. Aspirin, aspirin-like drugs " (salicylates), and nonsteroidal anti-inflammatory drugs (NSAIDs such as ibuprofen, naproxen, celecoxib) may have effects similar to warfarin. These drugs may increase the risk of bleeding problems if taken during treatment with warfarin. Carefully check all prescription/nonprescription product labels (including drugs applied to the skin such as pain-relieving creams) since the products may contain NSAIDs or salicylates. Talk to your doctor about using a different medication (such as acetaminophen) to treat pain/fever. Low-dose aspirin and related drugs (such as clopidogrel, ticlopidine) should be continued if prescribed by your doctor for specific medical reasons such as heart attack or stroke prevention. Consult your doctor or pharmacist for more details. Many herbal products interact with warfarin. Tell your doctor before taking any herbal products, especially bromelains, coenzyme Q10, cranberry, danshen, dong quai, fenugreek, garlic, ginkgo biloba, ginseng, and [place]'s wort, among others. This medication may interfere with a certain laboratory test to measure theophylline levels, possibly causing false test results. Make sure laboratory personnel and all your doctors know you use this drug.      OVERDOSE:  If overdose is suspected, contact a poison control center or emergency room immediately. [place] residents can call the [place] National Poison Hotline at 1-988.387.2116. [place] residents can call a provincial poison control center. Symptoms of overdose may include: bloody/black/tarry stools, pink/dark urine, unusual/prolonged bleeding.      NOTES:  Do not share this medication with others. Laboratory and/or medical tests (such as INR, complete blood count) must be performed periodically to monitor your progress or check for side effects. Consult your doctor for more details.      MISSED DOSE:  For the best possible benefit, do not miss any doses. If you do miss a dose and remember on the same day, take it as soon as  you remember. If you remember on the next day, skip the missed dose and resume your usual dosing schedule. Do not double the dose to catch up because this could increase your risk for bleeding. Keep a record of missed doses to give to your doctor or pharmacist. Contact your doctor or pharmacist if you miss 2 or more doses in a row.      STORAGE:  Store at room temperature away from light and moisture. Do not store in the bathroom. Keep all medications away from children and pets. Do not flush medications down the toilet or pour them into a drain unless instructed to do so. Properly discard this product when it is  or no longer needed. Consult your pharmacist or local waste disposal company for more details about how to safely discard your product.      MEDICAL ALERT:  Your condition and medication can cause complications in a medical emergency. For information about enrolling in MedicAlert, call 1-423.365.4529 ([place]) or 1-890.946.6298 ([place]).      Information last revised 2010 Copyright(c) 2010 [employer]             · Is patient Post Blood Transfusion?  No    Cellulitis  Cellulitis is an infection of the skin and the tissue beneath it. The infected area is usually red and tender. Cellulitis occurs most often in the arms and lower legs.   CAUSES   Cellulitis is caused by bacteria that enter the skin through cracks or cuts in the skin. The most common types of bacteria that cause cellulitis are staphylococci and streptococci.  SIGNS AND SYMPTOMS   · Redness and warmth.  · Swelling.  · Tenderness or pain.  · Fever.  DIAGNOSIS   Your health care provider can usually determine what is wrong based on a physical exam. Blood tests may also be done.  TREATMENT   Treatment usually involves taking an antibiotic medicine.  HOME CARE INSTRUCTIONS   · Take your antibiotic medicine as directed by your health care provider. Finish the antibiotic even if you start to feel better.  · Keep the infected arm or  leg elevated to reduce swelling.  · Apply a warm cloth to the affected area up to 4 times per day to relieve pain.  · Take medicines only as directed by your health care provider.  · Keep all follow-up visits as directed by your health care provider.  SEEK MEDICAL CARE IF:   · You notice red streaks coming from the infected area.  · Your red area gets larger or turns dark in color.  · Your bone or joint underneath the infected area becomes painful after the skin has healed.  · Your infection returns in the same area or another area.  · You notice a swollen bump in the infected area.  · You develop new symptoms.  · You have a fever.  SEEK IMMEDIATE MEDICAL CARE IF:   · You feel very sleepy.  · You develop vomiting or diarrhea.  · You have a general ill feeling (malaise) with muscle aches and pains.  MAKE SURE YOU:   · Understand these instructions.  · Will watch your condition.  · Will get help right away if you are not doing well or get worse.     This information is not intended to replace advice given to you by your health care provider. Make sure you discuss any questions you have with your health care provider.     Document Released: 09/27/2006 Document Revised: 01/08/2016 Document Reviewed: 03/04/2013  [employer] Interactive Patient Education ©2016 [employer]      Depression / Suicide Risk    As you are discharged from this [facility] facility, it is important to learn how to keep safe from harming yourself.    Recognize the warning signs:  · Abrupt changes in personality, positive or negative- including increase in energy   · Giving away possessions  · Change in eating patterns- significant weight changes-  positive or negative  · Change in sleeping patterns- unable to sleep or sleeping all the time   · Unwillingness or inability to communicate  · Depression  · Unusual sadness, discouragement and loneliness  · Talk of wanting to die  · Neglect of personal appearance   · Rebelliousness- reckless  behavior  · Withdrawal from people/activities they love  · Confusion- inability to concentrate     If you or a loved one observes any of these behaviors or has concerns about self-harm, here's what you can do:  · Talk about it- your feelings and reasons for harming yourself  · Remove any means that you might use to hurt yourself (examples: pills, rope, extension cords, firearm)  · Get professional help from the community (Mental Health, Substance Abuse, psychological counseling)  · Do not be alone:Call your Safe Contact- someone whom you trust who will be there for you.  · Call your local CRISIS HOTLINE 835-0626 or 763-052-3107  · Call your local Children's Mobile Crisis Response Team [place] (816) 666-5888 or www.[employer]  · Call the toll free National Suicide Prevention Hotlines   · National Suicide Prevention Lifeline 053-576-HIGG (4797)  · National [employer] Line Network 800-SUICIDE (925-7828)    Chronic Obstructive Pulmonary Disease Exacerbation  Chronic obstructive pulmonary disease (COPD) is a common lung condition in which airflow from the lungs is limited. COPD is a general term that can be used to describe many different lung problems that limit airflow, including chronic bronchitis and emphysema. COPD exacerbations are episodes when breathing symptoms become much worse and require extra treatment. Without treatment, COPD exacerbations can be life threatening, and frequent COPD exacerbations can cause further damage to your lungs.  CAUSES  · Respiratory infections.  · Exposure to smoke.  · Exposure to air pollution, chemical fumes, or dust.  Sometimes there is no apparent cause or trigger.  RISK FACTORS  · Smoking cigarettes.  · Older age.  · Frequent prior COPD exacerbations.  SIGNS AND SYMPTOMS  · Increased coughing.  · Increased thick spit (sputum) production.  · Increased wheezing.  · Increased shortness of breath.  · Rapid breathing.  · Chest tightness.  DIAGNOSIS  Your medical history, a  physical exam, and tests will help your health care provider make a diagnosis. Tests may include:  · A chest X-ray.  · Basic lab tests.  · Sputum testing.  · An arterial blood gas test.  TREATMENT  Depending on the severity of your COPD exacerbation, you may need to be admitted to a hospital for treatment. Some of the treatments commonly used to treat COPD exacerbations are:   · Antibiotic medicines.  · Bronchodilators. These are drugs that expand the air passages. They may be given with an inhaler or nebulizer. Spacer devices may be needed to help improve drug delivery.  · Corticosteroid medicines.  · Supplemental oxygen therapy.  · Airway clearing techniques, such as noninvasive ventilation (NIV) and positive expiratory pressure (PEP). These provide respiratory support through a mask or other noninvasive device.  HOME CARE INSTRUCTIONS  · Do not smoke. Quitting smoking is very important to prevent COPD from getting worse and exacerbations from happening as often.  · Avoid exposure to all substances that irritate the airway, especially to tobacco smoke.  · If you were prescribed an antibiotic medicine, finish it all even if you start to feel better.  · Take all medicines as directed by your health care provider. It is important to use correct technique with inhaled medicines.  · Drink enough fluids to keep your urine clear or pale yellow (unless you have a medical condition that requires fluid restriction).  · Use a cool mist vaporizer. This makes it easier to clear your chest when you cough.  · If you have a home nebulizer and oxygen, continue to use them as directed.  · Maintain all necessary vaccinations to prevent infections.  · Exercise regularly.  · Eat a healthy diet.  · Keep all follow-up appointments as directed by your health care provider.  SEEK IMMEDIATE MEDICAL CARE IF:  · You have worsening shortness of breath.  · You have trouble talking.  · You have severe chest pain.  · You have blood in your  sputum.  · You have a fever.  · You have weakness, vomit repeatedly, or faint.  · You feel confused.  · You continue to get worse.  MAKE SURE YOU:  · Understand these instructions.  · Will watch your condition.  · Will get help right away if you are not doing well or get worse.     This information is not intended to replace advice given to you by your health care provider. Make sure you discuss any questions you have with your health care provider.     Document Released: 10/14/2008 Document Revised: 01/08/2016 Document Reviewed: 08/22/2014  [employer] Interactive Patient Education ©2016 [employer]      Follow-up Information     1. Follow up with Joseph Foster M.D.. Go on 6/8/2017.    Specialty:  Family Medicine    Why:  please arrive at 745am for an 8:00am appointment. Thank you    Contact information    [facility]  [facility] [address]  [phone]           Discharge Medication Instructions:    Below are the medications your physician expects you to take upon discharge:    Review all your home medications and newly ordered medications with your doctor and/or pharmacist. Follow medication instructions as directed by your doctor and/or pharmacist.    Please keep your medication list with you and share with your physician.               Medication List      START taking these medications        Instructions    Morning Afternoon Evening Bedtime    albuterol 108 (90 BASE) MCG/ACT Aers inhalation aerosol        Inhale 2 Puffs by mouth every four hours as needed for Shortness of Breath.   Dose:  2 Puff                        nitroglycerin 0.4 MG Subl   Commonly known as:  NITROSTAT        1 tab taken sublingually every 5 minutes as need for chest pain.  Up to 3 doses per day in total.                        * predniSONE 20 MG Tabs   Last time this was given:  60 mg on 5/20/2017  5:56 AM   Commonly known as:  DELTASONE        Take 3 Tabs by mouth every day.   Dose:  60 mg                        *  predniSONE 20 MG Tabs   Last time this was given:  60 mg on 5/20/2017  5:56 AM   Commonly known as:  DELTASONE        Take 3 Tabs by mouth every day for 5 days.   Dose:  60 mg                        * Notice:  This list has 2 medication(s) that are the same as other medications prescribed for you. Read the directions carefully, and ask your doctor or other care provider to review them with you.      CHANGE how you take these medications        Instructions    Morning Afternoon Evening Bedtime    alprazolam 0.5 MG Tabs   What changed:  reasons to take this   Last time this was given:  0.5 mg on 5/23/2017  8:06 AM   Commonly known as:  XANAX        Take 1 Tab by mouth 3 times a day as needed for Anxiety.   Dose:  0.5 mg                        amoxicillin-clavulanate 875-125 MG Tabs   What changed:  when to take this   Last time this was given:  1 Tab on 5/23/2017  8:02 AM   Commonly known as:  AUGMENTIN        Take 1 Tab by mouth 2 times a day for 5 days.   Dose:  1 Tab                        oxycodone immediate release 10 MG immediate release tablet   What changed:  reasons to take this   Last time this was given:  10 mg on 5/23/2017 10:41 AM   Commonly known as:  ROXICODONE        Take 1 Tab by mouth every 8 hours as needed for Severe Pain.   Dose:  10 mg                          CONTINUE taking these medications        Instructions    Morning Afternoon Evening Bedtime    budesonide-formoterol 80-4.5 MCG/ACT Aero   Last time this was given:  2 Puffs on 5/23/2017  8:02 AM   Commonly known as:  SYMBICORT        Inhale 2 Puffs by mouth 2 Times a Day.   Dose:  2 Puff                        carvedilol 6.25 MG Tabs   Last time this was given:  6.25 mg on 5/22/2017  8:18 PM   Commonly known as:  COREG        Take 6.25 mg by mouth every evening.   Dose:  6.25 mg                        citalopram 20 MG Tabs   Last time this was given:  20 mg on 5/22/2017  8:18 PM   Commonly known as:  CELEXA        Take 20 mg by mouth every  evening.   Dose:  20 mg                        doxycycline monohydrate 100 MG tablet   Last time this was given:  100 mg on 5/23/2017 10:41 AM   Commonly known as:  ADOXA        Take 1 Tab by mouth every 12 hours for 5 days.   Dose:  100 mg                        gabapentin 300 MG Caps   Last time this was given:  300 mg on 5/23/2017  8:02 AM   Commonly known as:  NEURONTIN        Take 1 Cap by mouth 3 times a day.   Dose:  300 mg                        Guaifenesin 1200 MG Tb12        Take 1 Tab by mouth every 12 hours.   Dose:  1200 mg                        metformin 850 MG Tabs   Commonly known as:  GLUCOPHAGE        Take 850 mg by mouth every evening.   Dose:  850 mg                        morphine ER 15 MG Tbcr tablet   Last time this was given:  30 mg on 5/23/2017  8:02 AM   Commonly known as:  MS CONTIN        Take 1 Tab by mouth every 12 hours.   Dose:  15 mg                        risperidone 0.5 MG Tabs   Last time this was given:  0.5 mg on 5/22/2017  8:18 PM   Commonly known as:  RISPERDAL        Take 1 Tab by mouth every bedtime.   Dose:  0.5 mg                        trazodone 50 MG Tabs   Last time this was given:  50 mg on 5/22/2017  8:18 PM   Commonly known as:  DESYREL        Take 50 mg by mouth every evening.   Dose:  50 mg                        warfarin 5 MG Tabs   Last time this was given:  5 mg on 5/21/2017  5:45 PM   Commonly known as:  COUMADIN        Take 1 Tab by mouth COUMADIN-DAILY.   Dose:  5 mg                          STOP taking these medications     metoclopramide 5 MG tablet   Commonly known as:  REGLAN                    Where to Get Your Medications      You can get these medications from any pharmacy     Bring a paper prescription for each of these medications    - predniSONE 20 MG Tabs  - predniSONE 20 MG Tabs      Information about where to get these medications is not yet available     ! Ask your nurse or doctor about these medications    - albuterol 108 (90 BASE) MCG/ACT  Aers inhalation aerosol  - alprazolam 0.5 MG Tabs  - amoxicillin-clavulanate 875-125 MG Tabs  - budesonide-formoterol 80-4.5 MCG/ACT Aero  - doxycycline monohydrate 100 MG tablet  - morphine ER 15 MG Tbcr tablet  - nitroglycerin 0.4 MG Subl  - oxycodone immediate release 10 MG immediate release tablet            Orders for after discharge     DME O2 New Set Up    Complete by:  As directed        DME O2 New Set Up    Complete by:  As directed              Instructions           Diet / Nutrition:    Follow any diet instructions given to you by your doctor or the dietician, including how much salt (sodium) you are allowed each day.    If you are overweight, talk to your doctor about a weight reduction plan.    Activity:    Remain physically active following your doctor's instructions about exercise and activity.    Rest often.     Any time you become even a little tired or short of breath, SIT DOWN and rest.    Worsening Symptoms:    Report any of the following signs and symptoms to the doctor's office immediately:    *Pain of jaw, arm, or neck  *Chest pain not relieved by medication                               *Dizziness or loss of consciousness  *Difficulty breathing even when at rest   *More tired than usual                                       *Bleeding drainage or swelling of surgical site  *Swelling of feet, ankles, legs or stomach                 *Fever (>100ºF)  *Pink or blood tinged sputum  *Weight gain (3lbs/day or 5lbs /week)           *Shock from internal defibrillator (if applicable)  *Palpitations or irregular heartbeats                *Cool and/or numb extremities    Stroke Awareness    Common Risk Factors for Stroke include:    Age  Atrial Fibrillation  Carotid Artery Stenosis  Diabetes Mellitus  Excessive alcohol consumption  High blood pressure  Overweight   Physical inactivity  Smoking    Warning signs and symptoms of a stroke include:    *Sudden numbness or weakness of the face, arm or leg  (especially on one side of the body).  *Sudden confusion, trouble speaking or understanding.  *Sudden trouble seeing in one or both eyes.  *Sudden trouble walking, dizziness, loss of balance or coordination.Sudden severe headache with no known cause.    It is very important to get treatment quickly when a stroke occurs. If you experience any of the above warning signs, call 911 immediately.                   Disclaimer         Quit Smoking / Tobacco Use:    I understand the use of any tobacco products increases my chance of suffering from future heart disease or stroke and could cause other illnesses which may shorten my life. Quitting the use of tobacco products is the single most important thing I can do to improve my health. For further information on smoking / tobacco cessation call a Toll Free Quit Line at 1-853.820.8601 (*[employer]) or 1-476.307.4001 (American Lung Association) or you can access the web based program at www.lungusa.org.    [place] Tobacco Users Help Line:  (516) 602-7798       Toll Free: 1-127.261.2242  Quit Tobacco Program [facility] [phone]    Crisis Hotline:    [place] Crisis Hotline:  5-802-TQZEXZJ or 1-843.649.8019    [place] Crisis Hotline:    1-970.976.7666 or 943-784-0284    Discharge Survey:   Thank you for choosing [facility]. We hope we did everything we could to make your hospital stay a pleasant one. You may be receiving a phone survey and we would appreciate your time and participation in answering the questions. Your input is very valuable to us in our efforts to improve our service to our patients and their families.        My signature on this form indicates that:    1. I have reviewed and understand the above information.  2. My questions regarding this information have been answered to my satisfaction.  3. I have formulated a plan with my discharge nurse to obtain my prescribed medications for home.                  Disclaimer          __________________________________                     __________       ________                       Patient Signature                                                 Date                    Time

## 2017-05-20 NOTE — ED NOTES
Chief Complaint   Patient presents with   • Shortness of Breath     x1 week   • Cough   • Peripheral Edema     bilateral leg, red     Pt BIB EMS, has been experiencing SOB x1 week with cough, dx with PE left lung x4 weeks ago takes warfarin, pt states she is suppose to be on 3L of oxygen continuously but has recently moved and was unable to have oxygen deliver. Also been having 10/10 pain bilateral legs, +3 edema bilater, red , left leg laurel has a hx of CHF and DM

## 2017-05-20 NOTE — ED PROVIDER NOTES
ED Provider Note    Scribed for Vannesa Castellanos M.D. by Ryanne Fuentes. 5/20/2017, 1:39 AM.    Primary care provider: Pcp Pt States None  Means of arrival: Ambulance  History obtained from: Patient  History limited by: None    CHIEF COMPLAINT  Chief Complaint   Patient presents with   • Shortness of Breath     x1 week   • Cough   • Peripheral Edema     bilateral leg, red       HPI  Zenia Ordaz is a 66 y.o. female with a history of COPD who presents to the Emergency Department for shortness of breath and cough onset one week ago. The patient was recently hospitalized for pulmonary emboli and immediately following her discharge, she was able to sleep and walk normally. However, over the course of the past week the patient has been unable to catch her breath, even when lying supine, and she has experienced pain on inspiration. This pain is similar to her previous episodes of pleurisy. The patient complains of inability to walk secondary to bilateral leg swelling and pain at this time. She is typically on 3L of supplemental oxygen at home.    REVIEW OF SYSTEMS  Pertinent positives include shortness of breath, cough, edema in bilateral lower extremities, chest pain. Pertinent negatives include no fever.  All other systems reviewed and negative.  C    PAST MEDICAL HISTORY   has a past medical history of COPD; Fibromyalgia; Hepatitis B; Hepatitis C; Hepatitis A; Diabetes; CAD (coronary artery disease); Stroke (CMS-[facility]) (1982); Backpain; Myocardial infarct (CMS-[facility]) (1989, 1991); Cancer (CMS-[facility]) (Cervical ~2003); Congestive heart failure (CMS-HCC); MRSA infection; Drug abuse; Hypertension; Fall; Pneumonia; and Seizure (CMS-HCC).    SURGICAL HISTORY   has past surgical history that includes other cardiac surgery and incision and drainage orthopedic (7/13/2013).    SOCIAL HISTORY  Social History   Substance Use Topics   • Smoking status: Current Every Day Smoker -- 0.12 packs/day for 53 years     Types: Cigarettes   •  "Smokeless tobacco: Never Used      Comment: smokes 1/2 ppd   • Alcohol Use: No      History   Drug Use No     Comment: Hx of heroin meth       FAMILY HISTORY  Family History   Problem Relation Age of Onset   • Cancer Mother    • Cancer Sister    • Cancer Maternal Aunt        CURRENT MEDICATIONS  Home Medications     Reviewed by Chata Saunders R.N. (Registered Nurse) on 05/20/17 at 0129  Med List Status: <None>    Medication Last Dose Status    alprazolam (XANAX) 0.5 MG Tab 4/21/2017 Active    amoxicillin-clavulanate (AUGMENTIN) 875-125 MG Tab  Active    budesonide-formoterol (SYMBICORT) 80-4.5 MCG/ACT Aerosol  Active    carvedilol (COREG) 6.25 MG Tab  Active    citalopram (CELEXA) 20 MG Tab 4/21/2017 Active    doxycycline monohydrate (ADOXA) 100 MG tablet  Active    gabapentin (NEURONTIN) 300 MG Cap 4/21/2017 Active    guaifenesin LA 1200 MG TABLET SR 12 HR  Active    metformin (GLUCOPHAGE) 850 MG Tab  Active    metoclopramide (REGLAN) 5 MG tablet 4/21/2017 Active    morphine ER (MS CONTIN) 15 MG Tab CR tablet  Active    risperidone (RISPERDAL) 0.5 MG Tab 4/21/2017 Active    trazodone (DESYREL) 50 MG Tab 4/21/2017 Active    warfarin (COUMADIN) 5 MG Tab  Active                ALLERGIES  Allergies   Allergen Reactions   • Mushroom Extract Complex Rash and Unspecified     Pt gets a rash and breaks out in a sweat when eats mushrooms   • Tylenol Rash     Pt states \"I get a body rash\".   • Zofran Rash     Pt states \"I get a body rash\".       PHYSICAL EXAM  VITAL SIGNS: /59 mmHg  Pulse 60  Temp(Src) 37 °C (98.6 °F)  Resp 14  Ht 1.549 m (5' 1\")  Wt 81.647 kg (180 lb)  BMI 34.03 kg/m2  SpO2 96%    Constitutional: Alert in no apparent distress.Speaking in full sentences  HENT: No signs of trauma, Bilateral external ears normal, Nose normal.   Eyes: Pupils are equal and reactive, Conjunctiva normal, Non-icteric.   Neck: Normal range of motion, No tenderness, Supple, No stridor.   Cardiovascular: Regular rate " and rhythm, no murmurs.   Thorax & Lungs: Diffuse expiratory wheezes and rales  Abdomen: Bowel sounds normal, Soft, No tenderness, No masses, No peritoneal signs.  Skin: Warm, Dry, No erythema, No rash.   Musculoskeletal:  No major deformities noted. 3+ pitting edema with slight erythema bilaterally  Neurologic: Alert, moving all extremities without difficulty, no focal deficits.    LABS  Results for orders placed or performed during the hospital encounter of 05/20/17   BTYPE NATRIURETIC PEPTIDE   Result Value Ref Range    B Natriuretic Peptide 85 0 - 100 pg/mL   CBC WITH DIFFERENTIAL   Result Value Ref Range    WBC 6.3 4.8 - 10.8 K/uL    RBC 5.05 4.20 - 5.40 M/uL    Hemoglobin 13.3 12.0 - 16.0 g/dL    Hematocrit 43.0 37.0 - 47.0 %    MCV 85.1 81.4 - 97.8 fL    MCH 26.3 (L) 27.0 - 33.0 pg    MCHC 30.9 (L) 33.6 - 35.0 g/dL    RDW 49.1 35.9 - 50.0 fL    Platelet Count 220 164 - 446 K/uL    MPV 10.3 9.0 - 12.9 fL    Neutrophils-Polys 63.40 44.00 - 72.00 %    Lymphocytes 24.60 22.00 - 41.00 %    Monocytes 7.80 0.00 - 13.40 %    Eosinophils 2.60 0.00 - 6.90 %    Basophils 1.00 0.00 - 1.80 %    Immature Granulocytes 0.60 0.00 - 0.90 %    Nucleated RBC 0.00 /100 WBC    Neutrophils (Absolute) 3.96 2.00 - 7.15 K/uL    Lymphs (Absolute) 1.54 1.00 - 4.80 K/uL    Monos (Absolute) 0.49 0.00 - 0.85 K/uL    Eos (Absolute) 0.16 0.00 - 0.51 K/uL    Baso (Absolute) 0.06 0.00 - 0.12 K/uL    Immature Granulocytes (abs) 0.04 0.00 - 0.11 K/uL    NRBC (Absolute) 0.00 K/uL   COMP METABOLIC PANEL   Result Value Ref Range    Sodium 133 (L) 135 - 145 mmol/L    Potassium 3.7 3.6 - 5.5 mmol/L    Chloride 99 96 - 112 mmol/L    Co2 29 20 - 33 mmol/L    Anion Gap 5.0 0.0 - 11.9    Glucose 110 (H) 65 - 99 mg/dL    Bun 23 (H) 8 - 22 mg/dL    Creatinine 0.73 0.50 - 1.40 mg/dL    Calcium 9.5 8.5 - 10.5 mg/dL    AST(SGOT) 18 12 - 45 U/L    ALT(SGPT) 18 2 - 50 U/L    Alkaline Phosphatase 81 30 - 99 U/L    Total Bilirubin 0.6 0.1 - 1.5 mg/dL    Albumin  4.0 3.2 - 4.9 g/dL    Total Protein 7.6 6.0 - 8.2 g/dL    Globulin 3.6 (H) 1.9 - 3.5 g/dL    A-G Ratio 1.1 g/dL   PT/INR   Result Value Ref Range    PT 20.7 (H) 12.0 - 14.6 sec    INR 1.72 (H) 0.87 - 1.13   PTT   Result Value Ref Range    APTT 37.1 (H) 24.7 - 36.0 sec   ESTIMATED GFR   Result Value Ref Range    GFR If African American >60 >60 mL/min/1.73 m 2    GFR If Non African American >60 >60 mL/min/1.73 m 2   EKG (ER)   Result Value Ref Range    Report       [facility] Emergency Dept.    Test Date:  2017  Pt Name:    OMAR KENNEDY              Department: ER  MRN:        [id number]                      Room:       [facility]  Gender:     F                            Technician: [id number]  :        1950                   Requested By:ER TRIAGE PROTOCOL  Order #:    [id number]                    Reading MD:    Measurements  Intervals                                Axis  Rate:       61                           P:          65  AL:         184                          QRS:        29  QRSD:       96                           T:          85  QT:         420  QTc:        423    Interpretive Statements  SINUS RHYTHM  VENTRICULAR PREMATURE COMPLEX  BORDERLINE T WAVE ABNORMALITIES  BASELINE WANDER IN LEAD(S) V3  Compared to ECG 2017 23:02:53  Ventricular premature complex(es) now present  Sinus bradycardia no longer present  T-wave abnormality still present       All labs reviewed by me.    EKG  See above.     RADIOLOGY  DX-CHEST-LIMITED (1 VIEW)   Final Result      1.  Stable enlargement of the cardiomediastinal silhouette.   2.  Mild left basilar atelectasis.        The radiologist's interpretation of all radiological studies have been reviewed by me.    COURSE & MEDICAL DECISION MAKING  Pertinent Labs & Imaging studies reviewed. (See chart for details)    On review of medical record, the patient was admitted from the  to the  for pleurisy. She has a history of COPD, IV drug use,  and noncompliance.     Differential diagnoses include but are not limited to: COPD exacerbation, pleurisy, PE    1:39 AM - Patient seen and examined at bedside. Patient will be treated with Albuterol breathing treatment. Ordered Chest X-ray, PTT, PT/INR, BNP, CBC, CMP, Blood culture, EKG to evaluate her symptoms.       Decision Making:  This is a 66 y.o. year old female who presents with shortness of breath. She initially reports shortness of breath the last week with cough and making up the middle night short of breath. However she failed to mention that she has been unable to get her baseline oxygen at home. She is wheezy on exam. I do think she has an acute exacerbation of COPD as well as noncompliance. She has an INR of 1.7. She has an IVC filter. On 3 L of oxygen she is not hypoxic she is not tachycardic. She is on anticoagulation with an IVC filter do not think additional CT angiogram is indicated at this time she recently had a CTA and intervention would not change even if she does have residual emboli. She is slightly subtherapeutic on her INR. She is sleeping without difficulty on oxygen. I do think the issues that she needs oxygen at home. She is intermittently homeless and has been difficult to set up outpatient oxygen. Social work is coordinating to obtain this oxygen for her so she can be discharged. I do not think she requires immediate intervention and admission. BNP is normal CBC and CMP are essentially normal. Chest x-ray does not show any evidence of failure. She is afebrile. Again I do think she can be discharged with home oxygen as long as this can be arranged.     The patient will return for new or worsening symptoms and is stable at the time of discharge. Patient was given return precautions. Patient and/or family member verbalizes understanding and will comply.    DISPOSITION:  Patient will be discharged home in stable condition.    FOLLOW UP:  [facility]  [address] [place] [address]  [place] [address]  [phone]          [address] [address]  [phone]          OUTPATIENT MEDICATIONS:  New Prescriptions    PREDNISONE (DELTASONE) 20 MG TAB    Take 3 Tabs by mouth every day.           FINAL IMPRESSION  1. Shortness of breath    2. COPD exacerbation (CMS-HCC)    3. Drug abuse, IV         Your blood pressure is elevated here in the emergency department. Please monitor your blood pressure over the next several days. If your blood pressure continues to be 120/80 or higher please contact your physician for blood pressure management.     This dictation has been created using voice recognition software and/or scribes. The accuracy of the dictation is limited by the abilities of the software and the expertise of the scribes. I expect there may be some errors of grammar and possibly content. I made every attempt to manually correct the errors within my dictation. However, errors related to voice recognition software and/or scribes may still exist and should be interpreted within the appropriate context.     Ryanne GALLOWAY (Scribe), am scribing for, and in the presence of, Vannesa Castellanos M.D..    Electronically signed by: Ryanne Fuentes (Olimpiaibnurys), 5/20/2017    Vannesa GALLOWAY M.D. personally performed the services described in this documentation, as scribed by Ryanne Fuentes in my presence, and it is both accurate and complete.    The note accurately reflects work and decisions made by me.  Vannesa Castellanos  5/20/2017  5:18 AM

## 2017-05-20 NOTE — ED AVS SNAPSHOT
[email or web address] Access Code: [email or web address]  Expires: 5/29/2017 12:39 PM    Your email address is not on file at [facility].  Email Addresses are required for you to sign up for [email or web address], please contact [phone] to verify your personal information and to provide your email address prior to attempting to register for [email or web address].    [place] [address]    [employer]  A secure, online tool to manage your health information     [facility]’s Celebrations.com® is a secure, online tool that connects you to your personalized health information from the privacy of your home -- day or night - making it very easy for you to manage your healthcare. Once the activation process is completed, you can even access your medical information using the Celebrations.com spenser, which is available for free in the Apple Spenser store or Google Play store.     To learn more about [email or web address], visit [email or web address]    There are two levels of access available (as shown below):   My Chart Features  [facility] Primary Care Doctor [facility]  Specialists [facility]  Urgent  Care Non-[facility] Primary Care Doctor   Email your healthcare team securely and privately 24/7 X X X    Manage appointments: schedule your next appointment; view details of past/upcoming appointments X      Request prescription refills. X      View recent personal medical records, including lab and immunizations X X X X   View health record, including health history, allergies, medications X X X X   Read reports about your outpatient visits, procedures, consult and ER notes X X X X   See your discharge summary, which is a recap of your hospital and/or ER visit that includes your diagnosis, lab results, and care plan X X  X     How to register for [employer]:  Once your e-mail address has been verified, follow the following steps to sign up for [employer].     1. Go to  [email or web address]  2. Click on the Sign Up Now box, which takes you to the New Member Sign Up page. You will  need to provide the following information:  a. Enter your [employer] Access Code exactly as it appears at the top of this page. (You will not need to use this code after you’ve completed the sign-up process. If you do not sign up before the expiration date, you must request a new code.)   b. Enter your date of birth.   c. Enter your home email address.   d. Click Submit, and follow the next screen’s instructions.  3. Create a Aionext ID. This will be your [employer] login ID and cannot be changed, so think of one that is secure and easy to remember.  4. Create a [employer] password. You can change your password at any time.  5. Enter your Password Reset Question and Answer. This can be used at a later time if you forget your password.   6. Enter your e-mail address. This allows you to receive e-mail notifications when new information is available in [employer].  7. Click Sign Up. You can now view your health information.    For assistance activating your [employer] account, call [phone]

## 2017-05-20 NOTE — DISCHARGE PLANNING
Received call from [facility], they will accept patient but per their , this is the last time if equipment is not returned. ER GAMALIEL Gutiérrez will speak with patient.

## 2017-05-20 NOTE — ED NOTES
RN notified SW that pt states she is suppose to wear 3L of oxygen at home but does not have any at home and does not have any personal tanks at the bedside, states she will look into it and call the RN back.

## 2017-05-20 NOTE — IP AVS SNAPSHOT
[employer] Access Code: [email or web address]  Expires: 5/29/2017 12:39 PM    Your email address is not on file at [facility].  Email Addresses are required for you to sign up for [employer], please contact [phone] to verify your personal information and to provide your email address prior to attempting to register for [employer].    Zenia [place] [address]    virtual tweens ltdt  A secure, online tool to manage your health information     [facility]’s [employer]® is a secure, online tool that connects you to your personalized health information from the privacy of your home -- day or night - making it very easy for you to manage your healthcare. Once the activation process is completed, you can even access your medical information using the [employer] spenser, which is available for free in the Apple Spenser store or Google Play store.     To learn more about [employer], visit [email or web address]    There are two levels of access available (as shown below):   My Chart Features  [facility] Primary Care Doctor [facility]  Specialists [facility]  Urgent  Care Non-[facility] Primary Care Doctor   Email your healthcare team securely and privately 24/7 X X X    Manage appointments: schedule your next appointment; view details of past/upcoming appointments X      Request prescription refills. X      View recent personal medical records, including lab and immunizations X X X X   View health record, including health history, allergies, medications X X X X   Read reports about your outpatient visits, procedures, consult and ER notes X X X X   See your discharge summary, which is a recap of your hospital and/or ER visit that includes your diagnosis, lab results, and care plan X X  X     How to register for virtual tweens ltdt:  Once your e-mail address has been verified, follow the following steps to sign up for virtual tweens ltdt.     1. Go to  [email or web address]  2. Click on the Sign Up Now box, which takes you to the New Member Sign Up page. You will  need to provide the following information:  a. Enter your [employer] Access Code exactly as it appears at the top of this page. (You will not need to use this code after you’ve completed the sign-up process. If you do not sign up before the expiration date, you must request a new code.)   b. Enter your date of birth.   c. Enter your home email address.   d. Click Submit, and follow the next screen’s instructions.  3. Create a [employer] [place] [employer] ID. This will be your [employer] login ID and cannot be changed, so think of one that is secure and easy to remember.  4. Create a [employer]ME password. You can change your password at any time.  5. Enter your Password Reset Question and Answer. This can be used at a later time if you forget your password.   6. Enter your e-mail address. This allows you to receive e-mail notifications when new information is available in [email or web address].  7. Click Sign Up. You can now view your health information.    For assistance activating your [email or web address] account, call [phone]

## 2017-05-20 NOTE — ED AVS SNAPSHOT
5/20/2017    Zenia Ordaz  [facility] [address]    Dear Zenia:    [facility] wants to ensure your discharge home is safe and you or your loved ones have had all of your questions answered regarding your care after you leave the hospital.    Below is a list of resources and contact information should you have any questions regarding your hospital stay, follow-up instructions, or active medical symptoms.    Questions or Concerns Regarding… Contact   Medical Questions Related to Your Discharge  (7 days a week, 8am-5pm) Contact a Nurse Care Coordinator   [phone]   Medical Questions Not Related to Your Discharge  (24 hours a day / 7 days a week)  Contact the Nurse Health Line   [phone]    Medications or Discharge Instructions Refer to your discharge packet   or contact your [facility] Primary Care Provider   [phone]   Follow-up Appointment(s) Schedule your appointment via [employer]   or contact Scheduling [phone]   Billing Review your statement via [employer]  or contact Billing [phone]   Medical Records Review your records via [employer]   or contact Medical Records [phone]     You may receive a telephone call within two days of discharge. This call is to make certain you understand your discharge instructions and have the opportunity to have any questions answered. You can also easily access your medical information, test results and upcoming appointments via the [employer] free online health management tool. You can learn more and sign up at [email or web address]. For assistance setting up your [employer] account, please call [phone].    Once again, we want to ensure your discharge home is safe and that you have a clear understanding of any next steps in your care. If you have any questions or concerns, please do not hesitate to contact us, we are here for you. Thank you for choosing [facility] for your healthcare needs.    Sincerely,    Your [facility] Healthcare Team

## 2017-05-20 NOTE — DISCHARGE PLANNING
Per ER GAMALIEL Gutiérrez referral, order and face to face have been faxed to [facility]. Per Skylar at [facility] , they have had not return equipment and is non compliant with service and order.  Per Skylar she will speak with the General Manger, to get authorization to service patient.

## 2017-05-20 NOTE — ED NOTES
Pt asking for her legs to get checked out. Legs noted red/edematous and warm to touch, erp notified. Will see pt shortly.

## 2017-05-20 NOTE — IP AVS SNAPSHOT
5/23/2017    Zenia Ordaz  [facility] [address]    Dear Zenia:    [facility] wants to ensure your discharge home is safe and you or your loved ones have had all of your questions answered regarding your care after you leave the hospital.    Below is a list of resources and contact information should you have any questions regarding your hospital stay, follow-up instructions, or active medical symptoms.    Questions or Concerns Regarding… Contact   Medical Questions Related to Your Discharge  (7 days a week, 8am-5pm) Contact a Nurse Care Coordinator   [phone]   Medical Questions Not Related to Your Discharge  (24 hours a day / 7 days a week)  Contact the Nurse Health Line   [phone]    Medications or Discharge Instructions Refer to your discharge packet   or contact your [facility] Primary Care Provider   [phone]   Follow-up Appointment(s) Schedule your appointment via [employer]   or contact Scheduling [phone]   Billing Review your statement via [employer]  or contact Billing [phone]   Medical Records Review your records via [employer]   or contact Medical Records [phone]     You may receive a telephone call within two days of discharge. This call is to make certain you understand your discharge instructions and have the opportunity to have any questions answered. You can also easily access your medical information, test results and upcoming appointments via the [employer] free online health management tool. You can learn more and sign up at [email or web address]. For assistance setting up your [employer] account, please call [phone].    Once again, we want to ensure your discharge home is safe and that you have a clear understanding of any next steps in your care. If you have any questions or concerns, please do not hesitate to contact us, we are here for you. Thank you for choosing [facility] for your healthcare needs.    Sincerely,    Your [facility] Healthcare Team

## 2017-05-20 NOTE — FACE TO FACE
Face to Face Note  -  Durable Medical Equipment    Vannesa Castellanos M.D. - NPI: [id number]  I certify that this patient is under my care and that they had a durable medical equipment(DME)face to face encounter by myself that meets the physician DME face-to-face encounter requirements with this patient on:    Date of encounter:   Patient:                    MRN:                       YOB: 2015  Zenia Ordaz  [id number]  1950     The encounter with the patient was in whole, or in part, for the following medical condition, which is the primary reason for durable medical equipment:  CHF    I certify that, based on my findings, the following durable medical equipment is medically necessary:  Oxygen.    HOME O2 Saturation Measurements:(Values must be present for Home Oxygen orders)         ,     ,         My Clinical findings support the need for the above equipment due to:  Hypoxia    Supporting Symptoms: history of PEs, COPD

## 2017-05-20 NOTE — ED NOTES
The Medication Reconciliation process has been completed by interviewing the patient who states that she ran out of many of her meds this week.  She goes to several pharmacies.  She does not take her carvedilol (BID) or metformin 850 (TID) as prescribed because she only takes meds at night except for her morphone, oxycodone and sometimes her metoclopramide.     Allergies have been reviewed  Antibiotic use in 30 days - Completed a two day course of doxy and Augmentin following her IP stay.    Home Pharmacy:  [facility], [facility]

## 2017-05-20 NOTE — ED NOTES
Received report from Corina RAMÍREZ assumed care done.  Still waiting from 02 equipment to be delivered for the pt.

## 2017-05-21 PROBLEM — F11.10 HEROIN ABUSE (HCC): Status: ACTIVE | Noted: 2017-05-21

## 2017-05-21 PROBLEM — D64.9 ANEMIA: Status: ACTIVE | Noted: 2017-05-21

## 2017-05-21 PROBLEM — G89.29 CHRONIC PAIN: Status: ACTIVE | Noted: 2017-05-21

## 2017-05-21 PROBLEM — R07.89 ATYPICAL CHEST PAIN: Status: ACTIVE | Noted: 2017-05-21

## 2017-05-21 LAB
ALBUMIN SERPL BCP-MCNC: 2.9 G/DL (ref 3.2–4.9)
ALBUMIN/GLOB SERPL: 1 G/DL
ALP SERPL-CCNC: 57 U/L (ref 30–99)
ALT SERPL-CCNC: 11 U/L (ref 2–50)
ANION GAP SERPL CALC-SCNC: 6 MMOL/L (ref 0–11.9)
AST SERPL-CCNC: 12 U/L (ref 12–45)
BILIRUB SERPL-MCNC: 0.4 MG/DL (ref 0.1–1.5)
BUN SERPL-MCNC: 14 MG/DL (ref 8–22)
CALCIUM SERPL-MCNC: 8.2 MG/DL (ref 8.5–10.5)
CHLORIDE SERPL-SCNC: 109 MMOL/L (ref 96–112)
CO2 SERPL-SCNC: 27 MMOL/L (ref 20–33)
CREAT SERPL-MCNC: 0.52 MG/DL (ref 0.5–1.4)
EKG IMPRESSION: NORMAL
ERYTHROCYTE [DISTWIDTH] IN BLOOD BY AUTOMATED COUNT: 47.3 FL (ref 35.9–50)
GFR SERPL CREATININE-BSD FRML MDRD: >60 ML/MIN/1.73 M 2
GLOBULIN SER CALC-MCNC: 2.8 G/DL (ref 1.9–3.5)
GLUCOSE BLD-MCNC: 105 MG/DL (ref 65–99)
GLUCOSE BLD-MCNC: 107 MG/DL (ref 65–99)
GLUCOSE BLD-MCNC: 168 MG/DL (ref 65–99)
GLUCOSE BLD-MCNC: 97 MG/DL (ref 65–99)
GLUCOSE SERPL-MCNC: 82 MG/DL (ref 65–99)
HAV IGM SERPL QL IA: NEGATIVE
HBV CORE IGM SER QL: NEGATIVE
HBV SURFACE AG SER QL: NEGATIVE
HCT VFR BLD AUTO: 34.9 % (ref 37–47)
HCV AB SER QL: REACTIVE
HGB BLD-MCNC: 10.8 G/DL (ref 12–16)
INR PPP: 2.46 (ref 0.87–1.13)
MCH RBC QN AUTO: 26.1 PG (ref 27–33)
MCHC RBC AUTO-ENTMCNC: 30.9 G/DL (ref 33.6–35)
MCV RBC AUTO: 84.3 FL (ref 81.4–97.8)
PLATELET # BLD AUTO: 206 K/UL (ref 164–446)
PMV BLD AUTO: 10.2 FL (ref 9–12.9)
POTASSIUM SERPL-SCNC: 3.9 MMOL/L (ref 3.6–5.5)
PROT SERPL-MCNC: 5.7 G/DL (ref 6–8.2)
PROTHROMBIN TIME: 27.4 SEC (ref 12–14.6)
RBC # BLD AUTO: 4.14 M/UL (ref 4.2–5.4)
SODIUM SERPL-SCNC: 142 MMOL/L (ref 135–145)
TROPONIN I SERPL-MCNC: <0.01 NG/ML (ref 0–0.04)
WBC # BLD AUTO: 6.3 K/UL (ref 4.8–10.8)

## 2017-05-21 PROCEDURE — 85610 PROTHROMBIN TIME: CPT

## 2017-05-21 PROCEDURE — 700102 HCHG RX REV CODE 250 W/ 637 OVERRIDE(OP): Performed by: INTERNAL MEDICINE

## 2017-05-21 PROCEDURE — 700111 HCHG RX REV CODE 636 W/ 250 OVERRIDE (IP): Performed by: INTERNAL MEDICINE

## 2017-05-21 PROCEDURE — A9270 NON-COVERED ITEM OR SERVICE: HCPCS | Performed by: INTERNAL MEDICINE

## 2017-05-21 PROCEDURE — 94640 AIRWAY INHALATION TREATMENT: CPT

## 2017-05-21 PROCEDURE — 84484 ASSAY OF TROPONIN QUANT: CPT

## 2017-05-21 PROCEDURE — 94668 MNPJ CHEST WALL SBSQ: CPT

## 2017-05-21 PROCEDURE — 770006 HCHG ROOM/CARE - MED/SURG/GYN SEMI*

## 2017-05-21 PROCEDURE — 700101 HCHG RX REV CODE 250: Performed by: INTERNAL MEDICINE

## 2017-05-21 PROCEDURE — 700105 HCHG RX REV CODE 258: Performed by: INTERNAL MEDICINE

## 2017-05-21 PROCEDURE — 93010 ELECTROCARDIOGRAM REPORT: CPT | Performed by: INTERNAL MEDICINE

## 2017-05-21 PROCEDURE — 94669 MECHANICAL CHEST WALL OSCILL: CPT

## 2017-05-21 PROCEDURE — 80053 COMPREHEN METABOLIC PANEL: CPT

## 2017-05-21 PROCEDURE — 700102 HCHG RX REV CODE 250 W/ 637 OVERRIDE(OP)

## 2017-05-21 PROCEDURE — 82962 GLUCOSE BLOOD TEST: CPT

## 2017-05-21 PROCEDURE — 85027 COMPLETE CBC AUTOMATED: CPT

## 2017-05-21 PROCEDURE — 87522 HEPATITIS C REVRS TRNSCRPJ: CPT

## 2017-05-21 PROCEDURE — 99233 SBSQ HOSP IP/OBS HIGH 50: CPT | Performed by: INTERNAL MEDICINE

## 2017-05-21 PROCEDURE — A9270 NON-COVERED ITEM OR SERVICE: HCPCS

## 2017-05-21 PROCEDURE — 93005 ELECTROCARDIOGRAM TRACING: CPT | Performed by: INTERNAL MEDICINE

## 2017-05-21 PROCEDURE — 94667 MNPJ CHEST WALL 1ST: CPT

## 2017-05-21 PROCEDURE — 36415 COLL VENOUS BLD VENIPUNCTURE: CPT

## 2017-05-21 RX ORDER — OXYCODONE HYDROCHLORIDE 5 MG/1
15 TABLET ORAL EVERY 6 HOURS PRN
Status: DISCONTINUED | OUTPATIENT
Start: 2017-05-21 | End: 2017-05-23 | Stop reason: HOSPADM

## 2017-05-21 RX ORDER — NITROGLYCERIN 0.4 MG/1
TABLET SUBLINGUAL
Status: ACTIVE
Start: 2017-05-21 | End: 2017-05-21

## 2017-05-21 RX ORDER — ALBUTEROL SULFATE 90 UG/1
2 AEROSOL, METERED RESPIRATORY (INHALATION)
Status: DISCONTINUED | OUTPATIENT
Start: 2017-05-21 | End: 2017-05-23 | Stop reason: HOSPADM

## 2017-05-21 RX ORDER — OXYCODONE HYDROCHLORIDE 10 MG/1
10-15 TABLET ORAL EVERY 6 HOURS PRN
Status: DISCONTINUED | OUTPATIENT
Start: 2017-05-21 | End: 2017-05-21

## 2017-05-21 RX ORDER — IPRATROPIUM BROMIDE AND ALBUTEROL SULFATE 2.5; .5 MG/3ML; MG/3ML
3 SOLUTION RESPIRATORY (INHALATION)
Status: COMPLETED | OUTPATIENT
Start: 2017-05-21 | End: 2017-05-21

## 2017-05-21 RX ORDER — HEPARIN SODIUM 5000 [USP'U]/ML
5000 INJECTION, SOLUTION INTRAVENOUS; SUBCUTANEOUS EVERY 8 HOURS
Status: DISCONTINUED | OUTPATIENT
Start: 2017-05-21 | End: 2017-05-22

## 2017-05-21 RX ORDER — IPRATROPIUM BROMIDE AND ALBUTEROL SULFATE 2.5; .5 MG/3ML; MG/3ML
SOLUTION RESPIRATORY (INHALATION)
Status: ACTIVE
Start: 2017-05-21 | End: 2017-05-21

## 2017-05-21 RX ORDER — IPRATROPIUM BROMIDE AND ALBUTEROL SULFATE 2.5; .5 MG/3ML; MG/3ML
3 SOLUTION RESPIRATORY (INHALATION)
Status: DISCONTINUED | OUTPATIENT
Start: 2017-05-21 | End: 2017-05-22

## 2017-05-21 RX ORDER — WARFARIN SODIUM 5 MG/1
5 TABLET ORAL
Status: DISCONTINUED | OUTPATIENT
Start: 2017-05-21 | End: 2017-05-22

## 2017-05-21 RX ORDER — OXYCODONE HYDROCHLORIDE 10 MG/1
10 TABLET ORAL EVERY 6 HOURS PRN
Status: DISCONTINUED | OUTPATIENT
Start: 2017-05-21 | End: 2017-05-23 | Stop reason: HOSPADM

## 2017-05-21 RX ORDER — MORPHINE SULFATE 30 MG/1
30 TABLET, FILM COATED, EXTENDED RELEASE ORAL EVERY 12 HOURS
Status: DISCONTINUED | OUTPATIENT
Start: 2017-05-21 | End: 2017-05-23 | Stop reason: HOSPADM

## 2017-05-21 RX ADMIN — MORPHINE SULFATE 30 MG: 30 TABLET, EXTENDED RELEASE ORAL at 20:57

## 2017-05-21 RX ADMIN — TRAZODONE HYDROCHLORIDE 50 MG: 50 TABLET ORAL at 20:57

## 2017-05-21 RX ADMIN — VANCOMYCIN HYDROCHLORIDE 1600 MG: 100 INJECTION, POWDER, LYOPHILIZED, FOR SOLUTION INTRAVENOUS at 22:01

## 2017-05-21 RX ADMIN — GABAPENTIN 300 MG: 300 CAPSULE ORAL at 20:49

## 2017-05-21 RX ADMIN — GABAPENTIN 300 MG: 300 CAPSULE ORAL at 08:41

## 2017-05-21 RX ADMIN — MORPHINE SULFATE 15 MG: 15 TABLET, EXTENDED RELEASE ORAL at 08:45

## 2017-05-21 RX ADMIN — IPRATROPIUM BROMIDE AND ALBUTEROL SULFATE 3 ML: .5; 3 SOLUTION RESPIRATORY (INHALATION) at 14:15

## 2017-05-21 RX ADMIN — GABAPENTIN 300 MG: 300 CAPSULE ORAL at 16:24

## 2017-05-21 RX ADMIN — RISPERIDONE 0.5 MG: 0.5 TABLET, FILM COATED ORAL at 20:57

## 2017-05-21 RX ADMIN — WARFARIN SODIUM 5 MG: 5 TABLET ORAL at 17:45

## 2017-05-21 RX ADMIN — CARVEDILOL 6.25 MG: 6.25 TABLET, FILM COATED ORAL at 20:49

## 2017-05-21 RX ADMIN — IPRATROPIUM BROMIDE AND ALBUTEROL SULFATE 3 ML: .5; 3 SOLUTION RESPIRATORY (INHALATION) at 19:11

## 2017-05-21 RX ADMIN — OXYCODONE HYDROCHLORIDE 15 MG: 5 TABLET ORAL at 20:49

## 2017-05-21 RX ADMIN — BUDESONIDE AND FORMOTEROL FUMARATE DIHYDRATE 2 PUFF: 80; 4.5 AEROSOL RESPIRATORY (INHALATION) at 19:14

## 2017-05-21 RX ADMIN — AMPICILLIN SODIUM AND SULBACTAM SODIUM 3 G: 2; 1 INJECTION, POWDER, FOR SOLUTION INTRAMUSCULAR; INTRAVENOUS at 17:45

## 2017-05-21 RX ADMIN — OXYCODONE HYDROCHLORIDE 15 MG: 5 TABLET ORAL at 13:46

## 2017-05-21 RX ADMIN — CITALOPRAM HYDROBROMIDE 20 MG: 20 TABLET ORAL at 20:49

## 2017-05-21 RX ADMIN — OXYCODONE HYDROCHLORIDE 10 MG: 10 TABLET ORAL at 04:00

## 2017-05-21 RX ADMIN — HEPARIN SODIUM 5000 UNITS: 5000 INJECTION, SOLUTION INTRAVENOUS; SUBCUTANEOUS at 20:57

## 2017-05-21 RX ADMIN — IPRATROPIUM BROMIDE AND ALBUTEROL SULFATE 3 ML: .5; 3 SOLUTION RESPIRATORY (INHALATION) at 09:31

## 2017-05-21 RX ADMIN — STANDARDIZED SENNA CONCENTRATE AND DOCUSATE SODIUM 2 TABLET: 8.6; 5 TABLET, FILM COATED ORAL at 20:57

## 2017-05-21 RX ADMIN — INSULIN LISPRO 1 UNITS: 100 INJECTION, SOLUTION INTRAVENOUS; SUBCUTANEOUS at 21:00

## 2017-05-21 RX ADMIN — ALPRAZOLAM 0.5 MG: 0.25 TABLET ORAL at 17:45

## 2017-05-21 RX ADMIN — BUDESONIDE AND FORMOTEROL FUMARATE DIHYDRATE 2 PUFF: 80; 4.5 AEROSOL RESPIRATORY (INHALATION) at 08:41

## 2017-05-21 RX ADMIN — AMPICILLIN SODIUM AND SULBACTAM SODIUM 3 G: 2; 1 INJECTION, POWDER, FOR SOLUTION INTRAMUSCULAR; INTRAVENOUS at 12:28

## 2017-05-21 RX ADMIN — AMPICILLIN SODIUM AND SULBACTAM SODIUM 3 G: 2; 1 INJECTION, POWDER, FOR SOLUTION INTRAMUSCULAR; INTRAVENOUS at 06:08

## 2017-05-21 ASSESSMENT — COPD QUESTIONNAIRES
DO YOU EVER COUGH UP ANY MUCUS OR PHLEGM?: YES, A FEW DAYS A WEEK OR MONTH
COPD SCREENING SCORE: 6
DURING THE PAST 4 WEEKS HOW MUCH DID YOU FEEL SHORT OF BREATH: NONE/LITTLE OF THE TIME
HAVE YOU SMOKED AT LEAST 100 CIGARETTES IN YOUR ENTIRE LIFE: YES

## 2017-05-21 ASSESSMENT — ENCOUNTER SYMPTOMS
COUGH: 0
FEVER: 0
SHORTNESS OF BREATH: 0
HEADACHES: 0

## 2017-05-21 ASSESSMENT — PAIN SCALES - GENERAL
PAINLEVEL_OUTOF10: 8
PAINLEVEL_OUTOF10: 10
PAINLEVEL_OUTOF10: 10

## 2017-05-21 ASSESSMENT — LIFESTYLE VARIABLES: EVER_SMOKED: YES

## 2017-05-21 NOTE — PROGRESS NOTES
2 RN skin check completed. Moderate bruising noted to left abdomen. Redness and swelling noted to bilateral lower extremities.

## 2017-05-21 NOTE — CARE PLAN
Problem: Safety  Goal: Will remain free from injury  Outcome: PROGRESSING AS EXPECTED  Bed alarm on and in use, hourly rounding in place, call light within reach.    Problem: Pain Management  Goal: Pain level will decrease to patient’s comfort goal  Outcome: PROGRESSING AS EXPECTED  Scheduled MS Contin administered per MAR, PRN Oxy available if needed for breakthrough pain.

## 2017-05-21 NOTE — PROGRESS NOTES
During AM rounds with MD Metz pt states that she has been smoking and has been shooting heroin into her lower legs - the site where her cellulitis is, MD states he will increase pt's pain medications although cannot start her on Methadone. MD and this RN also offered pt counseling on quitting smoking and educated pt regarding risks of smoking especially with oxygen. MD also states he will put in an order for Hepatitis panel.

## 2017-05-21 NOTE — CARE PLAN
Problem: Infection  Goal: Will remain free from infection  Outcome: PROGRESSING AS EXPECTED  Patient receiving antibiotics. No new signs or symptoms of infection at this time.    Problem: Pain Management  Goal: Pain level will decrease to patient’s comfort goal  Outcome: PROGRESSING AS EXPECTED  Pain currently controlled with ordered pharmacologic regimen.

## 2017-05-21 NOTE — PROGRESS NOTES
"Pharmacy Kinetics 66 y.o. female on vancomycin day # 2 2017    Currently on Vancomycin 1600 mg iv q24hr    Indication for Treatment: LLE cellulitis    Pertinent history per medical record: Admitted on 2017 for SOB, cough, and bilateral LE edema/erythema. Per DW with MD on admit, no portal of entry was noted, no purulence, the pt's LEs look consistent with Streptococcal cellulitis. Pt uses heroin and reports that she injects it daily into the area of cellulitis. Due to pt's recent abx therapy which was deemed a failure, MD escalated abx to include MRSA coverage.    Other antibiotics: Unasyn 3g iv q6h     Allergies: Mushroom extract complex; Tylenol; and Zofran     List concerns for renal function: BMI ~34, HCV and HBV infection, IVDA, low albumin    Pertinent cultures to date:   17: PBC x 2 NGTD    Recent Labs      17   0150  17   0205   WBC  6.3  6.3   NEUTSPOLYS  63.40   --      Recent Labs      17   0150  17   0205   BUN  23*  14   CREATININE  0.73  0.52   ALBUMIN  4.0  2.9*     No results for input(s): VANCOTROUGH, VANCOPEAK, VANCORANDOM in the last 72 hours.  Intake/Output Summary (Last 24 hours) at 17 1637  Last data filed at 17 0600   Gross per 24 hour   Intake    825 ml   Output      0 ml   Net    825 ml      Blood pressure 131/66, pulse 62, temperature 36.8 °C (98.3 °F), resp. rate 22, height 1.549 m (5' 1\"), weight 81.647 kg (180 lb), SpO2 94 %, not currently breastfeeding. Temp (24hrs), Av.7 °C (98 °F), Min:36.4 °C (97.6 °F), Max:36.8 °C (98.3 °F)      A/P   1. Vancomycin dose change: not indicated  2. Next vancomycin level: tomorrow  at 1930  3. Goal trough: 12-16 mcg/mL  4. Comments: Will continue dose as ordered, trough tomorrow prior to 3rd total dose. Recommend de-escalation of therapy if cellulitis not likely due to staph.    Brianne Cabezas, PharmD         "

## 2017-05-21 NOTE — PROGRESS NOTES
Patient A&O x 4, sitting up in bed eating breakfast, complains of pain to BLE - scheduled MS Contin administered per MAR, marker lines drawn on legs by NOC RN where redness stops, pt up with 1-assist and FWW to use bathroom,  on and in use.

## 2017-05-21 NOTE — PROGRESS NOTES
Inpatient Anticoagulation Service Note    Date: 2017  Reason for Anticoagulation: Pulmonary Embolism        Hemoglobin Value: 13.3  Hematocrit Value: 43  Lab Platelet Value: 220  Target INR: 2.0 to 3.0    INR from last 7 days     Date/Time INR Value    17 0150 (!)1.72        Dose from last 7 days     Date/Time Dose (mg)    17 1900 7.5        Average Dose (mg): 5 (5mg qday)  Significant Interactions: Antibiotics (citalopram)  Bridge Therapy: No     Comments:  1. Pt admitted with a subtherapeutic INR value    Home regimen detailed above  2. Drug interactions   Noted above  3. Hematology   Pt's cell lines are WNL  4. Plan   Will order 7.5mg x1, followed by: 5mg po qday thereafter   INR with AM labs    Education Material Provided?: No (pt on VKA PTA)  Pharmacist suggested discharge dosin.5mg x1, followed by 5mg qday. Repeat INR within 72 hours of DC     Linh Adair, PharmD, BCPS

## 2017-05-21 NOTE — PROGRESS NOTES
0915: Rapid response called for chest pain, pt states she has had MI's in the past, had stents placed, has angina and normally takes nitro for it at home, also states she is withdrawing from Heroin and needs Methadone.  0917: MD Metz updated regarding Rapid response. STAT EKG and STAT Troponins ordered per protocol. MD states he will come assess patient shortly.  0920: Rapid response team and RT in to pt's bedside.  0925: STAT EKG and STAT Trops completed - Rapid response team looked at EKG, RT in to give pt treatment.  0930: Nitro pulled out from [employer] to give to patient, patient states she has to go to the bathroom and she does not have anymore chest pain, nitro returned to [facility]. Pt assisted to bathroom to void, assisted back to bed. Pt states that when she is withdrawing from Heroin she has this chest pain.

## 2017-05-21 NOTE — CODE DOCUMENTATION
Rapid response for chest pain 8/10 on inspiration and expiration. Pt states she has had chest pain at home that is relieved with nitro.

## 2017-05-21 NOTE — PROGRESS NOTES
"Pharmacy Kinetics 66 y.o. female on vancomycin day # 1  2017    Currently on Vancomycin    17 @ 1957: 2000mg iv x1 LD    Indication for Treatment: SSTI    Pertinent history per medical record:    Admitted on 2017 for SOB, cough, and bilateral LE edema/erythema.   Per DW with MD, no portal of entry was noted, no purulence, the pt's LEs look consistent with Streptococcal cellulitis    Due to pt's recent abx therapy which was deemed a failure, MD escalated abx to include MRSA coverage    Other antibiotics: Unasyn 3g iv q6h     Allergies: Mushroom extract complex; Tylenol; and Zofran     List concerns for renal function:    HCV and HBV infections   BMI ~ 34     Recent Labs      17   0150   WBC  6.3   NEUTSPOLYS  63.40     Recent Labs      17   0150   BUN  23*   CREATININE  0.73   ALBUMIN  4.0     Blood pressure 131/59, pulse 64, temperature 37 °C (98.6 °F), resp. rate 12, height 1.549 m (5' 1\"), weight 81.647 kg (180 lb), SpO2 96 %. Temp (24hrs), Av °C (98.6 °F), Min:37 °C (98.6 °F), Max:37 °C (98.6 °F)      A/P   1. Vancomycin new start: LD as above, followed by: 1600mg iv q24h (20)  2. Next vancomycin level: 17 @ 1930 (if therapy continues)  3. Goal trough: 12-16 mcg/mL  4. Comments: pt with relatively limited concerns for the risk of accumulation/renal insult  a. Review of prior dosing schematics only shows levels following a single \"pulsed dose.\"  b. Will initiate therapy per protocol  c. Trough ordered prior to SS  d. Recommend rapid de-escalation of therapy as anti-MRSA therapy is unlikely indicated in this case    Linh Adair, BinduD, BCPS        "

## 2017-05-21 NOTE — DISCHARGE PLANNING
Received call from Skylar at [facility], requesting copy of ANTHONY PAYAN note for insurance authorization. ANTHONY PAYAN note faxed to [facility]  at [phone].

## 2017-05-21 NOTE — PROGRESS NOTES
Patient alert and oriented at this time, but falling asleep between questions during admission interview. Admission interview only partially complete at this time.  applied to patient. Patient currently saturating at 96% with a pulse rate of 62.

## 2017-05-21 NOTE — RESPIRATORY CARE
COPD EDUCATION by COPD CLINICAL EDUCATOR  5/21/2017 at 7:07 AM by Michelle Acosta     Patient reviewed by COPD education team. Patient does not qualify for COPD program.
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
## 2017-05-21 NOTE — PROGRESS NOTES
During the admission interview, patient denied current recreational drug use. However, at approximately 0340, patient intimated to CNA that she had used heroine last on 05/19 at approximately 1800. Upon further discussion with the RN, patient stated that she had been using heroine regularly to help control pain. JESICA Coe notified of aforementioned information. Urine drug screen ordered. Per APN, currently to continue with oxycodone for pain control.

## 2017-05-21 NOTE — ED NOTES
Admit orders received, waiting for medical bed.  Pt resting in bed comfortably, will continue to monitor.

## 2017-05-21 NOTE — H&P
PRIMARY CARE PHYSICIAN:  None stated.    CHIEF COMPLAINT:  Leg pain.    HISTORY OF PRESENT ILLNESS:  She is a 66-year-old female, who came to the ER   with above.  Per patient, over the past week or so she has been noticing more   pain over the leg and has been getting over the past day or so.  She feels hot   in her leg and she noticed more redness on the left side than the right side.    So, she decided to come to the ER.  In the ER, she was found to have   cellulitis of the leg.  Apart from that, patient denies any fever, chills,   chest pain or palpitation.  Patient does complain about weakness.    REVIEW OF SYSTEMS:  All other systems were reviewed and negative, the rest per   HPI.    ALLERGY HISTORY:  ALLERGIC TO MUSHROOM, TYLENOL AND ZOFRAN.    PAST MEDICAL HISTORY:  History of deep venous thrombosis, history of   noncompliance, COPD on 3 liter oxygen, IV drug abuse history, hepatitis C   infection, hepatitis B infection, nicotine dependent, diabetes mellitus type   2, fibromyalgia and chronic pain syndrome.    PAST SURGICAL HISTORY:  Cardiac catheterization _____ debridement.    FAMILY HISTORY:  No pertinent family history.    SOCIAL HISTORY:  Patient smokes 5 cigarettes a day.  Denies alcohol drinking   or illicit drug abuse.    OUTPATIENT MEDICATIONS:  Carvedilol 6.25 mg b.i.d., Celexa 20 mg daily,   metformin 850 mg b.i.d., trazodone 50 mg daily, oxycodone 10 mg every _____   hours p.r.n., warfarin 5 mg daily, MS Contin 50 mg b.i.d., Symbicort inhaler,   risperidone 0.5 mg at bedtime, gabapentin 300 mg t.i.d., Xanax 0.5 mg 3 times   a day p.r.n.    PHYSICAL EXAMINATION:  VITAL SIGNS:  Temperature 98.6, pulse rate 64, respiratory rate 12, blood   pressure 123/54, satting 98% on 2 L oxygen.  GENERAL:  Alert, communicative.  HEENT:  Normocephalic, atraumatic.  NECK:  Supple.  No neck gland enlargement.  CARDIOVASCULAR:  Bradycardia.  No murmur.  RESPIRATORY:  Normal respiratory effort.  No  wheezing.  ABDOMEN:  Soft, bowel sounds present.  Nontender.  CENTRAL NERVOUS SYSTEM:  Cranial nerves II-XII intact.  No neurological   deficit.  EXTREMITIES:  Bilateral lower extremity edema and bilateral cellulitis with   left-sided worse than right-sided.  SKIN:  Warm and moist.    LABORATORY DATA:  CBC within normal limits.  Chemistry panel within normal   limits.    ASSESSMENT AND PLAN:  1.  Bilateral lower extremity cellulitis.  Patient has history of MRSA.    Patient will be started on IV vancomycin per pharmacy dosing.  2.  History of chronic obstructive pulmonary disease, on 2 liter oxygen,   stable.  3.  Hyponatremia.  4.  Diabetes mellitus, on insulin management, hypoglycemic protocol.  5.  History of chronic pain syndrome.  6.  History of essential hypertension.  7.  Deep venous thrombosis prophylaxis.  The patient is on warfarin.  She is a   full code.  8.  History of DVT, PE, on warfarin and history of IVC filter placed.  9.  Nicotine dependence.  I spent more than 10 minutes on smoking cessation   education..       ____________________________________     MD RONNIE DOE / MIKE    DD:  05/20/2017 17:44:59  DT:  05/20/2017 19:44:05    D#:  [id number]  Job#:  [id number]

## 2017-05-22 LAB
ALBUMIN SERPL BCP-MCNC: 2.8 G/DL (ref 3.2–4.9)
AMPHET UR QL SCN: NEGATIVE
ANISOCYTOSIS BLD QL SMEAR: ABNORMAL
BARBITURATES UR QL SCN: NEGATIVE
BASOPHILS # BLD AUTO: 0 % (ref 0–1.8)
BASOPHILS # BLD: 0 K/UL (ref 0–0.12)
BENZODIAZ UR QL SCN: NEGATIVE
BUN SERPL-MCNC: 15 MG/DL (ref 8–22)
BZE UR QL SCN: NEGATIVE
CALCIUM SERPL-MCNC: 8.8 MG/DL (ref 8.5–10.5)
CANNABINOIDS UR QL SCN: NEGATIVE
CHLORIDE SERPL-SCNC: 109 MMOL/L (ref 96–112)
CO2 SERPL-SCNC: 28 MMOL/L (ref 20–33)
CREAT SERPL-MCNC: 0.6 MG/DL (ref 0.5–1.4)
EOSINOPHIL # BLD AUTO: 0.21 K/UL (ref 0–0.51)
EOSINOPHIL NFR BLD: 4.3 % (ref 0–6.9)
ERYTHROCYTE [DISTWIDTH] IN BLOOD BY AUTOMATED COUNT: 49.5 FL (ref 35.9–50)
GFR SERPL CREATININE-BSD FRML MDRD: >60 ML/MIN/1.73 M 2
GIANT PLATELETS BLD QL SMEAR: NORMAL
GLUCOSE BLD-MCNC: 119 MG/DL (ref 65–99)
GLUCOSE BLD-MCNC: 139 MG/DL (ref 65–99)
GLUCOSE BLD-MCNC: 153 MG/DL (ref 65–99)
GLUCOSE BLD-MCNC: 160 MG/DL (ref 65–99)
GLUCOSE SERPL-MCNC: 71 MG/DL (ref 65–99)
HCT VFR BLD AUTO: 36.8 % (ref 37–47)
HGB BLD-MCNC: 11 G/DL (ref 12–16)
INR PPP: 3.48 (ref 0.87–1.13)
LYMPHOCYTES # BLD AUTO: 1.71 K/UL (ref 1–4.8)
LYMPHOCYTES NFR BLD: 34.8 % (ref 22–41)
MANUAL DIFF BLD: NORMAL
MCH RBC QN AUTO: 25.9 PG (ref 27–33)
MCHC RBC AUTO-ENTMCNC: 29.9 G/DL (ref 33.6–35)
MCV RBC AUTO: 86.8 FL (ref 81.4–97.8)
MDMA UR QL SCN: NEGATIVE
METHADONE UR QL SCN: POSITIVE
MICROCYTES BLD QL SMEAR: ABNORMAL
MONOCYTES # BLD AUTO: 0.13 K/UL (ref 0–0.85)
MONOCYTES NFR BLD AUTO: 2.6 % (ref 0–13.4)
MORPHOLOGY BLD-IMP: NORMAL
NEUTROPHILS # BLD AUTO: 2.86 K/UL (ref 2–7.15)
NEUTROPHILS NFR BLD: 58.3 % (ref 44–72)
NRBC # BLD AUTO: 0 K/UL
NRBC BLD AUTO-RTO: 0 /100 WBC
OPIATES UR QL SCN: POSITIVE
OVALOCYTES BLD QL SMEAR: NORMAL
OXYCODONE UR QL SCN: POSITIVE
PCP UR QL SCN: NEGATIVE
PHOSPHATE SERPL-MCNC: 3.2 MG/DL (ref 2.5–4.5)
PLATELET # BLD AUTO: 197 K/UL (ref 164–446)
PLATELET BLD QL SMEAR: NORMAL
PMV BLD AUTO: 10.9 FL (ref 9–12.9)
POIKILOCYTOSIS BLD QL SMEAR: NORMAL
POTASSIUM SERPL-SCNC: 4.3 MMOL/L (ref 3.6–5.5)
PROPOXYPH UR QL SCN: NEGATIVE
PROTHROMBIN TIME: 36 SEC (ref 12–14.6)
RBC # BLD AUTO: 4.24 M/UL (ref 4.2–5.4)
RBC BLD AUTO: PRESENT
SODIUM SERPL-SCNC: 140 MMOL/L (ref 135–145)
VANCOMYCIN TROUGH SERPL-MCNC: 19.9 UG/ML (ref 10–20)
WBC # BLD AUTO: 4.9 K/UL (ref 4.8–10.8)

## 2017-05-22 PROCEDURE — 94669 MECHANICAL CHEST WALL OSCILL: CPT

## 2017-05-22 PROCEDURE — 700111 HCHG RX REV CODE 636 W/ 250 OVERRIDE (IP): Performed by: INTERNAL MEDICINE

## 2017-05-22 PROCEDURE — A9270 NON-COVERED ITEM OR SERVICE: HCPCS | Performed by: INTERNAL MEDICINE

## 2017-05-22 PROCEDURE — 82962 GLUCOSE BLOOD TEST: CPT | Mod: 91

## 2017-05-22 PROCEDURE — 94668 MNPJ CHEST WALL SBSQ: CPT

## 2017-05-22 PROCEDURE — 85027 COMPLETE CBC AUTOMATED: CPT

## 2017-05-22 PROCEDURE — 80069 RENAL FUNCTION PANEL: CPT

## 2017-05-22 PROCEDURE — 700102 HCHG RX REV CODE 250 W/ 637 OVERRIDE(OP): Performed by: INTERNAL MEDICINE

## 2017-05-22 PROCEDURE — 700101 HCHG RX REV CODE 250: Performed by: INTERNAL MEDICINE

## 2017-05-22 PROCEDURE — 700105 HCHG RX REV CODE 258: Performed by: INTERNAL MEDICINE

## 2017-05-22 PROCEDURE — 80202 ASSAY OF VANCOMYCIN: CPT

## 2017-05-22 PROCEDURE — 99233 SBSQ HOSP IP/OBS HIGH 50: CPT | Performed by: INTERNAL MEDICINE

## 2017-05-22 PROCEDURE — 80307 DRUG TEST PRSMV CHEM ANLYZR: CPT

## 2017-05-22 PROCEDURE — 36415 COLL VENOUS BLD VENIPUNCTURE: CPT

## 2017-05-22 PROCEDURE — 770006 HCHG ROOM/CARE - MED/SURG/GYN SEMI*

## 2017-05-22 PROCEDURE — 85007 BL SMEAR W/DIFF WBC COUNT: CPT

## 2017-05-22 PROCEDURE — 85610 PROTHROMBIN TIME: CPT

## 2017-05-22 PROCEDURE — 94640 AIRWAY INHALATION TREATMENT: CPT

## 2017-05-22 RX ORDER — GUAIFENESIN/DEXTROMETHORPHAN 100-10MG/5
5 SYRUP ORAL EVERY 8 HOURS PRN
Status: DISCONTINUED | OUTPATIENT
Start: 2017-05-22 | End: 2017-05-23 | Stop reason: HOSPADM

## 2017-05-22 RX ORDER — AMOXICILLIN AND CLAVULANATE POTASSIUM 875; 125 MG/1; MG/1
1 TABLET, FILM COATED ORAL EVERY 12 HOURS
Status: DISCONTINUED | OUTPATIENT
Start: 2017-05-22 | End: 2017-05-23 | Stop reason: HOSPADM

## 2017-05-22 RX ORDER — IPRATROPIUM BROMIDE AND ALBUTEROL SULFATE 2.5; .5 MG/3ML; MG/3ML
3 SOLUTION RESPIRATORY (INHALATION)
Status: DISCONTINUED | OUTPATIENT
Start: 2017-05-22 | End: 2017-05-23 | Stop reason: HOSPADM

## 2017-05-22 RX ADMIN — INSULIN LISPRO 1 UNITS: 100 INJECTION, SOLUTION INTRAVENOUS; SUBCUTANEOUS at 20:18

## 2017-05-22 RX ADMIN — CITALOPRAM HYDROBROMIDE 20 MG: 20 TABLET ORAL at 20:18

## 2017-05-22 RX ADMIN — OXYCODONE HYDROCHLORIDE 15 MG: 5 TABLET ORAL at 09:35

## 2017-05-22 RX ADMIN — AMPICILLIN SODIUM AND SULBACTAM SODIUM 3 G: 2; 1 INJECTION, POWDER, FOR SOLUTION INTRAMUSCULAR; INTRAVENOUS at 00:23

## 2017-05-22 RX ADMIN — BUDESONIDE AND FORMOTEROL FUMARATE DIHYDRATE 2 PUFF: 80; 4.5 AEROSOL RESPIRATORY (INHALATION) at 08:04

## 2017-05-22 RX ADMIN — OXYCODONE HYDROCHLORIDE 15 MG: 5 TABLET ORAL at 02:57

## 2017-05-22 RX ADMIN — INSULIN LISPRO 1 UNITS: 100 INJECTION, SOLUTION INTRAVENOUS; SUBCUTANEOUS at 17:35

## 2017-05-22 RX ADMIN — IPRATROPIUM BROMIDE AND ALBUTEROL SULFATE 3 ML: .5; 3 SOLUTION RESPIRATORY (INHALATION) at 14:19

## 2017-05-22 RX ADMIN — OXYCODONE HYDROCHLORIDE 15 MG: 5 TABLET ORAL at 17:32

## 2017-05-22 RX ADMIN — IPRATROPIUM BROMIDE AND ALBUTEROL SULFATE 3 ML: .5; 3 SOLUTION RESPIRATORY (INHALATION) at 18:38

## 2017-05-22 RX ADMIN — MORPHINE SULFATE 30 MG: 30 TABLET, EXTENDED RELEASE ORAL at 20:18

## 2017-05-22 RX ADMIN — AMPICILLIN SODIUM AND SULBACTAM SODIUM 3 G: 2; 1 INJECTION, POWDER, FOR SOLUTION INTRAMUSCULAR; INTRAVENOUS at 11:24

## 2017-05-22 RX ADMIN — GUAIFENESIN AND DEXTROMETHORPHAN 5 ML: 100; 10 SYRUP ORAL at 20:31

## 2017-05-22 RX ADMIN — GABAPENTIN 300 MG: 300 CAPSULE ORAL at 20:18

## 2017-05-22 RX ADMIN — MORPHINE SULFATE 30 MG: 30 TABLET, EXTENDED RELEASE ORAL at 08:04

## 2017-05-22 RX ADMIN — AMOXICILLIN AND CLAVULANATE POTASSIUM 1 TABLET: 875; 125 TABLET, FILM COATED ORAL at 20:20

## 2017-05-22 RX ADMIN — ALPRAZOLAM 0.5 MG: 0.25 TABLET ORAL at 11:53

## 2017-05-22 RX ADMIN — VANCOMYCIN HYDROCHLORIDE 1600 MG: 100 INJECTION, POWDER, LYOPHILIZED, FOR SOLUTION INTRAVENOUS at 20:15

## 2017-05-22 RX ADMIN — HEPARIN SODIUM 5000 UNITS: 5000 INJECTION, SOLUTION INTRAVENOUS; SUBCUTANEOUS at 06:42

## 2017-05-22 RX ADMIN — AMPICILLIN SODIUM AND SULBACTAM SODIUM 3 G: 2; 1 INJECTION, POWDER, FOR SOLUTION INTRAMUSCULAR; INTRAVENOUS at 06:41

## 2017-05-22 RX ADMIN — GABAPENTIN 300 MG: 300 CAPSULE ORAL at 08:04

## 2017-05-22 RX ADMIN — CARVEDILOL 6.25 MG: 6.25 TABLET, FILM COATED ORAL at 20:18

## 2017-05-22 RX ADMIN — TRAZODONE HYDROCHLORIDE 50 MG: 50 TABLET ORAL at 20:18

## 2017-05-22 RX ADMIN — STANDARDIZED SENNA CONCENTRATE AND DOCUSATE SODIUM 2 TABLET: 8.6; 5 TABLET, FILM COATED ORAL at 20:18

## 2017-05-22 RX ADMIN — STANDARDIZED SENNA CONCENTRATE AND DOCUSATE SODIUM 2 TABLET: 8.6; 5 TABLET, FILM COATED ORAL at 08:04

## 2017-05-22 RX ADMIN — BUDESONIDE AND FORMOTEROL FUMARATE DIHYDRATE 2 PUFF: 80; 4.5 AEROSOL RESPIRATORY (INHALATION) at 20:30

## 2017-05-22 RX ADMIN — IPRATROPIUM BROMIDE AND ALBUTEROL SULFATE 3 ML: .5; 3 SOLUTION RESPIRATORY (INHALATION) at 10:26

## 2017-05-22 RX ADMIN — IPRATROPIUM BROMIDE AND ALBUTEROL SULFATE 3 ML: .5; 3 SOLUTION RESPIRATORY (INHALATION) at 06:09

## 2017-05-22 RX ADMIN — GABAPENTIN 300 MG: 300 CAPSULE ORAL at 15:38

## 2017-05-22 RX ADMIN — BUDESONIDE AND FORMOTEROL FUMARATE DIHYDRATE 2 PUFF: 80; 4.5 AEROSOL RESPIRATORY (INHALATION) at 06:16

## 2017-05-22 RX ADMIN — GUAIFENESIN AND DEXTROMETHORPHAN 5 ML: 100; 10 SYRUP ORAL at 17:36

## 2017-05-22 RX ADMIN — RISPERIDONE 0.5 MG: 0.5 TABLET, FILM COATED ORAL at 20:18

## 2017-05-22 ASSESSMENT — PAIN SCALES - GENERAL
PAINLEVEL_OUTOF10: 10
PAINLEVEL_OUTOF10: 8
PAINLEVEL_OUTOF10: 9
PAINLEVEL_OUTOF10: 8
PAINLEVEL_OUTOF10: 10

## 2017-05-22 ASSESSMENT — ENCOUNTER SYMPTOMS
SHORTNESS OF BREATH: 0
FEVER: 0
HEADACHES: 0
COUGH: 0

## 2017-05-22 NOTE — PROGRESS NOTES
Pt c/o pain when she is awake, or woken up   Sleeping most of this shift   A/o x4  Bed alarm on for safety   No c/o chest pain   Leg swelling improving according to marking on legs  Will continue to monitor

## 2017-05-22 NOTE — PROGRESS NOTES
Hospital Medicine Progress Note, Adult, Complex               Author: Rupesh Kaplan Date & Time created: 5/22/2017  9:12 AM     Interval History:  65 yo female presents with bilateral leg pain and redness.  The patient admits that she uses 2 balls of heroin daily and she likes to inject them to her legs.    5/21/17:  The patient reports of chest pain whenever she stop using heroin.   The patient states she needs more pain medication or she will going into withdrawal.  5/22/17:   The patient states that she is feeling better today and no chest pain.    Review of Systems:  Review of Systems   Constitutional: Negative for fever.   Respiratory: Negative for cough and shortness of breath.    Cardiovascular: Negative for chest pain.   Neurological: Negative for headaches.       Physical Exam:  Physical Exam   Constitutional: She is oriented to person, place, and time. No distress.   HENT:   Head: Normocephalic and atraumatic.   Mouth/Throat: Oropharynx is clear and moist. No oropharyngeal exudate.   Eyes: Pupils are equal, round, and reactive to light. Right eye exhibits no discharge. Left eye exhibits no discharge.   Neck: Normal range of motion. Neck supple. No JVD present.   Cardiovascular: Normal rate and regular rhythm.    Pulmonary/Chest: Effort normal and breath sounds normal. No respiratory distress.   Abdominal: Soft. Bowel sounds are normal. She exhibits no distension.   Musculoskeletal: Normal range of motion. She exhibits edema.   Neurological: She is alert and oriented to person, place, and time.   Skin: Skin is warm. She is not diaphoretic. There is erythema.   Erythema in bilateral lower extremities with needle marks.  Erythematous area decreased compare to yesterday.   Psychiatric: She has a normal mood and affect.       Labs:        Invalid input(s): [id number]  Recent Labs      05/20/17   0150  05/20/17   1553  05/21/17   0929  05/21/17   1445  05/21/17   2046   TROPONINI  <0.01   --   <0.01  <0.01   <0.01   BNPBTYPENAT  85  132*   --    --    --      Recent Labs      17   SODIUM  133*  142  140   POTASSIUM  3.7  3.9  4.3   CHLORIDE  99  109  109   CO2  29  27  28   BUN  23*  14  15   CREATININE  0.73  0.52  0.60   PHOSPHORUS   --    --   3.2   CALCIUM  9.5  8.2*  8.8     Recent Labs      17   ALTSGPT  18  11   --    ASTSGOT  18  12   --    ALKPHOSPHAT  81  57   --    TBILIRUBIN  0.6  0.4   --    LIPASE  7*   --    --    GLUCOSE  110*  82  71     Recent Labs      17   RBC  5.05  4.14*  4.24   HEMOGLOBIN  13.3  10.8*  11.0*   HEMATOCRIT  43.0  34.9*  36.8*   PLATELETCT  220  206  197   PROTHROMBTM  20.7*  27.4*  36.0*   APTT  37.1*   --    --    INR  1.72*  2.46*  3.48*     Recent Labs      17   WBC  6.3  6.3  4.9   NEUTSPOLYS  63.40   --   58.30   LYMPHOCYTES  24.60   --   34.80   MONOCYTES  7.80   --   2.60   EOSINOPHILS  2.60   --   4.30   BASOPHILS  1.00   --   0.00   ASTSGOT  18  12   --    ALTSGPT  18  11   --    ALKPHOSPHAT  81  57   --    TBILIRUBIN  0.6  0.4   --            Hemodynamics:  Temp (24hrs), Av.8 °C (98.2 °F), Min:36.7 °C (98.1 °F), Max:36.9 °C (98.4 °F)  Temperature: 36.9 °C (98.4 °F)  Pulse  Av.2  Min: 46  Max: 70  Blood Pressure: 139/74 mmHg     Respiratory:    Respiration: 18, Pulse Oximetry: 94 %, O2 Daily Delivery Respiratory : Silicone Nasal Cannula     Given By:: Mouthpiece, #MDI/DPI Given: MDI/DPI x 1, PEP/CPT Method: Positive Airway Pressure Device, Work Of Breathing / Effort: Mild  RUL Breath Sounds: Crackles, RML Breath Sounds: Crackles, RLL Breath Sounds: Crackles, MARC Breath Sounds: Crackles, LLL Breath Sounds: Crackles  Fluids:  No intake or output data in the 24 hours ending 17 0912     GI/Nutrition:  Orders Placed This Encounter   Procedures   • Diet Order     Standing Status:  Standing      Number of Occurrences: 1      Standing Expiration Date:      Order Specific Question:  Diet:     Answer:  Diabetic [3]     Medical Decision Making, by Problem:  Active Hospital Problems    Diagnosis   • *Lower extremity cellulitis [L03.119]  -- continue vancomycin.  Monitor erythema area for progress.  Transition to augmentin.   Anticipate total 7 days course.  -- likely source is from iv drug injection sites.     • Atypical chest pain [R07.89]  -- ekg reviewed.  No evidence suggest of acute ischemia.  Troponin negative x 3 sets       • Chronic respiratory failure (CMS-HCC) [J96.10]  -- continue home oxygen and svn prn.     • Hyponatremia [E87.1]  -- resolved with iv fluid infusion     • Heroin abuse [F11.10]  -- discussed with the patient to stop abusing ilicit drug.     • Anemia [D64.9]  -- likely dilutional from iv infusion.  Hemoglobin improved today.     • Chronic pain [G89.29]  -- continue ms contin and prn oxycodone to prevent acute withdrawal.     Anticipate discharge home tomorrow with oral antibiotic if the patient's condition continues to improve and blood cultures remain negative.          DVT Prophylaxis: Heparin

## 2017-05-22 NOTE — PROGRESS NOTES
Inpatient Anticoagulation Service Note    Date: 5/22/2017  Reason for Anticoagulation: Pulmonary Embolism        Hemoglobin Value: 11  Hematocrit Value: 36.8  Lab Platelet Value: 197  Target INR: 2.0 to 3.0    INR from last 7 days     Date/Time INR Value    05/22/17 0046 (!)3.48    05/21/17 0205 (!)2.46    05/20/17 0150 (!)1.72        Dose from last 7 days     Date/Time Dose (mg)    05/22/17 1600 0    05/21/17 1700 5    05/20/17 1900 7.5        Average Dose (mg): 5 (5mg qday)  Significant Interactions: Antibiotics (citalopram)  Bridge Therapy: No     Comments: INR jumped a whole point today, will hold dose tonight.     Plan:  HOLD dose tonight, INR in the morning  Education Material Provided?: No (pt on [facility] PTA)  Pharmacist suggested discharge dosing: currently supratherapeutic on home dose, TBD     Brianne Cabezas, PharmD

## 2017-05-22 NOTE — PROGRESS NOTES
Inpatient Anticoagulation Service Note    Date: 2017  Reason for Anticoagulation: Pulmonary Embolism        Hemoglobin Value: 10.8  Hematocrit Value: 34.9  Lab Platelet Value: 206  Target INR: 2.0 to 3.0    INR from last 7 days     Date/Time INR Value    17 0205 (!)2.46    17 0150 (!)1.72        Dose from last 7 days     Date/Time Dose (mg)    17 1700 5    17 1900 7.5        Average Dose (mg): 5 (5mg qday)  Significant Interactions: Antibiotics (citalopram)  Bridge Therapy: No     Comments: INR with big jump after 7.5 mg dose, will start 5 mg tonight and recheck INR in the morning. Likely will require a lower dose while on antibiotics. Reassess tomorrow.    Plan:  5 mg, inr in the morning  Education Material Provided?: No (pt on [facility] PTA)  Pharmacist suggested discharge dosin mg daily w/ INR follow up within 72 hours of discharge.     Brianne Cabezas, PharmD

## 2017-05-22 NOTE — CARE PLAN
Problem: Pain Management  Goal: Pain level will decrease to patient’s comfort goal  Outcome: PROGRESSING AS EXPECTED  Pt. With complaints of BLE pain. Medicated per MAR.

## 2017-05-22 NOTE — PROGRESS NOTES
MD at bedside during rounds. Pt. Requesting for cough medicine. Received verbal order for Robutussin DM 5ml from MD Kaplan. Order read back to MD Kaplan.

## 2017-05-22 NOTE — PROGRESS NOTES
Hospital Medicine Progress Note, Adult, Complex               Author: Rupesh Kaplan Date & Time created: 5/21/2017  6:20 PM     Interval History:  65 yo female presents with bilateral leg pain and redness.  The patient admits that she uses 2 balls of heroin daily and she likes to inject them to her legs.    5/21/17:  The patient reports of chest pain whenever she stop using heroin.   The patient states she needs more pain medication or she will going into withdrawal.    Review of Systems:  Review of Systems   Constitutional: Negative for fever.   Respiratory: Negative for cough and shortness of breath.    Cardiovascular: Positive for chest pain.   Neurological: Negative for headaches.       Physical Exam:  Physical Exam   Constitutional: She is oriented to person, place, and time. No distress.   HENT:   Head: Normocephalic and atraumatic.   Mouth/Throat: Oropharynx is clear and moist. No oropharyngeal exudate.   Eyes: Pupils are equal, round, and reactive to light. Right eye exhibits no discharge. Left eye exhibits no discharge.   Neck: Normal range of motion. Neck supple. No JVD present.   Cardiovascular: Normal rate and regular rhythm.    Pulmonary/Chest: Effort normal and breath sounds normal. No respiratory distress.   Abdominal: Soft. Bowel sounds are normal. She exhibits no distension.   Musculoskeletal: Normal range of motion. She exhibits edema.   Neurological: She is alert and oriented to person, place, and time.   Skin: Skin is warm. She is not diaphoretic. There is erythema.   Erythema in bilateral lower extremities with needle marks but lower than the line tracing.   Psychiatric: She has a normal mood and affect.       Labs:        Invalid input(s): [id number]  Recent Labs      05/20/17   0150  05/20/17   1553  05/21/17   0929  05/21/17   1445   TROPONINI  <0.01   --   <0.01  <0.01   BNPBTYPENAT  85  132*   --    --      Recent Labs      05/20/17   0150  05/21/17   0205   SODIUM  133*  142   POTASSIUM   3.7  3.9   CHLORIDE  99  109   CO2  29  27   BUN  23*  14   CREATININE  0.73  0.52   CALCIUM  9.5  8.2*     Recent Labs      17   0150  17   0205   ALTSGPT  18  11   ASTSGOT  18  12   ALKPHOSPHAT  81  57   TBILIRUBIN  0.6  0.4   LIPASE  7*   --    GLUCOSE  110*  82     Recent Labs      17   0150  17   0205   RBC  5.05  4.14*   HEMOGLOBIN  13.3  10.8*   HEMATOCRIT  43.0  34.9*   PLATELETCT  220  206   PROTHROMBTM  20.7*  27.4*   APTT  37.1*   --    INR  1.72*  2.46*     Recent Labs      17   0150  17   0205   WBC  6.3  6.3   NEUTSPOLYS  63.40   --    LYMPHOCYTES  24.60   --    MONOCYTES  7.80   --    EOSINOPHILS  2.60   --    BASOPHILS  1.00   --    ASTSGOT  18  12   ALTSGPT  18  11   ALKPHOSPHAT  81  57   TBILIRUBIN  0.6  0.4           Hemodynamics:  Temp (24hrs), Av.7 °C (98 °F), Min:36.4 °C (97.6 °F), Max:36.8 °C (98.3 °F)  Temperature: 36.7 °C (98.1 °F)  Pulse  Av.4  Min: 54  Max: 70  Blood Pressure: 130/59 mmHg, NIBP: 117/64 mmHg     Respiratory:    Respiration: 18, Pulse Oximetry: 98 %, O2 Daily Delivery Respiratory : Silicone Nasal Cannula     Given By:: Mouthpiece, PEP/CPT Method: Positive Airway Pressure Device, Work Of Breathing / Effort: Moderate;Mild;Increased Work of Breathing  RUL Breath Sounds: Expiratory Wheezes, RML Breath Sounds: Expiratory Wheezes, RLL Breath Sounds: Expiratory Wheezes, MARC Breath Sounds: Expiratory Wheezes, LLL Breath Sounds: Expiratory Wheezes  Fluids:    Intake/Output Summary (Last 24 hours) at 17 1820  Last data filed at 17 0600   Gross per 24 hour   Intake    825 ml   Output      0 ml   Net    825 ml        GI/Nutrition:  Orders Placed This Encounter   Procedures   • Diet Order     Standing Status: Standing      Number of Occurrences: 1      Standing Expiration Date:      Order Specific Question:  Diet:     Answer:  Diabetic [3]     Medical Decision Making, by Problem:  Active Hospital Problems    Diagnosis   • *Lower  extremity cellulitis [L03.119]  -- continue unasyn and vancomycin.  Monitor erythema area for progress  -- likely source is from iv drug injection sites.     • Atypical chest pain [R07.89]  -- ekg reviewed.  No evidence suggest of acute ischemia.  Trend troponin.       • Chronic respiratory failure (CMS-HCC) [J96.10]  -- continue home oxygen and svn prn.     • Hyponatremia [E87.1]  -- improved with iv fluid infusion     • Heroin abuse [F11.10]  -- discussed with the patient to stop abusing ilicit drug.     • Anemia [D64.9]  -- likely dilutional from iv infusion     • Chronic pain [G89.29]  -- resume ms contin and increase prn oxycodone to prevent acute withdrawal.               DVT Prophylaxis: Heparin

## 2017-05-22 NOTE — PROGRESS NOTES
Received report from day shift RN, assumed care. Pt. Is awake, on bed. A&Ox4, stand by assist , pt. With complaints of BLE pain, medicated per MAR. Pt. On 2L O2 via NC, tolerating well, IS of 750, flutter valve at bedside. Plan of care was safety, comfort and rest. Discussed plan of care. Bed alarm in use, call light and personal belongings within reach, bed kept low, treaded socks on. Assisted as necessary. Kept rested and comfortable at all times.

## 2017-05-23 ENCOUNTER — PATIENT OUTREACH (OUTPATIENT)
Dept: HEALTH INFORMATION MANAGEMENT | Facility: OTHER | Age: 67
End: 2017-05-23

## 2017-05-23 VITALS
OXYGEN SATURATION: 98 % | RESPIRATION RATE: 17 BRPM | HEART RATE: 58 BPM | TEMPERATURE: 97.7 F | WEIGHT: 180 LBS | BODY MASS INDEX: 33.99 KG/M2 | HEIGHT: 61 IN | SYSTOLIC BLOOD PRESSURE: 132 MMHG | DIASTOLIC BLOOD PRESSURE: 55 MMHG

## 2017-05-23 LAB
ALBUMIN SERPL BCP-MCNC: 2.8 G/DL (ref 3.2–4.9)
BASOPHILS # BLD AUTO: 1.1 % (ref 0–1.8)
BASOPHILS # BLD: 0.05 K/UL (ref 0–0.12)
BUN SERPL-MCNC: 20 MG/DL (ref 8–22)
CALCIUM SERPL-MCNC: 8.7 MG/DL (ref 8.5–10.5)
CHLORIDE SERPL-SCNC: 106 MMOL/L (ref 96–112)
CO2 SERPL-SCNC: 29 MMOL/L (ref 20–33)
CREAT SERPL-MCNC: 0.66 MG/DL (ref 0.5–1.4)
EOSINOPHIL # BLD AUTO: 0.14 K/UL (ref 0–0.51)
EOSINOPHIL NFR BLD: 3 % (ref 0–6.9)
ERYTHROCYTE [DISTWIDTH] IN BLOOD BY AUTOMATED COUNT: 47.8 FL (ref 35.9–50)
GFR SERPL CREATININE-BSD FRML MDRD: >60 ML/MIN/1.73 M 2
GLUCOSE BLD-MCNC: 116 MG/DL (ref 65–99)
GLUCOSE BLD-MCNC: 88 MG/DL (ref 65–99)
GLUCOSE SERPL-MCNC: 115 MG/DL (ref 65–99)
HCT VFR BLD AUTO: 36.1 % (ref 37–47)
HGB BLD-MCNC: 10.9 G/DL (ref 12–16)
IMM GRANULOCYTES # BLD AUTO: 0.02 K/UL (ref 0–0.11)
IMM GRANULOCYTES NFR BLD AUTO: 0.4 % (ref 0–0.9)
INR PPP: 2.73 (ref 0.87–1.13)
LYMPHOCYTES # BLD AUTO: 1.5 K/UL (ref 1–4.8)
LYMPHOCYTES NFR BLD: 31.9 % (ref 22–41)
MCH RBC QN AUTO: 26 PG (ref 27–33)
MCHC RBC AUTO-ENTMCNC: 30.2 G/DL (ref 33.6–35)
MCV RBC AUTO: 86 FL (ref 81.4–97.8)
MONOCYTES # BLD AUTO: 0.49 K/UL (ref 0–0.85)
MONOCYTES NFR BLD AUTO: 10.4 % (ref 0–13.4)
NEUTROPHILS # BLD AUTO: 2.5 K/UL (ref 2–7.15)
NEUTROPHILS NFR BLD: 53.2 % (ref 44–72)
NRBC # BLD AUTO: 0 K/UL
NRBC BLD AUTO-RTO: 0 /100 WBC
PHOSPHATE SERPL-MCNC: 4.1 MG/DL (ref 2.5–4.5)
PLATELET # BLD AUTO: 208 K/UL (ref 164–446)
PMV BLD AUTO: 10.5 FL (ref 9–12.9)
POTASSIUM SERPL-SCNC: 4.3 MMOL/L (ref 3.6–5.5)
PROTHROMBIN TIME: 29.8 SEC (ref 12–14.6)
RBC # BLD AUTO: 4.2 M/UL (ref 4.2–5.4)
SODIUM SERPL-SCNC: 138 MMOL/L (ref 135–145)
WBC # BLD AUTO: 4.7 K/UL (ref 4.8–10.8)

## 2017-05-23 PROCEDURE — 80069 RENAL FUNCTION PANEL: CPT

## 2017-05-23 PROCEDURE — A9270 NON-COVERED ITEM OR SERVICE: HCPCS | Performed by: INTERNAL MEDICINE

## 2017-05-23 PROCEDURE — 85610 PROTHROMBIN TIME: CPT

## 2017-05-23 PROCEDURE — 99239 HOSP IP/OBS DSCHRG MGMT >30: CPT | Performed by: INTERNAL MEDICINE

## 2017-05-23 PROCEDURE — 700102 HCHG RX REV CODE 250 W/ 637 OVERRIDE(OP): Performed by: INTERNAL MEDICINE

## 2017-05-23 PROCEDURE — 36415 COLL VENOUS BLD VENIPUNCTURE: CPT

## 2017-05-23 PROCEDURE — 85025 COMPLETE CBC W/AUTO DIFF WBC: CPT

## 2017-05-23 PROCEDURE — 82962 GLUCOSE BLOOD TEST: CPT

## 2017-05-23 RX ORDER — DOXYCYCLINE 100 MG/1
100 TABLET ORAL EVERY 12 HOURS
Qty: 10 TAB | Refills: 0 | Status: SHIPPED | OUTPATIENT
Start: 2017-05-23 | End: 2017-05-28

## 2017-05-23 RX ORDER — ALPRAZOLAM 0.5 MG/1
0.5 TABLET ORAL 3 TIMES DAILY PRN
Qty: 15 TAB | Refills: 0 | Status: SHIPPED | OUTPATIENT
Start: 2017-05-23

## 2017-05-23 RX ORDER — DOXYCYCLINE 100 MG/1
100 TABLET ORAL EVERY 12 HOURS
Status: DISCONTINUED | OUTPATIENT
Start: 2017-05-23 | End: 2017-05-23 | Stop reason: HOSPADM

## 2017-05-23 RX ORDER — BUDESONIDE AND FORMOTEROL FUMARATE DIHYDRATE 80; 4.5 UG/1; UG/1
2 AEROSOL RESPIRATORY (INHALATION) 2 TIMES DAILY
Qty: 1 INHALER | Refills: 2 | Status: ON HOLD | OUTPATIENT
Start: 2017-05-23 | End: 2017-06-21

## 2017-05-23 RX ORDER — OXYCODONE HYDROCHLORIDE 10 MG/1
10 TABLET ORAL EVERY 8 HOURS PRN
Qty: 15 TAB | Refills: 0 | Status: SHIPPED | OUTPATIENT
Start: 2017-05-23

## 2017-05-23 RX ORDER — AMOXICILLIN AND CLAVULANATE POTASSIUM 875; 125 MG/1; MG/1
1 TABLET, FILM COATED ORAL 2 TIMES DAILY
Qty: 10 TAB | Refills: 0 | Status: SHIPPED | OUTPATIENT
Start: 2017-05-23 | End: 2017-05-28

## 2017-05-23 RX ORDER — MORPHINE SULFATE 15 MG/1
15 TABLET, FILM COATED, EXTENDED RELEASE ORAL EVERY 12 HOURS
Qty: 14 TAB | Refills: 0 | Status: SHIPPED | OUTPATIENT
Start: 2017-05-23

## 2017-05-23 RX ORDER — ALBUTEROL SULFATE 90 UG/1
2 AEROSOL, METERED RESPIRATORY (INHALATION) EVERY 4 HOURS PRN
Qty: 8.5 G | Refills: 2 | Status: ON HOLD | OUTPATIENT
Start: 2017-05-23 | End: 2017-06-21

## 2017-05-23 RX ORDER — NITROGLYCERIN 0.4 MG/1
TABLET SUBLINGUAL
Qty: 25 TAB | Refills: 0 | Status: SHIPPED | OUTPATIENT
Start: 2017-05-23

## 2017-05-23 RX ORDER — WARFARIN SODIUM 2.5 MG/1
2.5 TABLET ORAL
Status: DISCONTINUED | OUTPATIENT
Start: 2017-05-23 | End: 2017-05-23 | Stop reason: HOSPADM

## 2017-05-23 RX ADMIN — OXYCODONE HYDROCHLORIDE 10 MG: 10 TABLET ORAL at 00:50

## 2017-05-23 RX ADMIN — GUAIFENESIN AND DEXTROMETHORPHAN 5 ML: 100; 10 SYRUP ORAL at 09:37

## 2017-05-23 RX ADMIN — BUDESONIDE AND FORMOTEROL FUMARATE DIHYDRATE 2 PUFF: 80; 4.5 AEROSOL RESPIRATORY (INHALATION) at 08:02

## 2017-05-23 RX ADMIN — OXYCODONE HYDROCHLORIDE 10 MG: 10 TABLET ORAL at 10:41

## 2017-05-23 RX ADMIN — AMOXICILLIN AND CLAVULANATE POTASSIUM 1 TABLET: 875; 125 TABLET, FILM COATED ORAL at 08:02

## 2017-05-23 RX ADMIN — DOXYCYCLINE 100 MG: 100 TABLET ORAL at 10:41

## 2017-05-23 RX ADMIN — GABAPENTIN 300 MG: 300 CAPSULE ORAL at 08:02

## 2017-05-23 RX ADMIN — STANDARDIZED SENNA CONCENTRATE AND DOCUSATE SODIUM 2 TABLET: 8.6; 5 TABLET, FILM COATED ORAL at 08:02

## 2017-05-23 RX ADMIN — ALPRAZOLAM 0.5 MG: 0.25 TABLET ORAL at 08:06

## 2017-05-23 RX ADMIN — MORPHINE SULFATE 30 MG: 30 TABLET, EXTENDED RELEASE ORAL at 08:02

## 2017-05-23 ASSESSMENT — PAIN SCALES - GENERAL
PAINLEVEL_OUTOF10: 8
PAINLEVEL_OUTOF10: 9

## 2017-05-23 ASSESSMENT — ENCOUNTER SYMPTOMS
SHORTNESS OF BREATH: 0
HEADACHES: 0
FEVER: 0
COUGH: 0

## 2017-05-23 NOTE — PROGRESS NOTES
Hospital Medicine Progress Note, Adult, Complex               Author: Rupesh Kaplan Date & Time created: 5/23/2017  7:57 AM     Interval History:  65 yo female presents with bilateral leg pain and redness.  The patient admits that she uses 2 balls of heroin daily and she likes to inject them to her legs.    5/21/17:  The patient reports of chest pain whenever she stop using heroin.   The patient states she needs more pain medication or she will going into withdrawal.  5/22/17:   The patient states that she is feeling better today and no chest pain.    Review of Systems:  Review of Systems   Constitutional: Negative for fever.   Respiratory: Negative for cough and shortness of breath.    Cardiovascular: Negative for chest pain.   Neurological: Negative for headaches.       Physical Exam:  Physical Exam   Constitutional: She is oriented to person, place, and time. No distress.   HENT:   Head: Normocephalic and atraumatic.   Mouth/Throat: Oropharynx is clear and moist. No oropharyngeal exudate.   Eyes: Pupils are equal, round, and reactive to light. Right eye exhibits no discharge. Left eye exhibits no discharge.   Neck: Normal range of motion. Neck supple. No JVD present.   Cardiovascular: Normal rate and regular rhythm.    Pulmonary/Chest: Effort normal and breath sounds normal. No respiratory distress.   Abdominal: Soft. Bowel sounds are normal. She exhibits no distension.   Musculoskeletal: Normal range of motion. She exhibits edema.   Neurological: She is alert and oriented to person, place, and time.   Skin: Skin is warm. She is not diaphoretic. There is erythema.   Erythema in bilateral lower extremities with needle marks.  Erythematous area decreased compare to yesterday.   Psychiatric: She has a normal mood and affect.       Labs:        Invalid input(s): [id number]  Recent Labs      05/20/17   1553  05/21/17   0929  05/21/17   1445  05/21/17   2046   TROPONINI   --   <0.01  <0.01  <0.01   BNPBTYPENAT  132*    --    --    --      Recent Labs      17   SODIUM  142  140  138   POTASSIUM  3.9  4.3  4.3   CHLORIDE  109  109  106   CO2  27  28  29   BUN  14  15  20   CREATININE  0.52  0.60  0.66   PHOSPHORUS   --   3.2  4.1   CALCIUM  8.2*  8.8  8.7     Recent Labs      17   ALTSGPT  11   --    --    ASTSGOT  12   --    --    ALKPHOSPHAT  57   --    --    TBILIRUBIN  0.4   --    --    GLUCOSE  82  71  115*     Recent Labs      17   RBC  4.14*  4.24  4.20   HEMOGLOBIN  10.8*  11.0*  10.9*   HEMATOCRIT  34.9*  36.8*  36.1*   PLATELETCT  206  197  208   PROTHROMBTM  27.4*  36.0*  29.8*   INR  2.46*  3.48*  2.73*     Recent Labs      17   WBC  6.3  4.9  4.7*   NEUTSPOLYS   --   58.30  53.20   LYMPHOCYTES   --   34.80  31.90   MONOCYTES   --   2.60  10.40   EOSINOPHILS   --   4.30  3.00   BASOPHILS   --   0.00  1.10   ASTSGOT  12   --    --    ALTSGPT  11   --    --    ALKPHOSPHAT  57   --    --    TBILIRUBIN  0.4   --    --            Hemodynamics:  Temp (24hrs), Av.6 °C (97.8 °F), Min:36.1 °C (96.9 °F), Max:36.9 °C (98.4 °F)  Temperature: 36.6 °C (97.8 °F)  Pulse  Av.4  Min: 46  Max: 71  Blood Pressure: 130/65 mmHg     Respiratory:    Respiration: 18, Pulse Oximetry: 97 %, O2 Daily Delivery Respiratory : Silicone Nasal Cannula     Given By:: Mouthpiece, PEP/CPT Method: Positive Airway Pressure Device, Work Of Breathing / Effort: Mild  RUL Breath Sounds: Crackles;Inspiratory Wheezes;Expiratory Wheezes, RML Breath Sounds: Crackles;Inspiratory Wheezes;Expiratory Wheezes, RLL Breath Sounds: Crackles;Inspiratory Wheezes;Expiratory Wheezes, MARC Breath Sounds: Inspiratory Wheezes;Expiratory Wheezes;Crackles, LLL Breath Sounds: Crackles;Expiratory Wheezes;Inspiratory Wheezes  Fluids:    Intake/Output Summary (Last 24 hours) at 17  0757  Last data filed at 05/23/17 0400   Gross per 24 hour   Intake    500 ml   Output      0 ml   Net    500 ml        GI/Nutrition:  Orders Placed This Encounter   Procedures   • Diet Order     Standing Status: Standing      Number of Occurrences: 1      Standing Expiration Date:      Order Specific Question:  Diet:     Answer:  Diabetic [3]     Medical Decision Making, by Problem:  Active Hospital Problems    Diagnosis   • *Lower extremity cellulitis [L03.119]  -- continue vancomycin.  Monitor erythema area for progress.  Transition to augmentin.   Anticipate total 7 days course.  -- likely source is from iv drug injection sites.     • Atypical chest pain [R07.89]  -- ekg reviewed.  No evidence suggest of acute ischemia.  Troponin negative x 3 sets       • Chronic respiratory failure (CMS-HCC) [J96.10]  -- continue home oxygen and svn prn.     • Hyponatremia [E87.1]  -- resolved with iv fluid infusion     • Heroin abuse [F11.10]  -- discussed with the patient to stop abusing ilicit drug.     • Anemia [D64.9]  -- likely dilutional from iv infusion.  Hemoglobin improved today.     • Chronic pain [G89.29]  -- continue ms contin and prn oxycodone to prevent acute withdrawal.     Anticipate discharge home tomorrow with oral antibiotic if the patient's condition continues to improve and blood cultures remain negative.          DVT Prophylaxis: Heparin

## 2017-05-23 NOTE — PROGRESS NOTES
Received report from day shift RN, assumed care. Pt. Is awake, on bed. A&Ox4, stand by assist , pt. With complaints of generalized and BLE pain, medicated per MAR. Pt. On 3L O2 via NC, tolerating well. Plan of care was safety, comfort and rest. Discussed plan of care. Bed alarm in use, call light and personal belongings within reach, bed kept low, treaded socks on. Assisted as necessary. Kept rested and comfortable at all times.

## 2017-05-23 NOTE — PROGRESS NOTES
Pt. With D/C order. D/C instructions and prescriptions discussed with pt. Clothing provided to pt. Returned tagged medications back to pt. Removed PIV, tip intact. Pt. Kept comfortable.

## 2017-05-23 NOTE — DISCHARGE INSTRUCTIONS
Discharge Instructions    Discharged to home by taxi with self. Discharged via wheelchair, hospital escort: Yes.  Special equipment needed: Not Applicable    Be sure to schedule a follow-up appointment with your primary care doctor or any specialists as instructed.     Discharge Plan:   Smoking Cessation Offered: Patient Refused  Influenza Vaccine Indication: Not indicated: Previously immunized this influenza season and > 8 years of age    I understand that a diet low in cholesterol, fat, and sodium is recommended for good health. Unless I have been given specific instructions below for another diet, I accept this instruction as my diet prescription.   Other diet: Diabetic    Special Instructions: None    · Is patient discharged on Warfarin / Coumadin?   Yes    You are receiving the drug warfarin. Please understand the importance of monitoring warfarin with scheduled PT/INR blood draws.  Follow-up with the [facility] in one week for INR lab..    IMPORTANT: HOW TO USE THIS INFORMATION:  This is a summary and does NOT have all possible information about this product. This information does not assure that this product is safe, effective, or appropriate for you. This information is not individual medical advice and does not substitute for the advice of your health care professional. Always ask your health care professional for complete information about this product and your specific health needs.      WARFARIN - ORAL (WARF-uh-rin)      COMMON BRAND NAME(S): Coumadin      WARNING:  Warfarin can cause very serious (possibly fatal) bleeding. This is more likely to occur when you first start taking this medication or if you take too much warfarin. To decrease your risk for bleeding, your doctor or other health care provider will monitor you closely and check your lab results (INR test) to make sure you are not taking too much warfarin. Keep all medical and laboratory appointments. Tell your doctor right away if you  "notice any signs of serious bleeding. See also Side Effects section.      USES:  This medication is used to treat blood clots (such as in deep vein thrombosis-DVT or pulmonary embolus-PE) and/or to prevent new clots from forming in your body. Preventing harmful blood clots helps to reduce the risk of a stroke or heart attack. Conditions that increase your risk of developing blood clots include a certain type of irregular heart rhythm (atrial fibrillation), heart valve replacement, recent heart attack, and certain surgeries (such as hip/knee replacement). Warfarin is commonly called a \"blood thinner,\" but the more correct term is \"anticoagulant.\" It helps to keep blood flowing smoothly in your body by decreasing the amount of certain substances (clotting proteins) in your blood.      HOW TO USE:  Read the Medication Guide provided by your pharmacist before you start taking warfarin and each time you get a refill. If you have any questions, ask your doctor or pharmacist. Take this medication by mouth with or without food as directed by your doctor or other health care professional, usually once a day. It is very important to take it exactly as directed. Do not increase the dose, take it more frequently, or stop using it unless directed by your doctor. Dosage is based on your medical condition, laboratory tests (such as INR), and response to treatment. Your doctor or other health care provider will monitor you closely while you are taking this medication to determine the right dose for you. Use this medication regularly to get the most benefit from it. To help you remember, take it at the same time each day. It is important to eat a balanced, consistent diet while taking warfarin. Some foods can affect how warfarin works in your body and may affect your treatment and dose. Avoid sudden large increases or decreases in your intake of foods high in vitamin K (such as broccoli, cauliflower, cabbage, brussels sprouts, kale, " spinach, and other green leafy vegetables, liver, green tea, certain vitamin supplements). If you are trying to lose weight, check with your doctor before you try to go on a diet. Cranberry products may also affect how your warfarin works. Limit the amount of cranberry juice (16 ounces/480 milliliters a day) or other cranberry products you may drink or eat.      SIDE EFFECTS:  Nausea, loss of appetite, or stomach/abdominal pain may occur. If any of these effects persist or worsen, tell your doctor or pharmacist promptly. Remember that your doctor has prescribed this medication because he or she has judged that the benefit to you is greater than the risk of side effects. Many people using this medication do not have serious side effects. This medication can cause serious bleeding if it affects your blood clotting proteins too much (shown by unusually high INR lab results). Even if your doctor stops your medication, this risk of bleeding can continue for up to a week. Tell your doctor right away if you have any signs of serious bleeding, including: unusual pain/swelling/discomfort, unusual/easy bruising, prolonged bleeding from cuts or gums, persistent/frequent nosebleeds, unusually heavy/prolonged menstrual flow, pink/dark urine, coughing up blood, vomit that is bloody or looks like coffee grounds, severe headache, dizziness/fainting, unusual or persistent tiredness/weakness, bloody/black/tarry stools, chest pain, shortness of breath, difficulty swallowing. Tell your doctor right away if any of these unlikely but serious side effects occur: persistent nausea/vomiting, severe stomach/abdominal pain, yellowing eyes/skin. This drug rarely has caused very serious (possibly fatal) problems if its effects lead to small blood clots (usually at the beginning of treatment). This can lead to severe skin/tissue damage that may require surgery or amputation if left untreated. Patients with certain blood conditions (protein C or  S deficiency) may be at greater risk. Get medical help right away if any of these rare but serious side effects occur: painful/red/purplish patches on the skin (such as on the toe, breast, abdomen), change in the amount of urine, vision changes, confusion, slurred speech, weakness on one side of the body. A very serious allergic reaction to this drug is rare. However, get medical help right away if you notice any symptoms of a serious allergic reaction, including: rash, itching/swelling (especially of the face/tongue/throat), severe dizziness, trouble breathing. This is not a complete list of possible side effects. If you notice other effects not listed above, contact your doctor or pharmacist. In the [place] - Call your doctor for medical advice about side effects. You may report side effects to FDA at 3-765-IGX-3387. In [place] - Call your doctor for medical advice about side effects. You may report side effects to [facility] [place] at [phone].      PRECAUTIONS:  Before taking warfarin, tell your doctor or pharmacist if you are allergic to it; or if you have any other allergies. This product may contain inactive ingredients, which can cause allergic reactions or other problems. Talk to your pharmacist for more details. Before using this medication, tell your doctor or pharmacist your medical history, especially of: blood disorders (such as anemia, hemophilia), bleeding problems (such as bleeding of the stomach/intestines, bleeding in the brain), blood vessel disorders (such as aneurysms), recent major injury/surgery, liver disease, alcohol use, mental/mood disorders (including memory problems), frequent falls/injuries. It is important that all your doctors and dentists know that you take warfarin. Before having surgery or any medical/dental procedures, tell your doctor or dentist that you are taking this medication and about all the products you use (including prescription drugs, nonprescription drugs, and herbal  products). Avoid getting injections into the muscles. If you must have an injection into a muscle (for example, a flu shot), it should be given in the arm. This way, it will be easier to check for bleeding and/or apply pressure bandages. This medication may cause stomach bleeding. Daily use of alcohol while using this medicine will increase your risk for stomach bleeding and may also affect how this medication works. Limit or avoid alcoholic beverages. If you have not been eating well, if you have an illness or infection that causes fever, vomiting, or diarrhea for more than 2 days, or if you start using any antibiotic medications, contact your doctor or pharmacist immediately because these conditions can affect how warfarin works. This medication can cause heavy bleeding. To lower the chance of getting cut, bruised, or injured, use great caution with sharp objects like safety razors and nail cutters. Use an electric razor when shaving and a soft toothbrush when brushing your teeth. Avoid activities such as contact sports. If you fall or injure yourself, especially if you hit your head, call your doctor immediately. Your doctor may need to check you. The Food & Drug Administration has stated that generic warfarin products are interchangeable. However, consult your doctor or pharmacist before switching warfarin products. Be careful not to take more than one medication that contains warfarin unless specifically directed by the doctor or health care provider who is monitoring your warfarin treatment. Older adults may be at greater risk for bleeding while using this drug. This medication is not recommended for use during pregnancy because of serious (possibly fatal) harm to an unborn baby. Discuss the use of reliable forms of birth control with your doctor. If you become pregnant or think you may be pregnant, tell your doctor immediately. If you are planning pregnancy, discuss a plan for managing your condition with  "your doctor before you become pregnant. Your doctor may switch the type of medication you use during pregnancy. Very small amounts of this medication may pass into breast milk but is unlikely to harm a nursing infant. Consult your doctor before breast-feeding.      DRUG INTERACTIONS:  Drug interactions may change how your medications work or increase your risk for serious side effects. This document does not contain all possible drug interactions. Keep a list of all the products you use (including prescription/nonprescription drugs and herbal products) and share it with your doctor and pharmacist. Do not start, stop, or change the dosage of any medicines without your doctor's approval. Warfarin interacts with many prescription, nonprescription, vitamin, and herbal products. This includes medications that are applied to the skin or inside the vagina or rectum. The interactions with warfarin usually result in an increase or decrease in the \"blood-thinning\" (anticoagulant) effect. Your doctor or other health care professional should closely monitor you to prevent serious bleeding or clotting problems. While taking warfarin, it is very important to tell your doctor or pharmacist of any changes in medications, vitamins, or herbal products that you are taking. Some products that may interact with this drug include: capecitabine, imatinib, mifepristone. Aspirin, aspirin-like drugs (salicylates), and nonsteroidal anti-inflammatory drugs (NSAIDs such as ibuprofen, naproxen, celecoxib) may have effects similar to warfarin. These drugs may increase the risk of bleeding problems if taken during treatment with warfarin. Carefully check all prescription/nonprescription product labels (including drugs applied to the skin such as pain-relieving creams) since the products may contain NSAIDs or salicylates. Talk to your doctor about using a different medication (such as acetaminophen) to treat pain/fever. Low-dose aspirin and related " drugs (such as clopidogrel, ticlopidine) should be continued if prescribed by your doctor for specific medical reasons such as heart attack or stroke prevention. Consult your doctor or pharmacist for more details. Many herbal products interact with warfarin. Tell your doctor before taking any herbal products, especially bromelains, coenzyme Q10, cranberry, danshen, dong quai, fenugreek, garlic, ginkgo biloba, ginseng, and Henny's wort, among others. This medication may interfere with a certain laboratory test to measure theophylline levels, possibly causing false test results. Make sure laboratory personnel and all your doctors know you use this drug.      OVERDOSE:  If overdose is suspected, contact a poison control center or emergency room immediately. [place] residents can call the [place] National Poison Hotline at 1-355.406.4950. [place] residents can call a provincial poison control center. Symptoms of overdose may include: bloody/black/tarry stools, pink/dark urine, unusual/prolonged bleeding.      NOTES:  Do not share this medication with others. Laboratory and/or medical tests (such as INR, complete blood count) must be performed periodically to monitor your progress or check for side effects. Consult your doctor for more details.      MISSED DOSE:  For the best possible benefit, do not miss any doses. If you do miss a dose and remember on the same day, take it as soon as you remember. If you remember on the next day, skip the missed dose and resume your usual dosing schedule. Do not double the dose to catch up because this could increase your risk for bleeding. Keep a record of missed doses to give to your doctor or pharmacist. Contact your doctor or pharmacist if you miss 2 or more doses in a row.      STORAGE:  Store at room temperature away from light and moisture. Do not store in the bathroom. Keep all medications away from children and pets. Do not flush medications down the toilet or pour them into a drain  unless instructed to do so. Properly discard this product when it is  or no longer needed. Consult your pharmacist or local waste disposal company for more details about how to safely discard your product.      MEDICAL ALERT:  Your condition and medication can cause complications in a medical emergency. For information about enrolling in MedicAlert, call 1-661.403.5643 ([place]) or 1-178.125.5653 ([place]).      Information last revised 2010 Copyright(c) 2010 [employer]             · Is patient Post Blood Transfusion?  No    Cellulitis  Cellulitis is an infection of the skin and the tissue beneath it. The infected area is usually red and tender. Cellulitis occurs most often in the arms and lower legs.   CAUSES   Cellulitis is caused by bacteria that enter the skin through cracks or cuts in the skin. The most common types of bacteria that cause cellulitis are staphylococci and streptococci.  SIGNS AND SYMPTOMS   · Redness and warmth.  · Swelling.  · Tenderness or pain.  · Fever.  DIAGNOSIS   Your health care provider can usually determine what is wrong based on a physical exam. Blood tests may also be done.  TREATMENT   Treatment usually involves taking an antibiotic medicine.  HOME CARE INSTRUCTIONS   · Take your antibiotic medicine as directed by your health care provider. Finish the antibiotic even if you start to feel better.  · Keep the infected arm or leg elevated to reduce swelling.  · Apply a warm cloth to the affected area up to 4 times per day to relieve pain.  · Take medicines only as directed by your health care provider.  · Keep all follow-up visits as directed by your health care provider.  SEEK MEDICAL CARE IF:   · You notice red streaks coming from the infected area.  · Your red area gets larger or turns dark in color.  · Your bone or joint underneath the infected area becomes painful after the skin has healed.  · Your infection returns in the same area or another area.  · You  notice a swollen bump in the infected area.  · You develop new symptoms.  · You have a fever.  SEEK IMMEDIATE MEDICAL CARE IF:   · You feel very sleepy.  · You develop vomiting or diarrhea.  · You have a general ill feeling (malaise) with muscle aches and pains.  MAKE SURE YOU:   · Understand these instructions.  · Will watch your condition.  · Will get help right away if you are not doing well or get worse.     This information is not intended to replace advice given to you by your health care provider. Make sure you discuss any questions you have with your health care provider.     Document Released: 09/27/2006 Document Revised: 01/08/2016 Document Reviewed: 03/04/2013  [employer] Interactive Patient Education ©2016 [employer]      Depression / Suicide Risk    As you are discharged from this [facility] facility, it is important to learn how to keep safe from harming yourself.    Recognize the warning signs:  · Abrupt changes in personality, positive or negative- including increase in energy   · Giving away possessions  · Change in eating patterns- significant weight changes-  positive or negative  · Change in sleeping patterns- unable to sleep or sleeping all the time   · Unwillingness or inability to communicate  · Depression  · Unusual sadness, discouragement and loneliness  · Talk of wanting to die  · Neglect of personal appearance   · Rebelliousness- reckless behavior  · Withdrawal from people/activities they love  · Confusion- inability to concentrate     If you or a loved one observes any of these behaviors or has concerns about self-harm, here's what you can do:  · Talk about it- your feelings and reasons for harming yourself  · Remove any means that you might use to hurt yourself (examples: pills, rope, extension cords, firearm)  · Get professional help from the community (Mental Health, Substance Abuse, psychological counseling)  · Do not be alone:Call your Safe Contact- someone whom you trust who  will be there for you.  · Call your local CRISIS HOTLINE 291-7587 or 653-079-4321  · Call your local Children's Mobile Crisis Response Team [place] (660) 891-1047 or www.Teamly  · Call the toll free National Suicide Prevention Hotlines   · National Suicide Prevention Lifeline 423-231-ANUF (9792)  · [facility] Line Network 800-SUICIDE (954-9695)    Chronic Obstructive Pulmonary Disease Exacerbation  Chronic obstructive pulmonary disease (COPD) is a common lung condition in which airflow from the lungs is limited. COPD is a general term that can be used to describe many different lung problems that limit airflow, including chronic bronchitis and emphysema. COPD exacerbations are episodes when breathing symptoms become much worse and require extra treatment. Without treatment, COPD exacerbations can be life threatening, and frequent COPD exacerbations can cause further damage to your lungs.  CAUSES  · Respiratory infections.  · Exposure to smoke.  · Exposure to air pollution, chemical fumes, or dust.  Sometimes there is no apparent cause or trigger.  RISK FACTORS  · Smoking cigarettes.  · Older age.  · Frequent prior COPD exacerbations.  SIGNS AND SYMPTOMS  · Increased coughing.  · Increased thick spit (sputum) production.  · Increased wheezing.  · Increased shortness of breath.  · Rapid breathing.  · Chest tightness.  DIAGNOSIS  Your medical history, a physical exam, and tests will help your health care provider make a diagnosis. Tests may include:  · A chest X-ray.  · Basic lab tests.  · Sputum testing.  · An arterial blood gas test.  TREATMENT  Depending on the severity of your COPD exacerbation, you may need to be admitted to a hospital for treatment. Some of the treatments commonly used to treat COPD exacerbations are:   · Antibiotic medicines.  · Bronchodilators. These are drugs that expand the air passages. They may be given with an inhaler or nebulizer. Spacer devices may be needed to help  improve drug delivery.  · Corticosteroid medicines.  · Supplemental oxygen therapy.  · Airway clearing techniques, such as noninvasive ventilation (NIV) and positive expiratory pressure (PEP). These provide respiratory support through a mask or other noninvasive device.  HOME CARE INSTRUCTIONS  · Do not smoke. Quitting smoking is very important to prevent COPD from getting worse and exacerbations from happening as often.  · Avoid exposure to all substances that irritate the airway, especially to tobacco smoke.  · If you were prescribed an antibiotic medicine, finish it all even if you start to feel better.  · Take all medicines as directed by your health care provider. It is important to use correct technique with inhaled medicines.  · Drink enough fluids to keep your urine clear or pale yellow (unless you have a medical condition that requires fluid restriction).  · Use a cool mist vaporizer. This makes it easier to clear your chest when you cough.  · If you have a home nebulizer and oxygen, continue to use them as directed.  · Maintain all necessary vaccinations to prevent infections.  · Exercise regularly.  · Eat a healthy diet.  · Keep all follow-up appointments as directed by your health care provider.  SEEK IMMEDIATE MEDICAL CARE IF:  · You have worsening shortness of breath.  · You have trouble talking.  · You have severe chest pain.  · You have blood in your sputum.  · You have a fever.  · You have weakness, vomit repeatedly, or faint.  · You feel confused.  · You continue to get worse.  MAKE SURE YOU:  · Understand these instructions.  · Will watch your condition.  · Will get help right away if you are not doing well or get worse.     This information is not intended to replace advice given to you by your health care provider. Make sure you discuss any questions you have with your health care provider.     Document Released: 10/14/2008 Document Revised: 01/08/2016 Document Reviewed: 08/22/2014  [employer]  Interactive Patient Education ©2016 [employer]

## 2017-05-23 NOTE — PROGRESS NOTES
Inpatient Anticoagulation Service Note    Date: 5/23/2017  Reason for Anticoagulation: Pulmonary Embolism      Hemoglobin Value: 10.9  Hematocrit Value: 36.1  Lab Platelet Value: 208  Target INR: 2.0 to 3.0    INR from last 7 days     Date/Time INR Value    05/23/17 0035 (!)2.73    05/22/17 0046 (!)3.48    05/21/17 0205 (!)2.46    05/20/17 0150 (!)1.72        Dose from last 7 days     Date/Time Dose (mg)    05/23/17 1000 2.5    05/22/17 1600 0    05/21/17 1700 5    05/20/17 1900 7.5        Average Dose (mg): 5 (5mg qday)  Significant Interactions: Antibiotics (citalopram)  Bridge Therapy: No     Comments: Labile INR within goal range again, will give lower dose x 1 and repeat INR. May be able to start lower daily dose while on abx.    Plan:  2.5 mg x 1  Education Material Provided?: No (pt on [facility] PTA)  Pharmacist suggested discharge dosing: Supratherapeutic on home dose. Would use 2.5 mg daily, check INR within 48 hours of d/c     Brianne Cabezas, PharmD

## 2017-05-23 NOTE — CARE PLAN
Problem: Safety  Goal: Will remain free from falls  Outcome: PROGRESSING AS EXPECTED  Pt. With complaints of BLE pain, on narcs and benzos. Bed alarm ON, bed kept low and locked, call light within reach, assisted as necessary.    Problem: Pain Management  Goal: Pain level will decrease to patient’s comfort goal  Outcome: PROGRESSING AS EXPECTED  Pt. With complaints of BLE pain, medicated per MAR.

## 2017-05-23 NOTE — PROGRESS NOTES
"Pt resting in bed at this time, continues to c/o pain \"all over\"  Up to bathroom with sba   Asking for snacks frequently  Tolerating meds   LE continue to be swollen and pink/red   Will monitor  "

## 2017-05-24 ENCOUNTER — PATIENT OUTREACH (OUTPATIENT)
Dept: HEALTH INFORMATION MANAGEMENT | Facility: OTHER | Age: 67
End: 2017-05-24

## 2017-05-24 ENCOUNTER — TELEPHONE (OUTPATIENT)
Dept: VASCULAR LAB | Facility: MEDICAL CENTER | Age: 67
End: 2017-05-24

## 2017-05-24 LAB
HCV RNA SERPL NAA+PROBE-ACNC: <15 IU/ML
HCV RNA SERPL NAA+PROBE-LOG IU: <1.2 LOG IU
HCV RNA SERPL QL NAA+PROBE: NOT DETECTED
PATHOLOGY STUDY: NORMAL

## 2017-05-24 NOTE — DISCHARGE SUMMARY
DATE OF ADMISSION:  2017    DATE OF DISCHARGE:  2017    DISCHARGE DIAGNOSES:  Including the followin.  Bilateral lower extremity cellulitis secondary to IV drug injections.  2.  The patient also had atypical chest pain and patient had chronic   respiratory failure with oxygen dependence with hypoxemia.  3.   hyponatremia.  4.  Heroin abuse.  5.  Anemia.  6.  Chronic pain.  7.  Opiate dependence.    PERTINENT IMAGING AND PROCEDURES HERE IN THE HOSPITAL:  The patient had the   following done:  Please note that patient had a chest x-ray done on arrival,   which showed stable enlargement of cardiomediastinal silhouette and mild left   basilar atelectasis.  Please note that the patient recently had a hospital   visit and at that time the patient already had a CT angio of the chest, which   revealed the patient has no pulmonary emboli and there was no mention of any   dissections or any aneurysms on that CT scan.  Please note that back on   2017 patient had an IVC filter placed.  Please note that the patient had   2 sets of blood culture collected on 2017, both came back negative.    Please note that the patient also had a duplex ultrasound of the bilateral   lower extremity and this is with the venous system and this showed there is no   evidence of DVT.    I saw and examined the patient on the date of discharge, 17.    SUMMARY OF HOSPITAL COURSE:  Patient is a 66-year-old female who came in the   hospital with complaint of leg pain, in fact is actually bilaterally.  Patient   states that she noticed the pain in the leg has it got worse over the past   few days and prior to coming to the hospital.  Patient has erythema on both   sides of the leg, but more so on the left than right.  Patient did admit to me   that she would like to inject heroin into her legs that she would like to   take about 2 balloon of heroin per day and patient does have some needle marks   along the shin area of the  bilateral legs.  Patient has a marker tracing to   delineate where the extent of the cellulitis.  Patient does have venous   ultrasound of the bilateral extremity done, which does not show DVT and please   note that this patient already has IVC filter placed in the past and patient   is actually on Coumadin for her DVT and patient was started on the empiric   antibiotic, which including Unasyn as well as vancomycin.  Patient's   cellulitis steadily improved with IV antibiotic treatment and patient's pain   was controlled with oral opiate medications and patient does have COPD, was   getting breathing treatment and her respiratory status is stable and patient   was chronically on 2 liter per minute oxygen at home and patient also of noted   has hyponatremia on arrival and that has resolved with normal saline   infusions and please note that patient does reported some chest pain during   the hospital stay, but however, the patient states this pain is    different from her previous chest pain as she states that whenever she not   getting the heroin injection, she gets chest pain.  Patient has troponin trend   3 sets and all negative and patient also had an EKG done, which does not   indicate any evidence of acute ischemia, so patient's chest pain is likely   secondary to her opiate dependence.  Any case, the patient's chest pain   resolved and patient's cellulitis is under control.  The patient was deemed   stable for discharge home.    DISCHARGE CONDITION:  Stable.    DISPOSITION:  Home.    ACTIVITY:  As tolerated and I advised patient not to abuse any more illicit   drugs, especially not injected to her legs.    DISCHARGE MEDICATIONS:  Including the following:  Patient was discharged home   with continue on morphine extended release 50 mg 1 tablet b.i.d. and oxycodone   10 mg 1 tablet by mouth q.8 hours as needed for pain.  I advised patient to   follow up with pain management specialist and wean off the opiate  medications   as soon as possible and advised patient on long term health risks and also   addiction potential source of opiate medications and patient was also   prescribed with nitroglycerin 0.4 mg sublingual tablet 1 tablet by mouth   sublingually every 5 minutes a needed for chest pain.  If patient experience   more continuous chest pain, patient advised to return to ER for evaluation.    Patient has Xanax 0.5 mg 1 tablet by mouth 3 times a day as needed for anxiety   and albuterol 2 puffs inhaled by mouth every 4 hours as needed for shortness   of breath and Symbicort prescribed to patient 2 puffs by mouth twice daily and   warfarin 5 mg 1 tablet by mouth daily.  Please note that I have advised   patient if her INR may be more elevated, as the patient will be on antibiotics   that enhance the warfarin effect and the patient will need to follow up with   her primary care provider  for INR check in 3 days and patient also had   Neurontin 300 mg 1 capsule by mouth 3 times a day and Celexa 20 mg 1 tablet by   mouth every evening and metformin 850 mg 1 tablet by mouth every evening and   Risperdal 0.5 mg 1 tablet by mouth at bedtime and carvedilol 6.25 mg 1 tablet   by mouth every evening and guaifenesin 1200 mg 1 tablet by mouth b.i.d. and   Augmentin 875 one tablet by mouth twice a day for 5 more days to complete a   7-day course and doxycycline 100 mg 1 tablet by mouth twice a day for 5 more   days to complete the antibiotic treatment.    FOLLOWUP INSTRUCTIONS:  The patient is to follow up with primary care provider   within 1 week.  Please note that patient already has appointment set up to   follow up with Dr. Joseph Foster.    Total discharge time is 32 minutes.     ____________________________________     DO HAZEL Reaves / MIKE    DD:  05/23/2017 19:15:52  DT:  05/24/2017 03:15:07    D#:  [id number]  Job#:  [id number]

## 2017-05-24 NOTE — TELEPHONE ENCOUNTER
[facility]    Spoke with pt's sister, emergency contact, and she  States she has not had a phone for a long time and stays with friends.  Sister states she is basically homeless.  No address to send letter of compliance.  Gave clinic number to pt's sister and asked if she could have pt call number to set up appointment.    Edgar Moralez, PHARMD

## 2017-05-24 NOTE — PROGRESS NOTES
· 5/24/17 at 2:22 PM--Placed discharge outreach phone call to patient s/p hospital discharge 5/23/17.  Received recording stating that phone number is not in service.  Placed phone call to pt's sister Odalys, who is listed in pt's record as emergency contact.  Odalys states that she has not spoken to pt since her last hospital admission and was unaware that she was readmitted to hospital.  Provided my phone number to pt's sister and requested that she have pt return my phone call if she makes contact with her.  Odalys verbalizes understanding.

## 2017-05-25 LAB
BACTERIA BLD CULT: NORMAL
BACTERIA BLD CULT: NORMAL
SIGNIFICANT IND 70042: NORMAL
SIGNIFICANT IND 70042: NORMAL
SITE SITE: NORMAL
SITE SITE: NORMAL
SOURCE SOURCE: NORMAL
SOURCE SOURCE: NORMAL

## 2017-06-13 ENCOUNTER — APPOINTMENT (OUTPATIENT)
Dept: RADIOLOGY | Facility: MEDICAL CENTER | Age: 67
DRG: 190 | End: 2017-06-13
Attending: EMERGENCY MEDICINE
Payer: MEDICAID

## 2017-06-13 ENCOUNTER — HOSPITAL ENCOUNTER (INPATIENT)
Facility: MEDICAL CENTER | Age: 67
LOS: 7 days | DRG: 190 | End: 2017-06-21
Attending: EMERGENCY MEDICINE | Admitting: INTERNAL MEDICINE
Payer: MEDICAID

## 2017-06-13 DIAGNOSIS — R07.9 CHEST PAIN, UNSPECIFIED TYPE: ICD-10-CM

## 2017-06-13 DIAGNOSIS — J44.1 ACUTE EXACERBATION OF CHRONIC OBSTRUCTIVE PULMONARY DISEASE (COPD) (HCC): ICD-10-CM

## 2017-06-13 LAB — EKG IMPRESSION: NORMAL

## 2017-06-13 PROCEDURE — 80053 COMPREHEN METABOLIC PANEL: CPT

## 2017-06-13 PROCEDURE — 93005 ELECTROCARDIOGRAM TRACING: CPT

## 2017-06-13 PROCEDURE — 99285 EMERGENCY DEPT VISIT HI MDM: CPT

## 2017-06-13 PROCEDURE — 84484 ASSAY OF TROPONIN QUANT: CPT

## 2017-06-13 ASSESSMENT — PAIN SCALES - GENERAL: PAINLEVEL_OUTOF10: 8

## 2017-06-13 NOTE — IP AVS SNAPSHOT
6/21/2017    Zenia Ordaz  [facility] [address]    Dear Zenia:    [facility] wants to ensure your discharge home is safe and you or your loved ones have had all of your questions answered regarding your care after you leave the hospital.    Below is a list of resources and contact information should you have any questions regarding your hospital stay, follow-up instructions, or active medical symptoms.    Questions or Concerns Regarding… Contact   Medical Questions Related to Your Discharge  (7 days a week, 8am-5pm) Contact a Nurse Care Coordinator   [phone]   Medical Questions Not Related to Your Discharge  (24 hours a day / 7 days a week)  Contact the Nurse Health Line   [phone]    Medications or Discharge Instructions Refer to your discharge packet   or contact your [facility] Primary Care Provider   [phone]   Follow-up Appointment(s) Schedule your appointment via [employer]   or contact Scheduling [phone]   Billing Review your statement via [employer]  or contact Billing [phone]   Medical Records Review your records via [employer]   or contact Medical Records [phone]     You may receive a telephone call within two days of discharge. This call is to make certain you understand your discharge instructions and have the opportunity to have any questions answered. You can also easily access your medical information, test results and upcoming appointments via the [employer] free online health management tool. You can learn more and sign up at [employer]. For assistance setting up your [employer] account, please call [phone].    Once again, we want to ensure your discharge home is safe and that you have a clear understanding of any next steps in your care. If you have any questions or concerns, please do not hesitate to contact us, we are here for you. Thank you for choosing [facility] for your healthcare needs.    Sincerely,    Your [facility] Healthcare Team

## 2017-06-13 NOTE — IP AVS SNAPSHOT
" <p align=\"LEFT\"><IMG SRC=\"[email or web address]\" alt=\"Image\" WIDTH=\"50%\" HEIGHT=\"200\" BORDER=\"\"></p>                   Name:Zenia Ordaz  Medical Record Number:[id number]  CSN: [id number]    YOB: 1950   Age: 66 y.o.  Sex: female  HT:1.524 m (5') WT: 91 kg (200 lb 9.9 oz)          Admit Date: 6/13/2017     Discharge Date:   Today's Date: 6/21/2017  Attending Doctor:  Rosa Isela Washington M.D.                  Allergies:  Mushroom extract complex; Tylenol; and Zofran          Follow-up Information     1. Schedule an appointment as soon as possible for a visit with Mamadou Fortune PA-C.    Specialty:  Family Medicine    Contact information    [address]  [address] [address] [phone]          2. Follow up with [facility] In 2 days.    Contact information    [address] [facility] [address] [phone]           Medication List      Take these Medications        Instructions    albuterol 108 (90 BASE) MCG/ACT Aers inhalation aerosol    Inhale 2 Puffs by mouth every four hours as needed for Shortness of Breath.   Dose:  2 Puff       alprazolam 0.5 MG Tabs   Commonly known as:  XANAX    Take 1 Tab by mouth 3 times a day as needed for Anxiety.   Dose:  0.5 mg       aspirin 81 MG EC tablet    Take 1 Tab by mouth every day.   Dose:  81 mg       budesonide-formoterol 80-4.5 MCG/ACT Aero   Commonly known as:  SYMBICORT    Inhale 2 Puffs by mouth 2 Times a Day.   Dose:  2 Puff       carvedilol 3.125 MG Tabs   What changed:    - medication strength  - how much to take  - when to take this   Commonly known as:  COREG    Take 1 Tab by mouth 2 times a day, with meals.   Dose:  3.125 mg       citalopram 20 MG Tabs   Commonly known as:  CELEXA    Take 20 mg by mouth every evening.   Dose:  20 mg       gabapentin 300 MG Caps   Commonly known as:  NEURONTIN    Take 1 Cap by mouth 3 times a day.   Dose:  300 mg       metformin 850 MG Tabs   Commonly known as:  GLUCOPHAGE    Take " 1 Tab by mouth every evening.   Dose:  850 mg       morphine ER 15 MG Tbcr tablet   Commonly known as:  MS CONTIN    Take 1 Tab by mouth every 12 hours.   Dose:  15 mg       nitroglycerin 0.4 MG Subl   Commonly known as:  NITROSTAT    1 tab taken sublingually every 5 minutes as need for chest pain.  Up to 3 doses per day in total.       oxycodone immediate release 10 MG immediate release tablet   Commonly known as:  ROXICODONE    Take 1 Tab by mouth every 8 hours as needed for Severe Pain.   Dose:  10 mg       predniSONE 10 MG Tabs   Commonly known as:  DELTASONE    Take 3 tablets daily for 3 days, then take 2 tablets daily for 3 days, then take 1 tablet daily for 3 days, then stop.       risperidone 0.5 MG Tabs   Commonly known as:  RISPERDAL    Take 1 Tab by mouth every bedtime.   Dose:  0.5 mg       tiotropium 18 MCG Caps   Commonly known as:  SPIRIVA    Inhale 1 Cap by mouth every day.   Dose:  18 mcg       trazodone 50 MG Tabs   Commonly known as:  DESYREL    Take 50 mg by mouth every evening.   Dose:  50 mg       warfarin 5 MG Tabs   Commonly known as:  COUMADIN    Take 5 mg by mouth every day.   Dose:  5 mg

## 2017-06-13 NOTE — IP AVS SNAPSHOT
Home Care Instructions                                                                                                                  Name:Zenia Ordaz  Medical Record Number:[id number]  CSN: [id number]    YOB: 1950   Age: 66 y.o.  Sex: female  HT:1.524 m (5') WT: 91 kg (200 lb 9.9 oz)          Admit Date: 6/13/2017     Discharge Date:   Today's Date: 6/21/2017  Attending Doctor:  Rosa Isela Washington M.D.                  Allergies:  Mushroom extract complex; Tylenol; and Zofran            Discharge Instructions       Discharge Instructions    Discharged to home by car with relative. Discharged via walking, hospital escort: Refused.  Special equipment needed: Oxygen    Be sure to schedule a follow-up appointment with your primary care doctor or any specialists as instructed.     Discharge Plan:   Diet Plan: Discussed  Activity Level: Discussed  Confirmed Follow up Appointment: Patient to Call and Schedule Appointment  Confirmed Symptoms Management: Discussed  Medication Reconciliation Updated: Yes  Influenza Vaccine Indication: Not indicated: Previously immunized this influenza season and > 8 years of age    I understand that a diet low in cholesterol, fat, and sodium is recommended for good health. Unless I have been given specific instructions below for another diet, I accept this instruction as my diet prescription.   Other diet: Diabetic cardiac diet= low sugar, low fat, low sodium diet    Special Instructions: None    · Is patient discharged on Warfarin / Coumadin?   Yes    You are receiving the drug warfarin. Please understand the importance of monitoring warfarin with scheduled PT/INR blood draws.  Follow-up with a call to your personal Doctor's office in 3 days to schedule a PT/INR. .    IMPORTANT: HOW TO USE THIS INFORMATION:  This is a summary and does NOT have all possible information about this product. This information does not assure that this product is safe, effective, or appropriate  "for you. This information is not individual medical advice and does not substitute for the advice of your health care professional. Always ask your health care professional for complete information about this product and your specific health needs.      WARFARIN - ORAL (WARF-uh-rin)      COMMON BRAND NAME(S): Coumadin      WARNING:  Warfarin can cause very serious (possibly fatal) bleeding. This is more likely to occur when you first start taking this medication or if you take too much warfarin. To decrease your risk for bleeding, your doctor or other health care provider will monitor you closely and check your lab results (INR test) to make sure you are not taking too much warfarin. Keep all medical and laboratory appointments. Tell your doctor right away if you notice any signs of serious bleeding. See also Side Effects section.      USES:  This medication is used to treat blood clots (such as in deep vein thrombosis-DVT or pulmonary embolus-PE) and/or to prevent new clots from forming in your body. Preventing harmful blood clots helps to reduce the risk of a stroke or heart attack. Conditions that increase your risk of developing blood clots include a certain type of irregular heart rhythm (atrial fibrillation), heart valve replacement, recent heart attack, and certain surgeries (such as hip/knee replacement). Warfarin is commonly called a \"blood thinner,\" but the more correct term is \"anticoagulant.\" It helps to keep blood flowing smoothly in your body by decreasing the amount of certain substances (clotting proteins) in your blood.      HOW TO USE:  Read the Medication Guide provided by your pharmacist before you start taking warfarin and each time you get a refill. If you have any questions, ask your doctor or pharmacist. Take this medication by mouth with or without food as directed by your doctor or other health care professional, usually once a day. It is very important to take it exactly as directed. Do " not increase the dose, take it more frequently, or stop using it unless directed by your doctor. Dosage is based on your medical condition, laboratory tests (such as INR), and response to treatment. Your doctor or other health care provider will monitor you closely while you are taking this medication to determine the right dose for you. Use this medication regularly to get the most benefit from it. To help you remember, take it at the same time each day. It is important to eat a balanced, consistent diet while taking warfarin. Some foods can affect how warfarin works in your body and may affect your treatment and dose. Avoid sudden large increases or decreases in your intake of foods high in vitamin K (such as broccoli, cauliflower, cabbage, brussels sprouts, kale, spinach, and other green leafy vegetables, liver, green tea, certain vitamin supplements). If you are trying to lose weight, check with your doctor before you try to go on a diet. Cranberry products may also affect how your warfarin works. Limit the amount of cranberry juice (16 ounces/480 milliliters a day) or other cranberry products you may drink or eat.      SIDE EFFECTS:  Nausea, loss of appetite, or stomach/abdominal pain may occur. If any of these effects persist or worsen, tell your doctor or pharmacist promptly. Remember that your doctor has prescribed this medication because he or she has judged that the benefit to you is greater than the risk of side effects. Many people using this medication do not have serious side effects. This medication can cause serious bleeding if it affects your blood clotting proteins too much (shown by unusually high INR lab results). Even if your doctor stops your medication, this risk of bleeding can continue for up to a week. Tell your doctor right away if you have any signs of serious bleeding, including: unusual pain/swelling/discomfort, unusual/easy bruising, prolonged bleeding from cuts or gums,  persistent/frequent nosebleeds, unusually heavy/prolonged menstrual flow, pink/dark urine, coughing up blood, vomit that is bloody or looks like coffee grounds, severe headache, dizziness/fainting, unusual or persistent tiredness/weakness, bloody/black/tarry stools, chest pain, shortness of breath, difficulty swallowing. Tell your doctor right away if any of these unlikely but serious side effects occur: persistent nausea/vomiting, severe stomach/abdominal pain, yellowing eyes/skin. This drug rarely has caused very serious (possibly fatal) problems if its effects lead to small blood clots (usually at the beginning of treatment). This can lead to severe skin/tissue damage that may require surgery or amputation if left untreated. Patients with certain blood conditions (protein C or S deficiency) may be at greater risk. Get medical help right away if any of these rare but serious side effects occur: painful/red/purplish patches on the skin (such as on the toe, breast, abdomen), change in the amount of urine, vision changes, confusion, slurred speech, weakness on one side of the body. A very serious allergic reaction to this drug is rare. However, get medical help right away if you notice any symptoms of a serious allergic reaction, including: rash, itching/swelling (especially of the face/tongue/throat), severe dizziness, trouble breathing. This is not a complete list of possible side effects. If you notice other effects not listed above, contact your doctor or pharmacist. In the [place] - Call your doctor for medical advice about side effects. You may report side effects to FDA at 2-932-FEQ-3032. In [place] - Call your doctor for medical advice about side effects. You may report side effects to [facility] [place] at 1-417.334.7506.      PRECAUTIONS:  Before taking warfarin, tell your doctor or pharmacist if you are allergic to it; or if you have any other allergies. This product may contain inactive ingredients, which can cause  allergic reactions or other problems. Talk to your pharmacist for more details. Before using this medication, tell your doctor or pharmacist your medical history, especially of: blood disorders (such as anemia, hemophilia), bleeding problems (such as bleeding of the stomach/intestines, bleeding in the brain), blood vessel disorders (such as aneurysms), recent major injury/surgery, liver disease, alcohol use, mental/mood disorders (including memory problems), frequent falls/injuries. It is important that all your doctors and dentists know that you take warfarin. Before having surgery or any medical/dental procedures, tell your doctor or dentist that you are taking this medication and about all the products you use (including prescription drugs, nonprescription drugs, and herbal products). Avoid getting injections into the muscles. If you must have an injection into a muscle (for example, a flu shot), it should be given in the arm. This way, it will be easier to check for bleeding and/or apply pressure bandages. This medication may cause stomach bleeding. Daily use of alcohol while using this medicine will increase your risk for stomach bleeding and may also affect how this medication works. Limit or avoid alcoholic beverages. If you have not been eating well, if you have an illness or infection that causes fever, vomiting, or diarrhea for more than 2 days, or if you start using any antibiotic medications, contact your doctor or pharmacist immediately because these conditions can affect how warfarin works. This medication can cause heavy bleeding. To lower the chance of getting cut, bruised, or injured, use great caution with sharp objects like safety razors and nail cutters. Use an electric razor when shaving and a soft toothbrush when brushing your teeth. Avoid activities such as contact sports. If you fall or injure yourself, especially if you hit your head, call your doctor immediately. Your doctor may need to  "check you. The Food & Drug Administration has stated that generic warfarin products are interchangeable. However, consult your doctor or pharmacist before switching warfarin products. Be careful not to take more than one medication that contains warfarin unless specifically directed by the doctor or health care provider who is monitoring your warfarin treatment. Older adults may be at greater risk for bleeding while using this drug. This medication is not recommended for use during pregnancy because of serious (possibly fatal) harm to an unborn baby. Discuss the use of reliable forms of birth control with your doctor. If you become pregnant or think you may be pregnant, tell your doctor immediately. If you are planning pregnancy, discuss a plan for managing your condition with your doctor before you become pregnant. Your doctor may switch the type of medication you use during pregnancy. Very small amounts of this medication may pass into breast milk but is unlikely to harm a nursing infant. Consult your doctor before breast-feeding.      DRUG INTERACTIONS:  Drug interactions may change how your medications work or increase your risk for serious side effects. This document does not contain all possible drug interactions. Keep a list of all the products you use (including prescription/nonprescription drugs and herbal products) and share it with your doctor and pharmacist. Do not start, stop, or change the dosage of any medicines without your doctor's approval. Warfarin interacts with many prescription, nonprescription, vitamin, and herbal products. This includes medications that are applied to the skin or inside the vagina or rectum. The interactions with warfarin usually result in an increase or decrease in the \"blood-thinning\" (anticoagulant) effect. Your doctor or other health care professional should closely monitor you to prevent serious bleeding or clotting problems. While taking warfarin, it is very important " to tell your doctor or pharmacist of any changes in medications, vitamins, or herbal products that you are taking. Some products that may interact with this drug include: capecitabine, imatinib, mifepristone. Aspirin, aspirin-like drugs (salicylates), and nonsteroidal anti-inflammatory drugs (NSAIDs such as ibuprofen, naproxen, celecoxib) may have effects similar to warfarin. These drugs may increase the risk of bleeding problems if taken during treatment with warfarin. Carefully check all prescription/nonprescription product labels (including drugs applied to the skin such as pain-relieving creams) since the products may contain NSAIDs or salicylates. Talk to your doctor about using a different medication (such as acetaminophen) to treat pain/fever. Low-dose aspirin and related drugs (such as clopidogrel, ticlopidine) should be continued if prescribed by your doctor for specific medical reasons such as heart attack or stroke prevention. Consult your doctor or pharmacist for more details. Many herbal products interact with warfarin. Tell your doctor before taking any herbal products, especially bromelains, coenzyme Q10, cranberry, danshen, dong quai, fenugreek, garlic, ginkgo biloba, ginseng, and Henny's wort, among others. This medication may interfere with a certain laboratory test to measure theophylline levels, possibly causing false test results. Make sure laboratory personnel and all your doctors know you use this drug.      OVERDOSE:  If overdose is suspected, contact a poison control center or emergency room immediately. [place] residents can call the [place] National Poison Hotline at 1-831.352.4775. [place] residents can call a provincial poison control center. Symptoms of overdose may include: bloody/black/tarry stools, pink/dark urine, unusual/prolonged bleeding.      NOTES:  Do not share this medication with others. Laboratory and/or medical tests (such as INR, complete blood count) must be performed  periodically to monitor your progress or check for side effects. Consult your doctor for more details.      MISSED DOSE:  For the best possible benefit, do not miss any doses. If you do miss a dose and remember on the same day, take it as soon as you remember. If you remember on the next day, skip the missed dose and resume your usual dosing schedule. Do not double the dose to catch up because this could increase your risk for bleeding. Keep a record of missed doses to give to your doctor or pharmacist. Contact your doctor or pharmacist if you miss 2 or more doses in a row.      STORAGE:  Store at room temperature away from light and moisture. Do not store in the bathroom. Keep all medications away from children and pets. Do not flush medications down the toilet or pour them into a drain unless instructed to do so. Properly discard this product when it is  or no longer needed. Consult your pharmacist or local waste disposal company for more details about how to safely discard your product.      MEDICAL ALERT:  Your condition and medication can cause complications in a medical emergency. For information about enrolling in MedicAlert, call 1-804.525.4355 ([place]) or 1-259.193.7631 ([place]).      Information last revised 2010 Copyright(c) 2010 [employer]             · Is patient Post Blood Transfusion?  No    Depression / Suicide Risk    As you are discharged from this [facility] facility, it is important to learn how to keep safe from harming yourself.    Recognize the warning signs:  · Abrupt changes in personality, positive or negative- including increase in energy   · Giving away possessions  · Change in eating patterns- significant weight changes-  positive or negative  · Change in sleeping patterns- unable to sleep or sleeping all the time   · Unwillingness or inability to communicate  · Depression  · Unusual sadness, discouragement and loneliness  · Talk of wanting to die  · Neglect of  personal appearance   · Rebelliousness- reckless behavior  · Withdrawal from people/activities they love  · Confusion- inability to concentrate     If you or a loved one observes any of these behaviors or has concerns about self-harm, here's what you can do:  · Talk about it- your feelings and reasons for harming yourself  · Remove any means that you might use to hurt yourself (examples: pills, rope, extension cords, firearm)  · Get professional help from the community (Mental Health, Substance Abuse, psychological counseling)  · Do not be alone:Call your Safe Contact- someone whom you trust who will be there for you.  · Call your local CRISIS HOTLINE 403-6337 or 787-184-8398  · Call your local Children's Mobile Crisis Response Team [place] (913) 566-4396 or www.[employer]  · Call the toll free National Suicide Prevention Hotlines   · National Suicide Prevention Lifeline 481-702-QKDD (5902)  · "[facility]" Line Network 800-SUICIDE (178-2518)            Pulmonary Edema  Pulmonary edema is abnormal fluid buildup in the lungs that can make it hard to breathe.  HOME CARE  · Talk to your doctor about an exercise program.  · Eat a healthy diet:  ¨ Eat fresh fruits, vegetables, and lean meats.  ¨ Limit high fat and salty foods.  ¨ Avoid processed, canned, or fried foods.  ¨ Avoid fast food.  · Follow your doctor's advice about taking medicine and recording the medicine you take.  · Follow your doctor's advice about keeping a record of your weight.  · Talk to your doctor about keeping track of your blood pressure.  · Do not smoke.  · Do not use nicotine patches or nicotine gum.  · Make a follow-up appointment with your doctor.  · Ask your doctor for a copy of your latest heart tracing (ECG) and keep a copy with you at all times.  GET HELP RIGHT AWAY IF:   · You have chest pain. THIS IS AN EMERGENCY. Do not wait to see if the pain will go away. Call for local emergency medical help. Do not drive yourself to the  "hospital.  · You have sweating, feel sick to your stomach (nauseous), or are experiencing shortness of breath.  · Your weight increases more than your doctor tells you it should.  · You start to have shortness of breath.  · You notice more swelling in your hands, feet, ankles, or belly.  · You have dizziness, blurred vision, headache, or unsteadiness that does not go away.  · You cough up bloody spit.  · You have a cough that does not go away.  · You are unable to sleep because it is hard to breathe.  · You begin to feel a \"jumping\" or \"fluttering\" sensation (palpitations) in the chest that is unusual for you.  MAKE SURE YOU:   · Understand these instructions.  · Will watch your condition.  · Will get help right away if you are not doing well or get worse.     This information is not intended to replace advice given to you by your health care provider. Make sure you discuss any questions you have with your health care provider.     Document Released: 12/06/2010 Document Revised: 12/23/2014 Document Reviewed: 08/25/2014  [employer] Interactive Patient Education ©2016 [employer]        Follow-up Information     1. Schedule an appointment as soon as possible for a visit with Mamadou Fortune PA-C.    Specialty:  Family Medicine    Contact information    [address] [id number] [phone]          2. Follow up with [facility] In 2 days.    Contact information    [address] [place] [address]  [phone]           Discharge Medication Instructions:    Below are the medications your physician expects you to take upon discharge:    Review all your home medications and newly ordered medications with your doctor and/or pharmacist. Follow medication instructions as directed by your doctor and/or pharmacist.    Please keep your medication list with you and share with your physician.               Medication List      START taking these medications        Instructions    Morning " Afternoon Evening Bedtime    aspirin 81 MG EC tablet   Last time this was given:  81 mg on 6/21/2017  9:55 AM        Take 1 Tab by mouth every day.   Dose:  81 mg                        predniSONE 10 MG Tabs   Last time this was given:  40 mg on 6/21/2017  9:54 AM   Commonly known as:  DELTASONE        Take 3 tablets daily for 3 days, then take 2 tablets daily for 3 days, then take 1 tablet daily for 3 days, then stop.                        tiotropium 18 MCG Caps   Last time this was given:  1 Cap on 6/21/2017  9:56 AM   Commonly known as:  SPIRIVA        Inhale 1 Cap by mouth every day.   Dose:  18 mcg                          CHANGE how you take these medications        Instructions    Morning Afternoon Evening Bedtime    carvedilol 3.125 MG Tabs   What changed:    - medication strength  - how much to take  - when to take this   Last time this was given:  3.125 mg on 6/21/2017  4:20 PM   Commonly known as:  COREG        Take 1 Tab by mouth 2 times a day, with meals.   Dose:  3.125 mg                          CONTINUE taking these medications        Instructions    Morning Afternoon Evening Bedtime    albuterol 108 (90 BASE) MCG/ACT Aers inhalation aerosol        Inhale 2 Puffs by mouth every four hours as needed for Shortness of Breath.   Dose:  2 Puff                        alprazolam 0.5 MG Tabs   Last time this was given:  0.5 mg on 6/21/2017  6:09 PM   Commonly known as:  XANAX        Take 1 Tab by mouth 3 times a day as needed for Anxiety.   Dose:  0.5 mg                        budesonide-formoterol 80-4.5 MCG/ACT Aero   Commonly known as:  SYMBICORT        Inhale 2 Puffs by mouth 2 Times a Day.   Dose:  2 Puff                        citalopram 20 MG Tabs   Last time this was given:  20 mg on 6/21/2017  9:00 PM   Commonly known as:  CELEXA        Take 20 mg by mouth every evening.   Dose:  20 mg                        gabapentin 300 MG Caps   Last time this was given:  300 mg on 6/21/2017  9:00 PM   Commonly  known as:  NEURONTIN        Take 1 Cap by mouth 3 times a day.   Dose:  300 mg                        metformin 850 MG Tabs   Commonly known as:  GLUCOPHAGE        Take 1 Tab by mouth every evening.   Dose:  850 mg                        morphine ER 15 MG Tbcr tablet   Last time this was given:  15 mg on 6/21/2017  9:00 PM   Commonly known as:  MS CONTIN        Take 1 Tab by mouth every 12 hours.   Dose:  15 mg                        nitroglycerin 0.4 MG Subl   Last time this was given:  0.4 mg on 6/20/2017  6:18 AM   Commonly known as:  NITROSTAT        1 tab taken sublingually every 5 minutes as need for chest pain.  Up to 3 doses per day in total.                        oxycodone immediate release 10 MG immediate release tablet   Last time this was given:  10 mg on 6/21/2017  4:28 PM   Commonly known as:  ROXICODONE        Take 1 Tab by mouth every 8 hours as needed for Severe Pain.   Dose:  10 mg                        risperidone 0.5 MG Tabs   Last time this was given:  0.5 mg on 6/21/2017  9:00 PM   Commonly known as:  RISPERDAL        Take 1 Tab by mouth every bedtime.   Dose:  0.5 mg                        trazodone 50 MG Tabs   Last time this was given:  50 mg on 6/21/2017  9:00 PM   Commonly known as:  DESYREL        Take 50 mg by mouth every evening.   Dose:  50 mg                        warfarin 5 MG Tabs   Last time this was given:  5 mg on 6/21/2017  6:02 PM   Commonly known as:  COUMADIN        Take 5 mg by mouth every day.   Dose:  5 mg                             Where to Get Your Medications      Information about where to get these medications is not yet available     ! Ask your nurse or doctor about these medications    - albuterol 108 (90 BASE) MCG/ACT Aers inhalation aerosol  - aspirin 81 MG EC tablet  - budesonide-formoterol 80-4.5 MCG/ACT Aero  - carvedilol 3.125 MG Tabs  - metformin 850 MG Tabs  - predniSONE 10 MG Tabs  - tiotropium 18 MCG Caps            Instructions           Diet /  Nutrition:    Follow any diet instructions given to you by your doctor or the dietician, including how much salt (sodium) you are allowed each day.    If you are overweight, talk to your doctor about a weight reduction plan.    Activity:    Remain physically active following your doctor's instructions about exercise and activity.    Rest often.     Any time you become even a little tired or short of breath, SIT DOWN and rest.    Worsening Symptoms:    Report any of the following signs and symptoms to the doctor's office immediately:    *Pain of jaw, arm, or neck  *Chest pain not relieved by medication                               *Dizziness or loss of consciousness  *Difficulty breathing even when at rest   *More tired than usual                                       *Bleeding drainage or swelling of surgical site  *Swelling of feet, ankles, legs or stomach                 *Fever (>100ºF)  *Pink or blood tinged sputum  *Weight gain (3lbs/day or 5lbs /week)           *Shock from internal defibrillator (if applicable)  *Palpitations or irregular heartbeats                *Cool and/or numb extremities    Stroke Awareness    Common Risk Factors for Stroke include:    Age  Atrial Fibrillation  Carotid Artery Stenosis  Diabetes Mellitus  Excessive alcohol consumption  High blood pressure  Overweight   Physical inactivity  Smoking    Warning signs and symptoms of a stroke include:    *Sudden numbness or weakness of the face, arm or leg (especially on one side of the body).  *Sudden confusion, trouble speaking or understanding.  *Sudden trouble seeing in one or both eyes.  *Sudden trouble walking, dizziness, loss of balance or coordination.Sudden severe headache with no known cause.    It is very important to get treatment quickly when a stroke occurs. If you experience any of the above warning signs, call 911 immediately.                   Disclaimer         Quit Smoking / Tobacco Use:    I understand the use of any  tobacco products increases my chance of suffering from future heart disease or stroke and could cause other illnesses which may shorten my life. Quitting the use of tobacco products is the single most important thing I can do to improve my health. For further information on smoking / tobacco cessation call a Toll Free Quit Line at 1-718.738.5271 (*[facility]) or 1-645.106.2544 (American [employer] Association) or you can access the web based program at www.lungusa.org.    [place] Tobacco Users Help Line:  (648) 119-4526       Toll Free: 1-394.879.7138  Quit Tobacco Program [facility] (799)625-4236    Crisis Hotline:    [place] Crisis Hotline:  6-521-GBMBUAW or 1-374.588.2132    [place] Crisis Hotline:    1-760.660.4427 or 050-122-0029    Discharge Survey:   Thank you for choosing [facility]. We hope we did everything we could to make your hospital stay a pleasant one. You may be receiving a phone survey and we would appreciate your time and participation in answering the questions. Your input is very valuable to us in our efforts to improve our service to our patients and their families.        My signature on this form indicates that:    1. I have reviewed and understand the above information.  2. My questions regarding this information have been answered to my satisfaction.  3. I have formulated a plan with my discharge nurse to obtain my prescribed medications for home.                  Disclaimer         __________________________________                     __________       ________                       Patient Signature                                                 Date                    Time

## 2017-06-13 NOTE — IP AVS SNAPSHOT
Awesome Maps Access Code: [email or web address]  Expires: 7/21/2017  9:50 PM    Your email address is not on file at [email or web address].  Email Addresses are required for you to sign up for Awesome Maps, please contact [phone] to verify your personal information and to provide your email address prior to attempting to register for Awesome Maps.    [place] [address]    FirstBestt  A secure, online tool to manage your health information     [email or web address]’s Awesome Maps® is a secure, online tool that connects you to your personalized health information from the privacy of your home -- day or night - making it very easy for you to manage your healthcare. Once the activation process is completed, you can even access your medical information using the Awesome Maps spenser, which is available for free in the Apple Spenser store or Google Play store.     To learn more about Awesome Maps, visit [email or web address]    There are two levels of access available (as shown below):   My Chart Features  [facility] Primary Care Doctor [facility]  Specialists [facility]  Urgent  Care Non-[facility] Primary Care Doctor   Email your healthcare team securely and privately 24/7 X X X    Manage appointments: schedule your next appointment; view details of past/upcoming appointments X      Request prescription refills. X      View recent personal medical records, including lab and immunizations X X X X   View health record, including health history, allergies, medications X X X X   Read reports about your outpatient visits, procedures, consult and ER notes X X X X   See your discharge summary, which is a recap of your hospital and/or ER visit that includes your diagnosis, lab results, and care plan X X  X     How to register for FirstBestt:  Once your e-mail address has been verified, follow the following steps to sign up for Awesome Maps.     1. Go to  [email or web address]  2. Click on the Sign Up Now box, which takes you to the New Member Sign Up page. You will  need to provide the following information:  a. Enter your Zairge Access Code exactly as it appears at the top of this page. (You will not need to use this code after you’ve completed the sign-up process. If you do not sign up before the expiration date, you must request a new code.)   b. Enter your date of birth.   c. Enter your home email address.   d. Click Submit, and follow the next screen’s instructions.  3. Create a Abcamt ID. This will be your Zairge login ID and cannot be changed, so think of one that is secure and easy to remember.  4. Create a Zairge password. You can change your password at any time.  5. Enter your Password Reset Question and Answer. This can be used at a later time if you forget your password.   6. Enter your e-mail address. This allows you to receive e-mail notifications when new information is available in Zairge.  7. Click Sign Up. You can now view your health information.    For assistance activating your Zairge account, call [phone]

## 2017-06-14 ENCOUNTER — APPOINTMENT (OUTPATIENT)
Dept: RADIOLOGY | Facility: MEDICAL CENTER | Age: 67
DRG: 190 | End: 2017-06-14
Attending: EMERGENCY MEDICINE
Payer: MEDICAID

## 2017-06-14 ENCOUNTER — RESOLUTE PROFESSIONAL BILLING HOSPITAL PROF FEE (OUTPATIENT)
Dept: HOSPITALIST | Facility: MEDICAL CENTER | Age: 67
End: 2017-06-14
Payer: MEDICAID

## 2017-06-14 PROBLEM — F19.10 POLYSUBSTANCE ABUSE (HCC): Status: ACTIVE | Noted: 2017-06-14

## 2017-06-14 PROBLEM — J81.1 PULMONARY EDEMA: Status: ACTIVE | Noted: 2017-06-14

## 2017-06-14 PROBLEM — J96.90 RESPIRATORY FAILURE (HCC): Status: ACTIVE | Noted: 2017-06-14

## 2017-06-14 PROBLEM — F17.200 NICOTINE DEPENDENCE: Status: ACTIVE | Noted: 2017-06-14

## 2017-06-14 LAB
ALBUMIN SERPL BCP-MCNC: 3.6 G/DL (ref 3.2–4.9)
ALBUMIN/GLOB SERPL: 1 G/DL
ALP SERPL-CCNC: 81 U/L (ref 30–99)
ALT SERPL-CCNC: 10 U/L (ref 2–50)
AMPHET UR QL SCN: NEGATIVE
ANION GAP SERPL CALC-SCNC: 5 MMOL/L (ref 0–11.9)
APTT PPP: 46.8 SEC (ref 24.7–36)
AST SERPL-CCNC: 19 U/L (ref 12–45)
BARBITURATES UR QL SCN: NEGATIVE
BASOPHILS # BLD AUTO: 1.5 % (ref 0–1.8)
BASOPHILS # BLD: 0.08 K/UL (ref 0–0.12)
BENZODIAZ UR QL SCN: NEGATIVE
BILIRUB SERPL-MCNC: 0.4 MG/DL (ref 0.1–1.5)
BNP SERPL-MCNC: 357 PG/ML (ref 0–100)
BUN SERPL-MCNC: 7 MG/DL (ref 8–22)
BZE UR QL SCN: NEGATIVE
CALCIUM SERPL-MCNC: 9.5 MG/DL (ref 8.5–10.5)
CANNABINOIDS UR QL SCN: NEGATIVE
CHLORIDE SERPL-SCNC: 104 MMOL/L (ref 96–112)
CO2 SERPL-SCNC: 29 MMOL/L (ref 20–33)
CREAT SERPL-MCNC: 0.64 MG/DL (ref 0.5–1.4)
DEPRECATED D DIMER PPP IA-ACNC: 803 NG/ML(D-DU)
EKG IMPRESSION: NORMAL
EOSINOPHIL # BLD AUTO: 0.14 K/UL (ref 0–0.51)
EOSINOPHIL NFR BLD: 2.6 % (ref 0–6.9)
ERYTHROCYTE [DISTWIDTH] IN BLOOD BY AUTOMATED COUNT: 44.4 FL (ref 35.9–50)
GFR SERPL CREATININE-BSD FRML MDRD: >60 ML/MIN/1.73 M 2
GLOBULIN SER CALC-MCNC: 3.6 G/DL (ref 1.9–3.5)
GLUCOSE BLD-MCNC: 190 MG/DL (ref 65–99)
GLUCOSE BLD-MCNC: 220 MG/DL (ref 65–99)
GLUCOSE BLD-MCNC: 225 MG/DL (ref 65–99)
GLUCOSE SERPL-MCNC: 143 MG/DL (ref 65–99)
HCT VFR BLD AUTO: 39.6 % (ref 37–47)
HGB BLD-MCNC: 12 G/DL (ref 12–16)
IMM GRANULOCYTES # BLD AUTO: 0.02 K/UL (ref 0–0.11)
IMM GRANULOCYTES NFR BLD AUTO: 0.4 % (ref 0–0.9)
INR PPP: 2.72 (ref 0.87–1.13)
LACTATE BLD-SCNC: 1.4 MMOL/L (ref 0.5–2)
LYMPHOCYTES # BLD AUTO: 1.51 K/UL (ref 1–4.8)
LYMPHOCYTES NFR BLD: 28.2 % (ref 22–41)
MAGNESIUM SERPL-MCNC: 1.6 MG/DL (ref 1.5–2.5)
MCH RBC QN AUTO: 25.3 PG (ref 27–33)
MCHC RBC AUTO-ENTMCNC: 30.3 G/DL (ref 33.6–35)
MCV RBC AUTO: 83.5 FL (ref 81.4–97.8)
MDMA UR QL SCN: NEGATIVE
METHADONE UR QL SCN: NEGATIVE
MONOCYTES # BLD AUTO: 0.62 K/UL (ref 0–0.85)
MONOCYTES NFR BLD AUTO: 11.6 % (ref 0–13.4)
NEUTROPHILS # BLD AUTO: 2.99 K/UL (ref 2–7.15)
NEUTROPHILS NFR BLD: 55.7 % (ref 44–72)
NRBC # BLD AUTO: 0 K/UL
NRBC BLD AUTO-RTO: 0 /100 WBC
OPIATES UR QL SCN: POSITIVE
OXYCODONE UR QL SCN: NEGATIVE
PCP UR QL SCN: NEGATIVE
PHOSPHATE SERPL-MCNC: 2.2 MG/DL (ref 2.5–4.5)
PLATELET # BLD AUTO: 194 K/UL (ref 164–446)
PMV BLD AUTO: 10.3 FL (ref 9–12.9)
POTASSIUM SERPL-SCNC: 3.9 MMOL/L (ref 3.6–5.5)
PROPOXYPH UR QL SCN: NEGATIVE
PROT SERPL-MCNC: 7.2 G/DL (ref 6–8.2)
PROTHROMBIN TIME: 29.7 SEC (ref 12–14.6)
RBC # BLD AUTO: 4.74 M/UL (ref 4.2–5.4)
SODIUM SERPL-SCNC: 138 MMOL/L (ref 135–145)
TROPONIN I SERPL-MCNC: <0.01 NG/ML (ref 0–0.04)
TROPONIN I SERPL-MCNC: <0.01 NG/ML (ref 0–0.04)
WBC # BLD AUTO: 5.4 K/UL (ref 4.8–10.8)

## 2017-06-14 PROCEDURE — 700101 HCHG RX REV CODE 250

## 2017-06-14 PROCEDURE — 71275 CT ANGIOGRAPHY CHEST: CPT

## 2017-06-14 PROCEDURE — 700101 HCHG RX REV CODE 250: Performed by: EMERGENCY MEDICINE

## 2017-06-14 PROCEDURE — 36415 COLL VENOUS BLD VENIPUNCTURE: CPT

## 2017-06-14 PROCEDURE — 84100 ASSAY OF PHOSPHORUS: CPT

## 2017-06-14 PROCEDURE — 94640 AIRWAY INHALATION TREATMENT: CPT

## 2017-06-14 PROCEDURE — 93005 ELECTROCARDIOGRAM TRACING: CPT | Performed by: INTERNAL MEDICINE

## 2017-06-14 PROCEDURE — 80307 DRUG TEST PRSMV CHEM ANLYZR: CPT

## 2017-06-14 PROCEDURE — 770020 HCHG ROOM/CARE - TELE (206)

## 2017-06-14 PROCEDURE — 700102 HCHG RX REV CODE 250 W/ 637 OVERRIDE(OP)

## 2017-06-14 PROCEDURE — A9270 NON-COVERED ITEM OR SERVICE: HCPCS | Performed by: INTERNAL MEDICINE

## 2017-06-14 PROCEDURE — 83880 ASSAY OF NATRIURETIC PEPTIDE: CPT

## 2017-06-14 PROCEDURE — 85025 COMPLETE CBC W/AUTO DIFF WBC: CPT

## 2017-06-14 PROCEDURE — 700101 HCHG RX REV CODE 250: Performed by: INTERNAL MEDICINE

## 2017-06-14 PROCEDURE — 94760 N-INVAS EAR/PLS OXIMETRY 1: CPT

## 2017-06-14 PROCEDURE — 700111 HCHG RX REV CODE 636 W/ 250 OVERRIDE (IP): Performed by: EMERGENCY MEDICINE

## 2017-06-14 PROCEDURE — 84484 ASSAY OF TROPONIN QUANT: CPT

## 2017-06-14 PROCEDURE — 96374 THER/PROPH/DIAG INJ IV PUSH: CPT

## 2017-06-14 PROCEDURE — 700102 HCHG RX REV CODE 250 W/ 637 OVERRIDE(OP): Performed by: INTERNAL MEDICINE

## 2017-06-14 PROCEDURE — 93010 ELECTROCARDIOGRAM REPORT: CPT | Performed by: INTERNAL MEDICINE

## 2017-06-14 PROCEDURE — 85379 FIBRIN DEGRADATION QUANT: CPT

## 2017-06-14 PROCEDURE — 700111 HCHG RX REV CODE 636 W/ 250 OVERRIDE (IP): Performed by: INTERNAL MEDICINE

## 2017-06-14 PROCEDURE — 71010 DX-CHEST-LIMITED (1 VIEW): CPT

## 2017-06-14 PROCEDURE — 85730 THROMBOPLASTIN TIME PARTIAL: CPT

## 2017-06-14 PROCEDURE — A9270 NON-COVERED ITEM OR SERVICE: HCPCS

## 2017-06-14 PROCEDURE — 87040 BLOOD CULTURE FOR BACTERIA: CPT | Mod: 91

## 2017-06-14 PROCEDURE — 82962 GLUCOSE BLOOD TEST: CPT

## 2017-06-14 PROCEDURE — 700117 HCHG RX CONTRAST REV CODE 255: Performed by: EMERGENCY MEDICINE

## 2017-06-14 PROCEDURE — 83605 ASSAY OF LACTIC ACID: CPT

## 2017-06-14 PROCEDURE — 85610 PROTHROMBIN TIME: CPT

## 2017-06-14 PROCEDURE — 83735 ASSAY OF MAGNESIUM: CPT

## 2017-06-14 PROCEDURE — 99223 1ST HOSP IP/OBS HIGH 75: CPT | Performed by: INTERNAL MEDICINE

## 2017-06-14 PROCEDURE — 96375 TX/PRO/DX INJ NEW DRUG ADDON: CPT

## 2017-06-14 RX ORDER — BUDESONIDE AND FORMOTEROL FUMARATE DIHYDRATE 160; 4.5 UG/1; UG/1
2 AEROSOL RESPIRATORY (INHALATION)
Status: DISCONTINUED | OUTPATIENT
Start: 2017-06-14 | End: 2017-06-18

## 2017-06-14 RX ORDER — HYDRALAZINE HYDROCHLORIDE 20 MG/ML
20 INJECTION INTRAMUSCULAR; INTRAVENOUS ONCE
Status: CANCELLED | OUTPATIENT
Start: 2017-06-14 | End: 2017-06-14

## 2017-06-14 RX ORDER — TIOTROPIUM BROMIDE 18 UG/1
1 CAPSULE ORAL; RESPIRATORY (INHALATION)
Status: DISCONTINUED | OUTPATIENT
Start: 2017-06-14 | End: 2017-06-18

## 2017-06-14 RX ORDER — BISACODYL 10 MG
10 SUPPOSITORY, RECTAL RECTAL
Status: DISCONTINUED | OUTPATIENT
Start: 2017-06-14 | End: 2017-06-22 | Stop reason: HOSPADM

## 2017-06-14 RX ORDER — IPRATROPIUM BROMIDE AND ALBUTEROL SULFATE 2.5; .5 MG/3ML; MG/3ML
3 SOLUTION RESPIRATORY (INHALATION)
Status: DISCONTINUED | OUTPATIENT
Start: 2017-06-14 | End: 2017-06-16

## 2017-06-14 RX ORDER — METHYLPREDNISOLONE SODIUM SUCCINATE 125 MG/2ML
125 INJECTION, POWDER, LYOPHILIZED, FOR SOLUTION INTRAMUSCULAR; INTRAVENOUS ONCE
Status: COMPLETED | OUTPATIENT
Start: 2017-06-14 | End: 2017-06-14

## 2017-06-14 RX ORDER — NICOTINE 21 MG/24HR
14 PATCH, TRANSDERMAL 24 HOURS TRANSDERMAL
Status: DISCONTINUED | OUTPATIENT
Start: 2017-06-14 | End: 2017-06-22 | Stop reason: HOSPADM

## 2017-06-14 RX ORDER — GABAPENTIN 300 MG/1
300 CAPSULE ORAL 3 TIMES DAILY
Status: DISCONTINUED | OUTPATIENT
Start: 2017-06-14 | End: 2017-06-22 | Stop reason: HOSPADM

## 2017-06-14 RX ORDER — WARFARIN SODIUM 2.5 MG/1
2.5 TABLET ORAL
Status: COMPLETED | OUTPATIENT
Start: 2017-06-14 | End: 2017-06-14

## 2017-06-14 RX ORDER — ASPIRIN 325 MG
325 TABLET ORAL DAILY
Status: DISCONTINUED | OUTPATIENT
Start: 2017-06-14 | End: 2017-06-16

## 2017-06-14 RX ORDER — TRAZODONE HYDROCHLORIDE 50 MG/1
50 TABLET ORAL NIGHTLY
Status: DISCONTINUED | OUTPATIENT
Start: 2017-06-14 | End: 2017-06-22 | Stop reason: HOSPADM

## 2017-06-14 RX ORDER — WARFARIN SODIUM 5 MG/1
5 TABLET ORAL DAILY
COMMUNITY

## 2017-06-14 RX ORDER — ACETAMINOPHEN 325 MG/1
650 TABLET ORAL EVERY 6 HOURS PRN
Status: DISCONTINUED | OUTPATIENT
Start: 2017-06-14 | End: 2017-06-14

## 2017-06-14 RX ORDER — ASPIRIN 300 MG/1
300 SUPPOSITORY RECTAL DAILY
Status: DISCONTINUED | OUTPATIENT
Start: 2017-06-14 | End: 2017-06-16

## 2017-06-14 RX ORDER — POLYETHYLENE GLYCOL 3350 17 G/17G
1 POWDER, FOR SOLUTION ORAL
Status: DISCONTINUED | OUTPATIENT
Start: 2017-06-14 | End: 2017-06-22 | Stop reason: HOSPADM

## 2017-06-14 RX ORDER — IPRATROPIUM BROMIDE AND ALBUTEROL SULFATE 2.5; .5 MG/3ML; MG/3ML
3 SOLUTION RESPIRATORY (INHALATION)
Status: DISCONTINUED | OUTPATIENT
Start: 2017-06-14 | End: 2017-06-22 | Stop reason: HOSPADM

## 2017-06-14 RX ORDER — AMOXICILLIN 250 MG
2 CAPSULE ORAL 2 TIMES DAILY
Status: DISCONTINUED | OUTPATIENT
Start: 2017-06-14 | End: 2017-06-22 | Stop reason: HOSPADM

## 2017-06-14 RX ORDER — CARVEDILOL 3.12 MG/1
3.12 TABLET ORAL 2 TIMES DAILY WITH MEALS
Status: DISCONTINUED | OUTPATIENT
Start: 2017-06-14 | End: 2017-06-22 | Stop reason: HOSPADM

## 2017-06-14 RX ORDER — FUROSEMIDE 10 MG/ML
20 INJECTION INTRAMUSCULAR; INTRAVENOUS ONCE
Status: COMPLETED | OUTPATIENT
Start: 2017-06-14 | End: 2017-06-14

## 2017-06-14 RX ORDER — HYDRALAZINE HYDROCHLORIDE 20 MG/ML
10 INJECTION INTRAMUSCULAR; INTRAVENOUS EVERY 4 HOURS PRN
Status: DISCONTINUED | OUTPATIENT
Start: 2017-06-14 | End: 2017-06-22 | Stop reason: HOSPADM

## 2017-06-14 RX ORDER — PROMETHAZINE HYDROCHLORIDE 25 MG/1
12.5-25 TABLET ORAL EVERY 4 HOURS PRN
Status: DISCONTINUED | OUTPATIENT
Start: 2017-06-14 | End: 2017-06-22 | Stop reason: HOSPADM

## 2017-06-14 RX ORDER — CITALOPRAM 20 MG/1
20 TABLET ORAL EVERY EVENING
Status: DISCONTINUED | OUTPATIENT
Start: 2017-06-14 | End: 2017-06-22 | Stop reason: HOSPADM

## 2017-06-14 RX ORDER — HYDRALAZINE HYDROCHLORIDE 20 MG/ML
20 INJECTION INTRAMUSCULAR; INTRAVENOUS ONCE
Status: COMPLETED | OUTPATIENT
Start: 2017-06-14 | End: 2017-06-14

## 2017-06-14 RX ORDER — RISPERIDONE 0.5 MG/1
0.5 TABLET ORAL
Status: DISCONTINUED | OUTPATIENT
Start: 2017-06-14 | End: 2017-06-22 | Stop reason: HOSPADM

## 2017-06-14 RX ORDER — LABETALOL HYDROCHLORIDE 5 MG/ML
10 INJECTION, SOLUTION INTRAVENOUS EVERY 4 HOURS PRN
Status: DISCONTINUED | OUTPATIENT
Start: 2017-06-14 | End: 2017-06-22 | Stop reason: HOSPADM

## 2017-06-14 RX ORDER — ALPRAZOLAM 0.5 MG/1
0.5 TABLET ORAL 3 TIMES DAILY PRN
Status: DISCONTINUED | OUTPATIENT
Start: 2017-06-14 | End: 2017-06-18

## 2017-06-14 RX ORDER — OXYCODONE HYDROCHLORIDE 10 MG/1
10 TABLET ORAL EVERY 8 HOURS PRN
Status: DISCONTINUED | OUTPATIENT
Start: 2017-06-14 | End: 2017-06-22 | Stop reason: HOSPADM

## 2017-06-14 RX ORDER — PROMETHAZINE HYDROCHLORIDE 25 MG/1
12.5-25 SUPPOSITORY RECTAL EVERY 4 HOURS PRN
Status: DISCONTINUED | OUTPATIENT
Start: 2017-06-14 | End: 2017-06-22 | Stop reason: HOSPADM

## 2017-06-14 RX ORDER — ASPIRIN 81 MG/1
324 TABLET, CHEWABLE ORAL DAILY
Status: DISCONTINUED | OUTPATIENT
Start: 2017-06-14 | End: 2017-06-16

## 2017-06-14 RX ORDER — GUAIFENESIN/DEXTROMETHORPHAN 100-10MG/5
10 SYRUP ORAL EVERY 6 HOURS PRN
Status: DISCONTINUED | OUTPATIENT
Start: 2017-06-14 | End: 2017-06-22 | Stop reason: HOSPADM

## 2017-06-14 RX ORDER — METHYLPREDNISOLONE SODIUM SUCCINATE 125 MG/2ML
62.5 INJECTION, POWDER, LYOPHILIZED, FOR SOLUTION INTRAMUSCULAR; INTRAVENOUS EVERY 6 HOURS
Status: DISCONTINUED | OUTPATIENT
Start: 2017-06-14 | End: 2017-06-19

## 2017-06-14 RX ORDER — MORPHINE SULFATE 15 MG/1
15 TABLET, FILM COATED, EXTENDED RELEASE ORAL EVERY 12 HOURS
Status: DISCONTINUED | OUTPATIENT
Start: 2017-06-14 | End: 2017-06-22 | Stop reason: HOSPADM

## 2017-06-14 RX ORDER — DOXYCYCLINE 100 MG/1
100 TABLET ORAL EVERY 12 HOURS
Status: DISCONTINUED | OUTPATIENT
Start: 2017-06-14 | End: 2017-06-19

## 2017-06-14 RX ADMIN — DOXYCYCLINE 100 MG: 100 TABLET ORAL at 09:09

## 2017-06-14 RX ADMIN — MORPHINE SULFATE 15 MG: 15 TABLET, EXTENDED RELEASE ORAL at 09:09

## 2017-06-14 RX ADMIN — HYDRALAZINE HYDROCHLORIDE 20 MG: 20 INJECTION INTRAMUSCULAR; INTRAVENOUS at 06:55

## 2017-06-14 RX ADMIN — METHYLPREDNISOLONE SODIUM SUCCINATE 62.5 MG: 125 INJECTION, POWDER, FOR SOLUTION INTRAMUSCULAR; INTRAVENOUS at 23:47

## 2017-06-14 RX ADMIN — FUROSEMIDE 20 MG: 10 INJECTION, SOLUTION INTRAMUSCULAR; INTRAVENOUS at 09:09

## 2017-06-14 RX ADMIN — IPRATROPIUM BROMIDE AND ALBUTEROL SULFATE 3 ML: .5; 3 SOLUTION RESPIRATORY (INHALATION) at 17:43

## 2017-06-14 RX ADMIN — GABAPENTIN 300 MG: 300 CAPSULE ORAL at 21:39

## 2017-06-14 RX ADMIN — METHYLPREDNISOLONE SODIUM SUCCINATE 62.5 MG: 125 INJECTION, POWDER, FOR SOLUTION INTRAMUSCULAR; INTRAVENOUS at 18:00

## 2017-06-14 RX ADMIN — BUDESONIDE AND FORMOTEROL FUMARATE DIHYDRATE 2 PUFF: 160; 4.5 AEROSOL RESPIRATORY (INHALATION) at 20:16

## 2017-06-14 RX ADMIN — CARVEDILOL 3.12 MG: 3.12 TABLET, FILM COATED ORAL at 09:09

## 2017-06-14 RX ADMIN — LABETALOL HYDROCHLORIDE 10 MG: 5 INJECTION, SOLUTION INTRAVENOUS at 23:47

## 2017-06-14 RX ADMIN — INSULIN LISPRO 2 UNITS: 100 INJECTION, SOLUTION INTRAVENOUS; SUBCUTANEOUS at 21:48

## 2017-06-14 RX ADMIN — GABAPENTIN 300 MG: 300 CAPSULE ORAL at 14:15

## 2017-06-14 RX ADMIN — WARFARIN SODIUM 2.5 MG: 2.5 TABLET ORAL at 18:06

## 2017-06-14 RX ADMIN — ALPRAZOLAM 0.5 MG: 0.25 TABLET ORAL at 18:00

## 2017-06-14 RX ADMIN — CITALOPRAM HYDROBROMIDE 20 MG: 20 TABLET ORAL at 22:13

## 2017-06-14 RX ADMIN — METHYLPREDNISOLONE SODIUM SUCCINATE 62.5 MG: 125 INJECTION, POWDER, FOR SOLUTION INTRAMUSCULAR; INTRAVENOUS at 14:15

## 2017-06-14 RX ADMIN — TRAZODONE HYDROCHLORIDE 50 MG: 50 TABLET ORAL at 21:39

## 2017-06-14 RX ADMIN — DOXYCYCLINE 100 MG: 100 TABLET ORAL at 21:40

## 2017-06-14 RX ADMIN — CARVEDILOL 3.12 MG: 3.12 TABLET, FILM COATED ORAL at 16:23

## 2017-06-14 RX ADMIN — ALPRAZOLAM 0.5 MG: 0.25 TABLET ORAL at 07:43

## 2017-06-14 RX ADMIN — IOHEXOL 65 ML: 350 INJECTION, SOLUTION INTRAVENOUS at 03:56

## 2017-06-14 RX ADMIN — INSULIN LISPRO 1 UNITS: 100 INJECTION, SOLUTION INTRAVENOUS; SUBCUTANEOUS at 16:26

## 2017-06-14 RX ADMIN — OXYCODONE HYDROCHLORIDE 10 MG: 5 TABLET ORAL at 16:23

## 2017-06-14 RX ADMIN — INSULIN LISPRO 2 UNITS: 100 INJECTION, SOLUTION INTRAVENOUS; SUBCUTANEOUS at 12:10

## 2017-06-14 RX ADMIN — RISPERIDONE 0.5 MG: 0.5 TABLET, FILM COATED ORAL at 22:13

## 2017-06-14 RX ADMIN — ASPIRIN 325 MG: 325 TABLET, COATED ORAL at 09:08

## 2017-06-14 RX ADMIN — MORPHINE SULFATE 15 MG: 15 TABLET, EXTENDED RELEASE ORAL at 21:39

## 2017-06-14 RX ADMIN — ALBUTEROL SULFATE 2.5 MG: 2.5 SOLUTION RESPIRATORY (INHALATION) at 00:59

## 2017-06-14 RX ADMIN — METHYLPREDNISOLONE SODIUM SUCCINATE 125 MG: 125 INJECTION, POWDER, FOR SOLUTION INTRAMUSCULAR; INTRAVENOUS at 04:43

## 2017-06-14 RX ADMIN — GABAPENTIN 300 MG: 300 CAPSULE ORAL at 09:09

## 2017-06-14 RX ADMIN — ALBUTEROL SULFATE 2.5 MG: 2.5 SOLUTION RESPIRATORY (INHALATION) at 06:48

## 2017-06-14 RX ADMIN — IPRATROPIUM BROMIDE AND ALBUTEROL SULFATE 3 ML: .5; 3 SOLUTION RESPIRATORY (INHALATION) at 20:15

## 2017-06-14 ASSESSMENT — LIFESTYLE VARIABLES
EVER_SMOKED: YES
EVER_SMOKED: YES
ALCOHOL_USE: NO

## 2017-06-14 ASSESSMENT — ENCOUNTER SYMPTOMS
VOMITING: 0
DIARRHEA: 0
FEVER: 0
NAUSEA: 0
CHILLS: 0
SHORTNESS OF BREATH: 1

## 2017-06-14 ASSESSMENT — COGNITIVE AND FUNCTIONAL STATUS - GENERAL
WALKING IN HOSPITAL ROOM: A LITTLE
CLIMB 3 TO 5 STEPS WITH RAILING: A LITTLE
HELP NEEDED FOR BATHING: A LITTLE
DAILY ACTIVITIY SCORE: 18
DRESSING REGULAR UPPER BODY CLOTHING: A LITTLE
PERSONAL GROOMING: A LITTLE
SUGGESTED CMS G CODE MODIFIER MOBILITY: CJ
DRESSING REGULAR LOWER BODY CLOTHING: A LITTLE
SUGGESTED CMS G CODE MODIFIER DAILY ACTIVITY: CK
MOBILITY SCORE: 21
STANDING UP FROM CHAIR USING ARMS: A LITTLE
EATING MEALS: A LITTLE
TOILETING: A LITTLE

## 2017-06-14 ASSESSMENT — PAIN SCALES - GENERAL
PAINLEVEL_OUTOF10: 7
PAINLEVEL_OUTOF10: 7
PAINLEVEL_OUTOF10: 5
PAINLEVEL_OUTOF10: 5
PAINLEVEL_OUTOF10: 10

## 2017-06-14 NOTE — ED PROVIDER NOTES
"ED Provider Note    Scribed for Alexander Wilson D.O. by Jonathan Lindquist. 6/14/2017  12:05 AM    Primary care provider: None  Means of arrival: EMS  History obtained from: Patient  History limited by: None    CHIEF COMPLAINT  Chief Complaint   Patient presents with   • Shortness of Breath   • Chest Pain     x3 days, sharp       HPI  Zenia Ordaz is a 66 y.o. female who was brought into the ED by ambulance for chest pain and shortness of breath. The patient says for the past 3 days she's had \"sharp\" chest pain that also radiates to her back. She's felt short of breath and has difficulty ambulating to the bathroom secondary to her shortness of breath. Deep inspiration exacerbates her shortness of breath. The patient says she's had similar shortness of breath where she'd have difficulty walking 1 block and was found to have blood clots. She denies any nausea, vomiting, diarrhea, or chills. The patient has a history of COPD and diabetes mellitus.    REVIEW OF SYSTEMS  Review of Systems   Constitutional: Negative for fever and chills.   Respiratory: Positive for shortness of breath.    Cardiovascular: Positive for chest pain and leg swelling.   Gastrointestinal: Negative for nausea, vomiting and diarrhea.   All other systems reviewed and are negative.      PAST MEDICAL HISTORY   has a past medical history of COPD; Fibromyalgia; Hepatitis B; Hepatitis C; Hepatitis A; Diabetes; CAD (coronary artery disease); Stroke (CMS-[facility]) (1982); Backpain; Myocardial infarct (CMS-[facility]) (1989, 1991); Cancer (CMS-[facility]) (Cervical ~2003); Congestive heart failure (CMS-[facility]); MRSA infection; Drug abuse; Hypertension; Fall; Pneumonia; and Seizure (CMS-HCC).    SURGICAL HISTORY   has past surgical history that includes other cardiac surgery and incision and drainage orthopedic (7/13/2013).    SOCIAL HISTORY  Social History   Substance Use Topics   • Smoking status: Current Every Day Smoker -- 0.12 packs/day for 53 years     Types: " Cigarettes   • Smokeless tobacco: Never Used      Comment: smokes 1/2 ppd   • Alcohol Use: No      History   Drug Use No     Comment: Hx of heroin meth     FAMILY HISTORY  Family History   Problem Relation Age of Onset   • Cancer Mother    • Cancer Sister    • Cancer Maternal Aunt      CURRENT MEDICATIONS  No current facility-administered medications on file prior to encounter.     Current Outpatient Prescriptions on File Prior to Encounter   Medication Sig Dispense Refill   • morphine ER (MS CONTIN) 15 MG Tab CR tablet Take 1 Tab by mouth every 12 hours. 14 Tab 0   • oxycodone immediate release (ROXICODONE) 10 MG immediate release tablet Take 1 Tab by mouth every 8 hours as needed for Severe Pain. 15 Tab 0   • alprazolam (XANAX) 0.5 MG Tab Take 1 Tab by mouth 3 times a day as needed for Anxiety. 15 Tab 0   • budesonide-formoterol (SYMBICORT) 80-4.5 MCG/ACT Aerosol Inhale 2 Puffs by mouth 2 Times a Day. 1 Inhaler 2   • albuterol 108 (90 BASE) MCG/ACT Aero Soln inhalation aerosol Inhale 2 Puffs by mouth every four hours as needed for Shortness of Breath. 8.5 g 2   • nitroglycerin (NITROSTAT) 0.4 MG SL Tab 1 tab taken sublingually every 5 minutes as need for chest pain.  Up to 3 doses per day in total. 25 Tab 0   • predniSONE (DELTASONE) 20 MG Tab Take 3 Tabs by mouth every day. 30 Tab 0   • carvedilol (COREG) 6.25 MG Tab Take 6.25 mg by mouth every evening.     • citalopram (CELEXA) 20 MG Tab Take 20 mg by mouth every evening.     • metformin (GLUCOPHAGE) 850 MG Tab Take 850 mg by mouth every evening.     • trazodone (DESYREL) 50 MG Tab Take 50 mg by mouth every evening.     • warfarin (COUMADIN) 5 MG Tab Take 1 Tab by mouth COUMADIN-DAILY. 30 Tab 1   • guaifenesin LA 1200 MG TABLET SR 12 HR Take 1 Tab by mouth every 12 hours. 28 Tab 1   • gabapentin (NEURONTIN) 300 MG Cap Take 1 Cap by mouth 3 times a day. 90 Cap 2   • risperidone (RISPERDAL) 0.5 MG Tab Take 1 Tab by mouth every bedtime. 30 Tab 2  "    ALLERGIES  Allergies   Allergen Reactions   • Mushroom Extract Complex Rash and Unspecified     Pt gets a rash and breaks out in a sweat when eats mushrooms   • Tylenol Rash     Pt states \"I get a body rash\".   • Zofran Rash     Pt states \"I get a body rash\".       PHYSICAL EXAM  VITAL SIGNS: /76 mmHg  Pulse 67  Temp(Src) 36.6 °C (97.8 °F)  Resp 16  Ht 1.524 m (5')  Wt 90.4 kg (199 lb 4.7 oz)  BMI 38.92 kg/m2  SpO2 98%    Constitutional: Well developed, well nourished. mild acute distress.  HEENT: Normocephalic, atraumatic. Posterior pharynx clear and moist.  Eyes:  EOMI. Normal sclera.  Neck: Supple, Full range of motion, nontender.  Chest/Pulmonary: Expiratory wheeze, rhonchi noted.  Symmetrical expansion.   Cardio: Regular rate and rhythm with no murmur.   Abdomen: Soft, nontender. No peritoneal signs. No guarding. No palpable masses.  Back: No CVA tenderness, nontender midline, no step offs.  Musculoskeletal: No deformity, 1+ pitting edema lower extremity, neurovascular intact.   Neuro: Clear speech, appropriate, cooperative, cranial nerves II-XII grossly intact.  Psych: Normal mood and affect      DIAGNOSTIC STUDIES / PROCEDURES    LABS  Results for orders placed or performed during the hospital encounter of 06/13/17   BTYPE NATRIURETIC PEPTIDE   Result Value Ref Range    B Natriuretic Peptide 357 (H) 0 - 100 pg/mL   CBC WITH DIFFERENTIAL   Result Value Ref Range    WBC 5.4 4.8 - 10.8 K/uL    RBC 4.74 4.20 - 5.40 M/uL    Hemoglobin 12.0 12.0 - 16.0 g/dL    Hematocrit 39.6 37.0 - 47.0 %    MCV 83.5 81.4 - 97.8 fL    MCH 25.3 (L) 27.0 - 33.0 pg    MCHC 30.3 (L) 33.6 - 35.0 g/dL    RDW 44.4 35.9 - 50.0 fL    Platelet Count 194 164 - 446 K/uL    MPV 10.3 9.0 - 12.9 fL    Neutrophils-Polys 55.70 44.00 - 72.00 %    Lymphocytes 28.20 22.00 - 41.00 %    Monocytes 11.60 0.00 - 13.40 %    Eosinophils 2.60 0.00 - 6.90 %    Basophils 1.50 0.00 - 1.80 %    Immature Granulocytes 0.40 0.00 - 0.90 %    " Nucleated RBC 0.00 /100 WBC    Neutrophils (Absolute) 2.99 2.00 - 7.15 K/uL    Lymphs (Absolute) 1.51 1.00 - 4.80 K/uL    Monos (Absolute) 0.62 0.00 - 0.85 K/uL    Eos (Absolute) 0.14 0.00 - 0.51 K/uL    Baso (Absolute) 0.08 0.00 - 0.12 K/uL    Immature Granulocytes (abs) 0.02 0.00 - 0.11 K/uL    NRBC (Absolute) 0.00 K/uL   COMP METABOLIC PANEL   Result Value Ref Range    Sodium 138 135 - 145 mmol/L    Potassium 3.9 3.6 - 5.5 mmol/L    Chloride 104 96 - 112 mmol/L    Co2 29 20 - 33 mmol/L    Anion Gap 5.0 0.0 - 11.9    Glucose 143 (H) 65 - 99 mg/dL    Bun 7 (L) 8 - 22 mg/dL    Creatinine 0.64 0.50 - 1.40 mg/dL    Calcium 9.5 8.5 - 10.5 mg/dL    AST(SGOT) 19 12 - 45 U/L    ALT(SGPT) 10 2 - 50 U/L    Alkaline Phosphatase 81 30 - 99 U/L    Total Bilirubin 0.4 0.1 - 1.5 mg/dL    Albumin 3.6 3.2 - 4.9 g/dL    Total Protein 7.2 6.0 - 8.2 g/dL    Globulin 3.6 (H) 1.9 - 3.5 g/dL    A-G Ratio 1.0 g/dL   LACTIC ACID   Result Value Ref Range    Lactic Acid 1.4 0.5 - 2.0 mmol/L   TROPONIN   Result Value Ref Range    Troponin I <0.01 0.00 - 0.04 ng/mL   ESTIMATED GFR   Result Value Ref Range    GFR If African American >60 >60 mL/min/1.73 m 2    GFR If Non African American >60 >60 mL/min/1.73 m 2   D-DIMER   Result Value Ref Range    D-Dimer Screen 803 (H) <250 ng/mL(D-DU)   EKG (ER)   Result Value Ref Range    Report       [facility] Emergency Dept.    Test Date:  2017  Pt Name:    OMAR KENNEDY              Department: ER  MRN:        [id number]                      Room:  Gender:     F                            Technician: [id number]  :        1950                   Requested By:ER TRIAGE PROTOCOL  Order #:    [id number]                    Reading MD:    Measurements  Intervals                                Axis  Rate:       59                           P:          62  ND:         184                          QRS:        45  QRSD:       100                          T:          80  QT:          452  QTc:        448    Interpretive Statements  SINUS BRADYCARDIA  BORDERLINE T ABNORMALITIES, ANT-LAT LEADS  BASELINE WANDER IN LEAD(S) V2,V3  Compared to ECG 05/21/2017 09:23:28  Sinus rhythm no longer present  T-wave abnormality still present     All labs reviewed by me.    EKG  Normal sinus rhythm at a rate of 59. No ST elevations or depressions. No ectopy noted. As interpreted by me.     RADIOLOGY  CT-CTA CHEST PULMONARY ARTERY W/ RECONS   Final Result         1.  Motion artifacts results in largely nondiagnostic evaluation of the subsegmental pulmonary arteries, otherwise no pulmonary embolism is identified.   2.  Enlarged mediastinal lymph nodes, appear somewhat increased prior, additional workup for causes of adenopathy as clinically appropriate.   3.  Hepatomegaly and splenomegaly   4.  Cardiomegaly   5.  Atherosclerosis and atherosclerotic coronary disease      DX-CHEST-LIMITED (1 VIEW)   Final Result         1.  Cardiomegaly with pulmonary vascular congestion and mild diffuse pulmonary edema.      The radiologist's interpretation of all radiological studies have been reviewed by me.    COURSE & MEDICAL DECISION MAKING  Pertinent Labs & Imaging studies reviewed. (See chart for details)    12:05 AM - Patient seen and examined at bedside. Patient will be treated with 2.5 mg Albuterol in 0.5 mL nebulizer solution. Ordered chest x-ray, lactic acid, BNP, blood cultures x2, CBC, CMP, troponin, and EKG to evaluate her symptoms. The differential diagnoses include but are not limited to: pneumonia, pe, mi, ptx, copd    4:19 AM  At this time, the pt will be admitted for copd exacerbation.  She was given a breathing treatment, with some relief of her symptoms.  In addition, i wrote for solu medrol to be given as well.  Pt will be admitted to Dr. Mendez in guarded condition.     FINAL IMPRESSION  1. Acute exacerbation of chronic obstructive pulmonary disease (COPD) (CMS-HCC)    2. Chest pain, unspecified type          I, Jonathan Lindquist (Scribe), am scribing for, and in the presence of, Alexander Wilson D.O..    Electronically signed by: Jonathan Lindquist (Betty), 6/14/2017    IAlexander D.O. personally performed the services described in this documentation, as scribed by Jonathan Lindquist in my presence, and it is both accurate and complete.    The note accurately reflects work and decisions made by me.  Alexander Wilson  6/14/2017  4:21 AM

## 2017-06-14 NOTE — CARE PLAN
Problem: Safety  Goal: Will remain free from injury  Outcome: PROGRESSING AS EXPECTED  Safety and fall precautions in place, bed in lowest position, treaded socks on, upper bedrails up.  Pt educated on call light and verbalizes understanding.        Problem: Infection  Goal: Will remain free from infection  Outcome: PROGRESSING AS EXPECTED  Pt shows no new or worsening s/s of infection

## 2017-06-14 NOTE — PROGRESS NOTES
Inpatient Anticoagulation Service Note    Date: 6/14/2017  Reason for Anticoagulation: Pulmonary Embolism, Deep Vein Thrombosis  Hemoglobin Value: 12  Hematocrit Value: 39.6  Lab Platelet Value: 194  Target INR: 2.0 to 3.0  INR from last 7 days     Date/Time INR Value    06/14/17 0015 (!)2.72        Dose from last 7 days     Date/Time Dose (mg)    06/14/17 1000 2.5        Average Dose (mg):  (5 mg daily)  Significant Interactions: Antiplatelet Medications, Antibiotics, Corticosteroids (SSRI)  Bridge Therapy: No     Comments:  Patient admitted for acute hypoxemic respiratory failure secondary to acute chronic obstructive pulmonary disease exacerbation.  Warfarin indication is history of DVT/PE. IVC filter placed on 4/22/17. Overall noncompliance with medications.   Follows with [facility] clinic, however, appears it has been some time since she has been seen.  Reported last dose was yesterday. INR today is therapeutic.   INR has been variable on previous admissions. New interaction with aspirin, ABX and steroids.  Considering new interactions will start at half of the home dose.  Dose conservatively for now. Monitor for bleeding. H/H stable.    Plan:  Warfarin 2.5mg x1, trend INR. Pharmacy will follow.  Education Material Provided?: No (Chronic warfarin)  Pharmacist suggested discharge dosing: Likely the home dose pending drug interactions on discharge. INR within 72 hours of discharge.     Fredi Cota, PharmD, BCPS

## 2017-06-14 NOTE — ED NOTES
Pt is upset and rude, wont answer questions when asked, states that i don't need to know what brought her here.

## 2017-06-14 NOTE — ED NOTES
Had hospilaist bedside to hear pt breathing ask if he was ordering her a breathing treatment, told him pt is requesting pain meds over breathing treatment.

## 2017-06-14 NOTE — H&P
DATE OF SERVICE:  06/14/2017    CHIEF COMPLAINT:  Shortness of breath.    HISTORY OF PRESENTING ILLNESS:  This is a 66-year-old female who has known   history of recurrent hospitalizations, failure to thrive and noncompliance   with medical recommendation with ongoing nicotine dependence and polysubstance   abuse who comes into the emergency room for further evaluation of shortness   of breath and some chest pain.  Patient reports that she has been having   ongoing shortness of breath over the course of last 4 days with profound   dyspnea on exertion to the point that she is unable to do any activities   of daily living including going to the restroom because this will make her   severely short of breath and dyspneic.  Patient also reports having ongoing   worsening cough over the course of last 4 days with production of sputum,   which is yellow colored.  Otherwise, she also complains of associated fevers   and chills.  Patient reports that she has continued to smoke and she reports   that she quit smoking only 2 days ago prior to then she was smoking anywhere   from 2-3 cigarettes to half a pack a day.  She also has ongoing polysubstance   abuse.  She reports that she last used heroin yesterday and she cannot find   anymore IV access and uses intramuscularly.  She has also ongoing   methamphetamine abuse and she reports that she last used inhaled   methamphetamine 2 days ago.  Otherwise, this patient denies any alcohol use.    She, upon presentation, denies any nausea or vomiting.  She reports that prior   to presentation because of her shortness of breath she was feeling some   central chest pain, which has subsequently resolved.  Otherwise, she denies   any abdominal pain, diarrhea, constipation, blood in bowel movements or   melena.  She denies any dysuria, frequency, urgency or hematuria.  She does   complain of lower extremity swelling and tenderness.  Otherwise, she denies   any palpitations.  At times does  complain of orthopnea and paroxysmal   nocturnal dyspnea.    HOME MEDICATIONS:  1.  Morphine ER 15 mg twice a day.  2.  Oxycodone 10 mg every 8 hours as needed.  3.  Xanax 1 mg 3 times a day as needed.  4.  Symbicort twice a day.  5.  Albuterol inhaler as needed.  6.  Nitroglycerin sublingual as needed.  7.  Prednisone 60 mg daily.  8.  Carvedilol 6.25 mg daily.  9.  Celexa 20 mg daily.  10.  Metformin 850 mg daily.  11.  Trazodone 50 mg daily.  12.  Warfarin 5 mg daily.  13.  Guaifenesin.  14.  Gabapentin 300 mg 3 times a day.  15.  Risperidone 0.5 mg at bedtime.    PAST MEDICAL HISTORY:  1.  Severe noncompliance and nonadherence to medical recommendation.  2.  Recurrent hospitalization.  3.  Failure to thrive.  4.  History of pulmonary embolism and deep venous thrombosis, status post   inferior vena cava filter placement and failure to comply with outpatient   anticoagulation.  5.  Chronic hypoxemic respiratory failure.  6.  Chronic obstructive pulmonary disease.  7.  Homelessness.  8.  Ongoing intravenous drug abuse.  9.  Polysubstance abuse including use of heroin and methamphetamine.  10.  Ongoing nicotine dependence.  11.  Dementia.  12.  Hepatitis C viral infection.  13.  Hepatitis B viral infection.  14.  Poor psychosocial support.  15.  Opiate dependence.  16.  Morbid obesity.  17.  Neuropathy.  18.  Hypertension.  19.  Diabetes mellitus type 2.  20.  Fibromyalgia.  21.  Chronic back pain.  22.  History of pneumonia.    PAST SURGICAL HISTORY:  1.  History of cardiac catheterization.  2.  Irrigation and debridement surgery.    FAMILY HISTORY:  Patient reports that everybody in the family is healthy.  She   denies any family history of significance.  Upon review of electronic medical   record system, it appears that patient's mother, sister, and maternal aunt   have a history of cancer, but to me she reported no history of malignancy in   the family.    SOCIAL HISTORY:  Nicotine dependence, drug abuse and  alcohol use as listed in   the history of presenting illness.  The patient currently reports that she is   living with a friend, is not providing further details to this.    ALLERGIES:  PATIENT HAS REPORTED ALLERGIES TO MUSHROOMS, TYLENOL AND ZOFRAN.    REVIEW OF SYSTEMS:  A detailed review of system was reviewed with the patient   and negative other than as listed in the history of presenting illness and   past medical history.    PHYSICAL EXAMINATION:  VITAL SIGNS:  On presentation, temperature of 36.6 degrees Celsius, pulse of   67, respiratory rate of 16, blood pressure of 172/76, weight of 90.4 kg, and   oxygen saturation of 98% on 3 L of nasal cannula.  GENERAL:  Patient is alert and oriented x4, in severe distress from underlying   dyspnea and hypoxemic respiratory failure.  HEAD, EYES, AND ENT:  Head is normocephalic.  Extraocular movements are   intact.  Bilateral pupils are equal, round, and reactive to light and   accommodation.  No conjunctival pallor or scleral icterus is seen.  No nasal   discharge.  No oropharyngeal exudates or erythema.  NECK:  No jugular venous distention.  CARDIOVASCULAR:  Regular rate and rhythm.  No tachycardia.  S1 plus S2.  No   murmurs, rubs, or gallops.  RESPIRATIONS:  Patient is noted to have profound expiratory wheezing in all   quadrants.  Bilateral lower lobe rhonchi are seen, which are clearing with   cough, otherwise.  Minimal bilateral basal crackles are seen.  ABDOMEN:  Soft, nontender, nondistended.  Bowel sounds positive and normal in   frequency.  GENITOURINARY SYSTEM:  Not examined.  MUSCULOSKELETAL:  No apparent joint swelling or tenderness on examination.  SKIN:  Bilateral lower extremity skin tracking is noted; otherwise, no   findings of acute infection.  Patient denies any lumps that she would have   noted concerning for underlying abscess.  NEUROLOGICAL:  Cranial nerves without any gross deficit.  Motor strength of   5/5 in bilateral upper and lower  extremities.  No gross sensory deficits.  EXTREMITIES:  Pulses 1+ in bilateral lower extremity.  Bilateral lower   extremity edema and tenderness to palpation.  No cyanosis.    LABORATORY STUDIES:  White blood cell count of 5.4, hemoglobin of 12.0,   platelet count of 194.  Sodium of 138, potassium of 3.9, BUN of 7, creatinine   of 0.64.  Liver function tests within normal limits.  Troponin I less than   0.01.  BNP of 357.  D-dimer screen is positive with a D-dimer level of 803.    IMAGING STUDIES:  1.  CT of the abdomen and pelvis obtained on presentation reveals motion   artifact, but no evidence of pulmonary embolism, mediastinal adenopathy is   seen.  Hepatomegaly and splenomegaly is seen.  Cardiomegaly is seen.    Atherosclerosis and atherosclerotic coronary artery disease is seen.  2.  EKG obtained on presentation and personally reviewed by me reveals the   patient to be in sinus bradycardia.  T-wave flattening is seen in V1-V6;   otherwise, no acute ST-T wave changes concerning for ischemia.    IMPRESSION:  1.  Acute-on-chronic hypoxemic respiratory failure.  2.  Acute chronic obstructive pulmonary disease exacerbation.  3.  Underlying pulmonary edema.  4.  Chronic chest pain.  5.  History of deep venous thrombosis and pulmonary embolism, status post   inferior vena cava filter, suspected noncompliance with underlying therapeutic   anticoagulation.  6.  Known ongoing noncompliance and adherence to medical recommendations.  7.  Depression and anxiety.  8.  Chronic pain syndrome with narcotic dependence.  9.  Polysubstance abuse, ongoing.  10.  Nicotine dependence, ongoing.  11.  Diabetes mellitus type 2.  12.  Fibromyalgia.  13.  Obesity with a body mass index of 38.92.  14.  Hepatitis B viral infection.  15.  Hepatitis C viral infection.  16.  Homelessness.  17.  Failure to thrive.  18.  Recurrent hospitalization.    PLAN:  At this point, the patient will be admitted to the hospital.  Patient   presents with  acute hypoxemic respiratory failure secondary to acute chronic   obstructive pulmonary disease exacerbation.  At this point, patient will be   initiated on aggressive bronchodilator regimen.  In addition, she will be   initiated on intravenous steroids, plan to transition to oral steroids once   she is clinically stable and maintain an outpatient steroid taper with plans   to follow up with pulmonology in the outpatient setting and obtain pulmonary   function testing.  Concern for underlying bronchitis.  We will initiate the   patient on oral doxycycline therapy in addition.  Otherwise, this patient   complains of chest pain, which was present prior to presentation to the   hospital subsequently resolved.  At this point in time, patient will be   admitted to the telemetry unit.  Her troponins will be cycled and she will be   monitored on telemetry unit.  Recent echocardiogram was obtained.  In   addition, she is noted to have pulmonary edema.  At this point in time,   patient will be given one-time dose of intravenous Lasix and further need to   continue intravenous diuresis/diabetic regimen per rounding hospitalist based   on clinical evaluation.  Otherwise, this patient has underlying known history   of bilateral deep venous thrombosis and pulmonary embolism, status post IVC   filter, I suspect she is noncompliant with underlying anticoagulation therapy.    She remains quiet when questioned regarding her outpatient followup and if   she has followed up with the Coumadin clinic.  We will obtain an INR level and   continue the patient on Coumadin.  If INR is subtherapeutic, decision will   need to be made if ongoing therapeutic anticoagulation during the   hospitalization with subcutaneous enoxaparin or intravenous heparin needs to   be pursued or not.  Otherwise, patient is noted to have hypertension.  We will   initiate the patient on hypertensive regimen and as needed intravenous   medications will remain  available.  Otherwise, we will continue this patient's   underlying regimen for chronic comorbid condition.  Patient remains   noncompliant.  She has been counseled and educated.  She needs to be more   responsible with her underlying care.  Otherwise, she has been counseled and   educated regarding smoking cessation and her underlying drug abuse.    Otherwise, DVT preventive prophylaxis are not indicated while the patient on   therapeutic anticoagulation.  This needs to be considered if the patient has a   subtherapeutic INR.  Otherwise, stress ulcer prophylaxis are not indicated.    Patient has been initiated on sliding scale insulin therapy.  An oral   hypoglycemic agents have been held.  Patient is being admitted to the hospital   under a full code status.       ____________________________________     MD SHEILA BULLARD / MIKE    DD:  06/14/2017 07:29:35  DT:  06/14/2017 08:09:31    D#:  [id number]  Job#:  [id number]

## 2017-06-14 NOTE — RESPIRATORY CARE
COPD EDUCATION by COPD CLINICAL EDUCATOR  6/14/2017 at 11:24 AM by Radha Chatterjee     Patient reviewed by COPD education team. Patient does not qualify for COPD program.

## 2017-06-14 NOTE — ED NOTES
Called Dr Mendez to let him know that pts pressures were going up and getting into the 200's systolic.  Doctor ordered hydralazine 20mg Iv

## 2017-06-14 NOTE — PROGRESS NOTES
Pt arrived on floor at 0830. Tele monitor placed and monitor room was called for conformation.  Chart and MAR have been reviewed.  Pt has 6/10 pain, pt denies SOB.  POC has been discussed and questions answered. Pt was shown the call light and how to use the remote.  Pt informed to call before getting out of bed.  Fall precautions in place.  Pt verbalized understanding.

## 2017-06-14 NOTE — ED NOTES
Patient transported to [facility] via gurney with RN on cardiac monitor, Tele RN and CNA at bedside.

## 2017-06-14 NOTE — ED NOTES
Lawrence RAMÍREZ tried for EJ on both sides without success and Alise RAMÍREZ was able to get IV with ultrasound

## 2017-06-14 NOTE — ED NOTES
"Asked pt if she needed a breathing treatment she state \"i wont take it I need pain meds, pain meds help my diaphragm.\"  "

## 2017-06-14 NOTE — ED NOTES
Pt upset that I tried to start a line in her AC of left arm but with all of her track marks there are not many places to go.  Able to draw blood but unable to get a working IV.  Pt extremely upset with me about choosing that IV

## 2017-06-14 NOTE — ED NOTES
"Pt bib [facility] from [facility] (ender) to ED ender in . Pt states she went to [facility] to \"get off heroin.\" But they noted she was having difficulty breathing at [facility] and called 911. Pt states she's been having chest pain x3 days, \"it hurts when I breathe, and my back hurts,\" and worsening SOB, especially with exertion. \"I can't get air,\" she states. Pt states she started using home O2 four days ago; she is currently on 3L. Hx of COPD. States she has a hx of \"blood clots in my lungs,\" and she's concerned that she might have a PE again. Currently on Coumadin. VS per EMS: 170/77, HR 61, 99% on 2L, . EKG ordered. Will start SOB protocol.   "

## 2017-06-15 LAB
ALBUMIN SERPL BCP-MCNC: 3.8 G/DL (ref 3.2–4.9)
ALBUMIN/GLOB SERPL: 1 G/DL
ALP SERPL-CCNC: 75 U/L (ref 30–99)
ALT SERPL-CCNC: 8 U/L (ref 2–50)
ANION GAP SERPL CALC-SCNC: 8 MMOL/L (ref 0–11.9)
AST SERPL-CCNC: 12 U/L (ref 12–45)
BILIRUB SERPL-MCNC: 0.6 MG/DL (ref 0.1–1.5)
BNP SERPL-MCNC: 817 PG/ML (ref 0–100)
BUN SERPL-MCNC: 18 MG/DL (ref 8–22)
CALCIUM SERPL-MCNC: 9.7 MG/DL (ref 8.5–10.5)
CHLORIDE SERPL-SCNC: 100 MMOL/L (ref 96–112)
CO2 SERPL-SCNC: 26 MMOL/L (ref 20–33)
CREAT SERPL-MCNC: 0.54 MG/DL (ref 0.5–1.4)
ERYTHROCYTE [DISTWIDTH] IN BLOOD BY AUTOMATED COUNT: 39.9 FL (ref 35.9–50)
GFR SERPL CREATININE-BSD FRML MDRD: >60 ML/MIN/1.73 M 2
GLOBULIN SER CALC-MCNC: 3.8 G/DL (ref 1.9–3.5)
GLUCOSE BLD-MCNC: 238 MG/DL (ref 65–99)
GLUCOSE BLD-MCNC: 253 MG/DL (ref 65–99)
GLUCOSE BLD-MCNC: 261 MG/DL (ref 65–99)
GLUCOSE BLD-MCNC: 285 MG/DL (ref 65–99)
GLUCOSE SERPL-MCNC: 182 MG/DL (ref 65–99)
HCT VFR BLD AUTO: 42.3 % (ref 37–47)
HGB BLD-MCNC: 13.6 G/DL (ref 12–16)
INR PPP: 2.32 (ref 0.87–1.13)
MAGNESIUM SERPL-MCNC: 1.7 MG/DL (ref 1.5–2.5)
MCH RBC QN AUTO: 25.2 PG (ref 27–33)
MCHC RBC AUTO-ENTMCNC: 32.2 G/DL (ref 33.6–35)
MCV RBC AUTO: 78.3 FL (ref 81.4–97.8)
PHOSPHATE SERPL-MCNC: 2.9 MG/DL (ref 2.5–4.5)
PLATELET # BLD AUTO: 280 K/UL (ref 164–446)
PMV BLD AUTO: 9.8 FL (ref 9–12.9)
POTASSIUM SERPL-SCNC: 3.6 MMOL/L (ref 3.6–5.5)
PROT SERPL-MCNC: 7.6 G/DL (ref 6–8.2)
PROTHROMBIN TIME: 26.2 SEC (ref 12–14.6)
RBC # BLD AUTO: 5.4 M/UL (ref 4.2–5.4)
SODIUM SERPL-SCNC: 134 MMOL/L (ref 135–145)
WBC # BLD AUTO: 8.8 K/UL (ref 4.8–10.8)

## 2017-06-15 PROCEDURE — 700102 HCHG RX REV CODE 250 W/ 637 OVERRIDE(OP): Performed by: INTERNAL MEDICINE

## 2017-06-15 PROCEDURE — 83735 ASSAY OF MAGNESIUM: CPT

## 2017-06-15 PROCEDURE — G8979 MOBILITY GOAL STATUS: HCPCS | Mod: CI

## 2017-06-15 PROCEDURE — 94760 N-INVAS EAR/PLS OXIMETRY 1: CPT

## 2017-06-15 PROCEDURE — A9270 NON-COVERED ITEM OR SERVICE: HCPCS

## 2017-06-15 PROCEDURE — 85027 COMPLETE CBC AUTOMATED: CPT

## 2017-06-15 PROCEDURE — 94640 AIRWAY INHALATION TREATMENT: CPT

## 2017-06-15 PROCEDURE — G8980 MOBILITY D/C STATUS: HCPCS | Mod: CI

## 2017-06-15 PROCEDURE — 82962 GLUCOSE BLOOD TEST: CPT | Mod: 91

## 2017-06-15 PROCEDURE — 80053 COMPREHEN METABOLIC PANEL: CPT

## 2017-06-15 PROCEDURE — 700101 HCHG RX REV CODE 250: Performed by: INTERNAL MEDICINE

## 2017-06-15 PROCEDURE — 36415 COLL VENOUS BLD VENIPUNCTURE: CPT

## 2017-06-15 PROCEDURE — 700102 HCHG RX REV CODE 250 W/ 637 OVERRIDE(OP)

## 2017-06-15 PROCEDURE — G8987 SELF CARE CURRENT STATUS: HCPCS | Mod: CJ

## 2017-06-15 PROCEDURE — 97162 PT EVAL MOD COMPLEX 30 MIN: CPT

## 2017-06-15 PROCEDURE — A9270 NON-COVERED ITEM OR SERVICE: HCPCS | Performed by: INTERNAL MEDICINE

## 2017-06-15 PROCEDURE — 700111 HCHG RX REV CODE 636 W/ 250 OVERRIDE (IP): Performed by: INTERNAL MEDICINE

## 2017-06-15 PROCEDURE — 83880 ASSAY OF NATRIURETIC PEPTIDE: CPT

## 2017-06-15 PROCEDURE — 770020 HCHG ROOM/CARE - TELE (206)

## 2017-06-15 PROCEDURE — G8988 SELF CARE GOAL STATUS: HCPCS | Mod: CI

## 2017-06-15 PROCEDURE — G8978 MOBILITY CURRENT STATUS: HCPCS | Mod: CI

## 2017-06-15 PROCEDURE — 99232 SBSQ HOSP IP/OBS MODERATE 35: CPT | Performed by: HOSPITALIST

## 2017-06-15 PROCEDURE — 84100 ASSAY OF PHOSPHORUS: CPT

## 2017-06-15 PROCEDURE — 85610 PROTHROMBIN TIME: CPT

## 2017-06-15 PROCEDURE — 97165 OT EVAL LOW COMPLEX 30 MIN: CPT

## 2017-06-15 RX ORDER — WARFARIN SODIUM 5 MG/1
5 TABLET ORAL
Status: DISCONTINUED | OUTPATIENT
Start: 2017-06-15 | End: 2017-06-17

## 2017-06-15 RX ADMIN — IPRATROPIUM BROMIDE AND ALBUTEROL SULFATE 3 ML: .5; 3 SOLUTION RESPIRATORY (INHALATION) at 10:30

## 2017-06-15 RX ADMIN — METHYLPREDNISOLONE SODIUM SUCCINATE 62.5 MG: 125 INJECTION, POWDER, FOR SOLUTION INTRAMUSCULAR; INTRAVENOUS at 17:01

## 2017-06-15 RX ADMIN — INSULIN LISPRO 2 UNITS: 100 INJECTION, SOLUTION INTRAVENOUS; SUBCUTANEOUS at 16:55

## 2017-06-15 RX ADMIN — DOXYCYCLINE 100 MG: 100 TABLET ORAL at 20:10

## 2017-06-15 RX ADMIN — METHYLPREDNISOLONE SODIUM SUCCINATE 62.5 MG: 125 INJECTION, POWDER, FOR SOLUTION INTRAMUSCULAR; INTRAVENOUS at 05:53

## 2017-06-15 RX ADMIN — SENNOSIDES AND DOCUSATE SODIUM 2 TABLET: 8.6; 5 TABLET ORAL at 08:46

## 2017-06-15 RX ADMIN — BUDESONIDE AND FORMOTEROL FUMARATE DIHYDRATE 2 PUFF: 160; 4.5 AEROSOL RESPIRATORY (INHALATION) at 06:31

## 2017-06-15 RX ADMIN — GABAPENTIN 300 MG: 300 CAPSULE ORAL at 20:10

## 2017-06-15 RX ADMIN — ALPRAZOLAM 0.5 MG: 0.25 TABLET ORAL at 11:56

## 2017-06-15 RX ADMIN — ALPRAZOLAM 0.5 MG: 0.25 TABLET ORAL at 16:59

## 2017-06-15 RX ADMIN — WARFARIN SODIUM 5 MG: 5 TABLET ORAL at 20:10

## 2017-06-15 RX ADMIN — METHYLPREDNISOLONE SODIUM SUCCINATE 62.5 MG: 125 INJECTION, POWDER, FOR SOLUTION INTRAMUSCULAR; INTRAVENOUS at 11:56

## 2017-06-15 RX ADMIN — CARVEDILOL 3.12 MG: 3.12 TABLET, FILM COATED ORAL at 17:01

## 2017-06-15 RX ADMIN — BUDESONIDE AND FORMOTEROL FUMARATE DIHYDRATE 2 PUFF: 160; 4.5 AEROSOL RESPIRATORY (INHALATION) at 20:10

## 2017-06-15 RX ADMIN — INSULIN LISPRO 3 UNITS: 100 INJECTION, SOLUTION INTRAVENOUS; SUBCUTANEOUS at 05:56

## 2017-06-15 RX ADMIN — INSULIN LISPRO 3 UNITS: 100 INJECTION, SOLUTION INTRAVENOUS; SUBCUTANEOUS at 20:15

## 2017-06-15 RX ADMIN — SENNOSIDES AND DOCUSATE SODIUM 2 TABLET: 8.6; 5 TABLET ORAL at 20:10

## 2017-06-15 RX ADMIN — IPRATROPIUM BROMIDE AND ALBUTEROL SULFATE 3 ML: .5; 3 SOLUTION RESPIRATORY (INHALATION) at 06:29

## 2017-06-15 RX ADMIN — LABETALOL HYDROCHLORIDE 10 MG: 5 INJECTION, SOLUTION INTRAVENOUS at 21:33

## 2017-06-15 RX ADMIN — GABAPENTIN 300 MG: 300 CAPSULE ORAL at 17:00

## 2017-06-15 RX ADMIN — TRAZODONE HYDROCHLORIDE 50 MG: 50 TABLET ORAL at 20:10

## 2017-06-15 RX ADMIN — ASPIRIN 325 MG: 325 TABLET, COATED ORAL at 08:46

## 2017-06-15 RX ADMIN — RISPERIDONE 0.5 MG: 0.5 TABLET, FILM COATED ORAL at 21:29

## 2017-06-15 RX ADMIN — TIOTROPIUM BROMIDE 1 CAPSULE: 18 CAPSULE ORAL; RESPIRATORY (INHALATION) at 06:32

## 2017-06-15 RX ADMIN — IPRATROPIUM BROMIDE AND ALBUTEROL SULFATE 3 ML: .5; 3 SOLUTION RESPIRATORY (INHALATION) at 14:12

## 2017-06-15 RX ADMIN — MORPHINE SULFATE 15 MG: 15 TABLET, EXTENDED RELEASE ORAL at 08:46

## 2017-06-15 RX ADMIN — MORPHINE SULFATE 15 MG: 15 TABLET, EXTENDED RELEASE ORAL at 20:10

## 2017-06-15 RX ADMIN — HYDRALAZINE HYDROCHLORIDE 10 MG: 20 INJECTION INTRAMUSCULAR; INTRAVENOUS at 01:47

## 2017-06-15 RX ADMIN — OXYCODONE HYDROCHLORIDE 10 MG: 5 TABLET ORAL at 03:20

## 2017-06-15 RX ADMIN — OXYCODONE HYDROCHLORIDE 10 MG: 5 TABLET ORAL at 11:56

## 2017-06-15 RX ADMIN — INSULIN LISPRO 3 UNITS: 100 INJECTION, SOLUTION INTRAVENOUS; SUBCUTANEOUS at 12:01

## 2017-06-15 RX ADMIN — GABAPENTIN 300 MG: 300 CAPSULE ORAL at 08:46

## 2017-06-15 RX ADMIN — ALPRAZOLAM 0.5 MG: 0.25 TABLET ORAL at 23:32

## 2017-06-15 RX ADMIN — METHYLPREDNISOLONE SODIUM SUCCINATE 62.5 MG: 125 INJECTION, POWDER, FOR SOLUTION INTRAMUSCULAR; INTRAVENOUS at 23:32

## 2017-06-15 RX ADMIN — CARVEDILOL 3.12 MG: 3.12 TABLET, FILM COATED ORAL at 08:46

## 2017-06-15 RX ADMIN — CITALOPRAM HYDROBROMIDE 20 MG: 20 TABLET ORAL at 20:10

## 2017-06-15 RX ADMIN — DOXYCYCLINE 100 MG: 100 TABLET ORAL at 08:46

## 2017-06-15 ASSESSMENT — ACTIVITIES OF DAILY LIVING (ADL): TOILETING: INDEPENDENT

## 2017-06-15 ASSESSMENT — ENCOUNTER SYMPTOMS
DOUBLE VISION: 0
COUGH: 1
MYALGIAS: 0
ORTHOPNEA: 0
PALPITATIONS: 0
EYE PAIN: 0
PHOTOPHOBIA: 0
SENSORY CHANGE: 0
BACK PAIN: 0
STRIDOR: 0
CHILLS: 0
PND: 0
VOMITING: 0
WHEEZING: 1
NERVOUS/ANXIOUS: 1
HEMOPTYSIS: 0
NAUSEA: 0
DEPRESSION: 0
SPEECH CHANGE: 0
SORE THROAT: 0
HEADACHES: 0
BLURRED VISION: 0
FEVER: 0
CLAUDICATION: 0
TINGLING: 0
SPUTUM PRODUCTION: 1
TREMORS: 0
MEMORY LOSS: 0
DIZZINESS: 0
SHORTNESS OF BREATH: 1
BLOOD IN STOOL: 0
CONSTIPATION: 0
HEARTBURN: 0
WEAKNESS: 1
NECK PAIN: 0

## 2017-06-15 ASSESSMENT — COGNITIVE AND FUNCTIONAL STATUS - GENERAL
PERSONAL GROOMING: A LITTLE
TOILETING: A LITTLE
CLIMB 3 TO 5 STEPS WITH RAILING: A LITTLE
DRESSING REGULAR UPPER BODY CLOTHING: A LITTLE
SUGGESTED CMS G CODE MODIFIER DAILY ACTIVITY: CK
DAILY ACTIVITIY SCORE: 19
DRESSING REGULAR LOWER BODY CLOTHING: A LITTLE
HELP NEEDED FOR BATHING: A LITTLE
MOBILITY SCORE: 23
SUGGESTED CMS G CODE MODIFIER MOBILITY: CI

## 2017-06-15 ASSESSMENT — PAIN SCALES - GENERAL
PAINLEVEL_OUTOF10: 5
PAINLEVEL_OUTOF10: 8
PAINLEVEL_OUTOF10: 10
PAINLEVEL_OUTOF10: 5
PAINLEVEL_OUTOF10: 6
PAINLEVEL_OUTOF10: 8

## 2017-06-15 ASSESSMENT — GAIT ASSESSMENTS
DISTANCE (FEET): 150
ASSISTIVE DEVICE: FRONT WHEEL WALKER
GAIT LEVEL OF ASSIST: SUPERVISED

## 2017-06-15 NOTE — ASSESSMENT & PLAN NOTE
Respiratory therapy and oxygen   Patient is on her home level of oxygen  Placement difficult for discharge as she refuses a shelter because she states they make her move around too much and this makes her more short of breath

## 2017-06-15 NOTE — PROGRESS NOTES
Assumed care of pt.  Bedside report received and whiteboard updated. Discussed plan of care for the day and answered questions.  Chart and MAR reviewed.  Call light and personal belongings are within reach.  Bed in low position and locked. Pt has no needs at this time.

## 2017-06-15 NOTE — THERAPY
"Occupational Therapy Evaluation completed.   Functional Status:  Pt s/p acute on chronic respiratory failure, hx of drug abuse, hx of medical non-compliance, presenting with generalized weakness and pain limiting safe ADL participation. Performed bed mobility with sba, socks mod a, toileting cga/min a. Pt presents with mild LB ADL deficits and would benefit from AE tranining and training in EC techniques. Pt woud benefit from 1-2 more ADL sessions to address above mentioned deficits to maximize pt's I with ADLs.   Plan of Care: Will benefit from Occupational Therapy 2 times per week  Discharge Recommendations:  Equipment: Will Continue to Assess for Equipment Needs. Post-acute therapy Discharge to home with outpatient or home health for additional skilled therapy services.    See \"Rehab Therapy-Acute\" Patient Summary Report for complete documentation.    "

## 2017-06-15 NOTE — PROGRESS NOTES
Assumed care at 1915. Bedside report received from Day DARRELL Conner. Patient's chart and MAR reviewed. 12 hour chart check complete. Pt. Is asleep in bed. White board updated. Call light, phone and personal belongings within reach. Bed alarm on and working appropriately. Vital signs stable

## 2017-06-15 NOTE — THERAPY
"Physical Therapy Evaluation completed.   Bed Mobility:  Supine to Sit: Supervised  Transfers: Sit to Stand: Supervised  Gait: Level Of Assist: Supervised with Front-Wheel Walker       Plan of Care: Patient with no further skilled PT needs in the acute care setting at this time  Discharge Recommendations: Equipment: Front-Wheel Walker. If d/c in next 1-2 days. However w/ increased mobilization w/ nsg anticipate that she will progress towards ambulation w/ her own cane.      See \"Rehab Therapy-Acute\" Patient Summary Report for complete documentation.     "
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
## 2017-06-15 NOTE — PROGRESS NOTES
2 RN skin check completed with Mary Anne RN    Scar to R shoulder  Scar to L shoulder  Scar midline abdomen  Bruising and scabbing to bilateral LE   Discoloration to bottom, scabbing and possible old healed pressure ulcer

## 2017-06-15 NOTE — PROGRESS NOTES
[facility]ist Progress Note    Date of Service: 6/15/2017    Chief Complaint  66 y.o. female admitted 2017 with Severe noncompliance, Type 2 DM, fibromyalgia, chronic back pain, History of pulmonary embolism and deep venous thrombosis, status post inferior vena cava filter placement, Intravenous drug abuse admitted for acute hypoxemic respiratory failure due to COPD exacerbation.     Interval Problem Update  Patient is having difficulty breathing but is improving slowly.   Continue RT protocol and duonebs, cont IV solumedrol, wean as tolerated.    Consultants/Specialty  None    Disposition  TBD        Review of Systems   Constitutional: Positive for malaise/fatigue. Negative for fever and chills.   HENT: Negative for congestion, hearing loss, sore throat and tinnitus.    Eyes: Negative for blurred vision, double vision, photophobia and pain.   Respiratory: Positive for cough, sputum production, shortness of breath and wheezing. Negative for hemoptysis and stridor.    Cardiovascular: Negative for chest pain, palpitations, orthopnea, claudication and PND.   Gastrointestinal: Negative for heartburn, nausea, vomiting, constipation, blood in stool and melena.   Genitourinary: Negative for dysuria, urgency and frequency.   Musculoskeletal: Negative for myalgias, back pain and neck pain.   Neurological: Positive for weakness. Negative for dizziness, tingling, tremors, sensory change, speech change and headaches.   Psychiatric/Behavioral: Positive for suicidal ideas. Negative for depression and memory loss. The patient is nervous/anxious.       Physical Exam  Laboratory/Imaging   Hemodynamics  Temp (24hrs), Av.7 °C (98.1 °F), Min:36.5 °C (97.7 °F), Max:36.9 °C (98.5 °F)   Temperature: 36.8 °C (98.2 °F)  Pulse  Av.6  Min: 61  Max: 99    Blood Pressure: 124/69 mmHg      Respiratory      Respiration: 18, Pulse Oximetry: 95 %, O2 Daily Delivery Respiratory : Silicone Nasal Cannula     Given By:: Mouthpiece,  #MDI/DPI Given: MDI/DPI x 2, Work Of Breathing / Effort: Mild  RUL Breath Sounds: Coarse Crackles;Expiratory Wheezes, RML Breath Sounds: Coarse Crackles;Expiratory Wheezes, RLL Breath Sounds: Coarse Crackles;Expiratory Wheezes, MARC Breath Sounds: Coarse Crackles;Expiratory Wheezes, LLL Breath Sounds: Coarse Crackles;Expiratory Wheezes    Fluids    Intake/Output Summary (Last 24 hours) at 06/15/17 1412  Last data filed at 06/15/17 0400   Gross per 24 hour   Intake    711 ml   Output    500 ml   Net    211 ml       Nutrition  Orders Placed This Encounter   Procedures   • Diet Order     Standing Status: Standing      Number of Occurrences: 1      Standing Expiration Date:      Order Specific Question:  Diet:     Answer:  Cardiac [6]     Order Specific Question:  Diet:     Answer:  2 Gram Sodium [7]     Order Specific Question:  Diet:     Answer:  Diabetic [3]     Physical Exam   Constitutional: She is oriented to person, place, and time. She appears well-developed and well-nourished.   HENT:   Head: Normocephalic and atraumatic.   Mouth/Throat: No oropharyngeal exudate.   Eyes: Conjunctivae are normal. Pupils are equal, round, and reactive to light. Right eye exhibits no discharge. No scleral icterus.   Neck: Neck supple. No JVD present. No thyromegaly present.   Cardiovascular: Intact distal pulses.    No murmur heard.  Pulmonary/Chest: No stridor. No respiratory distress. She has wheezes (bilateral). She has no rales.   Abdominal: Soft. Bowel sounds are normal. She exhibits no distension. There is no tenderness. There is no rebound.   Musculoskeletal: Normal range of motion. She exhibits no edema.   Lymphadenopathy:     She has no cervical adenopathy.   Neurological: She is alert and oriented to person, place, and time. No cranial nerve deficit.   Skin: Skin is warm. No erythema.   Psychiatric:   Very anxious       Recent Labs      06/14/17   0103  06/15/17   0258   WBC  5.4  8.8   RBC  4.74  5.40   HEMOGLOBIN  12.0   13.6   HEMATOCRIT  39.6  42.3   MCV  83.5  78.3*   MCH  25.3*  25.2*   MCHC  30.3*  32.2*   RDW  44.4  39.9   PLATELETCT  194  280   MPV  10.3  9.8     Recent Labs      06/13/17   0005  06/15/17   0258   SODIUM  138  134*   POTASSIUM  3.9  3.6   CHLORIDE  104  100   CO2  29  26   GLUCOSE  143*  182*   BUN  7*  18   CREATININE  0.64  0.54   CALCIUM  9.5  9.7     Recent Labs      06/14/17   0015  06/15/17   0258   APTT  46.8*   --    INR  2.72*  2.32*     Recent Labs      06/14/17   0103  06/15/17   0258   BNPBTYPENAT  357*  817*              Assessment/Plan     Respiratory failure (CMS-HCC)  Assessment & Plan  As above, COPD exacerbation +/- bronchitis   Continue Abx, IV steroids,   CTA PE neg.  ,   Echocardiogram pending  One dose of lasix IV given.    COPD exacerbation (CMS-HCC)  Assessment & Plan  On 3L baseline.  Continue RT protocol, duo nebs, Pep therapy if warranted, and incentive spirometry.   Continue IV solumedrol, wean as tolerated.  Continue with PO doxycyline for empiric bronchitis.    Drug abuse, IV (present on admission)  Assessment & Plan  Ongoing, emphasized cessation    Type 2 diabetes mellitus, uncontrolled (CMS-HCC) (present on admission)  Assessment & Plan  Continue Insulin-sliding scale, accu-checks and hypoglycemia protocol.    Chest pain  Assessment & Plan  Suspect secondary to COPD exacerbation.   EKG and initial troponins neg.   CTA PE negative.    Non compliance w medication regimen  Assessment & Plan  Recommended medication compliance    Nicotine dependence  Assessment & Plan  Tobacco cessation counseling and education provided    Pulmonary edema  Assessment & Plan  ,     Polysubstance abuse  Assessment & Plan  Recommended cessation    History of pulmonary embolism and deep venous thrombosis, status post    inferior vena cava filter placement.  - Continue warfarin, check INR    Hypertension  Continue Coreg      Patient plan of care discussed at multidisplinary team rounds and  with patient and RSherriN at beside.      Core Measures

## 2017-06-15 NOTE — CARE PLAN
Problem: Communication  Goal: The ability to communicate needs accurately and effectively will improve  Outcome: PROGRESSING AS EXPECTED  Pt is oriented to the unit and has been educated on the use of the call light, pt verbalizes understanding        Problem: Safety  Goal: Will remain free from falls  Outcome: PROGRESSING AS EXPECTED  Fall precautions in place. Bed in lowest position. Non-skid socks in place. Personal possessions within reach. Mobility sign on door. Bed-alarm on. Call light within reach. Pt educated regarding fall prevention and states understanding.

## 2017-06-16 LAB
ALBUMIN SERPL BCP-MCNC: 3.5 G/DL (ref 3.2–4.9)
ALBUMIN/GLOB SERPL: 1 G/DL
ALP SERPL-CCNC: 67 U/L (ref 30–99)
ALT SERPL-CCNC: 6 U/L (ref 2–50)
ANION GAP SERPL CALC-SCNC: 5 MMOL/L (ref 0–11.9)
AST SERPL-CCNC: 9 U/L (ref 12–45)
BASOPHILS # BLD AUTO: 0.1 % (ref 0–1.8)
BASOPHILS # BLD: 0.02 K/UL (ref 0–0.12)
BILIRUB SERPL-MCNC: 0.5 MG/DL (ref 0.1–1.5)
BUN SERPL-MCNC: 26 MG/DL (ref 8–22)
CALCIUM SERPL-MCNC: 9.6 MG/DL (ref 8.5–10.5)
CHLORIDE SERPL-SCNC: 102 MMOL/L (ref 96–112)
CO2 SERPL-SCNC: 28 MMOL/L (ref 20–33)
CREAT SERPL-MCNC: 0.6 MG/DL (ref 0.5–1.4)
EOSINOPHIL # BLD AUTO: 0.01 K/UL (ref 0–0.51)
EOSINOPHIL NFR BLD: 0.1 % (ref 0–6.9)
ERYTHROCYTE [DISTWIDTH] IN BLOOD BY AUTOMATED COUNT: 41 FL (ref 35.9–50)
GFR SERPL CREATININE-BSD FRML MDRD: >60 ML/MIN/1.73 M 2
GLOBULIN SER CALC-MCNC: 3.6 G/DL (ref 1.9–3.5)
GLUCOSE BLD-MCNC: 196 MG/DL (ref 65–99)
GLUCOSE BLD-MCNC: 276 MG/DL (ref 65–99)
GLUCOSE BLD-MCNC: 346 MG/DL (ref 65–99)
GLUCOSE BLD-MCNC: 357 MG/DL (ref 65–99)
GLUCOSE SERPL-MCNC: 176 MG/DL (ref 65–99)
HCT VFR BLD AUTO: 40.5 % (ref 37–47)
HGB BLD-MCNC: 13 G/DL (ref 12–16)
IMM GRANULOCYTES # BLD AUTO: 0.1 K/UL (ref 0–0.11)
IMM GRANULOCYTES NFR BLD AUTO: 0.7 % (ref 0–0.9)
INR PPP: 2.78 (ref 0.87–1.13)
LYMPHOCYTES # BLD AUTO: 1.12 K/UL (ref 1–4.8)
LYMPHOCYTES NFR BLD: 8.2 % (ref 22–41)
MAGNESIUM SERPL-MCNC: 1.9 MG/DL (ref 1.5–2.5)
MCH RBC QN AUTO: 25.1 PG (ref 27–33)
MCHC RBC AUTO-ENTMCNC: 32.1 G/DL (ref 33.6–35)
MCV RBC AUTO: 78.3 FL (ref 81.4–97.8)
MONOCYTES # BLD AUTO: 0.36 K/UL (ref 0–0.85)
MONOCYTES NFR BLD AUTO: 2.6 % (ref 0–13.4)
NEUTROPHILS # BLD AUTO: 12.02 K/UL (ref 2–7.15)
NEUTROPHILS NFR BLD: 88.3 % (ref 44–72)
NRBC # BLD AUTO: 0 K/UL
NRBC BLD AUTO-RTO: 0 /100 WBC
PLATELET # BLD AUTO: 275 K/UL (ref 164–446)
PMV BLD AUTO: 9.7 FL (ref 9–12.9)
POTASSIUM SERPL-SCNC: 4.1 MMOL/L (ref 3.6–5.5)
PROT SERPL-MCNC: 7.1 G/DL (ref 6–8.2)
PROTHROMBIN TIME: 30.2 SEC (ref 12–14.6)
RBC # BLD AUTO: 5.17 M/UL (ref 4.2–5.4)
SODIUM SERPL-SCNC: 135 MMOL/L (ref 135–145)
WBC # BLD AUTO: 13.6 K/UL (ref 4.8–10.8)

## 2017-06-16 PROCEDURE — 700102 HCHG RX REV CODE 250 W/ 637 OVERRIDE(OP): Performed by: INTERNAL MEDICINE

## 2017-06-16 PROCEDURE — 85025 COMPLETE CBC W/AUTO DIFF WBC: CPT

## 2017-06-16 PROCEDURE — 770020 HCHG ROOM/CARE - TELE (206)

## 2017-06-16 PROCEDURE — 83735 ASSAY OF MAGNESIUM: CPT

## 2017-06-16 PROCEDURE — 700101 HCHG RX REV CODE 250: Performed by: HOSPITALIST

## 2017-06-16 PROCEDURE — 700111 HCHG RX REV CODE 636 W/ 250 OVERRIDE (IP): Performed by: HOSPITALIST

## 2017-06-16 PROCEDURE — 700102 HCHG RX REV CODE 250 W/ 637 OVERRIDE(OP): Performed by: HOSPITALIST

## 2017-06-16 PROCEDURE — 700102 HCHG RX REV CODE 250 W/ 637 OVERRIDE(OP)

## 2017-06-16 PROCEDURE — A9270 NON-COVERED ITEM OR SERVICE: HCPCS | Performed by: HOSPITALIST

## 2017-06-16 PROCEDURE — 700111 HCHG RX REV CODE 636 W/ 250 OVERRIDE (IP): Performed by: INTERNAL MEDICINE

## 2017-06-16 PROCEDURE — 85610 PROTHROMBIN TIME: CPT

## 2017-06-16 PROCEDURE — A9270 NON-COVERED ITEM OR SERVICE: HCPCS | Performed by: INTERNAL MEDICINE

## 2017-06-16 PROCEDURE — 99232 SBSQ HOSP IP/OBS MODERATE 35: CPT | Performed by: HOSPITALIST

## 2017-06-16 PROCEDURE — 82962 GLUCOSE BLOOD TEST: CPT | Mod: 91

## 2017-06-16 PROCEDURE — 94640 AIRWAY INHALATION TREATMENT: CPT

## 2017-06-16 PROCEDURE — 36415 COLL VENOUS BLD VENIPUNCTURE: CPT

## 2017-06-16 PROCEDURE — 700101 HCHG RX REV CODE 250: Performed by: INTERNAL MEDICINE

## 2017-06-16 PROCEDURE — 94760 N-INVAS EAR/PLS OXIMETRY 1: CPT

## 2017-06-16 PROCEDURE — A9270 NON-COVERED ITEM OR SERVICE: HCPCS

## 2017-06-16 PROCEDURE — 80053 COMPREHEN METABOLIC PANEL: CPT

## 2017-06-16 RX ORDER — MAGNESIUM SULFATE HEPTAHYDRATE 40 MG/ML
2 INJECTION, SOLUTION INTRAVENOUS ONCE
Status: COMPLETED | OUTPATIENT
Start: 2017-06-16 | End: 2017-06-16

## 2017-06-16 RX ORDER — AMLODIPINE BESYLATE 5 MG/1
5 TABLET ORAL ONCE
Status: COMPLETED | OUTPATIENT
Start: 2017-06-16 | End: 2017-06-16

## 2017-06-16 RX ORDER — IPRATROPIUM BROMIDE AND ALBUTEROL SULFATE 2.5; .5 MG/3ML; MG/3ML
3 SOLUTION RESPIRATORY (INHALATION)
Status: DISCONTINUED | OUTPATIENT
Start: 2017-06-16 | End: 2017-06-18

## 2017-06-16 RX ADMIN — ALPRAZOLAM 0.5 MG: 0.25 TABLET ORAL at 19:37

## 2017-06-16 RX ADMIN — AMLODIPINE BESYLATE 5 MG: 5 TABLET ORAL at 15:05

## 2017-06-16 RX ADMIN — TRAZODONE HYDROCHLORIDE 50 MG: 50 TABLET ORAL at 20:28

## 2017-06-16 RX ADMIN — IPRATROPIUM BROMIDE AND ALBUTEROL SULFATE 3 ML: .5; 3 SOLUTION RESPIRATORY (INHALATION) at 13:48

## 2017-06-16 RX ADMIN — LABETALOL HYDROCHLORIDE 10 MG: 5 INJECTION, SOLUTION INTRAVENOUS at 13:46

## 2017-06-16 RX ADMIN — METHYLPREDNISOLONE SODIUM SUCCINATE 62.5 MG: 125 INJECTION, POWDER, FOR SOLUTION INTRAMUSCULAR; INTRAVENOUS at 11:45

## 2017-06-16 RX ADMIN — SENNOSIDES AND DOCUSATE SODIUM 2 TABLET: 8.6; 5 TABLET ORAL at 08:04

## 2017-06-16 RX ADMIN — BUDESONIDE AND FORMOTEROL FUMARATE DIHYDRATE 2 PUFF: 160; 4.5 AEROSOL RESPIRATORY (INHALATION) at 20:28

## 2017-06-16 RX ADMIN — BUDESONIDE AND FORMOTEROL FUMARATE DIHYDRATE 2 PUFF: 160; 4.5 AEROSOL RESPIRATORY (INHALATION) at 06:41

## 2017-06-16 RX ADMIN — WARFARIN SODIUM 5 MG: 5 TABLET ORAL at 17:12

## 2017-06-16 RX ADMIN — GABAPENTIN 300 MG: 300 CAPSULE ORAL at 08:04

## 2017-06-16 RX ADMIN — METHYLPREDNISOLONE SODIUM SUCCINATE 62.5 MG: 125 INJECTION, POWDER, FOR SOLUTION INTRAMUSCULAR; INTRAVENOUS at 04:42

## 2017-06-16 RX ADMIN — DOXYCYCLINE 100 MG: 100 TABLET ORAL at 20:28

## 2017-06-16 RX ADMIN — CITALOPRAM HYDROBROMIDE 20 MG: 20 TABLET ORAL at 20:28

## 2017-06-16 RX ADMIN — DOXYCYCLINE 100 MG: 100 TABLET ORAL at 08:04

## 2017-06-16 RX ADMIN — INSULIN LISPRO 1 UNITS: 100 INJECTION, SOLUTION INTRAVENOUS; SUBCUTANEOUS at 06:22

## 2017-06-16 RX ADMIN — IPRATROPIUM BROMIDE AND ALBUTEROL SULFATE 3 ML: .5; 3 SOLUTION RESPIRATORY (INHALATION) at 18:44

## 2017-06-16 RX ADMIN — LABETALOL HYDROCHLORIDE 10 MG: 5 INJECTION, SOLUTION INTRAVENOUS at 06:38

## 2017-06-16 RX ADMIN — GABAPENTIN 300 MG: 300 CAPSULE ORAL at 15:00

## 2017-06-16 RX ADMIN — IPRATROPIUM BROMIDE AND ALBUTEROL SULFATE 3 ML: .5; 3 SOLUTION RESPIRATORY (INHALATION) at 06:41

## 2017-06-16 RX ADMIN — IPRATROPIUM BROMIDE AND ALBUTEROL SULFATE 3 ML: .5; 3 SOLUTION RESPIRATORY (INHALATION) at 10:06

## 2017-06-16 RX ADMIN — OXYCODONE HYDROCHLORIDE 10 MG: 5 TABLET ORAL at 04:43

## 2017-06-16 RX ADMIN — MORPHINE SULFATE 15 MG: 15 TABLET, EXTENDED RELEASE ORAL at 08:04

## 2017-06-16 RX ADMIN — ASPIRIN 325 MG: 325 TABLET, COATED ORAL at 08:04

## 2017-06-16 RX ADMIN — GABAPENTIN 300 MG: 300 CAPSULE ORAL at 20:28

## 2017-06-16 RX ADMIN — ALPRAZOLAM 0.5 MG: 0.25 TABLET ORAL at 10:07

## 2017-06-16 RX ADMIN — OXYCODONE HYDROCHLORIDE 10 MG: 5 TABLET ORAL at 13:58

## 2017-06-16 RX ADMIN — METHYLPREDNISOLONE SODIUM SUCCINATE 62.5 MG: 125 INJECTION, POWDER, FOR SOLUTION INTRAMUSCULAR; INTRAVENOUS at 17:12

## 2017-06-16 RX ADMIN — RISPERIDONE 0.5 MG: 0.5 TABLET, FILM COATED ORAL at 20:28

## 2017-06-16 RX ADMIN — TIOTROPIUM BROMIDE 1 CAPSULE: 18 CAPSULE ORAL; RESPIRATORY (INHALATION) at 06:41

## 2017-06-16 RX ADMIN — MAGNESIUM SULFATE IN WATER 2 G: 40 INJECTION, SOLUTION INTRAVENOUS at 15:00

## 2017-06-16 RX ADMIN — CARVEDILOL 3.12 MG: 3.12 TABLET, FILM COATED ORAL at 17:12

## 2017-06-16 RX ADMIN — CARVEDILOL 3.12 MG: 3.12 TABLET, FILM COATED ORAL at 08:04

## 2017-06-16 RX ADMIN — MORPHINE SULFATE 15 MG: 15 TABLET, EXTENDED RELEASE ORAL at 20:28

## 2017-06-16 ASSESSMENT — ENCOUNTER SYMPTOMS
SORE THROAT: 0
BLOOD IN STOOL: 0
HEADACHES: 0
DIZZINESS: 0
WEAKNESS: 1
BLURRED VISION: 0
DEPRESSION: 0
SPUTUM PRODUCTION: 1
NECK PAIN: 0
CHILLS: 0
MYALGIAS: 0
COUGH: 1
PALPITATIONS: 0
SHORTNESS OF BREATH: 1
PND: 0
CLAUDICATION: 0
MEMORY LOSS: 0
SPEECH CHANGE: 0
STRIDOR: 0
ORTHOPNEA: 1
VOMITING: 0
TREMORS: 0
WHEEZING: 1
NAUSEA: 0
FEVER: 0
NERVOUS/ANXIOUS: 1
CONSTIPATION: 0
EYE PAIN: 0
HEARTBURN: 0
TINGLING: 0
SENSORY CHANGE: 0
HEMOPTYSIS: 0
PHOTOPHOBIA: 0
BACK PAIN: 0
DOUBLE VISION: 0

## 2017-06-16 ASSESSMENT — PAIN SCALES - GENERAL
PAINLEVEL_OUTOF10: 6
PAINLEVEL_OUTOF10: 0
PAINLEVEL_OUTOF10: 5
PAINLEVEL_OUTOF10: 10

## 2017-06-16 NOTE — PROGRESS NOTES
Patient became agitated when the IV pole started beeping, yelling out, restless, anxious. Xanax given with good effect. Blood sugar covered prior to lunch. Voiding on commode.

## 2017-06-16 NOTE — PROGRESS NOTES
This RN spoke with MD JACOB on the phone, he is aware of patients consistent high blood pressures. New order to give one time dose of 5mg Norvasc, patient just had IV Labetalol. Will continue to monitor.

## 2017-06-16 NOTE — PROGRESS NOTES
Inpatient Anticoagulation Service Note    Date: 6/15/2017  Reason for Anticoagulation: Pulmonary Embolism, Deep Vein Thrombosis        Hemoglobin Value: 13.6  Hematocrit Value: 42.3  Lab Platelet Value: 280  Target INR: 2.0 to 3.0    INR from last 7 days     Date/Time INR Value    06/15/17 0258 (!)2.32    06/14/17 0015 (!)2.72        Dose from last 7 days     Date/Time Dose (mg)    06/15/17 1700 5    06/14/17 1000 2.5        Average Dose (mg):  (5 mg daily)  Significant Interactions: Antiplatelet Medications, Antibiotics, Corticosteroids, Other (Comments) (SSRI)  Bridge Therapy: No     Comments: INR remains therapeutic, although decreased since yesterday. Pt on systemic anticoagulation for hx of DVT and PE. Here with chest pain workup, COPD exacerbation, hx of medication noncompliance and IVDU. New drug interactions with steroids, PIB371, doxycycline. Stable drug interaction with SSRI. Good PO intake documented. Will resume pt's home dosing regimen of 5 mg daily and trend INRs.     Plan:  5 mg daily. INR in AM.   Education Material Provided?: No (Chronic warfarin)  Pharmacist suggested discharge dosing: resume home regimen of 5 mg daily, with outpatient INR check within 1 week of hospital discharge.      Beck Goodman, BinduD

## 2017-06-16 NOTE — CARE PLAN
Problem: Safety  Goal: Will remain free from injury  Outcome: PROGRESSING AS EXPECTED  Alarms on, call bell in hand, calls approp for needs, A+Ox4

## 2017-06-16 NOTE — PROGRESS NOTES
Inpatient Anticoagulation Service Note    Date: 6/16/2017  Reason for Anticoagulation: Pulmonary Embolism, Deep Vein Thrombosis        Hemoglobin Value: 13  Hematocrit Value: 40.5  Lab Platelet Value: 275  Target INR: 2.0 to 3.0    INR from last 7 days     Date/Time INR Value    06/16/17 0049 (!)2.78    06/15/17 0258 (!)2.32    06/14/17 0015 (!)2.72        Dose from last 7 days     Date/Time Dose (mg)    06/16/17 1200 5    06/15/17 1700 5    06/14/17 1000 2.5        Average Dose (mg):  (5 mg daily)  Significant Interactions: Antiplatelet Medications, Antibiotics, Corticosteroids, Other (Comments) (SSRI)  Bridge Therapy: No    Comments: INR again therapeutic today. No acute overnight events or sx of acute bleeding noted. Discussed use of full-dose ASA with MD today, okay to reduce dose to 81 mg daily. No changes to other interactions - corticosteroids for COPD exacerbation continue with same dose and frequency. Hgb/hct stable overnight. Good PO intake.     Plan:  Continue 5 mg daily. INR in AM.     Education Material Provided?: No (Chronic warfarin)    Pharmacist suggested discharge dosing: resume home regimen of 5 mg daily, with outpatient INR check within 1 week of hospital discharge.       Beck Goodman, PharmD

## 2017-06-16 NOTE — PROGRESS NOTES
Patient A+Ox4, restless, states she is withdrawing from heroin and has back pain and leg pain. Given AM pills. Refusing bathing. Call bell in hand, reviewed safety with patient, RN and CNA numbers on whiteboard. Hourly rounding in place.

## 2017-06-16 NOTE — PROGRESS NOTES
[facility]ist Progress Note    Date of Service: 2017    Chief Complaint  66 y.o. female admitted 2017 with Severe noncompliance, Type 2 DM, fibromyalgia, chronic back pain, History of pulmonary embolism and deep venous thrombosis, status post inferior vena cava filter placement, Intravenous drug abuse admitted for acute hypoxemic respiratory failure due to COPD exacerbation.     Interval Problem Update  Patient is having difficulty breathing but is improving slowly.   Continue RT protocol and duonebs, cont IV solumedrol, wean as tolerated.      No acute issues overnight, patient still feels very short of breath with audible wheezing. Minimal improvement.  continue aggressive RT protocol  Continue IV solumedrol.    Consultants/Specialty  None    Disposition  TBD        Review of Systems   Constitutional: Positive for malaise/fatigue. Negative for fever and chills.   HENT: Negative for congestion, hearing loss, sore throat and tinnitus.    Eyes: Negative for blurred vision, double vision, photophobia and pain.   Respiratory: Positive for cough, sputum production, shortness of breath and wheezing (unchanged). Negative for hemoptysis and stridor.    Cardiovascular: Positive for orthopnea. Negative for chest pain, palpitations, claudication and PND.   Gastrointestinal: Negative for heartburn, nausea, vomiting, constipation, blood in stool and melena.   Genitourinary: Negative for dysuria, urgency and frequency.   Musculoskeletal: Negative for myalgias, back pain and neck pain.   Neurological: Positive for weakness. Negative for dizziness, tingling, tremors, sensory change, speech change and headaches.   Psychiatric/Behavioral: Positive for suicidal ideas. Negative for depression and memory loss. The patient is nervous/anxious.       Physical Exam  Laboratory/Imaging   Hemodynamics  Temp (24hrs), Av.7 °C (98.1 °F), Min:36.2 °C (97.1 °F), Max:37 °C (98.6 °F)   Temperature: 37 °C (98.6 °F)  Pulse   Av.8  Min: 61  Max: 99    Blood Pressure: (!) 194/104 mmHg      Respiratory      Respiration: 20, Pulse Oximetry: 98 %, O2 Daily Delivery Respiratory : Silicone Nasal Cannula     Given By:: Mouthpiece, #MDI/DPI Given: MDI/DPI x 2, Work Of Breathing / Effort: Mild  RUL Breath Sounds: Coarse Crackles, RML Breath Sounds: Coarse Crackles, RLL Breath Sounds: Coarse Crackles, MARC Breath Sounds: Coarse Crackles, LLL Breath Sounds: Coarse Crackles    Fluids    Intake/Output Summary (Last 24 hours) at 17 1414  Last data filed at 06/15/17 1700   Gross per 24 hour   Intake    750 ml   Output    400 ml   Net    350 ml       Nutrition  Orders Placed This Encounter   Procedures   • Diet Order     Standing Status: Standing      Number of Occurrences: 1      Standing Expiration Date:      Order Specific Question:  Diet:     Answer:  Cardiac [6]     Order Specific Question:  Diet:     Answer:  2 Gram Sodium [7]     Order Specific Question:  Diet:     Answer:  Diabetic [3]     Physical Exam   Constitutional: She is oriented to person, place, and time. She appears well-developed and well-nourished.   HENT:   Head: Normocephalic and atraumatic.   Mouth/Throat: No oropharyngeal exudate.   Eyes: Conjunctivae are normal. Pupils are equal, round, and reactive to light. Right eye exhibits no discharge. No scleral icterus.   Neck: Neck supple. No JVD present. No thyromegaly present.   Cardiovascular: Intact distal pulses.    No murmur heard.  Pulmonary/Chest: No stridor. No respiratory distress. She has wheezes (bilateral). She has no rales.   Diminished breath sounds b/l    Abdominal: Soft. Bowel sounds are normal. She exhibits no distension. There is no tenderness. There is no rebound.   Musculoskeletal: Normal range of motion. She exhibits no edema.   Lymphadenopathy:     She has no cervical adenopathy.   Neurological: She is alert and oriented to person, place, and time. No cranial nerve deficit.   Skin: Skin is warm. No  erythema.   Psychiatric:   anxious       Recent Labs      06/14/17   0103  06/15/17   0258  06/16/17   0049   WBC  5.4  8.8  13.6*   RBC  4.74  5.40  5.17   HEMOGLOBIN  12.0  13.6  13.0   HEMATOCRIT  39.6  42.3  40.5   MCV  83.5  78.3*  78.3*   MCH  25.3*  25.2*  25.1*   MCHC  30.3*  32.2*  32.1*   RDW  44.4  39.9  41.0   PLATELETCT  194  280  275   MPV  10.3  9.8  9.7     Recent Labs      06/15/17   0258  06/16/17   0049   SODIUM  134*  135   POTASSIUM  3.6  4.1   CHLORIDE  100  102   CO2  26  28   GLUCOSE  182*  176*   BUN  18  26*   CREATININE  0.54  0.60   CALCIUM  9.7  9.6     Recent Labs      06/14/17   0015  06/15/17   0258  06/16/17   0049   APTT  46.8*   --    --    INR  2.72*  2.32*  2.78*     Recent Labs      06/14/17   0103  06/15/17   0258   BNPBTYPENAT  357*  817*              Assessment/Plan     Respiratory failure (CMS-HCC)  Assessment & Plan  As above, COPD exacerbation +/- bronchitis   Continue Abx, IV steroids,   CTA PE neg.  ,   Echocardiogram pending still.    COPD exacerbation (CMS-HCC)  Assessment & Plan  On 3L baseline.  Continue RT protocol, duo nebs, Pep therapy if warranted, and incentive spirometry.   Continue IV solumedrol, no changed on dose today  Continue with PO doxycyline for empiric bronchitis.    Drug abuse, IV (present on admission)  Assessment & Plan  Ongoing, emphasized cessation    Type 2 diabetes mellitus, uncontrolled (CMS-HCC) (present on admission)  Assessment & Plan  Continue Insulin-sliding scale, accu-checks and hypoglycemia protocol.    Chest pain  Assessment & Plan  Suspect secondary to COPD exacerbation.   EKG and initial troponins neg.   CTA PE negative.    Non compliance w medication regimen  Assessment & Plan  Recommended medication compliance    Nicotine dependence  Assessment & Plan  Tobacco cessation counseling and education provided    Pulmonary edema  Assessment & Plan  , echocardiogram pending.     Polysubstance abuse  Assessment &  Plan  Recommended cessation    History of pulmonary embolism and deep venous thrombosis, status post    inferior vena cava filter placement.  Continue warfarin    Hypertension  Continue Coreg      Patient plan of care discussed at multidisplinary team rounds and with patient and R.N at beside.      Core Measures

## 2017-06-17 LAB
ALBUMIN SERPL BCP-MCNC: 3.3 G/DL (ref 3.2–4.9)
ALBUMIN/GLOB SERPL: 1 G/DL
ALP SERPL-CCNC: 66 U/L (ref 30–99)
ALT SERPL-CCNC: 8 U/L (ref 2–50)
ANION GAP SERPL CALC-SCNC: 9 MMOL/L (ref 0–11.9)
AST SERPL-CCNC: 8 U/L (ref 12–45)
BASOPHILS # BLD AUTO: 0.1 % (ref 0–1.8)
BASOPHILS # BLD: 0.01 K/UL (ref 0–0.12)
BILIRUB SERPL-MCNC: 0.3 MG/DL (ref 0.1–1.5)
BUN SERPL-MCNC: 28 MG/DL (ref 8–22)
CALCIUM SERPL-MCNC: 8.7 MG/DL (ref 8.5–10.5)
CHLORIDE SERPL-SCNC: 99 MMOL/L (ref 96–112)
CO2 SERPL-SCNC: 26 MMOL/L (ref 20–33)
CREAT SERPL-MCNC: 0.63 MG/DL (ref 0.5–1.4)
EOSINOPHIL # BLD AUTO: 0 K/UL (ref 0–0.51)
EOSINOPHIL NFR BLD: 0 % (ref 0–6.9)
ERYTHROCYTE [DISTWIDTH] IN BLOOD BY AUTOMATED COUNT: 41.6 FL (ref 35.9–50)
GFR SERPL CREATININE-BSD FRML MDRD: >60 ML/MIN/1.73 M 2
GLOBULIN SER CALC-MCNC: 3.2 G/DL (ref 1.9–3.5)
GLUCOSE BLD-MCNC: 246 MG/DL (ref 65–99)
GLUCOSE BLD-MCNC: 260 MG/DL (ref 65–99)
GLUCOSE BLD-MCNC: 272 MG/DL (ref 65–99)
GLUCOSE BLD-MCNC: 324 MG/DL (ref 65–99)
GLUCOSE BLD-MCNC: 371 MG/DL (ref 65–99)
GLUCOSE SERPL-MCNC: 386 MG/DL (ref 65–99)
HCT VFR BLD AUTO: 39 % (ref 37–47)
HGB BLD-MCNC: 12.2 G/DL (ref 12–16)
IMM GRANULOCYTES # BLD AUTO: 0.09 K/UL (ref 0–0.11)
IMM GRANULOCYTES NFR BLD AUTO: 0.7 % (ref 0–0.9)
INR PPP: 3.66 (ref 0.87–1.13)
LYMPHOCYTES # BLD AUTO: 0.82 K/UL (ref 1–4.8)
LYMPHOCYTES NFR BLD: 6.7 % (ref 22–41)
MAGNESIUM SERPL-MCNC: 2 MG/DL (ref 1.5–2.5)
MCH RBC QN AUTO: 24.9 PG (ref 27–33)
MCHC RBC AUTO-ENTMCNC: 31.3 G/DL (ref 33.6–35)
MCV RBC AUTO: 79.8 FL (ref 81.4–97.8)
MONOCYTES # BLD AUTO: 0.4 K/UL (ref 0–0.85)
MONOCYTES NFR BLD AUTO: 3.3 % (ref 0–13.4)
NEUTROPHILS # BLD AUTO: 10.88 K/UL (ref 2–7.15)
NEUTROPHILS NFR BLD: 89.2 % (ref 44–72)
NRBC # BLD AUTO: 0 K/UL
NRBC BLD AUTO-RTO: 0 /100 WBC
PLATELET # BLD AUTO: 247 K/UL (ref 164–446)
PMV BLD AUTO: 11 FL (ref 9–12.9)
POTASSIUM SERPL-SCNC: 4.1 MMOL/L (ref 3.6–5.5)
PROT SERPL-MCNC: 6.5 G/DL (ref 6–8.2)
PROTHROMBIN TIME: 37.5 SEC (ref 12–14.6)
RBC # BLD AUTO: 4.89 M/UL (ref 4.2–5.4)
SODIUM SERPL-SCNC: 134 MMOL/L (ref 135–145)
WBC # BLD AUTO: 12.2 K/UL (ref 4.8–10.8)

## 2017-06-17 PROCEDURE — 700102 HCHG RX REV CODE 250 W/ 637 OVERRIDE(OP): Performed by: INTERNAL MEDICINE

## 2017-06-17 PROCEDURE — A9270 NON-COVERED ITEM OR SERVICE: HCPCS

## 2017-06-17 PROCEDURE — 85025 COMPLETE CBC W/AUTO DIFF WBC: CPT

## 2017-06-17 PROCEDURE — 700101 HCHG RX REV CODE 250: Performed by: INTERNAL MEDICINE

## 2017-06-17 PROCEDURE — 80053 COMPREHEN METABOLIC PANEL: CPT

## 2017-06-17 PROCEDURE — 770020 HCHG ROOM/CARE - TELE (206)

## 2017-06-17 PROCEDURE — 94640 AIRWAY INHALATION TREATMENT: CPT

## 2017-06-17 PROCEDURE — 700111 HCHG RX REV CODE 636 W/ 250 OVERRIDE (IP): Performed by: INTERNAL MEDICINE

## 2017-06-17 PROCEDURE — 82962 GLUCOSE BLOOD TEST: CPT | Mod: 91

## 2017-06-17 PROCEDURE — 85610 PROTHROMBIN TIME: CPT

## 2017-06-17 PROCEDURE — 700102 HCHG RX REV CODE 250 W/ 637 OVERRIDE(OP): Performed by: HOSPITALIST

## 2017-06-17 PROCEDURE — A9270 NON-COVERED ITEM OR SERVICE: HCPCS | Performed by: INTERNAL MEDICINE

## 2017-06-17 PROCEDURE — 700102 HCHG RX REV CODE 250 W/ 637 OVERRIDE(OP)

## 2017-06-17 PROCEDURE — 700101 HCHG RX REV CODE 250: Performed by: HOSPITALIST

## 2017-06-17 PROCEDURE — 94760 N-INVAS EAR/PLS OXIMETRY 1: CPT

## 2017-06-17 PROCEDURE — 99232 SBSQ HOSP IP/OBS MODERATE 35: CPT | Performed by: HOSPITALIST

## 2017-06-17 PROCEDURE — A9270 NON-COVERED ITEM OR SERVICE: HCPCS | Performed by: HOSPITALIST

## 2017-06-17 PROCEDURE — 83735 ASSAY OF MAGNESIUM: CPT

## 2017-06-17 PROCEDURE — 36415 COLL VENOUS BLD VENIPUNCTURE: CPT

## 2017-06-17 RX ORDER — INSULIN GLARGINE 100 [IU]/ML
10 INJECTION, SOLUTION SUBCUTANEOUS
Status: DISCONTINUED | OUTPATIENT
Start: 2017-06-17 | End: 2017-06-18

## 2017-06-17 RX ORDER — WARFARIN SODIUM 2.5 MG/1
2.5 TABLET ORAL
Status: DISCONTINUED | OUTPATIENT
Start: 2017-06-17 | End: 2017-06-20

## 2017-06-17 RX ORDER — LORAZEPAM 2 MG/ML
1 INJECTION INTRAMUSCULAR
Status: COMPLETED | OUTPATIENT
Start: 2017-06-17 | End: 2017-06-18

## 2017-06-17 RX ADMIN — LORAZEPAM 1 MG: 2 INJECTION INTRAMUSCULAR; INTRAVENOUS at 19:48

## 2017-06-17 RX ADMIN — IPRATROPIUM BROMIDE AND ALBUTEROL SULFATE 3 ML: .5; 3 SOLUTION RESPIRATORY (INHALATION) at 10:36

## 2017-06-17 RX ADMIN — MORPHINE SULFATE 15 MG: 15 TABLET, EXTENDED RELEASE ORAL at 22:04

## 2017-06-17 RX ADMIN — ASPIRIN 81 MG: 81 TABLET ORAL at 08:33

## 2017-06-17 RX ADMIN — TIOTROPIUM BROMIDE 1 CAPSULE: 18 CAPSULE ORAL; RESPIRATORY (INHALATION) at 08:35

## 2017-06-17 RX ADMIN — INSULIN GLARGINE 10 UNITS: 100 INJECTION, SOLUTION SUBCUTANEOUS at 12:06

## 2017-06-17 RX ADMIN — OXYCODONE HYDROCHLORIDE 10 MG: 5 TABLET ORAL at 00:16

## 2017-06-17 RX ADMIN — DOXYCYCLINE 100 MG: 100 TABLET ORAL at 08:33

## 2017-06-17 RX ADMIN — GABAPENTIN 300 MG: 300 CAPSULE ORAL at 13:04

## 2017-06-17 RX ADMIN — TRAZODONE HYDROCHLORIDE 50 MG: 50 TABLET ORAL at 22:04

## 2017-06-17 RX ADMIN — IPRATROPIUM BROMIDE AND ALBUTEROL SULFATE 3 ML: .5; 3 SOLUTION RESPIRATORY (INHALATION) at 14:12

## 2017-06-17 RX ADMIN — HYDRALAZINE HYDROCHLORIDE 10 MG: 20 INJECTION INTRAMUSCULAR; INTRAVENOUS at 16:39

## 2017-06-17 RX ADMIN — GABAPENTIN 300 MG: 300 CAPSULE ORAL at 08:33

## 2017-06-17 RX ADMIN — IPRATROPIUM BROMIDE AND ALBUTEROL SULFATE 3 ML: .5; 3 SOLUTION RESPIRATORY (INHALATION) at 18:46

## 2017-06-17 RX ADMIN — METHYLPREDNISOLONE SODIUM SUCCINATE 62.5 MG: 125 INJECTION, POWDER, FOR SOLUTION INTRAMUSCULAR; INTRAVENOUS at 23:57

## 2017-06-17 RX ADMIN — LABETALOL HYDROCHLORIDE 10 MG: 5 INJECTION, SOLUTION INTRAVENOUS at 13:05

## 2017-06-17 RX ADMIN — PROCHLORPERAZINE EDISYLATE 5 MG: 5 INJECTION INTRAMUSCULAR; INTRAVENOUS at 02:56

## 2017-06-17 RX ADMIN — BUDESONIDE AND FORMOTEROL FUMARATE DIHYDRATE 2 PUFF: 160; 4.5 AEROSOL RESPIRATORY (INHALATION) at 08:34

## 2017-06-17 RX ADMIN — LORAZEPAM 1 MG: 2 INJECTION INTRAMUSCULAR; INTRAVENOUS at 22:45

## 2017-06-17 RX ADMIN — WARFARIN SODIUM 2.5 MG: 2.5 TABLET ORAL at 18:29

## 2017-06-17 RX ADMIN — METHYLPREDNISOLONE SODIUM SUCCINATE 62.5 MG: 125 INJECTION, POWDER, FOR SOLUTION INTRAMUSCULAR; INTRAVENOUS at 16:40

## 2017-06-17 RX ADMIN — CARVEDILOL 3.12 MG: 3.12 TABLET, FILM COATED ORAL at 08:33

## 2017-06-17 RX ADMIN — OXYCODONE HYDROCHLORIDE 10 MG: 5 TABLET ORAL at 13:04

## 2017-06-17 RX ADMIN — BUDESONIDE AND FORMOTEROL FUMARATE DIHYDRATE 2 PUFF: 160; 4.5 AEROSOL RESPIRATORY (INHALATION) at 18:48

## 2017-06-17 RX ADMIN — CITALOPRAM HYDROBROMIDE 20 MG: 20 TABLET ORAL at 22:04

## 2017-06-17 RX ADMIN — RISPERIDONE 0.5 MG: 0.5 TABLET, FILM COATED ORAL at 22:04

## 2017-06-17 RX ADMIN — GABAPENTIN 300 MG: 300 CAPSULE ORAL at 22:04

## 2017-06-17 RX ADMIN — SENNOSIDES AND DOCUSATE SODIUM 2 TABLET: 8.6; 5 TABLET ORAL at 08:33

## 2017-06-17 RX ADMIN — DOXYCYCLINE 100 MG: 100 TABLET ORAL at 22:04

## 2017-06-17 RX ADMIN — MORPHINE SULFATE 15 MG: 15 TABLET, EXTENDED RELEASE ORAL at 08:33

## 2017-06-17 RX ADMIN — METHYLPREDNISOLONE SODIUM SUCCINATE 62.5 MG: 125 INJECTION, POWDER, FOR SOLUTION INTRAMUSCULAR; INTRAVENOUS at 12:04

## 2017-06-17 RX ADMIN — METHYLPREDNISOLONE SODIUM SUCCINATE 62.5 MG: 125 INJECTION, POWDER, FOR SOLUTION INTRAMUSCULAR; INTRAVENOUS at 05:48

## 2017-06-17 RX ADMIN — METHYLPREDNISOLONE SODIUM SUCCINATE 62.5 MG: 125 INJECTION, POWDER, FOR SOLUTION INTRAMUSCULAR; INTRAVENOUS at 00:15

## 2017-06-17 RX ADMIN — ALPRAZOLAM 0.5 MG: 0.25 TABLET ORAL at 18:31

## 2017-06-17 ASSESSMENT — ENCOUNTER SYMPTOMS
SPUTUM PRODUCTION: 0
TREMORS: 0
PALPITATIONS: 0
STRIDOR: 0
PHOTOPHOBIA: 0
ORTHOPNEA: 0
MEMORY LOSS: 0
CLAUDICATION: 0
SENSORY CHANGE: 0
HEMOPTYSIS: 0
WEAKNESS: 1
SHORTNESS OF BREATH: 1
NECK PAIN: 0
MYALGIAS: 0
NERVOUS/ANXIOUS: 1
HEADACHES: 0
SORE THROAT: 0
DEPRESSION: 0
SPEECH CHANGE: 0
PND: 0
EYE PAIN: 0
NAUSEA: 0
CONSTIPATION: 0
TINGLING: 0
BLURRED VISION: 0
DOUBLE VISION: 0
FEVER: 0
COUGH: 0
DIZZINESS: 0
BACK PAIN: 0
WHEEZING: 1
BLOOD IN STOOL: 0
CHILLS: 0
VOMITING: 0
HEARTBURN: 0

## 2017-06-17 ASSESSMENT — PAIN SCALES - GENERAL
PAINLEVEL_OUTOF10: 8
PAINLEVEL_OUTOF10: 7
PAINLEVEL_OUTOF10: 10

## 2017-06-17 ASSESSMENT — LIFESTYLE VARIABLES: DO YOU DRINK ALCOHOL: NO

## 2017-06-17 NOTE — PROGRESS NOTES
Patients blood sugar still in 300's, MD Hawthorne informed in person at 1915, he will adjust sliding scale. Pt on SoluMedrol.

## 2017-06-17 NOTE — CARE PLAN
Problem: Safety  Goal: Will remain free from injury  Outcome: PROGRESSING AS EXPECTED  Patient will remain free from injury by keeping call-light within reach at all times, bed at lowest position, bed alarm in use, socks on at all times, one person assist with ambulation, silicone O2 tubing, O2 tubing long enough for trips to restroom/cammode, upper bed rails up, frequent hand hygiene, keep all invasive lines closed, belongings within reach, pathways free of clutter. No new injuries to report.

## 2017-06-17 NOTE — PROGRESS NOTES
Assumed care at 1900. Bedside report received from Lucía. Patient's chart and MAR reviewed. Pt complains of chronic back pain at this time. Pt is A & O 4. Patient was updated on plan of care for the day. Questions answered and concerns addressed.  Pt denies any additional needs at this time. White board updated. Call light, phone and personal belongings within reach.

## 2017-06-17 NOTE — CARE PLAN
Problem: Safety  Goal: Will remain free from falls  Outcome: PROGRESSING AS EXPECTED  Pt educated on the importance fall prevention methods, such as treaded sock and the bed alarm. Pt stated they will use the call light prior to any attempts of ambulation. Ambulatory ability assessed, treaded socks in place, bed locked and in low position, frequent trips to bathroom offered, and call light and phone within reach.    Problem: Pain Management  Goal: Pain level will decrease to patient’s comfort goal  Outcome: PROGRESSING AS EXPECTED  Pt assessed for pain regularly and medicated PRN per MAR.

## 2017-06-17 NOTE — CARE PLAN
Problem: Communication  Goal: The ability to communicate needs accurately and effectively will improve  Outcome: PROGRESSING AS EXPECTED  Patient will feel comfortable and confident enough to communicate needs/concerns to staff. No complaints of pain, SOB, distress. POC discussed with patient. No questions at this time.

## 2017-06-17 NOTE — PROGRESS NOTES
Large bag of pt home meds brought to pharmacy for holding.  2 pharmacy slips attached to patients chart.

## 2017-06-18 ENCOUNTER — APPOINTMENT (OUTPATIENT)
Dept: RADIOLOGY | Facility: MEDICAL CENTER | Age: 67
DRG: 190 | End: 2017-06-18
Attending: HOSPITALIST
Payer: MEDICAID

## 2017-06-18 LAB
ALBUMIN SERPL BCP-MCNC: 3.3 G/DL (ref 3.2–4.9)
ALBUMIN/GLOB SERPL: 1.1 G/DL
ALP SERPL-CCNC: 58 U/L (ref 30–99)
ALT SERPL-CCNC: 13 U/L (ref 2–50)
ANION GAP SERPL CALC-SCNC: 4 MMOL/L (ref 0–11.9)
AST SERPL-CCNC: 12 U/L (ref 12–45)
BASOPHILS # BLD AUTO: 0.1 % (ref 0–1.8)
BASOPHILS # BLD: 0.01 K/UL (ref 0–0.12)
BILIRUB SERPL-MCNC: 0.4 MG/DL (ref 0.1–1.5)
BUN SERPL-MCNC: 26 MG/DL (ref 8–22)
CALCIUM SERPL-MCNC: 9 MG/DL (ref 8.5–10.5)
CHLORIDE SERPL-SCNC: 101 MMOL/L (ref 96–112)
CO2 SERPL-SCNC: 28 MMOL/L (ref 20–33)
CREAT SERPL-MCNC: 0.56 MG/DL (ref 0.5–1.4)
EOSINOPHIL # BLD AUTO: 0 K/UL (ref 0–0.51)
EOSINOPHIL NFR BLD: 0 % (ref 0–6.9)
ERYTHROCYTE [DISTWIDTH] IN BLOOD BY AUTOMATED COUNT: 41 FL (ref 35.9–50)
GFR SERPL CREATININE-BSD FRML MDRD: >60 ML/MIN/1.73 M 2
GLOBULIN SER CALC-MCNC: 3.1 G/DL (ref 1.9–3.5)
GLUCOSE BLD-MCNC: 225 MG/DL (ref 65–99)
GLUCOSE BLD-MCNC: 261 MG/DL (ref 65–99)
GLUCOSE BLD-MCNC: 426 MG/DL (ref 65–99)
GLUCOSE SERPL-MCNC: 225 MG/DL (ref 65–99)
HCT VFR BLD AUTO: 40.9 % (ref 37–47)
HGB BLD-MCNC: 12.9 G/DL (ref 12–16)
IMM GRANULOCYTES # BLD AUTO: 0.11 K/UL (ref 0–0.11)
IMM GRANULOCYTES NFR BLD AUTO: 1 % (ref 0–0.9)
INR PPP: 3.65 (ref 0.87–1.13)
LYMPHOCYTES # BLD AUTO: 0.97 K/UL (ref 1–4.8)
LYMPHOCYTES NFR BLD: 8.4 % (ref 22–41)
MAGNESIUM SERPL-MCNC: 2 MG/DL (ref 1.5–2.5)
MCH RBC QN AUTO: 25.1 PG (ref 27–33)
MCHC RBC AUTO-ENTMCNC: 31.5 G/DL (ref 33.6–35)
MCV RBC AUTO: 79.6 FL (ref 81.4–97.8)
MONOCYTES # BLD AUTO: 0.42 K/UL (ref 0–0.85)
MONOCYTES NFR BLD AUTO: 3.6 % (ref 0–13.4)
NEUTROPHILS # BLD AUTO: 10.05 K/UL (ref 2–7.15)
NEUTROPHILS NFR BLD: 86.9 % (ref 44–72)
NRBC # BLD AUTO: 0 K/UL
NRBC BLD AUTO-RTO: 0 /100 WBC
PLATELET # BLD AUTO: 230 K/UL (ref 164–446)
PMV BLD AUTO: 10.3 FL (ref 9–12.9)
POTASSIUM SERPL-SCNC: 4.2 MMOL/L (ref 3.6–5.5)
PROT SERPL-MCNC: 6.4 G/DL (ref 6–8.2)
PROTHROMBIN TIME: 37.4 SEC (ref 12–14.6)
RBC # BLD AUTO: 5.14 M/UL (ref 4.2–5.4)
SODIUM SERPL-SCNC: 133 MMOL/L (ref 135–145)
WBC # BLD AUTO: 11.6 K/UL (ref 4.8–10.8)

## 2017-06-18 PROCEDURE — 700102 HCHG RX REV CODE 250 W/ 637 OVERRIDE(OP)

## 2017-06-18 PROCEDURE — 94760 N-INVAS EAR/PLS OXIMETRY 1: CPT

## 2017-06-18 PROCEDURE — A9270 NON-COVERED ITEM OR SERVICE: HCPCS

## 2017-06-18 PROCEDURE — 770020 HCHG ROOM/CARE - TELE (206)

## 2017-06-18 PROCEDURE — A9270 NON-COVERED ITEM OR SERVICE: HCPCS | Performed by: HOSPITALIST

## 2017-06-18 PROCEDURE — 99232 SBSQ HOSP IP/OBS MODERATE 35: CPT | Performed by: HOSPITALIST

## 2017-06-18 PROCEDURE — 94640 AIRWAY INHALATION TREATMENT: CPT

## 2017-06-18 PROCEDURE — 82962 GLUCOSE BLOOD TEST: CPT | Mod: 91

## 2017-06-18 PROCEDURE — 71010 DX-CHEST-LIMITED (1 VIEW): CPT

## 2017-06-18 PROCEDURE — 700102 HCHG RX REV CODE 250 W/ 637 OVERRIDE(OP): Performed by: HOSPITALIST

## 2017-06-18 PROCEDURE — 700111 HCHG RX REV CODE 636 W/ 250 OVERRIDE (IP): Performed by: HOSPITALIST

## 2017-06-18 PROCEDURE — A9270 NON-COVERED ITEM OR SERVICE: HCPCS | Performed by: INTERNAL MEDICINE

## 2017-06-18 PROCEDURE — 700111 HCHG RX REV CODE 636 W/ 250 OVERRIDE (IP): Performed by: INTERNAL MEDICINE

## 2017-06-18 PROCEDURE — 36415 COLL VENOUS BLD VENIPUNCTURE: CPT

## 2017-06-18 PROCEDURE — 700102 HCHG RX REV CODE 250 W/ 637 OVERRIDE(OP): Performed by: INTERNAL MEDICINE

## 2017-06-18 PROCEDURE — 85025 COMPLETE CBC W/AUTO DIFF WBC: CPT

## 2017-06-18 PROCEDURE — 700101 HCHG RX REV CODE 250: Performed by: HOSPITALIST

## 2017-06-18 PROCEDURE — 80053 COMPREHEN METABOLIC PANEL: CPT

## 2017-06-18 PROCEDURE — 85610 PROTHROMBIN TIME: CPT

## 2017-06-18 PROCEDURE — 83735 ASSAY OF MAGNESIUM: CPT

## 2017-06-18 RX ORDER — ALPRAZOLAM 0.5 MG/1
0.5 TABLET ORAL EVERY 6 HOURS PRN
Status: DISCONTINUED | OUTPATIENT
Start: 2017-06-18 | End: 2017-06-22 | Stop reason: HOSPADM

## 2017-06-18 RX ORDER — INSULIN GLARGINE 100 [IU]/ML
5 INJECTION, SOLUTION SUBCUTANEOUS ONCE
Status: COMPLETED | OUTPATIENT
Start: 2017-06-18 | End: 2017-06-18

## 2017-06-18 RX ORDER — INSULIN GLARGINE 100 [IU]/ML
15 INJECTION, SOLUTION SUBCUTANEOUS
Status: DISCONTINUED | OUTPATIENT
Start: 2017-06-19 | End: 2017-06-20

## 2017-06-18 RX ORDER — TIOTROPIUM BROMIDE 18 UG/1
1 CAPSULE ORAL; RESPIRATORY (INHALATION) DAILY
Status: DISCONTINUED | OUTPATIENT
Start: 2017-06-19 | End: 2017-06-22 | Stop reason: HOSPADM

## 2017-06-18 RX ORDER — BUDESONIDE AND FORMOTEROL FUMARATE DIHYDRATE 160; 4.5 UG/1; UG/1
2 AEROSOL RESPIRATORY (INHALATION) 2 TIMES DAILY
Status: DISCONTINUED | OUTPATIENT
Start: 2017-06-18 | End: 2017-06-22 | Stop reason: HOSPADM

## 2017-06-18 RX ORDER — FUROSEMIDE 10 MG/ML
20 INJECTION INTRAMUSCULAR; INTRAVENOUS
Status: DISCONTINUED | OUTPATIENT
Start: 2017-06-18 | End: 2017-06-22 | Stop reason: HOSPADM

## 2017-06-18 RX ADMIN — MORPHINE SULFATE 15 MG: 15 TABLET, EXTENDED RELEASE ORAL at 21:35

## 2017-06-18 RX ADMIN — FUROSEMIDE 20 MG: 10 INJECTION, SOLUTION INTRAMUSCULAR; INTRAVENOUS at 11:47

## 2017-06-18 RX ADMIN — OXYCODONE HYDROCHLORIDE 10 MG: 5 TABLET ORAL at 03:58

## 2017-06-18 RX ADMIN — DOXYCYCLINE 100 MG: 100 TABLET ORAL at 21:35

## 2017-06-18 RX ADMIN — METHYLPREDNISOLONE SODIUM SUCCINATE 62.5 MG: 125 INJECTION, POWDER, FOR SOLUTION INTRAMUSCULAR; INTRAVENOUS at 11:47

## 2017-06-18 RX ADMIN — ALPRAZOLAM 0.5 MG: 0.25 TABLET ORAL at 06:25

## 2017-06-18 RX ADMIN — IPRATROPIUM BROMIDE AND ALBUTEROL SULFATE 3 ML: .5; 3 SOLUTION RESPIRATORY (INHALATION) at 06:39

## 2017-06-18 RX ADMIN — ALPRAZOLAM 0.5 MG: 0.25 TABLET ORAL at 10:04

## 2017-06-18 RX ADMIN — GABAPENTIN 300 MG: 300 CAPSULE ORAL at 08:29

## 2017-06-18 RX ADMIN — BUDESONIDE AND FORMOTEROL FUMARATE DIHYDRATE 2 PUFF: 160; 4.5 AEROSOL RESPIRATORY (INHALATION) at 21:35

## 2017-06-18 RX ADMIN — GABAPENTIN 300 MG: 300 CAPSULE ORAL at 15:28

## 2017-06-18 RX ADMIN — GABAPENTIN 300 MG: 300 CAPSULE ORAL at 21:35

## 2017-06-18 RX ADMIN — METHYLPREDNISOLONE SODIUM SUCCINATE 62.5 MG: 125 INJECTION, POWDER, FOR SOLUTION INTRAMUSCULAR; INTRAVENOUS at 05:56

## 2017-06-18 RX ADMIN — TIOTROPIUM BROMIDE 1 CAPSULE: 18 CAPSULE ORAL; RESPIRATORY (INHALATION) at 10:04

## 2017-06-18 RX ADMIN — LORAZEPAM 1 MG: 2 INJECTION INTRAMUSCULAR; INTRAVENOUS at 04:04

## 2017-06-18 RX ADMIN — TRAZODONE HYDROCHLORIDE 50 MG: 50 TABLET ORAL at 21:35

## 2017-06-18 RX ADMIN — CARVEDILOL 3.12 MG: 3.12 TABLET, FILM COATED ORAL at 17:19

## 2017-06-18 RX ADMIN — INSULIN GLARGINE 5 UNITS: 100 INJECTION, SOLUTION SUBCUTANEOUS at 15:27

## 2017-06-18 RX ADMIN — INSULIN GLARGINE 10 UNITS: 100 INJECTION, SOLUTION SUBCUTANEOUS at 06:00

## 2017-06-18 RX ADMIN — OXYCODONE HYDROCHLORIDE 10 MG: 5 TABLET ORAL at 12:41

## 2017-06-18 RX ADMIN — IPRATROPIUM BROMIDE AND ALBUTEROL SULFATE 3 ML: .5; 3 SOLUTION RESPIRATORY (INHALATION) at 15:24

## 2017-06-18 RX ADMIN — METHYLPREDNISOLONE SODIUM SUCCINATE 62.5 MG: 125 INJECTION, POWDER, FOR SOLUTION INTRAMUSCULAR; INTRAVENOUS at 17:19

## 2017-06-18 RX ADMIN — ASPIRIN 81 MG: 81 TABLET ORAL at 08:29

## 2017-06-18 RX ADMIN — CARVEDILOL 3.12 MG: 3.12 TABLET, FILM COATED ORAL at 08:29

## 2017-06-18 RX ADMIN — WARFARIN SODIUM 2.5 MG: 2.5 TABLET ORAL at 17:19

## 2017-06-18 RX ADMIN — CITALOPRAM HYDROBROMIDE 20 MG: 20 TABLET ORAL at 21:35

## 2017-06-18 RX ADMIN — DOXYCYCLINE 100 MG: 100 TABLET ORAL at 08:29

## 2017-06-18 RX ADMIN — IPRATROPIUM BROMIDE AND ALBUTEROL SULFATE 3 ML: .5; 3 SOLUTION RESPIRATORY (INHALATION) at 10:08

## 2017-06-18 RX ADMIN — BUDESONIDE AND FORMOTEROL FUMARATE DIHYDRATE 2 PUFF: 160; 4.5 AEROSOL RESPIRATORY (INHALATION) at 10:04

## 2017-06-18 RX ADMIN — METHYLPREDNISOLONE SODIUM SUCCINATE 62.5 MG: 125 INJECTION, POWDER, FOR SOLUTION INTRAMUSCULAR; INTRAVENOUS at 23:35

## 2017-06-18 RX ADMIN — MORPHINE SULFATE 15 MG: 15 TABLET, EXTENDED RELEASE ORAL at 08:29

## 2017-06-18 RX ADMIN — SENNOSIDES AND DOCUSATE SODIUM 2 TABLET: 8.6; 5 TABLET ORAL at 08:29

## 2017-06-18 RX ADMIN — RISPERIDONE 0.5 MG: 0.5 TABLET, FILM COATED ORAL at 21:35

## 2017-06-18 RX ADMIN — LORAZEPAM 1 MG: 2 INJECTION INTRAMUSCULAR; INTRAVENOUS at 12:42

## 2017-06-18 RX ADMIN — ALPRAZOLAM 0.5 MG: 0.5 TABLET ORAL at 19:23

## 2017-06-18 ASSESSMENT — PAIN SCALES - GENERAL
PAINLEVEL_OUTOF10: 4
PAINLEVEL_OUTOF10: 8
PAINLEVEL_OUTOF10: 6
PAINLEVEL_OUTOF10: 8
PAINLEVEL_OUTOF10: 8

## 2017-06-18 ASSESSMENT — ENCOUNTER SYMPTOMS
HEARTBURN: 0
BLOOD IN STOOL: 0
MEMORY LOSS: 0
PALPITATIONS: 0
EYE PAIN: 0
NERVOUS/ANXIOUS: 1
TINGLING: 0
COUGH: 0
TREMORS: 0
SPUTUM PRODUCTION: 0
SENSORY CHANGE: 0
HEMOPTYSIS: 0
NAUSEA: 0
FEVER: 0
CLAUDICATION: 0
PHOTOPHOBIA: 0
DOUBLE VISION: 0
PND: 0
VOMITING: 0
SORE THROAT: 0
SHORTNESS OF BREATH: 1
WHEEZING: 1
DIZZINESS: 0
CONSTIPATION: 0
ORTHOPNEA: 0
BLURRED VISION: 0
CHILLS: 0
NECK PAIN: 0
STRIDOR: 0
MYALGIAS: 0
DEPRESSION: 0
WEAKNESS: 1
HEADACHES: 0
SPEECH CHANGE: 0
BACK PAIN: 0

## 2017-06-18 NOTE — PROGRESS NOTES
Bedside report received by tara Funes. Pt sitting in a chair in hallway. Pt vary anxious, doctor notified and new orders in place. POC discussed with verbal understanding. No immediate concerns for patient at this time. All safety measures in place.

## 2017-06-18 NOTE — PROGRESS NOTES
Inpatient Anticoagulation Service Note    Date: 2017  Reason for Anticoagulation: Pulmonary Embolism, Deep Vein Thrombosis        Hemoglobin Value: 12.2  Hematocrit Value: 39  Lab Platelet Value: 247  Target INR: 2.0 to 3.0    INR from last 7 days     Date/Time INR Value    17 0236 (!)3.66    17 0049 (!)2.78    06/15/17 0258 (!)2.32    17 0015 (!)2.72        Dose from last 7 days     Date/Time Dose (mg)    17 1700 2.5    17 1200 5    06/15/17 1700 5    17 1000 2.5        Average Dose (mg):  (5 mg daily)  Significant Interactions: Antiplatelet Medications, Antibiotics, Corticosteroids, Other (SSRI)  Bridge Therapy: No     Comments: INR with appreciable increase overnight, now slightly supratherapeutic. No acute interval events. No sx of acute bleed noted in chart or discussed on rounds. Hgb/hct WNL and stable overnight. No new drug interactions noted. Good PO intake.     Plan:  Will reduce dose to 2.5 mg tonight. INR in AM.     Education Material Provided?: No (Chronic warfarin)    Pharmacist suggested discharge dosin.5 mg Tues/Thurs and 5 mg all other days, with outpatient INR follow up within 96 hours of discharge      Beck Goodman, PharmD

## 2017-06-18 NOTE — CARE PLAN
Problem: Communication  Goal: The ability to communicate needs accurately and effectively will improve  Outcome: PROGRESSING AS EXPECTED  Pt is oriented to the unit and has been educated on the use of the call light, pt verbalizes understanding        Problem: Safety  Goal: Will remain free from injury  Outcome: PROGRESSING AS EXPECTED  Safety and fall precautions in place, bed in lowest position, treaded socks on, upper bedrails up.  Pt educated on call light and verbalizes understanding.

## 2017-06-18 NOTE — PROGRESS NOTES
[facility]    Date of Service: 6/18/2017    Chief Complaint  66 y.o. female admitted 6/13/2017 with Severe noncompliance, Type 2 DM, fibromyalgia, chronic back pain, History of pulmonary embolism and deep venous thrombosis, status post inferior vena cava filter placement, Intravenous drug abuse admitted for acute hypoxemic respiratory failure due to COPD exacerbation.     Interval Problem Update  Patient is having difficulty breathing but is improving slowly.   Continue RT protocol and duonebs, cont IV solumedrol, wean as tolerated.    6/16  No acute issues overnight, patient still feels very short of breath with audible wheezing. Minimal improvement.  continue aggressive RT protocol  Continue IV solumedrol.    6/17  Patient feels little bit better, still feels chest tightness and is wheezing.    6/18  Still complaining of shortness of breath and fatigue, but overall improving clinically. Still has b/l wheezes but air movement improved.  Continue RT Protocol and IV steroids.    Consultants/Specialty  None    Disposition  TBD        Review of Systems   Constitutional: Positive for malaise/fatigue. Negative for fever and chills.   HENT: Negative for congestion, hearing loss, sore throat and tinnitus.    Eyes: Negative for blurred vision, double vision, photophobia and pain.   Respiratory: Positive for shortness of breath (improving) and wheezing (decerasing). Negative for cough, hemoptysis, sputum production and stridor.    Cardiovascular: Negative for chest pain, palpitations, orthopnea, claudication and PND.   Gastrointestinal: Negative for heartburn, nausea, vomiting, constipation, blood in stool and melena.   Genitourinary: Negative for dysuria, urgency and frequency.   Musculoskeletal: Negative for myalgias, back pain and neck pain.   Neurological: Positive for weakness. Negative for dizziness, tingling, tremors, sensory change, speech change and headaches.   Psychiatric/Behavioral: Negative  for depression, suicidal ideas and memory loss. The patient is nervous/anxious.       Physical Exam  Laboratory/Imaging   Hemodynamics  Temp (24hrs), Av.6 °C (97.9 °F), Min:36.3 °C (97.4 °F), Max:36.7 °C (98.1 °F)   Temperature: 36.4 °C (97.5 °F)  Pulse  Av.7  Min: 49  Max: 99    Blood Pressure: 136/73 mmHg      Respiratory      Respiration: 16, Pulse Oximetry: 99 %, O2 Daily Delivery Respiratory : Silicone Nasal Cannula     Given By:: Mouthpiece, #MDI/DPI Given: MDI/DPI x 1, Work Of Breathing / Effort: Mild  RUL Breath Sounds: Expiratory Wheezes, RML Breath Sounds: Diminished, RLL Breath Sounds: Diminished, MARC Breath Sounds: Expiratory Wheezes, LLL Breath Sounds: Diminished    Fluids    Intake/Output Summary (Last 24 hours) at 17 1130  Last data filed at 17 0900   Gross per 24 hour   Intake      0 ml   Output    400 ml   Net   -400 ml       Nutrition  Orders Placed This Encounter   Procedures   • Diet Order     Standing Status: Standing      Number of Occurrences: 1      Standing Expiration Date:      Order Specific Question:  Diet:     Answer:  Cardiac [6]     Order Specific Question:  Diet:     Answer:  2 Gram Sodium [7]     Order Specific Question:  Diet:     Answer:  Diabetic [3]     Physical Exam   Constitutional: She is oriented to person, place, and time. She appears well-developed and well-nourished.   HENT:   Head: Normocephalic and atraumatic.   Mouth/Throat: No oropharyngeal exudate.   Eyes: Conjunctivae are normal. Pupils are equal, round, and reactive to light. Right eye exhibits no discharge. No scleral icterus.   Neck: Neck supple. No JVD present. No thyromegaly present.   Cardiovascular: Intact distal pulses.    No murmur heard.  Pulmonary/Chest: No stridor. No respiratory distress. Wheezes: bilateral improving. She has no rales.   improved breath sounds b/l lung bases   Abdominal: Soft. Bowel sounds are normal. She exhibits no distension. There is no tenderness. There is no  rebound.   Musculoskeletal: Normal range of motion. She exhibits no edema.   Lymphadenopathy:     She has no cervical adenopathy.   Neurological: She is alert and oriented to person, place, and time. No cranial nerve deficit.   Skin: Skin is warm. No erythema.   Psychiatric:   anxious       Recent Labs      06/16/17   0049  06/17/17   0236  06/18/17   0338   WBC  13.6*  12.2*  11.6*   RBC  5.17  4.89  5.14   HEMOGLOBIN  13.0  12.2  12.9   HEMATOCRIT  40.5  39.0  40.9   MCV  78.3*  79.8*  79.6*   MCH  25.1*  24.9*  25.1*   MCHC  32.1*  31.3*  31.5*   RDW  41.0  41.6  41.0   PLATELETCT  275  247  230   MPV  9.7  11.0  10.3     Recent Labs      06/16/17   0049  06/17/17   0236  06/18/17   0338   SODIUM  135  134*  133*   POTASSIUM  4.1  4.1  4.2   CHLORIDE  102  99  101   CO2  28  26  28   GLUCOSE  176*  386*  225*   BUN  26*  28*  26*   CREATININE  0.60  0.63  0.56   CALCIUM  9.6  8.7  9.0     Recent Labs      06/16/17   0049  06/17/17   0236  06/18/17   0338   INR  2.78*  3.66*  3.65*                  Assessment/Plan     Respiratory failure (CMS-HCC)  Assessment & Plan  As above, COPD exacerbation +/- bronchitis   Continue Abx, IV steroids, still has tightness, wheezing,  CTA PE neg.    Echocardiogram pending  Lose dose IV lasix.    COPD exacerbation (CMS-HCC)  Assessment & Plan  On 3L baseline.  Continue RT protocol, duo nebs, Pep therapy if warranted, and incentive spirometry.   Continue IV solumedrol no change in dose.  Continue with PO doxycyline for empiric bronchitis.    Drug abuse, IV (present on admission)  Assessment & Plan  Ongoing, emphasized cessation    Type 2 diabetes mellitus, uncontrolled (CMS-HCC) (present on admission)  Assessment & Plan  Continue Insulin-sliding scale, accu-checks and hypoglycemia protocol.    Chest pain  Assessment & Plan  Suspect secondary to COPD exacerbation.   EKG and initial troponins neg.   CTA PE negative.    Non compliance w medication regimen  Assessment &  Plan  Recommended medication compliance    Nicotine dependence  Assessment & Plan  Tobacco cessation counseling and education provided    Pulmonary edema  Assessment & Plan    echocardiogram pending.   Trial low dose lasix.    Polysubstance abuse  Assessment & Plan  Recommended cessation    History of pulmonary embolism and deep venous thrombosis, status post    inferior vena cava filter placement.  Continue warfarin    Hypertension  Continue Coreg    Patient plan of care discussed at multidisplinary team rounds and with patient and R.N at beside.      Medications reviewed, Radiology images reviewed and Labs reviewed  Fagan catheter: No Fagan      DVT Prophylaxis: Warfarin (Coumadin)  DVT prophylaxis - mechanical: SCDs    Antibiotics: Treating active infection/contamination beyond 24 hours perioperative coverage

## 2017-06-18 NOTE — PROGRESS NOTES
[facility] Progress Note    Date of Service: 2017    Chief Complaint  66 y.o. female admitted 2017 with Severe noncompliance, Type 2 DM, fibromyalgia, chronic back pain, History of pulmonary embolism and deep venous thrombosis, status post inferior vena cava filter placement, Intravenous drug abuse admitted for acute hypoxemic respiratory failure due to COPD exacerbation.     Interval Problem Update  Patient is having difficulty breathing but is improving slowly.   Continue RT protocol and duonebs, cont IV solumedrol, wean as tolerated.      No acute issues overnight, patient still feels very short of breath with audible wheezing. Minimal improvement.  continue aggressive RT protocol  Continue IV solumedrol.      Patient feels little bit better, still feels chest tightness and is wheezing.    Consultants/Specialty  None    Disposition  TBD        Review of Systems   Constitutional: Positive for malaise/fatigue. Negative for fever and chills.   HENT: Negative for congestion, hearing loss, sore throat and tinnitus.    Eyes: Negative for blurred vision, double vision, photophobia and pain.   Respiratory: Positive for shortness of breath and wheezing (mildly improved). Negative for cough, hemoptysis, sputum production and stridor.    Cardiovascular: Negative for chest pain, palpitations, orthopnea, claudication and PND.   Gastrointestinal: Negative for heartburn, nausea, vomiting, constipation, blood in stool and melena.   Genitourinary: Negative for dysuria, urgency and frequency.   Musculoskeletal: Negative for myalgias, back pain and neck pain.   Neurological: Positive for weakness. Negative for dizziness, tingling, tremors, sensory change, speech change and headaches.   Psychiatric/Behavioral: Negative for depression, suicidal ideas and memory loss. The patient is nervous/anxious.       Physical Exam  Laboratory/Imaging   Hemodynamics  Temp (24hrs), Av.7 °C (98 °F), Min:36.3 °C (97.4  °F), Max:37.3 °C (99.1 °F)   Temperature: 36.3 °C (97.4 °F)  Pulse  Av.4  Min: 49  Max: 99    Blood Pressure: 132/60 mmHg      Respiratory      Respiration: 18, Pulse Oximetry: 96 %, O2 Daily Delivery Respiratory : Silicone Nasal Cannula     Given By:: Mouthpiece, Work Of Breathing / Effort: Mild  RUL Breath Sounds: Expiratory Wheezes, RML Breath Sounds: Fine Crackles, RLL Breath Sounds: Fine Crackles, MARC Breath Sounds: Expiratory Wheezes, LLL Breath Sounds: Fine Crackles    Fluids  No intake or output data in the 24 hours ending 17 1815    Nutrition  Orders Placed This Encounter   Procedures   • Diet Order     Standing Status: Standing      Number of Occurrences: 1      Standing Expiration Date:      Order Specific Question:  Diet:     Answer:  Cardiac [6]     Order Specific Question:  Diet:     Answer:  2 Gram Sodium [7]     Order Specific Question:  Diet:     Answer:  Diabetic [3]     Physical Exam   Constitutional: She is oriented to person, place, and time. She appears well-developed and well-nourished.   HENT:   Head: Normocephalic and atraumatic.   Mouth/Throat: No oropharyngeal exudate.   Eyes: Conjunctivae are normal. Pupils are equal, round, and reactive to light. Right eye exhibits no discharge. No scleral icterus.   Neck: Neck supple. No JVD present. No thyromegaly present.   Cardiovascular: Intact distal pulses.    No murmur heard.  Pulmonary/Chest: No stridor. No respiratory distress. She has wheezes (bilateral unchanged). She has no rales.   Diminished breath sounds b/l mildly improved     Abdominal: Soft. Bowel sounds are normal. She exhibits no distension. There is no tenderness. There is no rebound.   Musculoskeletal: Normal range of motion. She exhibits no edema.   Lymphadenopathy:     She has no cervical adenopathy.   Neurological: She is alert and oriented to person, place, and time. No cranial nerve deficit.   Skin: Skin is warm. No erythema.   Psychiatric:   anxious       Recent  Labs      06/15/17   0258  06/16/17   0049  06/17/17   0236   WBC  8.8  13.6*  12.2*   RBC  5.40  5.17  4.89   HEMOGLOBIN  13.6  13.0  12.2   HEMATOCRIT  42.3  40.5  39.0   MCV  78.3*  78.3*  79.8*   MCH  25.2*  25.1*  24.9*   MCHC  32.2*  32.1*  31.3*   RDW  39.9  41.0  41.6   PLATELETCT  280  275  247   MPV  9.8  9.7  11.0     Recent Labs      06/15/17   0258  06/16/17   0049  06/17/17   0236   SODIUM  134*  135  134*   POTASSIUM  3.6  4.1  4.1   CHLORIDE  100  102  99   CO2  26  28  26   GLUCOSE  182*  176*  386*   BUN  18  26*  28*   CREATININE  0.54  0.60  0.63   CALCIUM  9.7  9.6  8.7     Recent Labs      06/15/17   0258  06/16/17   0049  06/17/17   0236   INR  2.32*  2.78*  3.66*     Recent Labs      06/15/17   0258   BNPBTYPENAT  817*              Assessment/Plan     Respiratory failure (CMS-HCC)  Assessment & Plan  As above, COPD exacerbation +/- bronchitis   Continue Abx, IV steroids, still has tightness, wheezing,  CTA PE neg.    Echocardiogram pending    COPD exacerbation (CMS-HCC)  Assessment & Plan  On 3L baseline.  Continue RT protocol, duo nebs, Pep therapy if warranted, and incentive spirometry.   Continue IV solumedrol, no changed on dose today  Continue with PO doxycyline for empiric bronchitis.    Drug abuse, IV (present on admission)  Assessment & Plan  Ongoing, emphasized cessation    Type 2 diabetes mellitus, uncontrolled (CMS-HCC) (present on admission)  Assessment & Plan  Continue Insulin-sliding scale, accu-checks and hypoglycemia protocol.    Chest pain  Assessment & Plan  Suspect secondary to COPD exacerbation.   EKG and initial troponins neg.   CTA PE negative.    Non compliance w medication regimen  Assessment & Plan  Recommended medication compliance    Nicotine dependence  Assessment & Plan  Tobacco cessation counseling and education provided    Pulmonary edema  Assessment & Plan    echocardiogram pending.     Polysubstance abuse  Assessment & Plan  Recommended  cessation    History of pulmonary embolism and deep venous thrombosis, status post    inferior vena cava filter placement.  Continue warfarin    Hypertension  Continue Coreg    Patient plan of care discussed at multidisplinary team rounds and with patient and R.N at beside.      Medications reviewed, Radiology images reviewed and Labs reviewed  Fagan catheter: No Fagan      DVT Prophylaxis: Warfarin (Coumadin)  DVT prophylaxis - mechanical: SCDs    Antibiotics: Treating active infection/contamination beyond 24 hours perioperative coverage

## 2017-06-18 NOTE — PROGRESS NOTES
Inpatient Anticoagulation Service Note    Date: 6/18/2017  Reason for Anticoagulation: Pulmonary Embolism, Deep Vein Thrombosis        Hemoglobin Value: 12.9  Hematocrit Value: 40.9  Lab Platelet Value: 230  Target INR: 2.0 to 3.0    INR from last 7 days     Date/Time INR Value    06/18/17 0338 (!)3.65    06/17/17 0236 (!)3.66    06/16/17 0049 (!)2.78    06/15/17 0258 (!)2.32    06/14/17 0015 (!)2.72        Dose from last 7 days     Date/Time Dose (mg)    06/18/17 1500 2.5    06/17/17 1700 2.5    06/16/17 1200 5    06/15/17 1700 5    06/14/17 1000 2.5        Average Dose (mg):  (5 mg daily)  Significant Interactions: Antibiotics, Corticosteroids, Aspirin, SSRI    Comments: No acute overnight events.  Drug interaction profile remains stable; pt continues to receive IV steroids at fairly high dose secondary to COPD exacerbation, unable to wean at this point. I expect this is accountable for supratherapeutic INR. Per discussion with RN, no sx of bleeding and good PO intake. Hgb/hct stable. INR remains slightly supratherapeutic, however without any further increase after receiving 2.5 mg last night. Anticipate that pt will require a regimen of 5 mg most days with 2-3 days of 2.5 mg while on steroids and acutely ill to maintain therapeutic INR.       Plan:  2.5 mg x 1 tonight. INR in AM    Education Material Provided?: No (Chronic warfarin)    Pharmacist suggested discharge dosing: if patient is to continue steroids as outpatient, 2.5 mg Tu/Thurs and 5 mg all other days, with outpatient INR follow up within 96 hours of discharge      Beck Goodman, PharmD

## 2017-06-19 LAB
BACTERIA BLD CULT: NORMAL
BACTERIA BLD CULT: NORMAL
GLUCOSE BLD-MCNC: 248 MG/DL (ref 65–99)
GLUCOSE BLD-MCNC: 254 MG/DL (ref 65–99)
GLUCOSE BLD-MCNC: 290 MG/DL (ref 65–99)
GLUCOSE BLD-MCNC: 328 MG/DL (ref 65–99)
GLUCOSE BLD-MCNC: 379 MG/DL (ref 65–99)
INR PPP: 3.44 (ref 0.87–1.13)
LV EJECT FRACT  99904: 60
LV EJECT FRACT MOD 2C 99903: 76.85
LV EJECT FRACT MOD 4C 99902: 49.74
LV EJECT FRACT MOD BP 99901: 64.69
PROTHROMBIN TIME: 35.7 SEC (ref 12–14.6)
SIGNIFICANT IND 70042: NORMAL
SIGNIFICANT IND 70042: NORMAL
SITE SITE: NORMAL
SITE SITE: NORMAL
SOURCE SOURCE: NORMAL
SOURCE SOURCE: NORMAL

## 2017-06-19 PROCEDURE — A9270 NON-COVERED ITEM OR SERVICE: HCPCS | Performed by: HOSPITALIST

## 2017-06-19 PROCEDURE — A9270 NON-COVERED ITEM OR SERVICE: HCPCS | Performed by: INTERNAL MEDICINE

## 2017-06-19 PROCEDURE — 85610 PROTHROMBIN TIME: CPT

## 2017-06-19 PROCEDURE — 700102 HCHG RX REV CODE 250 W/ 637 OVERRIDE(OP): Performed by: HOSPITALIST

## 2017-06-19 PROCEDURE — 99232 SBSQ HOSP IP/OBS MODERATE 35: CPT | Performed by: HOSPITALIST

## 2017-06-19 PROCEDURE — 700102 HCHG RX REV CODE 250 W/ 637 OVERRIDE(OP)

## 2017-06-19 PROCEDURE — 700102 HCHG RX REV CODE 250 W/ 637 OVERRIDE(OP): Performed by: INTERNAL MEDICINE

## 2017-06-19 PROCEDURE — 93306 TTE W/DOPPLER COMPLETE: CPT

## 2017-06-19 PROCEDURE — 770001 HCHG ROOM/CARE - MED/SURG/GYN PRIV*

## 2017-06-19 PROCEDURE — 82962 GLUCOSE BLOOD TEST: CPT | Mod: 91

## 2017-06-19 PROCEDURE — A9270 NON-COVERED ITEM OR SERVICE: HCPCS

## 2017-06-19 PROCEDURE — 700111 HCHG RX REV CODE 636 W/ 250 OVERRIDE (IP): Performed by: HOSPITALIST

## 2017-06-19 PROCEDURE — 36415 COLL VENOUS BLD VENIPUNCTURE: CPT

## 2017-06-19 PROCEDURE — 700111 HCHG RX REV CODE 636 W/ 250 OVERRIDE (IP): Performed by: INTERNAL MEDICINE

## 2017-06-19 PROCEDURE — 93306 TTE W/DOPPLER COMPLETE: CPT | Mod: 26 | Performed by: INTERNAL MEDICINE

## 2017-06-19 RX ORDER — PREDNISONE 20 MG/1
40 TABLET ORAL DAILY
Status: COMPLETED | OUTPATIENT
Start: 2017-06-19 | End: 2017-06-21

## 2017-06-19 RX ORDER — PREDNISONE 20 MG/1
20 TABLET ORAL DAILY
Status: DISCONTINUED | OUTPATIENT
Start: 2017-06-25 | End: 2017-06-22 | Stop reason: HOSPADM

## 2017-06-19 RX ORDER — PREDNISONE 5 MG/1
10 TABLET ORAL DAILY
Status: DISCONTINUED | OUTPATIENT
Start: 2017-06-28 | End: 2017-06-22 | Stop reason: HOSPADM

## 2017-06-19 RX ORDER — PREDNISONE 5 MG/1
30 TABLET ORAL DAILY
Status: DISCONTINUED | OUTPATIENT
Start: 2017-06-22 | End: 2017-06-22 | Stop reason: HOSPADM

## 2017-06-19 RX ADMIN — ALPRAZOLAM 0.5 MG: 0.5 TABLET ORAL at 22:24

## 2017-06-19 RX ADMIN — GABAPENTIN 300 MG: 300 CAPSULE ORAL at 21:21

## 2017-06-19 RX ADMIN — ASPIRIN 81 MG: 81 TABLET ORAL at 10:04

## 2017-06-19 RX ADMIN — CARVEDILOL 3.12 MG: 3.12 TABLET, FILM COATED ORAL at 17:23

## 2017-06-19 RX ADMIN — BUDESONIDE AND FORMOTEROL FUMARATE DIHYDRATE 2 PUFF: 160; 4.5 AEROSOL RESPIRATORY (INHALATION) at 21:22

## 2017-06-19 RX ADMIN — OXYCODONE HYDROCHLORIDE 10 MG: 5 TABLET ORAL at 00:52

## 2017-06-19 RX ADMIN — CARVEDILOL 3.12 MG: 3.12 TABLET, FILM COATED ORAL at 10:04

## 2017-06-19 RX ADMIN — MORPHINE SULFATE 15 MG: 15 TABLET, EXTENDED RELEASE ORAL at 10:04

## 2017-06-19 RX ADMIN — RISPERIDONE 0.5 MG: 0.5 TABLET, FILM COATED ORAL at 21:22

## 2017-06-19 RX ADMIN — INSULIN GLARGINE 15 UNITS: 100 INJECTION, SOLUTION SUBCUTANEOUS at 05:43

## 2017-06-19 RX ADMIN — CITALOPRAM HYDROBROMIDE 20 MG: 20 TABLET ORAL at 21:21

## 2017-06-19 RX ADMIN — PROMETHAZINE HYDROCHLORIDE 25 MG: 25 TABLET ORAL at 14:04

## 2017-06-19 RX ADMIN — PREDNISONE 40 MG: 20 TABLET ORAL at 11:00

## 2017-06-19 RX ADMIN — TRAZODONE HYDROCHLORIDE 50 MG: 50 TABLET ORAL at 21:22

## 2017-06-19 RX ADMIN — ALPRAZOLAM 0.5 MG: 0.5 TABLET ORAL at 00:52

## 2017-06-19 RX ADMIN — GABAPENTIN 300 MG: 300 CAPSULE ORAL at 14:04

## 2017-06-19 RX ADMIN — ALPRAZOLAM 0.5 MG: 0.5 TABLET ORAL at 07:08

## 2017-06-19 RX ADMIN — OXYCODONE HYDROCHLORIDE 10 MG: 5 TABLET ORAL at 11:07

## 2017-06-19 RX ADMIN — METHYLPREDNISOLONE SODIUM SUCCINATE 62.5 MG: 125 INJECTION, POWDER, FOR SOLUTION INTRAMUSCULAR; INTRAVENOUS at 05:37

## 2017-06-19 RX ADMIN — FUROSEMIDE 20 MG: 10 INJECTION, SOLUTION INTRAMUSCULAR; INTRAVENOUS at 05:37

## 2017-06-19 RX ADMIN — BUDESONIDE AND FORMOTEROL FUMARATE DIHYDRATE 2 PUFF: 160; 4.5 AEROSOL RESPIRATORY (INHALATION) at 10:05

## 2017-06-19 RX ADMIN — GABAPENTIN 300 MG: 300 CAPSULE ORAL at 10:03

## 2017-06-19 RX ADMIN — DOXYCYCLINE 100 MG: 100 TABLET ORAL at 10:04

## 2017-06-19 RX ADMIN — WARFARIN SODIUM 2.5 MG: 2.5 TABLET ORAL at 17:37

## 2017-06-19 RX ADMIN — FUROSEMIDE 20 MG: 10 INJECTION, SOLUTION INTRAMUSCULAR; INTRAVENOUS at 17:23

## 2017-06-19 RX ADMIN — MORPHINE SULFATE 15 MG: 15 TABLET, EXTENDED RELEASE ORAL at 21:22

## 2017-06-19 ASSESSMENT — ENCOUNTER SYMPTOMS
WEAKNESS: 1
MEMORY LOSS: 0
CHILLS: 0
HEMOPTYSIS: 0
ORTHOPNEA: 0
SPUTUM PRODUCTION: 0
MYALGIAS: 0
TREMORS: 0
SPEECH CHANGE: 0
DEPRESSION: 0
TINGLING: 0
SENSORY CHANGE: 0
HEARTBURN: 0
PALPITATIONS: 0
EYE PAIN: 0
SORE THROAT: 0
STRIDOR: 0
NECK PAIN: 0
FEVER: 0
NERVOUS/ANXIOUS: 1
NAUSEA: 0
SHORTNESS OF BREATH: 1
BLOOD IN STOOL: 0
BLURRED VISION: 0
CONSTIPATION: 0
DOUBLE VISION: 0
VOMITING: 0
WHEEZING: 1
PHOTOPHOBIA: 0
BACK PAIN: 0
HEADACHES: 0
CLAUDICATION: 0
COUGH: 0
PND: 0
DIZZINESS: 0

## 2017-06-19 ASSESSMENT — PAIN SCALES - GENERAL
PAINLEVEL_OUTOF10: 5
PAINLEVEL_OUTOF10: 8
PAINLEVEL_OUTOF10: 6
PAINLEVEL_OUTOF10: 6

## 2017-06-19 NOTE — PROGRESS NOTES
Pt started yelling and cursing at staff, throwing objects in room, removed bed alarm and oxygen, pt walked self to kitchen area on floor to get food and juice (pt educated that she is diabetic and should not have certain foods and refused to listen). Security was called and assisted staff to move pt to a different room. All of Pt's belongings were moved to new room with pt.

## 2017-06-19 NOTE — PROGRESS NOTES
Pt is refusing bed alarm after education. Charge RN notified. Charge RN and Primary RN reeducated and Pt still refusing.

## 2017-06-19 NOTE — PROGRESS NOTES
Bedside report received by tara Conner. Patient lying in bed watching TV at this time. POC discussed, verbalized understanding. No immediate concerns for patient at this time. All safety measures in place.

## 2017-06-19 NOTE — PROGRESS NOTES
[facility] Progress Note    Date of Service: 6/19/2017    Chief Complaint  66 y.o. female admitted 6/13/2017 with Severe noncompliance, Type 2 DM, fibromyalgia, chronic back pain, History of pulmonary embolism and deep venous thrombosis, status post inferior vena cava filter placement, Intravenous drug abuse admitted for acute hypoxemic respiratory failure due to COPD exacerbation.     Interval Problem Update  Patient is having difficulty breathing but is improving slowly.   Continue RT protocol and duonebs, cont IV solumedrol, wean as tolerated.    6/16  No acute issues overnight, patient still feels very short of breath with audible wheezing. Minimal improvement.  continue aggressive RT protocol  Continue IV solumedrol.    6/17  Patient feels little bit better, still feels chest tightness and is wheezing.    6/18  Still complaining of shortness of breath and fatigue, but overall improving clinically. Still has b/l wheezes but air movement improved.  Continue RT Protocol and IV steroids.    6/19  Patient marginally better, still has b/l wheezing but improved.  Change IV solumedrol to PO prednisone    Consultants/Specialty  None    Disposition  TBD        Review of Systems   Constitutional: Positive for malaise/fatigue. Negative for fever and chills.   HENT: Negative for congestion, hearing loss, sore throat and tinnitus.    Eyes: Negative for blurred vision, double vision, photophobia and pain.   Respiratory: Positive for shortness of breath (improving slowly) and wheezing (uncnhanged). Negative for cough, hemoptysis, sputum production and stridor.    Cardiovascular: Negative for chest pain, palpitations, orthopnea, claudication and PND.   Gastrointestinal: Negative for heartburn, nausea, vomiting, constipation, blood in stool and melena.   Genitourinary: Negative for dysuria, urgency and frequency.   Musculoskeletal: Negative for myalgias, back pain and neck pain.   Neurological: Positive for weakness.  Negative for dizziness, tingling, tremors, sensory change, speech change and headaches.   Psychiatric/Behavioral: Negative for depression, suicidal ideas and memory loss. The patient is nervous/anxious.       Physical Exam  Laboratory/Imaging   Hemodynamics  Temp (24hrs), Av.7 °C (98 °F), Min:36.5 °C (97.7 °F), Max:36.9 °C (98.4 °F)   Temperature: 36.6 °C (97.9 °F)  Pulse  Av.9  Min: 49  Max: 99    Blood Pressure: 147/75 mmHg      Respiratory      Respiration: 18, Pulse Oximetry: 92 %        RUL Breath Sounds: Expiratory Wheezes, RML Breath Sounds: Diminished, RLL Breath Sounds: Diminished, MARC Breath Sounds: Expiratory Wheezes, LLL Breath Sounds: Diminished    Fluids    Intake/Output Summary (Last 24 hours) at 17 1619  Last data filed at 17 1200   Gross per 24 hour   Intake    360 ml   Output   2100 ml   Net  -1740 ml       Nutrition  Orders Placed This Encounter   Procedures   • Diet Order     Standing Status: Standing      Number of Occurrences: 1      Standing Expiration Date:      Order Specific Question:  Diet:     Answer:  Cardiac [6]     Order Specific Question:  Diet:     Answer:  2 Gram Sodium [7]     Order Specific Question:  Diet:     Answer:  Diabetic [3]     Physical Exam   Constitutional: She is oriented to person, place, and time. She appears well-developed and well-nourished.   HENT:   Head: Normocephalic and atraumatic.   Mouth/Throat: No oropharyngeal exudate.   Eyes: Conjunctivae are normal. Pupils are equal, round, and reactive to light. Right eye exhibits no discharge. No scleral icterus.   Neck: Neck supple. No JVD present. No thyromegaly present.   Cardiovascular: Intact distal pulses.    No murmur heard.  Pulmonary/Chest: No stridor. No respiratory distress. She has wheezes (bilateral improving.). She has no rales.   improved breath sounds b/l lung bases   Abdominal: Soft. Bowel sounds are normal. She exhibits no distension. There is no tenderness. There is no rebound.    Musculoskeletal: Normal range of motion. She exhibits no edema.   Lymphadenopathy:     She has no cervical adenopathy.   Neurological: She is alert and oriented to person, place, and time. No cranial nerve deficit.   Skin: Skin is warm. No erythema.   Psychiatric:   anxious       Recent Labs      06/17/17   0236  06/18/17   0338   WBC  12.2*  11.6*   RBC  4.89  5.14   HEMOGLOBIN  12.2  12.9   HEMATOCRIT  39.0  40.9   MCV  79.8*  79.6*   MCH  24.9*  25.1*   MCHC  31.3*  31.5*   RDW  41.6  41.0   PLATELETCT  247  230   MPV  11.0  10.3     Recent Labs      06/17/17   0236  06/18/17   0338   SODIUM  134*  133*   POTASSIUM  4.1  4.2   CHLORIDE  99  101   CO2  26  28   GLUCOSE  386*  225*   BUN  28*  26*   CREATININE  0.63  0.56   CALCIUM  8.7  9.0     Recent Labs      06/17/17   0236  06/18/17   0338  06/19/17   0332   INR  3.66*  3.65*  3.44*                  Assessment/Plan     COPD exacerbation (CMS-HCC)  Continue RT protocol, duo nebs, Pep therapy if warranted, and incentive spirometry.   Change to PO prednisone  D/c PO doxycyline       Chest pain  Suspect secondary to COPD exacerbation.   EKG and initial troponins neg.   CTA PE negative.    Non compliance w medication regimen  Recommended medication compliance    Nicotine dependence  Tobacco cessation counseling and education provided    Respiratory failure (CMS-HCC)  As above, COPD exacerbation +/- bronchitis   Continue Abx, IV steroids, still has tightness, wheezing,  CTA PE neg.    Echocardiogram EF 60%  Lose dose lasix,  Change IV solumedrol to prednisone    Pulmonary edema    echocardiogram preserved EF  Trial low dose lasix.      Polysubstance abuse  .    Type 2 diabetes mellitus, uncontrolled (CMS-HCC)  Continue Insulin-sliding scale, accu-checks and hypoglycemia protocol.        Drug abuse, IV  Ongoing, emphasized cessation          Patient plan of care discussed at multidisplinary team rounds and with patient and R.N at [facility].       Medications reviewed, Radiology images reviewed and Labs reviewed  Fagan catheter: No Fagan      DVT Prophylaxis: Warfarin (Coumadin)  DVT prophylaxis - mechanical: SCDs    Antibiotics: Treating active infection/contamination beyond 24 hours perioperative coverage

## 2017-06-20 ENCOUNTER — APPOINTMENT (OUTPATIENT)
Dept: RADIOLOGY | Facility: MEDICAL CENTER | Age: 67
DRG: 190 | End: 2017-06-20
Attending: INTERNAL MEDICINE
Payer: MEDICAID

## 2017-06-20 LAB
ALBUMIN SERPL BCP-MCNC: 3 G/DL (ref 3.2–4.9)
ALBUMIN/GLOB SERPL: 1.1 G/DL
ALP SERPL-CCNC: 67 U/L (ref 30–99)
ALT SERPL-CCNC: 12 U/L (ref 2–50)
ANION GAP SERPL CALC-SCNC: 7 MMOL/L (ref 0–11.9)
AST SERPL-CCNC: 9 U/L (ref 12–45)
BASOPHILS # BLD AUTO: 0.1 % (ref 0–1.8)
BASOPHILS # BLD: 0.02 K/UL (ref 0–0.12)
BILIRUB SERPL-MCNC: 0.3 MG/DL (ref 0.1–1.5)
BUN SERPL-MCNC: 31 MG/DL (ref 8–22)
CALCIUM SERPL-MCNC: 9 MG/DL (ref 8.5–10.5)
CHLORIDE SERPL-SCNC: 95 MMOL/L (ref 96–112)
CO2 SERPL-SCNC: 29 MMOL/L (ref 20–33)
CREAT SERPL-MCNC: 0.56 MG/DL (ref 0.5–1.4)
EKG IMPRESSION: NORMAL
EOSINOPHIL # BLD AUTO: 0.01 K/UL (ref 0–0.51)
EOSINOPHIL NFR BLD: 0.1 % (ref 0–6.9)
ERYTHROCYTE [DISTWIDTH] IN BLOOD BY AUTOMATED COUNT: 40.2 FL (ref 35.9–50)
GFR SERPL CREATININE-BSD FRML MDRD: >60 ML/MIN/1.73 M 2
GLOBULIN SER CALC-MCNC: 2.8 G/DL (ref 1.9–3.5)
GLUCOSE BLD-MCNC: 107 MG/DL (ref 65–99)
GLUCOSE BLD-MCNC: 307 MG/DL (ref 65–99)
GLUCOSE BLD-MCNC: 354 MG/DL (ref 65–99)
GLUCOSE BLD-MCNC: 408 MG/DL (ref 65–99)
GLUCOSE SERPL-MCNC: 238 MG/DL (ref 65–99)
HCT VFR BLD AUTO: 42.5 % (ref 37–47)
HGB BLD-MCNC: 13.5 G/DL (ref 12–16)
IMM GRANULOCYTES # BLD AUTO: 0.14 K/UL (ref 0–0.11)
IMM GRANULOCYTES NFR BLD AUTO: 1 % (ref 0–0.9)
INR PPP: 2.55 (ref 0.87–1.13)
LYMPHOCYTES # BLD AUTO: 2.43 K/UL (ref 1–4.8)
LYMPHOCYTES NFR BLD: 18.2 % (ref 22–41)
MAGNESIUM SERPL-MCNC: 2 MG/DL (ref 1.5–2.5)
MCH RBC QN AUTO: 24.9 PG (ref 27–33)
MCHC RBC AUTO-ENTMCNC: 31.8 G/DL (ref 33.6–35)
MCV RBC AUTO: 78.4 FL (ref 81.4–97.8)
MONOCYTES # BLD AUTO: 1.1 K/UL (ref 0–0.85)
MONOCYTES NFR BLD AUTO: 8.2 % (ref 0–13.4)
NEUTROPHILS # BLD AUTO: 9.68 K/UL (ref 2–7.15)
NEUTROPHILS NFR BLD: 72.4 % (ref 44–72)
NRBC # BLD AUTO: 0 K/UL
NRBC BLD AUTO-RTO: 0 /100 WBC
PLATELET # BLD AUTO: 274 K/UL (ref 164–446)
PMV BLD AUTO: 10.3 FL (ref 9–12.9)
POTASSIUM SERPL-SCNC: 4 MMOL/L (ref 3.6–5.5)
PROT SERPL-MCNC: 5.8 G/DL (ref 6–8.2)
PROTHROMBIN TIME: 28.2 SEC (ref 12–14.6)
RBC # BLD AUTO: 5.42 M/UL (ref 4.2–5.4)
SODIUM SERPL-SCNC: 131 MMOL/L (ref 135–145)
TROPONIN I SERPL-MCNC: <0.01 NG/ML (ref 0–0.04)
WBC # BLD AUTO: 13.4 K/UL (ref 4.8–10.8)

## 2017-06-20 PROCEDURE — 97535 SELF CARE MNGMENT TRAINING: CPT

## 2017-06-20 PROCEDURE — 36415 COLL VENOUS BLD VENIPUNCTURE: CPT

## 2017-06-20 PROCEDURE — 82962 GLUCOSE BLOOD TEST: CPT | Mod: 91

## 2017-06-20 PROCEDURE — A9270 NON-COVERED ITEM OR SERVICE: HCPCS | Performed by: INTERNAL MEDICINE

## 2017-06-20 PROCEDURE — A9270 NON-COVERED ITEM OR SERVICE: HCPCS | Performed by: HOSPITALIST

## 2017-06-20 PROCEDURE — 93010 ELECTROCARDIOGRAM REPORT: CPT | Performed by: INTERNAL MEDICINE

## 2017-06-20 PROCEDURE — 700102 HCHG RX REV CODE 250 W/ 637 OVERRIDE(OP): Performed by: INTERNAL MEDICINE

## 2017-06-20 PROCEDURE — 83735 ASSAY OF MAGNESIUM: CPT

## 2017-06-20 PROCEDURE — 770001 HCHG ROOM/CARE - MED/SURG/GYN PRIV*

## 2017-06-20 PROCEDURE — 700111 HCHG RX REV CODE 636 W/ 250 OVERRIDE (IP): Performed by: INTERNAL MEDICINE

## 2017-06-20 PROCEDURE — 700102 HCHG RX REV CODE 250 W/ 637 OVERRIDE(OP)

## 2017-06-20 PROCEDURE — A9270 NON-COVERED ITEM OR SERVICE: HCPCS

## 2017-06-20 PROCEDURE — 71010 DX-CHEST-PORTABLE (1 VIEW): CPT

## 2017-06-20 PROCEDURE — 99232 SBSQ HOSP IP/OBS MODERATE 35: CPT | Performed by: INTERNAL MEDICINE

## 2017-06-20 PROCEDURE — 97110 THERAPEUTIC EXERCISES: CPT

## 2017-06-20 PROCEDURE — 700102 HCHG RX REV CODE 250 W/ 637 OVERRIDE(OP): Performed by: HOSPITALIST

## 2017-06-20 PROCEDURE — 93005 ELECTROCARDIOGRAM TRACING: CPT | Performed by: INTERNAL MEDICINE

## 2017-06-20 PROCEDURE — 85025 COMPLETE CBC W/AUTO DIFF WBC: CPT

## 2017-06-20 PROCEDURE — 84484 ASSAY OF TROPONIN QUANT: CPT

## 2017-06-20 PROCEDURE — 97530 THERAPEUTIC ACTIVITIES: CPT

## 2017-06-20 PROCEDURE — 700111 HCHG RX REV CODE 636 W/ 250 OVERRIDE (IP): Performed by: HOSPITALIST

## 2017-06-20 PROCEDURE — 80053 COMPREHEN METABOLIC PANEL: CPT

## 2017-06-20 PROCEDURE — 85610 PROTHROMBIN TIME: CPT

## 2017-06-20 RX ORDER — WARFARIN SODIUM 5 MG/1
5 TABLET ORAL
Status: DISCONTINUED | OUTPATIENT
Start: 2017-06-20 | End: 2017-06-21

## 2017-06-20 RX ORDER — INSULIN GLARGINE 100 [IU]/ML
20 INJECTION, SOLUTION SUBCUTANEOUS
Status: DISCONTINUED | OUTPATIENT
Start: 2017-06-21 | End: 2017-06-22 | Stop reason: HOSPADM

## 2017-06-20 RX ORDER — INSULIN GLARGINE 100 [IU]/ML
5 INJECTION, SOLUTION SUBCUTANEOUS ONCE
Status: COMPLETED | OUTPATIENT
Start: 2017-06-20 | End: 2017-06-20

## 2017-06-20 RX ORDER — NITROGLYCERIN 0.4 MG/1
TABLET SUBLINGUAL
Status: COMPLETED | OUTPATIENT
Start: 2017-06-20 | End: 2017-06-20

## 2017-06-20 RX ORDER — NITROGLYCERIN 0.4 MG/1
TABLET SUBLINGUAL
Status: ACTIVE
Start: 2017-06-20 | End: 2017-06-20

## 2017-06-20 RX ORDER — WARFARIN SODIUM 2.5 MG/1
2.5 TABLET ORAL
Status: DISCONTINUED | OUTPATIENT
Start: 2017-06-21 | End: 2017-06-21

## 2017-06-20 RX ADMIN — PREDNISONE 40 MG: 20 TABLET ORAL at 11:59

## 2017-06-20 RX ADMIN — ALPRAZOLAM 0.5 MG: 0.5 TABLET ORAL at 18:18

## 2017-06-20 RX ADMIN — MORPHINE SULFATE 15 MG: 15 TABLET, EXTENDED RELEASE ORAL at 11:59

## 2017-06-20 RX ADMIN — HYDRALAZINE HYDROCHLORIDE 10 MG: 20 INJECTION INTRAMUSCULAR; INTRAVENOUS at 05:56

## 2017-06-20 RX ADMIN — CITALOPRAM HYDROBROMIDE 20 MG: 20 TABLET ORAL at 20:39

## 2017-06-20 RX ADMIN — OXYCODONE HYDROCHLORIDE 10 MG: 5 TABLET ORAL at 05:46

## 2017-06-20 RX ADMIN — INSULIN GLARGINE 5 UNITS: 100 INJECTION, SOLUTION SUBCUTANEOUS at 12:36

## 2017-06-20 RX ADMIN — INSULIN GLARGINE 15 UNITS: 100 INJECTION, SOLUTION SUBCUTANEOUS at 05:55

## 2017-06-20 RX ADMIN — BUDESONIDE AND FORMOTEROL FUMARATE DIHYDRATE 2 PUFF: 160; 4.5 AEROSOL RESPIRATORY (INHALATION) at 20:38

## 2017-06-20 RX ADMIN — FUROSEMIDE 20 MG: 10 INJECTION, SOLUTION INTRAMUSCULAR; INTRAVENOUS at 17:22

## 2017-06-20 RX ADMIN — TRAZODONE HYDROCHLORIDE 50 MG: 50 TABLET ORAL at 20:39

## 2017-06-20 RX ADMIN — GABAPENTIN 300 MG: 300 CAPSULE ORAL at 17:27

## 2017-06-20 RX ADMIN — GABAPENTIN 300 MG: 300 CAPSULE ORAL at 20:39

## 2017-06-20 RX ADMIN — ASPIRIN 81 MG: 81 TABLET ORAL at 11:58

## 2017-06-20 RX ADMIN — OXYCODONE HYDROCHLORIDE 10 MG: 5 TABLET ORAL at 18:18

## 2017-06-20 RX ADMIN — BUDESONIDE AND FORMOTEROL FUMARATE DIHYDRATE 2 PUFF: 160; 4.5 AEROSOL RESPIRATORY (INHALATION) at 12:26

## 2017-06-20 RX ADMIN — WARFARIN SODIUM 5 MG: 5 TABLET ORAL at 20:39

## 2017-06-20 RX ADMIN — FUROSEMIDE 20 MG: 10 INJECTION, SOLUTION INTRAMUSCULAR; INTRAVENOUS at 06:56

## 2017-06-20 RX ADMIN — GABAPENTIN 300 MG: 300 CAPSULE ORAL at 11:58

## 2017-06-20 RX ADMIN — MORPHINE SULFATE 15 MG: 15 TABLET, EXTENDED RELEASE ORAL at 20:39

## 2017-06-20 RX ADMIN — NITROGLYCERIN 0.4 MG: 0.4 TABLET SUBLINGUAL at 06:18

## 2017-06-20 RX ADMIN — RISPERIDONE 0.5 MG: 0.5 TABLET, FILM COATED ORAL at 20:39

## 2017-06-20 RX ADMIN — ALPRAZOLAM 0.5 MG: 0.5 TABLET ORAL at 12:35

## 2017-06-20 RX ADMIN — CARVEDILOL 3.12 MG: 3.12 TABLET, FILM COATED ORAL at 17:27

## 2017-06-20 RX ADMIN — TIOTROPIUM BROMIDE 1 CAPSULE: 18 CAPSULE ORAL; RESPIRATORY (INHALATION) at 12:26

## 2017-06-20 RX ADMIN — ALPRAZOLAM 0.5 MG: 0.5 TABLET ORAL at 05:47

## 2017-06-20 RX ADMIN — SENNOSIDES AND DOCUSATE SODIUM 2 TABLET: 8.6; 5 TABLET ORAL at 20:39

## 2017-06-20 ASSESSMENT — PATIENT HEALTH QUESTIONNAIRE - PHQ9
SUM OF ALL RESPONSES TO PHQ9 QUESTIONS 1 AND 2: 0
SUM OF ALL RESPONSES TO PHQ QUESTIONS 1-9: 0
1. LITTLE INTEREST OR PLEASURE IN DOING THINGS: NOT AT ALL

## 2017-06-20 ASSESSMENT — COGNITIVE AND FUNCTIONAL STATUS - GENERAL
PERSONAL GROOMING: A LITTLE
HELP NEEDED FOR BATHING: A LITTLE
TOILETING: A LITTLE
SUGGESTED CMS G CODE MODIFIER DAILY ACTIVITY: CK
DRESSING REGULAR UPPER BODY CLOTHING: A LITTLE
DRESSING REGULAR LOWER BODY CLOTHING: A LITTLE
DAILY ACTIVITIY SCORE: 19

## 2017-06-20 ASSESSMENT — ENCOUNTER SYMPTOMS
ABDOMINAL PAIN: 0
FEVER: 0
MYALGIAS: 1
HEADACHES: 1
NAUSEA: 0
COUGH: 1
DIARRHEA: 0
BACK PAIN: 1
CHILLS: 0
WHEEZING: 1
SPUTUM PRODUCTION: 0
CONSTIPATION: 0
DIAPHORESIS: 0

## 2017-06-20 ASSESSMENT — PAIN SCALES - GENERAL
PAINLEVEL_OUTOF10: 5
PAINLEVEL_OUTOF10: 8
PAINLEVEL_OUTOF10: 5

## 2017-06-20 NOTE — PROGRESS NOTES
[facility]    Date of Service: 2017    Chief Complaint  66 y.o. female admitted 2017 with copd exacerbation and increased work of breathing.    Interval Problem Update  Steroids changed to oral dosing with taper  Overnight she complained of chest pain found to be muscular in nature    Consultants/Specialty  none    Disposition  Pending, placement an issue        Review of Systems   Constitutional: Negative for fever, chills and diaphoresis.   Respiratory: Positive for cough and wheezing. Negative for sputum production.    Cardiovascular:        Chest wall pain, leg, pain and headache   Gastrointestinal: Negative for nausea, abdominal pain, diarrhea and constipation.   Genitourinary: Negative for dysuria.   Musculoskeletal: Positive for myalgias and back pain.   Skin: Negative for rash.   Neurological: Positive for headaches.      Physical Exam  Laboratory/Imaging   Hemodynamics  Temp (24hrs), Av.4 °C (97.6 °F), Min:36.1 °C (96.9 °F), Max:37.1 °C (98.7 °F)   Temperature: 36.5 °C (97.7 °F)  Pulse  Av.4  Min: 49  Max: 99    Blood Pressure: 109/58 mmHg      Respiratory      Respiration: 16, Pulse Oximetry: 98 %     Work Of Breathing / Effort: Mild  RUL Breath Sounds: Clear, RML Breath Sounds: Diminished, RLL Breath Sounds: Diminished, MARC Breath Sounds: Clear, LLL Breath Sounds: Diminished    Fluids    Intake/Output Summary (Last 24 hours) at 17 1314  Last data filed at 17 1200   Gross per 24 hour   Intake    900 ml   Output      0 ml   Net    900 ml       Nutrition  Orders Placed This Encounter   Procedures   • Diet Order     Standing Status: Standing      Number of Occurrences: 1      Standing Expiration Date:      Order Specific Question:  Diet:     Answer:  Cardiac [6]     Order Specific Question:  Diet:     Answer:  2 Gram Sodium [7]     Order Specific Question:  Diet:     Answer:  Diabetic [3]     Physical Exam   Constitutional: She is oriented to person, place,  and time. No distress.   HENT:   Mouth/Throat: Oropharynx is clear and moist.   Eyes: Conjunctivae are normal.   Neck: Neck supple.   Cardiovascular: Normal rate and regular rhythm.    No murmur heard.  Pulmonary/Chest: No respiratory distress. She has wheezes.   Abdominal: Soft. She exhibits no distension. There is no tenderness.   Musculoskeletal: She exhibits no edema.   Neurological: She is alert and oriented to person, place, and time.   Skin: Skin is warm and dry. No rash noted. She is not diaphoretic.   Psychiatric: Her behavior is normal.   Nursing note and vitals reviewed.      Recent Labs      06/18/17 0338 06/20/17 0229   WBC  11.6*  13.4*   RBC  5.14  5.42*   HEMOGLOBIN  12.9  13.5   HEMATOCRIT  40.9  42.5   MCV  79.6*  78.4*   MCH  25.1*  24.9*   MCHC  31.5*  31.8*   RDW  41.0  40.2   PLATELETCT  230  274   MPV  10.3  10.3     Recent Labs      06/18/17 0338 06/20/17 0229   SODIUM  133*  131*   POTASSIUM  4.2  4.0   CHLORIDE  101  95*   CO2  28  29   GLUCOSE  225*  238*   BUN  26*  31*   CREATININE  0.56  0.56   CALCIUM  9.0  9.0     Recent Labs      06/18/17 0338 06/19/17 0332  06/20/17   0403   INR  3.65*  3.44*  2.55*                  Assessment/Plan     COPD exacerbation (CMS-HCC)  Assessment & Plan  Respiratory therapy and oxygen to continue, patient is on chronic oxygen but lost her housing and refuses a shelter      Drug abuse, IV (present on admission)  Assessment & Plan  Patient continues drug use, education provided again this admission    Type 2 diabetes mellitus, uncontrolled (CMS-HCC) (present on admission)  Assessment & Plan  Continue insulin, bloodsugar lower this morning, will monitor        Chest pain  Assessment & Plan  Due to copd  Non cardiac  CT negative for PE    Non compliance w medication regimen  Assessment & Plan  Recommended medication compliance    Nicotine dependence  Assessment & Plan  Tobacco cessation counseling and education provided    Respiratory failure  (CMS-[facility])  Assessment & Plan  Continue steroids and respiratory therapy and oxygen    Pulmonary edema  Assessment & Plan  Cardiac systolic function normal on echocardiogram      Polysubstance abuse  Assessment & Plan  .    Labs reviewed and Medications reviewed  Fagan catheter: No Fagan      DVT Prophylaxis: Warfarin (Coumadin)    Ulcer prophylaxis: Not indicated    Assessed for rehab: Patient returned to prior level of function, rehabilitation not indicated at this time

## 2017-06-20 NOTE — THERAPY
"Occupational Therapy Treatment completed with focus on ADLs, ADL transfers and patient education.  Functional Status:  Pt seen for OT tx today, progressing with POC, after inital training and education on AE for LB ADL's, completing tasks with sba/supervision level, toileting with supervision, functional t/f's with supervision, and performed functional mobility within room using FWW for endurance training. Pt would benefit from acute OT to address strength and endurance training to maximize pt's safe participation with ADLs and functional mobility given pt has limited support in the community. Pt appears to be quite near her functional baseline.  Plan of Care: Will benefit from Occupational Therapy 2 times per week  Discharge Recommendations:  Equipment sock aide, reacher Post-acute therapy Discharge to home with outpatient or home health for additional skilled therapy services.   See \"Rehab Therapy-Acute\" Patient Summary Report for complete documentation.   "

## 2017-06-20 NOTE — CARE PLAN
Problem: Knowledge Deficit  Goal: Knowledge of disease process/condition, treatment plan, diagnostic tests, and medications will improve  Outcome: PROGRESSING AS EXPECTED  POC discussed, all questions answered, no other concerns at this time.    Problem: Pain Management  Goal: Pain level will decrease to patient’s comfort goal  Outcome: PROGRESSING AS EXPECTED  Pt medicated per MAR, states pain is returning to tolerable level.

## 2017-06-20 NOTE — PROGRESS NOTES
Inpatient Anticoagulation Service Note    Date: 6/19/2017  Reason for Anticoagulation: Pulmonary Embolism, Deep Vein Thrombosis        Hemoglobin Value: 12.9  Hematocrit Value: 40.9  Lab Platelet Value: 230  Target INR: 2.0 to 3.0    INR from last 7 days     Date/Time INR Value    06/19/17 0332 (!)3.44    06/18/17 0338 (!)3.65    06/17/17 0236 (!)3.66    06/16/17 0049 (!)2.78    06/15/17 0258 (!)2.32    06/14/17 0015 (!)2.72        Dose from last 7 days     Date/Time Dose (mg)    06/19/17 1800 2.5    06/18/17 1500 2.5    06/17/17 1700 2.5    06/16/17 1200 5    06/15/17 1700 5    06/14/17 1000 2.5        Average Dose (mg):  (5 mg daily)  Significant Interactions: Corticosteroids, Other (Comments), Aspirin (SSRI)  Bridge Therapy: No     Comments: INR still supratherapeutic but now trending back towards goal after pt received 2 reduced doses. No acute interval events. Antibiotics discontinued today, and steroids have been changed to PO prednisone with a prolonged taper. Hgb/hct stable; no sx of acute bleed noted. Good PO intake.     Plan:  2.5 mg tonight. INR in AM.   Education Material Provided?: No (Chronic warfarin)  Pharmacist suggested discharge dosing: if patient is to continue steroids as outpatient, 2.5 mg M/W/F and 5 mg all other days, with outpatient INR follow up within 96 hours of discharge       Beck Goodman, BinduD

## 2017-06-20 NOTE — PROGRESS NOTES
Change in patient condition due to: Cardiac (Chest pain)  Rapid Response called at: 0555  Physician Andrea notified at 0600    See Code Blue timeline for rapid response events. Patient Remained on Unit

## 2017-06-20 NOTE — PROGRESS NOTES
Assumed care of patient. Bedside report received from DARRELL Foote. Updated on POC, call light within reach and fall precautions in place. Bed locked and in lowest position. Patient instructed to call for assistance before getting out of bed. All questions answered, no other needs at this time. MAR, labs, orders reviewed.

## 2017-06-21 VITALS
HEART RATE: 57 BPM | WEIGHT: 200.62 LBS | DIASTOLIC BLOOD PRESSURE: 66 MMHG | TEMPERATURE: 98.8 F | BODY MASS INDEX: 39.39 KG/M2 | OXYGEN SATURATION: 97 % | HEIGHT: 60 IN | SYSTOLIC BLOOD PRESSURE: 118 MMHG | RESPIRATION RATE: 16 BRPM

## 2017-06-21 PROBLEM — J81.1 PULMONARY EDEMA: Status: RESOLVED | Noted: 2017-06-14 | Resolved: 2017-06-21

## 2017-06-21 PROBLEM — R07.9 CHEST PAIN: Status: RESOLVED | Noted: 2017-05-05 | Resolved: 2017-06-21

## 2017-06-21 PROBLEM — J96.90 RESPIRATORY FAILURE (HCC): Status: RESOLVED | Noted: 2017-06-14 | Resolved: 2017-06-21

## 2017-06-21 LAB
GLUCOSE BLD-MCNC: 125 MG/DL (ref 65–99)
GLUCOSE BLD-MCNC: 184 MG/DL (ref 65–99)
GLUCOSE BLD-MCNC: 253 MG/DL (ref 65–99)
INR PPP: 1.95 (ref 0.87–1.13)
PROTHROMBIN TIME: 22.8 SEC (ref 12–14.6)

## 2017-06-21 PROCEDURE — 700102 HCHG RX REV CODE 250 W/ 637 OVERRIDE(OP): Performed by: HOSPITALIST

## 2017-06-21 PROCEDURE — 82962 GLUCOSE BLOOD TEST: CPT | Mod: 91

## 2017-06-21 PROCEDURE — A9270 NON-COVERED ITEM OR SERVICE: HCPCS | Performed by: INTERNAL MEDICINE

## 2017-06-21 PROCEDURE — 700111 HCHG RX REV CODE 636 W/ 250 OVERRIDE (IP): Performed by: HOSPITALIST

## 2017-06-21 PROCEDURE — 770001 HCHG ROOM/CARE - MED/SURG/GYN PRIV*

## 2017-06-21 PROCEDURE — 36415 COLL VENOUS BLD VENIPUNCTURE: CPT

## 2017-06-21 PROCEDURE — 85610 PROTHROMBIN TIME: CPT

## 2017-06-21 PROCEDURE — A9270 NON-COVERED ITEM OR SERVICE: HCPCS | Performed by: HOSPITALIST

## 2017-06-21 PROCEDURE — 700102 HCHG RX REV CODE 250 W/ 637 OVERRIDE(OP)

## 2017-06-21 PROCEDURE — A9270 NON-COVERED ITEM OR SERVICE: HCPCS

## 2017-06-21 PROCEDURE — 700102 HCHG RX REV CODE 250 W/ 637 OVERRIDE(OP): Performed by: INTERNAL MEDICINE

## 2017-06-21 PROCEDURE — 99239 HOSP IP/OBS DSCHRG MGMT >30: CPT | Performed by: INTERNAL MEDICINE

## 2017-06-21 RX ORDER — CARVEDILOL 3.12 MG/1
3.12 TABLET ORAL 2 TIMES DAILY WITH MEALS
Qty: 60 TAB | Refills: 2 | Status: SHIPPED | OUTPATIENT
Start: 2017-06-21

## 2017-06-21 RX ORDER — BUDESONIDE AND FORMOTEROL FUMARATE DIHYDRATE 80; 4.5 UG/1; UG/1
2 AEROSOL RESPIRATORY (INHALATION) 2 TIMES DAILY
Qty: 1 INHALER | Refills: 2 | Status: SHIPPED | OUTPATIENT
Start: 2017-06-21

## 2017-06-21 RX ORDER — TIOTROPIUM BROMIDE 18 UG/1
18 CAPSULE ORAL; RESPIRATORY (INHALATION) DAILY
Qty: 30 CAP | Refills: 3 | Status: SHIPPED | OUTPATIENT
Start: 2017-06-21

## 2017-06-21 RX ORDER — PREDNISONE 10 MG/1
TABLET ORAL
Qty: 18 TAB | Refills: 0 | Status: SHIPPED | OUTPATIENT
Start: 2017-06-21 | End: 2017-06-30

## 2017-06-21 RX ORDER — WARFARIN SODIUM 5 MG/1
5 TABLET ORAL
Status: COMPLETED | OUTPATIENT
Start: 2017-06-21 | End: 2017-06-21

## 2017-06-21 RX ORDER — ASPIRIN 81 MG/1
81 TABLET ORAL DAILY
Qty: 30 TAB | Refills: 0 | Status: SHIPPED | OUTPATIENT
Start: 2017-06-21

## 2017-06-21 RX ORDER — ALBUTEROL SULFATE 90 UG/1
2 AEROSOL, METERED RESPIRATORY (INHALATION) EVERY 4 HOURS PRN
Qty: 8.5 G | Refills: 2 | Status: SHIPPED | OUTPATIENT
Start: 2017-06-21

## 2017-06-21 RX ADMIN — RISPERIDONE 0.5 MG: 0.5 TABLET, FILM COATED ORAL at 21:00

## 2017-06-21 RX ADMIN — TIOTROPIUM BROMIDE 1 CAPSULE: 18 CAPSULE ORAL; RESPIRATORY (INHALATION) at 09:56

## 2017-06-21 RX ADMIN — OXYCODONE HYDROCHLORIDE 10 MG: 5 TABLET ORAL at 16:28

## 2017-06-21 RX ADMIN — GABAPENTIN 300 MG: 300 CAPSULE ORAL at 16:20

## 2017-06-21 RX ADMIN — CARVEDILOL 3.12 MG: 3.12 TABLET, FILM COATED ORAL at 16:20

## 2017-06-21 RX ADMIN — FUROSEMIDE 20 MG: 10 INJECTION, SOLUTION INTRAMUSCULAR; INTRAVENOUS at 16:20

## 2017-06-21 RX ADMIN — OXYCODONE HYDROCHLORIDE 10 MG: 5 TABLET ORAL at 06:20

## 2017-06-21 RX ADMIN — CARVEDILOL 3.12 MG: 3.12 TABLET, FILM COATED ORAL at 09:55

## 2017-06-21 RX ADMIN — MORPHINE SULFATE 15 MG: 15 TABLET, EXTENDED RELEASE ORAL at 09:55

## 2017-06-21 RX ADMIN — INSULIN GLARGINE 20 UNITS: 100 INJECTION, SOLUTION SUBCUTANEOUS at 06:20

## 2017-06-21 RX ADMIN — PREDNISONE 40 MG: 20 TABLET ORAL at 09:54

## 2017-06-21 RX ADMIN — ALPRAZOLAM 0.5 MG: 0.5 TABLET ORAL at 18:09

## 2017-06-21 RX ADMIN — MORPHINE SULFATE 15 MG: 15 TABLET, EXTENDED RELEASE ORAL at 21:00

## 2017-06-21 RX ADMIN — ALPRAZOLAM 0.5 MG: 0.5 TABLET ORAL at 01:47

## 2017-06-21 RX ADMIN — GABAPENTIN 300 MG: 300 CAPSULE ORAL at 09:55

## 2017-06-21 RX ADMIN — ASPIRIN 81 MG: 81 TABLET ORAL at 09:55

## 2017-06-21 RX ADMIN — CITALOPRAM HYDROBROMIDE 20 MG: 20 TABLET ORAL at 21:00

## 2017-06-21 RX ADMIN — ALPRAZOLAM 0.5 MG: 0.5 TABLET ORAL at 09:57

## 2017-06-21 RX ADMIN — BUDESONIDE AND FORMOTEROL FUMARATE DIHYDRATE 2 PUFF: 160; 4.5 AEROSOL RESPIRATORY (INHALATION) at 10:03

## 2017-06-21 RX ADMIN — TRAZODONE HYDROCHLORIDE 50 MG: 50 TABLET ORAL at 21:00

## 2017-06-21 RX ADMIN — WARFARIN SODIUM 5 MG: 5 TABLET ORAL at 18:02

## 2017-06-21 RX ADMIN — FUROSEMIDE 20 MG: 10 INJECTION, SOLUTION INTRAMUSCULAR; INTRAVENOUS at 06:13

## 2017-06-21 RX ADMIN — BUDESONIDE AND FORMOTEROL FUMARATE DIHYDRATE 2 PUFF: 160; 4.5 AEROSOL RESPIRATORY (INHALATION) at 21:00

## 2017-06-21 RX ADMIN — GABAPENTIN 300 MG: 300 CAPSULE ORAL at 21:00

## 2017-06-21 ASSESSMENT — ENCOUNTER SYMPTOMS
CONSTIPATION: 0
COUGH: 1
HEADACHES: 0
ABDOMINAL PAIN: 0
FEVER: 0
MYALGIAS: 0
WHEEZING: 1
BACK PAIN: 1
PALPITATIONS: 0
DIAPHORESIS: 0
VOMITING: 0
SPUTUM PRODUCTION: 0
NAUSEA: 0

## 2017-06-21 ASSESSMENT — PAIN SCALES - GENERAL
PAINLEVEL_OUTOF10: 5
PAINLEVEL_OUTOF10: 3
PAINLEVEL_OUTOF10: 5

## 2017-06-21 NOTE — PROGRESS NOTES
Inpatient Anticoagulation Service Note    Date: 2017  Reason for Anticoagulation: Pulmonary Embolism, Deep Vein Thrombosis        Hemoglobin Value: 13.5  Hematocrit Value: 42.5  Lab Platelet Value: 274  Target INR: 2.0 to 3.0    INR from last 7 days     Date/Time INR Value    17 0403 (!)2.55    17 0332 (!)3.44    17 0338 (!)3.65    17 0236 (!)3.66    17 0049 (!)2.78    06/15/17 0258 (!)2.32    17 0015 (!)2.72        Dose from last 7 days     Date/Time Dose (mg)    17 1800 2.5    17 1800 2.5    17 1500 2.5    17 1700 2.5    17 1200 5    06/15/17 1700 5    17 1000 2.5        Average Dose (mg):  (5 mg daily)  Significant Interactions: Corticosteroids, Other (Comments), Aspirin (SSRI)  Bridge Therapy: No     Comments: INR now again therapeutic. No new drug interactions noted. Hgb/hct stable overnight. Will initiate a regimen of 5 mg Tu/Th, 2.5 mg all other days. Daily INRs for now.      Plan:  5 mg Tu/Th, 2.5 mg all other days   Education Material Provided?: No (Chronic warfarin)  Pharmacist suggested discharge dosin mg Tu/Th, 2.5 mg all other days      Beck Goodman, PharmD

## 2017-06-21 NOTE — CARE PLAN
Problem: Communication  Goal: The ability to communicate needs accurately and effectively will improve  Outcome: PROGRESSING AS EXPECTED  Pt is alert and oriented x 4 . Pt is oriented to the environment and call light. I have discussed with the pt the plan of care, treatments and medications and the pt verbalizes agreement and understanding. The pt has been able to communicate her needs effectively and has been given the opportunity to ask any questions. All pt needs have been met and questions have been answered at this time. Hourly rounding in place.         Problem: Safety  Goal: Will remain free from injury  Outcome: PROGRESSING AS EXPECTED  Pt assessed at beginning of shift. Pt determined to be up self . Fall precautions in place. Call light and personal possessions in reach, bed alarm off . Hourly rounding in place.

## 2017-06-21 NOTE — PROGRESS NOTES
[facility]    Date of Service: 2017    Chief Complaint  66 y.o. female admitted 2017 with copd exacerbation and increased work of breathing.    Interval Problem Update  Patient continues to have improvement in her respiratory status on oral steroids    Consultants/Specialty  none    Disposition  Pending, placement an issue        Review of Systems   Constitutional: Negative for fever and diaphoresis.   Respiratory: Positive for cough and wheezing. Negative for sputum production.    Cardiovascular: Negative for chest pain and palpitations.        Chest wall pain, leg, pain    Gastrointestinal: Negative for nausea, vomiting, abdominal pain and constipation.   Musculoskeletal: Positive for back pain. Negative for myalgias.   Skin: Negative for itching and rash.   Neurological: Negative for headaches.      Physical Exam  Laboratory/Imaging   Hemodynamics  Temp (24hrs), Av.7 °C (98 °F), Min:36.3 °C (97.3 °F), Max:37.1 °C (98.8 °F)   Temperature: 37.1 °C (98.8 °F)  Pulse  Av.8  Min: 49  Max: 99    Blood Pressure: 118/66 mmHg      Respiratory      Respiration: 16, Pulse Oximetry: 97 %     Work Of Breathing / Effort: Mild  RUL Breath Sounds: Clear, RML Breath Sounds: Diminished, RLL Breath Sounds: Diminished, MARC Breath Sounds: Clear, LLL Breath Sounds: Diminished    Fluids    Intake/Output Summary (Last 24 hours) at 17 1641  Last data filed at 17 1600   Gross per 24 hour   Intake   1350 ml   Output   3750 ml   Net  -2400 ml       Nutrition  Orders Placed This Encounter   Procedures   • Diet Order     Standing Status: Standing      Number of Occurrences: 1      Standing Expiration Date:      Order Specific Question:  Diet:     Answer:  Cardiac [6]     Order Specific Question:  Diet:     Answer:  2 Gram Sodium [7]     Order Specific Question:  Diet:     Answer:  Diabetic [3]     Physical Exam   Constitutional: She is oriented to person, place, and time. No distress.    HENT:   Mouth/Throat: Oropharynx is clear and moist. No oropharyngeal exudate.   Eyes: No scleral icterus.   Neck: Neck supple.   Cardiovascular: Normal rate and regular rhythm.    No murmur heard.  Pulmonary/Chest: No respiratory distress. She has wheezes.   Abdominal: Soft. Bowel sounds are normal. She exhibits no distension. There is no tenderness.   Musculoskeletal: She exhibits no edema.   Neurological: She is alert and oriented to person, place, and time.   Skin: Skin is warm. No rash noted. She is not diaphoretic.   Psychiatric: Her behavior is normal.   Nursing note and vitals reviewed.      Recent Labs      06/20/17 0229   WBC  13.4*   RBC  5.42*   HEMOGLOBIN  13.5   HEMATOCRIT  42.5   MCV  78.4*   MCH  24.9*   MCHC  31.8*   RDW  40.2   PLATELETCT  274   MPV  10.3     Recent Labs      06/20/17 0229   SODIUM  131*   POTASSIUM  4.0   CHLORIDE  95*   CO2  29   GLUCOSE  238*   BUN  31*   CREATININE  0.56   CALCIUM  9.0     Recent Labs      06/19/17   0332  06/20/17   0403  06/21/17   0309   INR  3.44*  2.55*  1.95*                  Assessment/Plan     COPD exacerbation (CMS-HCC)  Assessment & Plan  Respiratory therapy and oxygen   Patient is on her home level of oxygen  Placement difficult for discharge as she refuses a shelter because she states they make her move around too much and this makes her more short of breath      Drug abuse, IV (present on admission)  Assessment & Plan  Patient continues drug use, education provided this admission    Type 2 diabetes mellitus, uncontrolled (CMS-HCC) (present on admission)  Assessment & Plan  Continue insulin        Chest pain  Assessment & Plan    Non cardiac  CT negative for PE    Non compliance w medication regimen  Assessment & Plan  Recommended medication compliance    Nicotine dependence  Assessment & Plan  Tobacco cessation counseling and education provided    Respiratory failure (CMS-HCC)  Assessment & Plan  Continue steroids and respiratory therapy and  oxygen    Pulmonary edema  Assessment & Plan  Cardiac systolic function normal on echocardiogram      Polysubstance abuse  Assessment & Plan  .      Labs reviewed and Medications reviewed  Fagan catheter: No Fagan      DVT Prophylaxis: Warfarin (Coumadin)    Ulcer prophylaxis: Not indicated    Assessed for rehab: Patient returned to prior level of function, rehabilitation not indicated at this time

## 2017-06-21 NOTE — PROGRESS NOTES
Received report from day RN assumed care at 1915. Pt A&Ox 4 . Pt states 5 /10 pain at this time. Plan of care discussed with Pt, verbalized understanding. No family present at bedside. Assessment completed. All Pt needs met at this time. Call light within reach, bed alarm off , bed locked and in low position. Will continue to monitor.

## 2017-06-21 NOTE — PROGRESS NOTES
Inpatient Anticoagulation Service Note    Date: 6/21/2017  Reason for Anticoagulation: Pulmonary Embolism, Deep Vein Thrombosis        Hemoglobin Value: 13.5  Hematocrit Value: 42.5  Lab Platelet Value: 274  Target INR: 2.0 to 3.0    INR from last 7 days     Date/Time INR Value    06/21/17 0309 (!)1.95    06/20/17 0403 (!)2.55    06/19/17 0332 (!)3.44    06/18/17 0338 (!)3.65    06/17/17 0236 (!)3.66    06/16/17 0049 (!)2.78    06/15/17 0258 (!)2.32        Dose from last 7 days     Date/Time Dose (mg)    06/21/17 0309 5    06/20/17 1800 2.5    06/19/17 1800 2.5    06/18/17 1500 2.5    06/17/17 1700 2.5    06/16/17 1200 5    06/15/17 1700 5        Average Dose (mg):  (Home dose: 5 mg daily)  Significant Interactions: Corticosteroids, Other (Comments), Aspirin (SSRI)  Bridge Therapy: No     Comments: INR dropped into the subtherapeutic range today.  I have ordered warfarin 5 mg for tonight.  The patient may be able to resume his home dose at this point.  Pharmacy will monitor closely.    Plan:  INR with morning labs  Education Material Provided?: No (Chronic warfarin)  Pharmacist suggested discharge dosing: Likely to resume his home dose, but this is still to be determined.     Carolyn Lambert

## 2017-06-22 ENCOUNTER — PATIENT OUTREACH (OUTPATIENT)
Dept: HEALTH INFORMATION MANAGEMENT | Facility: OTHER | Age: 67
End: 2017-06-22

## 2017-06-22 LAB — GLUCOSE BLD-MCNC: 357 MG/DL (ref 65–99)

## 2017-06-22 NOTE — CARE PLAN
Problem: Communication  Goal: The ability to communicate needs accurately and effectively will improve  Outcome: MET Date Met:  06/21/17    Problem: Safety  Goal: Will remain free from injury  Outcome: MET Date Met:  06/21/17  Goal: Will remain free from falls  Outcome: MET Date Met:  06/21/17    Problem: Infection  Goal: Will remain free from infection  Outcome: MET Date Met:  06/21/17    Problem: Venous Thromboembolism (VTW)/Deep Vein Thrombosis (DVT) Prevention:  Goal: Patient will participate in Venous Thrombosis (VTE)/Deep Vein Thrombosis (DVT)Prevention Measures  Outcome: MET Date Met:  06/21/17  Patient verbalizes understanding of coumadin education and importance of follow-up with outpatient clinic for routine blood work.    Problem: Bowel/Gastric:  Goal: Normal bowel function is maintained or improved  Outcome: MET Date Met:  06/21/17  Goal: Will not experience complications related to bowel motility  Outcome: MET Date Met:  06/21/17    Problem: Knowledge Deficit  Goal: Knowledge of disease process/condition, treatment plan, diagnostic tests, and medications will improve  Outcome: MET Date Met:  06/21/17  Goal: Knowledge of the prescribed therapeutic regimen will improve  Outcome: MET Date Met:  06/21/17    Problem: Discharge Barriers/Planning  Goal: Patient’s continuum of care needs will be met  Outcome: MET Date Met:  06/21/17  Discharge instruction provided. Patient verbalizes understanding.     Problem: Pain Management  Goal: Pain level will decrease to patient’s comfort goal  Outcome: DISCHARGED-GOAL NOT MET Date Met:  06/21/17  Will follow-up outpatient with family medicine clinic from management.     Problem: Fluid Volume:  Goal: Will maintain balanced intake and output  Outcome: MET Date Met:  06/21/17    Problem: Respiratory:  Goal: Respiratory status will improve  Outcome: MET Date Met:  06/21/17  Respiratory status/pulse oximetry maintained. Patient denies SOB. Verbalizes understanding to  performed rest periods at home PRN.    Problem: Urinary Elimination:  Goal: Ability to reestablish a normal urinary elimination pattern will improve  Outcome: MET Date Met:  06/21/17  Voiding independently    Problem: Psychosocial Needs:  Goal: Level of anxiety will decrease  Outcome: DISCHARGED-GOAL NOT MET Date Met:  06/21/17  Will follow-up with family medicine clinic outpatient. Verbalizes understanding.

## 2017-06-22 NOTE — DISCHARGE INSTRUCTIONS
Discharge Instructions    Discharged to home by car with relative. Discharged via walking, hospital escort: Refused.  Special equipment needed: Oxygen    Be sure to schedule a follow-up appointment with your primary care doctor or any specialists as instructed.     Discharge Plan:   Diet Plan: Discussed  Activity Level: Discussed  Confirmed Follow up Appointment: Patient to Call and Schedule Appointment  Confirmed Symptoms Management: Discussed  Medication Reconciliation Updated: Yes  Influenza Vaccine Indication: Not indicated: Previously immunized this influenza season and > 8 years of age    I understand that a diet low in cholesterol, fat, and sodium is recommended for good health. Unless I have been given specific instructions below for another diet, I accept this instruction as my diet prescription.   Other diet: Diabetic cardiac diet= low sugar, low fat, low sodium diet    Special Instructions: None    · Is patient discharged on Warfarin / Coumadin?   Yes    You are receiving the drug warfarin. Please understand the importance of monitoring warfarin with scheduled PT/INR blood draws.  Follow-up with a call to your personal Doctor's office in 3 days to schedule a PT/INR. .    IMPORTANT: HOW TO USE THIS INFORMATION:  This is a summary and does NOT have all possible information about this product. This information does not assure that this product is safe, effective, or appropriate for you. This information is not individual medical advice and does not substitute for the advice of your health care professional. Always ask your health care professional for complete information about this product and your specific health needs.      WARFARIN - ORAL (WARF-uh-rin)      COMMON BRAND NAME(S): Coumadin      WARNING:  Warfarin can cause very serious (possibly fatal) bleeding. This is more likely to occur when you first start taking this medication or if you take too much warfarin. To decrease your risk for bleeding,  "your doctor or other health care provider will monitor you closely and check your lab results (INR test) to make sure you are not taking too much warfarin. Keep all medical and laboratory appointments. Tell your doctor right away if you notice any signs of serious bleeding. See also Side Effects section.      USES:  This medication is used to treat blood clots (such as in deep vein thrombosis-DVT or pulmonary embolus-PE) and/or to prevent new clots from forming in your body. Preventing harmful blood clots helps to reduce the risk of a stroke or heart attack. Conditions that increase your risk of developing blood clots include a certain type of irregular heart rhythm (atrial fibrillation), heart valve replacement, recent heart attack, and certain surgeries (such as hip/knee replacement). Warfarin is commonly called a \"blood thinner,\" but the more correct term is \"anticoagulant.\" It helps to keep blood flowing smoothly in your body by decreasing the amount of certain substances (clotting proteins) in your blood.      HOW TO USE:  Read the Medication Guide provided by your pharmacist before you start taking warfarin and each time you get a refill. If you have any questions, ask your doctor or pharmacist. Take this medication by mouth with or without food as directed by your doctor or other health care professional, usually once a day. It is very important to take it exactly as directed. Do not increase the dose, take it more frequently, or stop using it unless directed by your doctor. Dosage is based on your medical condition, laboratory tests (such as INR), and response to treatment. Your doctor or other health care provider will monitor you closely while you are taking this medication to determine the right dose for you. Use this medication regularly to get the most benefit from it. To help you remember, take it at the same time each day. It is important to eat a balanced, consistent diet while taking warfarin. Some " foods can affect how warfarin works in your body and may affect your treatment and dose. Avoid sudden large increases or decreases in your intake of foods high in vitamin K (such as broccoli, cauliflower, cabbage, brussels sprouts, kale, spinach, and other green leafy vegetables, liver, green tea, certain vitamin supplements). If you are trying to lose weight, check with your doctor before you try to go on a diet. Cranberry products may also affect how your warfarin works. Limit the amount of cranberry juice (16 ounces/480 milliliters a day) or other cranberry products you may drink or eat.      SIDE EFFECTS:  Nausea, loss of appetite, or stomach/abdominal pain may occur. If any of these effects persist or worsen, tell your doctor or pharmacist promptly. Remember that your doctor has prescribed this medication because he or she has judged that the benefit to you is greater than the risk of side effects. Many people using this medication do not have serious side effects. This medication can cause serious bleeding if it affects your blood clotting proteins too much (shown by unusually high INR lab results). Even if your doctor stops your medication, this risk of bleeding can continue for up to a week. Tell your doctor right away if you have any signs of serious bleeding, including: unusual pain/swelling/discomfort, unusual/easy bruising, prolonged bleeding from cuts or gums, persistent/frequent nosebleeds, unusually heavy/prolonged menstrual flow, pink/dark urine, coughing up blood, vomit that is bloody or looks like coffee grounds, severe headache, dizziness/fainting, unusual or persistent tiredness/weakness, bloody/black/tarry stools, chest pain, shortness of breath, difficulty swallowing. Tell your doctor right away if any of these unlikely but serious side effects occur: persistent nausea/vomiting, severe stomach/abdominal pain, yellowing eyes/skin. This drug rarely has caused very serious (possibly fatal)  problems if its effects lead to small blood clots (usually at the beginning of treatment). This can lead to severe skin/tissue damage that may require surgery or amputation if left untreated. Patients with certain blood conditions (protein C or S deficiency) may be at greater risk. Get medical help right away if any of these rare but serious side effects occur: painful/red/purplish patches on the skin (such as on the toe, breast, abdomen), change in the amount of urine, vision changes, confusion, slurred speech, weakness on one side of the body. A very serious allergic reaction to this drug is rare. However, get medical help right away if you notice any symptoms of a serious allergic reaction, including: rash, itching/swelling (especially of the face/tongue/throat), severe dizziness, trouble breathing. This is not a complete list of possible side effects. If you notice other effects not listed above, contact your doctor or pharmacist. In the [place] - Call your doctor for medical advice about side effects. You may report side effects to FDA at 5-780-WXN-6867. In [place] - Call your doctor for medical advice about side effects. You may report side effects to [facility] [place] at [phone].      PRECAUTIONS:  Before taking warfarin, tell your doctor or pharmacist if you are allergic to it; or if you have any other allergies. This product may contain inactive ingredients, which can cause allergic reactions or other problems. Talk to your pharmacist for more details. Before using this medication, tell your doctor or pharmacist your medical history, especially of: blood disorders (such as anemia, hemophilia), bleeding problems (such as bleeding of the stomach/intestines, bleeding in the brain), blood vessel disorders (such as aneurysms), recent major injury/surgery, liver disease, alcohol use, mental/mood disorders (including memory problems), frequent falls/injuries. It is important that all your doctors and dentists know  that you take warfarin. Before having surgery or any medical/dental procedures, tell your doctor or dentist that you are taking this medication and about all the products you use (including prescription drugs, nonprescription drugs, and herbal products). Avoid getting injections into the muscles. If you must have an injection into a muscle (for example, a flu shot), it should be given in the arm. This way, it will be easier to check for bleeding and/or apply pressure bandages. This medication may cause stomach bleeding. Daily use of alcohol while using this medicine will increase your risk for stomach bleeding and may also affect how this medication works. Limit or avoid alcoholic beverages. If you have not been eating well, if you have an illness or infection that causes fever, vomiting, or diarrhea for more than 2 days, or if you start using any antibiotic medications, contact your doctor or pharmacist immediately because these conditions can affect how warfarin works. This medication can cause heavy bleeding. To lower the chance of getting cut, bruised, or injured, use great caution with sharp objects like safety razors and nail cutters. Use an electric razor when shaving and a soft toothbrush when brushing your teeth. Avoid activities such as contact sports. If you fall or injure yourself, especially if you hit your head, call your doctor immediately. Your doctor may need to check you. The Food & Drug Administration has stated that generic warfarin products are interchangeable. However, consult your doctor or pharmacist before switching warfarin products. Be careful not to take more than one medication that contains warfarin unless specifically directed by the doctor or health care provider who is monitoring your warfarin treatment. Older adults may be at greater risk for bleeding while using this drug. This medication is not recommended for use during pregnancy because of serious (possibly fatal) harm to an  "unborn baby. Discuss the use of reliable forms of birth control with your doctor. If you become pregnant or think you may be pregnant, tell your doctor immediately. If you are planning pregnancy, discuss a plan for managing your condition with your doctor before you become pregnant. Your doctor may switch the type of medication you use during pregnancy. Very small amounts of this medication may pass into breast milk but is unlikely to harm a nursing infant. Consult your doctor before breast-feeding.      DRUG INTERACTIONS:  Drug interactions may change how your medications work or increase your risk for serious side effects. This document does not contain all possible drug interactions. Keep a list of all the products you use (including prescription/nonprescription drugs and herbal products) and share it with your doctor and pharmacist. Do not start, stop, or change the dosage of any medicines without your doctor's approval. Warfarin interacts with many prescription, nonprescription, vitamin, and herbal products. This includes medications that are applied to the skin or inside the vagina or rectum. The interactions with warfarin usually result in an increase or decrease in the \"blood-thinning\" (anticoagulant) effect. Your doctor or other health care professional should closely monitor you to prevent serious bleeding or clotting problems. While taking warfarin, it is very important to tell your doctor or pharmacist of any changes in medications, vitamins, or herbal products that you are taking. Some products that may interact with this drug include: capecitabine, imatinib, mifepristone. Aspirin, aspirin-like drugs (salicylates), and nonsteroidal anti-inflammatory drugs (NSAIDs such as ibuprofen, naproxen, celecoxib) may have effects similar to warfarin. These drugs may increase the risk of bleeding problems if taken during treatment with warfarin. Carefully check all prescription/nonprescription product labels " (including drugs applied to the skin such as pain-relieving creams) since the products may contain NSAIDs or salicylates. Talk to your doctor about using a different medication (such as acetaminophen) to treat pain/fever. Low-dose aspirin and related drugs (such as clopidogrel, ticlopidine) should be continued if prescribed by your doctor for specific medical reasons such as heart attack or stroke prevention. Consult your doctor or pharmacist for more details. Many herbal products interact with warfarin. Tell your doctor before taking any herbal products, especially bromelains, coenzyme Q10, cranberry, danshen, dong quai, fenugreek, garlic, ginkgo biloba, ginseng, and Henny's wort, among others. This medication may interfere with a certain laboratory test to measure theophylline levels, possibly causing false test results. Make sure laboratory personnel and all your doctors know you use this drug.      OVERDOSE:  If overdose is suspected, contact a poison control center or emergency room immediately.  residents can call the [employer] Poison Hotline at 1-630.543.2961. [place] residents can call a provincial poison control center. Symptoms of overdose may include: bloody/black/tarry stools, pink/dark urine, unusual/prolonged bleeding.      NOTES:  Do not share this medication with others. Laboratory and/or medical tests (such as INR, complete blood count) must be performed periodically to monitor your progress or check for side effects. Consult your doctor for more details.      MISSED DOSE:  For the best possible benefit, do not miss any doses. If you do miss a dose and remember on the same day, take it as soon as you remember. If you remember on the next day, skip the missed dose and resume your usual dosing schedule. Do not double the dose to catch up because this could increase your risk for bleeding. Keep a record of missed doses to give to your doctor or pharmacist. Contact your doctor or pharmacist if you  miss 2 or more doses in a row.      STORAGE:  Store at room temperature away from light and moisture. Do not store in the bathroom. Keep all medications away from children and pets. Do not flush medications down the toilet or pour them into a drain unless instructed to do so. Properly discard this product when it is  or no longer needed. Consult your pharmacist or local waste disposal company for more details about how to safely discard your product.      MEDICAL ALERT:  Your condition and medication can cause complications in a medical emergency. For information about enrolling in MedicAlert, call 1-116.908.7324 ([place]) or [phone] ([place]).      Information last revised 2010 Copyright(c)  [employer]             · Is patient Post Blood Transfusion?  No    Depression / Suicide Risk    As you are discharged from this [facility] facility, it is important to learn how to keep safe from harming yourself.    Recognize the warning signs:  · Abrupt changes in personality, positive or negative- including increase in energy   · Giving away possessions  · Change in eating patterns- significant weight changes-  positive or negative  · Change in sleeping patterns- unable to sleep or sleeping all the time   · Unwillingness or inability to communicate  · Depression  · Unusual sadness, discouragement and loneliness  · Talk of wanting to die  · Neglect of personal appearance   · Rebelliousness- reckless behavior  · Withdrawal from people/activities they love  · Confusion- inability to concentrate     If you or a loved one observes any of these behaviors or has concerns about self-harm, here's what you can do:  · Talk about it- your feelings and reasons for harming yourself  · Remove any means that you might use to hurt yourself (examples: pills, rope, extension cords, firearm)  · Get professional help from the community (Mental Health, Substance Abuse, psychological counseling)  · Do not be  "alone:Call your Safe Contact- someone whom you trust who will be there for you.  · Call your local CRISIS HOTLINE 987-5904 or 964-607-2019  · Call your local Children's Mobile Crisis Response Team [place] (757) 674-6775 or www.[employer]  · Call the toll free National Suicide Prevention Hotlines   · National Suicide Prevention Lifeline 371-378-JDMJ (1950)  · [employer] Ass[employer] Network 800-SUICIDE (644-5201)            Pulmonary Edema  Pulmonary edema is abnormal fluid buildup in the lungs that can make it hard to breathe.  HOME CARE  · Talk to your doctor about an exercise program.  · Eat a healthy diet:  ¨ Eat fresh fruits, vegetables, and lean meats.  ¨ Limit high fat and salty foods.  ¨ Avoid processed, canned, or fried foods.  ¨ Avoid fast food.  · Follow your doctor's advice about taking medicine and recording the medicine you take.  · Follow your doctor's advice about keeping a record of your weight.  · Talk to your doctor about keeping track of your blood pressure.  · Do not smoke.  · Do not use nicotine patches or nicotine gum.  · Make a follow-up appointment with your doctor.  · Ask your doctor for a copy of your latest heart tracing (ECG) and keep a copy with you at all times.  GET HELP RIGHT AWAY IF:   · You have chest pain. THIS IS AN EMERGENCY. Do not wait to see if the pain will go away. Call for local emergency medical help. Do not drive yourself to the hospital.  · You have sweating, feel sick to your stomach (nauseous), or are experiencing shortness of breath.  · Your weight increases more than your doctor tells you it should.  · You start to have shortness of breath.  · You notice more swelling in your hands, feet, ankles, or belly.  · You have dizziness, blurred vision, headache, or unsteadiness that does not go away.  · You cough up bloody spit.  · You have a cough that does not go away.  · You are unable to sleep because it is hard to breathe.  · You begin to feel a \"jumping\" or " "\"fluttering\" sensation (palpitations) in the chest that is unusual for you.  MAKE SURE YOU:   · Understand these instructions.  · Will watch your condition.  · Will get help right away if you are not doing well or get worse.     This information is not intended to replace advice given to you by your health care provider. Make sure you discuss any questions you have with your health care provider.     Document Released: 12/06/2010 Document Revised: 12/23/2014 Document Reviewed: 08/25/2014  Elsevier Interactive Patient Education ©2016 [employer]      "

## 2017-06-22 NOTE — PROGRESS NOTES
At 2030 security called after patient wandered off unit. Heart monitor was off and IV removed at this time. Patient found on elevator returning to unit independently with hospital oxygen still intact. Education provided explaining that she can't leave unit/floor till discharge paperwork given and daughter arrived with vehicle. Patient verbalizes understanding. Currently resting in bed. Will continue to monitor.

## 2017-06-22 NOTE — CARE PLAN
Problem: Bowel/Gastric:  Goal: Will not experience complications related to bowel motility  Outcome: MET Date Met:  06/21/17

## 2017-06-22 NOTE — PROGRESS NOTES
"Report received at bedside and assumed care at this time. Patient c/o back and BLE pain, cramping \"5/10\". MS contin given as prescribed with bedtime medications. Patient ambulating up in hallway. Minimal SOB noted with exertion. Nasal canula continued - 2L/min. Right upper arm IV discontinued pending discharge home. Paperwork completed. Waiting for daughter to arrive. Home medications picked up from pharmacy and patient belongings/oxygen supplies retrieved from ED. Verified call-light within reach and bed wheels locked while patient in room. Instructed patient to call for assistance PRN. Will continue to monitor patient.   "

## 2017-06-22 NOTE — DISCHARGE SUMMARY
CHIEF COMPLAINT ON ADMISSION  Chief Complaint   Patient presents with   • Shortness of Breath   • Chest Pain     x3 days, sharp       CODE STATUS  Full Code    HPI & HOSPITAL COURSE  This is a 66 y.o. female here with shortness of breath and increased work of breathing and has history of copd on 2 liters oxygen at home at all times. She was admitted and treated with diuresis for pulmonary vascular congestion on chest xray and steroids and respiratory therapy. Her work of breathing improved to her baseline and oxygen need was maintained at her home level.    Therefore, she is discharged in good and stable condition with close outpatient follow-up.    SPECIFIC OUTPATIENT FOLLOW-UP  Primary care provider Mamadou Fortune at [facility] clinic within 2-4 weeks  Anticoagulation clinic in 2 days    DISCHARGE PROBLEM LIST  Active Problems:    COPD exacerbation (CMS-HCC) POA: Unknown    Drug abuse, IV POA: Yes    Type 2 diabetes mellitus, uncontrolled (CMS-HCC) (Chronic) POA: Yes      Overview: Onset 2011    Non compliance w medication regimen POA: Unknown    Nicotine dependence POA: Unknown    Polysubstance abuse POA: Unknown  Resolved Problems:    Chest pain POA: Unknown    Respiratory failure (CMS-HCC) POA: Unknown    Pulmonary edema POA: Unknown      FOLLOW UP  No future appointments.  Pcp Pt States None            MEDICATIONS ON DISCHARGE   Zenia Ordaz   Home Medication Instructions [id number]    Printed on:06/21/17 1911   Medication Information                      albuterol 108 (90 BASE) MCG/ACT Aero Soln inhalation aerosol  Inhale 2 Puffs by mouth every four hours as needed for Shortness of Breath.             alprazolam (XANAX) 0.5 MG Tab  Take 1 Tab by mouth 3 times a day as needed for Anxiety.             aspirin EC 81 MG EC tablet  Take 1 Tab by mouth every day.             budesonide-formoterol (SYMBICORT) 80-4.5 MCG/ACT Aerosol  Inhale 2 Puffs by mouth 2 Times a Day.             carvedilol (COREG) 3.125 MG  Tab  Take 1 Tab by mouth 2 times a day, with meals.             citalopram (CELEXA) 20 MG Tab  Take 20 mg by mouth every evening.             gabapentin (NEURONTIN) 300 MG Cap  Take 1 Cap by mouth 3 times a day.             metformin (GLUCOPHAGE) 850 MG Tab  Take 1 Tab by mouth every evening.             morphine ER (MS CONTIN) 15 MG Tab CR tablet  Take 1 Tab by mouth every 12 hours.             nitroglycerin (NITROSTAT) 0.4 MG SL Tab  1 tab taken sublingually every 5 minutes as need for chest pain.  Up to 3 doses per day in total.             oxycodone immediate release (ROXICODONE) 10 MG immediate release tablet  Take 1 Tab by mouth every 8 hours as needed for Severe Pain.             predniSONE (DELTASONE) 10 MG Tab  Take 3 tablets daily for 3 days, then take 2 tablets daily for 3 days, then take 1 tablet daily for 3 days, then stop.             risperidone (RISPERDAL) 0.5 MG Tab  Take 1 Tab by mouth every bedtime.             tiotropium (SPIRIVA) 18 MCG Cap  Inhale 1 Cap by mouth every day.             trazodone (DESYREL) 50 MG Tab  Take 50 mg by mouth every evening.             warfarin (COUMADIN) 5 MG Tab  Take 5 mg by mouth every day.                 DIET  Orders Placed This Encounter   Procedures   • Diet Order     Standing Status: Standing      Number of Occurrences: 1      Standing Expiration Date:      Order Specific Question:  Diet:     Answer:  Cardiac [6]     Order Specific Question:  Diet:     Answer:  2 Gram Sodium [7]     Order Specific Question:  Diet:     Answer:  Diabetic [3]       ACTIVITY  As tolerated.  Weight bearing as tolerated      CONSULTATIONS  none    PROCEDURES  none    LABORATORY  Lab Results   Component Value Date/Time    SODIUM 131* 06/20/2017 02:29 AM    POTASSIUM 4.0 06/20/2017 02:29 AM    CHLORIDE 95* 06/20/2017 02:29 AM    CO2 29 06/20/2017 02:29 AM    GLUCOSE 238* 06/20/2017 02:29 AM    BUN 31* 06/20/2017 02:29 AM    CREATININE 0.56 06/20/2017 02:29 AM        Lab Results    Component Value Date/Time    WBC 13.4* 06/20/2017 02:29 AM    HEMOGLOBIN 13.5 06/20/2017 02:29 AM    HEMATOCRIT 42.5 06/20/2017 02:29 AM    PLATELET COUNT 274 06/20/2017 02:29 AM        Total time of the discharge process exceeds 45 minutes ensuring she had a safe discharge plan which is home with her daughter and ensuring she has home oxygen supplies.

## 2017-06-26 ENCOUNTER — TELEPHONE (OUTPATIENT)
Dept: VASCULAR LAB | Facility: MEDICAL CENTER | Age: 67
End: 2017-06-26

## 2017-06-28 ENCOUNTER — TELEPHONE (OUTPATIENT)
Dept: VASCULAR LAB | Facility: MEDICAL CENTER | Age: 67
End: 2017-06-28

## 2017-06-28 NOTE — ADDENDUM NOTE
Encounter addended by: Max Hawthorne M.D. on: 6/28/2017  4:26 AM<BR>     Documentation filed: Clinical Notes

## 2017-06-28 NOTE — TELEPHONE ENCOUNTER
"Patient's sister called stating that this patient is currently an Inpatient at \"[facility]" hospital in [place], [place]. She's currently staying with her daughter there, but did not have a phone number to reach this patient at. Patient later called and confirmed by leaving a voicemail stating she was currently in a hospital in [place].   "

## 2017-07-17 ENCOUNTER — TELEPHONE (OUTPATIENT)
Dept: VASCULAR LAB | Facility: MEDICAL CENTER | Age: 67
End: 2017-07-17

## 2017-07-17 NOTE — TELEPHONE ENCOUNTER
Chart reviewed in accordance with IVC filter protocol.  Pt has been difficult to reach.  Recent note mentions the pt is hospitalized in [place].    Will attempt to reach the pt in 1 month.    Irene LOPEZ

## 2017-08-02 ENCOUNTER — TELEPHONE (OUTPATIENT)
Dept: VASCULAR LAB | Facility: MEDICAL CENTER | Age: 67
End: 2017-08-02

## 2017-08-02 NOTE — TELEPHONE ENCOUNTER
[facility]    Spoke with emergency contact. Pt is out of the hospital but does not have a phone.  Emergency contact will pass along the phone number for the clinic for the patient to call when she is available.    Edgar Moralez, WILLYD

## 2017-08-29 ENCOUNTER — TELEPHONE (OUTPATIENT)
Dept: VASCULAR LAB | Facility: MEDICAL CENTER | Age: 67
End: 2017-08-29

## 2017-08-29 NOTE — TELEPHONE ENCOUNTER
[facility]    Emergency contact states pt lives with her daughter.  Called daughter and left message for pt to contact clinic.      Daughter's phone number: [phone]    Bindu CastroD

## 2017-09-11 ENCOUNTER — TELEPHONE (OUTPATIENT)
Dept: VASCULAR LAB | Facility: MEDICAL CENTER | Age: 67
End: 2017-09-11

## 2017-09-11 NOTE — TELEPHONE ENCOUNTER
Left msg on pt's daughter's cell phone to return call regarding IVC filter protocol.      Irene LOPEZ

## 2017-09-19 ENCOUNTER — TELEPHONE (OUTPATIENT)
Dept: VASCULAR LAB | Facility: MEDICAL CENTER | Age: 67
End: 2017-09-19

## 2017-09-19 NOTE — TELEPHONE ENCOUNTER
[facility]    Was able to make contact with pt using pt's daughter's phone number.  Pt states she has moved to [place] and her physician, Dr Grimaldo, is managing her warfarin.  Her last INR was taken 2 weeks ago.  Will discharge pt from [facility].    Edgar Moralez, PharmD  CC Dr Michael Bloch

## 2017-10-30 ENCOUNTER — TELEPHONE (OUTPATIENT)
Dept: VASCULAR LAB | Facility: MEDICAL CENTER | Age: 67
End: 2017-10-30

## 2018-01-03 ENCOUNTER — TELEPHONE (OUTPATIENT)
Dept: VASCULAR LAB | Facility: MEDICAL CENTER | Age: 68
End: 2018-01-03

## 2018-01-03 NOTE — TELEPHONE ENCOUNTER
Unable to reach pt or her daughter after multiple attempts.  Will eloina IVC filter as indefinite.    Irene LOPEZ

## 2020-09-11 NOTE — PROGRESS NOTES
Inpatient Anticoagulation Service Note    Date: 4/17/2017  Reason for Anticoagulation: Pulmonary Embolism (4/3/17)        Hemoglobin Value: 12.6  Hematocrit Value: 39.7  Lab Platelet Value: 225  Target INR: 2.0 to 3.0    INR from last 7 days     Date/Time INR Value    04/17/17 0247 (!)4.34    04/16/17 0121 (!)2.73    04/15/17 0159 (!)1.72    04/14/17 0200 (!)2.02    04/13/17 1710 (!)2.08        Dose from last 7 days     Date/Time Dose (mg)    04/17/17 1200 0    04/16/17 1400 0    04/15/17 1300 5    04/14/17 1300 7.5    04/13/17 2100 --        Average Dose (mg):  (New start last admission (~6mg/day))  Significant Interactions: Aspirin  Bridge Therapy: No     Comments: Substantial increase in INR today despite held dose yesterday, possibly secondary to liver function. Continue to hold dose. AST/ALT and Tbili all trending down.  No S/S bleeding noted, CBC stable. Will continue to follow.    Plan:  HOLD warfarin tonight, INR tomorrow  Education Material Provided?: No (Declined handout)  Pharmacist suggested discharge dosing: TBMYRIAM Morris, PHARMD              PCP reviewed and corrected the script.

## 2021-03-03 DIAGNOSIS — Z23 NEED FOR VACCINATION: ICD-10-CM

## 2022-09-20 NOTE — ED AVS SNAPSHOT
I-Stand Access Code: [email or web address]  Expires: 5/29/2017 12:39 PM    Your email address is not on file at [facility] [employer]  Email Addresses are required for you to sign up for I-Stand, please contact [phone] to verify your personal information and to provide your email address prior to attempting to register for I-Stand.    Zenia Ord[place]  [place] [address]    beprettyt  A secure, online tool to manage your health information     [employer]’s I-Stand® is a secure, online tool that connects you to your personalized health information from the privacy of your home -- day or night - making it very easy for you to manage your healthcare. Once the activation process is completed, you can even access your medical information using the I-Stand spenser, which is available for free in the Apple Spenser store or Google Play store.     To learn more about I-Stand, visit [email or web address]    There are two levels of access available (as shown below):   My Chart Features  [facility] Primary Care Doctor [facility]  Specialists [facility]  Urgent  Care Non-[facility] Primary Care Doctor   Email your healthcare team securely and privately 24/7 X X X    Manage appointments: schedule your next appointment; view details of past/upcoming appointments X      Request prescription refills. X      View recent personal medical records, including lab and immunizations X X X X   View health record, including health history, allergies, medications X X X X   Read reports about your outpatient visits, procedures, consult and ER notes X X X X   See your discharge summary, which is a recap of your hospital and/or ER visit that includes your diagnosis, lab results, and care plan X X  X     How to register for beprettyt:  Once your e-mail address has been verified, follow the following steps to sign up for beprettyt.     1. Go to  [email or web address]  2. Click on the Sign Up Now box, which takes you to the New Member Sign Up page. You will  need to provide the following information:  a. Enter your [employer] Access Code exactly as it appears at the top of this page. (You will not need to use this code after you’ve completed the sign-up process. If you do not sign up before the expiration date, you must request a new code.)   b. Enter your date of birth.   c. Enter your home email address.   d. Click Submit, and follow the next screen’s instructions.  3. Create a [employer] ID. This will be your [employer] login ID and cannot be changed, so think of one that is secure and easy to remember.  4. Create a [employer] password. You can change your password at any time.  5. Enter your Password Reset Question and Answer. This can be used at a later time if you forget your password.   6. Enter your e-mail address. This allows you to receive e-mail notifications when new information is available in [employer].  7. Click Sign Up. You can now view your health information.    For assistance activating your [employer] account, call [phone]          [Negative] : Heme/Lymph [Chest Pain] : no chest pain [Palpitations] : no palpitations [Paroxysmal Nocturnal Dyspnea] : no paroxysmal nocturnal dyspnea [Shortness Of Breath] : no shortness of breath [Wheezing] : no wheezing [Cough] : no cough [Dyspnea on Exertion] : no dyspnea on exertion [Abdominal Pain] : no abdominal pain [Nausea] : no nausea [Constipation] : no constipation [Vomiting] : no vomiting [Joint Pain] : no joint pain [Joint Stiffness] : no joint stiffness [Muscle Pain] : no muscle pain [Itching] : no itching [Skin Rash] : no skin rash [Headache] : no headache [Dizziness] : no dizziness [Memory Loss] : no memory loss [Unsteady Walking] : no ataxia [Suicidal] : not suicidal [FreeTextEntry6] : resipiratory symptoms resolved

## 2024-01-01 NOTE — PROGRESS NOTES
Problem: Physiological Stability  Goal: Vital signs and physical assessments stable and within expected parameters  Note: Infant's top popped yesterday. He is swaddled and his temperatures have been stable.      Hospital Medicine Progress Note, Adult, Complex               Author: Renato Sawyer Date & Time created: 4/27/2017  2:23 PM     Interval History:  67 y/o female with COPD exacerbation and h/o PE    H/O PE-complains of mild pain at her neck with IVC filter insertion site, but no bleeding. Denies any new cp or sob. Pacing the hallways. Spoke with the TADS people and no available beds. Working on the [employer] for her.     COPD-feels like she is almost back to her baseline.     Review of Systems:  Review of Systems   Constitutional: Negative for fever.   Eyes: Negative for blurred vision.   Respiratory: Positive for cough and shortness of breath. Negative for sputum production, wheezing and stridor.         More improvement today     Cardiovascular: Negative for chest pain and leg swelling.   Gastrointestinal: Negative for nausea and vomiting.   Genitourinary: Negative for dysuria and flank pain.   Musculoskeletal: Negative for neck pain.   Skin: Negative for itching and rash.   Neurological: Positive for weakness (nearly resolved). Negative for dizziness and headaches.   Psychiatric/Behavioral: Negative for suicidal ideas. The patient is nervous/anxious. The patient does not have insomnia.         Tangential, impulsive   All other systems reviewed and are negative.      Physical Exam:  Physical Exam   Constitutional: She is oriented to person, place, and time. She appears well-developed and well-nourished. No distress.   Mumbles a lot to herself   HENT:   Head: Normocephalic and atraumatic.   NC in place   Eyes: Conjunctivae are normal. No scleral icterus.   Neck: Neck supple.   Cardiovascular: Normal rate and regular rhythm.  Exam reveals no gallop and no friction rub.    No murmur heard.  Pulmonary/Chest: Effort normal. No stridor. No respiratory distress. She has wheezes (scant, less intense today).   Abdominal: Soft. Bowel sounds are normal. She exhibits no distension. There is no tenderness.   Musculoskeletal:  She exhibits no tenderness.   Neurological: She is alert and oriented to person, place, and time. No cranial nerve deficit.   Skin: Skin is warm and dry. She is not diaphoretic. No erythema.   Psychiatric: She has a normal mood and affect. Her behavior is normal.   Nursing note and vitals reviewed.      Labs:        Invalid input(s): [id number]      Recent Labs      17   SODIUM  135  135   POTASSIUM  4.3  4.7   CHLORIDE  101  101   CO2  29  32   BUN  23*  24*   CREATININE  0.62  0.55   CALCIUM  9.0  9.1     Recent Labs      17   GLUCOSE  395*  121*     Recent Labs      17   1057  17   0126  17   RBC   --    --   4.08*   HEMOGLOBIN   --    --   10.9*   HEMATOCRIT   --    --   34.9*   PLATELETCT   --    --   220   PROTHROMBTM  19.9*  17.7*   --    INR  1.64*  1.41*   --      Recent Labs      17   WBC  11.0*           Hemodynamics:  Temp (24hrs), Av.3 °C (97.3 °F), Min:36.1 °C (96.9 °F), Max:36.7 °C (98 °F)  Temperature: 36.2 °C (97.1 °F)  Pulse  Av.5  Min: 55  Max: 97   Blood Pressure: 157/73 mmHg, NIBP: 109/60 mmHg     Respiratory:    Respiration: 20, Pulse Oximetry: 97 %, O2 Daily Delivery Respiratory : Silicone Nasal Cannula     Given By:: Mouthpiece, Work Of Breathing / Effort: Mild  RUL Breath Sounds: Diminished, RML Breath Sounds: Diminished, RLL Breath Sounds: Diminished, MARC Breath Sounds: Diminished, LLL Breath Sounds: Diminished  Fluids:  No intake or output data in the 24 hours ending 17 1423  Weight: 89.4 kg (197 lb 1.5 oz)  GI/Nutrition:  Orders Placed This Encounter   Procedures   • DIET ORDER     Standing Status: Standing      Number of Occurrences: 1      Standing Expiration Date:      Order Specific Question:  Diet:     Answer:  Consistent Carbohydrate [4]     Medical Decision Making, by Problem:  Active Hospital Problems    Diagnosis   • COPD exacerbation (CMS-HCC) [J44.1]-continues to  improve  -continue current therapies, try to wean o2 as much as possible  -wean medrol again today     • Pulmonary emboli (CMS-[facility]) [I26.99]  -s/p IVC filter     • COPD (chronic obstructive pulmonary disease) (CMS-HCC) [J44.9]   • Type 2 diabetes mellitus, uncontrolled (CMS-HCC) [E11.65]SSI, diabetic diet, sugars are better today  -will increase lantus again today  -very labile blood sugars     • Hepatitis C [B19.20]IVDU history     • Tobacco abuse [Z72.0]cessation counseling     • Morbidly obese (CMS-[facility]) [E66.01]dietary modifications     • Opioid dependence (CMS-HCC) [F11.20]ms contin, no changes today     • Mood disorder (CMS-HCC) [F39]celexa     • Hemorrhagic disorder due to extrinsic circulating anticoagulants (CMS-HCC) [D68.32]-stopped as outlined above     • Acute on chronic respiratory failure with hypoxemia (CMS-HCC) [J96.21]2 liters at baseline  -at her baseline again today, no new issues, continue above treatments     • Non compliance w medication regimen [Z91.14]without domicile  -working on housing for patient, has income, will need to go [facility] the [facility]   • Homeless [Z59.0]   • IV drug abuse [F19.10]   • Diabetic neuropathy (CMS-HCC) [E11.40]neurontin     • Essential hypertension [I10]-well controlled  -continue norvasc, outpatient meds       Labs reviewed, Medications reviewed and Radiology images reviewed  Fagan catheter: No Fagan      DVT: IVC filter.        Assessed for rehab: Patient was assess for and/or received rehabilitation services during this hospitalization
